# Patient Record
Sex: FEMALE | Race: WHITE | NOT HISPANIC OR LATINO
[De-identification: names, ages, dates, MRNs, and addresses within clinical notes are randomized per-mention and may not be internally consistent; named-entity substitution may affect disease eponyms.]

---

## 2020-01-10 ENCOUNTER — APPOINTMENT (OUTPATIENT)
Dept: UROLOGY | Facility: AMBULATORY SURGERY CENTER | Age: 85
End: 2020-01-10
Payer: MEDICARE

## 2020-01-10 PROBLEM — Z00.00 ENCOUNTER FOR PREVENTIVE HEALTH EXAMINATION: Status: ACTIVE | Noted: 2020-01-10

## 2020-01-10 PROCEDURE — 99204 OFFICE O/P NEW MOD 45 MIN: CPT

## 2020-01-10 NOTE — ASSESSMENT
[FreeTextEntry1] : Discussed medical expulsive therapy vs. ureteroscopy\par Discussed risks of sepsis, death, N/V, fever, UTI\par Discussed risks of intervention \par Will discuss tamsulosin with internist, to ensure he is OK with risks of hypotension/fall risk\par Patient stated internist zahra blood yesterday; will call to check Cr; if not drawn will ask internist to draw in office which is where she gets her usual bloodwork. \par Patient to strain all urine to capture stone if possible.

## 2020-01-10 NOTE — HISTORY OF PRESENT ILLNESS
[FreeTextEntry1] : CC: Ureteral stone, renal lesion \par \par HPI: Patient is an 84 year old woman with recent back pain.  Imaging obtained revealing nephrolithiasis including a ureteral stone.  The stone is 4-5 mm in size.  It is on the left side.  The stone is located in the distal left ureter, approximately 4-5 cm proximal to the left UVJ.  There is mild hydroureter.  There is a small 1cm right sided enhancing renal lesion, possible small renal cell carcinoma.  There are other small nonobstructing renal stones bilaterally.  No dysuria, burning, pain.   She has never had any known stones prior to this issue. \par \par Flank pain present, intermittent, sometimes up to 10/10 pain \par Nausea : denies\par Vomiting : denies \par Fever : denies \par Urinary symptoms : some sense of urgency but then little urine comes out; this is only for the lasat couple of days, possible tunnel syndrome  \par Duration: 2-3 weeks\par Location: left flank\par Severity: moderate to severe \par Stability: stable \par Quality: dull \par \par FAMHX: Adopted, no knowledge of family history \par SURGHX:  left pneumonectomy, craniotomy for aneurysm\par SOCIAL: 2 year history of smoking 40 years ago\par ROS: weakness, uses walker\par

## 2020-01-10 NOTE — PHYSICAL EXAM
[Normal Appearance] : normal appearance [General Appearance - In No Acute Distress] : no acute distress [Well Groomed] : well groomed [Heart Rate And Rhythm] : Heart rate and rhythm were normal [] : no respiratory distress [Respiration, Rhythm And Depth] : normal respiratory rhythm and effort [Abdomen Soft] : soft [Exaggerated Use Of Accessory Muscles For Inspiration] : no accessory muscle use [Abdomen Tenderness] : non-tender [Abdomen Hernia] : no hernia was discovered [Urinary Bladder Findings] : the bladder was normal on palpation [No Palpable Adenopathy] : no palpable adenopathy [FreeTextEntry1] : nondistended

## 2020-01-22 ENCOUNTER — INPATIENT (INPATIENT)
Facility: HOSPITAL | Age: 85
LOS: 7 days | Discharge: NURSING, REHAB OR LONG-TERM CARE | DRG: 853 | End: 2020-01-30
Attending: UROLOGY | Admitting: UROLOGY
Payer: MEDICARE

## 2020-01-22 VITALS
HEIGHT: 64 IN | RESPIRATION RATE: 18 BRPM | HEART RATE: 110 BPM | WEIGHT: 108.91 LBS | TEMPERATURE: 98 F | OXYGEN SATURATION: 97 % | DIASTOLIC BLOOD PRESSURE: 81 MMHG | SYSTOLIC BLOOD PRESSURE: 125 MMHG

## 2020-01-22 DIAGNOSIS — Z90.2 ACQUIRED ABSENCE OF LUNG [PART OF]: Chronic | ICD-10-CM

## 2020-01-22 LAB
ALBUMIN SERPL ELPH-MCNC: 3.3 G/DL — SIGNIFICANT CHANGE UP (ref 3.3–5)
ALP SERPL-CCNC: 85 U/L — SIGNIFICANT CHANGE UP (ref 40–120)
ALT FLD-CCNC: 35 U/L — SIGNIFICANT CHANGE UP (ref 10–45)
ANION GAP SERPL CALC-SCNC: 22 MMOL/L — HIGH (ref 5–17)
ANISOCYTOSIS BLD QL: SLIGHT — SIGNIFICANT CHANGE UP
APPEARANCE UR: CLEAR — SIGNIFICANT CHANGE UP
APTT BLD: 28.3 SEC — SIGNIFICANT CHANGE UP (ref 27.5–36.3)
AST SERPL-CCNC: 42 U/L — HIGH (ref 10–40)
BACTERIA # UR AUTO: PRESENT /HPF
BASOPHILS # BLD AUTO: 0 K/UL — SIGNIFICANT CHANGE UP (ref 0–0.2)
BASOPHILS NFR BLD AUTO: 0 % — SIGNIFICANT CHANGE UP (ref 0–2)
BILIRUB SERPL-MCNC: 0.6 MG/DL — SIGNIFICANT CHANGE UP (ref 0.2–1.2)
BILIRUB UR-MCNC: NEGATIVE — SIGNIFICANT CHANGE UP
BUN SERPL-MCNC: 38 MG/DL — HIGH (ref 7–23)
BURR CELLS BLD QL SMEAR: SIGNIFICANT CHANGE UP
CALCIUM SERPL-MCNC: 10.2 MG/DL — SIGNIFICANT CHANGE UP (ref 8.4–10.5)
CHLORIDE SERPL-SCNC: 96 MMOL/L — SIGNIFICANT CHANGE UP (ref 96–108)
CK MB CFR SERPL CALC: <1 NG/ML — SIGNIFICANT CHANGE UP (ref 0–6.7)
CK SERPL-CCNC: 27 U/L — SIGNIFICANT CHANGE UP (ref 25–170)
CO2 SERPL-SCNC: 19 MMOL/L — LOW (ref 22–31)
COLOR SPEC: YELLOW — SIGNIFICANT CHANGE UP
COMMENT - URINE: SIGNIFICANT CHANGE UP
CREAT SERPL-MCNC: 1.03 MG/DL — SIGNIFICANT CHANGE UP (ref 0.5–1.3)
DIFF PNL FLD: ABNORMAL
EOSINOPHIL # BLD AUTO: 0 K/UL — SIGNIFICANT CHANGE UP (ref 0–0.5)
EOSINOPHIL NFR BLD AUTO: 0 % — SIGNIFICANT CHANGE UP (ref 0–6)
EPI CELLS # UR: SIGNIFICANT CHANGE UP /HPF (ref 0–5)
GIANT PLATELETS BLD QL SMEAR: PRESENT — SIGNIFICANT CHANGE UP
GLUCOSE BLDC GLUCOMTR-MCNC: 230 MG/DL — HIGH (ref 70–99)
GLUCOSE BLDC GLUCOMTR-MCNC: 248 MG/DL — HIGH (ref 70–99)
GLUCOSE BLDC GLUCOMTR-MCNC: 319 MG/DL — HIGH (ref 70–99)
GLUCOSE SERPL-MCNC: 394 MG/DL — HIGH (ref 70–99)
GLUCOSE UR QL: >=1000
GRAN CASTS # UR COMP ASSIST: ABNORMAL /LPF
HCT VFR BLD CALC: 34.7 % — SIGNIFICANT CHANGE UP (ref 34.5–45)
HGB BLD-MCNC: 11.2 G/DL — LOW (ref 11.5–15.5)
HYALINE CASTS # UR AUTO: SIGNIFICANT CHANGE UP /LPF (ref 0–2)
INR BLD: 1.2 — HIGH (ref 0.88–1.16)
KETONES UR-MCNC: ABNORMAL MG/DL
LACTATE SERPL-SCNC: 3.6 MMOL/L — HIGH (ref 0.5–2)
LEUKOCYTE ESTERASE UR-ACNC: NEGATIVE — SIGNIFICANT CHANGE UP
LYMPHOCYTES # BLD AUTO: 0.26 K/UL — LOW (ref 1–3.3)
LYMPHOCYTES # BLD AUTO: 2.6 % — LOW (ref 13–44)
MANUAL SMEAR VERIFICATION: SIGNIFICANT CHANGE UP
MCHC RBC-ENTMCNC: 27.4 PG — SIGNIFICANT CHANGE UP (ref 27–34)
MCHC RBC-ENTMCNC: 32.3 GM/DL — SIGNIFICANT CHANGE UP (ref 32–36)
MCV RBC AUTO: 84.8 FL — SIGNIFICANT CHANGE UP (ref 80–100)
MICROCYTES BLD QL: SLIGHT — SIGNIFICANT CHANGE UP
MONOCYTES # BLD AUTO: 0.26 K/UL — SIGNIFICANT CHANGE UP (ref 0–0.9)
MONOCYTES NFR BLD AUTO: 2.6 % — SIGNIFICANT CHANGE UP (ref 2–14)
NEUTROPHILS # BLD AUTO: 9.66 K/UL — HIGH (ref 1.8–7.4)
NEUTROPHILS NFR BLD AUTO: 90.4 % — HIGH (ref 43–77)
NEUTS BAND # BLD: 4.4 % — SIGNIFICANT CHANGE UP (ref 0–8)
NITRITE UR-MCNC: NEGATIVE — SIGNIFICANT CHANGE UP
OVALOCYTES BLD QL SMEAR: SLIGHT — SIGNIFICANT CHANGE UP
PH UR: 6 — SIGNIFICANT CHANGE UP (ref 5–8)
PLAT MORPH BLD: NORMAL — SIGNIFICANT CHANGE UP
PLATELET # BLD AUTO: 102 K/UL — LOW (ref 150–400)
POIKILOCYTOSIS BLD QL AUTO: SIGNIFICANT CHANGE UP
POTASSIUM SERPL-MCNC: 3.4 MMOL/L — LOW (ref 3.5–5.3)
POTASSIUM SERPL-SCNC: 3.4 MMOL/L — LOW (ref 3.5–5.3)
PROT SERPL-MCNC: 6.3 G/DL — SIGNIFICANT CHANGE UP (ref 6–8.3)
PROT UR-MCNC: 30 MG/DL
PROTHROM AB SERPL-ACNC: 13.8 SEC — HIGH (ref 10–12.9)
RBC # BLD: 4.09 M/UL — SIGNIFICANT CHANGE UP (ref 3.8–5.2)
RBC # FLD: 14.5 % — SIGNIFICANT CHANGE UP (ref 10.3–14.5)
RBC BLD AUTO: SIGNIFICANT CHANGE UP
RBC CASTS # UR COMP ASSIST: ABNORMAL /HPF
RH IG SCN BLD-IMP: POSITIVE — SIGNIFICANT CHANGE UP
SCHISTOCYTES BLD QL AUTO: SLIGHT — SIGNIFICANT CHANGE UP
SODIUM SERPL-SCNC: 137 MMOL/L — SIGNIFICANT CHANGE UP (ref 135–145)
SP GR SPEC: 1.02 — SIGNIFICANT CHANGE UP (ref 1–1.03)
TROPONIN T SERPL-MCNC: <0.01 NG/ML — SIGNIFICANT CHANGE UP (ref 0–0.01)
UROBILINOGEN FLD QL: 0.2 E.U./DL — SIGNIFICANT CHANGE UP
WBC # BLD: 10.19 K/UL — SIGNIFICANT CHANGE UP (ref 3.8–10.5)
WBC # FLD AUTO: 10.19 K/UL — SIGNIFICANT CHANGE UP (ref 3.8–10.5)
WBC UR QL: ABNORMAL /HPF

## 2020-01-22 PROCEDURE — 99291 CRITICAL CARE FIRST HOUR: CPT

## 2020-01-22 PROCEDURE — 52332 CYSTOSCOPY AND TREATMENT: CPT | Mod: LT

## 2020-01-22 PROCEDURE — 74176 CT ABD & PELVIS W/O CONTRAST: CPT | Mod: 26

## 2020-01-22 PROCEDURE — 74420 UROGRAPHY RTRGR +-KUB: CPT | Mod: 26

## 2020-01-22 PROCEDURE — 70450 CT HEAD/BRAIN W/O DYE: CPT | Mod: 26

## 2020-01-22 PROCEDURE — 71045 X-RAY EXAM CHEST 1 VIEW: CPT | Mod: 26

## 2020-01-22 PROCEDURE — 93010 ELECTROCARDIOGRAM REPORT: CPT

## 2020-01-22 RX ORDER — OXYBUTYNIN CHLORIDE 5 MG
5 TABLET ORAL EVERY 8 HOURS
Refills: 0 | Status: DISCONTINUED | OUTPATIENT
Start: 2020-01-22 | End: 2020-01-25

## 2020-01-22 RX ORDER — CEFTRIAXONE 500 MG/1
1000 INJECTION, POWDER, FOR SOLUTION INTRAMUSCULAR; INTRAVENOUS ONCE
Refills: 0 | Status: COMPLETED | OUTPATIENT
Start: 2020-01-22 | End: 2020-01-22

## 2020-01-22 RX ORDER — GLUCAGON INJECTION, SOLUTION 0.5 MG/.1ML
1 INJECTION, SOLUTION SUBCUTANEOUS ONCE
Refills: 0 | Status: DISCONTINUED | OUTPATIENT
Start: 2020-01-22 | End: 2020-01-30

## 2020-01-22 RX ORDER — ACETAMINOPHEN 500 MG
975 TABLET ORAL ONCE
Refills: 0 | Status: COMPLETED | OUTPATIENT
Start: 2020-01-22 | End: 2020-01-22

## 2020-01-22 RX ORDER — SODIUM CHLORIDE 9 MG/ML
1000 INJECTION INTRAMUSCULAR; INTRAVENOUS; SUBCUTANEOUS ONCE
Refills: 0 | Status: COMPLETED | OUTPATIENT
Start: 2020-01-22 | End: 2020-01-22

## 2020-01-22 RX ORDER — INSULIN LISPRO 100/ML
VIAL (ML) SUBCUTANEOUS
Refills: 0 | Status: DISCONTINUED | OUTPATIENT
Start: 2020-01-22 | End: 2020-01-30

## 2020-01-22 RX ORDER — OXYCODONE HYDROCHLORIDE 5 MG/1
10 TABLET ORAL EVERY 6 HOURS
Refills: 0 | Status: DISCONTINUED | OUTPATIENT
Start: 2020-01-22 | End: 2020-01-29

## 2020-01-22 RX ORDER — OXYCODONE AND ACETAMINOPHEN 5; 325 MG/1; MG/1
1 TABLET ORAL EVERY 6 HOURS
Refills: 0 | Status: DISCONTINUED | OUTPATIENT
Start: 2020-01-22 | End: 2020-01-29

## 2020-01-22 RX ORDER — DEXTROSE 50 % IN WATER 50 %
15 SYRINGE (ML) INTRAVENOUS ONCE
Refills: 0 | Status: DISCONTINUED | OUTPATIENT
Start: 2020-01-22 | End: 2020-01-30

## 2020-01-22 RX ORDER — DEXTROSE 50 % IN WATER 50 %
25 SYRINGE (ML) INTRAVENOUS ONCE
Refills: 0 | Status: DISCONTINUED | OUTPATIENT
Start: 2020-01-22 | End: 2020-01-30

## 2020-01-22 RX ORDER — DEXTROSE 50 % IN WATER 50 %
12.5 SYRINGE (ML) INTRAVENOUS ONCE
Refills: 0 | Status: DISCONTINUED | OUTPATIENT
Start: 2020-01-22 | End: 2020-01-30

## 2020-01-22 RX ORDER — SODIUM CHLORIDE 9 MG/ML
1000 INJECTION INTRAMUSCULAR; INTRAVENOUS; SUBCUTANEOUS
Refills: 0 | Status: DISCONTINUED | OUTPATIENT
Start: 2020-01-22 | End: 2020-01-24

## 2020-01-22 RX ORDER — HYDROMORPHONE HYDROCHLORIDE 2 MG/ML
0.5 INJECTION INTRAMUSCULAR; INTRAVENOUS; SUBCUTANEOUS ONCE
Refills: 0 | Status: DISCONTINUED | OUTPATIENT
Start: 2020-01-22 | End: 2020-01-22

## 2020-01-22 RX ORDER — ACETAMINOPHEN 500 MG
650 TABLET ORAL EVERY 6 HOURS
Refills: 0 | Status: DISCONTINUED | OUTPATIENT
Start: 2020-01-22 | End: 2020-01-30

## 2020-01-22 RX ORDER — ONDANSETRON 8 MG/1
4 TABLET, FILM COATED ORAL EVERY 6 HOURS
Refills: 0 | Status: DISCONTINUED | OUTPATIENT
Start: 2020-01-22 | End: 2020-01-30

## 2020-01-22 RX ORDER — SODIUM CHLORIDE 9 MG/ML
500 INJECTION INTRAMUSCULAR; INTRAVENOUS; SUBCUTANEOUS ONCE
Refills: 0 | Status: COMPLETED | OUTPATIENT
Start: 2020-01-22 | End: 2020-01-22

## 2020-01-22 RX ORDER — SODIUM CHLORIDE 9 MG/ML
1000 INJECTION, SOLUTION INTRAVENOUS
Refills: 0 | Status: DISCONTINUED | OUTPATIENT
Start: 2020-01-22 | End: 2020-01-25

## 2020-01-22 RX ORDER — INSULIN HUMAN 100 [IU]/ML
6 INJECTION, SOLUTION SUBCUTANEOUS ONCE
Refills: 0 | Status: COMPLETED | OUTPATIENT
Start: 2020-01-22 | End: 2020-01-22

## 2020-01-22 RX ORDER — CEFTRIAXONE 500 MG/1
1000 INJECTION, POWDER, FOR SOLUTION INTRAMUSCULAR; INTRAVENOUS EVERY 24 HOURS
Refills: 0 | Status: DISCONTINUED | OUTPATIENT
Start: 2020-01-22 | End: 2020-01-23

## 2020-01-22 RX ADMIN — CEFTRIAXONE 100 MILLIGRAM(S): 500 INJECTION, POWDER, FOR SOLUTION INTRAMUSCULAR; INTRAVENOUS at 16:47

## 2020-01-22 RX ADMIN — SODIUM CHLORIDE 500 MILLILITER(S): 9 INJECTION INTRAMUSCULAR; INTRAVENOUS; SUBCUTANEOUS at 17:49

## 2020-01-22 RX ADMIN — Medication 975 MILLIGRAM(S): at 16:21

## 2020-01-22 RX ADMIN — SODIUM CHLORIDE 500 MILLILITER(S): 9 INJECTION INTRAMUSCULAR; INTRAVENOUS; SUBCUTANEOUS at 16:15

## 2020-01-22 RX ADMIN — INSULIN HUMAN 6 UNIT(S): 100 INJECTION, SOLUTION SUBCUTANEOUS at 17:48

## 2020-01-22 RX ADMIN — CEFTRIAXONE 1000 MILLIGRAM(S): 500 INJECTION, POWDER, FOR SOLUTION INTRAMUSCULAR; INTRAVENOUS at 17:49

## 2020-01-22 RX ADMIN — SODIUM CHLORIDE 1000 MILLILITER(S): 9 INJECTION INTRAMUSCULAR; INTRAVENOUS; SUBCUTANEOUS at 17:49

## 2020-01-22 RX ADMIN — Medication 975 MILLIGRAM(S): at 17:05

## 2020-01-22 RX ADMIN — SODIUM CHLORIDE 1000 MILLILITER(S): 9 INJECTION INTRAMUSCULAR; INTRAVENOUS; SUBCUTANEOUS at 16:15

## 2020-01-22 RX ADMIN — Medication 4: at 22:19

## 2020-01-22 NOTE — ED PROVIDER NOTE - CRITICAL CARE INDICATION, MLM
patient was critically ill... Patient was critically ill with a high probability of imminent or life threatening deterioration.  sepsis from suspected uti with mental status changes

## 2020-01-22 NOTE — BRIEF OPERATIVE NOTE - NSICDXBRIEFPOSTOP_GEN_ALL_CORE_FT
POST-OP DIAGNOSIS:  Left ureteral calculus 22-Jan-2020 19:39:47  Pa Calix  Sepsis 22-Jan-2020 19:39:20  Pa Calix

## 2020-01-22 NOTE — PACU DISCHARGE NOTE - COMMENTS
Discharged from PACU after meeting criteria. Report given to 9W JAY Rob. Discharged from PACU after meeting criteria. Report given to 9W RN Liane. VSS. Patient denies pain. RRWNL on 3lNC. CM showing junctional rhythm. Right 18g HL with NS at 75cc/h. Juarez 16fr in situ to bedside drainage. Patient tolerating sips of water. Patient transported on bed monitored with RN and PCA. Belongings taken home with daughter. Patient informed. Afebrile.

## 2020-01-22 NOTE — ED PROVIDER NOTE - OBJECTIVE STATEMENT
brought in by family/caregiver from pmd office with mental status changes, decreased po intake, intermittent chills/rigors for past few days.  Was diagnosed with renal stone about 2 weeks ago.  Denies fever, n/v/d, urinary symptoms.  Says she feels very weak/tired, having difficulty walking due to symptoms.  Family says she seems confused recently.  Went to pmd today and sent to ED for further eval.

## 2020-01-22 NOTE — ED PROVIDER NOTE - DIAGNOSTIC INTERPRETATION
CXR: whiteout of left lung, normal bones, heart border not visible due to whiteout.  read by Dr. Reynaga

## 2020-01-22 NOTE — PATIENT PROFILE ADULT - ARRIVAL FROM
Approved services done for vanco totaling $3.40.  Picked up patient's vanco from [facility]. Vanco given to charge nurse Laurita with instructions to give it to the patient at the time of discharge.  Bedside nurse Tammie notified.   Home

## 2020-01-22 NOTE — ED PROVIDER NOTE - CLINICAL SUMMARY MEDICAL DECISION MAKING FREE TEXT BOX
afebrile from triage but suspected infection clinically.  rectal temp done and elevated to 102.  + sirs criteria on arrival and concern for sepsis.  labs/ blood and urine cultures obtained and started 30/kg fluid bolus along with empiric ceftriaxone for suspected uti/obstructing stone as source of infection.  ua to r/o uti, cxr to r/o pneumonia.  urology consulted. afebrile from triage but suspected infection clinically.  rectal temp done and elevated to 102.  + sirs criteria on arrival and concern for sepsis.  labs/ blood and urine cultures obtained and started 30/kg fluid bolus along with empiric ceftriaxone for suspected uti/obstructing stone as source of infection.  ua to r/o uti, cxr to r/o pneumonia.  cxr shows whiteout of left lung.  ct shows obstructing stone.  urology consulted.  admit to OR afebrile from triage but suspected infection clinically.  rectal temp done and elevated to 102.  + sirs criteria on arrival and concern for sepsis.  labs/ blood and urine cultures obtained and started 30/kg fluid bolus along with empiric ceftriaxone for suspected uti/obstructing stone as source of infection.  ua to r/o uti, cxr to r/o pneumonia.  cxr shows whiteout of left lung.  discussed with daughter who states had left lung removed due to cancer.  ct shows obstructing stone.  urology consulted.  admit to OR

## 2020-01-22 NOTE — PROGRESS NOTE ADULT - ASSESSMENT
Hemodynamically stable with sepsis syndrome and impacted ureteral stone now s/p successful stent and drainage  -abx per urology until cultures return  -hold antihypertensive therapy   =AODM previsouly on metformin will monitor on sliding scale insulin  =DVT prophylaxis  =patient has 24 hour care at home and wants to return home asap will monitor her progress  [phone]

## 2020-01-22 NOTE — ED PROVIDER NOTE - CARE PLAN
Principal Discharge DX:	Sepsis Principal Discharge DX:	Sepsis  Secondary Diagnosis:	Ureteral stone  Secondary Diagnosis:	Hyperglycemia

## 2020-01-22 NOTE — ED ADULT NURSE NOTE - OBJECTIVE STATEMENT
pt A&Ox2 to pes[facility] and place brought by family members for eval of decreased po intake generalized weaknss and foul smelling urine reportedly recently had kidney stone they deny any problems with urination noted stage 2 pressure ulcer to sacrum pt wears pamp abd soft nondistended ntoed to have rectal temperature 12 lead ekg done 18Gplaced to RAC labs drawn and sent pt in nad

## 2020-01-22 NOTE — ED ADULT TRIAGE NOTE - CHIEF COMPLAINT QUOTE
Patient brought in for generalized weakness , poor appetite and disorientation since monday . Was sent from PMD clinic for evaluation .

## 2020-01-22 NOTE — ED CLERICAL - NS ED CLERK NOTE PRE-ARRIVAL INFORMATION; ADDITIONAL PRE-ARRIVAL INFORMATION
Patient mental health status has change, rule out cyslitis. known impact kidney stone, last seen by Dr. Arnoldo Villalobos

## 2020-01-22 NOTE — BRIEF OPERATIVE NOTE - NSICDXBRIEFPREOP_GEN_ALL_CORE_FT
PRE-OP DIAGNOSIS:  Left ureteral calculus 22-Jan-2020 19:39:10  Pa Calix  Sepsis 22-Jan-2020 19:39:02  Pa Calix

## 2020-01-22 NOTE — PROGRESS NOTE ADULT - SUBJECTIVE AND OBJECTIVE BOX
HPI: 85 yo woman with a hx of lung cancer s/p resecction, AODM, spine surgery, DJD and recently diagnosed with renal stone that was expected to pass but then presented to my office today with confusion lethargy loss of balance and her family thought she had a stroke.  In the setting of her recent history she was referred to the ER where CT confirmed impacted stone and she was sent urgently for ureteral stent revealling purulent fluid.    PAST MEDICAL & SURGICAL HISTORY:  High cholesterol  Diabetes  HTN (hypertension)  S/P removal of lung: left    MEDICATIONS  (STANDING):  cefTRIAXone   IVPB 1000 milliGRAM(s) IV Intermittent every 24 hours  dextrose 5%. 1000 milliLiter(s) (50 mL/Hr) IV Continuous <Continuous>  dextrose 50% Injectable 12.5 Gram(s) IV Push once  dextrose 50% Injectable 25 Gram(s) IV Push once  dextrose 50% Injectable 25 Gram(s) IV Push once  insulin lispro (HumaLOG) corrective regimen sliding scale   SubCutaneous Before meals and at bedtime  sodium chloride 0.9%. 1000 milliLiter(s) (75 mL/Hr) IV Continuous <Continuous>    MEDICATIONS  (PRN):  acetaminophen   Tablet .. 650 milliGRAM(s) Oral every 6 hours PRN Temp greater or equal to 38C (100.4F), Mild Pain (1 - 3)  dextrose 40% Gel 15 Gram(s) Oral once PRN Blood Glucose LESS THAN 70 milliGRAM(s)/deciliter  glucagon  Injectable 1 milliGRAM(s) IntraMuscular once PRN Glucose LESS THAN 70 milligrams/deciliter  HYDROmorphone  Injectable 0.5 milliGRAM(s) IV Push once PRN Severe Pain (7 - 10)  ondansetron Injectable 4 milliGRAM(s) IV Push every 6 hours PRN Nausea and/or Vomiting  oxybutynin 5 milliGRAM(s) Oral every 8 hours PRN Bladder spasms  oxycodone    5 mG/acetaminophen 325 mG 1 Tablet(s) Oral every 6 hours PRN Moderate Pain (4 - 6)  oxyCODONE    IR 10 milliGRAM(s) Oral every 6 hours PRN Severe Pain (7 - 10)    Vital Signs Last 24 Hrs  T(C): 36.8 (22 Jan 2020 19:50), Max: 39.2 (22 Jan 2020 15:57)  T(F): 98.2 (22 Jan 2020 19:50), Max: 102.6 (22 Jan 2020 15:57)  HR: 81 (22 Jan 2020 21:05) (74 - 124)  BP: 127/62 (22 Jan 2020 21:05) (125/81 - 144/89)  BP(mean): 89 (22 Jan 2020 21:05) (83 - 127)  RR: 11 (22 Jan 2020 21:05) (0 - 32)  SpO2: 100% (22 Jan 2020 21:05) (96% - 100%)    NAD  Noo JVD  Chest clear decreased breasth sounds right  CV RRR s1s2  Abd soft  Ext no edema                          11.2   10.19 )-----------( 102      ( 22 Jan 2020 15:58 )             34.7   01-22    137  |  96  |  38<H>  ----------------------------<  394<H>  3.4<L>   |  19<L>  |  1.03    Ca    10.2      22 Jan 2020 15:58    TPro  6.3  /  Alb  3.3  /  TBili  0.6  /  DBili  x   /  AST  42<H>  /  ALT  35  /  AlkPhos  85  01-22

## 2020-01-23 DIAGNOSIS — N20.1 CALCULUS OF URETER: ICD-10-CM

## 2020-01-23 LAB
ANION GAP SERPL CALC-SCNC: 11 MMOL/L — SIGNIFICANT CHANGE UP (ref 5–17)
BUN SERPL-MCNC: 28 MG/DL — HIGH (ref 7–23)
CALCIUM SERPL-MCNC: 9.2 MG/DL — SIGNIFICANT CHANGE UP (ref 8.4–10.5)
CHLORIDE SERPL-SCNC: 105 MMOL/L — SIGNIFICANT CHANGE UP (ref 96–108)
CO2 SERPL-SCNC: 26 MMOL/L — SIGNIFICANT CHANGE UP (ref 22–31)
CREAT SERPL-MCNC: 0.9 MG/DL — SIGNIFICANT CHANGE UP (ref 0.5–1.3)
ENTEROCOC DNA BLD POS QL NAA+NON-PROBE: SIGNIFICANT CHANGE UP
GLUCOSE BLDC GLUCOMTR-MCNC: 162 MG/DL — HIGH (ref 70–99)
GLUCOSE BLDC GLUCOMTR-MCNC: 186 MG/DL — HIGH (ref 70–99)
GLUCOSE BLDC GLUCOMTR-MCNC: 250 MG/DL — HIGH (ref 70–99)
GLUCOSE BLDC GLUCOMTR-MCNC: 297 MG/DL — HIGH (ref 70–99)
GLUCOSE SERPL-MCNC: 171 MG/DL — HIGH (ref 70–99)
GRAM STN FLD: SIGNIFICANT CHANGE UP
HBA1C BLD-MCNC: 7.2 % — HIGH (ref 4–5.6)
HCT VFR BLD CALC: 29.7 % — LOW (ref 34.5–45)
HGB BLD-MCNC: 9.7 G/DL — LOW (ref 11.5–15.5)
MAGNESIUM SERPL-MCNC: 1.3 MG/DL — LOW (ref 1.6–2.6)
MCHC RBC-ENTMCNC: 27.4 PG — SIGNIFICANT CHANGE UP (ref 27–34)
MCHC RBC-ENTMCNC: 32.7 GM/DL — SIGNIFICANT CHANGE UP (ref 32–36)
MCV RBC AUTO: 83.9 FL — SIGNIFICANT CHANGE UP (ref 80–100)
METHOD TYPE: SIGNIFICANT CHANGE UP
NRBC # BLD: 0 /100 WBCS — SIGNIFICANT CHANGE UP (ref 0–0)
PHOSPHATE SERPL-MCNC: 2.5 MG/DL — SIGNIFICANT CHANGE UP (ref 2.5–4.5)
PLATELET # BLD AUTO: 93 K/UL — LOW (ref 150–400)
POTASSIUM SERPL-MCNC: 3.4 MMOL/L — LOW (ref 3.5–5.3)
POTASSIUM SERPL-SCNC: 3.4 MMOL/L — LOW (ref 3.5–5.3)
RBC # BLD: 3.54 M/UL — LOW (ref 3.8–5.2)
RBC # FLD: 14.3 % — SIGNIFICANT CHANGE UP (ref 10.3–14.5)
SODIUM SERPL-SCNC: 142 MMOL/L — SIGNIFICANT CHANGE UP (ref 135–145)
WBC # BLD: 7.51 K/UL — SIGNIFICANT CHANGE UP (ref 3.8–10.5)
WBC # FLD AUTO: 7.51 K/UL — SIGNIFICANT CHANGE UP (ref 3.8–10.5)

## 2020-01-23 PROCEDURE — 99222 1ST HOSP IP/OBS MODERATE 55: CPT

## 2020-01-23 RX ORDER — PIPERACILLIN AND TAZOBACTAM 4; .5 G/20ML; G/20ML
3.38 INJECTION, POWDER, LYOPHILIZED, FOR SOLUTION INTRAVENOUS ONCE
Refills: 0 | Status: COMPLETED | OUTPATIENT
Start: 2020-01-23 | End: 2020-01-23

## 2020-01-23 RX ORDER — ACETAMINOPHEN 500 MG
750 TABLET ORAL ONCE
Refills: 0 | Status: COMPLETED | OUTPATIENT
Start: 2020-01-23 | End: 2020-01-23

## 2020-01-23 RX ORDER — VANCOMYCIN HCL 1 G
750 VIAL (EA) INTRAVENOUS ONCE
Refills: 0 | Status: COMPLETED | OUTPATIENT
Start: 2020-01-23 | End: 2020-01-23

## 2020-01-23 RX ORDER — PIPERACILLIN AND TAZOBACTAM 4; .5 G/20ML; G/20ML
2.25 INJECTION, POWDER, LYOPHILIZED, FOR SOLUTION INTRAVENOUS EVERY 6 HOURS
Refills: 0 | Status: DISCONTINUED | OUTPATIENT
Start: 2020-01-23 | End: 2020-01-24

## 2020-01-23 RX ORDER — MAGNESIUM SULFATE 500 MG/ML
2 VIAL (ML) INJECTION ONCE
Refills: 0 | Status: COMPLETED | OUTPATIENT
Start: 2020-01-23 | End: 2020-01-23

## 2020-01-23 RX ORDER — PIPERACILLIN AND TAZOBACTAM 4; .5 G/20ML; G/20ML
3.38 INJECTION, POWDER, LYOPHILIZED, FOR SOLUTION INTRAVENOUS EVERY 6 HOURS
Refills: 0 | Status: DISCONTINUED | OUTPATIENT
Start: 2020-01-23 | End: 2020-01-23

## 2020-01-23 RX ADMIN — Medication 750 MILLIGRAM(S): at 19:38

## 2020-01-23 RX ADMIN — Medication 50 GRAM(S): at 13:28

## 2020-01-23 RX ADMIN — Medication 4: at 22:32

## 2020-01-23 RX ADMIN — PIPERACILLIN AND TAZOBACTAM 200 GRAM(S): 4; .5 INJECTION, POWDER, LYOPHILIZED, FOR SOLUTION INTRAVENOUS at 18:16

## 2020-01-23 RX ADMIN — Medication 250 MILLIGRAM(S): at 19:38

## 2020-01-23 RX ADMIN — Medication 2: at 12:22

## 2020-01-23 RX ADMIN — Medication 2: at 07:24

## 2020-01-23 RX ADMIN — PIPERACILLIN AND TAZOBACTAM 200 GRAM(S): 4; .5 INJECTION, POWDER, LYOPHILIZED, FOR SOLUTION INTRAVENOUS at 23:56

## 2020-01-23 RX ADMIN — Medication 300 MILLIGRAM(S): at 18:00

## 2020-01-23 RX ADMIN — Medication 6: at 17:58

## 2020-01-23 RX ADMIN — PIPERACILLIN AND TAZOBACTAM 200 GRAM(S): 4; .5 INJECTION, POWDER, LYOPHILIZED, FOR SOLUTION INTRAVENOUS at 12:22

## 2020-01-23 NOTE — DIETITIAN INITIAL EVALUATION ADULT. - ENERGY NEEDS
Height: 5 feet 4 inches, Weight (Current): 108 pounds,  pounds +/-10%, %IBW 90%, BMI 18.7  current body wt used for energy calculations as pt falls within % IBW; adjusted for suspected malnutrition in older adult - recommend mid range calorie needs, upper end protein needs

## 2020-01-23 NOTE — DIETITIAN INITIAL EVALUATION ADULT. - ADD RECOMMEND
1. Monitor PO intake/appetite, GI distress, diet tolerance, labs, weights.  2. MVI daily.  3. RD to remain available for additional nutrition interventions as needed.

## 2020-01-23 NOTE — DIETITIAN INITIAL EVALUATION ADULT. - PERTINENT MEDS FT
82yo F admitted 1/22 – no H&P noted. Pt with hx of HTN HLD, B frontal craniotomies (aneurysm?), nephrolithiasis/pyelonephritis with bacteremia, lung cancer s/p resection, AODM, spine surgery, DJD, renal stone. Pt with sepsis, Left ureteral calculus s/p Placement of ureteral stent 1/22. No pain per flow sheets. No edema/pressure ulcers.       Zosyn, NaCl 0.9%, Humalog correction, Mg Sulfate, Zofran, Tylenol, Dilaudid, Oxy,  Percocet Zosyn, NaCl 0.9%, Humalog correction, Mg Sulfate, Zofran, Tylenol, Dilaudid, Oxy,  Percocet

## 2020-01-23 NOTE — DIETITIAN INITIAL EVALUATION ADULT. - PERTINENT LABORATORY DATA
low HH, last 3 POCT 162 230 248, Glucose 171, A1c 7.2%, low potassium, Phos WDL, Mg Low BUN 28, Cr WDL

## 2020-01-23 NOTE — DIETITIAN INITIAL EVALUATION ADULT. - OTHER INFO
84yo F admitted 1/22 – no H&P noted. Pt with hx of HTN HLD, B frontal craniotomies (aneurysm?), nephrolithiasis/pyelonephritis with bacteremia, lung cancer s/p resection, AODM, spine surgery, DJD, renal stone. Pt with sepsis, Left ureteral calculus s/p Placement of ureteral stent 1/22. No pain per flow sheets. No edema/pressure ulcers.   Please see below for nutritions recommendations. 84yo F admitted 1/22 – no H&P noted. Pt with hx of HTN HLD, B frontal craniotomies (aneurysm?), nephrolithiasis/pyelonephritis with bacteremia, lung cancer s/p resection, AODM, spine surgery, DJD, renal stone. Pt with sepsis, Left ureteral calculus s/p Placement of ureteral stent 1/22. No pain per flow sheets. No edema/pressure ulcers.   Spoke with pt, aide and daughter at bedside. Pt had weighed 148 pounds x15 months ago, reports more recent wt of 109 pounds, EMR wt of 108 pounds 1/22 suggests wt loss of 40 pounds / 27 % Body wt loss. Per physical assessment: Muscle Wasting- Temporal [ Moderate ]  Clavicle/Pectoral [ Severe ]  Shoulder/Deltoid [ Mild ]  Scapula [ Moderate ]. Pt unsure reason for wt loss however does report decreased PO intake x1 month. Regular diet in which she eats small portions of, Just bought ensure x1-2 days PTA. No issues chewing/swallowing, NKFA. Pt with order for Consistent Carbohydrate diet - lunch seen at bedside which was untouched, pt reports she was told not to eat however is not hungry regardless.  Education: Discussed oral nutrition supplements, Discussed calorie dense foods.   Please see below for nutritions recommendations. Recs made with team. Pending order placed.

## 2020-01-23 NOTE — CONSULT NOTE ADULT - ASSESSMENT
85 yo F with HTN, DM, HLD, h/o lung CA s/p L pneumonectomy, remote cerebral aneurysm with ureteral stone/pyelonephritis with sepsis (fever, tachycardia, leukocytosis elevated lactate).  Blood isolate is Enterococcus sp.  by [facility].  Would expect urine to be source in view of findings c/c pyelonephritis/blocked kidney on CT.  Though she had been treated with ceftriaxone, she is clinically improved since stent placed.  Until Enterococcal species is known, would include coverage for ampicillin-resistant isolates.  Gene for vancomycin resistance was not detected by PCR.  Until urine culture results are known, agree with including broader gram negative coverage.  Would dose antibiotics for CrCl ~37 for now.  Thrombocytopenia likely secondary to sepsis.  Suggest:  - F/U ID and susceptibility of bloodstream isolate  - Repeat surveillance blood cultures.  - F/U urine cultures  - Vancomycin 750 mg IV X 1 dose.  Please obtain level tomorrow morning  - Continue pip-tazo - change dose to 2.25 g IV q6h  Recommendations discussed with primary team.  Will follow with you – ID Team 2.

## 2020-01-23 NOTE — DIETITIAN INITIAL EVALUATION ADULT. - DIET TYPE
Consistent Carbohydrate + evening Snack to promote PO; glucerna x3 per day as oral nutrition supplements - 220kcal/10gm prot per 1 can

## 2020-01-23 NOTE — DIETITIAN INITIAL EVALUATION ADULT. - MALNUTRITION
Suspect Severe in setting of chronic illness: 40 pounds / 27 % Body wt loss x15 months / decreased PO intake x1 month / Per physical assessment: Muscle Wasting- Temporal [ Moderate ]  Clavicle/Pectoral [ Severe ]  Shoulder/Deltoid [ Mild ]  Scapula [ Moderate ] Suspect Severe in setting of chronic illness:

## 2020-01-23 NOTE — PROGRESS NOTE ADULT - SUBJECTIVE AND OBJECTIVE BOX
HPI:  full hx per yesterdays note  overnight afebrile normal WBC and good urine output  hemodynamically stable and marked improvememtn in mental status and level of alertness    PAST MEDICAL & SURGICAL HISTORY:  High cholesterol  Diabetes  HTN (hypertension)  S/P removal of lung: left    MEDICATIONS  (STANDING):  cefTRIAXone   IVPB 1000 milliGRAM(s) IV Intermittent every 24 hours  dextrose 5%. 1000 milliLiter(s) (50 mL/Hr) IV Continuous <Continuous>  dextrose 50% Injectable 12.5 Gram(s) IV Push once  dextrose 50% Injectable 25 Gram(s) IV Push once  dextrose 50% Injectable 25 Gram(s) IV Push once  insulin lispro (HumaLOG) corrective regimen sliding scale   SubCutaneous Before meals and at bedtime  sodium chloride 0.9%. 1000 milliLiter(s) (75 mL/Hr) IV Continuous <Continuous>    MEDICATIONS  (PRN):  acetaminophen   Tablet .. 650 milliGRAM(s) Oral every 6 hours PRN Temp greater or equal to 38C (100.4F), Mild Pain (1 - 3)  dextrose 40% Gel 15 Gram(s) Oral once PRN Blood Glucose LESS THAN 70 milliGRAM(s)/deciliter  glucagon  Injectable 1 milliGRAM(s) IntraMuscular once PRN Glucose LESS THAN 70 milligrams/deciliter  HYDROmorphone  Injectable 0.5 milliGRAM(s) IV Push once PRN Severe Pain (7 - 10)  ondansetron Injectable 4 milliGRAM(s) IV Push every 6 hours PRN Nausea and/or Vomiting  oxybutynin 5 milliGRAM(s) Oral every 8 hours PRN Bladder spasms  oxycodone    5 mG/acetaminophen 325 mG 1 Tablet(s) Oral every 6 hours PRN Moderate Pain (4 - 6)  oxyCODONE    IR 10 milliGRAM(s) Oral every 6 hours PRN Severe Pain (7 - 10)    Vital Signs Last 24 Hrs  T(C): 36.4 (23 Jan 2020 05:29), Max: 39.2 (22 Jan 2020 15:57)  T(F): 97.6 (23 Jan 2020 05:29), Max: 102.6 (22 Jan 2020 15:57)  HR: 78 (23 Jan 2020 05:29) (74 - 124)  BP: 144/66 (23 Jan 2020 05:29) (125/81 - 144/89)  BP(mean): 91 (22 Jan 2020 22:00) (83 - 127)  RR: 16 (23 Jan 2020 05:29) (0 - 32)  SpO2: 100% (23 Jan 2020 05:29) (96% - 100%)    Alert and oriented x 3   Denies pain  No JVD  Chest clear right decreased breath sounds left (s/p resecction)  CV RRR s1s2 no murmur  Abd soft  Ext no edema                          9.7    7.51  )-----------( 93       ( 23 Jan 2020 06:33 )             29.7   01-23    142  |  105  |  28<H>  ----------------------------<  171<H>  3.4<L>   |  26  |  0.90    Ca    9.2      23 Jan 2020 05:51  Phos  2.5     01-23  Mg     1.3     01-23    TPro  6.3  /  Alb  3.3  /  TBili  0.6  /  DBili  x   /  AST  42<H>  /  ALT  35  /  AlkPhos  85  01-22      Culture - Blood (collected 22 Jan 2020 17:50)  Source: .Blood Blood-Peripheral  Preliminary Report (23 Jan 2020 06:00):    No growth at 12 hours    Culture - Blood (collected 22 Jan 2020 17:50)  Source: .Blood Blood-Peripheral  Preliminary Report (23 Jan 2020 06:00):    No growth at 12 hours

## 2020-01-23 NOTE — CHART NOTE - NSCHARTNOTEFT_GEN_A_CORE
Upon Nutritional Assessment by the Registered Dietitian your patient was determined to meet criteria / has evidence of the following diagnosis/diagnoses:          [ ]  Mild Protein Calorie Malnutrition        [ ]  Moderate Protein Calorie Malnutrition        [ x ] Severe Protein Calorie Malnutrition        [ ] Unspecified Protein Calorie Malnutrition        [ ] Underweight / BMI <19        [ ] Morbid Obesity / BMI > 40      Findings as based on:  •  Comprehensive nutrition assessment and consultation : 40 pounds / 27 % Body wt loss x15 months / decreased PO intake x1 month / Per physical assessment: Muscle Wasting- Temporal [ Moderate ]  Clavicle/Pectoral [ Severe ]  Shoulder/Deltoid [ Mild ]  Scapula [ Moderate ]         The following diet has been recommended: Consistent Carbohydrate with evening snack, glucerna x3 per day as oral nutrition supplements - 220kcal/10gm prot per 1 can       PROVIDER Section:     By signing this assessment you are acknowledging and agree with the diagnosis/diagnoses assigned by the Registered Dietitian    Comments:

## 2020-01-23 NOTE — CONSULT NOTE ADULT - SUBJECTIVE AND OBJECTIVE BOX
HPI:  83 yo F with HTN, DM, HLD, h/o L pneumonectomy, B frontal craniotomies (aneurysm?), nephrolithiasis/pyelonephritis with bacteremia.  She went to PMD on  with confusion, decreased po intake and intermittent chills/rigors X ~3 days.  She had been diagnosed with nephrolithiasis ~2 weeks PTA.  Was referred to ED where she had T 98.5, , WBC 10.19 with 90% PMNs, plts 102, lactate 3.6.  CT A/P showed enlarged L kidney with perinephric stranding and moderate L hydroureteronephrosis secondary to a 4 mm stone just proximal to L ureterovesical junction.  She was started on ceftriaxone.  She underwent L ureteral stent placement last night.  Blood cultures are now growing GPC pr/ch.  Ceftriaxone was d/fernandez and she was started on pip-tazo.  ID consult requested.      PAST MEDICAL & SURGICAL HISTORY:  High cholesterol  Diabetes  HTN (hypertension)  S/P removal of lung: left          MEDICATIONS  (STANDING):  dextrose 5%. 1000 milliLiter(s) (50 mL/Hr) IV Continuous <Continuous>  dextrose 50% Injectable 12.5 Gram(s) IV Push once  dextrose 50% Injectable 25 Gram(s) IV Push once  dextrose 50% Injectable 25 Gram(s) IV Push once  insulin lispro (HumaLOG) corrective regimen sliding scale   SubCutaneous Before meals and at bedtime  magnesium sulfate  IVPB 2 Gram(s) IV Intermittent once  piperacillin/tazobactam IVPB. 3.375 Gram(s) IV Intermittent once  piperacillin/tazobactam IVPB.. 3.375 Gram(s) IV Intermittent every 6 hours  sodium chloride 0.9%. 1000 milliLiter(s) (75 mL/Hr) IV Continuous <Continuous>    MEDICATIONS  (PRN):  acetaminophen   Tablet .. 650 milliGRAM(s) Oral every 6 hours PRN Temp greater or equal to 38C (100.4F), Mild Pain (1 - 3)  dextrose 40% Gel 15 Gram(s) Oral once PRN Blood Glucose LESS THAN 70 milliGRAM(s)/deciliter  glucagon  Injectable 1 milliGRAM(s) IntraMuscular once PRN Glucose LESS THAN 70 milligrams/deciliter  HYDROmorphone  Injectable 0.5 milliGRAM(s) IV Push once PRN Severe Pain (7 - 10)  ondansetron Injectable 4 milliGRAM(s) IV Push every 6 hours PRN Nausea and/or Vomiting  oxybutynin 5 milliGRAM(s) Oral every 8 hours PRN Bladder spasms  oxycodone    5 mG/acetaminophen 325 mG 1 Tablet(s) Oral every 6 hours PRN Moderate Pain (4 - 6)  oxyCODONE    IR 10 milliGRAM(s) Oral every 6 hours PRN Severe Pain (7 - 10)      Allergies    No Known Allergies    Intolerances        SOCIAL HISTORY:    FAMILY HISTORY:  No pertinent family history in first degree relatives.    Vital Signs Last 24 Hrs  T(C): 37.2 (2020 08:24), Max: 39.2 (2020 15:57)  T(F): 98.9 (2020 08:24), Max: 102.6 (2020 15:57)  HR: 79 (2020 08:24) (74 - 124)  BP: 140/63 (2020 08:24) (125/81 - 144/89)  BP(mean): 91 (2020 22:00) (83 - 127)  RR: 17 (2020 08:24) (0 - 32)  SpO2: 100% (2020 08:24) (96% - 100%)    PE:  WDWN in no distress  HEENT:  NC, PERRL, sclerae anicteric, conjunctivae clear, EOMI.  Sinuses nontender, no nasal exudate.  No buccal or pharyngeal lesions, erythema or exudate  Neck:  Supple, no adenopathy  Lungs:  Clear to auscultation  Cor:  RRR, S1, S2, no murmur appreciated  Abd:  Symmetric, normoactive BS.  Soft, nontender, no masses, guarding or rebound.  Liver and spleen not enlarged  Extrem:  No cyanosis or edema  Skin:  No rashes.    LABS:                        9.7    7.51  )-----------( 93       ( 2020 06:33 )             29.7         142  |  105  |  28<H>  ----------------------------<  171<H>  3.4<L>   |  26  |  0.90    Ca    9.2      2020 05:51  Phos  2.5       Mg     1.3         TPro  6.3  /  Alb  3.3  /  TBili  0.6  /  DBili  x   /  AST  42<H>  /  ALT  35  /  AlkPhos  85      Urinalysis Basic - ( 2020 16:55 )    Color: Yellow / Appearance: Clear / S.020 / pH: x  Gluc: x / Ketone: Trace mg/dL  / Bili: Negative / Urobili: 0.2 E.U./dL   Blood: x / Protein: 30 mg/dL / Nitrite: NEGATIVE   Leuk Esterase: NEGATIVE / RBC: 5-10 /HPF / WBC 5-10 /HPF   Sq Epi: x / Non Sq Epi: 0-5 /HPF / Bacteria: Present /HPF    MICROBIOLOGY:  Blood cultures:  1/22 X 2 – GPC pr ch (3/4 bottles)  Urine culture : in lab  Surgical urine culture specimen received      RADIOLOGY & ADDITIONAL STUDIES:  < from: CT Head No Cont (20 @ 17:19) >  Findings: There are no prior studies available for comparison.    The patient is status post bilateral frontal craniotomies with a few surgical clips seen in the inferior anterior frontal midline. There are anterior frontal balwinder holes present. There is an aneurysm clip seen at the anterior suprasellar cistern. There are marked bilateral inferior frontal encephalomalacic changes and gliosis. There is ex vacuo dilation of the bilateral frontal horns. There are patchy areas of low density within the periventricular white matter consistent with mild microvascular disease. There are tiny bilateral caudate chronic lacunar infarcts. There is a left external capsule tiny chronic lacunar infarct. There is enlargement the sulci, cisterns and ventricles consistent with mild cerebral and cerebellar volume loss. There is no evidence of hydrocephalus. There is no evidence of mass-effect or midline shift.There is no evidence of an intra or extra-axial fluid collection.    There is mild bilateral ethmoid mucosal thickening. There is a tiny left sphenoid air-fluid level. The remaining paranasal sinuses and bilateral mastoid air cells are clear. There is osteopenia of the skull base.    Impression: No acute intracranial injury.     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 Bilateral inferior frontal encephalomalacic changes and gliosis. Mild microvascular disease. Tiny bilateral caudate chronic lacunar infarcts. Left external capsule tiny chronic lacunar infarct.     < end of copied text >    < from: CT Abdomen and Pelvis No Cont (20 @ 17:19) >  FINDINGS:    Evaluation of the abdominopelvic viscera is limited due to noncontrast technique.    Lower chest: There are multiple right-sided lung nodules including two 5-mm right lower lobe pulmonary nodules, 2 mm and 4 mm right middle lobe pulmonary nodules.  Severe mitral anus calcifications. Moderate coronary artery calcifications. Heart is deviated to the left. Left basal pleural thickening with thoracotomy and rib deformity. Small loculated left basal pleural effusion. Partially imaged nodular thickening of the adjacent left posterolateral pleura.    Liver: Limited assessment due to lack of intravenous contrast. Smooth contour. No definite focal lesion seen.     Biliary system: Gallbladder is normal in size. No calcified gallstones. No biliary ductal dilatation.    Pancreas: Pancreatic head calcifications which could be vascular. Thinning of the pancreatic parenchyma at the body and tail..    Spleen: Unremarkable.    Adrenal glands: 2.8 cmright adrenal nodule with Hounsfield unit of -2 HU compatible with a benign adenoma.     Kidneys: Enlarged left kidney and left renal perinephric stranding with moderate left hydroureteronephrosis secondary to a 4 mm stone (774 HU) just proximal to the left ureterovesical junction. 2.0 cm intermediate density left renal lesion could represent hyperdense renal cyst however a solid renal neoplasm cannot be excluded. Punctate nonobstructing right lower pole calcification which could be vascular or may represent a tiny stone.    Bowel/Peritoneum: Normal bowel caliber without evidence of obstruction. No appreciable wall thickening, although evaluation is suboptimal without oral contrast. No extraluminal gas or ascites.     Pelvic organs: Focus of air in the urinary bladder..     Lymph nodes: No lymphadenopathy.    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    Bones/Soft tissues: Degenerative changes of the spine and hips. Osteopenia. Small right fat-containing inguinal hernia.    IMPRESSION:   1.  Enlargedleft kidney and left renal perinephric stranding with moderate left hydroureteronephrosis secondary to a 4 mm stone just proximal to the left ureterovesical junction.  2.  Focus of air in the urinary bladder compatible with infection in the absence of recent instrumentation, correlate clinically.  3.  Partially imaged left lung base in this patient with a history of left pneumonectomy. Loculated small left basal pleural effusion with nodular pleural thickening of the left lateral costophrenic angle. Follow-up CT of the chest is recommended. Comparison with prior studies would be helpful.    < end of copied text > HPI:  85 yo F with HTN, DM, HLD, h/o lung CA s/p L pneumonectomy (), cerebral aneurysm - B frontal craniotomies (), ureteral stone/pyelonephritis with bacteremia.  Per her daughter, she had been having L hip pain X ~1 month.  Nephrolithiasis/ureteral stone was seen on imaging, for which she was seen by Dr. Villalobos on 1/10 - plan was for tamsulosin.  She went to PMD on  with confusion, decreased po intake and intermittent chills/rigors since .  Was referred to ED where she had T(r) 102.6, , WBC 10.19 with 90% PMNs, plts 102, lactate 3.6.  CT A/P showed enlarged L kidney with perinephric stranding and moderate L hydroureteronephrosis secondary to a 4 mm stone just proximal to L ureterovesical junction.  She was started on ceftriaxone.  She underwent L ureteral stent placement last night.  Blood cultures are now growing GPC pr/ch.  Ceftriaxone was d/fernandez and she was started on pip-tazo.  ID consult requested.  Per her daughter, her mental status is markedly improved today.  She thinks that she recently received antibiotics, unknown which or why.  Per Ms. Gold, she has not had prior UTIs.  She currently has no flank/back/hip pain.      PAST MEDICAL & SURGICAL HISTORY:  High cholesterol  Diabetes  HTN (hypertension)  S/P removal of lung: left          MEDICATIONS  (STANDING):  dextrose 5%. 1000 milliLiter(s) (50 mL/Hr) IV Continuous <Continuous>  dextrose 50% Injectable 12.5 Gram(s) IV Push once  dextrose 50% Injectable 25 Gram(s) IV Push once  dextrose 50% Injectable 25 Gram(s) IV Push once  insulin lispro (HumaLOG) corrective regimen sliding scale   SubCutaneous Before meals and at bedtime  magnesium sulfate  IVPB 2 Gram(s) IV Intermittent once  piperacillin/tazobactam IVPB. 3.375 Gram(s) IV Intermittent once  piperacillin/tazobactam IVPB.. 3.375 Gram(s) IV Intermittent every 6 hours  sodium chloride 0.9%. 1000 milliLiter(s) (75 mL/Hr) IV Continuous <Continuous>    MEDICATIONS  (PRN):  acetaminophen   Tablet .. 650 milliGRAM(s) Oral every 6 hours PRN Temp greater or equal to 38C (100.4F), Mild Pain (1 - 3)  dextrose 40% Gel 15 Gram(s) Oral once PRN Blood Glucose LESS THAN 70 milliGRAM(s)/deciliter  glucagon  Injectable 1 milliGRAM(s) IntraMuscular once PRN Glucose LESS THAN 70 milligrams/deciliter  HYDROmorphone  Injectable 0.5 milliGRAM(s) IV Push once PRN Severe Pain (7 - 10)  ondansetron Injectable 4 milliGRAM(s) IV Push every 6 hours PRN Nausea and/or Vomiting  oxybutynin 5 milliGRAM(s) Oral every 8 hours PRN Bladder spasms  oxycodone    5 mG/acetaminophen 325 mG 1 Tablet(s) Oral every 6 hours PRN Moderate Pain (4 - 6)  oxyCODONE    IR 10 milliGRAM(s) Oral every 6 hours PRN Severe Pain (7 - 10)      Allergies    No Known Allergies    Intolerances        SOCIAL HISTORY:  , originally from [place].  Moved to [place] (near daughter) in 3/2019.  Has HHA, now including night time.  No pets, recent travel.    FAMILY HISTORY:  No pertinent family history in first degree relatives.    ROS:  No HA, photophobia, neck stiffness, rhinorrhea, sore throat, cough, CP, SOB, N, V, diarrhea, abd pain, dysuria, hematuria, frequency, muscle/joint symptoms, bruising/bleeding.      Vital Signs Last 24 Hrs  T(C): 37.2 (2020 08:24), Max: 39.2 (2020 15:57)  T(F): 98.9 (2020 08:24), Max: 102.6 (2020 15:57)  HR: 79 (2020 08:24) (74 - 124)  BP: 140/63 (2020 08:24) (125/81 - 144/89)  BP(mean): 91 (2020 22:00) (83 - 127)  RR: 17 (2020 08:24) (0 - 32)  SpO2: 100% (2020 08:24) (96% - 100%)    PE:  Alert, frail, in no distress, OX3  HEENT:  NC, PERRL, sclerae anicteric, conjunctivae clear.  Nasal cannula in place.  Sinuses nontender, no nasal exudate.  No buccal or pharyngeal lesions, erythema or exudate  Neck:  Supple, no adenopathy  Lungs:  Clear to auscultation  Cor:  RRR, S1, S2, no murmur appreciated  Abd:  Symmetric, normoactive BS.  Soft, nontender, no masses, guarding or rebound.  Liver and spleen not enlarged.  No CVAT  :  Juarez catheter in place draining somewhat dark urine  Extrem:  No cyanosis or edema  Skin:  No rashes.    LABS:                        9.7    7.51  )-----------( 93       ( 2020 06:33 )             29.7         142  |  105  |  28<H>  ----------------------------<  171<H>  3.4<L>   |  26  |  0.90    Ca    9.2      2020 05:51  Phos  2.5       Mg     1.3         TPro  6.3  /  Alb  3.3  /  TBili  0.6  /  DBili  x   /  AST  42<H>  /  ALT  35  /  AlkPhos  85      Urinalysis Basic - ( 2020 16:55 )    Color: Yellow / Appearance: Clear / S.020 / pH: x  Gluc: x / Ketone: Trace mg/dL  / Bili: Negative / Urobili: 0.2 E.U./dL   Blood: x / Protein: 30 mg/dL / Nitrite: NEGATIVE   Leuk Esterase: NEGATIVE / RBC: 5-10 /HPF / WBC 5-10 /HPF   Sq Epi: x / Non Sq Epi: 0-5 /HPF / Bacteria: Present /HPF    MICROBIOLOGY:  Blood cultures:  1/22 X 2 – GPC pr ch (3/4 bottles)  Urine culture : in lab  Surgical urine culture specimen received      RADIOLOGY & ADDITIONAL STUDIES:  < from: CT Head No Cont (20 @ 17:19) >  Findings: There are no prior studies available for comparison.    The patient is status post bilateral frontal craniotomies with a few surgical clips seen in the inferior anterior frontal midline. There are anterior frontal balwinder holes present. There is an aneurysm clip seen at the anterior suprasellar cistern. There are marked bilateral inferior frontal encephalomalacic changes and gliosis. There is ex vacuo dilation of the bilateral frontal horns. There are patchy areas of low density within the periventricular white matter consistent with mild microvascular disease. There are tiny bilateral caudate chronic lacunar infarcts. There is a left external capsule tiny chronic lacunar infarct. There is enlargement the sulci, cisterns and ventricles consistent with mild cerebral and cerebellar volume loss. There is no evidence of hydrocephalus. There is no evidence of mass-effect or midline shift.There is no evidence of an intra or extra-axial fluid collection.    There is mild bilateral ethmoid mucosal thickening. There is a tiny left sphenoid air-fluid level. The remaining paranasal sinuses and bilateral mastoid air cells are clear. There is osteopenia of the skull base.    Impression: No acute intracranial injury.     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 Bilateral inferior frontal encephalomalacic changes and gliosis. Mild microvascular disease. Tiny bilateral caudate chronic lacunar infarcts. Left external capsule tiny chronic lacunar infarct.     < end of copied text >    < from: CT Abdomen and Pelvis No Cont (20 @ 17:19) >  FINDINGS:    Evaluation of the abdominopelvic viscera is limited due to noncontrast technique.    Lower chest: There are multiple right-sided lung nodules including two 5-mm right lower lobe pulmonary nodules, 2 mm and 4 mm right middle lobe pulmonary nodules.  Severe mitral anus calcifications. Moderate coronary artery calcifications. Heart is deviated to the left. Left basal pleural thickening with thoracotomy and rib deformity. Small loculated left basal pleural effusion. Partially imaged nodular thickening of the adjacent left posterolateral pleura.    Liver: Limited assessment due to lack of intravenous contrast. Smooth contour. No definite focal lesion seen.     Biliary system: Gallbladder is normal in size. No calcified gallstones. No biliary ductal dilatation.    Pancreas: Pancreatic head calcifications which could be vascular. Thinning of the pancreatic parenchyma at the body and tail..    Spleen: Unremarkable.    Adrenal glands: 2.8 cmright adrenal nodule with Hounsfield unit of -2 HU compatible with a benign adenoma.     Kidneys: Enlarged left kidney and left renal perinephric stranding with moderate left hydroureteronephrosis secondary to a 4 mm stone (774 HU) just proximal to the left ureterovesical junction. 2.0 cm intermediate density left renal lesion could represent hyperdense renal cyst however a solid renal neoplasm cannot be excluded. Punctate nonobstructing right lower pole calcification which could be vascular or may represent a tiny stone.    Bowel/Peritoneum: Normal bowel caliber without evidence of obstruction. No appreciable wall thickening, although evaluation is suboptimal without oral contrast. No extraluminal gas or ascites.     Pelvic organs: Focus of air in the urinary bladder..     Lymph nodes: No lymphadenopathy.    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    Bones/Soft tissues: Degenerative changes of the spine and hips. Osteopenia. Small right fat-containing inguinal hernia.    IMPRESSION:   1.  Enlargedleft kidney and left renal perinephric stranding with moderate left hydroureteronephrosis secondary to a 4 mm stone just proximal to the left ureterovesical junction.  2.  Focus of air in the urinary bladder compatible with infection in the absence of recent instrumentation, correlate clinically.  3.  Partially imaged left lung base in this patient with a history of left pneumonectomy. Loculated small left basal pleural effusion with nodular pleural thickening of the left lateral costophrenic angle. Follow-up CT of the chest is recommended. Comparison with prior studies would be helpful.    < end of copied text > HPI:  85 yo F with HTN, DM, HLD, h/o lung CA s/p L pneumonectomy (), cerebral aneurysm - B frontal craniotomies (), ureteral stone/pyelonephritis with bacteremia.  Per her daughter, she had been having L hip pain X ~1 month.  Nephrolithiasis/ureteral stone was seen on imaging, for which she was seen by Dr. Villalobos on 1/10 - plan was for tamsulosin.  She went to PMD on  with confusion, decreased po intake and intermittent chills/rigors since .  Was referred to ED where she had T(r) 102.6, , WBC 10.19 with 90% PMNs, plts 102, lactate 3.6.  CT A/P showed enlarged L kidney with perinephric stranding and moderate L hydroureteronephrosis secondary to a 4 mm stone just proximal to L ureterovesical junction.  She was started on ceftriaxone.  She underwent L ureteral stent placement last night.  Blood cultures are now growing GPC pr/ch.  Ceftriaxone was d/fernandez and she was started on pip-tazo.  ID consult requested.  Per her daughter, her mental status is markedly improved today.  She thinks that she recently received antibiotics, unknown which or why.  Per Ms. Gold, she has not had prior UTIs.  She currently has no flank/back/hip pain.      PAST MEDICAL & SURGICAL HISTORY:  High cholesterol  Diabetes  HTN (hypertension)  S/P removal of lung: left          MEDICATIONS  (STANDING):  dextrose 5%. 1000 milliLiter(s) (50 mL/Hr) IV Continuous <Continuous>  dextrose 50% Injectable 12.5 Gram(s) IV Push once  dextrose 50% Injectable 25 Gram(s) IV Push once  dextrose 50% Injectable 25 Gram(s) IV Push once  insulin lispro (HumaLOG) corrective regimen sliding scale   SubCutaneous Before meals and at bedtime  magnesium sulfate  IVPB 2 Gram(s) IV Intermittent once  piperacillin/tazobactam IVPB. 3.375 Gram(s) IV Intermittent once  piperacillin/tazobactam IVPB.. 3.375 Gram(s) IV Intermittent every 6 hours  sodium chloride 0.9%. 1000 milliLiter(s) (75 mL/Hr) IV Continuous <Continuous>    MEDICATIONS  (PRN):  acetaminophen   Tablet .. 650 milliGRAM(s) Oral every 6 hours PRN Temp greater or equal to 38C (100.4F), Mild Pain (1 - 3)  dextrose 40% Gel 15 Gram(s) Oral once PRN Blood Glucose LESS THAN 70 milliGRAM(s)/deciliter  glucagon  Injectable 1 milliGRAM(s) IntraMuscular once PRN Glucose LESS THAN 70 milligrams/deciliter  HYDROmorphone  Injectable 0.5 milliGRAM(s) IV Push once PRN Severe Pain (7 - 10)  ondansetron Injectable 4 milliGRAM(s) IV Push every 6 hours PRN Nausea and/or Vomiting  oxybutynin 5 milliGRAM(s) Oral every 8 hours PRN Bladder spasms  oxycodone    5 mG/acetaminophen 325 mG 1 Tablet(s) Oral every 6 hours PRN Moderate Pain (4 - 6)  oxyCODONE    IR 10 milliGRAM(s) Oral every 6 hours PRN Severe Pain (7 - 10)      Allergies    No Known Allergies    Intolerances        SOCIAL HISTORY:  , originally from [place].  Moved to [place] (near daughter) in 3/2019.  Has HHA, now including night time.  No pets, recent travel.    FAMILY HISTORY:  No pertinent family history in first degree relatives.    ROS:  No HA, photophobia, neck stiffness, rhinorrhea, sore throat, cough, CP, SOB, N, V, diarrhea, abd pain, dysuria, hematuria, frequency, muscle/joint symptoms, bruising/bleeding.  Per her daughter, she has 45 lb weight loss in past year.      Vital Signs Last 24 Hrs  T(C): 37.2 (2020 08:24), Max: 39.2 (2020 15:57)  T(F): 98.9 (2020 08:24), Max: 102.6 (2020 15:57)  HR: 79 (2020 08:24) (74 - 124)  BP: 140/63 (2020 08:24) (125/81 - 144/89)  BP(mean): 91 (2020 22:00) (83 - 127)  RR: 17 (2020 08:24) (0 - 32)  SpO2: 100% (2020 08:24) (96% - 100%)    PE:  Alert, frail, in no distress, OX3  HEENT:  NC, PERRL, sclerae anicteric, conjunctivae clear.  Nasal cannula in place.  Sinuses nontender, no nasal exudate.  No buccal or pharyngeal lesions, erythema or exudate  Neck:  Supple, no adenopathy  Lungs:  Clear to auscultation  Cor:  RRR, S1, S2, no murmur appreciated  Abd:  Symmetric, normoactive BS.  Soft, nontender, no masses, guarding or rebound.  Liver and spleen not enlarged.  No CVAT  :  Juarez catheter in place draining somewhat dark urine  Extrem:  No cyanosis or edema  Skin:  No rashes.    LABS:                        9.7    7.51  )-----------( 93       ( 2020 06:33 )             29.7         142  |  105  |  28<H>  ----------------------------<  171<H>  3.4<L>   |  26  |  0.90    Ca    9.2      2020 05:51  Phos  2.5       Mg     1.3         TPro  6.3  /  Alb  3.3  /  TBili  0.6  /  DBili  x   /  AST  42<H>  /  ALT  35  /  AlkPhos  85      Urinalysis Basic - ( 2020 16:55 )    Color: Yellow / Appearance: Clear / S.020 / pH: x  Gluc: x / Ketone: Trace mg/dL  / Bili: Negative / Urobili: 0.2 E.U./dL   Blood: x / Protein: 30 mg/dL / Nitrite: NEGATIVE   Leuk Esterase: NEGATIVE / RBC: 5-10 /HPF / WBC 5-10 /HPF   Sq Epi: x / Non Sq Epi: 0-5 /HPF / Bacteria: Present /HPF    MICROBIOLOGY:  Blood cultures:  1/22 X 2 – GPC pr ch (3/4 bottles)  Urine culture : in lab  Surgical urine culture specimen received      RADIOLOGY & ADDITIONAL STUDIES:  < from: CT Head No Cont (20 @ 17:19) >  Findings: There are no prior studies available for comparison.    The patient is status post bilateral frontal craniotomies with a few surgical clips seen in the inferior anterior frontal midline. There are anterior frontal balwinder holes present. There is an aneurysm clip seen at the anterior suprasellar cistern. There are marked bilateral inferior frontal encephalomalacic changes and gliosis. There is ex vacuo dilation of the bilateral frontal horns. There are patchy areas of low density within the periventricular white matter consistent with mild microvascular disease. There are tiny bilateral caudate chronic lacunar infarcts. There is a left external capsule tiny chronic lacunar infarct. There is enlargement the sulci, cisterns and ventricles consistent with mild cerebral and cerebellar volume loss. There is no evidence of hydrocephalus. There is no evidence of mass-effect or midline shift.There is no evidence of an intra or extra-axial fluid collection.    There is mild bilateral ethmoid mucosal thickening. There is a tiny left sphenoid air-fluid level. The remaining paranasal sinuses and bilateral mastoid air cells are clear. There is osteopenia of the skull base.    Impression: No acute intracranial injury.     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 Bilateral inferior frontal encephalomalacic changes and gliosis. Mild microvascular disease. Tiny bilateral caudate chronic lacunar infarcts. Left external capsule tiny chronic lacunar infarct.     < end of copied text >    < from: CT Abdomen and Pelvis No Cont (20 @ 17:19) >  FINDINGS:    Evaluation of the abdominopelvic viscera is limited due to noncontrast technique.    Lower chest: There are multiple right-sided lung nodules including two 5-mm right lower lobe pulmonary nodules, 2 mm and 4 mm right middle lobe pulmonary nodules.  Severe mitral anus calcifications. Moderate coronary artery calcifications. Heart is deviated to the left. Left basal pleural thickening with thoracotomy and rib deformity. Small loculated left basal pleural effusion. Partially imaged nodular thickening of the adjacent left posterolateral pleura.    Liver: Limited assessment due to lack of intravenous contrast. Smooth contour. No definite focal lesion seen.     Biliary system: Gallbladder is normal in size. No calcified gallstones. No biliary ductal dilatation.    Pancreas: Pancreatic head calcifications which could be vascular. Thinning of the pancreatic parenchyma at the body and tail..    Spleen: Unremarkable.    Adrenal glands: 2.8 cmright adrenal nodule with Hounsfield unit of -2 HU compatible with a benign adenoma.     Kidneys: Enlarged left kidney and left renal perinephric stranding with moderate left hydroureteronephrosis secondary to a 4 mm stone (774 HU) just proximal to the left ureterovesical junction. 2.0 cm intermediate density left renal lesion could represent hyperdense renal cyst however a solid renal neoplasm cannot be excluded. Punctate nonobstructing right lower pole calcification which could be vascular or may represent a tiny stone.    Bowel/Peritoneum: Normal bowel caliber without evidence of obstruction. No appreciable wall thickening, although evaluation is suboptimal without oral contrast. No extraluminal gas or ascites.     Pelvic organs: Focus of air in the urinary bladder..     Lymph nodes: No lymphadenopathy.    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    Bones/Soft tissues: Degenerative changes of the spine and hips. Osteopenia. Small right fat-containing inguinal hernia.    IMPRESSION:   1.  Enlargedleft kidney and left renal perinephric stranding with moderate left hydroureteronephrosis secondary to a 4 mm stone just proximal to the left ureterovesical junction.  2.  Focus of air in the urinary bladder compatible with infection in the absence of recent instrumentation, correlate clinically.  3.  Partially imaged left lung base in this patient with a history of left pneumonectomy. Loculated small left basal pleural effusion with nodular pleural thickening of the left lateral costophrenic angle. Follow-up CT of the chest is recommended. Comparison with prior studies would be helpful.    < end of copied text >

## 2020-01-23 NOTE — PROGRESS NOTE ADULT - ASSESSMENT
86 yo woman with AODM, hx of lung cancer s/p resection spine surgery admitted with septic stone now s/p stent  overnight afebrile normal WBC and good urine output  hemodynamically stable and marked improvement in mental status and level of alertness  follow up cultures  plan to switch to po abx per urology  DVT prophylaxis  Incentive spirometry  blood pressure controlled  pain controlled  OOB - PT  Dispo pending clinical course

## 2020-01-23 NOTE — PROGRESS NOTE ADULT - SUBJECTIVE AND OBJECTIVE BOX
INTERVAL HPI/OVERNIGHT EVENTS:  No acute events overnight. Patient is s/p cysto, stent for left sided septic stone. Patient reports to no episodes of acute pain s/p emergent surgical procedure. She denies chest pain, sob, abdominal or suprapubic pain. She is afebrile, hemodynamically stable.     VITALS:    T(F): 97.6 (20 @ 05:29), Max: 102.6 (20 @ 15:57)  HR: 78 (20 @ 05:29) (74 - 124)  BP: 144/66 (20 @ 05:29) (125/81 - 144/89)  RR: 16 (20 @ 05:29) (0 - 32)  SpO2: 100% (20 @ 05:29) (96% - 100%)  Wt(kg): --    I&O's Detail    2020 07:01  -  2020 05:49  --------------------------------------------------------  IN:    Oral Fluid: 60 mL    sodium chloride 0.9%.: 525 mL  Total IN: 585 mL    OUT:    Indwelling Catheter - Urethral: 1320 mL  Total OUT: 1320 mL    Total NET: -735 mL          MEDICATIONS:    ANTIBIOTICS:  cefTRIAXone   IVPB 1000 milliGRAM(s) IV Intermittent every 24 hours      PAIN CONTROL:  acetaminophen   Tablet .. 650 milliGRAM(s) Oral every 6 hours PRN  HYDROmorphone  Injectable 0.5 milliGRAM(s) IV Push once PRN  ondansetron Injectable 4 milliGRAM(s) IV Push every 6 hours PRN  oxycodone    5 mG/acetaminophen 325 mG 1 Tablet(s) Oral every 6 hours PRN  oxyCODONE    IR 10 milliGRAM(s) Oral every 6 hours PRN       MEDS:  oxybutynin 5 milliGRAM(s) Oral every 8 hours PRN      HEME/ONC        PHYSICAL EXAM:  General: No acute distress.  Alert and Oriented  Abdominal Exam: soft, nontender, non distended   Exam: no suprapubic discomfort, Juarez catheter in, draining clear pink      LABS:                        11.2   10.19 )-----------( 102      ( 2020 15:58 )             34.7         137  |  96  |  38<H>  ----------------------------<  394<H>  3.4<L>   |  19<L>  |  1.03    Ca    10.2      2020 15:58    TPro  6.3  /  Alb  3.3  /  TBili  0.6  /  DBili  x   /  AST  42<H>  /  ALT  35  /  AlkPhos  85      PT/INR - ( 2020 15:58 )   PT: 13.8 sec;   INR: 1.20          PTT - ( 2020 15:58 )  PTT:28.3 sec  Urinalysis Basic - ( 2020 16:55 )    Color: Yellow / Appearance: Clear / S.020 / pH: x  Gluc: x / Ketone: Trace mg/dL  / Bili: Negative / Urobili: 0.2 E.U./dL   Blood: x / Protein: 30 mg/dL / Nitrite: NEGATIVE   Leuk Esterase: NEGATIVE / RBC: 5-10 /HPF / WBC 5-10 /HPF   Sq Epi: x / Non Sq Epi: 0-5 /HPF / Bacteria: Present /HPF        RADIOLOGY & ADDITIONAL TESTS:

## 2020-01-24 LAB
-  AMPICILLIN: SIGNIFICANT CHANGE UP
-  CIPROFLOXACIN: SIGNIFICANT CHANGE UP
-  LEVOFLOXACIN: SIGNIFICANT CHANGE UP
-  TETRACYCLINE: SIGNIFICANT CHANGE UP
-  VANCOMYCIN: SIGNIFICANT CHANGE UP
ANION GAP SERPL CALC-SCNC: 10 MMOL/L — SIGNIFICANT CHANGE UP (ref 5–17)
BASOPHILS # BLD AUTO: 0.01 K/UL — SIGNIFICANT CHANGE UP (ref 0–0.2)
BASOPHILS NFR BLD AUTO: 0.2 % — SIGNIFICANT CHANGE UP (ref 0–2)
BUN SERPL-MCNC: 22 MG/DL — SIGNIFICANT CHANGE UP (ref 7–23)
CALCIUM SERPL-MCNC: 8.9 MG/DL — SIGNIFICANT CHANGE UP (ref 8.4–10.5)
CHLORIDE SERPL-SCNC: 105 MMOL/L — SIGNIFICANT CHANGE UP (ref 96–108)
CO2 SERPL-SCNC: 24 MMOL/L — SIGNIFICANT CHANGE UP (ref 22–31)
CREAT SERPL-MCNC: 1 MG/DL — SIGNIFICANT CHANGE UP (ref 0.5–1.3)
CULTURE RESULTS: SIGNIFICANT CHANGE UP
EOSINOPHIL # BLD AUTO: 0.01 K/UL — SIGNIFICANT CHANGE UP (ref 0–0.5)
EOSINOPHIL NFR BLD AUTO: 0.2 % — SIGNIFICANT CHANGE UP (ref 0–6)
GLUCOSE BLDC GLUCOMTR-MCNC: 132 MG/DL — HIGH (ref 70–99)
GLUCOSE BLDC GLUCOMTR-MCNC: 133 MG/DL — HIGH (ref 70–99)
GLUCOSE BLDC GLUCOMTR-MCNC: 153 MG/DL — HIGH (ref 70–99)
GLUCOSE BLDC GLUCOMTR-MCNC: 160 MG/DL — HIGH (ref 70–99)
GLUCOSE SERPL-MCNC: 152 MG/DL — HIGH (ref 70–99)
HCT VFR BLD CALC: 29.9 % — LOW (ref 34.5–45)
HGB BLD-MCNC: 9.5 G/DL — LOW (ref 11.5–15.5)
IMM GRANULOCYTES NFR BLD AUTO: 0.5 % — SIGNIFICANT CHANGE UP (ref 0–1.5)
LYMPHOCYTES # BLD AUTO: 0.56 K/UL — LOW (ref 1–3.3)
LYMPHOCYTES # BLD AUTO: 8.9 % — LOW (ref 13–44)
MAGNESIUM SERPL-MCNC: 1.8 MG/DL — SIGNIFICANT CHANGE UP (ref 1.6–2.6)
MCHC RBC-ENTMCNC: 27.1 PG — SIGNIFICANT CHANGE UP (ref 27–34)
MCHC RBC-ENTMCNC: 31.8 GM/DL — LOW (ref 32–36)
MCV RBC AUTO: 85.4 FL — SIGNIFICANT CHANGE UP (ref 80–100)
METHOD TYPE: SIGNIFICANT CHANGE UP
MONOCYTES # BLD AUTO: 0.45 K/UL — SIGNIFICANT CHANGE UP (ref 0–0.9)
MONOCYTES NFR BLD AUTO: 7.2 % — SIGNIFICANT CHANGE UP (ref 2–14)
NEUTROPHILS # BLD AUTO: 5.2 K/UL — SIGNIFICANT CHANGE UP (ref 1.8–7.4)
NEUTROPHILS NFR BLD AUTO: 83 % — HIGH (ref 43–77)
NRBC # BLD: 0 /100 WBCS — SIGNIFICANT CHANGE UP (ref 0–0)
PHOSPHATE SERPL-MCNC: 1.7 MG/DL — LOW (ref 2.5–4.5)
PLATELET # BLD AUTO: 86 K/UL — LOW (ref 150–400)
POTASSIUM SERPL-MCNC: 3.1 MMOL/L — LOW (ref 3.5–5.3)
POTASSIUM SERPL-SCNC: 3.1 MMOL/L — LOW (ref 3.5–5.3)
RBC # BLD: 3.5 M/UL — LOW (ref 3.8–5.2)
RBC # FLD: 14.4 % — SIGNIFICANT CHANGE UP (ref 10.3–14.5)
SODIUM SERPL-SCNC: 139 MMOL/L — SIGNIFICANT CHANGE UP (ref 135–145)
SPECIMEN SOURCE: SIGNIFICANT CHANGE UP
VANCOMYCIN FLD-MCNC: 8.7 UG/ML — SIGNIFICANT CHANGE UP
WBC # BLD: 6.26 K/UL — SIGNIFICANT CHANGE UP (ref 3.8–10.5)
WBC # FLD AUTO: 6.26 K/UL — SIGNIFICANT CHANGE UP (ref 3.8–10.5)

## 2020-01-24 PROCEDURE — 93306 TTE W/DOPPLER COMPLETE: CPT | Mod: 26

## 2020-01-24 PROCEDURE — 99233 SBSQ HOSP IP/OBS HIGH 50: CPT

## 2020-01-24 RX ORDER — POTASSIUM CHLORIDE 20 MEQ
10 PACKET (EA) ORAL
Refills: 0 | Status: COMPLETED | OUTPATIENT
Start: 2020-01-24 | End: 2020-01-24

## 2020-01-24 RX ORDER — MAGNESIUM OXIDE 400 MG ORAL TABLET 241.3 MG
800 TABLET ORAL ONCE
Refills: 0 | Status: DISCONTINUED | OUTPATIENT
Start: 2020-01-24 | End: 2020-01-24

## 2020-01-24 RX ORDER — FLUCONAZOLE 150 MG/1
200 TABLET ORAL DAILY
Refills: 0 | Status: DISCONTINUED | OUTPATIENT
Start: 2020-01-24 | End: 2020-01-30

## 2020-01-24 RX ORDER — SODIUM,POTASSIUM PHOSPHATES 278-250MG
1 POWDER IN PACKET (EA) ORAL ONCE
Refills: 0 | Status: COMPLETED | OUTPATIENT
Start: 2020-01-24 | End: 2020-01-24

## 2020-01-24 RX ORDER — POTASSIUM CHLORIDE 20 MEQ
40 PACKET (EA) ORAL ONCE
Refills: 0 | Status: COMPLETED | OUTPATIENT
Start: 2020-01-24 | End: 2020-01-24

## 2020-01-24 RX ORDER — AMPICILLIN TRIHYDRATE 250 MG
2 CAPSULE ORAL EVERY 6 HOURS
Refills: 0 | Status: DISCONTINUED | OUTPATIENT
Start: 2020-01-24 | End: 2020-01-30

## 2020-01-24 RX ORDER — MAGNESIUM OXIDE 400 MG ORAL TABLET 241.3 MG
400 TABLET ORAL ONCE
Refills: 0 | Status: COMPLETED | OUTPATIENT
Start: 2020-01-24 | End: 2020-01-24

## 2020-01-24 RX ADMIN — Medication 100 MILLIEQUIVALENT(S): at 09:03

## 2020-01-24 RX ADMIN — Medication 1 TABLET(S): at 17:35

## 2020-01-24 RX ADMIN — Medication 1 TABLET(S): at 21:11

## 2020-01-24 RX ADMIN — Medication 216 GRAM(S): at 17:35

## 2020-01-24 RX ADMIN — Medication 40 MILLIEQUIVALENT(S): at 21:12

## 2020-01-24 RX ADMIN — FLUCONAZOLE 200 MILLIGRAM(S): 150 TABLET ORAL at 16:32

## 2020-01-24 RX ADMIN — MAGNESIUM OXIDE 400 MG ORAL TABLET 400 MILLIGRAM(S): 241.3 TABLET ORAL at 21:11

## 2020-01-24 RX ADMIN — Medication 100 MILLIEQUIVALENT(S): at 12:55

## 2020-01-24 RX ADMIN — Medication 216 GRAM(S): at 23:46

## 2020-01-24 RX ADMIN — Medication 2: at 07:22

## 2020-01-24 RX ADMIN — PIPERACILLIN AND TAZOBACTAM 200 GRAM(S): 4; .5 INJECTION, POWDER, LYOPHILIZED, FOR SOLUTION INTRAVENOUS at 06:07

## 2020-01-24 RX ADMIN — Medication 100 MILLIEQUIVALENT(S): at 14:29

## 2020-01-24 RX ADMIN — PIPERACILLIN AND TAZOBACTAM 200 GRAM(S): 4; .5 INJECTION, POWDER, LYOPHILIZED, FOR SOLUTION INTRAVENOUS at 12:22

## 2020-01-24 NOTE — SWALLOW BEDSIDE ASSESSMENT ADULT - NS SPL SWALLOW CLINIC TRIAL FT
PO trials limited d/t pt's refusal. Oral and pharyngeal phases appeared to be WFL. No clinical overt s/s observed across trials. Swallow appeared brisk and timely. Pt denied odynophagia, globus, or discomfort during trials.

## 2020-01-24 NOTE — SWALLOW BEDSIDE ASSESSMENT ADULT - COMMENTS
Family at bedside who assisted in providing pertinent case hx information. Pt is somewhat of a poor historian. Per her explanation, liquid "doesn't seem to go down." She reported compensating for this b taking small sips. Since 3/18 to now, pt has had a significant weight loss of ~40 lbs. However, poor appetite could also account for this weight loss.

## 2020-01-24 NOTE — SWALLOW BEDSIDE ASSESSMENT ADULT - SWALLOW EVAL: DIAGNOSIS
Oropharyngeal swallow was clinically judged to be WFL. During the evaluation, pt denied odynophagia, globus sensation, or discomfort. Based on unremarkable assessment, cont. recs for a regular diet with thin liquids. However, if subjective complaints continue, pt would benefit from a MBSS as an OUTPATIENT to further assess swallow anatomy and physiology.

## 2020-01-24 NOTE — SWALLOW BEDSIDE ASSESSMENT ADULT - SLP PERTINENT HISTORY OF CURRENT PROBLEM
history of lung cancer s/p L pneumonectomy, diabetes, htn, hyperlipidemia, spine surgery and recently admitted for impacted ureteral stone with bacteremia.

## 2020-01-24 NOTE — PROGRESS NOTE ADULT - ASSESSMENT
Suggest:  - Continue to f/u results of blood cultures/susceptibilitites from 1/22 and 1/23  - D/c pip-tazo  - Start ampicillin 2 g IV q6h  - Start fluconazole 200 mg po q24h 85 yo F with HTN, DM, HLD, h/o lung CA s/p L pneumonectomy, remote cerebral aneurysm with ureteral stone/pyelo, Enterococcus faecalis in blood and urine from OR, yeast in urine as well.  Fever yesterday was not surprising but she has significant thrombocytopenia that is continuing to trend slightly down - for now would treat for yeast as well.  Urine E. faecalis isolate is susceptible to ampicillin, as are 100% of E faecalis isolates at [facility].  No gram negative in urine.  TTE did not show vegetation but she has severe mitral annular calcification and fibrocalcific aortic valve.  Though she has a urinary source, E. faecalis has high propensity for causing endocarditis and duration of bacteremia prior to admission is unknown.  If present, would change both antibiotic regimen and duration.  Dosing anti-infectives for CrCl 33.  Suggest:  - Continue to f/u results of blood cultures/susceptibilities from 1/22 and 1/23  - I have requested ID and susceptibility testing of urine yeast isolate  - Please send ESR and CRP  - D/c pip-tazo  - Start ampicillin 2 g IV q6h  - Start fluconazole 200 mg po q24h  - Will discuss possible ANNETTE with Dr. Johnson  Recommendations discussed with primary team.  Will follow with you – ID Team 2.  Dr. Fareed Ugarte will cover from [facility] through 1/26, I will return 1/27.

## 2020-01-24 NOTE — PROGRESS NOTE ADULT - ASSESSMENT
83 yo woman with history of lung cancer, diabetes, htn, hyperlipidemia, spine surgery and recently admitted for impacted ureteral stone with bacteremia.  =cultures grew out enterococcus sensitivity pending  -today she complains of dysuria  -she had fever yesterday evening  -vitals stable overnight    Currently on vanc and zosyn with enterococcus in the blood - sensitivity pending  awaiting speech and swallow  DVT prophylaxis  Please order an echocardiogram  Blood pressure controlled  PT evaluation

## 2020-01-24 NOTE — PROGRESS NOTE ADULT - SUBJECTIVE AND OBJECTIVE BOX
AM Note    Pt complaining of trouble swallowing. States it started 2 weeks ago and getting worse.      Vital Signs Last 24 Hrs  T(C): 37.6 (01-24-20 @ 05:45), Max: 39.4 (01-23-20 @ 16:50)  T(F): 99.6 (01-24-20 @ 05:45), Max: 102.9 (01-23-20 @ 16:50)  HR: 88 (01-24-20 @ 05:45) (74 - 113)  BP: 146/63 (01-24-20 @ 05:45) (118/57 - 177/82)  BP(mean): --  RR: 16 (01-24-20 @ 05:45) (15 - 18)  SpO2: 97% (01-24-20 @ 05:45) (97% - 100%)     23 Jan 2020 05:51    142    |  105    |  28     ----------------------------<  171    3.4     |  26     |  0.90     Ca    9.2        23 Jan 2020 05:51  Phos  2.5       23 Jan 2020 05:51  Mg     1.3       23 Jan 2020 05:51    TPro  6.3    /  Alb  3.3    /  TBili  0.6    /  DBili  x      /  AST  42     /  ALT  35     /  AlkPhos  85     22 Jan 2020 15:58                          9.7    7.51  )-----------( 93       ( 23 Jan 2020 06:33 )             29.7         I&O's Summary    22 Jan 2020 07:01  -  23 Jan 2020 07:00  --------------------------------------------------------  IN: 885 mL / OUT: 1320 mL / NET: -435 mL    23 Jan 2020 07:01  -  24 Jan 2020 06:27  --------------------------------------------------------  IN: 1075 mL / OUT: 900 mL / NET: 175 mL          PHYSICAL EXAM:    GEN: awake and alert  ABD: nontender, nondistended  : no suprapubic/CVAT. cuevas in place

## 2020-01-24 NOTE — CHART NOTE - NSCHARTNOTEFT_GEN_A_CORE
Admitting Diagnosis:   Patient is a 84y old  Female who presents with a chief complaint of septic stone (23 Jan 2020 07:46)      PAST MEDICAL & SURGICAL HISTORY:  High cholesterol  Diabetes  HTN (hypertension)  S/P removal of lung: left      Current Nutrition Order: Consistent Carbohydrate   PO Intake: Good (%) [   ]  Fair (50-75%) [   ] Poor (<25%) [   ]  GI Issues: WDL per flow sheets  Pain: none per flow sheets  Skin: no edema/pressure ulcers noted at this time, scab to R knee s/p fall prior to hospitalization as per pt    Labs:   01-24    139  |  105  |  22  ----------------------------<  152<H>  3.1<L>   |  24  |  1.00    Ca    8.9      24 Jan 2020 06:43  Phos  1.7     01-24  Mg     1.8     01-24    TPro  6.3  /  Alb  3.3  /  TBili  0.6  /  DBili  x   /  AST  42<H>  /  ALT  35  /  AlkPhos  85  01-22    CAPILLARY BLOOD GLUCOSE      POCT Blood Glucose.: 160 mg/dL (24 Jan 2020 06:56)  POCT Blood Glucose.: 250 mg/dL (23 Jan 2020 22:07)  POCT Blood Glucose.: 297 mg/dL (23 Jan 2020 17:34)  POCT Blood Glucose.: 186 mg/dL (23 Jan 2020 12:08)      Medications:  MEDICATIONS  (STANDING):  dextrose 5%. 1000 milliLiter(s) (50 mL/Hr) IV Continuous <Continuous>  dextrose 50% Injectable 12.5 Gram(s) IV Push once  dextrose 50% Injectable 25 Gram(s) IV Push once  dextrose 50% Injectable 25 Gram(s) IV Push once  insulin lispro (HumaLOG) corrective regimen sliding scale   SubCutaneous Before meals and at bedtime  magnesium oxide 400 milliGRAM(s) Oral once  Nephro-florence 1 Tablet(s) Oral daily  piperacillin/tazobactam IVPB.. 2.25 Gram(s) IV Intermittent every 6 hours  potassium acid phosphate/sodium acid phosphate tablet (K-PHOS No. 2) 1 Tablet(s) Oral once  potassium chloride   Powder 40 milliEquivalent(s) Oral once  potassium chloride  10 mEq/100 mL IVPB 10 milliEquivalent(s) IV Intermittent every 1 hour    MEDICATIONS  (PRN):  acetaminophen   Tablet .. 650 milliGRAM(s) Oral every 6 hours PRN Temp greater or equal to 38C (100.4F), Mild Pain (1 - 3)  dextrose 40% Gel 15 Gram(s) Oral once PRN Blood Glucose LESS THAN 70 milliGRAM(s)/deciliter  glucagon  Injectable 1 milliGRAM(s) IntraMuscular once PRN Glucose LESS THAN 70 milligrams/deciliter  HYDROmorphone  Injectable 0.5 milliGRAM(s) IV Push once PRN Severe Pain (7 - 10)  ondansetron Injectable 4 milliGRAM(s) IV Push every 6 hours PRN Nausea and/or Vomiting  oxybutynin 5 milliGRAM(s) Oral every 8 hours PRN Bladder spasms  oxycodone    5 mG/acetaminophen 325 mG 1 Tablet(s) Oral every 6 hours PRN Moderate Pain (4 - 6)  oxyCODONE    IR 10 milliGRAM(s) Oral every 6 hours PRN Severe Pain (7 - 10)      Height: 5 feet 4 inches  Weight (Current): 108 pounds – Most recent EMR wt    pounds +/-10%, %IBW 90%, BMI 18.7  Pt had weighed 148 pounds x15 months ago, reports more recent wt of 109 pounds, EMR wt of 108 pounds 1/22 suggests wt loss of 40 pounds / 27 % Body wt loss.    Weight Change:   Per physical assessment: Muscle Wasting- Temporal [ Moderate ]  Clavicle/Pectoral [ Severe ]  Shoulder/Deltoid [ Mild ]  Scapula [ Moderate ].     Estimated energy needs:   current body wt used for energy calculations as pt falls within % IBW; adjusted for suspected malnutrition in older adult - recommend mid range calorie needs, upper end protein needs        Subjective:  84yo F admitted 1/22 – no H&P noted. Pt with hx of HTN HLD, B frontal craniotomies (aneurysm?), nephrolithiasis/pyelonephritis with bacteremia, lung cancer s/p resection, AODM, spine surgery, DJD, renal stone. Pt with sepsis, Left ureteral calculus s/p Placement of ureteral stent 1/22.   Pt seen 1/23 for RD IA, reported Regular diet in which she eats small portions of, Just bought ensure x1-2 days PTA, NKFA. Had denies issues chewing/swallowing, however since RD IA, Consult 1/23 for “Patient's family reports patient having trouble swallowing and significantly decreased appetite over the past few weeks” and is also with Pending bedside swallow evaluation 1/24; per progress notes issues swallowing which began x2 weeks ago. Pt remains with order for PO diet at this time, consCHO no snack.   Please see below for nutritions recommendations.     Previous Nutrition Diagnosis: Malnutrition Suspect Severe in setting of chronic illness RT presumed intake<EER AEB wt loss, poor PO intake, wasting.  Active [  X ]  Resolved [   ]  Goal: Pt will meet at least 75% of nutrient needs via feasible route to show no further s/s of malnutrition     Recommendations:    Education:     Risk Level: High [   ] Moderate [   ] Low [   ] Admitting Diagnosis:   Patient is a 84y old  Female who presents with a chief complaint of septic stone (23 Jan 2020 07:46)      PAST MEDICAL & SURGICAL HISTORY:  High cholesterol  Diabetes  HTN (hypertension)  S/P removal of lung: left      Current Nutrition Order: Consistent Carbohydrate   PO Intake: Good (%) [   ]  Fair (50-75%) [   ] Poor (<25%) [   ]- Pt unable to determine  GI Issues: WDL per flow sheets, pt confirms   Pain: none per flow sheets  Skin: no edema/pressure ulcers noted at this time, scab to R knee s/p fall prior to hospitalization as per pt    Labs:   01-24    139  |  105  |  22  ----------------------------<  152<H>  3.1<L>   |  24  |  1.00    Ca    8.9      24 Jan 2020 06:43  Phos  1.7     01-24  Mg     1.8     01-24    TPro  6.3  /  Alb  3.3  /  TBili  0.6  /  DBili  x   /  AST  42<H>  /  ALT  35  /  AlkPhos  85  01-22    CAPILLARY BLOOD GLUCOSE      POCT Blood Glucose.: 160 mg/dL (24 Jan 2020 06:56)  POCT Blood Glucose.: 250 mg/dL (23 Jan 2020 22:07)  POCT Blood Glucose.: 297 mg/dL (23 Jan 2020 17:34)  POCT Blood Glucose.: 186 mg/dL (23 Jan 2020 12:08)      Medications:  MEDICATIONS  (STANDING):  dextrose 5%. 1000 milliLiter(s) (50 mL/Hr) IV Continuous <Continuous>  dextrose 50% Injectable 12.5 Gram(s) IV Push once  dextrose 50% Injectable 25 Gram(s) IV Push once  dextrose 50% Injectable 25 Gram(s) IV Push once  insulin lispro (HumaLOG) corrective regimen sliding scale   SubCutaneous Before meals and at bedtime  magnesium oxide 400 milliGRAM(s) Oral once  Nephro-florence 1 Tablet(s) Oral daily  piperacillin/tazobactam IVPB.. 2.25 Gram(s) IV Intermittent every 6 hours  potassium acid phosphate/sodium acid phosphate tablet (K-PHOS No. 2) 1 Tablet(s) Oral once  potassium chloride   Powder 40 milliEquivalent(s) Oral once  potassium chloride  10 mEq/100 mL IVPB 10 milliEquivalent(s) IV Intermittent every 1 hour    MEDICATIONS  (PRN):  acetaminophen   Tablet .. 650 milliGRAM(s) Oral every 6 hours PRN Temp greater or equal to 38C (100.4F), Mild Pain (1 - 3)  dextrose 40% Gel 15 Gram(s) Oral once PRN Blood Glucose LESS THAN 70 milliGRAM(s)/deciliter  glucagon  Injectable 1 milliGRAM(s) IntraMuscular once PRN Glucose LESS THAN 70 milligrams/deciliter  HYDROmorphone  Injectable 0.5 milliGRAM(s) IV Push once PRN Severe Pain (7 - 10)  ondansetron Injectable 4 milliGRAM(s) IV Push every 6 hours PRN Nausea and/or Vomiting  oxybutynin 5 milliGRAM(s) Oral every 8 hours PRN Bladder spasms  oxycodone    5 mG/acetaminophen 325 mG 1 Tablet(s) Oral every 6 hours PRN Moderate Pain (4 - 6)  oxyCODONE    IR 10 milliGRAM(s) Oral every 6 hours PRN Severe Pain (7 - 10)      Height: 5 feet 4 inches  Weight (Current): 108 pounds – Most recent EMR wt    pounds +/-10%, %IBW 90%, BMI 18.7  Pt had weighed 148 pounds x15 months ago, reports more recent wt of 109 pounds, EMR wt of 108 pounds 1/22 suggests wt loss of 40 pounds / 27 % Body wt loss.    Weight Change:   Per physical assessment: Muscle Wasting- Temporal [ Moderate ]  Clavicle/Pectoral [ Severe ]  Shoulder/Deltoid [ Mild ]  Scapula [ Moderate ].     Estimated energy needs:   current body wt used for energy calculations as pt falls within % IBW; adjusted for suspected malnutrition in older adult  ~1500kcal/day 30kcal/kg   ~60-70gm prot/day 1.2-1.4gm/kg  ~1500-1700ml/day 30-35ml/kg    Subjective:  84yo F admitted 1/22 – no H&P noted. Pt with hx of HTN HLD, B frontal craniotomies (aneurysm?), nephrolithiasis/pyelonephritis with bacteremia, lung cancer s/p resection, AODM, spine surgery, DJD, renal stone. Pt with sepsis, Left ureteral calculus s/p Placement of ureteral stent 1/22.   Pt seen 1/23 for RD IA, reported Regular diet in which she eats small portions of, Just bought ensure x1-2 days PTA, NKFA. Had denied issues chewing/swallowing, however since RD IA, Consult 1/23 for “Patient's family reports patient having trouble swallowing and significantly decreased appetite over the past few weeks” and is also with Pending bedside swallow evaluation 1/24; per progress notes issues swallowing which began x2 weeks ago. Pt  unclear as to issues swallowing when asked again today. Pt remains with order for PO diet at this time, consCHO no snack. pt reports eating dinner last however unsure how much / what she consumed. Pt seen with breakfast at bedside, untouched - per RN breakfast held d/t Pending SLP.   Please see below for nutritions recommendations. Recs made with team.     Previous Nutrition Diagnosis: Malnutrition Suspect Severe in setting of chronic illness RT presumed intake<EER AEB wt loss, poor PO intake, wasting.  Active [  X ]  Resolved [   ]  Goal: Pt will meet at least 75% of nutrient needs via feasible route to show no further s/s of malnutrition     Recommendations:  1. Recommend NPO prior to swallow evaluation.  2. With passing results, recommend Consistent Carbohydrate with even snack diet; Glucerna x3 per day as oral nutrition supplements - 220kcal/10 gm prot per 1 can; with PO consistency per SLP Recs.   3. However should pt fail and TF indicated + consistent with GOC/wishes, consider use of Glucerna 1.2 x24hrs goal rate 50ml/hr to provide ~1200ml, 1440kcal, 72gm prot, 966ml water. Start feeds at low rate, 10cc and increase slowly by 10cc q8hrs to reach goal - Monitor for tolerance, GI distress. Recommend to replete lytes prior to starting feeds and Monitor Closely; low BMI / low lytes, Risk for refeeding syndrome.   4. Monitor Labs, wts, Skin, GI distress, GOC.  5. Recommend MVI  >  Nephro-Florence.   6. RD to remain available for additional nutrition interventions as needed.     Education: Attempted to explain purpose of SLP Eval.     Risk Level: High [X] Moderate [   ] Low [   ] Admitting Diagnosis:   Patient is a 84y old  Female who presents with a chief complaint of septic stone (23 Jan 2020 07:46)      PAST MEDICAL & SURGICAL HISTORY:  High cholesterol  Diabetes  HTN (hypertension)  S/P removal of lung: left      Current Nutrition Order: Consistent Carbohydrate   PO Intake: Good (%) [   ]  Fair (50-75%) [   ] Poor (<25%) [   ]- Pt unable to determine  GI Issues: WDL per flow sheets, pt confirms   Pain: none per flow sheets  Skin: no edema/pressure ulcers noted at this time, scab to R knee s/p fall prior to hospitalization as per pt    Labs:   01-24    139  |  105  |  22  ----------------------------<  152<H>  3.1<L>   |  24  |  1.00    Ca    8.9      24 Jan 2020 06:43  Phos  1.7     01-24  Mg     1.8     01-24    TPro  6.3  /  Alb  3.3  /  TBili  0.6  /  DBili  x   /  AST  42<H>  /  ALT  35  /  AlkPhos  85  01-22    CAPILLARY BLOOD GLUCOSE      POCT Blood Glucose.: 160 mg/dL (24 Jan 2020 06:56)  POCT Blood Glucose.: 250 mg/dL (23 Jan 2020 22:07)  POCT Blood Glucose.: 297 mg/dL (23 Jan 2020 17:34)  POCT Blood Glucose.: 186 mg/dL (23 Jan 2020 12:08)      Medications:  MEDICATIONS  (STANDING):  dextrose 5%. 1000 milliLiter(s) (50 mL/Hr) IV Continuous <Continuous>  dextrose 50% Injectable 12.5 Gram(s) IV Push once  dextrose 50% Injectable 25 Gram(s) IV Push once  dextrose 50% Injectable 25 Gram(s) IV Push once  insulin lispro (HumaLOG) corrective regimen sliding scale   SubCutaneous Before meals and at bedtime  magnesium oxide 400 milliGRAM(s) Oral once  Nephro-florence 1 Tablet(s) Oral daily  piperacillin/tazobactam IVPB.. 2.25 Gram(s) IV Intermittent every 6 hours  potassium acid phosphate/sodium acid phosphate tablet (K-PHOS No. 2) 1 Tablet(s) Oral once  potassium chloride   Powder 40 milliEquivalent(s) Oral once  potassium chloride  10 mEq/100 mL IVPB 10 milliEquivalent(s) IV Intermittent every 1 hour    MEDICATIONS  (PRN):  acetaminophen   Tablet .. 650 milliGRAM(s) Oral every 6 hours PRN Temp greater or equal to 38C (100.4F), Mild Pain (1 - 3)  dextrose 40% Gel 15 Gram(s) Oral once PRN Blood Glucose LESS THAN 70 milliGRAM(s)/deciliter  glucagon  Injectable 1 milliGRAM(s) IntraMuscular once PRN Glucose LESS THAN 70 milligrams/deciliter  HYDROmorphone  Injectable 0.5 milliGRAM(s) IV Push once PRN Severe Pain (7 - 10)  ondansetron Injectable 4 milliGRAM(s) IV Push every 6 hours PRN Nausea and/or Vomiting  oxybutynin 5 milliGRAM(s) Oral every 8 hours PRN Bladder spasms  oxycodone    5 mG/acetaminophen 325 mG 1 Tablet(s) Oral every 6 hours PRN Moderate Pain (4 - 6)  oxyCODONE    IR 10 milliGRAM(s) Oral every 6 hours PRN Severe Pain (7 - 10)      Height: 5 feet 4 inches  Weight (Current): 108 pounds – Most recent EMR wt    pounds +/-10%, %IBW 90%, BMI 18.7  Pt had weighed 148 pounds x15 months ago, reports more recent wt of 109 pounds, EMR wt of 108 pounds 1/22 suggests wt loss of 40 pounds / 27 % Body wt loss.    Weight Change:   Per physical assessment: Muscle Wasting- Temporal [ Moderate ]  Clavicle/Pectoral [ Severe ]  Shoulder/Deltoid [ Mild ]  Scapula [ Moderate ].     Estimated energy needs:   current body wt used for energy calculations as pt falls within % IBW; adjusted for suspected malnutrition in older adult  ~1500kcal/day 30kcal/kg   ~60-70gm prot/day 1.2-1.4gm/kg  ~1500-1700ml/day 30-35ml/kg    Subjective:  82yo F admitted 1/22 – no H&P noted. Pt with hx of HTN HLD, B frontal craniotomies (aneurysm?), nephrolithiasis/pyelonephritis with bacteremia, lung cancer s/p resection, AODM, spine surgery, DJD, renal stone. Pt with sepsis, Left ureteral calculus s/p Placement of ureteral stent 1/22.   Pt seen 1/23 for RD IA, reported Regular diet in which she eats small portions of, Just bought ensure x1-2 days PTA, NKFA. Had denied issues chewing/swallowing, however since RD IA, Consult 1/23 for “Patient's family reports patient having trouble swallowing and significantly decreased appetite over the past few weeks” and is also with Pending bedside swallow evaluation 1/24; per progress notes issues swallowing which began x2 weeks ago. Pt  unclear as to issues swallowing when asked again today. Pt remains with order for PO diet at this time, consCHO no snack. pt reports eating dinner last however unsure how much / what she consumed. Pt seen with breakfast at bedside, untouched - per RN breakfast held d/t Pending SLP.   Please see below for nutritions recommendations. Paged team for Recs x2.    Previous Nutrition Diagnosis: Malnutrition Suspect Severe in setting of chronic illness RT presumed intake<EER AEB wt loss, poor PO intake, wasting.  Active [  X ]  Resolved [   ]  Goal: Pt will meet at least 75% of nutrient needs via feasible route to show no further s/s of malnutrition     Recommendations:  1. Recommend NPO prior to swallow evaluation.  2. With passing results, recommend Consistent Carbohydrate with even snack diet; Glucerna x3 per day as oral nutrition supplements - 220kcal/10 gm prot per 1 can; with PO consistency per SLP Recs.   3. However should pt fail and TF indicated + consistent with GOC/wishes, consider use of Glucerna 1.2 x24hrs goal rate 50ml/hr to provide ~1200ml, 1440kcal, 72gm prot, 966ml water. Start feeds at low rate, 10cc and increase slowly by 10cc q8hrs to reach goal - Monitor for tolerance, GI distress. Recommend to replete lytes prior to starting feeds and Monitor Closely; low BMI / low lytes, Risk for refeeding syndrome.   4. Monitor Labs, wts, Skin, GI distress, GOC.  5. Recommend MVI  >  Nephro-Florence.   6. RD to remain available for additional nutrition interventions as needed.     Education: Attempted to explain purpose of SLP Eval.     Risk Level: High [X] Moderate [   ] Low [   ]

## 2020-01-24 NOTE — PROGRESS NOTE ADULT - SUBJECTIVE AND OBJECTIVE BOX
HPI: 83 yo woman with history of lung cancer, diabetes, htn, hyperlipidemia, spine surgery and recently admitted for impacted ureteral stone with bacteremia.  =cultures grew out enterococcus sensitivity pending  -today she complians of dysuria  -she had fever yesterday evening  -vitals stable overnight    PAST MEDICAL & SURGICAL HISTORY:  High cholesterol  Diabetes  HTN (hypertension)  S/P removal of lung: left    MEDICATIONS  (STANDING):  dextrose 5%. 1000 milliLiter(s) (50 mL/Hr) IV Continuous <Continuous>  dextrose 50% Injectable 12.5 Gram(s) IV Push once  dextrose 50% Injectable 25 Gram(s) IV Push once  dextrose 50% Injectable 25 Gram(s) IV Push once  insulin lispro (HumaLOG) corrective regimen sliding scale   SubCutaneous Before meals and at bedtime  magnesium oxide 400 milliGRAM(s) Oral once  Nephro-florence 1 Tablet(s) Oral daily  piperacillin/tazobactam IVPB.. 2.25 Gram(s) IV Intermittent every 6 hours  potassium acid phosphate/sodium acid phosphate tablet (K-PHOS No. 2) 1 Tablet(s) Oral once  potassium chloride   Powder 40 milliEquivalent(s) Oral once  potassium chloride  10 mEq/100 mL IVPB 10 milliEquivalent(s) IV Intermittent every 1 hour    MEDICATIONS  (PRN):  acetaminophen   Tablet .. 650 milliGRAM(s) Oral every 6 hours PRN Temp greater or equal to 38C (100.4F), Mild Pain (1 - 3)  dextrose 40% Gel 15 Gram(s) Oral once PRN Blood Glucose LESS THAN 70 milliGRAM(s)/deciliter  glucagon  Injectable 1 milliGRAM(s) IntraMuscular once PRN Glucose LESS THAN 70 milligrams/deciliter  HYDROmorphone  Injectable 0.5 milliGRAM(s) IV Push once PRN Severe Pain (7 - 10)  ondansetron Injectable 4 milliGRAM(s) IV Push every 6 hours PRN Nausea and/or Vomiting  oxybutynin 5 milliGRAM(s) Oral every 8 hours PRN Bladder spasms  oxycodone    5 mG/acetaminophen 325 mG 1 Tablet(s) Oral every 6 hours PRN Moderate Pain (4 - 6)  oxyCODONE    IR 10 milliGRAM(s) Oral every 6 hours PRN Severe Pain (7 - 10)    Vital Signs Last 24 Hrs  T(C): 37.6 (24 Jan 2020 05:45), Max: 39.4 (23 Jan 2020 16:50)  T(F): 99.6 (24 Jan 2020 05:45), Max: 102.9 (23 Jan 2020 16:50)  HR: 88 (24 Jan 2020 05:45) (74 - 113)  BP: 146/63 (24 Jan 2020 05:45) (118/57 - 177/82)  BP(mean): --  RR: 16 (24 Jan 2020 05:45) (15 - 18)  SpO2: 97% (24 Jan 2020 05:45) (97% - 100%)    A/O x3 in NAD  No JVD  Chest clear on the right - dec BS on left  CV RRR s1s2 no murmur  Abd soft  Ext no edema                          9.5    6.26  )-----------( 86       ( 24 Jan 2020 06:43 )             29.9   01-24    139  |  105  |  22  ----------------------------<  152<H>  3.1<L>   |  24  |  1.00    Ca    8.9      24 Jan 2020 06:43  Phos  1.7     01-24  Mg     1.8     01-24    TPro  6.3  /  Alb  3.3  /  TBili  0.6  /  DBili  x   /  AST  42<H>  /  ALT  35  /  AlkPhos  85  01-22      Culture - Blood (collected 23 Jan 2020 15:36)  Source: .Blood Blood-Peripheral  Preliminary Report (24 Jan 2020 04:00):    No growth at 12 hours    Culture - Blood (collected 23 Jan 2020 12:01)  Source: .Blood Blood-Peripheral  Preliminary Report (24 Jan 2020 01:01):    No growth at 12 hours    Culture - Urine (collected 22 Jan 2020 21:07)  Source: Suprapubic bladder urine  Preliminary Report (23 Jan 2020 15:39):    50,000 CFU/ml Enterococcus faecalis    Susceptibility to follow.    Culture - Urine (collected 22 Jan 2020 18:12)  Source: .Urine Clean Catch (Midstream)  Preliminary Report (23 Jan 2020 15:37):    60,000 CFU/ml Enterococcus species    Susceptibility to follow.    Culture - Blood (collected 22 Jan 2020 17:50)  Source: .Blood Blood-Peripheral  Gram Stain (23 Jan 2020 09:39):    Anaerobic Bottle: Gram Positive Cocci in Pairs and Chains    Result called to and read back by_ HERMELINDA Guevara RN   01/23/2020 09:36:50  Preliminary Report (23 Jan 2020 09:21):    Culture in progress    Culture - Blood (collected 22 Jan 2020 17:50)  Source: .Blood Blood-Peripheral  Gram Stain (23 Jan 2020 09:38):    Aerobic and Anaerobic Bottle: Gram Positive Cocci in Pairs and Chains    Result called to and read back byLindsey Guevara RN   01/23/2020 09:36:50    ***Blood Panel PCR results on this specimen are available    approximately 3 hours after the Gram stain result.***    Gram stain, PCR, and/or culture results may not always    correspond due to difference in methodologies.    ************************************************************    This PCR assay was performed using [employer].    The following targets are tested for: Enterococcus,    vancomycin resistant enterococci, Listeria monocytogenes,    coagulase negative staphylococci, S. aureus,    methicillin resistant S. aureus, Streptococcus agalactiae    (Group B), S. pneumoniae, S. pyogenes (Group A),    Acinetobacter baumannii, Enterobacter cloacae, E. coli,    Klebsiella oxytoca, K. pneumoniae, Proteus sp.,    Serratia marcescens, Haemophilus influenzae,    Neisseria meningitidis, Pseudomonas aeruginosa, Candida    albicans, C. glabrata, C krusei, C parapsilosis,    C. tropicalis and the KPC resistance gene.  Preliminary Report (23 Jan 2020 09:21):    Culture in progress  Organism: Blood Culture PCR (23 Jan 2020 11:32)  Organism: Blood Culture PCR (23 Jan 2020 11:32)

## 2020-01-24 NOTE — PROGRESS NOTE ADULT - SUBJECTIVE AND OBJECTIVE BOX
INTERVAL HPI/OVERNIGHT EVENTS:  Tm 102.9 yesterday    CONSTITUTIONAL:  No fever, chills, night sweats  EYES:  No photophobia or visual changes  CARDIOVASCULAR:  No chest pain  RESPIRATORY:  No cough, wheezing, or SOB   GASTROINTESTINAL:  No nausea, vomiting, diarrhea, constipation, or abdominal pain  GENITOURINARY:  No frequency, urgency, dysuria or hematuria  NEUROLOGIC:  No headache, lightheadedness      ANTIBIOTICS/RELEVANT:  Pip-tazo 2.25 g IV q6h (-present)  Vancomycin 750 mg IV X 1 -     Ceftriaxone -        Vital Signs Last 24 Hrs  T(C): 37.6 (2020 05:45), Max: 39.4 (2020 16:50)  T(F): 99.6 (2020 05:45), Max: 102.9 (2020 16:50)  HR: 88 (2020 05:45) (74 - 113)  BP: 146/63 (2020 05:45) (118/57 - 177/82)  BP(mean): --  RR: 16 (2020 05:45) (15 - 18)  SpO2: 97% (2020 05:45) (97% - 98%)    PHYSICAL EXAM:  Constitutional:  Well-developed, well nourished  Eyes:  Sclerae anicterica, conjunctivae clear, PERRL  Ear/Nose/Throat:  No nasal exudate or sinus tenderness;  No buccal mucosal lesions, no pharyngeal erythema or exudate	  Neck:  Supple, no adenopathy  Respiratory:  Clear bilaterally  Cardiovascular:  RRR, S1S2, no murmur appreciated  Gastrointestinal:  Symmetric, normoactive BS, soft, NT, no masses, guarding or rebound.  No HSM  Extremities:  No edema      LABS:                        9.5    6.26  )-----------( 86       ( 2020 06:43 )             29.9         01-24    139  |  105  |  22  ----------------------------<  152<H>  3.1<L>   |  24  |  1.00    Ca    8.9      2020 06:43  Phos  1.7     -24  Mg     1.8     -24    TPro  6.3  /  Alb  3.3  /  TBili  0.6  /  DBili  x   /  AST  42<H>  /  ALT  35  /  AlkPhos  85        Urinalysis Basic - ( 2020 16:55 )    Color: Yellow / Appearance: Clear / S.020 / pH: x  Gluc: x / Ketone: Trace mg/dL  / Bili: Negative / Urobili: 0.2 E.U./dL   Blood: x / Protein: 30 mg/dL / Nitrite: NEGATIVE   Leuk Esterase: NEGATIVE / RBC: 5-10 /HPF / WBC 5-10 /HPF   Sq Epi: x / Non Sq Epi: 0-5 /HPF / Bacteria: Present /HPF    Vancomcyin 8.7 ( at 06:4- dosed at 19:38)    MICROBIOLOGY:  Blood cultures:  1/22 X 2 – Enterococcus faecalis (3/4 bottles);  1/23 X 2 - NGTD  Urine culture : contaminated  Surgical urine culture ("suprapubic bladder urine")   50K E. faecalis, 20K yeast      RADIOLOGY & ADDITIONAL STUDIES:    12 lead ECG :  QTc 0.42 INTERVAL HPI/OVERNIGHT EVENTS:  Tm 102.9 yesterday    CONSTITUTIONAL:  Not feeling fever today, no chills, night sweats  EYES:  No photophobia or visual changes  CARDIOVASCULAR:  No chest pain  RESPIRATORY:  No cough, wheezing, or SOB   GASTROINTESTINAL:  No nausea, vomiting, diarrhea, constipation, or abdominal pain  GENITOURINARY:  Feels burning on urination (Juarez in place)  NEUROLOGIC:  No headache, lightheadedness      ANTIBIOTICS/RELEVANT:  Pip-tazo 2.25 g IV q6h (-present)  Vancomycin 750 mg IV X 1 -     Ceftriaxone -        Vital Signs Last 24 Hrs  T(C): 37.6 (2020 05:45), Max: 39.4 (2020 16:50)  T(F): 99.6 (2020 05:45), Max: 102.9 (2020 16:50)  HR: 88 (2020 05:45) (74 - 113)  BP: 146/63 (2020 05:45) (118/57 - 177/82)  BP(mean): --  RR: 16 (2020 05:45) (15 - 18)  SpO2: 97% (2020 05:45) (97% - 98%)    PHYSICAL EXAM:  PE:  Alert, frail, in no distress, OX3  HEENT:  NC, PERRL, sclerae anicteric, conjunctivae clear.  Nasal cannula in place.  Sinuses nontender, no nasal exudate.  No buccal or pharyngeal lesions, erythema or exudate  Neck:  Supple, no adenopathy  Lungs:  Decreased BS L chest  Cor:  RRR, S1, S2, no murmur appreciated  Abd:  Symmetric, normoactive BS.  Soft, nontender, no masses, guarding or rebound.  Liver and spleen not enlarged.  No CVAT  :  Juarez catheter in place draining somewhat dark urine  Extrem:  No cyanosis or edema  Skin:  No rashes.        LABS:                        9.5    6.26  )-----------( 86       ( 2020 06:43 )             29.9         01-24    139  |  105  |  22  ----------------------------<  152<H>  3.1<L>   |  24  |  1.00    Ca    8.9      2020 06:43  Phos  1.7       Mg     1.8         TPro  6.3  /  Alb  3.3  /  TBili  0.6  /  DBili  x   /  AST  42<H>  /  ALT  35  /  AlkPhos  85        Urinalysis Basic - ( 2020 16:55 )    Color: Yellow / Appearance: Clear / S.020 / pH: x  Gluc: x / Ketone: Trace mg/dL  / Bili: Negative / Urobili: 0.2 E.U./dL   Blood: x / Protein: 30 mg/dL / Nitrite: NEGATIVE   Leuk Esterase: NEGATIVE / RBC: 5-10 /HPF / WBC 5-10 /HPF   Sq Epi: x / Non Sq Epi: 0-5 /HPF / Bacteria: Present /HPF    Vancomcyin 8.7 ( at 06:4- dosed at 19:38)    MICROBIOLOGY:  Blood cultures:  1/22 X 2 – Enterococcus faecalis (3/4 bottles) S amp;  1/23 X 2 - NGTD  Urine culture : contaminated  Surgical urine culture ("suprapubic bladder urine")   50K E. faecalis S amp, vanc (2), 20K yeast      RADIOLOGY & ADDITIONAL STUDIES:    12 lead ECG :  QTc 0.42    < from: Echocardiogram (20 @ 10:45) >   1. Small left ventricular cavity size.   2. Hyperdynamic left ventricular systolic function.   3. Mild symmetric left ventricular hypertrophy.   4. Normal right ventricular size and systolic function.   5. Aortic sclerosis without significant stenosis.   6. No evidence of pulmonary hypertension.   7. Small pericardial effusion without echocardiographic evidence of cardiac tamponade physiology.    --------------------------------------------------------------------------------  FINDINGS:     Left Ventricle:  The left ventricle cavity size is small. There is mild concentric left ventricular hypertrophy. There are no regional wall motion abnormalities seen. Left ventricular systolic function is hyperdynamic with a calculated ejection fraction of >75%. Analysis of left ventricular diastolic function and filling pressure is made challenging by the presence of significant mitral annular calcification.     Right Ventricle:  The right ventricle isnormal in size. Right ventricular systolic function is normal. The tricuspid annular plane systolic excursion (TAPSE) is 20.00 mm (normal >=17 mm). RV tissue Doppler S' is 15.60 cm/s (normal >10 cm/s).     Left Atrium:  The left atrium is normal in size. Left atrial volume index (ROSANA) is 17.10 ml/m².     Right Atrium:  The right atrium is normal in size.     Aortic Valve:  Fibrocalcific tricuspid aortic valve without significant stenosis. There is no evidence of aortic regurgitation.     Mitral Valve:  There is severe mitral annular calcification. There is trace mitral regurgitation.     Tricuspid Valve:  Structurally normal tricuspid valve with normal leaflet excursion. There is trace tricuspid regurgitation. Pulmonary artery systolic pressure (estimated using the tricuspid regurgitant gradient and an estimate of right atrial pressure) is 31.50 mmHg.     Inferior Vena Cava:  The inferior vena cava is normal in size (<2.1cm) with normal inspiratory collapse (>50%) consistent with normal right atrial pressure (  3, range 0-5mmHg).     Pulmonic Valve:  Structurally normal pulmonic valve with normal leaflet excursion. There is trace pulmonic regurgitation.     Aorta:  The aortic root is normal in size. The aortic arch is normal without evidence of aortic coarctation.     Pericardium:  There is a small pericardial effusion without echocardiographic evidence of cardiac tamponade.       < end of copied text >

## 2020-01-25 LAB
-  AMPICILLIN: SIGNIFICANT CHANGE UP
-  AMPICILLIN: SIGNIFICANT CHANGE UP
-  GENTAMICIN SYNERGY: SIGNIFICANT CHANGE UP
-  GENTAMICIN SYNERGY: SIGNIFICANT CHANGE UP
-  STREPTOMYCIN SYNERGY: SIGNIFICANT CHANGE UP
-  STREPTOMYCIN SYNERGY: SIGNIFICANT CHANGE UP
-  VANCOMYCIN: SIGNIFICANT CHANGE UP
-  VANCOMYCIN: SIGNIFICANT CHANGE UP
ANION GAP SERPL CALC-SCNC: 9 MMOL/L — SIGNIFICANT CHANGE UP (ref 5–17)
BUN SERPL-MCNC: 19 MG/DL — SIGNIFICANT CHANGE UP (ref 7–23)
CALCIUM SERPL-MCNC: 8.9 MG/DL — SIGNIFICANT CHANGE UP (ref 8.4–10.5)
CHLORIDE SERPL-SCNC: 107 MMOL/L — SIGNIFICANT CHANGE UP (ref 96–108)
CO2 SERPL-SCNC: 25 MMOL/L — SIGNIFICANT CHANGE UP (ref 22–31)
CREAT SERPL-MCNC: 0.98 MG/DL — SIGNIFICANT CHANGE UP (ref 0.5–1.3)
CRP SERPL-MCNC: 16.04 MG/DL — HIGH (ref 0–0.4)
CULTURE RESULTS: SIGNIFICANT CHANGE UP
ERYTHROCYTE [SEDIMENTATION RATE] IN BLOOD: 66 MM/HR — HIGH
GLUCOSE BLDC GLUCOMTR-MCNC: 151 MG/DL — HIGH (ref 70–99)
GLUCOSE BLDC GLUCOMTR-MCNC: 167 MG/DL — HIGH (ref 70–99)
GLUCOSE BLDC GLUCOMTR-MCNC: 172 MG/DL — HIGH (ref 70–99)
GLUCOSE SERPL-MCNC: 147 MG/DL — HIGH (ref 70–99)
HCT VFR BLD CALC: 29.4 % — LOW (ref 34.5–45)
HGB BLD-MCNC: 9.2 G/DL — LOW (ref 11.5–15.5)
MAGNESIUM SERPL-MCNC: 1.6 MG/DL — SIGNIFICANT CHANGE UP (ref 1.6–2.6)
MCHC RBC-ENTMCNC: 26.3 PG — LOW (ref 27–34)
MCHC RBC-ENTMCNC: 31.3 GM/DL — LOW (ref 32–36)
MCV RBC AUTO: 84 FL — SIGNIFICANT CHANGE UP (ref 80–100)
METHOD TYPE: SIGNIFICANT CHANGE UP
METHOD TYPE: SIGNIFICANT CHANGE UP
NRBC # BLD: 0 /100 WBCS — SIGNIFICANT CHANGE UP (ref 0–0)
ORGANISM # SPEC MICROSCOPIC CNT: SIGNIFICANT CHANGE UP
PHOSPHATE SERPL-MCNC: 2.2 MG/DL — LOW (ref 2.5–4.5)
PLATELET # BLD AUTO: 88 K/UL — LOW (ref 150–400)
POTASSIUM SERPL-MCNC: 3.3 MMOL/L — LOW (ref 3.5–5.3)
POTASSIUM SERPL-SCNC: 3.3 MMOL/L — LOW (ref 3.5–5.3)
RBC # BLD: 3.5 M/UL — LOW (ref 3.8–5.2)
RBC # FLD: 14.4 % — SIGNIFICANT CHANGE UP (ref 10.3–14.5)
SODIUM SERPL-SCNC: 141 MMOL/L — SIGNIFICANT CHANGE UP (ref 135–145)
SPECIMEN SOURCE: SIGNIFICANT CHANGE UP
WBC # BLD: 6.55 K/UL — SIGNIFICANT CHANGE UP (ref 3.8–10.5)
WBC # FLD AUTO: 6.55 K/UL — SIGNIFICANT CHANGE UP (ref 3.8–10.5)

## 2020-01-25 PROCEDURE — 99232 SBSQ HOSP IP/OBS MODERATE 35: CPT

## 2020-01-25 RX ORDER — SODIUM,POTASSIUM PHOSPHATES 278-250MG
1 POWDER IN PACKET (EA) ORAL ONCE
Refills: 0 | Status: COMPLETED | OUTPATIENT
Start: 2020-01-25 | End: 2020-01-25

## 2020-01-25 RX ORDER — POTASSIUM CHLORIDE 20 MEQ
40 PACKET (EA) ORAL EVERY 12 HOURS
Refills: 0 | Status: COMPLETED | OUTPATIENT
Start: 2020-01-25 | End: 2020-01-25

## 2020-01-25 RX ORDER — SODIUM CHLORIDE 9 MG/ML
3 INJECTION INTRAMUSCULAR; INTRAVENOUS; SUBCUTANEOUS EVERY 8 HOURS
Refills: 0 | Status: DISCONTINUED | OUTPATIENT
Start: 2020-01-25 | End: 2020-01-30

## 2020-01-25 RX ORDER — MAGNESIUM OXIDE 400 MG ORAL TABLET 241.3 MG
400 TABLET ORAL ONCE
Refills: 0 | Status: COMPLETED | OUTPATIENT
Start: 2020-01-25 | End: 2020-01-26

## 2020-01-25 RX ADMIN — SODIUM CHLORIDE 3 MILLILITER(S): 9 INJECTION INTRAMUSCULAR; INTRAVENOUS; SUBCUTANEOUS at 13:05

## 2020-01-25 RX ADMIN — Medication 216 GRAM(S): at 06:28

## 2020-01-25 RX ADMIN — Medication 2: at 07:43

## 2020-01-25 RX ADMIN — Medication 2: at 22:23

## 2020-01-25 RX ADMIN — Medication 216 GRAM(S): at 12:47

## 2020-01-25 RX ADMIN — FLUCONAZOLE 200 MILLIGRAM(S): 150 TABLET ORAL at 12:47

## 2020-01-25 RX ADMIN — Medication 216 GRAM(S): at 23:16

## 2020-01-25 RX ADMIN — Medication 1 PACKET(S): at 09:32

## 2020-01-25 RX ADMIN — Medication 216 GRAM(S): at 18:15

## 2020-01-25 RX ADMIN — Medication 40 MILLIEQUIVALENT(S): at 18:16

## 2020-01-25 RX ADMIN — Medication 2: at 18:23

## 2020-01-25 RX ADMIN — Medication 1 TABLET(S): at 12:47

## 2020-01-25 RX ADMIN — SODIUM CHLORIDE 3 MILLILITER(S): 9 INJECTION INTRAMUSCULAR; INTRAVENOUS; SUBCUTANEOUS at 21:25

## 2020-01-25 RX ADMIN — Medication 40 MILLIEQUIVALENT(S): at 09:42

## 2020-01-25 NOTE — PHYSICAL THERAPY INITIAL EVALUATION ADULT - DIAGNOSIS, PT EVAL
Practice Pattern 4G: Impaired Joint Mobility, Muscle Performance, and Range of Motion Associated with Fracture

## 2020-01-25 NOTE — PROGRESS NOTE ADULT - SUBJECTIVE AND OBJECTIVE BOX
No acute events  Patietn remains afebrile  Enterococcus gerw in the blood cutlures  ID adjusted abx    PAST MEDICAL & SURGICAL HISTORY:  High cholesterol  Diabetes  HTN (hypertension)  S/P removal of lung: left    MEDICATIONS  (STANDING):  ampicillin  IVPB 2 Gram(s) IV Intermittent every 6 hours  dextrose 50% Injectable 12.5 Gram(s) IV Push once  dextrose 50% Injectable 25 Gram(s) IV Push once  dextrose 50% Injectable 25 Gram(s) IV Push once  fluconAZOLE   Tablet 200 milliGRAM(s) Oral daily  insulin lispro (HumaLOG) corrective regimen sliding scale   SubCutaneous Before meals and at bedtime  magnesium oxide 400 milliGRAM(s) Oral once  magnesium oxide 400 milliGRAM(s) Oral once  Nephro-florence 1 Tablet(s) Oral daily  potassium acid phosphate/sodium acid phosphate tablet (K-PHOS No. 2) 2 Tablet(s) Oral once  sodium chloride 0.9% lock flush 3 milliLiter(s) IV Push every 8 hours    MEDICATIONS  (PRN):  acetaminophen   Tablet .. 650 milliGRAM(s) Oral every 6 hours PRN Temp greater or equal to 38C (100.4F), Mild Pain (1 - 3)  dextrose 40% Gel 15 Gram(s) Oral once PRN Blood Glucose LESS THAN 70 milliGRAM(s)/deciliter  glucagon  Injectable 1 milliGRAM(s) IntraMuscular once PRN Glucose LESS THAN 70 milligrams/deciliter  HYDROmorphone  Injectable 0.5 milliGRAM(s) IV Push once PRN Severe Pain (7 - 10)  ondansetron Injectable 4 milliGRAM(s) IV Push every 6 hours PRN Nausea and/or Vomiting  oxycodone    5 mG/acetaminophen 325 mG 1 Tablet(s) Oral every 6 hours PRN Moderate Pain (4 - 6)  oxyCODONE    IR 10 milliGRAM(s) Oral every 6 hours PRN Severe Pain (7 - 10)    Vital Signs Last 24 Hrs  T(C): 36.3 (26 Jan 2020 08:20), Max: 36.9 (25 Jan 2020 16:00)  T(F): 97.4 (26 Jan 2020 08:20), Max: 98.5 (25 Jan 2020 16:00)  HR: 85 (26 Jan 2020 08:20) (83 - 88)  BP: 139/74 (26 Jan 2020 08:20) (135/80 - 143/82)  BP(mean): --  RR: 17 (26 Jan 2020 08:20) (16 - 17)  SpO2: 100% (26 Jan 2020 08:20) (95% - 100%)    NAD  No JVD  Chest clear right and decreased bs left  CV RRR s1s2 no murmur  Abd soft  Ext no edema                          9.4    7.21  )-----------( 102      ( 26 Jan 2020 07:17 )             29.4     01-26    144  |  107  |  19  ----------------------------<  123<H>  4.8   |  28  |  0.90    Ca    9.8      26 Jan 2020 07:17  Phos  2.2     01-26  Mg     1.6     01-26        Culture - Blood (collected 23 Jan 2020 15:36)  Source: .Blood Blood-Peripheral  Preliminary Report (25 Jan 2020 16:01):    No growth at 2 days.    Culture - Blood (collected 23 Jan 2020 12:01)  Source: .Blood Blood-Peripheral  Preliminary Report (25 Jan 2020 13:01):    No growth at 2 days.

## 2020-01-25 NOTE — PHYSICAL THERAPY INITIAL EVALUATION ADULT - CRITERIA FOR SKILLED THERAPEUTIC INTERVENTIONS
anticipated discharge recommendation/impairments found/rehab potential/therapy frequency/risk reduction/prevention/functional limitations in following categories

## 2020-01-25 NOTE — PHYSICAL THERAPY INITIAL EVALUATION ADULT - PLANNED THERAPY INTERVENTIONS, PT EVAL
bed mobility training/gait training/postural re-education/strengthening/balance training/transfer training

## 2020-01-25 NOTE — PROGRESS NOTE ADULT - SUBJECTIVE AND OBJECTIVE BOX
AM Note    No acute events overnight.      Vital Signs Last 24 Hrs  T(C): 37.2 (01-24-20 @ 20:46), Max: 37.2 (01-24-20 @ 20:46)  T(F): 99 (01-24-20 @ 20:46), Max: 99 (01-24-20 @ 20:46)  HR: 87 (01-24-20 @ 20:46) (85 - 87)  BP: 143/85 (01-24-20 @ 20:46) (139/84 - 143/85)  BP(mean): --  RR: 17 (01-24-20 @ 20:46) (16 - 17)  SpO2: 97% (01-24-20 @ 20:46) (97% - 98%)     24 Jan 2020 06:43    139    |  105    |  22     ----------------------------<  152    3.1     |  24     |  1.00     Ca    8.9        24 Jan 2020 06:43  Phos  1.7       24 Jan 2020 06:43  Mg     1.8       24 Jan 2020 06:43                            9.5    6.26  )-----------( 86       ( 24 Jan 2020 06:43 )             29.9         I&O's Summary    23 Jan 2020 07:01  -  24 Jan 2020 07:00  --------------------------------------------------------  IN: 1075 mL / OUT: 900 mL / NET: 175 mL    24 Jan 2020 07:01  -  25 Jan 2020 06:28  --------------------------------------------------------  IN: 540 mL / OUT: 1700 mL / NET: -1160 mL          PHYSICAL EXAM:    GEN: NAD  ABD: soft, flat  : faith in place

## 2020-01-25 NOTE — PHYSICAL THERAPY INITIAL EVALUATION ADULT - ADDITIONAL COMMENTS
Patient lives in an elevator apartment with no steps to enter. Prior to admission, patient was independent for bed mobility, transfers and ambulation with rolling walker. Has home health assistance 24/7

## 2020-01-25 NOTE — PROGRESS NOTE ADULT - ASSESSMENT
85 yo F with HTN, DM, HLD, h/o lung CA s/p L pneumonectomy, remote cerebral aneurysm with ureteral stone/pyelo, Enterococcus faecalis in blood and urine from OR, yeast in urine as well. Urine E. faecalis isolate is susceptible to ampicillin, as are 100% of E faecalis isolates at [facility].  No gram negative in urine.  TTE did not show vegetation but she has severe mitral annular calcification and fibrocalcific aortic valve.  Though she has a urinary source, E. faecalis has high propensity for causing endocarditis and duration of bacteremia prior to admission is unknown.  If present, would change both antibiotic regimen and duration.   Suggest:  - surveillance bcx 1/23 ngtd  - f/u ID and susceptibility testing of urine yeast isolate  - continue ampicillin 2 g IV q6h  - continue fluconazole 200 mg po q24h  - Dr. Crawford discuss possible ANNETTE with Dr. Johnson Monday    Dr. Crawford/ID Team resumes care Monday 1/27

## 2020-01-25 NOTE — PHYSICAL THERAPY INITIAL EVALUATION ADULT - PATIENT/FAMILY/SIGNIFICANT OTHER GOALS STATEMENT, PT EVAL
Patient is willing and motivated to participate in physical therapy and would like to get stronger however is afraid

## 2020-01-25 NOTE — PHYSICAL THERAPY INITIAL EVALUATION ADULT - LEVEL OF INDEPENDENCE: CHAIR TO BED, REHAB EVAL
not observed, patient left sitting in bedside chair with all lines intact, +call bell, instructed to press call bell and await assistance should patient desire to get up or need anything, in no apparent distress

## 2020-01-25 NOTE — PROGRESS NOTE ADULT - ASSESSMENT
Hemodynamicaly stable with resolving sepsis post stenting of impacted left ureteral stone  d/w family and believe she would be a good candidate for ADELA  will d/w patient  DVT prophyilaxis  Normal renal function  mild anemia - check iron levels  Echocardiogram CONCLUSIONS:   1. Small left ventricular cavity size.   2. Hyperdynamic left ventricular systolic function.   3. Mild symmetric left ventricular hypertrophy.   4. Normal right ventricular size and systolic function.   5. Aortic sclerosis without significant stenosis.   6. No evidence of pulmonary hypertension.   7. Small pericardial effusion without echocardiographic evidence of cardiac tamponade physiology.

## 2020-01-25 NOTE — PHYSICAL THERAPY INITIAL EVALUATION ADULT - GENERAL OBSERVATIONS, REHAB EVAL
Received semi-Ann's position in bed with +hep lock, +home health attendant, on room air, +SCDs, in no apparent distress. Patient appears to be resting comfortably in bed

## 2020-01-25 NOTE — PROGRESS NOTE ADULT - SUBJECTIVE AND OBJECTIVE BOX
INTERVAL HPI/OVERNIGHT EVENTS: Feels better. Eager to leave hospital.    CONSTITUTIONAL:  Negative fever or chills, feels well, good appetite  EYES:  Negative  blurry vision or double vision  CARDIOVASCULAR:  Negative for chest pain or palpitations  RESPIRATORY:  Negative for cough, wheezing, or SOB   GASTROINTESTINAL:  Negative for nausea, vomiting, diarrhea, constipation, or abdominal pain  GENITOURINARY:  Negative frequency, urgency or dysuria  NEUROLOGIC:  No headache, confusion, dizziness, lightheadedness      ANTIBIOTICS/RELEVANT:    MEDICATIONS  (STANDING):  ampicillin  IVPB 2 Gram(s) IV Intermittent every 6 hours  dextrose 50% Injectable 12.5 Gram(s) IV Push once  dextrose 50% Injectable 25 Gram(s) IV Push once  dextrose 50% Injectable 25 Gram(s) IV Push once  fluconAZOLE   Tablet 200 milliGRAM(s) Oral daily  insulin lispro (HumaLOG) corrective regimen sliding scale   SubCutaneous Before meals and at bedtime  magnesium oxide 400 milliGRAM(s) Oral once  Nephro-florence 1 Tablet(s) Oral daily  potassium chloride   Powder 40 milliEquivalent(s) Oral every 12 hours  sodium chloride 0.9% lock flush 3 milliLiter(s) IV Push every 8 hours    MEDICATIONS  (PRN):  acetaminophen   Tablet .. 650 milliGRAM(s) Oral every 6 hours PRN Temp greater or equal to 38C (100.4F), Mild Pain (1 - 3)  dextrose 40% Gel 15 Gram(s) Oral once PRN Blood Glucose LESS THAN 70 milliGRAM(s)/deciliter  glucagon  Injectable 1 milliGRAM(s) IntraMuscular once PRN Glucose LESS THAN 70 milligrams/deciliter  HYDROmorphone  Injectable 0.5 milliGRAM(s) IV Push once PRN Severe Pain (7 - 10)  ondansetron Injectable 4 milliGRAM(s) IV Push every 6 hours PRN Nausea and/or Vomiting  oxycodone    5 mG/acetaminophen 325 mG 1 Tablet(s) Oral every 6 hours PRN Moderate Pain (4 - 6)  oxyCODONE    IR 10 milliGRAM(s) Oral every 6 hours PRN Severe Pain (7 - 10)        Vital Signs Last 24 Hrs  T(C): 36.9 (25 Jan 2020 16:00), Max: 37.2 (24 Jan 2020 20:46)  T(F): 98.5 (25 Jan 2020 16:00), Max: 99 (24 Jan 2020 20:46)  HR: 83 (25 Jan 2020 16:00) (83 - 91)  BP: 140/83 (25 Jan 2020 16:00) (137/80 - 143/85)  BP(mean): --  RR: 17 (25 Jan 2020 16:00) (17 - 17)  SpO2: 99% (25 Jan 2020 16:00) (97% - 99%)    PHYSICAL EXAM:  Constitutional:Well-developed, well nourished  Eyes:ZHANNA, EOMI  Ear/Nose/Throat: no oral lesion, no sinus tenderness on percussion	  Neck:no JVD, no lymphadenopathy, supple  Respiratory: CTA shane  Cardiovascular: S1S2 RRR, no murmurs  Gastrointestinal:soft, (+) BS, no HSM; no L CVAT  Extremities:no e/e/c  Vascular: DP Pulse:	right normal; left normal      LABS:                        9.2    6.55  )-----------( 88       ( 25 Jan 2020 06:23 )             29.4     01-25    141  |  107  |  19  ----------------------------<  147<H>  3.3<L>   |  25  |  0.98    Ca    8.9      25 Jan 2020 06:23  Phos  2.2     01-25  Mg     1.6     01-25            MICROBIOLOGY: Culture - Blood (01.23.20 @ 15:36)    Specimen Source: .Blood Blood-Peripheral    Culture Results:   No growth at 2 days.        RADIOLOGY & ADDITIONAL STUDIES: reviewed

## 2020-01-26 LAB
ANION GAP SERPL CALC-SCNC: 9 MMOL/L — SIGNIFICANT CHANGE UP (ref 5–17)
BUN SERPL-MCNC: 19 MG/DL — SIGNIFICANT CHANGE UP (ref 7–23)
CALCIUM SERPL-MCNC: 9.8 MG/DL — SIGNIFICANT CHANGE UP (ref 8.4–10.5)
CHLORIDE SERPL-SCNC: 107 MMOL/L — SIGNIFICANT CHANGE UP (ref 96–108)
CO2 SERPL-SCNC: 28 MMOL/L — SIGNIFICANT CHANGE UP (ref 22–31)
CREAT SERPL-MCNC: 0.9 MG/DL — SIGNIFICANT CHANGE UP (ref 0.5–1.3)
GLUCOSE BLDC GLUCOMTR-MCNC: 114 MG/DL — HIGH (ref 70–99)
GLUCOSE BLDC GLUCOMTR-MCNC: 146 MG/DL — HIGH (ref 70–99)
GLUCOSE BLDC GLUCOMTR-MCNC: 170 MG/DL — HIGH (ref 70–99)
GLUCOSE BLDC GLUCOMTR-MCNC: 217 MG/DL — HIGH (ref 70–99)
GLUCOSE SERPL-MCNC: 123 MG/DL — HIGH (ref 70–99)
HCT VFR BLD CALC: 29.4 % — LOW (ref 34.5–45)
HGB BLD-MCNC: 9.4 G/DL — LOW (ref 11.5–15.5)
MAGNESIUM SERPL-MCNC: 1.6 MG/DL — SIGNIFICANT CHANGE UP (ref 1.6–2.6)
MCHC RBC-ENTMCNC: 27.2 PG — SIGNIFICANT CHANGE UP (ref 27–34)
MCHC RBC-ENTMCNC: 32 GM/DL — SIGNIFICANT CHANGE UP (ref 32–36)
MCV RBC AUTO: 85 FL — SIGNIFICANT CHANGE UP (ref 80–100)
METHOD TYPE: SIGNIFICANT CHANGE UP
NRBC # BLD: 0 /100 WBCS — SIGNIFICANT CHANGE UP (ref 0–0)
ORGANISM # SPEC MICROSCOPIC CNT: SIGNIFICANT CHANGE UP
ORGANISM # SPEC MICROSCOPIC CNT: SIGNIFICANT CHANGE UP
PHOSPHATE SERPL-MCNC: 2.2 MG/DL — LOW (ref 2.5–4.5)
PLATELET # BLD AUTO: 102 K/UL — LOW (ref 150–400)
POTASSIUM SERPL-MCNC: 4.8 MMOL/L — SIGNIFICANT CHANGE UP (ref 3.5–5.3)
POTASSIUM SERPL-SCNC: 4.8 MMOL/L — SIGNIFICANT CHANGE UP (ref 3.5–5.3)
RBC # BLD: 3.46 M/UL — LOW (ref 3.8–5.2)
RBC # FLD: 14.5 % — SIGNIFICANT CHANGE UP (ref 10.3–14.5)
SODIUM SERPL-SCNC: 144 MMOL/L — SIGNIFICANT CHANGE UP (ref 135–145)
WBC # BLD: 7.21 K/UL — SIGNIFICANT CHANGE UP (ref 3.8–10.5)
WBC # FLD AUTO: 7.21 K/UL — SIGNIFICANT CHANGE UP (ref 3.8–10.5)

## 2020-01-26 RX ORDER — MAGNESIUM OXIDE 400 MG ORAL TABLET 241.3 MG
400 TABLET ORAL ONCE
Refills: 0 | Status: COMPLETED | OUTPATIENT
Start: 2020-01-26 | End: 2020-01-26

## 2020-01-26 RX ORDER — SODIUM,POTASSIUM PHOSPHATES 278-250MG
2 POWDER IN PACKET (EA) ORAL ONCE
Refills: 0 | Status: COMPLETED | OUTPATIENT
Start: 2020-01-26 | End: 2020-01-26

## 2020-01-26 RX ORDER — HEPARIN SODIUM 5000 [USP'U]/ML
5000 INJECTION INTRAVENOUS; SUBCUTANEOUS EVERY 8 HOURS
Refills: 0 | Status: DISCONTINUED | OUTPATIENT
Start: 2020-01-26 | End: 2020-01-30

## 2020-01-26 RX ADMIN — Medication 216 GRAM(S): at 12:15

## 2020-01-26 RX ADMIN — HEPARIN SODIUM 5000 UNIT(S): 5000 INJECTION INTRAVENOUS; SUBCUTANEOUS at 13:46

## 2020-01-26 RX ADMIN — SODIUM CHLORIDE 3 MILLILITER(S): 9 INJECTION INTRAMUSCULAR; INTRAVENOUS; SUBCUTANEOUS at 05:18

## 2020-01-26 RX ADMIN — SODIUM CHLORIDE 3 MILLILITER(S): 9 INJECTION INTRAMUSCULAR; INTRAVENOUS; SUBCUTANEOUS at 12:39

## 2020-01-26 RX ADMIN — HEPARIN SODIUM 5000 UNIT(S): 5000 INJECTION INTRAVENOUS; SUBCUTANEOUS at 21:33

## 2020-01-26 RX ADMIN — Medication 650 MILLIGRAM(S): at 09:38

## 2020-01-26 RX ADMIN — Medication 650 MILLIGRAM(S): at 16:15

## 2020-01-26 RX ADMIN — Medication 216 GRAM(S): at 23:07

## 2020-01-26 RX ADMIN — Medication 1 TABLET(S): at 12:15

## 2020-01-26 RX ADMIN — Medication 650 MILLIGRAM(S): at 17:15

## 2020-01-26 RX ADMIN — Medication 4: at 22:00

## 2020-01-26 RX ADMIN — Medication 216 GRAM(S): at 18:36

## 2020-01-26 RX ADMIN — SODIUM CHLORIDE 3 MILLILITER(S): 9 INJECTION INTRAMUSCULAR; INTRAVENOUS; SUBCUTANEOUS at 21:15

## 2020-01-26 RX ADMIN — FLUCONAZOLE 200 MILLIGRAM(S): 150 TABLET ORAL at 12:16

## 2020-01-26 RX ADMIN — Medication 650 MILLIGRAM(S): at 10:38

## 2020-01-26 RX ADMIN — MAGNESIUM OXIDE 400 MG ORAL TABLET 400 MILLIGRAM(S): 241.3 TABLET ORAL at 18:38

## 2020-01-26 RX ADMIN — MAGNESIUM OXIDE 400 MG ORAL TABLET 400 MILLIGRAM(S): 241.3 TABLET ORAL at 13:46

## 2020-01-26 RX ADMIN — Medication 2: at 12:22

## 2020-01-26 RX ADMIN — Medication 2 TABLET(S): at 13:46

## 2020-01-26 RX ADMIN — Medication 216 GRAM(S): at 05:31

## 2020-01-26 NOTE — PROGRESS NOTE ADULT - SUBJECTIVE AND OBJECTIVE BOX
83 yo woman admitted with impacted left ureteral stone and near sepsis with fever and postive blood cultures for enterococus:  -transthoraciic echo = unremarkable  -she has responded to approrpiate abx and remains afebrile  =she has agreed to ADELA  -normal renal function  =urinating in diaper with normal bladder scan (<42ml)    PAST MEDICAL & SURGICAL HISTORY:  High cholesterol  Diabetes  HTN (hypertension)  S/P removal of lung: left    MEDICATIONS  (STANDING):  ampicillin  IVPB 2 Gram(s) IV Intermittent every 6 hours  dextrose 50% Injectable 12.5 Gram(s) IV Push once  dextrose 50% Injectable 25 Gram(s) IV Push once  dextrose 50% Injectable 25 Gram(s) IV Push once  fluconAZOLE   Tablet 200 milliGRAM(s) Oral daily  insulin lispro (HumaLOG) corrective regimen sliding scale   SubCutaneous Before meals and at bedtime  magnesium oxide 400 milliGRAM(s) Oral once  magnesium oxide 400 milliGRAM(s) Oral once  Nephro-florence 1 Tablet(s) Oral daily  potassium acid phosphate/sodium acid phosphate tablet (K-PHOS No. 2) 2 Tablet(s) Oral once  sodium chloride 0.9% lock flush 3 milliLiter(s) IV Push every 8 hours    MEDICATIONS  (PRN):  acetaminophen   Tablet .. 650 milliGRAM(s) Oral every 6 hours PRN Temp greater or equal to 38C (100.4F), Mild Pain (1 - 3)  dextrose 40% Gel 15 Gram(s) Oral once PRN Blood Glucose LESS THAN 70 milliGRAM(s)/deciliter  glucagon  Injectable 1 milliGRAM(s) IntraMuscular once PRN Glucose LESS THAN 70 milligrams/deciliter  HYDROmorphone  Injectable 0.5 milliGRAM(s) IV Push once PRN Severe Pain (7 - 10)  ondansetron Injectable 4 milliGRAM(s) IV Push every 6 hours PRN Nausea and/or Vomiting  oxycodone    5 mG/acetaminophen 325 mG 1 Tablet(s) Oral every 6 hours PRN Moderate Pain (4 - 6)  oxyCODONE    IR 10 milliGRAM(s) Oral every 6 hours PRN Severe Pain (7 - 10)    Vital Signs Last 24 Hrs  T(C): 36.3 (26 Jan 2020 08:20), Max: 36.9 (25 Jan 2020 16:00)  T(F): 97.4 (26 Jan 2020 08:20), Max: 98.5 (25 Jan 2020 16:00)  HR: 85 (26 Jan 2020 08:20) (83 - 88)  BP: 139/74 (26 Jan 2020 08:20) (135/80 - 143/82)  BP(mean): --  RR: 17 (26 Jan 2020 08:20) (16 - 17)  SpO2: 100% (26 Jan 2020 08:20) (95% - 100%)    NAD A/O x3  No JVD  Chest clear right and decreased bs left  CV RRR s1s2 no murmur  Abd soft  Ext no edema  No peripheral stigmata of endocarditis                          9.4    7.21  )-----------( 102      ( 26 Jan 2020 07:17 )             29.4   01-26    144  |  107  |  19  ----------------------------<  123<H>  4.8   |  28  |  0.90    Ca    9.8      26 Jan 2020 07:17  Phos  2.2     01-26  Mg     1.6     01-26      Culture - Blood (collected 23 Jan 2020 15:36)  Source: .Blood Blood-Peripheral  Preliminary Report (25 Jan 2020 16:01):    No growth at 2 days.    Culture - Blood (collected 23 Jan 2020 12:01)  Source: .Blood Blood-Peripheral  Preliminary Report (25 Jan 2020 13:01):    No growth at 2 days.

## 2020-01-26 NOTE — PROGRESS NOTE ADULT - ASSESSMENT
Hemodynamically stable with resolving urosepsis on abx s/p stented left ureteral stone  -currently on po abx awaiting ID to change to po   -no indication for ANNETTE as she is afebrile on appropariate abx, has no murmur, had normal transthoracic echocardiogram, and has no peripheral stigmata of endocarditis  -blood pressure controlled  -urinating well post removal of cuevas  -I discussed with the patient and the family that she is a graat candidate for ADELA and they agree and she would like to go to [facility]  -DVT prophylaxis

## 2020-01-26 NOTE — PROGRESS NOTE ADULT - SUBJECTIVE AND OBJECTIVE BOX
INTERVAL HPI/OVERNIGHT EVENTS:  No acute events overnight.    VITALS:    T(F): 98.4 (01-26-20 @ 05:28), Max: 98.5 (01-25-20 @ 16:00)  HR: 88 (01-26-20 @ 05:28) (83 - 91)  BP: 143/82 (01-26-20 @ 05:28) (135/80 - 143/82)  RR: 16 (01-26-20 @ 05:28) (16 - 17)  SpO2: 97% (01-26-20 @ 05:28) (95% - 99%)  Wt(kg): --    I&O's Detail    24 Jan 2020 07:01  -  25 Jan 2020 07:00  --------------------------------------------------------  IN:    Oral Fluid: 540 mL  Total IN: 540 mL    OUT:    Indwelling Catheter - Urethral: 2050 mL  Total OUT: 2050 mL    Total NET: -1510 mL      25 Jan 2020 07:01  -  26 Jan 2020 06:15  --------------------------------------------------------  IN:    Oral Fluid: 120 mL  Total IN: 120 mL    OUT:    Voided: 600 mL  Total OUT: 600 mL    Total NET: -480 mL          MEDICATIONS:    ANTIBIOTICS:  ampicillin  IVPB 2 Gram(s) IV Intermittent every 6 hours  fluconAZOLE   Tablet 200 milliGRAM(s) Oral daily      PAIN CONTROL:  acetaminophen   Tablet .. 650 milliGRAM(s) Oral every 6 hours PRN  HYDROmorphone  Injectable 0.5 milliGRAM(s) IV Push once PRN  ondansetron Injectable 4 milliGRAM(s) IV Push every 6 hours PRN  oxycodone    5 mG/acetaminophen 325 mG 1 Tablet(s) Oral every 6 hours PRN  oxyCODONE    IR 10 milliGRAM(s) Oral every 6 hours PRN       MEDS:      HEME/ONC        PHYSICAL EXAM:  General: No acute distress.  Alert and Oriented  Abdominal Exam: soft, nontender   Exam: WNL, incontinence at baseline      LABS:                        9.2    6.55  )-----------( 88       ( 25 Jan 2020 06:23 )             29.4     01-25    141  |  107  |  19  ----------------------------<  147<H>  3.3<L>   |  25  |  0.98    Ca    8.9      25 Jan 2020 06:23  Phos  2.2     01-25  Mg     1.6     01-25

## 2020-01-27 LAB
ANION GAP SERPL CALC-SCNC: 10 MMOL/L — SIGNIFICANT CHANGE UP (ref 5–17)
BUN SERPL-MCNC: 18 MG/DL — SIGNIFICANT CHANGE UP (ref 7–23)
CALCIUM SERPL-MCNC: 9.5 MG/DL — SIGNIFICANT CHANGE UP (ref 8.4–10.5)
CHLORIDE SERPL-SCNC: 106 MMOL/L — SIGNIFICANT CHANGE UP (ref 96–108)
CO2 SERPL-SCNC: 27 MMOL/L — SIGNIFICANT CHANGE UP (ref 22–31)
CREAT SERPL-MCNC: 0.78 MG/DL — SIGNIFICANT CHANGE UP (ref 0.5–1.3)
GLUCOSE BLDC GLUCOMTR-MCNC: 112 MG/DL — HIGH (ref 70–99)
GLUCOSE BLDC GLUCOMTR-MCNC: 117 MG/DL — HIGH (ref 70–99)
GLUCOSE BLDC GLUCOMTR-MCNC: 137 MG/DL — HIGH (ref 70–99)
GLUCOSE BLDC GLUCOMTR-MCNC: 166 MG/DL — HIGH (ref 70–99)
GLUCOSE SERPL-MCNC: 122 MG/DL — HIGH (ref 70–99)
HCT VFR BLD CALC: 29.3 % — LOW (ref 34.5–45)
HGB BLD-MCNC: 9 G/DL — LOW (ref 11.5–15.5)
MAGNESIUM SERPL-MCNC: 1.7 MG/DL — SIGNIFICANT CHANGE UP (ref 1.6–2.6)
MCHC RBC-ENTMCNC: 26.2 PG — LOW (ref 27–34)
MCHC RBC-ENTMCNC: 30.7 GM/DL — LOW (ref 32–36)
MCV RBC AUTO: 85.4 FL — SIGNIFICANT CHANGE UP (ref 80–100)
NRBC # BLD: 0 /100 WBCS — SIGNIFICANT CHANGE UP (ref 0–0)
PHOSPHATE SERPL-MCNC: 2.5 MG/DL — SIGNIFICANT CHANGE UP (ref 2.5–4.5)
PLATELET # BLD AUTO: 120 K/UL — LOW (ref 150–400)
POTASSIUM SERPL-MCNC: 3.7 MMOL/L — SIGNIFICANT CHANGE UP (ref 3.5–5.3)
POTASSIUM SERPL-SCNC: 3.7 MMOL/L — SIGNIFICANT CHANGE UP (ref 3.5–5.3)
RBC # BLD: 3.43 M/UL — LOW (ref 3.8–5.2)
RBC # FLD: 14.4 % — SIGNIFICANT CHANGE UP (ref 10.3–14.5)
SODIUM SERPL-SCNC: 143 MMOL/L — SIGNIFICANT CHANGE UP (ref 135–145)
WBC # BLD: 6.8 K/UL — SIGNIFICANT CHANGE UP (ref 3.8–10.5)
WBC # FLD AUTO: 6.8 K/UL — SIGNIFICANT CHANGE UP (ref 3.8–10.5)

## 2020-01-27 PROCEDURE — 99233 SBSQ HOSP IP/OBS HIGH 50: CPT

## 2020-01-27 RX ORDER — MAGNESIUM SULFATE 500 MG/ML
1 VIAL (ML) INJECTION ONCE
Refills: 0 | Status: COMPLETED | OUTPATIENT
Start: 2020-01-27 | End: 2020-01-27

## 2020-01-27 RX ORDER — SODIUM CHLORIDE 9 MG/ML
1000 INJECTION INTRAMUSCULAR; INTRAVENOUS; SUBCUTANEOUS
Refills: 0 | Status: DISCONTINUED | OUTPATIENT
Start: 2020-01-27 | End: 2020-01-28

## 2020-01-27 RX ADMIN — Medication 216 GRAM(S): at 12:00

## 2020-01-27 RX ADMIN — SODIUM CHLORIDE 75 MILLILITER(S): 9 INJECTION INTRAMUSCULAR; INTRAVENOUS; SUBCUTANEOUS at 09:11

## 2020-01-27 RX ADMIN — HEPARIN SODIUM 5000 UNIT(S): 5000 INJECTION INTRAVENOUS; SUBCUTANEOUS at 05:00

## 2020-01-27 RX ADMIN — SODIUM CHLORIDE 3 MILLILITER(S): 9 INJECTION INTRAMUSCULAR; INTRAVENOUS; SUBCUTANEOUS at 22:22

## 2020-01-27 RX ADMIN — Medication 2: at 22:34

## 2020-01-27 RX ADMIN — SODIUM CHLORIDE 3 MILLILITER(S): 9 INJECTION INTRAMUSCULAR; INTRAVENOUS; SUBCUTANEOUS at 14:00

## 2020-01-27 RX ADMIN — Medication 216 GRAM(S): at 05:00

## 2020-01-27 RX ADMIN — FLUCONAZOLE 200 MILLIGRAM(S): 150 TABLET ORAL at 17:01

## 2020-01-27 RX ADMIN — HEPARIN SODIUM 5000 UNIT(S): 5000 INJECTION INTRAVENOUS; SUBCUTANEOUS at 22:34

## 2020-01-27 RX ADMIN — HEPARIN SODIUM 5000 UNIT(S): 5000 INJECTION INTRAVENOUS; SUBCUTANEOUS at 14:28

## 2020-01-27 RX ADMIN — Medication 100 GRAM(S): at 09:06

## 2020-01-27 RX ADMIN — Medication 216 GRAM(S): at 23:45

## 2020-01-27 RX ADMIN — SODIUM CHLORIDE 3 MILLILITER(S): 9 INJECTION INTRAMUSCULAR; INTRAVENOUS; SUBCUTANEOUS at 05:01

## 2020-01-27 RX ADMIN — Medication 216 GRAM(S): at 18:00

## 2020-01-27 NOTE — PROGRESS NOTE ADULT - ASSESSMENT
83 yo F with HTN, DM, HLD, h/o lung CA s/p L pneumonectomy, remote cerebral aneurysm with ureteral stone/pyelo, Enterococcus faecalis in blood and urine from OR, yeast in urine as well.  Though she has defervesced and cultures from 1/23 show NGTD, a significant proportion of patients with bloodstream infections due to E. faecalis develop endocarditis.  Unclear how long she was bacteremic as she had had significant decline for ~1 month per her children.  Though MV and AV function are normal, the MV has severe annular calcification and fibrocalcific changes are also present in AV.  Her ESR and CRP are markedly elevated.  Agree that she more likely does not have endocarditis in absence of murmur but in view of risk factors and marked change in management that would occur if she did, would exclude.  If endocarditis is not present, would complete course with IV antibiotics as she had bloodstream infection;  E. faecalis is tenacious - would not change to po.  Her renal function has improved - CrCl ~41.  Her thrombocytopenia also has improved.  Suggest:  - ANNETTE  - Continue ampicillin 2 g IV q6h  - Continue fluconazole 200 mg po q24h  Recommendations discussed with primary team, Dr. Johnson and at length with pt, son and daughter.  Will continue to follow with you - team 2.

## 2020-01-27 NOTE — PROGRESS NOTE ADULT - SUBJECTIVE AND OBJECTIVE BOX
INTERVAL HPI/OVERNIGHT EVENTS:  Remains afebrile.  Had had dysuria (sensation while Juarez was in place).  Juarez was d/fernandez 2 d ago - dysuria is improving.  No urinary retention.  Has had rectal pain since last night - decreased after having bowel movements.      CONSTITUTIONAL:  No fever, chills, night sweats  EYES:  No photophobia or visual changes  CARDIOVASCULAR:  No chest pain  RESPIRATORY:  No cough, wheezing, or SOB   GASTROINTESTINAL:  No nausea, vomiting, otherwise as above.  GENITOURINARY:  No frequency, urgency, dysuria or hematuria  NEUROLOGIC:  No headache, lightheadedness      ANTIBIOTICS/RELEVANT:  Ampicillin 2 g IV q6h (1/24-present)  Fluconazole 200 mg po q24h (1/24-present)    Ceftriaxone 1/22-1/23  Pip-tazo 1/23-1/24  Vancomycin 1/23    Vital Signs Last 24 Hrs  T(C): 36.9 (27 Jan 2020 09:10), Max: 36.9 (27 Jan 2020 09:10)  T(F): 98.5 (27 Jan 2020 09:10), Max: 98.5 (27 Jan 2020 09:10)  HR: 88 (27 Jan 2020 09:10) (84 - 89)  BP: 143/78 (27 Jan 2020 09:10) (130/82 - 143/78)  BP(mean): --  RR: 15 (27 Jan 2020 09:10) (15 - 17)  SpO2: 97% (27 Jan 2020 09:10) (97% - 100%)    PHYSICAL EXAM:  Constitutional:  Well-developed, well nourished  Eyes:  Sclerae anicteric, conjunctivae clear, PERRL  Ear/Nose/Throat:  No nasal exudate or sinus tenderness;  No buccal mucosal lesions, no pharyngeal erythema or exudate	  Neck:  Supple, no adenopathy  Respiratory:  Decrease BS L chest  Cardiovascular:  RRR, S1S2, no murmur appreciated  Gastrointestinal:  Symmetric, normoactive BS, soft, mild BLQ tenderness to palpation, no masses, guarding or rebound.  No HSM  Extremities:  No edema      LABS:                        9.0    6.80  )-----------( 120      ( 27 Jan 2020 07:59 )             29.3         01-27    143  |  106  |  18  ----------------------------<  122<H>  3.7   |  27  |  0.78    Ca    9.5      27 Jan 2020 07:59  Phos  2.5     01-27  Mg     1.7     01-27    Sedimentation Rate, Erythrocyte (01.25.20 @ 06:23)    Sedimentation Rate, Erythrocyte: 66 mm/Hr  C-Reactive Protein, Serum: 16.04 mg/dL (01.25.20 @ 06:23)            MICROBIOLOGY:    Blood cultures:  1/22 X 2 – Enterococcus faecalis (3/4 bottles) S amp;  1/23 X 2 - NGTD  Urine culture 1/22: contaminated  Surgical urine culture ("suprapubic bladder urine") 1/22  50K E. faecalis S amp, vanc (2), 20K yeast      RADIOLOGY & ADDITIONAL STUDIES:

## 2020-01-27 NOTE — PROGRESS NOTE ADULT - SUBJECTIVE AND OBJECTIVE BOX
INTERVAL HPI/OVERNIGHT EVENTS:  No acute events overnight.    VITALS:    T(F): 98.1 (01-27-20 @ 05:00), Max: 98.1 (01-27-20 @ 05:00)  HR: 88 (01-27-20 @ 05:00) (84 - 89)  BP: 141/77 (01-27-20 @ 05:00) (130/82 - 141/77)  RR: 16 (01-27-20 @ 05:00) (16 - 17)  SpO2: 99% (01-27-20 @ 05:00) (98% - 100%)  Wt(kg): --    I&O's Detail    25 Jan 2020 07:01  -  26 Jan 2020 07:00  --------------------------------------------------------  IN:    Oral Fluid: 120 mL  Total IN: 120 mL    OUT:    Voided: 600 mL  Total OUT: 600 mL    Total NET: -480 mL      26 Jan 2020 07:01  -  27 Jan 2020 06:27  --------------------------------------------------------  IN:    Oral Fluid: 120 mL  Total IN: 120 mL    OUT:    Voided: 700 mL  Total OUT: 700 mL    Total NET: -580 mL          MEDICATIONS:    ANTIBIOTICS:  ampicillin  IVPB 2 Gram(s) IV Intermittent every 6 hours  fluconAZOLE   Tablet 200 milliGRAM(s) Oral daily      PAIN CONTROL:  acetaminophen   Tablet .. 650 milliGRAM(s) Oral every 6 hours PRN  HYDROmorphone  Injectable 0.5 milliGRAM(s) IV Push once PRN  ondansetron Injectable 4 milliGRAM(s) IV Push every 6 hours PRN  oxycodone    5 mG/acetaminophen 325 mG 1 Tablet(s) Oral every 6 hours PRN  oxyCODONE    IR 10 milliGRAM(s) Oral every 6 hours PRN       MEDS:      HEME/ONC  heparin  Injectable 5000 Unit(s) SubCutaneous every 8 hours        PHYSICAL EXAM:  General: No acute distress.  Alert and Oriented  Abdominal Exam: soft, nontender   Exam: WNL, incontinence at baseline      LABS:                        9.4    7.21  )-----------( 102      ( 26 Jan 2020 07:17 )             29.4     01-26    144  |  107  |  19  ----------------------------<  123<H>  4.8   |  28  |  0.90    Ca    9.8      26 Jan 2020 07:17  Phos  2.2     01-26  Mg     1.6     01-26

## 2020-01-28 LAB
ANION GAP SERPL CALC-SCNC: 9 MMOL/L — SIGNIFICANT CHANGE UP (ref 5–17)
BUN SERPL-MCNC: 14 MG/DL — SIGNIFICANT CHANGE UP (ref 7–23)
CALCIUM SERPL-MCNC: 8.8 MG/DL — SIGNIFICANT CHANGE UP (ref 8.4–10.5)
CHLORIDE SERPL-SCNC: 104 MMOL/L — SIGNIFICANT CHANGE UP (ref 96–108)
CO2 SERPL-SCNC: 29 MMOL/L — SIGNIFICANT CHANGE UP (ref 22–31)
CREAT SERPL-MCNC: 0.86 MG/DL — SIGNIFICANT CHANGE UP (ref 0.5–1.3)
CULTURE RESULTS: SIGNIFICANT CHANGE UP
CULTURE RESULTS: SIGNIFICANT CHANGE UP
GLUCOSE BLDC GLUCOMTR-MCNC: 104 MG/DL — HIGH (ref 70–99)
GLUCOSE BLDC GLUCOMTR-MCNC: 121 MG/DL — HIGH (ref 70–99)
GLUCOSE BLDC GLUCOMTR-MCNC: 128 MG/DL — HIGH (ref 70–99)
GLUCOSE BLDC GLUCOMTR-MCNC: 97 MG/DL — SIGNIFICANT CHANGE UP (ref 70–99)
GLUCOSE SERPL-MCNC: 108 MG/DL — HIGH (ref 70–99)
HCT VFR BLD CALC: 28 % — LOW (ref 34.5–45)
HGB BLD-MCNC: 8.7 G/DL — LOW (ref 11.5–15.5)
MAGNESIUM SERPL-MCNC: 1.7 MG/DL — SIGNIFICANT CHANGE UP (ref 1.6–2.6)
MCHC RBC-ENTMCNC: 26.7 PG — LOW (ref 27–34)
MCHC RBC-ENTMCNC: 31.1 GM/DL — LOW (ref 32–36)
MCV RBC AUTO: 85.9 FL — SIGNIFICANT CHANGE UP (ref 80–100)
NRBC # BLD: 0 /100 WBCS — SIGNIFICANT CHANGE UP (ref 0–0)
PHOSPHATE SERPL-MCNC: 2.6 MG/DL — SIGNIFICANT CHANGE UP (ref 2.5–4.5)
PLATELET # BLD AUTO: 121 K/UL — LOW (ref 150–400)
POTASSIUM SERPL-MCNC: 3.4 MMOL/L — LOW (ref 3.5–5.3)
POTASSIUM SERPL-SCNC: 3.4 MMOL/L — LOW (ref 3.5–5.3)
RBC # BLD: 3.26 M/UL — LOW (ref 3.8–5.2)
RBC # FLD: 14.3 % — SIGNIFICANT CHANGE UP (ref 10.3–14.5)
SODIUM SERPL-SCNC: 142 MMOL/L — SIGNIFICANT CHANGE UP (ref 135–145)
SPECIMEN SOURCE: SIGNIFICANT CHANGE UP
SPECIMEN SOURCE: SIGNIFICANT CHANGE UP
WBC # BLD: 3.95 K/UL — SIGNIFICANT CHANGE UP (ref 3.8–10.5)
WBC # FLD AUTO: 3.95 K/UL — SIGNIFICANT CHANGE UP (ref 3.8–10.5)

## 2020-01-28 PROCEDURE — 71046 X-RAY EXAM CHEST 2 VIEWS: CPT | Mod: 26

## 2020-01-28 PROCEDURE — 99233 SBSQ HOSP IP/OBS HIGH 50: CPT

## 2020-01-28 RX ORDER — MAGNESIUM OXIDE 400 MG ORAL TABLET 241.3 MG
800 TABLET ORAL ONCE
Refills: 0 | Status: COMPLETED | OUTPATIENT
Start: 2020-01-28 | End: 2020-01-28

## 2020-01-28 RX ORDER — POTASSIUM CHLORIDE 20 MEQ
40 PACKET (EA) ORAL ONCE
Refills: 0 | Status: COMPLETED | OUTPATIENT
Start: 2020-01-28 | End: 2020-01-28

## 2020-01-28 RX ORDER — MAGNESIUM SULFATE 500 MG/ML
2 VIAL (ML) INJECTION ONCE
Refills: 0 | Status: DISCONTINUED | OUTPATIENT
Start: 2020-01-28 | End: 2020-01-28

## 2020-01-28 RX ORDER — POTASSIUM CHLORIDE 20 MEQ
10 PACKET (EA) ORAL
Refills: 0 | Status: DISCONTINUED | OUTPATIENT
Start: 2020-01-28 | End: 2020-01-28

## 2020-01-28 RX ADMIN — FLUCONAZOLE 200 MILLIGRAM(S): 150 TABLET ORAL at 13:19

## 2020-01-28 RX ADMIN — SODIUM CHLORIDE 3 MILLILITER(S): 9 INJECTION INTRAMUSCULAR; INTRAVENOUS; SUBCUTANEOUS at 21:06

## 2020-01-28 RX ADMIN — SODIUM CHLORIDE 3 MILLILITER(S): 9 INJECTION INTRAMUSCULAR; INTRAVENOUS; SUBCUTANEOUS at 06:47

## 2020-01-28 RX ADMIN — Medication 216 GRAM(S): at 23:27

## 2020-01-28 RX ADMIN — MAGNESIUM OXIDE 400 MG ORAL TABLET 800 MILLIGRAM(S): 241.3 TABLET ORAL at 13:19

## 2020-01-28 RX ADMIN — HEPARIN SODIUM 5000 UNIT(S): 5000 INJECTION INTRAVENOUS; SUBCUTANEOUS at 13:20

## 2020-01-28 RX ADMIN — Medication 40 MILLIEQUIVALENT(S): at 13:19

## 2020-01-28 RX ADMIN — Medication 216 GRAM(S): at 17:52

## 2020-01-28 RX ADMIN — Medication 216 GRAM(S): at 13:20

## 2020-01-28 RX ADMIN — SODIUM CHLORIDE 3 MILLILITER(S): 9 INJECTION INTRAMUSCULAR; INTRAVENOUS; SUBCUTANEOUS at 13:10

## 2020-01-28 RX ADMIN — Medication 1 TABLET(S): at 13:19

## 2020-01-28 RX ADMIN — Medication 216 GRAM(S): at 06:49

## 2020-01-28 RX ADMIN — Medication 100 MILLIEQUIVALENT(S): at 08:59

## 2020-01-28 RX ADMIN — HEPARIN SODIUM 5000 UNIT(S): 5000 INJECTION INTRAVENOUS; SUBCUTANEOUS at 21:08

## 2020-01-28 RX ADMIN — HEPARIN SODIUM 5000 UNIT(S): 5000 INJECTION INTRAVENOUS; SUBCUTANEOUS at 06:49

## 2020-01-28 NOTE — PROGRESS NOTE ADULT - SUBJECTIVE AND OBJECTIVE BOX
85 yo woman admitted with left ureteral stone and sepsis with enteroccocus (+) blood culture now resolved s/p stent and abx  -awaiting ANNETTE to rule out secondary endocarditis she developed a cough today that warranted a CXR was was clear on the right (she is s/p removal of left lung).  She is afebile and has a mild chronic cough but does not feel sick or SOB  -she will need 2 weeks of IV abx if ANNETTE is negqative but 6 weeks if it is positive and ID is recommending a PICC line    PAST MEDICAL & SURGICAL HISTORY:  High cholesterol  Diabetes  HTN (hypertension)  S/P removal of lung: left    MEDICATIONS  (STANDING):  ampicillin  IVPB 2 Gram(s) IV Intermittent every 6 hours  dextrose 50% Injectable 12.5 Gram(s) IV Push once  dextrose 50% Injectable 25 Gram(s) IV Push once  dextrose 50% Injectable 25 Gram(s) IV Push once  fluconAZOLE   Tablet 200 milliGRAM(s) Oral daily  heparin  Injectable 5000 Unit(s) SubCutaneous every 8 hours  insulin lispro (HumaLOG) corrective regimen sliding scale   SubCutaneous Before meals and at bedtime  Nephro-florence 1 Tablet(s) Oral daily  sodium chloride 0.9% lock flush 3 milliLiter(s) IV Push every 8 hours    MEDICATIONS  (PRN):  acetaminophen   Tablet .. 650 milliGRAM(s) Oral every 6 hours PRN Temp greater or equal to 38C (100.4F), Mild Pain (1 - 3)  dextrose 40% Gel 15 Gram(s) Oral once PRN Blood Glucose LESS THAN 70 milliGRAM(s)/deciliter  glucagon  Injectable 1 milliGRAM(s) IntraMuscular once PRN Glucose LESS THAN 70 milligrams/deciliter  HYDROmorphone  Injectable 0.5 milliGRAM(s) IV Push once PRN Severe Pain (7 - 10)  ondansetron Injectable 4 milliGRAM(s) IV Push every 6 hours PRN Nausea and/or Vomiting  oxycodone    5 mG/acetaminophen 325 mG 1 Tablet(s) Oral every 6 hours PRN Moderate Pain (4 - 6)  oxyCODONE    IR 10 milliGRAM(s) Oral every 6 hours PRN Severe Pain (7 - 10)    Vital Signs Last 24 Hrs  T(C): 36.6 (28 Jan 2020 16:15), Max: 36.8 (27 Jan 2020 20:20)  T(F): 97.8 (28 Jan 2020 16:15), Max: 98.3 (27 Jan 2020 20:20)  HR: 96 (28 Jan 2020 16:15) (95 - 97)  BP: 151/81 (28 Jan 2020 16:15) (150/72 - 153/76)  BP(mean): --  RR: 16 (28 Jan 2020 16:15) (14 - 16)  SpO2: 100% (28 Jan 2020 16:15) (96% - 100%)    NAD A/O x3  No JVD  Chest right rhonchi   CV [id number] no murmur  Abd soft  Ext no edema    CXR: History: Cough sepsis rule out pneumonia  Impression: Unchanged compared to prior exam 1/22/2020 with opacification left hemithorax and postoperative changes/surgical clips left hemithorax again noted. Right lung clear. No right pleural effusion. No acute bone abnormality. No pneumothorax.                          8.7    3.95  )-----------( 121      ( 28 Jan 2020 07:41 )             28.0   01-28    142  |  104  |  14  ----------------------------<  108<H>  3.4<L>   |  29  |  0.86    Ca    8.8      28 Jan 2020 07:41  Phos  2.6     01-28  Mg     1.7     01-28

## 2020-01-28 NOTE — PROGRESS NOTE ADULT - SUBJECTIVE AND OBJECTIVE BOX
INTERVAL HPI/OVERNIGHT EVENTS:  ANNETTE canceled this morning by Cardiology b/o cough.  Per pt and her son, she began coughing yesterday around noon.  Cough is nonproductive.  No CP, SOB.  She reports having had previous episodes of same cough for many years - she thinks these resolve spontaneously but does not know how long they last.  They have been associated with wheezing.    CONSTITUTIONAL:  No fever, chills, night sweats  EYES:  No photophobia or visual changes  CARDIOVASCULAR:  No chest pain  RESPIRATORY:  As above   GASTROINTESTINAL:  No nausea, vomiting, diarrhea, constipation, or abdominal pain  GENITOURINARY:  No frequency, urgency, dysuria or hematuria  NEUROLOGIC:  No headache, lightheadedness      ANTIBIOTICS/RELEVANT:  Ampicillin 2 g IV q6h (1/24-present)  Fluconazole 200 mg po q24h (1/24-present)    Ceftriaxone 1/22-1/23  Pip-tazo 1/23-1/24  Vancomycin 1/23      Vital Signs Last 24 Hrs  T(C): 36.7 (28 Jan 2020 08:38), Max: 36.8 (27 Jan 2020 20:20)  T(F): 98 (28 Jan 2020 08:38), Max: 98.3 (27 Jan 2020 20:20)  HR: 97 (28 Jan 2020 08:38) (95 - 97)  BP: 153/68 (28 Jan 2020 08:38) (150/72 - 153/76)  BP(mean): --  RR: 14 (28 Jan 2020 08:38) (14 - 16)  SpO2: 97% (28 Jan 2020 08:38) (96% - 97%)    PHYSICAL EXAM:  Constitutional:  Well-developed, well nourished  Eyes:  Sclerae anicteric, conjunctivae clear, PERRL  Ear/Nose/Throat:  No nasal exudate or sinus tenderness;  No buccal mucosal lesions, no pharyngeal erythema or exudate	  Neck:  Supple, no adenopathy  Respiratory:  Decrease BS L chest;  mild diffuse inspiratory and expiratory rhonchi  Cardiovascular:  RRR, S1S2, no murmur appreciated  Gastrointestinal:  Symmetric, normoactive BS, soft, nontender, no masses, guarding or rebound.  No HSM.  No CVAT.  :  Primafit in place  Extremities:  No edema      LABS:                        8.7    3.95  )-----------( 121      ( 28 Jan 2020 07:41 )             28.0         01-28    142  |  104  |  14  ----------------------------<  108<H>  3.4<L>   |  29  |  0.86    Ca    8.8      28 Jan 2020 07:41  Phos  2.6     01-28  Mg     1.7     01-28            MICROBIOLOGY:  Blood cultures:  1/22 X 2 – Enterococcus faecalis (3/4 bottles) S amp;  1/23 X 2 - NG  Urine culture 1/22: contaminated  Surgical urine culture ("suprapubic bladder urine") 1/22  50K E. faecalis S amp, vanc (2), 20K yeast      RADIOLOGY & ADDITIONAL STUDIES:  < from: Xray Chest 2 Views PA/Lat (01.28.20 @ 12:04) >  History: Cough sepsis rule out pneumonia    Impression:    Unchanged compared to prior exam 1/22/2020 with opacification left hemithorax and postoperative changes/surgical clips left hemithorax again noted. Right lung clear. No right pleural effusion. No acute bone abnormality. No pneumothorax.    < end of copied text >

## 2020-01-28 NOTE — PROGRESS NOTE ADULT - ASSESSMENT
83 yo woman admitted with left ureteral stone and sepsis with enteroccocus (+) blood culture now resolved s/p stent and abx  -awaiting ANNETTE to rule out secondary endocarditis she developed a cough today that warranted a CXR was was clear on the right (she is s/p removal of left lung).  She is afebile and has a mild chronic cough but does not feel sick or SOB  -she will need 2 weeks of IV abx if ANNETTE is negqative but 6 weeks if it is positive and ID is recommending a PICC line  -DVT prophylaxis  -Blood pressure controlled  -anemia  -diet controlled prediabetes  -UTI - on abx per ID with left ureteral stent in place

## 2020-01-28 NOTE — PROGRESS NOTE ADULT - ASSESSMENT
85 yo F with HTN, DM, HLD, h/o lung CA s/p L pneumonectomy, remote cerebral aneurysm with ureteral stone/pyelo, Enterococcus faecalis in blood and urine from OR, yeast in urine as well.  Agree with postponement of ANNETTE in view of cough/rhonchi.  Based upon H&P, suspect most likely bronchospasm and she has substantial smoking history.  No fever, leukocytosis.  CXR does not show pneumonia.  Will discuss prior cardiopulmonary disease with Dr. Johnson.  Suggest:  - Continue ampicillin 2 g IV q6h  - Continue fluconazole 200 mg po q24h  - ANNETTE after resolution of pulmonary symptoms  Recommendations discussed with primary team and at length with pt and her son.  Will continue to follow with you - team 2.

## 2020-01-28 NOTE — PROGRESS NOTE ADULT - SUBJECTIVE AND OBJECTIVE BOX
AM Note    No acute events overnight.      Vital Signs Last 24 Hrs  T(C): 36.8 (01-27-20 @ 20:20), Max: 37.2 (01-27-20 @ 15:18)  T(F): 98.3 (01-27-20 @ 20:20), Max: 98.9 (01-27-20 @ 15:18)  HR: 96 (01-27-20 @ 20:20) (88 - 96)  BP: 150/72 (01-27-20 @ 20:20) (143/78 - 150/72)  BP(mean): --  RR: 16 (01-27-20 @ 20:20) (15 - 16)  SpO2: 96% (01-27-20 @ 20:20) (96% - 99%)     27 Jan 2020 07:59    143    |  106    |  18     ----------------------------<  122    3.7     |  27     |  0.78     Ca    9.5        27 Jan 2020 07:59  Phos  2.5       27 Jan 2020 07:59  Mg     1.7       27 Jan 2020 07:59                            9.0    6.80  )-----------( 120      ( 27 Jan 2020 07:59 )             29.3         I&O's Summary    26 Jan 2020 07:01  -  27 Jan 2020 07:00  --------------------------------------------------------  IN: 120 mL / OUT: 700 mL / NET: -580 mL    27 Jan 2020 07:01  -  28 Jan 2020 05:57  --------------------------------------------------------  IN: 1425 mL / OUT: 0 mL / NET: 1425 mL          PHYSICAL EXAM:    GEN: awake and alert  ABD: nontender, nondistended  : no suprapubic/CVAT

## 2020-01-29 LAB
ANION GAP SERPL CALC-SCNC: 11 MMOL/L — SIGNIFICANT CHANGE UP (ref 5–17)
BUN SERPL-MCNC: 14 MG/DL — SIGNIFICANT CHANGE UP (ref 7–23)
CALCIUM SERPL-MCNC: 9.4 MG/DL — SIGNIFICANT CHANGE UP (ref 8.4–10.5)
CHLORIDE SERPL-SCNC: 103 MMOL/L — SIGNIFICANT CHANGE UP (ref 96–108)
CO2 SERPL-SCNC: 26 MMOL/L — SIGNIFICANT CHANGE UP (ref 22–31)
CREAT SERPL-MCNC: 0.71 MG/DL — SIGNIFICANT CHANGE UP (ref 0.5–1.3)
CULTURE RESULTS: SIGNIFICANT CHANGE UP
GLUCOSE BLDC GLUCOMTR-MCNC: 100 MG/DL — HIGH (ref 70–99)
GLUCOSE BLDC GLUCOMTR-MCNC: 146 MG/DL — HIGH (ref 70–99)
GLUCOSE BLDC GLUCOMTR-MCNC: 83 MG/DL — SIGNIFICANT CHANGE UP (ref 70–99)
GLUCOSE BLDC GLUCOMTR-MCNC: 92 MG/DL — SIGNIFICANT CHANGE UP (ref 70–99)
GLUCOSE BLDC GLUCOMTR-MCNC: 93 MG/DL — SIGNIFICANT CHANGE UP (ref 70–99)
GLUCOSE SERPL-MCNC: 100 MG/DL — HIGH (ref 70–99)
HCT VFR BLD CALC: 28.4 % — LOW (ref 34.5–45)
HGB BLD-MCNC: 8.7 G/DL — LOW (ref 11.5–15.5)
MAGNESIUM SERPL-MCNC: 1.8 MG/DL — SIGNIFICANT CHANGE UP (ref 1.6–2.6)
MCHC RBC-ENTMCNC: 26.5 PG — LOW (ref 27–34)
MCHC RBC-ENTMCNC: 30.6 GM/DL — LOW (ref 32–36)
MCV RBC AUTO: 86.6 FL — SIGNIFICANT CHANGE UP (ref 80–100)
NRBC # BLD: 0 /100 WBCS — SIGNIFICANT CHANGE UP (ref 0–0)
ORGANISM # SPEC MICROSCOPIC CNT: SIGNIFICANT CHANGE UP
ORGANISM # SPEC MICROSCOPIC CNT: SIGNIFICANT CHANGE UP
PHOSPHATE SERPL-MCNC: 2.5 MG/DL — SIGNIFICANT CHANGE UP (ref 2.5–4.5)
PLATELET # BLD AUTO: 132 K/UL — LOW (ref 150–400)
POTASSIUM SERPL-MCNC: 4 MMOL/L — SIGNIFICANT CHANGE UP (ref 3.5–5.3)
POTASSIUM SERPL-SCNC: 4 MMOL/L — SIGNIFICANT CHANGE UP (ref 3.5–5.3)
RBC # BLD: 3.28 M/UL — LOW (ref 3.8–5.2)
RBC # FLD: 14.5 % — SIGNIFICANT CHANGE UP (ref 10.3–14.5)
SODIUM SERPL-SCNC: 140 MMOL/L — SIGNIFICANT CHANGE UP (ref 135–145)
SPECIMEN SOURCE: SIGNIFICANT CHANGE UP
WBC # BLD: 3.49 K/UL — LOW (ref 3.8–10.5)
WBC # FLD AUTO: 3.49 K/UL — LOW (ref 3.8–10.5)

## 2020-01-29 PROCEDURE — 99232 SBSQ HOSP IP/OBS MODERATE 35: CPT

## 2020-01-29 PROCEDURE — 76376 3D RENDER W/INTRP POSTPROCES: CPT | Mod: 26

## 2020-01-29 PROCEDURE — 93312 ECHO TRANSESOPHAGEAL: CPT | Mod: 26

## 2020-01-29 RX ORDER — OXYCODONE HYDROCHLORIDE 5 MG/1
10 TABLET ORAL EVERY 6 HOURS
Refills: 0 | Status: DISCONTINUED | OUTPATIENT
Start: 2020-01-29 | End: 2020-01-30

## 2020-01-29 RX ORDER — BENZOCAINE AND MENTHOL 5; 1 G/100ML; G/100ML
1 LIQUID ORAL ONCE
Refills: 0 | Status: COMPLETED | OUTPATIENT
Start: 2020-01-29 | End: 2020-01-29

## 2020-01-29 RX ORDER — OXYCODONE AND ACETAMINOPHEN 5; 325 MG/1; MG/1
1 TABLET ORAL EVERY 6 HOURS
Refills: 0 | Status: DISCONTINUED | OUTPATIENT
Start: 2020-01-29 | End: 2020-01-30

## 2020-01-29 RX ADMIN — SODIUM CHLORIDE 3 MILLILITER(S): 9 INJECTION INTRAMUSCULAR; INTRAVENOUS; SUBCUTANEOUS at 05:38

## 2020-01-29 RX ADMIN — Medication 1 TABLET(S): at 12:51

## 2020-01-29 RX ADMIN — SODIUM CHLORIDE 3 MILLILITER(S): 9 INJECTION INTRAMUSCULAR; INTRAVENOUS; SUBCUTANEOUS at 21:52

## 2020-01-29 RX ADMIN — FLUCONAZOLE 200 MILLIGRAM(S): 150 TABLET ORAL at 12:51

## 2020-01-29 RX ADMIN — BENZOCAINE AND MENTHOL 1 LOZENGE: 5; 1 LIQUID ORAL at 15:49

## 2020-01-29 RX ADMIN — BENZOCAINE AND MENTHOL 1 LOZENGE: 5; 1 LIQUID ORAL at 18:29

## 2020-01-29 RX ADMIN — HEPARIN SODIUM 5000 UNIT(S): 5000 INJECTION INTRAVENOUS; SUBCUTANEOUS at 05:40

## 2020-01-29 RX ADMIN — Medication 216 GRAM(S): at 12:47

## 2020-01-29 RX ADMIN — Medication 216 GRAM(S): at 23:07

## 2020-01-29 RX ADMIN — HEPARIN SODIUM 5000 UNIT(S): 5000 INJECTION INTRAVENOUS; SUBCUTANEOUS at 22:15

## 2020-01-29 RX ADMIN — Medication 216 GRAM(S): at 05:40

## 2020-01-29 RX ADMIN — HEPARIN SODIUM 5000 UNIT(S): 5000 INJECTION INTRAVENOUS; SUBCUTANEOUS at 14:42

## 2020-01-29 RX ADMIN — Medication 216 GRAM(S): at 18:29

## 2020-01-29 RX ADMIN — SODIUM CHLORIDE 3 MILLILITER(S): 9 INJECTION INTRAMUSCULAR; INTRAVENOUS; SUBCUTANEOUS at 13:01

## 2020-01-29 NOTE — PROGRESS NOTE ADULT - ASSESSMENT
Suggest:  - Ampicillin 2 g IV q6h to complete 14 d from 1st negative blood culture (1/23-2/5) 83 yo F with HTN, DM, HLD, h/o lung CA s/p L pneumonectomy, remote cerebral aneurysm with ureteral stone/pyelo, Enterococcus faecalis in blood and urine from OR, yeast in urine as well.  ANNETTE without evidence for endocarditis.  In view of bacteremia, would complete course with IV therapy, would continue to treat for yeast as complicated UTI.  Suggest:  - I am attempting to obtain information regarding ID and susceptibility of yeast from [facility]  - Ampicillin 2 g IV q6h to complete 14 d from 1st negative blood culture (1/23-2/5)  - Fluconazole 200 mg po qd through 2/5  - Please have [facility] do CBC and CMP on 2/3 and fax to my office ([phone])  - I will arrange for f/u with me for after she is off antibiotics.  Recommendations discussed with primary team.  Please recall if further ID input is desired - team 2.

## 2020-01-29 NOTE — PROGRESS NOTE ADULT - SUBJECTIVE AND OBJECTIVE BOX
INTERVAL HPI/OVERNIGHT EVENTS:    CONSTITUTIONAL:  No fever, chills, night sweats  EYES:  No photophobia or visual changes  CARDIOVASCULAR:  No chest pain  RESPIRATORY:  No cough, wheezing, or SOB   GASTROINTESTINAL:  No nausea, vomiting, diarrhea, constipation, or abdominal pain  GENITOURINARY:  No frequency, urgency, dysuria or hematuria  NEUROLOGIC:  No headache, lightheadedness      ANTIBIOTICS/RELEVANT:          Vital Signs Last 24 Hrs  T(C): 36.8 (29 Jan 2020 08:18), Max: 37.1 (28 Jan 2020 20:26)  T(F): 98.2 (29 Jan 2020 08:18), Max: 98.8 (28 Jan 2020 20:26)  HR: 80 (29 Jan 2020 08:18) (80 - 96)  BP: 132/79 (29 Jan 2020 08:18) (132/79 - 151/81)  BP(mean): --  RR: 15 (29 Jan 2020 08:18) (15 - 17)  SpO2: 100% (29 Jan 2020 08:18) (100% - 100%)    PHYSICAL EXAM:  Constitutional:  Well-developed, well nourished  Eyes:  Sclerae anicterica, conjunctivae clear, PERRL  Ear/Nose/Throat:  No nasal exudate or sinus tenderness;  No buccal mucosal lesions, no pharyngeal erythema or exudate	  Neck:  Supple, no adenopathy  Respiratory:  Clear bilaterally  Cardiovascular:  RRR, S1S2, no murmur appreciated  Gastrointestinal:  Symmetric, normoactive BS, soft, NT, no masses, guarding or rebound.  No HSM  Extremities:  No edema      LABS:                        8.7    3.49  )-----------( 132      ( 29 Jan 2020 07:17 )             28.4         01-29    140  |  103  |  14  ----------------------------<  100<H>  4.0   |  26  |  0.71    Ca    9.4      29 Jan 2020 07:18  Phos  2.5     01-29  Mg     1.8     01-29            MICROBIOLOGY:        RADIOLOGY & ADDITIONAL STUDIES:  < from: ANNETTE w/Doppler (01.29.20 @ 10:58) >  CONCLUSIONS:     1. Normal left and right ventricular size and function.   2. Dilated left atrium.   3. No LA/RA/EZEKIEL/RAA thrombus seen.   4. The mitral valve is mildly thickened and calcified. There is severe mitral annular calcification. There is mild mitral regurgitation.   5. Fibrocalcific tricuspid aortic valve without significant stenosis. No hemodynamically significant aortic stenosis. The aortic valve area (estimated via planimetry) is 2.00 cm². There is trace aortic.   6. Trivial likely physiological pericardial effusion.   7. No echocardiographic evidence of vegetations.    < end of copied text > INTERVAL HPI/OVERNIGHT EVENTS:  Feels weak following ANNETTE.      CONSTITUTIONAL:  No fever, chills, night sweats  EYES:  No photophobia or visual changes  CARDIOVASCULAR:  No chest pain  RESPIRATORY:  Cough is unchanged, no wheezing, or SOB   GASTROINTESTINAL:  No nausea, vomiting, diarrhea, constipation, or abdominal pain  GENITOURINARY:  No frequency, urgency, dysuria or hematuria  NEUROLOGIC:  No headache, lightheadedness      ANTIBIOTICS/RELEVANT:    Ampicillin 2 g IV q6h (1/24-present)  Fluconazole 200 mg po q24h (1/24-present)    Ceftriaxone 1/22-1/23  Pip-tazo 1/23-1/24  Vancomycin 1/23        Vital Signs Last 24 Hrs  T(C): 36.8 (29 Jan 2020 08:18), Max: 37.1 (28 Jan 2020 20:26)  T(F): 98.2 (29 Jan 2020 08:18), Max: 98.8 (28 Jan 2020 20:26)  HR: 80 (29 Jan 2020 08:18) (80 - 96)  BP: 132/79 (29 Jan 2020 08:18) (132/79 - 151/81)  BP(mean): --  RR: 15 (29 Jan 2020 08:18) (15 - 17)  SpO2: 100% (29 Jan 2020 08:18) (100% - 100%)    PHYSICAL EXAM:  Constitutional:  Well-developed, well nourished  Eyes:  Sclerae anicteric, conjunctivae clear, PERRL  Ear/Nose/Throat:  No nasal exudate or sinus tenderness;  No buccal mucosal lesions, no pharyngeal erythema or exudate	  Neck:  Supple, no adenopathy  Respiratory:  Decreased BS L post chest, mild low pitched exp rhonchi  Cardiovascular:  RRR, S1S2, no murmur appreciated  Gastrointestinal:  Symmetric, normoactive BS, soft, NT, no masses, guarding or rebound.  No HSM  Extremities:  No edema      LABS:                        8.7    3.49  )-----------( 132      ( 29 Jan 2020 07:17 )             28.4         01-29    140  |  103  |  14  ----------------------------<  100<H>  4.0   |  26  |  0.71    Ca    9.4      29 Jan 2020 07:18  Phos  2.5     01-29  Mg     1.8     01-29            MICROBIOLOGY:        RADIOLOGY & ADDITIONAL STUDIES:  < from: ANNETTE w/Doppler (01.29.20 @ 10:58) >  CONCLUSIONS:     1. Normal left and right ventricular size and function.   2. Dilated left atrium.   3. No LA/RA/EZEKIEL/RAA thrombus seen.   4. The mitral valve is mildly thickened and calcified. There is severe mitral annular calcification. There is mild mitral regurgitation.   5. Fibrocalcific tricuspid aortic valve without significant stenosis. No hemodynamically significant aortic stenosis. The aortic valve area (estimated via planimetry) is 2.00 cm². There is trace aortic.   6. Trivial likely physiological pericardial effusion.   7. No echocardiographic evidence of vegetations.    < end of copied text >

## 2020-01-29 NOTE — PROGRESS NOTE ADULT - ASSESSMENT
Admitted with urospesis and impacted left ureteral stone no w s/p stent on abx with enterococcus in the blood  ANNETTE today showed no veg  Will need PICC for abx  Anxious to proceed to [facility]

## 2020-01-29 NOTE — PROGRESS NOTE ADULT - SUBJECTIVE AND OBJECTIVE BOX
Admitted with urospesis and impacted left ureteral stone no w s/p stent on abx with enterococcus in the blood  ANNETTE today showed no veg  Will need PICC for abx  Anxious to proceed to [facility]      PAST MEDICAL & SURGICAL HISTORY:  High cholesterol  Diabetes  HTN (hypertension)  S/P removal of lung: left    MEDICATIONS  (STANDING):  ampicillin  IVPB 2 Gram(s) IV Intermittent every 6 hours  dextrose 50% Injectable 12.5 Gram(s) IV Push once  dextrose 50% Injectable 25 Gram(s) IV Push once  dextrose 50% Injectable 25 Gram(s) IV Push once  fluconAZOLE   Tablet 200 milliGRAM(s) Oral daily  heparin  Injectable 5000 Unit(s) SubCutaneous every 8 hours  insulin lispro (HumaLOG) corrective regimen sliding scale   SubCutaneous Before meals and at bedtime  Nephro-florence 1 Tablet(s) Oral daily  sodium chloride 0.9% lock flush 3 milliLiter(s) IV Push every 8 hours    MEDICATIONS  (PRN):  acetaminophen   Tablet .. 650 milliGRAM(s) Oral every 6 hours PRN Temp greater or equal to 38C (100.4F), Mild Pain (1 - 3)  dextrose 40% Gel 15 Gram(s) Oral once PRN Blood Glucose LESS THAN 70 milliGRAM(s)/deciliter  glucagon  Injectable 1 milliGRAM(s) IntraMuscular once PRN Glucose LESS THAN 70 milligrams/deciliter  ondansetron Injectable 4 milliGRAM(s) IV Push every 6 hours PRN Nausea and/or Vomiting  oxycodone    5 mG/acetaminophen 325 mG 1 Tablet(s) Oral every 6 hours PRN Moderate Pain (4 - 6)  oxyCODONE    IR 10 milliGRAM(s) Oral every 6 hours PRN Severe Pain (7 - 10)    Vital Signs Last 24 Hrs  T(C): 37.2 (30 Jan 2020 06:32), Max: 37.2 (29 Jan 2020 20:39)  T(F): 98.9 (30 Jan 2020 06:32), Max: 98.9 (29 Jan 2020 20:39)  HR: 86 (30 Jan 2020 06:32) (80 - 98)  BP: 142/83 (30 Jan 2020 06:32) (132/79 - 164/88)  BP(mean): --  RR: 16 (30 Jan 2020 06:32) (15 - 16)  SpO2: 100% (30 Jan 2020 06:32) (100% - 100%)    NAD  No JVD  Chest absent left bs clear right  CV RRR s1s2 no murmur  Abd soft  Ext no edema                          8.7    3.49  )-----------( 132      ( 29 Jan 2020 07:17 )             28.4   01-29    140  |  103  |  14  ----------------------------<  100<H>  4.0   |  26  |  0.71    Ca    9.4      29 Jan 2020 07:18  Phos  2.5     01-29  Mg     1.8     01-29

## 2020-01-29 NOTE — PROGRESS NOTE ADULT - SUBJECTIVE AND OBJECTIVE BOX
INTERVAL HPI/OVERNIGHT EVENTS:  No acute events overnight.    VITALS:    T(F): 98.8 (01-28-20 @ 20:26), Max: 98.8 (01-28-20 @ 20:26)  HR: 90 (01-28-20 @ 20:26) (90 - 97)  BP: 143/83 (01-28-20 @ 20:26) (143/83 - 153/68)  RR: 17 (01-28-20 @ 20:26) (14 - 17)  SpO2: 100% (01-28-20 @ 20:26) (97% - 100%)  Wt(kg): --    I&O's Detail    27 Jan 2020 07:01  -  28 Jan 2020 07:00  --------------------------------------------------------  IN:    Oral Fluid: 600 mL    sodium chloride 0.9%: 825 mL  Total IN: 1425 mL    OUT:  Total OUT: 0 mL    Total NET: 1425 mL          MEDICATIONS:    ANTIBIOTICS:  ampicillin  IVPB 2 Gram(s) IV Intermittent every 6 hours  fluconAZOLE   Tablet 200 milliGRAM(s) Oral daily      PAIN CONTROL:  acetaminophen   Tablet .. 650 milliGRAM(s) Oral every 6 hours PRN  HYDROmorphone  Injectable 0.5 milliGRAM(s) IV Push once PRN  ondansetron Injectable 4 milliGRAM(s) IV Push every 6 hours PRN  oxycodone    5 mG/acetaminophen 325 mG 1 Tablet(s) Oral every 6 hours PRN  oxyCODONE    IR 10 milliGRAM(s) Oral every 6 hours PRN       MEDS:      HEME/ONC  heparin  Injectable 5000 Unit(s) SubCutaneous every 8 hours        PHYSICAL EXAM:  General: No acute distress.  Alert and Oriented  Abdominal Exam: soft, NT, ND   Exam: no SP discomfort      LABS:                        8.7    3.95  )-----------( 121      ( 28 Jan 2020 07:41 )             28.0     01-28    142  |  104  |  14  ----------------------------<  108<H>  3.4<L>   |  29  |  0.86    Ca    8.8      28 Jan 2020 07:41  Phos  2.6     01-28  Mg     1.7     01-28            RADIOLOGY & ADDITIONAL TESTS:

## 2020-01-29 NOTE — CHART NOTE - NSCHARTNOTEFT_GEN_A_CORE
Admitting Diagnosis:   Patient is a 84y old  Female who presents with a chief complaint of Urosepsis (28 Jan 2020 17:46)      PAST MEDICAL & SURGICAL HISTORY:  High cholesterol  Diabetes  HTN (hypertension)  S/P removal of lung: left      Current Nutrition Order:   Diet, NPO after Midnight:      NPO Start Date: 28-Jan-2020,   NPO Start Time: 23:59  Except Medications (01-28-20 @ 21:38)      PO Intake: Good (%) [   ]  Fair (50-75%) [ x  ] Poor (<25%) [   ]    GI Issues: none    Pain :none reported    Skin Integrity :intact    Labs:   01-29    140  |  103  |  14  ----------------------------<  100<H>  4.0   |  26  |  0.71    Ca    9.4      29 Jan 2020 07:18  Phos  2.5     01-29  Mg     1.8     01-29      CAPILLARY BLOOD GLUCOSE      POCT Blood Glucose.: 83 mg/dL (29 Jan 2020 14:26)  POCT Blood Glucose.: 92 mg/dL (29 Jan 2020 10:18)  POCT Blood Glucose.: 93 mg/dL (29 Jan 2020 06:46)  POCT Blood Glucose.: 121 mg/dL (28 Jan 2020 21:50)  POCT Blood Glucose.: 128 mg/dL (28 Jan 2020 16:57)      Medications:  MEDICATIONS  (STANDING):  ampicillin  IVPB 2 Gram(s) IV Intermittent every 6 hours  dextrose 50% Injectable 12.5 Gram(s) IV Push once  dextrose 50% Injectable 25 Gram(s) IV Push once  dextrose 50% Injectable 25 Gram(s) IV Push once  fluconAZOLE   Tablet 200 milliGRAM(s) Oral daily  heparin  Injectable 5000 Unit(s) SubCutaneous every 8 hours  insulin lispro (HumaLOG) corrective regimen sliding scale   SubCutaneous Before meals and at bedtime  Nephro-florence 1 Tablet(s) Oral daily  sodium chloride 0.9% lock flush 3 milliLiter(s) IV Push every 8 hours    MEDICATIONS  (PRN):  acetaminophen   Tablet .. 650 milliGRAM(s) Oral every 6 hours PRN Temp greater or equal to 38C (100.4F), Mild Pain (1 - 3)  dextrose 40% Gel 15 Gram(s) Oral once PRN Blood Glucose LESS THAN 70 milliGRAM(s)/deciliter  glucagon  Injectable 1 milliGRAM(s) IntraMuscular once PRN Glucose LESS THAN 70 milligrams/deciliter  HYDROmorphone  Injectable 0.5 milliGRAM(s) IV Push once PRN Severe Pain (7 - 10)  ondansetron Injectable 4 milliGRAM(s) IV Push every 6 hours PRN Nausea and/or Vomiting  oxycodone    5 mG/acetaminophen 325 mG 1 Tablet(s) Oral every 6 hours PRN Moderate Pain (4 - 6)  oxyCODONE    IR 10 milliGRAM(s) Oral every 6 hours PRN Severe Pain (7 - 10)      Weight:49kg  Daily     Daily no updated weights    Weight Change: noted reported weight loss over 15months 148-108lbs    Estimated energy needs: Based on on ABW due to between % of IBW.ABW:49kg [id number]:1125-1500kcal and 1.2-1.4gmprotein:60-70gmprotein and 30-35cc fluids:1500-1700cc fluids    Subjective: 85y/o female with high cholesterol Type 2 DM, HTN admitted with urosepsis. S.P cystoscopy stent 1/23.SLP consult noted for regular consistency. Found to have malnutrition on NFPE.Patient went for ANNETTE and PICC today .Eating variable amounts .Pending D/C to [facility]    Previous Nutrition Diagnosis: Suspected severe protein/calorie malnutrition in setting of chronic illness r/t presumed inadequate <eer AEB: weight loss and poor po and NFPE findings  Active [ x ]  Resolved [   ]    If resolved, new PES:     Goal:No further S/S of malnutrition.     Recommendations:1.Updated weights 2.Add Glucerna shakes to regimen(3x) providing :660kcal and 30gmprotein    Education: not appropriate at this time    Risk Level: High [ x  ] Moderate [   ] Low [   ]

## 2020-01-30 ENCOUNTER — TRANSCRIPTION ENCOUNTER (OUTPATIENT)
Age: 85
End: 2020-01-30

## 2020-01-30 VITALS
TEMPERATURE: 98 F | SYSTOLIC BLOOD PRESSURE: 128 MMHG | RESPIRATION RATE: 16 BRPM | HEART RATE: 88 BPM | OXYGEN SATURATION: 92 % | DIASTOLIC BLOOD PRESSURE: 82 MMHG

## 2020-01-30 LAB
ANION GAP SERPL CALC-SCNC: 14 MMOL/L — SIGNIFICANT CHANGE UP (ref 5–17)
BUN SERPL-MCNC: 15 MG/DL — SIGNIFICANT CHANGE UP (ref 7–23)
CALCIUM SERPL-MCNC: 9 MG/DL — SIGNIFICANT CHANGE UP (ref 8.4–10.5)
CHLORIDE SERPL-SCNC: 103 MMOL/L — SIGNIFICANT CHANGE UP (ref 96–108)
CO2 SERPL-SCNC: 26 MMOL/L — SIGNIFICANT CHANGE UP (ref 22–31)
CREAT SERPL-MCNC: 0.75 MG/DL — SIGNIFICANT CHANGE UP (ref 0.5–1.3)
GLUCOSE BLDC GLUCOMTR-MCNC: 149 MG/DL — HIGH (ref 70–99)
GLUCOSE BLDC GLUCOMTR-MCNC: 98 MG/DL — SIGNIFICANT CHANGE UP (ref 70–99)
GLUCOSE SERPL-MCNC: 95 MG/DL — SIGNIFICANT CHANGE UP (ref 70–99)
HCT VFR BLD CALC: 28.8 % — LOW (ref 34.5–45)
HGB BLD-MCNC: 8.9 G/DL — LOW (ref 11.5–15.5)
MAGNESIUM SERPL-MCNC: 1.9 MG/DL — SIGNIFICANT CHANGE UP (ref 1.6–2.6)
MCHC RBC-ENTMCNC: 26.6 PG — LOW (ref 27–34)
MCHC RBC-ENTMCNC: 30.9 GM/DL — LOW (ref 32–36)
MCV RBC AUTO: 86.2 FL — SIGNIFICANT CHANGE UP (ref 80–100)
NRBC # BLD: 0 /100 WBCS — SIGNIFICANT CHANGE UP (ref 0–0)
PHOSPHATE SERPL-MCNC: 2.8 MG/DL — SIGNIFICANT CHANGE UP (ref 2.5–4.5)
PLATELET # BLD AUTO: 145 K/UL — LOW (ref 150–400)
POTASSIUM SERPL-MCNC: 3.5 MMOL/L — SIGNIFICANT CHANGE UP (ref 3.5–5.3)
POTASSIUM SERPL-SCNC: 3.5 MMOL/L — SIGNIFICANT CHANGE UP (ref 3.5–5.3)
RBC # BLD: 3.34 M/UL — LOW (ref 3.8–5.2)
RBC # FLD: 14.4 % — SIGNIFICANT CHANGE UP (ref 10.3–14.5)
SODIUM SERPL-SCNC: 143 MMOL/L — SIGNIFICANT CHANGE UP (ref 135–145)
WBC # BLD: 4.82 K/UL — SIGNIFICANT CHANGE UP (ref 3.8–10.5)
WBC # FLD AUTO: 4.82 K/UL — SIGNIFICANT CHANGE UP (ref 3.8–10.5)

## 2020-01-30 PROCEDURE — C1758: CPT

## 2020-01-30 PROCEDURE — 93005 ELECTROCARDIOGRAM TRACING: CPT

## 2020-01-30 PROCEDURE — 83036 HEMOGLOBIN GLYCOSYLATED A1C: CPT

## 2020-01-30 PROCEDURE — 36569 INSJ PICC 5 YR+ W/O IMAGING: CPT

## 2020-01-30 PROCEDURE — C2617: CPT

## 2020-01-30 PROCEDURE — 81001 URINALYSIS AUTO W/SCOPE: CPT

## 2020-01-30 PROCEDURE — 85652 RBC SED RATE AUTOMATED: CPT

## 2020-01-30 PROCEDURE — 86901 BLOOD TYPING SEROLOGIC RH(D): CPT

## 2020-01-30 PROCEDURE — 99285 EMERGENCY DEPT VISIT HI MDM: CPT | Mod: 25

## 2020-01-30 PROCEDURE — 82553 CREATINE MB FRACTION: CPT

## 2020-01-30 PROCEDURE — 85730 THROMBOPLASTIN TIME PARTIAL: CPT

## 2020-01-30 PROCEDURE — 80053 COMPREHEN METABOLIC PANEL: CPT

## 2020-01-30 PROCEDURE — 83735 ASSAY OF MAGNESIUM: CPT

## 2020-01-30 PROCEDURE — 96361 HYDRATE IV INFUSION ADD-ON: CPT

## 2020-01-30 PROCEDURE — 36415 COLL VENOUS BLD VENIPUNCTURE: CPT

## 2020-01-30 PROCEDURE — 82962 GLUCOSE BLOOD TEST: CPT

## 2020-01-30 PROCEDURE — 71045 X-RAY EXAM CHEST 1 VIEW: CPT | Mod: 26

## 2020-01-30 PROCEDURE — 74176 CT ABD & PELVIS W/O CONTRAST: CPT

## 2020-01-30 PROCEDURE — 82550 ASSAY OF CK (CPK): CPT

## 2020-01-30 PROCEDURE — 71046 X-RAY EXAM CHEST 2 VIEWS: CPT

## 2020-01-30 PROCEDURE — 84100 ASSAY OF PHOSPHORUS: CPT

## 2020-01-30 PROCEDURE — 96365 THER/PROPH/DIAG IV INF INIT: CPT

## 2020-01-30 PROCEDURE — 76000 FLUOROSCOPY <1 HR PHYS/QHP: CPT

## 2020-01-30 PROCEDURE — 97162 PT EVAL MOD COMPLEX 30 MIN: CPT

## 2020-01-30 PROCEDURE — 87086 URINE CULTURE/COLONY COUNT: CPT

## 2020-01-30 PROCEDURE — 87186 SC STD MICRODIL/AGAR DIL: CPT

## 2020-01-30 PROCEDURE — 84484 ASSAY OF TROPONIN QUANT: CPT

## 2020-01-30 PROCEDURE — 97116 GAIT TRAINING THERAPY: CPT

## 2020-01-30 PROCEDURE — 83605 ASSAY OF LACTIC ACID: CPT

## 2020-01-30 PROCEDURE — 85610 PROTHROMBIN TIME: CPT

## 2020-01-30 PROCEDURE — 87040 BLOOD CULTURE FOR BACTERIA: CPT

## 2020-01-30 PROCEDURE — 87150 DNA/RNA AMPLIFIED PROBE: CPT

## 2020-01-30 PROCEDURE — 87106 FUNGI IDENTIFICATION YEAST: CPT

## 2020-01-30 PROCEDURE — 93312 ECHO TRANSESOPHAGEAL: CPT

## 2020-01-30 PROCEDURE — 80202 ASSAY OF VANCOMYCIN: CPT

## 2020-01-30 PROCEDURE — 85027 COMPLETE CBC AUTOMATED: CPT

## 2020-01-30 PROCEDURE — 85025 COMPLETE CBC W/AUTO DIFF WBC: CPT

## 2020-01-30 PROCEDURE — 70450 CT HEAD/BRAIN W/O DYE: CPT

## 2020-01-30 PROCEDURE — 80048 BASIC METABOLIC PNL TOTAL CA: CPT

## 2020-01-30 PROCEDURE — 86140 C-REACTIVE PROTEIN: CPT

## 2020-01-30 PROCEDURE — 93306 TTE W/DOPPLER COMPLETE: CPT

## 2020-01-30 PROCEDURE — 36573 INSJ PICC RS&I 5 YR+: CPT

## 2020-01-30 PROCEDURE — 74176 CT ABD & PELVIS W/O CONTRAST: CPT | Mod: 26

## 2020-01-30 PROCEDURE — 76937 US GUIDE VASCULAR ACCESS: CPT | Mod: 26,59

## 2020-01-30 PROCEDURE — 71045 X-RAY EXAM CHEST 1 VIEW: CPT

## 2020-01-30 PROCEDURE — 92610 EVALUATE SWALLOWING FUNCTION: CPT

## 2020-01-30 RX ORDER — SODIUM CHLORIDE 9 MG/ML
10 INJECTION INTRAMUSCULAR; INTRAVENOUS; SUBCUTANEOUS
Refills: 0 | Status: DISCONTINUED | OUTPATIENT
Start: 2020-01-30 | End: 2020-01-30

## 2020-01-30 RX ORDER — CHLORHEXIDINE GLUCONATE 213 G/1000ML
1 SOLUTION TOPICAL
Refills: 0 | Status: DISCONTINUED | OUTPATIENT
Start: 2020-01-30 | End: 2020-01-30

## 2020-01-30 RX ORDER — MAGNESIUM OXIDE 400 MG ORAL TABLET 241.3 MG
800 TABLET ORAL ONCE
Refills: 0 | Status: COMPLETED | OUTPATIENT
Start: 2020-01-30 | End: 2020-01-30

## 2020-01-30 RX ADMIN — FLUCONAZOLE 200 MILLIGRAM(S): 150 TABLET ORAL at 11:57

## 2020-01-30 RX ADMIN — Medication 216 GRAM(S): at 11:57

## 2020-01-30 RX ADMIN — MAGNESIUM OXIDE 400 MG ORAL TABLET 800 MILLIGRAM(S): 241.3 TABLET ORAL at 12:09

## 2020-01-30 RX ADMIN — SODIUM CHLORIDE 3 MILLILITER(S): 9 INJECTION INTRAMUSCULAR; INTRAVENOUS; SUBCUTANEOUS at 05:40

## 2020-01-30 RX ADMIN — SODIUM CHLORIDE 3 MILLILITER(S): 9 INJECTION INTRAMUSCULAR; INTRAVENOUS; SUBCUTANEOUS at 14:35

## 2020-01-30 RX ADMIN — HEPARIN SODIUM 5000 UNIT(S): 5000 INJECTION INTRAVENOUS; SUBCUTANEOUS at 05:39

## 2020-01-30 RX ADMIN — Medication 1 TABLET(S): at 11:57

## 2020-01-30 RX ADMIN — Medication 216 GRAM(S): at 05:39

## 2020-01-30 RX ADMIN — HEPARIN SODIUM 5000 UNIT(S): 5000 INJECTION INTRAVENOUS; SUBCUTANEOUS at 14:39

## 2020-01-30 NOTE — DISCHARGE NOTE PROVIDER - NSDCCPCAREPLAN_GEN_ALL_CORE_FT
PRINCIPAL DISCHARGE DIAGNOSIS  Diagnosis: Sepsis  Assessment and Plan of Treatment:       SECONDARY DISCHARGE DIAGNOSES  Diagnosis: Hyperglycemia  Assessment and Plan of Treatment:     Diagnosis: Ureteral stone  Assessment and Plan of Treatment:

## 2020-01-30 NOTE — PROGRESS NOTE ADULT - ASSESSMENT
85 yo female s/p cystoscopy, stent 1/23/20 with urine positive cultures for ESBL proteus mirabilis and enterococcus faecalis .

## 2020-01-30 NOTE — DISCHARGE NOTE PROVIDER - HOSPITAL COURSE
84 year old female with PMH HTN, HLD, DM admitted for mental status changes secondary to kidney stone s/p cysto, stent for left sided septic stone on 1/22/20. Patients surgical course was unremarkable. Urine cultures positive for enterococcus faecalis and yeast with initial Bcx positive for enterococcus faecalis. Patient was started on a antibiotic and antifungal regimen. Patient was extensively evaluated by ID and cardio for secondary complications. She was cleared by both teams. Patient is current asymptomatic, afebrile, hemodynamically stable. Patient will be discharged to [facility] with PICC line for continued ampicillin medication regimen. She will also continue Fluconazole 200mg PO qd and follow up with Dr. Crawford from infectious disease. patient is optimized for discharge. 84 year old female with PMH HTN, HLD, DM admitted for mental status changes secondary to kidney stone s/p cysto, stent for left sided septic stone on 1/22/20. Patients surgical course was unremarkable. Urine cultures positive for enterococcus faecalis and yeast with initial Bcx positive for enterococcus faecalis. Patient was started on a antibiotic and antifungal regimen. Patient was extensively evaluated by ID and cardio for secondary complications. She was cleared by both teams. Patient is current asymptomatic, afebrile, hemodynamically stable. Patient will be discharged to [facility] with PICC line for continued ampicillin medication regimen. She will also continue Fluconazole 200mg PO qd and follow up with Dr. Crawford from infectious disease. patient is optimized for discharge.  Patient with severe malnutrition.

## 2020-01-30 NOTE — CONSULT NOTE ADULT - SUBJECTIVE AND OBJECTIVE BOX
Vascular Access Service Consult Note    84yFemaleHEAL ISSUES - PROBLEM Dx:  Ureteral stone: Ureteral stone             Diagnosis: bacteremia, uTI    Indications for Vascular Access (Check all that apply)  [  X]  Antibiotic Therapy       Antibiotic Prescribed:  ampicillin                                                                           Expected Duration of Therapy:   6 more days            [  ]  IV Hydration  [  ]  Total Parenteral Nutrition  [  ]  Chemotherapy  [  ]  Difficult Venous Access  [  ]  CVP monitoring  [  ]  Medications with high potential for tissue necrosis on extravasation  [  ]  Other    Screening (Check all that apply)  Previous Radiation to chest  [  ] Yes      [X  ]  No  Breast Cancer                          [  ] Left     [  ]  Right    [ X ]  No  Lymph Node Dissection         [  ] Left     [  ]  Right    [  X]  No  Pacemaker or ICD                   [  ] Left     [  ]  Right    [ X ]  No  Upper Extremity DVT             [  ] Left     [  ]  Right    [ X ]  No  Chronic Kidney Disease         [  ]  Yes     [ X ]  No  Hemodialysis                           [  ]  Yes     [ X ]  No  AV Fistula/ Graft                     [  ]  Left    [  ]  Right    [ X ]  No  Temp>101F in past 24 H       [  ]  Yes     [X  ]  No  H/O PICC/Midline                   [  ]  Yes     [  X]  No    Lab data:                        8.9    4.82  )-----------( 145      ( 30 Jan 2020 07:52 )             28.8     01-30    143  |  103  |  15  ----------------------------<  95  3.5   |  26  |  0.75    Ca    9.0      30 Jan 2020 07:52  Phos  2.8     01-30  Mg     1.9     01-30                I have reviewed the chart, interviewed and examined the patient and determined that this patient:  [ X ] Is a candidate for a PICC line  [  ] Is a candidate for a Midline  [  ] Is not a candidate for vascular access device (reason)    Lumens:    [  X] Single  [  ] Double

## 2020-01-30 NOTE — PROGRESS NOTE ADULT - REASON FOR ADMISSION
Septic stone
sepsis with impacted renal stone
Sepsis
Urosepsis
sepsis
septic stone
urosepsis

## 2020-01-30 NOTE — PROGRESS NOTE ADULT - ASSESSMENT
Hemodynamically stable with urosepsis and enterococcus in the blood  ANNETTE neg for endocarditis  plan for PICC and abx per ID  DVT proph  Plan for [facility]

## 2020-01-30 NOTE — PROCEDURAL SAFETY CHECKLIST WITH OR WITHOUT SEDATION - NSPREEQUIPSUP_GEN_ALL_CORE
FEMALE STEVIE;      GA 32 weeks;     Age: 24;   PMA: 35    Current Status: 32 GA, AGA, maternal preeclampsia, prenatal Lt multicystic kidney, hydronephrosis, breech presentation    Weight: 2285 (+10)  Intake(ml/kg/day): 154  Urine output:    (ml/kg/hr or frequency):  x 8                         Stools (frequency): x 1  Other:     *******************************************************  FEN: Feed FEHM 24cal +Enfacare 22cal ad duane taking 40-47  ml every 3 h  PO ad duane since 1/5/19  intake - 100% with Dr Reyna. mother is practicing feeding    ADWG 28 kg/day (1/8) Daniela 39% HC: 31.5 (01-07), 31 (12-31), 30 (12-24)  Respiratory: Comfortable in RA.  CV: No current issues. Continue cardiorespiratory monitoring.  Heme: A+/NANCY neg;  Hyperbilirubinemia due to prematurity. S/p PhotoRx. Monitor bilirubin levels. 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 Hankins, peds Urology no need for Amox prophylaxis, request renal U/S in 3 months with follow-up in 3 months,   Renal US - L dysplastic kidney; mild R hydronephrosis.  VCUG done 1/3 and was normal, Urology rt hydronephrosis resolved, on repeat  RUS1/4    Neuro: Normal exam for GA. HC: 30.75 (90%)   NRE 7/15 no EI  f/u 6 months, HUS (1/7) - no IVH  Thermal:  Weaned  to crib 12/29.   Social: mother updated at the bedside 1/9. Plan to d/c  once demonstrates consistent feeding ability at sufficient volume, Earliest possible discharge 1/10-1/11  Meds: PVS, Fe  Labs/Imaging/Studies:  none FEMALE STEVIE;      GA 32 weeks;     Age: 24;   PMA: 35    Current Status: 32 GA, AGA, maternal preeclampsia, prenatal Lt multicystic kidney, hydronephrosis, breech presentation    Weight: 2295 (+10)  Intake(ml/kg/day): 142  Urine output:    (ml/kg/hr or frequency):  x 8                         Stools (frequency): x 2  Other:     *******************************************************  FEN: Feed FEHM 24cal +Enfacare 22cal ad duane taking 30-45  ml every 3 h  PO ad duane since 1/5/19  intake - 100% with slow flow and regular nipple mother is practicing feeding. PT/OT to work with baby today .  ADWG 28 kg/day (1/8) Daniela 39% HC: 31.5 (01-07), 31 (12-31), 30 (12-24)  Respiratory: Comfortable in RA.  CV: No current issues. Continue cardiorespiratory monitoring.  Heme: A+/NANCY neg;  Hyperbilirubinemia due to prematurity. S/p PhotoRx. Monitor bilirubin levels. 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 Hankins, peds Urology no need for Amox prophylaxis, request renal U/S in 3 months with follow-up in 3 months,   Renal US - L dysplastic kidney; mild R hydronephrosis.  VCUG done 1/3 and was normal, Urology rt hydronephrosis resolved, on repeat  RUS1/4    Neuro: Normal exam for GA. HC: 30.75 (90%)   NRE 7/15 no EI  f/u 6 months, HUS (1/7) - no IVH  Thermal:  Weaned  to crib 12/29.   Social: mother updated at the bedside 1/9. Plan to d/c  once demonstrates consistent feeding ability at sufficient volume, Earliest possible discharge 1/10-1/11  Meds: PVS, Fe  Labs/Imaging/Studies:  none n/a

## 2020-01-30 NOTE — DISCHARGE NOTE PROVIDER - NSDCMRMEDTOKEN_GEN_ALL_CORE_FT
ampicillin 2 g injection every 6 hours until 2/5/20. : 2 gram(s) intravenous every 6 hours   CBC and CMP on 2/3 and fax to Dr Crawford office ([phone]): 1 application intravenous once a week   Fluconazole 200mg PO qd until 2/5/20: 200 milligram(s) orally once a day   Heparin 10 units/ml 3ml post infusion: 3 milliliter(s) intravenous every 6 hours   Normal saline 0.9% 10ml pre/post infusion until 2/5/20 : ampicillin 2 g injection every 6 hours until 2/5/20. : 2 gram(s) intravenous every 6 hours   CBC and CMP on 2/3 and fax to Dr Crawford office ([phone]): 1 application intravenous once a week   Fluconazole 200mg PO qd until 2/5/20: 200 milligram(s) orally once a day   Heparin 10 units/ml 3ml post infusion: 3 milliliter(s) intravenous every 6 hours   Normal saline 0.9% 10ml pre/post infusion until 2/5/20 : ampicillin 2 g injection every 6 hours until 2/5/20. : 2 gram(s) intravenous every 6 hours   CBC and CMP on 2/3 and fax to Dr Crawford office ([phone]): 1 application intravenous once a week   Fluconazole 200mg PO qd until 2/5/20: 200 milligram(s) orally once a day   Heparin 10 units/ml 3ml post infusion: 3 milliliter(s) intravenous every 6 hours   Normal saline 0.9% 10ml pre/post infusion until 2/5/20 :

## 2020-01-30 NOTE — PROGRESS NOTE ADULT - SUBJECTIVE AND OBJECTIVE BOX
INTERVAL HPI/OVERNIGHT EVENTS:  No acute events overnight.    VITALS:    T(F): 98.9 (01-29-20 @ 20:39), Max: 98.9 (01-29-20 @ 20:39)  HR: 98 (01-29-20 @ 20:39) (80 - 98)  BP: 144/80 (01-29-20 @ 20:39) (132/79 - 164/88)  RR: 16 (01-29-20 @ 20:39) (15 - 16)  SpO2: 100% (01-29-20 @ 20:39) (100% - 100%)  Wt(kg): --    I&O's Detail      MEDICATIONS:    ANTIBIOTICS:  ampicillin  IVPB 2 Gram(s) IV Intermittent every 6 hours  fluconAZOLE   Tablet 200 milliGRAM(s) Oral daily      PAIN CONTROL:  acetaminophen   Tablet .. 650 milliGRAM(s) Oral every 6 hours PRN  ondansetron Injectable 4 milliGRAM(s) IV Push every 6 hours PRN  oxycodone    5 mG/acetaminophen 325 mG 1 Tablet(s) Oral every 6 hours PRN  oxyCODONE    IR 10 milliGRAM(s) Oral every 6 hours PRN       MEDS:      HEME/ONC  heparin  Injectable 5000 Unit(s) SubCutaneous every 8 hours        PHYSICAL EXAM:  General: No acute distress.  Alert and Oriented  Abdominal Exam: soft, NT, ND   Exam: no SP discomfort        LABS:                        8.7    3.49  )-----------( 132      ( 29 Jan 2020 07:17 )             28.4     01-29    140  |  103  |  14  ----------------------------<  100<H>  4.0   |  26  |  0.71    Ca    9.4      29 Jan 2020 07:18  Phos  2.5     01-29  Mg     1.8     01-29

## 2020-01-30 NOTE — DISCHARGE NOTE PROVIDER - CARE PROVIDERS DIRECT ADDRESSES
[email or web address]

## 2020-01-30 NOTE — DISCHARGE NOTE PROVIDER - CARE PROVIDER_API CALL
Arnoldo Villalobos)  Urology  [address] [address]  Phone: [phone]  Fax: [phone]  Follow Up Time:     Celestina Crawford)  Infectious Disease; Internal Medicine  [address] [place] [address]  Phone: [phone]  Fax: [phone]  Follow Up Time:

## 2020-01-30 NOTE — PROGRESS NOTE ADULT - NSHPATTENDINGPLANDISCUSS_GEN_ALL_CORE
primary team
pateint family and team
family pateint and team
patient famil and team
patient family and team

## 2020-01-30 NOTE — DISCHARGE NOTE NURSING/CASE MANAGEMENT/SOCIAL WORK - PATIENT PORTAL LINK FT
You can access the FollowMyHealth Patient Portal offered by [facility] by registering at the following website: [email or web address]. By joining [facility]’s FollowMyHealth portal, you will also be able to view your health information using other applications (apps) compatible with our system.

## 2020-01-30 NOTE — DISCHARGE NOTE PROVIDER - NSDCFUADDINST_GEN_ALL_CORE_FT
You are being discharged to [facility] sub acute rehab  -you will be continuing Ampicillin antibiotics 2g every 6 hours until 2/5/20  -You will be continuing Fluconazole 200mg every day until 2/5/20  -Dr. Crawford will follow up after completion of antibiotics You are being discharged to [facility]- sub acute rehab  -you will be continuing Ampicillin antibiotics 2g every 6 hours until 2/5/20  -You will be continuing Fluconazole 200mg every day until 2/5/20  -Dr. Crawford will follow up after completion of antibiotics    General Discharge Instructions:  Please resume all regular home medications unless specifically advised not to take a particular medication. Also, please take any new medications as prescribed.  Please get plenty of rest, continue to ambulate several times per day, and drink adequate amounts of fluids.

## 2020-01-30 NOTE — PROGRESS NOTE ADULT - SUBJECTIVE AND OBJECTIVE BOX
HPI: urosepsis with impactged renal stone    PAST MEDICAL & SURGICAL HISTORY:  High cholesterol  Diabetes  HTN (hypertension)  S/P removal of lung: left    MEDICATIONS  (STANDING):  ampicillin  IVPB 2 Gram(s) IV Intermittent every 6 hours  dextrose 50% Injectable 12.5 Gram(s) IV Push once  dextrose 50% Injectable 25 Gram(s) IV Push once  dextrose 50% Injectable 25 Gram(s) IV Push once  fluconAZOLE   Tablet 200 milliGRAM(s) Oral daily  heparin  Injectable 5000 Unit(s) SubCutaneous every 8 hours  insulin lispro (HumaLOG) corrective regimen sliding scale   SubCutaneous Before meals and at bedtime  Nephro-florence 1 Tablet(s) Oral daily  sodium chloride 0.9% lock flush 3 milliLiter(s) IV Push every 8 hours    MEDICATIONS  (PRN):  acetaminophen   Tablet .. 650 milliGRAM(s) Oral every 6 hours PRN Temp greater or equal to 38C (100.4F), Mild Pain (1 - 3)  dextrose 40% Gel 15 Gram(s) Oral once PRN Blood Glucose LESS THAN 70 milliGRAM(s)/deciliter  glucagon  Injectable 1 milliGRAM(s) IntraMuscular once PRN Glucose LESS THAN 70 milligrams/deciliter  ondansetron Injectable 4 milliGRAM(s) IV Push every 6 hours PRN Nausea and/or Vomiting  oxycodone    5 mG/acetaminophen 325 mG 1 Tablet(s) Oral every 6 hours PRN Moderate Pain (4 - 6)  oxyCODONE    IR 10 milliGRAM(s) Oral every 6 hours PRN Severe Pain (7 - 10)    Vital Signs Last 24 Hrs  T(C): 37.2 (30 Jan 2020 06:32), Max: 37.2 (29 Jan 2020 20:39)  T(F): 98.9 (30 Jan 2020 06:32), Max: 98.9 (29 Jan 2020 20:39)  HR: 86 (30 Jan 2020 06:32) (80 - 98)  BP: 142/83 (30 Jan 2020 06:32) (132/79 - 164/88)  BP(mean): --  RR: 16 (30 Jan 2020 06:32) (15 - 16)  SpO2: 100% (30 Jan 2020 06:32) (100% - 100%)    NAD  No JVD  Chest right clear no bs left  CV rrr s1s2 no murmur  Abd sfot  Ext no edema                          8.7    3.49  )-----------( 132      ( 29 Jan 2020 07:17 )             28.4   01-29    140  |  103  |  14  ----------------------------<  100<H>  4.0   |  26  |  0.71    Ca    9.4      29 Jan 2020 07:18  Phos  2.5     01-29  Mg     1.8     01-29        ANNETTE CONCLUSIONS:     1. Normal left and right ventricular size and function.   2. Dilated left atrium.   3. No LA/RA/EZEKIEL/RAA thrombus seen.   4. The mitral valve is mildly thickened and calcified. There is severe mitral annular calcification. There is mild mitral regurgitation.   5. Fibrocalcific tricuspid aortic valve without significant stenosis. No hemodynamically significant aortic stenosis. The aortic valve area (estimated via planimetry) is 2.00 cm². There is trace aortic.   6. Trivial likely physiological pericardial effusion.   7. No echocardiographic evidence of vegetations.

## 2020-02-04 DIAGNOSIS — E78.5 HYPERLIPIDEMIA, UNSPECIFIED: ICD-10-CM

## 2020-02-04 DIAGNOSIS — Z79.899 OTHER LONG TERM (CURRENT) DRUG THERAPY: ICD-10-CM

## 2020-02-04 DIAGNOSIS — M19.90 UNSPECIFIED OSTEOARTHRITIS, UNSPECIFIED SITE: ICD-10-CM

## 2020-02-04 DIAGNOSIS — N13.6 PYONEPHROSIS: ICD-10-CM

## 2020-02-04 DIAGNOSIS — Z98.890 OTHER SPECIFIED POSTPROCEDURAL STATES: ICD-10-CM

## 2020-02-04 DIAGNOSIS — A41.9 SEPSIS, UNSPECIFIED ORGANISM: ICD-10-CM

## 2020-02-04 DIAGNOSIS — D64.9 ANEMIA, UNSPECIFIED: ICD-10-CM

## 2020-02-04 DIAGNOSIS — D69.6 THROMBOCYTOPENIA, UNSPECIFIED: ICD-10-CM

## 2020-02-04 DIAGNOSIS — Z85.118 PERSONAL HISTORY OF OTHER MALIGNANT NEOPLASM OF BRONCHUS AND LUNG: ICD-10-CM

## 2020-02-04 DIAGNOSIS — E43 UNSPECIFIED SEVERE PROTEIN-CALORIE MALNUTRITION: ICD-10-CM

## 2020-02-04 DIAGNOSIS — N20.1 CALCULUS OF URETER: ICD-10-CM

## 2020-02-04 DIAGNOSIS — I10 ESSENTIAL (PRIMARY) HYPERTENSION: ICD-10-CM

## 2020-02-04 DIAGNOSIS — E11.65 TYPE 2 DIABETES MELLITUS WITH HYPERGLYCEMIA: ICD-10-CM

## 2020-02-04 PROBLEM — E78.00 PURE HYPERCHOLESTEROLEMIA, UNSPECIFIED: Chronic | Status: ACTIVE | Noted: 2020-01-22

## 2020-02-04 PROBLEM — E11.9 TYPE 2 DIABETES MELLITUS WITHOUT COMPLICATIONS: Chronic | Status: ACTIVE | Noted: 2020-01-22

## 2020-02-06 LAB
CULTURE RESULTS: SIGNIFICANT CHANGE UP
ORGANISM # SPEC MICROSCOPIC CNT: SIGNIFICANT CHANGE UP
ORGANISM # SPEC MICROSCOPIC CNT: SIGNIFICANT CHANGE UP
SPECIMEN SOURCE: SIGNIFICANT CHANGE UP

## 2020-02-14 ENCOUNTER — OUTPATIENT (OUTPATIENT)
Dept: OUTPATIENT SERVICES | Facility: HOSPITAL | Age: 85
LOS: 1 days | End: 2020-02-14
Payer: MEDICARE

## 2020-02-14 ENCOUNTER — APPOINTMENT (OUTPATIENT)
Dept: CT IMAGING | Facility: HOSPITAL | Age: 85
End: 2020-02-14

## 2020-02-14 ENCOUNTER — LABORATORY RESULT (OUTPATIENT)
Age: 85
End: 2020-02-14

## 2020-02-14 DIAGNOSIS — Z90.2 ACQUIRED ABSENCE OF LUNG [PART OF]: Chronic | ICD-10-CM

## 2020-02-14 PROCEDURE — 71250 CT THORAX DX C-: CPT

## 2020-02-14 PROCEDURE — 71250 CT THORAX DX C-: CPT | Mod: 26

## 2020-02-21 ENCOUNTER — APPOINTMENT (OUTPATIENT)
Dept: INFECTIOUS DISEASE | Facility: CLINIC | Age: 85
End: 2020-02-21
Payer: MEDICARE

## 2020-02-21 VITALS
HEART RATE: 98 BPM | HEIGHT: 64 IN | TEMPERATURE: 98 F | DIASTOLIC BLOOD PRESSURE: 80 MMHG | RESPIRATION RATE: 16 BRPM | OXYGEN SATURATION: 98 % | SYSTOLIC BLOOD PRESSURE: 134 MMHG | WEIGHT: 102.07 LBS

## 2020-02-21 VITALS
HEART RATE: 111 BPM | DIASTOLIC BLOOD PRESSURE: 79 MMHG | HEIGHT: 64 IN | TEMPERATURE: 97.2 F | SYSTOLIC BLOOD PRESSURE: 128 MMHG | WEIGHT: 102.5 LBS | OXYGEN SATURATION: 98 % | BODY MASS INDEX: 17.5 KG/M2 | RESPIRATION RATE: 16 BRPM

## 2020-02-21 DIAGNOSIS — Z82.49 FAMILY HISTORY OF ISCHEMIC HEART DISEASE AND OTHER DISEASES OF THE CIRCULATORY SYSTEM: ICD-10-CM

## 2020-02-21 DIAGNOSIS — R78.81 BACTEREMIA: ICD-10-CM

## 2020-02-21 DIAGNOSIS — Z78.9 OTHER SPECIFIED HEALTH STATUS: ICD-10-CM

## 2020-02-21 DIAGNOSIS — B95.2 BACTEREMIA: ICD-10-CM

## 2020-02-21 PROCEDURE — 99214 OFFICE O/P EST MOD 30 MIN: CPT

## 2020-02-21 RX ORDER — LOSARTAN POTASSIUM 100 MG/1
TABLET, FILM COATED ORAL
Refills: 0 | Status: ACTIVE | COMMUNITY

## 2020-02-21 RX ORDER — SERTRALINE HYDROCHLORIDE 25 MG/1
TABLET, FILM COATED ORAL
Refills: 0 | Status: ACTIVE | COMMUNITY

## 2020-02-21 RX ORDER — CHROMIUM 200 MCG
TABLET ORAL
Refills: 0 | Status: ACTIVE | COMMUNITY

## 2020-02-21 RX ORDER — METFORMIN HYDROCHLORIDE 625 MG/1
TABLET ORAL
Refills: 0 | Status: ACTIVE | COMMUNITY

## 2020-02-21 RX ORDER — MULTIVIT-MIN/FA/LYCOPEN/LUTEIN .4-300-25
TABLET ORAL
Refills: 0 | Status: ACTIVE | COMMUNITY

## 2020-02-21 RX ORDER — ASPIRIN 325 MG/1
TABLET, FILM COATED ORAL
Refills: 0 | Status: ACTIVE | COMMUNITY

## 2020-02-21 NOTE — ASSESSMENT
[FreeTextEntry1] : 84 year old female with HTN, DM, HLD, h/o lung CA s/p L pneumonectomy, remote cerebral aneurysm with ureteral stone/pyelo s/p L ureteral stend placement on 1/22.  Enterococcus faecalis in blood and urine from OR, yeast in urine as well. She completed treatment for bacteremia and UTI on 2/5.  Ureteroscopy/lithotripsy scheduled for 2/24, she is currently on ciprofloxacin for urine culture growing Pseudomonas aeruginosa and Klebsiella pneumoniae.\par Plan:\par - Continue ciprofloxacin 500 mg PO q 12 h x3 days - confirmed with [facility] that medication was started this morning\par - F/u with Dr. Villalobos as planned\par - Encouraged daily bathing, frequent diaper changes, scheduled toileting to decrease risk for infection\par F/u PRN, contact the office with any questions, concerns.

## 2020-02-21 NOTE — HISTORY OF PRESENT ILLNESS
[FreeTextEntry1] : 84 year old female with HTN, DM, HLD, h/o lung CA s/p L pneumonectomy (2001), cerebral aneurysm - B frontal craniotomies (1967), ureteral stone/pyelonephritis with bacteremia.  Nephrolithiasis/ureteral stone was seen on imaging, for which she was seen by Dr. Villalobos on 1/10 - plan was for tamsulosin.  She went to PMD on 1/22 with confusion, decreased po intake and intermittent chills/rigors since 1/20.  Was referred to ED where she had T(r) 102.6, , WBC 10.19 with 90% PMNs, lactate 3.6.  CT A/P showed enlarged L kidney with perinephric stranding and moderate L hydroureteronephrosis secondary to a 4 mm stone just proximal to L ureterovesical junction.  She was started on ceftriaxone.  She underwent L ureteral stent placement on 1/22.  Blood cultures grew enterococcus.  Ceftriaxone was d/fernandez and she was started on vanc/pip-tazo.  TTE negative.  Isolate identified as E. faecalis.  Vanc/pip-tazo discontinued and she was started on ampicillin.  Urine grew Candida albicans – fluconazole started. ANNETTE negative.  She was discharged to complete treatment of bacteremia: ampicillin 2 g IV q 6 h x 14 days from 1st negative blood culture (1/23-2/5) and complicated UTI d/t yeast: fluconazole 200 mg PO daily (1/24-2/5).  Plan is for ureteroscopy/lithotripsy on 2/24 with Dr. Villalobos.  On 2/21, she was started on ciprofloxacin pre-op for urine culture that grew Klebsiella pneumoniae and Pseudomonas aeruginosa.  Patient reports feeling depressed (recently started Zoloft) and difficulty walking but offers no other symptoms.  She is accompanied by daughter who reports intermittent blood in urine.  Patient is currently at rehab, she is incontinent at night and changed nightly.

## 2020-02-21 NOTE — PHYSICAL EXAM
[General Appearance - Alert] : alert [General Appearance - In No Acute Distress] : in no acute distress [Sclera] : the sclera and conjunctiva were normal [Outer Ear] : the ears and nose were normal in appearance [Examination Of The Oral Cavity] : the lips and gums were normal [Oropharynx] : the oropharynx was normal with no thrush [Heart Rate And Rhythm] : heart rate was normal and rhythm regular [Heart Sounds] : normal S1 and S2 [Murmurs] : no murmurs [Edema] : there was no peripheral edema [Bowel Sounds] : normal bowel sounds [Abdomen Soft] : soft [Abdomen Tenderness] : non-tender [Costovertebral Angle Tenderness] : no CVA tenderness [Skin Color & Pigmentation] : normal skin color and pigmentation [] : no rash [Musculoskeletal - Swelling] : no joint swelling [FreeTextEntry1] : Decreased BS L post chest

## 2020-02-21 NOTE — REVIEW OF SYSTEMS
[Nasal Discharge] : nasal discharge [As Noted in HPI] : as noted in HPI [Depression] : depression [Chills] : no chills [Fever] : no fever [Eyesight Problems] : no eyesight problems [Eye Pain] : no eye pain [Sore Throat] : no sore throat [Chest Pain] : no chest pain [Palpitations] : no palpitations [Shortness Of Breath] : no shortness of breath [Cough] : no cough [Abdominal Pain] : no abdominal pain [Vomiting] : no vomiting [Joint Swelling] : no joint swelling [Joint Stiffness] : no joint stiffness [Confused] : no confusion [Skin Lesions] : no skin lesions [Easy Bruising] : no tendency for easy bruising [Swollen Glands] : no swollen glands [Easy Bleeding] : no tendency for easy bleeding [FreeTextEntry9] : generalized weakness

## 2020-02-24 ENCOUNTER — APPOINTMENT (OUTPATIENT)
Dept: UROLOGY | Facility: HOSPITAL | Age: 85
End: 2020-02-24

## 2020-02-24 ENCOUNTER — RESULT REVIEW (OUTPATIENT)
Age: 85
End: 2020-02-24

## 2020-02-24 ENCOUNTER — OUTPATIENT (OUTPATIENT)
Dept: OUTPATIENT SERVICES | Facility: HOSPITAL | Age: 85
LOS: 1 days | Discharge: HOME | End: 2020-02-24
Payer: MEDICARE

## 2020-02-24 VITALS
RESPIRATION RATE: 25 BRPM | TEMPERATURE: 97 F | HEART RATE: 87 BPM | SYSTOLIC BLOOD PRESSURE: 138 MMHG | DIASTOLIC BLOOD PRESSURE: 64 MMHG | OXYGEN SATURATION: 98 %

## 2020-02-24 DIAGNOSIS — Z90.2 ACQUIRED ABSENCE OF LUNG [PART OF]: Chronic | ICD-10-CM

## 2020-02-24 LAB
GLUCOSE BLDC GLUCOMTR-MCNC: 133 MG/DL — HIGH (ref 70–99)
GLUCOSE BLDC GLUCOMTR-MCNC: 137 MG/DL — HIGH (ref 70–99)

## 2020-02-24 PROCEDURE — 88300 SURGICAL PATH GROSS: CPT | Mod: 26

## 2020-02-24 PROCEDURE — 88300 SURGICAL PATH GROSS: CPT

## 2020-02-24 PROCEDURE — 52310 CYSTOSCOPY AND TREATMENT: CPT | Mod: LT

## 2020-02-24 PROCEDURE — 74420 UROGRAPHY RTRGR +-KUB: CPT | Mod: 26

## 2020-02-24 PROCEDURE — 52332 CYSTOSCOPY AND TREATMENT: CPT | Mod: LT

## 2020-02-24 PROCEDURE — 52352 CYSTOURETERO W/STONE REMOVE: CPT | Mod: LT

## 2020-02-24 PROCEDURE — C1758: CPT

## 2020-02-24 PROCEDURE — C1769: CPT

## 2020-02-24 PROCEDURE — 82365 CALCULUS SPECTROSCOPY: CPT

## 2020-02-24 PROCEDURE — 82962 GLUCOSE BLOOD TEST: CPT

## 2020-02-24 PROCEDURE — 76000 FLUOROSCOPY <1 HR PHYS/QHP: CPT

## 2020-02-24 PROCEDURE — C2617: CPT

## 2020-02-24 PROCEDURE — 87086 URINE CULTURE/COLONY COUNT: CPT

## 2020-02-24 RX ORDER — ACETAMINOPHEN 500 MG
1000 TABLET ORAL ONCE
Refills: 0 | Status: DISCONTINUED | OUTPATIENT
Start: 2020-02-24 | End: 2020-02-24

## 2020-02-24 RX ORDER — SODIUM CHLORIDE 9 MG/ML
1000 INJECTION, SOLUTION INTRAVENOUS
Refills: 0 | Status: DISCONTINUED | OUTPATIENT
Start: 2020-02-24 | End: 2020-02-24

## 2020-02-24 RX ORDER — ONDANSETRON 8 MG/1
4 TABLET, FILM COATED ORAL EVERY 6 HOURS
Refills: 0 | Status: DISCONTINUED | OUTPATIENT
Start: 2020-02-24 | End: 2020-02-24

## 2020-02-24 RX ORDER — CIPROFLOXACIN LACTATE 400MG/40ML
1 VIAL (ML) INTRAVENOUS
Qty: 10 | Refills: 0
Start: 2020-02-24 | End: 2020-02-28

## 2020-02-24 RX ORDER — METOCLOPRAMIDE HCL 10 MG
10 TABLET ORAL ONCE
Refills: 0 | Status: DISCONTINUED | OUTPATIENT
Start: 2020-02-24 | End: 2020-02-24

## 2020-02-24 NOTE — PACU DISCHARGE NOTE - COMMENTS
pt is awake alert going home with daughter via car assissted pt with her clothes placed her in wheel chair diaper on pt IV dc .pt had some juice and crackers
to rehab with daughter as escort.  paperworks sent with pt

## 2020-02-24 NOTE — BRIEF OPERATIVE NOTE - NSICDXBRIEFPROCEDURE_GEN_ALL_CORE_FT
PROCEDURES:  Cystoscopy with ureteroscopy, removal of calculus, and insertion of ureteral stent 24-Feb-2020 10:07:26 Stent removal Tomer Arboleda

## 2020-02-26 LAB
CULTURE RESULTS: NO GROWTH — SIGNIFICANT CHANGE UP
SPECIMEN SOURCE: SIGNIFICANT CHANGE UP

## 2020-02-27 ENCOUNTER — APPOINTMENT (OUTPATIENT)
Dept: UROLOGY | Facility: CLINIC | Age: 85
End: 2020-02-27
Payer: MEDICARE

## 2020-02-27 VITALS — HEART RATE: 90 BPM | SYSTOLIC BLOOD PRESSURE: 140 MMHG | DIASTOLIC BLOOD PRESSURE: 80 MMHG | TEMPERATURE: 97.8 F

## 2020-02-27 DIAGNOSIS — N20.1 CALCULUS OF URETER: ICD-10-CM

## 2020-02-27 PROCEDURE — 99213 OFFICE O/P EST LOW 20 MIN: CPT

## 2020-02-27 NOTE — PHYSICAL EXAM
[General Appearance - Well Developed] : well developed [General Appearance - Well Nourished] : well nourished [Normal Appearance] : normal appearance [General Appearance - In No Acute Distress] : no acute distress [Well Groomed] : well groomed [Abdomen Tenderness] : non-tender [Abdomen Soft] : soft [] : no respiratory distress [Costovertebral Angle Tenderness] : no ~M costovertebral angle tenderness [Oriented To Time, Place, And Person] : oriented to person, place, and time [FreeTextEntry1] : patient in wheelchair

## 2020-02-27 NOTE — ASSESSMENT
[FreeTextEntry1] : 84 year old female s/p left laser lithotripsy for impacted left ureteral stone\par -stent removed today on string without any complications\par -finish antibiotic course\par -Renal US in 6 months\par

## 2020-02-27 NOTE — HISTORY OF PRESENT ILLNESS
[FreeTextEntry1] : CC: Post-op follow up\par \par This is a 84 year old female who underwent a left ureteroscopy laser lithotripsy on 2/24/20. Feeling well since the procedure.  \par Denies any hematuria, nausea, vomiting, fever, or chills\par \par Has been taking antibiotic prescribed at discharge without any complications.

## 2020-02-28 LAB — SURGICAL PATHOLOGY STUDY: SIGNIFICANT CHANGE UP

## 2020-03-03 LAB — NIDUS STONE QN: SIGNIFICANT CHANGE UP

## 2020-06-26 ENCOUNTER — OUTPATIENT (OUTPATIENT)
Dept: OUTPATIENT SERVICES | Facility: HOSPITAL | Age: 85
LOS: 1 days | End: 2020-06-26
Payer: MEDICARE

## 2020-06-26 ENCOUNTER — RESULT REVIEW (OUTPATIENT)
Age: 85
End: 2020-06-26

## 2020-06-26 ENCOUNTER — APPOINTMENT (OUTPATIENT)
Dept: ULTRASOUND IMAGING | Facility: HOSPITAL | Age: 85
End: 2020-06-26
Payer: MEDICARE

## 2020-06-26 DIAGNOSIS — Z90.2 ACQUIRED ABSENCE OF LUNG [PART OF]: Chronic | ICD-10-CM

## 2020-06-26 PROCEDURE — 76770 US EXAM ABDO BACK WALL COMP: CPT

## 2020-06-26 PROCEDURE — 76770 US EXAM ABDO BACK WALL COMP: CPT | Mod: 26

## 2020-07-15 DIAGNOSIS — Z87.442 PERSONAL HISTORY OF URINARY CALCULI: ICD-10-CM

## 2020-08-13 NOTE — PHYSICAL THERAPY INITIAL EVALUATION ADULT - IMPAIRMENTS FOUND, PT EVAL
This office note has been dictated.  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   • Biopsy vulva/perineum,one lesn      Vulvar bx---epithelial hyperplasia with hyperkeratosis and mild, nonspecific chronic inflammation with fibrosis of the submucosa; there is no epithelial dysplasia or neoplasia:  Dr. Rudd   • Conization cervix,loop electrd      Uterus conization: focal residual, KRYSTINA III; ectocervical and endocervical margins negative for dysplasia. Marked inflammation, squamous metaplasia and reserve cell hyperplasia. Uterus endocervical curreting: negative for dysplasia and negative for HPV.   • Induced abortn by dil/evac       elective termination of pregnancy   • Laser surgery of cervix      Cervical CIS and vulvar JACOB   • Obstetrical care,vag deliv only  , 1997    x2       Family History   Problem Relation Age of Onset   • Hyperlipidemia Mother    • Hypertension Mother    • Seizure Disorder Mother    • Heart Father         MI   • Hypertension Father    • Patient is unaware of any medical problems Brother    • Patient is unaware of any medical problems Sister    • Cancer Maternal Grandmother    • Stroke Maternal Grandfather    • Patient is unaware of any medical problems Son    • Patient is unaware of any medical problems Brother    • Patient is unaware of any medical problems  Brother    • Patient is unaware of any medical problems Son      Review of patient's family status indicates:    Mother                         Alive                       Comment: high chol, htn, gluten free diet    Father                                             Comment: MI, HTN, hx bypass surg.,DM, Kidney                disease    Brother                        Alive                       Comment: x3  L&W    Sister                         Alive                       Comment: L&W    Maternal Grandmother                               Comment: cancer    Maternal Grandfather           Alive                       Comment: strokes    Paternal Grandmother                               Comment: Unknown    Paternal Grandfather                               Comment: unknown    Son                            Alive                       Comment: x2 L & W    Brother                        Alive                     Brother                        Alive                     Son                            Alive                       Social History     Socioeconomic History   • Marital status: /Civil Union     Spouse name: Trace   • Number of children: 2   • Years of education: 12   • Highest education level: Not on file   Occupational History   • Occupation:     Social Needs   • Financial resource strain: Not on file   • Food insecurity:     Worry: Not on file     Inability: Not on file   • Transportation needs:     Medical: Not on file     Non-medical: Not on file   Tobacco Use   • Smoking status: Former Smoker     Years: 10.00     Last attempt to quit: 2001     Years since quittin.6   • Smokeless tobacco: Never Used   • Tobacco comment:  no smokers in home   Substance and Sexual Activity   • Alcohol use: Yes     Comment: rare   • Drug use: No   • Sexual activity: Yes     Partners: Male     Birth control/protection: Surgical     Comment:  had vasectomy    Lifestyle   • Physical activity:     Days per week: Not on file     Minutes per session: Not on file   • Stress: Not on file   Relationships   • Social connections:     Talks on phone: Not on file     Gets together: Not on file     Attends Holiness service: Not on file     Active member of club or organization: Not on file     Attends meetings of clubs or organizations: Not on file     Relationship status: Not on file   • Intimate partner violence:     Fear of current or ex partner: Not on file     Emotionally abused: Not on file     Physically abused: Not on file     Forced sexual activity: Not on file   Other Topics Concern   • Not on file   Social History Narrative    Blood type---A  positive     Ros neg   gross motor/muscle strength/gait, locomotion, and balance/aerobic capacity/endurance

## 2020-12-08 ENCOUNTER — OUTPATIENT (OUTPATIENT)
Dept: OUTPATIENT SERVICES | Facility: HOSPITAL | Age: 85
LOS: 1 days | End: 2020-12-08
Payer: MEDICARE

## 2020-12-08 ENCOUNTER — APPOINTMENT (OUTPATIENT)
Dept: ULTRASOUND IMAGING | Facility: HOSPITAL | Age: 85
End: 2020-12-08
Payer: MEDICARE

## 2020-12-08 ENCOUNTER — RESULT REVIEW (OUTPATIENT)
Age: 85
End: 2020-12-08

## 2020-12-08 DIAGNOSIS — Z90.2 ACQUIRED ABSENCE OF LUNG [PART OF]: Chronic | ICD-10-CM

## 2020-12-08 PROCEDURE — 76770 US EXAM ABDO BACK WALL COMP: CPT | Mod: 26

## 2020-12-08 PROCEDURE — 76770 US EXAM ABDO BACK WALL COMP: CPT

## 2021-01-19 ENCOUNTER — TRANSCRIPTION ENCOUNTER (OUTPATIENT)
Age: 86
End: 2021-01-19

## 2021-02-05 ENCOUNTER — INPATIENT (INPATIENT)
Facility: HOSPITAL | Age: 86
LOS: 3 days | Discharge: HOME | DRG: 86 | End: 2021-02-09
Attending: NEUROLOGICAL SURGERY | Admitting: NEUROLOGICAL SURGERY
Payer: MEDICARE

## 2021-02-05 VITALS
WEIGHT: 123.02 LBS | HEIGHT: 64 IN | SYSTOLIC BLOOD PRESSURE: 127 MMHG | TEMPERATURE: 97 F | OXYGEN SATURATION: 95 % | HEART RATE: 107 BPM | DIASTOLIC BLOOD PRESSURE: 80 MMHG | RESPIRATION RATE: 18 BRPM

## 2021-02-05 DIAGNOSIS — Z90.49 ACQUIRED ABSENCE OF OTHER SPECIFIED PARTS OF DIGESTIVE TRACT: Chronic | ICD-10-CM

## 2021-02-05 DIAGNOSIS — I10 ESSENTIAL (PRIMARY) HYPERTENSION: ICD-10-CM

## 2021-02-05 DIAGNOSIS — E78.00 PURE HYPERCHOLESTEROLEMIA, UNSPECIFIED: ICD-10-CM

## 2021-02-05 DIAGNOSIS — E11.9 TYPE 2 DIABETES MELLITUS WITHOUT COMPLICATIONS: ICD-10-CM

## 2021-02-05 DIAGNOSIS — I62.9 NONTRAUMATIC INTRACRANIAL HEMORRHAGE, UNSPECIFIED: ICD-10-CM

## 2021-02-05 DIAGNOSIS — Z90.2 ACQUIRED ABSENCE OF LUNG [PART OF]: Chronic | ICD-10-CM

## 2021-02-05 DIAGNOSIS — Z98.890 OTHER SPECIFIED POSTPROCEDURAL STATES: Chronic | ICD-10-CM

## 2021-02-05 LAB
APPEARANCE UR: CLEAR — SIGNIFICANT CHANGE UP
BILIRUB UR-MCNC: NEGATIVE — SIGNIFICANT CHANGE UP
BLD GP AB SCN SERPL QL: POSITIVE — SIGNIFICANT CHANGE UP
COLOR SPEC: YELLOW — SIGNIFICANT CHANGE UP
DIFF PNL FLD: NEGATIVE — SIGNIFICANT CHANGE UP
GLUCOSE BLDC GLUCOMTR-MCNC: 154 MG/DL — HIGH (ref 70–99)
GLUCOSE UR QL: NEGATIVE — SIGNIFICANT CHANGE UP
KETONES UR-MCNC: ABNORMAL MG/DL
LEUKOCYTE ESTERASE UR-ACNC: NEGATIVE — SIGNIFICANT CHANGE UP
NITRITE UR-MCNC: NEGATIVE — SIGNIFICANT CHANGE UP
PH UR: 5.5 — SIGNIFICANT CHANGE UP (ref 5–8)
PROT UR-MCNC: NEGATIVE MG/DL — SIGNIFICANT CHANGE UP
RH IG SCN BLD-IMP: POSITIVE — SIGNIFICANT CHANGE UP
SP GR SPEC: >=1.03 — SIGNIFICANT CHANGE UP (ref 1–1.03)
UROBILINOGEN FLD QL: 0.2 E.U./DL — SIGNIFICANT CHANGE UP

## 2021-02-05 PROCEDURE — 70450 CT HEAD/BRAIN W/O DYE: CPT | Mod: 26,59,76

## 2021-02-05 PROCEDURE — 99223 1ST HOSP IP/OBS HIGH 75: CPT

## 2021-02-05 PROCEDURE — 70498 CT ANGIOGRAPHY NECK: CPT | Mod: 26

## 2021-02-05 PROCEDURE — 93010 ELECTROCARDIOGRAM REPORT: CPT

## 2021-02-05 PROCEDURE — 71045 X-RAY EXAM CHEST 1 VIEW: CPT | Mod: 26

## 2021-02-05 PROCEDURE — 70496 CT ANGIOGRAPHY HEAD: CPT | Mod: 26

## 2021-02-05 PROCEDURE — 72125 CT NECK SPINE W/O DYE: CPT | Mod: 26,MH

## 2021-02-05 PROCEDURE — 99285 EMERGENCY DEPT VISIT HI MDM: CPT

## 2021-02-05 RX ORDER — FOLIC ACID 0.8 MG
1 TABLET ORAL DAILY
Refills: 0 | Status: DISCONTINUED | OUTPATIENT
Start: 2021-02-05 | End: 2021-02-09

## 2021-02-05 RX ORDER — PANTOPRAZOLE SODIUM 20 MG/1
40 TABLET, DELAYED RELEASE ORAL
Refills: 0 | Status: DISCONTINUED | OUTPATIENT
Start: 2021-02-05 | End: 2021-02-09

## 2021-02-05 RX ORDER — METFORMIN HYDROCHLORIDE 850 MG/1
0 TABLET ORAL
Qty: 0 | Refills: 0 | DISCHARGE

## 2021-02-05 RX ORDER — SENNA PLUS 8.6 MG/1
2 TABLET ORAL AT BEDTIME
Refills: 0 | Status: DISCONTINUED | OUTPATIENT
Start: 2021-02-05 | End: 2021-02-09

## 2021-02-05 RX ORDER — CHOLECALCIFEROL (VITAMIN D3) 125 MCG
0 CAPSULE ORAL
Qty: 0 | Refills: 0 | DISCHARGE

## 2021-02-05 RX ORDER — ATORVASTATIN CALCIUM 80 MG/1
10 TABLET, FILM COATED ORAL AT BEDTIME
Refills: 0 | Status: DISCONTINUED | OUTPATIENT
Start: 2021-02-05 | End: 2021-02-06

## 2021-02-05 RX ORDER — ACETAMINOPHEN 500 MG
650 TABLET ORAL EVERY 6 HOURS
Refills: 0 | Status: DISCONTINUED | OUTPATIENT
Start: 2021-02-05 | End: 2021-02-07

## 2021-02-05 RX ORDER — PREGABALIN 225 MG/1
250 CAPSULE ORAL DAILY
Refills: 0 | Status: DISCONTINUED | OUTPATIENT
Start: 2021-02-05 | End: 2021-02-09

## 2021-02-05 RX ORDER — INSULIN LISPRO 100/ML
VIAL (ML) SUBCUTANEOUS
Refills: 0 | Status: DISCONTINUED | OUTPATIENT
Start: 2021-02-05 | End: 2021-02-09

## 2021-02-05 RX ORDER — ONDANSETRON 8 MG/1
4 TABLET, FILM COATED ORAL EVERY 6 HOURS
Refills: 0 | Status: DISCONTINUED | OUTPATIENT
Start: 2021-02-05 | End: 2021-02-09

## 2021-02-05 RX ORDER — LOSARTAN POTASSIUM 100 MG/1
0 TABLET, FILM COATED ORAL
Qty: 0 | Refills: 0 | DISCHARGE

## 2021-02-05 RX ORDER — DESMOPRESSIN ACETATE 0.1 MG/1
22 TABLET ORAL ONCE
Refills: 0 | Status: COMPLETED | OUTPATIENT
Start: 2021-02-05 | End: 2021-02-05

## 2021-02-05 RX ORDER — SODIUM CHLORIDE 9 MG/ML
1000 INJECTION INTRAMUSCULAR; INTRAVENOUS; SUBCUTANEOUS
Refills: 0 | Status: DISCONTINUED | OUTPATIENT
Start: 2021-02-05 | End: 2021-02-06

## 2021-02-05 RX ORDER — ACETAMINOPHEN 500 MG
650 TABLET ORAL ONCE
Refills: 0 | Status: COMPLETED | OUTPATIENT
Start: 2021-02-05 | End: 2021-02-05

## 2021-02-05 RX ORDER — DEXTROSE 50 % IN WATER 50 %
25 SYRINGE (ML) INTRAVENOUS ONCE
Refills: 0 | Status: DISCONTINUED | OUTPATIENT
Start: 2021-02-05 | End: 2021-02-09

## 2021-02-05 RX ORDER — LEVETIRACETAM 250 MG/1
500 TABLET, FILM COATED ORAL EVERY 12 HOURS
Refills: 0 | Status: DISCONTINUED | OUTPATIENT
Start: 2021-02-05 | End: 2021-02-07

## 2021-02-05 RX ORDER — GLUCAGON INJECTION, SOLUTION 0.5 MG/.1ML
1 INJECTION, SOLUTION SUBCUTANEOUS ONCE
Refills: 0 | Status: DISCONTINUED | OUTPATIENT
Start: 2021-02-05 | End: 2021-02-09

## 2021-02-05 RX ORDER — DESMOPRESSIN ACETATE 0.1 MG/1
22 TABLET ORAL ONCE
Refills: 0 | Status: DISCONTINUED | OUTPATIENT
Start: 2021-02-05 | End: 2021-02-05

## 2021-02-05 RX ORDER — SERTRALINE 25 MG/1
50 TABLET, FILM COATED ORAL DAILY
Refills: 0 | Status: DISCONTINUED | OUTPATIENT
Start: 2021-02-05 | End: 2021-02-09

## 2021-02-05 RX ORDER — CHOLECALCIFEROL (VITAMIN D3) 125 MCG
5000 CAPSULE ORAL DAILY
Refills: 0 | Status: DISCONTINUED | OUTPATIENT
Start: 2021-02-05 | End: 2021-02-09

## 2021-02-05 RX ORDER — SODIUM CHLORIDE 9 MG/ML
1000 INJECTION, SOLUTION INTRAVENOUS
Refills: 0 | Status: DISCONTINUED | OUTPATIENT
Start: 2021-02-05 | End: 2021-02-09

## 2021-02-05 RX ORDER — LOSARTAN POTASSIUM 100 MG/1
100 TABLET, FILM COATED ORAL DAILY
Refills: 0 | Status: DISCONTINUED | OUTPATIENT
Start: 2021-02-05 | End: 2021-02-09

## 2021-02-05 RX ORDER — DEXTROSE 50 % IN WATER 50 %
15 SYRINGE (ML) INTRAVENOUS ONCE
Refills: 0 | Status: DISCONTINUED | OUTPATIENT
Start: 2021-02-05 | End: 2021-02-09

## 2021-02-05 RX ORDER — DEXTROSE 50 % IN WATER 50 %
12.5 SYRINGE (ML) INTRAVENOUS ONCE
Refills: 0 | Status: DISCONTINUED | OUTPATIENT
Start: 2021-02-05 | End: 2021-02-09

## 2021-02-05 RX ADMIN — LEVETIRACETAM 400 MILLIGRAM(S): 250 TABLET, FILM COATED ORAL at 20:30

## 2021-02-05 RX ADMIN — SENNA PLUS 2 TABLET(S): 8.6 TABLET ORAL at 22:36

## 2021-02-05 RX ADMIN — ATORVASTATIN CALCIUM 10 MILLIGRAM(S): 80 TABLET, FILM COATED ORAL at 22:35

## 2021-02-05 RX ADMIN — DESMOPRESSIN ACETATE 222 MICROGRAM(S): 0.1 TABLET ORAL at 22:35

## 2021-02-05 RX ADMIN — Medication 650 MILLIGRAM(S): at 19:12

## 2021-02-05 RX ADMIN — Medication 1 TABLET(S): at 22:35

## 2021-02-05 NOTE — H&P ADULT - DOES THIS PATIENT HAVE A HISTORY OF OR HAS BEEN DX WITH HEART FAILURE?
"    Patient denies any complaints related to anticoagulation therapy at this time. Patient reports no change in medication, diet or health. Dosing instructions given to patient verbally over the phone. Advised to call the clinic with any questions or concerns. Patient verbalizes understanding. Clinic number provided. Anticoagulation Summary  As of 3/27/2018    INR goal:   2.5-3.5   TTR:   53.4 % (2.4 y)   Today's INR:   2.7   Maintenance plan:   5 mg (2.5 mg x 2) on M, W, F; 2.5 mg (2.5 mg x 1) all other days   Weekly total:   25 mg   Plan last modified:   Richard Mciknnon RN (3/20/2018)   Next INR check:   4/10/2018   Priority:   Follow-Up - 2 Weeks   Target end date: Indefinite    Indications    S/P AVR (aortic valve replacement) [Z95.2]  Long term (current) use of anticoagulants [Z79.01]  Long-term (current) use of anticoagulants [Z79.01]             Anticoagulation Episode Summary     INR check location:   [facility]    Preferred lab:       Send INR reminders to:   [facility] (OPEN ENROLLMENT) [facility] CARD/EP    Comments:   Next STAC due 10/11/18 ; Self Monitor; 2 mg, 2.5 mg tablets;  ""Pat\""; call work phone (Option 4)      Anticoagulation Care Providers     Provider Role Specialty Phone number    Desire Monzon MD Referring Cardiovascular Disease [phone]            "
unknown

## 2021-02-05 NOTE — H&P ADULT - NSHPSOCIALHISTORY_GEN_ALL_CORE
Patient quit smoking 45-50 years ago. Patient admits to 1 alcoholic beverage approximately every 5 months. Patient denies illicit drug use.

## 2021-02-05 NOTE — ED ADULT NURSE NOTE - NSIMPLEMENTINTERV_GEN_ALL_ED
Implemented All Fall Risk Interventions:  [place] to call system. Call bell, personal items and telephone within reach. Instruct patient to call for assistance. Room bathroom lighting operational. Non-slip footwear when patient is off stretcher. Physically safe environment: no spills, clutter or unnecessary equipment. Stretcher in lowest position, wheels locked, appropriate side rails in place. Provide visual cue, wrist band, yellow gown, etc. Monitor gait and stability. Monitor for mental status changes and reorient to person, place, and time. Review medications for side effects contributing to fall risk. Reinforce activity limits and safety measures with patient and family.

## 2021-02-05 NOTE — ED ADULT NURSE NOTE - OBJECTIVE STATEMENT
Pt BIBA for head injury following fall at home. Per pt and friend, pt lost balance and fell backwards. PT states she has been feeling off balance after starting abx for UTI. Pt denies LOC.  Pt is alert and oriented x3.

## 2021-02-05 NOTE — H&P ADULT - ASSESSMENT
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

## 2021-02-05 NOTE — ED PROVIDER NOTE - CARE PLAN
Principal Discharge DX:	Fall  Secondary Diagnosis:	Head injury   Principal Discharge DX:	Intracranial hemorrhage  Secondary Diagnosis:	Head injury  Secondary Diagnosis:	Fall

## 2021-02-05 NOTE — H&P ADULT - NSHPPHYSICALEXAM_GEN_ALL_CORE
General: patient seen laying supine in bed in NAD  Neuro: AAOx3, FC, OE spontaneously, speech clear and fluent, CNII-XI grossly intact, face symmetric, no pronator drift, finger to nose intact, strength 5/5 b/l UE and LE, decreased sensation throughout left side of body (baseline)  HEENT: PERRL, EOMI, VFs intact b/l, large L parietal scalp hematoma with laceration covered in dried blood  Neck: supple  Cardiac: RRR, S1S2  Pulmonary: chest rise symmetric  Abdomen: soft, nontender, nondistended  Ext: warm, perfusing well

## 2021-02-05 NOTE — H&P ADULT - HISTORY OF PRESENT ILLNESS
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 The fall was witnessed by patient's home aide who denies LOC. Patient does not remember falling today, but states that she fell 2 days ago and hit her head when getting up from the toilet with no LOC. Patient reports 4/10 in severity head pain and baseline full body left sided decreased sensation since her prior aneurysm repair. Patient's daughter reports a history of chronic bladder incontinence. Patient denies new extremity numbness or weakness, diplopia, blurry vision, dizziness, and headache or dizziness preceding her fall.   Patient is also currently being treated with Bactrim DS 1 tab BID x 10 days for klebsiella UTI with last dose scheduled for 2/7/21.

## 2021-02-05 NOTE — H&P ADULT - NSHPLABSRESULTS_GEN_ALL_CORE
.  LABS:                         9.7    6.95  )-----------( 163      ( 05 Feb 2021 17:24 )             31.8     02-05    139  |  100  |  35<H>  ----------------------------<  151<H>  5.4<H>   |  26  |  1.33<H>    Ca    10.8<H>      05 Feb 2021 17:24    TPro  6.5  /  Alb  3.8  /  TBili  0.3  /  DBili  x   /  AST  121<H>  /  ALT  334<H>  /  AlkPhos  407<H>  02-05    PT/INR - ( 05 Feb 2021 17:24 )   PT: 12.0 sec;   INR: 1.00          PTT - ( 05 Feb 2021 17:24 )  PTT:26.3 sec  Urinalysis Basic - ( 05 Feb 2021 18:37 )    Color: Yellow / Appearance: Clear / SG: >=1.030 / pH: x  Gluc: x / Ketone: Trace mg/dL  / Bili: Negative / Urobili: 0.2 E.U./dL   Blood: x / Protein: NEGATIVE mg/dL / Nitrite: NEGATIVE   Leuk Esterase: NEGATIVE / RBC: x / WBC x   Sq Epi: x / Non Sq Epi: x / Bacteria: x            RADIOLOGY, EKG & ADDITIONAL TESTS: Reviewed.

## 2021-02-05 NOTE — ED PROVIDER NOTE - CLINICAL SUMMARY MEDICAL DECISION MAKING FREE TEXT BOX
86 yo s/p fall with ICH--  SDH  /SAH-- on ASA plt 162 K  no platelets for now -   neuro intact awake/ alert   hematoma  scalp- left parietal- no bisi lac to suture- tet utd -

## 2021-02-05 NOTE — H&P ADULT - PROBLEM SELECTOR PLAN 1
- admit to neurosurgical ICU  - neuro/vital checks q1  - pain control  - patient unable to have MRIs due to prior aneurysm repair hardware  - hold ASA 81 mg  - CTA head/neck  - repeat CTH 4-6 hours after initial  - start keppra 500 BID for seizure ppx  - NPO until stability imaging completed

## 2021-02-05 NOTE — ED ADULT TRIAGE NOTE - CHIEF COMPLAINT QUOTE
Per aide "she fell backwards. She's on an antibiotic and ever since she's been on it she's been unsteady."

## 2021-02-05 NOTE — ED PROVIDER NOTE - OBJECTIVE STATEMENT
84 yo F with hx HTN -recent treatment for UTI  bactrim -s/p fall 1 hr ago was walking when it occurred- no antecedant headache or dizziness - has bleeding / ? lac to eft parietal region-- no other ext injuries - no N/V no fevers or chills recently  TET UTD

## 2021-02-05 NOTE — H&P ADULT - NSICDXPASTSURGICALHX_GEN_ALL_CORE_FT
PAST SURGICAL HISTORY:  H/O cerebral aneurysm repair 1968, "metal plates"    Personal history of spine surgery     S/P cholecystectomy     S/P removal of lung left, 2000 partial and 2004 complete

## 2021-02-06 LAB
A1C WITH ESTIMATED AVERAGE GLUCOSE RESULT: 6.2 % — HIGH (ref 4–5.6)
ALBUMIN SERPL ELPH-MCNC: 3.4 G/DL — SIGNIFICANT CHANGE UP (ref 3.3–5)
ALP SERPL-CCNC: 315 U/L — HIGH (ref 40–120)
ALT FLD-CCNC: 250 U/L — HIGH (ref 10–45)
ANION GAP SERPL CALC-SCNC: 11 MMOL/L — SIGNIFICANT CHANGE UP (ref 5–17)
AST SERPL-CCNC: 87 U/L — HIGH (ref 10–40)
BILIRUB DIRECT SERPL-MCNC: <0.2 MG/DL — SIGNIFICANT CHANGE UP (ref 0–0.2)
BILIRUB INDIRECT FLD-MCNC: SIGNIFICANT CHANGE UP (ref 0.2–1)
BILIRUB SERPL-MCNC: 0.3 MG/DL — SIGNIFICANT CHANGE UP (ref 0.2–1.2)
BUN SERPL-MCNC: 37 MG/DL — HIGH (ref 7–23)
CALCIUM SERPL-MCNC: 9.9 MG/DL — SIGNIFICANT CHANGE UP (ref 8.4–10.5)
CHLORIDE SERPL-SCNC: 98 MMOL/L — SIGNIFICANT CHANGE UP (ref 96–108)
CO2 SERPL-SCNC: 25 MMOL/L — SIGNIFICANT CHANGE UP (ref 22–31)
CREAT SERPL-MCNC: 1.18 MG/DL — SIGNIFICANT CHANGE UP (ref 0.5–1.3)
ESTIMATED AVERAGE GLUCOSE: 131 MG/DL — HIGH (ref 68–114)
GLUCOSE BLDC GLUCOMTR-MCNC: 102 MG/DL — HIGH (ref 70–99)
GLUCOSE BLDC GLUCOMTR-MCNC: 127 MG/DL — HIGH (ref 70–99)
GLUCOSE BLDC GLUCOMTR-MCNC: 161 MG/DL — HIGH (ref 70–99)
GLUCOSE BLDC GLUCOMTR-MCNC: 169 MG/DL — HIGH (ref 70–99)
GLUCOSE BLDC GLUCOMTR-MCNC: 196 MG/DL — HIGH (ref 70–99)
GLUCOSE SERPL-MCNC: 103 MG/DL — HIGH (ref 70–99)
HCT VFR BLD CALC: 25.8 % — LOW (ref 34.5–45)
HGB BLD-MCNC: 7.8 G/DL — LOW (ref 11.5–15.5)
MAGNESIUM SERPL-MCNC: 1.7 MG/DL — SIGNIFICANT CHANGE UP (ref 1.6–2.6)
MCHC RBC-ENTMCNC: 24.7 PG — LOW (ref 27–34)
MCHC RBC-ENTMCNC: 30.2 GM/DL — LOW (ref 32–36)
MCV RBC AUTO: 81.6 FL — SIGNIFICANT CHANGE UP (ref 80–100)
NRBC # BLD: 0 /100 WBCS — SIGNIFICANT CHANGE UP (ref 0–0)
PHOSPHATE SERPL-MCNC: 4 MG/DL — SIGNIFICANT CHANGE UP (ref 2.5–4.5)
PLATELET # BLD AUTO: 117 K/UL — LOW (ref 150–400)
POTASSIUM SERPL-MCNC: 4.6 MMOL/L — SIGNIFICANT CHANGE UP (ref 3.5–5.3)
POTASSIUM SERPL-SCNC: 4.6 MMOL/L — SIGNIFICANT CHANGE UP (ref 3.5–5.3)
PROT SERPL-MCNC: 6 G/DL — SIGNIFICANT CHANGE UP (ref 6–8.3)
RBC # BLD: 3.16 M/UL — LOW (ref 3.8–5.2)
RBC # FLD: 17.1 % — HIGH (ref 10.3–14.5)
SARS-COV-2 RNA SPEC QL NAA+PROBE: SIGNIFICANT CHANGE UP
SODIUM SERPL-SCNC: 134 MMOL/L — LOW (ref 135–145)
WBC # BLD: 5.52 K/UL — SIGNIFICANT CHANGE UP (ref 3.8–10.5)
WBC # FLD AUTO: 5.52 K/UL — SIGNIFICANT CHANGE UP (ref 3.8–10.5)

## 2021-02-06 PROCEDURE — 76770 US EXAM ABDO BACK WALL COMP: CPT | Mod: 26

## 2021-02-06 PROCEDURE — 99291 CRITICAL CARE FIRST HOUR: CPT | Mod: 24

## 2021-02-06 PROCEDURE — 93970 EXTREMITY STUDY: CPT | Mod: 26

## 2021-02-06 PROCEDURE — 99223 1ST HOSP IP/OBS HIGH 75: CPT

## 2021-02-06 PROCEDURE — 70450 CT HEAD/BRAIN W/O DYE: CPT | Mod: 26

## 2021-02-06 RX ORDER — HYDRALAZINE HCL 50 MG
10 TABLET ORAL
Refills: 0 | Status: DISCONTINUED | OUTPATIENT
Start: 2021-02-06 | End: 2021-02-09

## 2021-02-06 RX ADMIN — PREGABALIN 250 MICROGRAM(S): 225 CAPSULE ORAL at 16:37

## 2021-02-06 RX ADMIN — Medication 2: at 00:45

## 2021-02-06 RX ADMIN — PANTOPRAZOLE SODIUM 40 MILLIGRAM(S): 20 TABLET, DELAYED RELEASE ORAL at 05:24

## 2021-02-06 RX ADMIN — LEVETIRACETAM 400 MILLIGRAM(S): 250 TABLET, FILM COATED ORAL at 05:24

## 2021-02-06 RX ADMIN — SERTRALINE 50 MILLIGRAM(S): 25 TABLET, FILM COATED ORAL at 12:06

## 2021-02-06 RX ADMIN — Medication 1 TABLET(S): at 08:45

## 2021-02-06 RX ADMIN — LEVETIRACETAM 400 MILLIGRAM(S): 250 TABLET, FILM COATED ORAL at 19:22

## 2021-02-06 RX ADMIN — Medication 5000 UNIT(S): at 12:06

## 2021-02-06 RX ADMIN — Medication 2: at 19:35

## 2021-02-06 RX ADMIN — Medication 650 MILLIGRAM(S): at 00:15

## 2021-02-06 RX ADMIN — Medication 650 MILLIGRAM(S): at 22:13

## 2021-02-06 RX ADMIN — SENNA PLUS 2 TABLET(S): 8.6 TABLET ORAL at 21:18

## 2021-02-06 RX ADMIN — Medication 1 TABLET(S): at 19:22

## 2021-02-06 RX ADMIN — Medication 1 MILLIGRAM(S): at 12:05

## 2021-02-06 RX ADMIN — LOSARTAN POTASSIUM 100 MILLIGRAM(S): 100 TABLET, FILM COATED ORAL at 05:24

## 2021-02-06 RX ADMIN — Medication 650 MILLIGRAM(S): at 12:05

## 2021-02-06 NOTE — OCCUPATIONAL THERAPY INITIAL EVALUATION ADULT - FINE MOTOR COORDINATION, RIGHT HAND, MANIPULATE OBJECTS, OT EVAL
normal performance Rhofade Counseling: Rhofade is a topical medication which can decrease superficial blood flow where applied. Side effects are uncommon and include stinging, redness and allergic reactions.

## 2021-02-06 NOTE — OCCUPATIONAL THERAPY INITIAL EVALUATION ADULT - ADDITIONAL COMMENTS
Patient reports that she lives in apt w/ elevator. Pt has 24 hour home health aide. Pt ambulates in her apt w/ RW x1 person assist. Pt uses a w/c for community mobility. Reports that her aide assists with all ADLs and mobility.

## 2021-02-06 NOTE — CONSULT NOTE ADULT - ASSESSMENT
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       PLAN -     NEURO -   -neurochecks q1h  -traumatic L frontal SAH, SDH with IVH, baseline L LEBRON stenosis  -sBP < 150 mmHg parameter  -keppra 500 mg BID  -follow-up CT head today  -ASA held    CV -  -sBP < 150 mmHg goal  -losartan 100 mg daily  -telemetry NSR    PUL -  -stable  -Hx of left pneumonectomy    ENDO -  -euglycemia goal  -insulin sliding scale  -lipitor 10 mg daily held secondary to increased LFTs, will trend    GI/ -  -downtrending Cr 1.1 (post IV hydration)  -euvolemia goal    DIET -   -dysphagia screen, diabetic diet as tolerated    HEM/ONC -  -Hb 9.7 stable    VTE Prophylaxis -  -SCDs  -SQL as per NSx    ID -  -afebrile, no leukocytosis     Social -  -updated patient and daughter    *****    CORE MEASURES    ICH  1.  NIHSS = 1  2.  ICH Score = 1  3.  VTE Prophylaxis:  [] administered within 24 hours of admission OR [X] reason not done: acute bleed  4.  Dysphagia screening:  [] performed before any oral meds / liquids / food

## 2021-02-06 NOTE — PHYSICAL THERAPY INITIAL EVALUATION ADULT - ADDITIONAL COMMENTS
Patient reports that she lives with 24 hour home health aide. Reports living in an elevator apt building with no TI. States that she is ambulatory within her apt with 1 person assist and use of a rolling walker, uses a w/c for community mobility. Reports that her aide assists with all ADLs and mobility.

## 2021-02-06 NOTE — PHYSICAL THERAPY INITIAL EVALUATION ADULT - MODALITIES TREATMENT COMMENTS
Patient demo'd (R) sided lean when standing, somewhat correctable with VCs. Patien unsure if this is chronic or new. Reports that she "always" uses a rolling walker for ambulation. Plan assess ambulation with rolling walker next session.

## 2021-02-06 NOTE — OCCUPATIONAL THERAPY INITIAL EVALUATION ADULT - GENERAL OBSERVATIONS, REHAB EVAL
Pt's RN Chantale aware of intent to eval; cleared Pt. Pt received in chair- +telemetry, dressing to head, premafit, heplock/IVs. Pt with fair affect; agreeable to OT. Pt's daughter present.

## 2021-02-06 NOTE — PROGRESS NOTE ADULT - ASSESSMENT
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       PLAN -     NEURO -   -neurochecks q1h  -traumatic L frontal SAH, SDH with IVH, possible L LEBRON stenosis, no residual aneurysm noted as per CTA report  -sBP < 140 mmHg parameter  -keppra 500 mg BID  -repeat CT head in AM  -ASA held    CV -  -sBP < 140 mmHg goal  -EKG ___    PUL -  -stable  -Hx of left pneumonectomy    ENDO -  -euglycemia goal  -insulin sliding scale  -lipitor 10 mg daily    GI/ -  -IV NS hydration post contrast, Cr trend 1.33 -->__    DIET -   -NPO, assess in AM    HEM/ONC -  -Hb 9.7 stable    VTE Prophylaxis -  -SCDs    ID -  -afebrile, no leukocytosis     Social -  -will update patient    *****    CORE MEASURES    ICH  1.  NIHSS =  2.  ICH Score = 1  3.  VTE Prophylaxis:  [] administered within 24 hours of admission OR [X] reason not done: acute bleed  4.  Dysphagia screening:  [] performed before any oral meds / liquids / food    *****    Quality Measures    FAST HUG    *****    ATTENDING ATTESTATION:  I was physically present for the key portions of the evaluation and management (E/M) service provided.  I agree with the above history, physical and plan, which I have reviewed and edited where appropriate.    Patient at high risk for neurological deterioriation or death due to:  bleeding, seizures, infection, DVT.  Critical Care Time:  I have personally provided 45 minutes of critical care time excluding time spent on separate procedures.   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       PLAN -     NEURO -   -neurochecks q1h  -traumatic L frontal SAH, SDH with IVH, possible L LEBRON stenosis, no residual aneurysm noted as per CTA report  -sBP < 140 mmHg parameter  -keppra 500 mg BID  -repeat CT head in AM  -ASA held    CV -  -sBP < 140 mmHg goal  -EKG ___    PUL -  -stable  -Hx of left pneumonectomy    ENDO -  -euglycemia goal  -insulin sliding scale  -lipitor 10 mg daily    GI/ -  -IV NS hydration post contrast, Cr trend 1.33 -->__    DIET -   -NPO, assess in AM    HEM/ONC -  -Hb 9.7 stable    VTE Prophylaxis -  -SCDs    ID -  -afebrile, no leukocytosis     Social -  -will update patient    *****    CORE MEASURES    ICH  1.  NIHSS = 1  2.  ICH Score = 1  3.  VTE Prophylaxis:  [] administered within 24 hours of admission OR [X] reason not done: acute bleed  4.  Dysphagia screening:  [] performed before any oral meds / liquids / food    *****    Quality Measures    FAST HUG    *****    ATTENDING ATTESTATION:  I was physically present for the key portions of the evaluation and management (E/M) service provided.  I agree with the above history, physical and plan, which I have reviewed and edited where appropriate.    Patient at high risk for neurological deterioriation or death due to:  bleeding, seizures, infection, DVT.  Critical Care Time:  I have personally provided 45 minutes of critical care time excluding time spent on separate procedures.   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       PLAN -     NEURO -   -neurochecks q1h  -traumatic L frontal SAH, SDH with IVH, L LEBRON stenosis, no residual aneurysm noted as per CTA report  -sBP < 140 mmHg parameter  -keppra 500 mg BID  -repeat CT head in AM  -ASA held    CV -  -sBP < 140 mmHg goal  -EKG ___    PUL -  -stable  -Hx of left pneumonectomy    ENDO -  -euglycemia goal  -insulin sliding scale  -lipitor 10 mg daily    GI/ -  -IV NS hydration post contrast, Cr trend 1.33 -->__    DIET -   -NPO, assess in AM    HEM/ONC -  -Hb 9.7 stable    VTE Prophylaxis -  -SCDs    ID -  -afebrile, no leukocytosis     Social -  -will update patient    *****    CORE MEASURES    ICH  1.  NIHSS = 1  2.  ICH Score = 1  3.  VTE Prophylaxis:  [] administered within 24 hours of admission OR [X] reason not done: acute bleed  4.  Dysphagia screening:  [] performed before any oral meds / liquids / food    *****    Quality Measures    FAST HUG    *****    ATTENDING ATTESTATION:  I was physically present for the key portions of the evaluation and management (E/M) service provided.  I agree with the above history, physical and plan, which I have reviewed and edited where appropriate.    Patient at high risk for neurological deterioriation or death due to:  bleeding, seizures, infection, DVT.  Critical Care Time:  I have personally provided 45 minutes of critical care time excluding time spent on separate procedures.   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       PLAN -     NEURO -   -neurochecks q1h  -traumatic L frontal SAH, SDH with IVH, baseline L LEBRON stenosis  -sBP < 150 mmHg parameter  -keppra 500 mg BID  -follow-up CT head today  -ASA held    CV -  -sBP < 150 mmHg goal  -telemetry NSR    PUL -  -stable  -Hx of left pneumonectomy    ENDO -  -euglycemia goal  -insulin sliding scale  -lipitor 10 mg daily held secondary to increased LFTs, will trend    GI/ -  -downtrending Cr 1.1 (post IV hydration)  -euvolemia goal    DIET -   -dysphagia screen, diabetic diet as tolerated    HEM/ONC -  -Hb 9.7 stable    VTE Prophylaxis -  -SCDs  -SQL as per NSx    ID -  -afebrile, no leukocytosis     Social -  -updated patient and daughter    *****    CORE MEASURES    ICH  1.  NIHSS = 1  2.  ICH Score = 1  3.  VTE Prophylaxis:  [] administered within 24 hours of admission OR [X] reason not done: acute bleed  4.  Dysphagia screening:  [] performed before any oral meds / liquids / food    *****    Quality Measures    FAST HUG    *****    ATTENDING ATTESTATION:  I was physically present for the key portions of the evaluation and management (E/M) service provided.  I agree with the above history, physical and plan, which I have reviewed and edited where appropriate.    Patient at high risk for neurological deterioriation or death due to:  bleeding, seizures, infection, DVT.  Critical Care Time:  I have personally provided 45 minutes of critical care time excluding time spent on separate procedures.   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       PLAN -     NEURO -   -neurochecks q1h  -traumatic L frontal SAH, SDH with IVH, baseline L LEBRON stenosis  -sBP < 150 mmHg parameter  -keppra 500 mg BID  -follow-up CT head today  -ASA held    CV -  -sBP < 150 mmHg goal  -losartan 100 mg daily  -telemetry NSR    PUL -  -stable  -Hx of left pneumonectomy    ENDO -  -euglycemia goal  -insulin sliding scale  -lipitor 10 mg daily held secondary to increased LFTs, will trend    GI/ -  -downtrending Cr 1.1 (post IV hydration)  -euvolemia goal    DIET -   -dysphagia screen, diabetic diet as tolerated    HEM/ONC -  -Hb 9.7 stable    VTE Prophylaxis -  -SCDs  -SQL as per NSx    ID -  -afebrile, no leukocytosis     Social -  -updated patient and daughter    *****    CORE MEASURES    ICH  1.  NIHSS = 1  2.  ICH Score = 1  3.  VTE Prophylaxis:  [] administered within 24 hours of admission OR [X] reason not done: acute bleed  4.  Dysphagia screening:  [] performed before any oral meds / liquids / food    *****    Quality Measures    FAST HUG    *****    ATTENDING ATTESTATION:  I was physically present for the key portions of the evaluation and management (E/M) service provided.  I agree with the above history, physical and plan, which I have reviewed and edited where appropriate.    Patient at high risk for neurological deterioriation or death due to:  bleeding, seizures, infection, DVT.  Critical Care Time:  I have personally provided 45 minutes of critical care time excluding time spent on separate procedures.

## 2021-02-06 NOTE — CONSULT NOTE ADULT - SUBJECTIVE AND OBJECTIVE BOX
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 The fall was witnessed by patient's home aide who denies LOC. Patient does not remember falling today, but states that she fell 2 days ago and hit her head when getting up from the toilet with no LOC. Patient reports 4/10 in severity head pain and baseline full body right sided decreased sensation since her prior aneurysm repair. Patient's daughter reports a history of chronic bladder incontinence. Patient denies new extremity numbness or weakness, diplopia, blurry vision, dizziness, and headache or dizziness preceding her fall.   Patient is also currently being treated with Bactrim DS 1 tab BID x 10 days for klebsiella UTI with last dose scheduled for 2/7/21.     PAST MEDICAL & SURGICAL HISTORY:  High cholesterol    Diabetes    HTN (hypertension)    H/O cerebral aneurysm repair  1968, &quot;metal plates&quot;    Personal history of spine surgery    S/P cholecystectomy    S/P removal of lung  left, 2000 partial and 2004 complete        FAMILY HISTORY:  No pertinent family history in first degree relatives        SOCIAL HISTORY:  Denies smoking, drinking, or drug use    ROS:  As above    MEDICATIONS  (STANDING):  cholecalciferol 5000 Unit(s) Oral daily  cyanocobalamin 250 MICROGram(s) Oral daily  dextrose 40% Gel 15 Gram(s) Oral once  dextrose 5%. 1000 milliLiter(s) (50 mL/Hr) IV Continuous <Continuous>  dextrose 5%. 1000 milliLiter(s) (100 mL/Hr) IV Continuous <Continuous>  dextrose 50% Injectable 25 Gram(s) IV Push once  dextrose 50% Injectable 12.5 Gram(s) IV Push once  dextrose 50% Injectable 25 Gram(s) IV Push once  folic acid 1 milliGRAM(s) Oral daily  glucagon  Injectable 1 milliGRAM(s) IntraMuscular once  insulin lispro (ADMELOG) corrective regimen sliding scale   SubCutaneous Before meals and at bedtime  levETIRAcetam  IVPB 500 milliGRAM(s) IV Intermittent every 12 hours  losartan 100 milliGRAM(s) Oral daily  pantoprazole    Tablet 40 milliGRAM(s) Oral before breakfast  senna 2 Tablet(s) Oral at bedtime  sertraline 50 milliGRAM(s) Oral daily  trimethoprim  160 mG/sulfamethoxazole 800 mG 1 Tablet(s) Oral every 12 hours    MEDICATIONS  (PRN):  acetaminophen   Tablet .. 650 milliGRAM(s) Oral every 6 hours PRN Temp greater or equal to 38.5C (101.3F), Mild Pain (1 - 3)  hydrALAZINE Injectable 10 milliGRAM(s) IV Push every 3 hours PRN SBP>140  ondansetron Injectable 4 milliGRAM(s) IV Push every 6 hours PRN Nausea and/or Vomiting      Allergies    No Known Allergies    Intolerances        Vital Signs Last 24 Hrs  T(C): 36.5 (06 Feb 2021 14:38), Max: 36.8 (06 Feb 2021 00:00)  T(F): 97.7 (06 Feb 2021 14:38), Max: 98.3 (06 Feb 2021 00:00)  HR: 98 (06 Feb 2021 14:06) (86 - 129)  BP: 125/58 (06 Feb 2021 14:06) (106/68 - 135/61)  BP(mean): 84 (06 Feb 2021 14:06) (79 - 96)  RR: 18 (06 Feb 2021 11:00) (15 - 18)  SpO2: 96% (06 Feb 2021 14:06) (75% - 100%)    Physical exam:  General: No acute distress, awake and alert  Cardiovascular: Regular rate and rhythm, no murmurs, rubs, or gallops. No carotid bruits.   Pulmonary: Anterior breath sounds clear bilaterally, no crackles or wheezing. No use of accessory muscles  GI: Abdomen soft, non-distended, non-tender  Extremities: Radial and DP pulses +2, no edema    Neurologic:  -Mental status: Awake, alert, oriented to person, place, and time. Speech is fluent with intact naming, repetition, and comprehension, no dysarthria. Difficulty with recent and remote memory. Decreased attention and concentration Follows commands.   -Cranial nerves:   II: Visual fields are full to confrontation.  III, IV, VI: Extraocular movements are intact without nystagmus. Pupils equally round and reactive to light  V:  Decreased V1-V3 sensation 50% on left side   VII: Face is symmetric with normal eye closure and smile  Motor: Normal bulk and tone. No pronator drift. Strength bilateral upper extremity 5/5, bilateral lower extremities 5/5.  Sensation: Decreased V1-V3 sensation 50% on left side  No neglect or extinction on double simultaneous testing.  Coordination: No dysmetria on finger-to-nose   NIHSS: 1 ICH 1 Vega Mcnair 1        LABS:                        7.8    5.52  )-----------( 117      ( 06 Feb 2021 06:25 )             25.8     02-06    134<L>  |  98  |  37<H>  ----------------------------<  103<H>  4.6   |  25  |  1.18    Ca    9.9      06 Feb 2021 06:25  Phos  4.0     02-06  Mg     1.7     02-06    TPro  6.0  /  Alb  3.4  /  TBili  0.3  /  DBili  <0.2  /  AST  87<H>  /  ALT  250<H>  /  AlkPhos  315<H>  02-06    PT/INR - ( 05 Feb 2021 17:24 )   PT: 12.0 sec;   INR: 1.00          PTT - ( 05 Feb 2021 17:24 )  PTT:26.3 sec  Urinalysis Basic - ( 05 Feb 2021 18:37 )    Color: Yellow / Appearance: Clear / SG: >=1.030 / pH: x  Gluc: x / Ketone: Trace mg/dL  / Bili: Negative / Urobili: 0.2 E.U./dL   Blood: x / Protein: NEGATIVE mg/dL / Nitrite: NEGATIVE   Leuk Esterase: NEGATIVE / RBC: x / WBC x   Sq Epi: x / Non Sq Epi: x / Bacteria: x        RADIOLOGY & ADDITIONAL TESTS:  < from: CT Head No Cont (02.05.21 @ 22:23) >  INTERPRETATION:  I, Kevin West MD, have reviewed the images and the report and agree with the findings, with the following modification:    There is a new small volume of subarachnoid hemorrhage layering within a left frontal sulci (series 5 image 21 and series 3 image 12).    < end of copied text >  < from: CT Head No Cont (02.05.21 @ 22:23) >  IMPRESSION:  1.  No significant interval change in large mixed subarachnoid and/or subdural hemorrhage along the left paramedian frontal lobe. No new or increasing hemorrhage.  2.  Stable thin right subdural hematoma and trace intraventricular hemorrhageas above.    < end of copied text >    < from: CT Angio Head w/ IV Cont (02.05.21 @ 22:24) >  INTERPRETATION:  I, Kevin West MD, have reviewed the images and the report and agree with the findings, with the following modification:    The patient is status post bifrontal craniotomy for aneurysm clipping. A midline aneurysm clip is identified which may be secondary to anterior communicating artery aneurysm clipping. Metallic artifact fromthe aneurysm obscures fine detail. No residual aneurysm is identified. There are encephalomalacia changes within the frontal lobes bilaterally which may be secondary to previous surgery and/or ischemia. The subarachnoid and subdural hematomas are most likely the sequela of recent trauma. The right A1 segment is clearly not identified and it is unclear whether this may be secondary to underlying aplasia or occlusion. The proximal left A2 segment is clearly identified. The anterior cerebral arteries are otherwise markedly attenuated in caliber which correlates with the encephalomalacia changes within the frontal lobes. Comparison with any prior studies would be beneficial to ascertain stability/change.    There is a common origin to the innominate and left common carotid artery. The left carotid artery is normal in caliber. There is a normal bifurcation into the left internal and external carotid arteries. There is minimum calcification at the right carotid bifurcation.    The patient is status post left lobectomy with shift of the mediastinal structures.    < end of copied text >  
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   Underlying L LEBRON stenosis.    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 The fall was witnessed by patient's home aide who denies LOC. Patient does not remember falling today, but states that she fell 2 days ago and hit her head when getting up from the toilet with no LOC. Patient reports 4/10 in severity head pain and baseline full body left sided decreased sensation since her prior aneurysm repair. Patient's daughter reports a history of chronic bladder incontinence. Patient denies new extremity numbness or weakness, diplopia, blurry vision, dizziness, and headache or dizziness preceding her fall.   Patient is also currently being treated with Bactrim DS 1 tab BID x 10 days for klebsiella UTI with last dose scheduled for 2/7/21.       Past Medical History:  High cholesterol  Diabetes  HTN (hypertension)  H/O cerebral aneurysm repair 1968  Personal history of spine surgery  S/P cholecystectomy  S/P removal of lung  left, 2000 partial and 2004 complete    Allergies:  None known    Home Meds:   aspirin 81 mg oral tablet:  (05 Feb 2021 21:05)  Bactrim  mg-160 mg oral tablet: 1 tab(s) orally every 12 hours x 10 days, klebsiella UTI, last dose 2/7 (05 Feb 2021 21:05)  Centrum:  (05 Feb 2021 21:05)  Claritin:  (05 Feb 2021 21:05)  Colace 100 mg oral capsule: 1 cap(s) orally once a day (at bedtime) (05 Feb 2021 21:05)  folic acid 1 mg oral tablet: 1 tab(s) orally once a day (05 Feb 2021 21:05)  Lipitor 10 mg oral tablet: 1 tab(s) orally once a day (05 Feb 2021 21:05)  losartan 100 mg oral tablet: 1 tab(s) orally once a day (05 Feb 2021 21:05)  metFORMIN 500 mg oral tablet: 500  orally 2 times a day (05 Feb 2021 21:05)  omeprazole 20 mg oral delayed release capsule:  (05 Feb 2021 21:05)  sertraline 50 mg oral tablet: 1 tab(s) orally once a day (05 Feb 2021 21:05)  Vitamin B12 250 mcg oral tablet:  (05 Feb 2021 21:05)  Vitamin D3: 5000u  orally (05 Feb 2021 21:05)    Current Meds:  MEDICATIONS  (STANDING):  atorvastatin 10 milliGRAM(s) Oral at bedtime  cholecalciferol 5000 Unit(s) Oral daily  cyanocobalamin 250 MICROGram(s) Oral daily  folic acid 1 milliGRAM(s) Oral daily  insulin lispro (ADMELOG) corrective regimen sliding scale   SubCutaneous Before meals and at bedtime  levETIRAcetam  IVPB 500 milliGRAM(s) IV Intermittent every 12 hours  losartan 100 milliGRAM(s) Oral daily  pantoprazole    Tablet 40 milliGRAM(s) Oral before breakfast  senna 2 Tablet(s) Oral at bedtime  sertraline 50 milliGRAM(s) Oral daily  sodium chloride 0.9%. 1000 milliLiter(s) (80 mL/Hr) IV Continuous <Continuous>  trimethoprim  160 mG/sulfamethoxazole 800 mG 1 Tablet(s) Oral every 12 hours    MEDICATIONS  (PRN):  acetaminophen   Tablet .. 650 milliGRAM(s) Oral every 6 hours PRN Temp greater or equal to 38.5C (101.3F), Mild Pain (1 - 3)  hydrALAZINE Injectable 10 milliGRAM(s) IV Push every 3 hours PRN SBP>140  ondansetron Injectable 4 milliGRAM(s) IV Push every 6 hours PRN Nausea and/or Vomiting    PHYSICAL EXAMINATION    ICU Vital Signs Last 24 Hrs  T(C): 36.8 (06 Feb 2021 00:00), Max: 36.8 (06 Feb 2021 00:00)  T(F): 98.3 (06 Feb 2021 00:00), Max: 98.3 (06 Feb 2021 00:00)  HR: 103 (06 Feb 2021 02:00) (103 - 115)  BP: 123/60 (06 Feb 2021 02:00) (112/58 - 134/61)  BP(mean): 86 (06 Feb 2021 02:00) (80 - 86)  RR: 16 (06 Feb 2021 02:00) (15 - 18)  SpO2: 99% (06 Feb 2021 02:00) (95% - 99%)    NEUROLOGICAL EXAMINATION:  awake, alert, oriented, speech clear, BINTA, intact remote memory, decreased recent memory, frontal release, EOMI, VFF, face symmetric, no pronator drift, no [place], finger-to-nose intact, intact strength, left hemibody decreased sensation (baseline), no LE extremity drift  EENT:  supple, occipital hematoma  CARDIOVASCULAR:  S1S2 noS3S4 noM  PULMONARY:  clear to bases  ABDOMEN:  soft, nontender, intact bowel sounds  EXTREMITIES:  warm, PPP  SKIN:  no manifestations    I/O's    02-05-21 @ 07:01  -  02-06-21 @ 07:00  --------------------------------------------------------  IN: 660 mL / OUT: 100 mL / NET: 560 mL    02-06-21 @ 07:01  -  02-06-21 @ 15:46  --------------------------------------------------------  IN: 320 mL / OUT: 0 mL / NET: 320 mL    LABS:                7.9 (9.7) ___  5.52  )-----------( 117      ( 06 Feb 2021 06:25 )             25.8     02-06    134<L>  |  98  |  37<H>  ----------------------------<  103<H>  4.6   |  25  |  1.18 (<--1.33)    Ca    9.9      06 Feb 2021 06:25  Phos  4.0     02-06  Mg     1.7     02-06    TPro  6.0  /  Alb  3.4  /  TBili  0.3  /  DBili  <0.2  /  AST  87<H>  /  ALT  250<H>  /  AlkPhos  315<H>  02-06    PT/INR - ( 05 Feb 2021 17:24 )   PT: 12.0 sec;   INR: 1.00     PTT - ( 05 Feb 2021 17:24 )  PTT:26.3 sec    Urinalysis Basic - ( 05 Feb 2021 18:37 )    Color: Yellow / Appearance: Clear / SG: >=1.030 / pH: x  Gluc: x / Ketone: Trace mg/dL  / Bili: Negative / Urobili: 0.2 E.U./dL   Blood: x / Protein: NEGATIVE mg/dL / Nitrite: NEGATIVE   Leuk Esterase: NEGATIVE / RBC: x / WBC x   Sq Epi: x / Non Sq Epi: x / Bacteria: x    CAPILLARY BLOOD GLUCOSE    POCT Blood Glucose.: 102 mg/dL (06 Feb 2021 11:22)  POCT Blood Glucose.: 161 mg/dL (06 Feb 2021 01:04)  POCT Blood Glucose.: 154 mg/dL (05 Feb 2021 23:06)    Culture - Urine (collected 02-05-21 @ 20:00)  Source: .Urine Clean Catch (Midstream)  Preliminary Report (02-06-21 @ 10:41):    No growth to date    02.06.2021 - CT follow-up pending _____    02.05.2021 - CT/CTA -  -hemorrhage in the medial aspect of left cerebral hemisphere extending into the rostrum of the corpus callosum and mid and lower portion of left frontal lobe. Large amount of posterior left scalp swelling. Patient is status post bifrontal craniotomy with aneurysm clip noted likely within the left anterior cerebral artery location. Aneurysm clip causes metallic artifact and poor visualization of regional portions of the brain.  There is faint visualization of the A1 segment on the left side. The A1 segment is not visualized on the right side. Very faint opacification of markedly diminished caliber of anterior cerebral arteries with possible segmental areas of occlusion involving the anterior cerebral arteries possibly etiological for hemorrhagic infarction within the anterior cerebral artery distribution on left side. There is some bowing to the right side of the anterior cerebral arteries due to the area of hemorrhage involving the medial cerebral hemispheres and rostrum of corpus callosum extending into left frontal lobe.    02.05.2021 - CT -  New left subarachnoid bleed identified within the paramedian left frontal lobe. There is also a new 2 mm right subdural hematoma and a small left lateral ventricle intraventricular bleed. Left scalp hematoma is also noted.  No cervical fracture, cervical spondylosis.    IV FLUIDS: [] SL  DRIPS:  []  LINES:  []  DRAINS:  []  WOUNDS:  []    CODE STATUS:  [Full]  GOALS OF CARE:  [Aggressive]  DISPOSITION:  [ICU]    *****

## 2021-02-06 NOTE — CONSULT NOTE ADULT - ATTENDING COMMENTS
Patient's daughter notes a long standing history of gait imbalance exacerbated when patient gets her recurrent UTIs.  She has 24 hr HHA.  The patient c/o positional vertigo for the last many months which may have contributed to fall.      Currently A&Ox3, no headaches/ neck stiffness, PERRLA, c/o vertigo w lateral eye movement but no nystagmus, no diplopia.  No UE drift, tone wnl, LE good bilateral movement and tone, plantars downgoing.      Imp/Plan: S/p head injury with small R SDH, left frontal lobe SAH, small left lateral IVH.      Continue on Keppra for seizure prophylaxis, continue monitoring with neuro checks.  OPD Estring for UTI prophylaxis.   Consider [facility] vestibular rehab.

## 2021-02-06 NOTE — PROGRESS NOTE ADULT - SUBJECTIVE AND OBJECTIVE BOX
HPI:  86 y/o F with pmh HTN, DM, HLD, hx aneurysm repair 1968 with "metal plates", hx lung cancer s/p L pneumonectomy, on ASA 81mg (last dose 9:30am) presents to ED s/p fall backwards onto a wooden floor with head trauma at 4:10pm. The fall was witnessed by patient's home aide who denies LOC. Patient does not remember falling today, but states that she fell 2 days ago and hit her head when getting up from the toilet with no LOC. Patient reports 4/10 in severity head pain and baseline full body left sided decreased sensation since her prior aneurysm repair. Patient's daughter reports a history of chronic bladder incontinence. Patient denies new extremity numbness or weakness, diplopia, blurry vision, dizziness, and headache or dizziness preceding her fall.   Patient is also currently being treated with Bactrim DS 1 tab BID x 10 days for klebsiella UTI with last dose scheduled for 2/7/21.    (05 Feb 2021 20:36)    Hospital Course:  2/6: RADHA overnight. Repeat head CT performed, stable. Neuro exam remains stable. CTA head/neck shows "diminished caliber of anterior cerebral arteries with possible segmental areas of occlusion involving the anterior cerebral arteries possibly etiological for hemorrhagic infarction within the anterior cerebral artery distribution on left side."     Vital Signs Last 24 Hrs  T(C): 36.8 (06 Feb 2021 00:00), Max: 36.8 (06 Feb 2021 00:00)  T(F): 98.3 (06 Feb 2021 00:00), Max: 98.3 (06 Feb 2021 00:00)  HR: 91 (06 Feb 2021 04:00) (91 - 115)  BP: 116/57 (06 Feb 2021 04:00) (112/58 - 134/61)  BP(mean): 82 (06 Feb 2021 04:00) (80 - 86)  RR: 16 (06 Feb 2021 04:00) (15 - 18)  SpO2: 95% (06 Feb 2021 04:00) (95% - 99%)    I&O's Summary    05 Feb 2021 07:01  -  06 Feb 2021 05:06  --------------------------------------------------------  IN: 320 mL / OUT: 0 mL / NET: 320 mL        PHYSICAL EXAM:  General: patient seen laying supine in bed in NAD  Neuro: AAOx3, FC, OE spontaneously, speech clear and fluent, CNII-XI grossly intact, face symmetric, no pronator drift, finger to nose intact, strength 5/5 b/l UE and LE, decreased sensation throughout left side of body (baseline)  HEENT: PERRL, EOMI, VFs intact b/l, large L parietal scalp hematoma with laceration covered in guaze and headwrap  Neck: supple  Cardiac: RRR, +S1S2  Pulmonary: chest rise symmetric  Abdomen: soft, nontender, nondistended  Ext: warm, perfusing well    TUBES/LINES:  [] Juarez  [] Lumbar Drain  [] Wound Drains  [] Others      DIET:  [x] NPO  [] Mechanical  [] Tube feeds    LABS:                        9.7    6.95  )-----------( 163      ( 05 Feb 2021 17:24 )             31.8     02-05    139  |  100  |  35<H>  ----------------------------<  151<H>  5.4<H>   |  26  |  1.33<H>    Ca    10.8<H>      05 Feb 2021 17:24    TPro  6.5  /  Alb  3.8  /  TBili  0.3  /  DBili  x   /  AST  121<H>  /  ALT  334<H>  /  AlkPhos  407<H>  02-05    PT/INR - ( 05 Feb 2021 17:24 )   PT: 12.0 sec;   INR: 1.00          PTT - ( 05 Feb 2021 17:24 )  PTT:26.3 sec  Urinalysis Basic - ( 05 Feb 2021 18:37 )    Color: Yellow / Appearance: Clear / SG: >=1.030 / pH: x  Gluc: x / Ketone: Trace mg/dL  / Bili: Negative / Urobili: 0.2 E.U./dL   Blood: x / Protein: NEGATIVE mg/dL / Nitrite: NEGATIVE   Leuk Esterase: NEGATIVE / RBC: x / WBC x   Sq Epi: x / Non Sq Epi: x / Bacteria: x          CAPILLARY BLOOD GLUCOSE      POCT Blood Glucose.: 161 mg/dL (06 Feb 2021 01:04)  POCT Blood Glucose.: 154 mg/dL (05 Feb 2021 23:06)      Drug Levels: [] N/A    CSF Analysis: [] N/A      Allergies    No Known Allergies    Intolerances      MEDICATIONS:  Antibiotics:  trimethoprim  160 mG/sulfamethoxazole 800 mG 1 Tablet(s) Oral every 12 hours    Neuro:  acetaminophen   Tablet .. 650 milliGRAM(s) Oral every 6 hours PRN  levETIRAcetam  IVPB 500 milliGRAM(s) IV Intermittent every 12 hours  ondansetron Injectable 4 milliGRAM(s) IV Push every 6 hours PRN  sertraline 50 milliGRAM(s) Oral daily    Anticoagulation:    OTHER:  atorvastatin 10 milliGRAM(s) Oral at bedtime  dextrose 40% Gel 15 Gram(s) Oral once  dextrose 50% Injectable 25 Gram(s) IV Push once  dextrose 50% Injectable 12.5 Gram(s) IV Push once  dextrose 50% Injectable 25 Gram(s) IV Push once  glucagon  Injectable 1 milliGRAM(s) IntraMuscular once  hydrALAZINE Injectable 10 milliGRAM(s) IV Push every 3 hours PRN  insulin lispro (ADMELOG) corrective regimen sliding scale   SubCutaneous Before meals and at bedtime  losartan 100 milliGRAM(s) Oral daily  pantoprazole    Tablet 40 milliGRAM(s) Oral before breakfast  senna 2 Tablet(s) Oral at bedtime    IVF:  cholecalciferol 5000 Unit(s) Oral daily  cyanocobalamin 250 MICROGram(s) Oral daily  dextrose 5%. 1000 milliLiter(s) IV Continuous <Continuous>  dextrose 5%. 1000 milliLiter(s) IV Continuous <Continuous>  folic acid 1 milliGRAM(s) Oral daily  sodium chloride 0.9%. 1000 milliLiter(s) IV Continuous <Continuous>    CULTURES:    RADIOLOGY & ADDITIONAL TESTS:      ASSESSMENT:  86 y/o F with pmh HTN, DM, HLD, hx aneurysm repair 1968 with "metal plates", hx lung cancer s/p L pneumonectomy, on ASA 81mg (last dose 9:30am 2/5/21) presenting s/p fall with posterior head trauma found to have traumatic L frontal lobe SAH, 2mm R SDH, and left lateral IVH    INTRACRANIAL HEMORRHAGE    No pertinent family history in first degree relatives    Handoff    MEWS Score    High cholesterol    Diabetes    HTN (hypertension)    Intracranial hemorrhage    Fall    High cholesterol    HTN (hypertension)    Diabetes    Intracranial hemorrhage    H/O cerebral aneurysm repair    Personal history of spine surgery    S/P cholecystectomy    S/P removal of lung    No significant past surgical history    HEAD INJURY    90+    Fall    Head injury    [email or web address]        PLAN:  NEURO:  -neuro/vital checks  -pain control  -continue keppra    CARDIOVASCULAR:  -SBP goal <140  -f/u echocardiogram    PULMONARY:  -room air    RENAL:  -IVF while NPO    GI:  -bowel regimen    HEME:  -H/H stable    ID:  -no issues  -continue bactrim for klebsiella UTI (started outpatient)    ENDO:  -ISS    DVT PROPHYLAXIS:  [x] Venodynes                                [] Heparin/Lovenox  F/u LE dopplers    DISPOSITION: ICU status, full code  D/w Dr. D'Amico and Dr. Gross

## 2021-02-06 NOTE — PHYSICAL THERAPY INITIAL EVALUATION ADULT - MANUAL MUSCLE TESTING RESULTS, REHAB EVAL
(B) UE grossly 3/5 upon functional assessment. (B) LEs 5/5 for each of the following: knee ext, knee flex, ankle DF, ankle PF.

## 2021-02-06 NOTE — PHYSICAL THERAPY INITIAL EVALUATION ADULT - LIVES WITH, PROFILE
I reviewed UC notes and diagnosis for Shingles on he right side of her face near her eye.  They already put in referral for Ophthalmology.  Patient states that now her eyelid is swollen.  I called  and got her an appt. with Dr. Nelson at 1:30 today.  Patient was advised and is OK with this.   24 hours home health aide

## 2021-02-06 NOTE — PHYSICAL THERAPY INITIAL EVALUATION ADULT - REHAB POTENTIAL, PT EVAL
Physical Therapy Daily Treatment Note     Name: Guillermo Castillo  Clinic Number: [id number]    Therapy Diagnosis:   Encounter Diagnoses   Name Primary?    Right elbow pain     Muscle weakness of right arm     Right shoulder pain, unspecified chronicity     Decreased  strength of right hand      Physician: Ruby Ortega NP    Visit Date: 8/15/2019    Physician Orders: PT Eval and Treat   Medical Diagnosis from Referral: Right elbow pain, right forearm pain  Evaluation Date: 7/31/2019  Authorization Period Expiration: 12/31/2019  Plan of Care Expiration: 8/30/2019  Visit # / Visits authorized: 5/25      Time In: 2:00  Time Out: 3:05  Total Billable Time: 44 minutes    Precautions: Standard    Subjective     Pt reports:  He reports that he is still having increased pain, slightly better after last tx session.    He was compliant with home exercise program.  Response to previous treatment: Increased pain after last tx session.  Functional change: None    Pain:  3-4/10  Location: right elbow      Objective     Guillermo received therapeutic exercises to develop ROM for 19 minutes including:    Wrist flexion with elbow flexed and then elbow extended 3/10 - each  Wrist palm to body with elbow flexed and then elbow extended 3/10 - each  Foam roll stretch for pectorals 3' pain after  LT iso with 1/2 roll 2'  Scap retract iso with 1/2 roll 2'       Guillermo received the following manual therapy techniques of soft tissue mobilization/manual to the: R upper quadrant for 10 minutes, including: subscapular releases, UT/levator scap, brachialis and scapular tilting.  FDN with and without estim to ERL/ECRB, brachioradialis, biceps along with STM to area as well x 15 min     Guillermo received the following supervised modalities after being cleared for contradictions: IFC Electrical Stimulation:  Guillermo received IFC Electrical Stimulation for pain control applied to the R elbow. Pt received stimulation at 40 % scan at a frequency  of  for 15 minutes. Guillermo tolerated treatment well without any adverse effects.      Guillermo received cold pack for 15 minutes to R elbow.      Home Exercises Provided and Patient Education Provided     Education provided:   - con't with HEP, posture    Written Home Exercises Provided: Patient instructed to cont prior HEP.  Exercises were reviewed and Guillermo was able to demonstrate them prior to the end of the session.  Guillermo demonstrated good  understanding of the education provided.     See EMR under Patient Instructions for exercises provided prior visit.    Assessment     Pt with improved sx's after last visit but still flared up, increased mm tone noted ECRL/ECRB, brachialis and biceps today.    Guillermo is progressing well towards his goals.   Pt prognosis is Good.     Pt will continue to benefit from skilled outpatient physical therapy to address the deficits listed in the problem list box on initial evaluation, provide pt/family education and to maximize pt's level of independence in the home and community environment.     Pt's spiritual, cultural and educational needs considered and pt agreeable to plan of care and goals.     Anticipated barriers to physical therapy: history of elbow injury, pain    Goals:   Short Term Goals: In 3 weeks   1.Pt to be educated on HEP.  2.Patient to increase wrist ROM to WNL, without pain.  3.Patient to increase RUE strength by 1/2 grade.  4.Patient to have pain less than 5/10 at worst.  5.Patient to score 59/80 or higher on the UEFS.  6. Pt to be educated on body mechanics awareness.     Long Term Goals: In 6 weeks  1. Patient to score 68/80 or higher on the UEFS.  2. Patient to demo increase in UE strength to 5/5.  3. Patient to have decreased pain to 0/10 at all times.  5. Patient to perform daily activities including lifting loads and required work duties without limitation.  6. Pt to demo negative Impingement testing at R shoulder.      Plan     Monitor response to  todays tx and modify program PRN, if pt con't to have increased pain next week will refer to orthopedics for consult.    Aniya Martinez, SPT  Екатерина Donovan, PT    good, to achieve stated therapy goals

## 2021-02-06 NOTE — PHYSICAL THERAPY INITIAL EVALUATION ADULT - PERTINENT HX OF CURRENT PROBLEM, REHAB EVAL
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

## 2021-02-06 NOTE — OCCUPATIONAL THERAPY INITIAL EVALUATION ADULT - PLANNED THERAPY INTERVENTIONS, OT EVAL
ADL retraining/IADL retraining/balance training/bed mobility training/cognitive, visual perceptual/fine motor coordination training/motor coordination training/neuromuscular re-education/ROM/strengthening/transfer training

## 2021-02-06 NOTE — PROGRESS NOTE ADULT - SUBJECTIVE AND OBJECTIVE BOX
=================================================   NEUROCRITICAL CARE ATTENDING NOTE (SATURDAY, FEBRUARY 6, 2021)  =================================================    NAME:  [FERNANDO ASHBY]  MRN:  [[id number]]    86 y/o F with pmh HTN, DM, HLD, hx aneurysm repair 1968 with "metal plates", hx lung cancer s/p L pneumonectomy, on ASA 81mg (last dose 9:30am) presents to ED s/p fall backwards onto a wooden floor with head trauma at 4:10pm. The fall was witnessed by patient's home aide who denies LOC. Patient does not remember falling today, but states that she fell 2 days ago and hit her head when getting up from the toilet with no LOC. Patient reports 4/10 in severity head pain and baseline full body left sided decreased sensation since her prior aneurysm repair. Patient's daughter reports a history of chronic bladder incontinence. Patient denies new extremity numbness or weakness, diplopia, blurry vision, dizziness, and headache or dizziness preceding her fall.   Patient is also currently being treated with Bactrim DS 1 tab BID x 10 days for klebsiella UTI with last dose scheduled for 2/7/21.       Past Medical History:  High cholesterol  Diabetes  HTN (hypertension)  H/O cerebral aneurysm repair 1968  Personal history of spine surgery  S/P cholecystectomy  S/P removal of lung  left, 2000 partial and 2004 complete    Allergies:  None known    Home Meds:   aspirin 81 mg oral tablet:  (05 Feb 2021 21:05)  Bactrim  mg-160 mg oral tablet: 1 tab(s) orally every 12 hours x 10 days, klebsiella UTI, last dose 2/7 (05 Feb 2021 21:05)  Centrum:  (05 Feb 2021 21:05)  Claritin:  (05 Feb 2021 21:05)  Colace 100 mg oral capsule: 1 cap(s) orally once a day (at bedtime) (05 Feb 2021 21:05)  folic acid 1 mg oral tablet: 1 tab(s) orally once a day (05 Feb 2021 21:05)  Lipitor 10 mg oral tablet: 1 tab(s) orally once a day (05 Feb 2021 21:05)  losartan 100 mg oral tablet: 1 tab(s) orally once a day (05 Feb 2021 21:05)  metFORMIN 500 mg oral tablet: 500  orally 2 times a day (05 Feb 2021 21:05)  omeprazole 20 mg oral delayed release capsule:  (05 Feb 2021 21:05)  sertraline 50 mg oral tablet: 1 tab(s) orally once a day (05 Feb 2021 21:05)  Vitamin B12 250 mcg oral tablet:  (05 Feb 2021 21:05)  Vitamin D3: 5000u  orally (05 Feb 2021 21:05)    Current Meds:  MEDICATIONS  (STANDING):  atorvastatin 10 milliGRAM(s) Oral at bedtime  cholecalciferol 5000 Unit(s) Oral daily  cyanocobalamin 250 MICROGram(s) Oral daily  folic acid 1 milliGRAM(s) Oral daily  insulin lispro (ADMELOG) corrective regimen sliding scale   SubCutaneous Before meals and at bedtime  levETIRAcetam  IVPB 500 milliGRAM(s) IV Intermittent every 12 hours  losartan 100 milliGRAM(s) Oral daily  pantoprazole    Tablet 40 milliGRAM(s) Oral before breakfast  senna 2 Tablet(s) Oral at bedtime  sertraline 50 milliGRAM(s) Oral daily  sodium chloride 0.9%. 1000 milliLiter(s) (80 mL/Hr) IV Continuous <Continuous>  trimethoprim  160 mG/sulfamethoxazole 800 mG 1 Tablet(s) Oral every 12 hours    MEDICATIONS  (PRN):  acetaminophen   Tablet .. 650 milliGRAM(s) Oral every 6 hours PRN Temp greater or equal to 38.5C (101.3F), Mild Pain (1 - 3)  hydrALAZINE Injectable 10 milliGRAM(s) IV Push every 3 hours PRN SBP>140  ondansetron Injectable 4 milliGRAM(s) IV Push every 6 hours PRN Nausea and/or Vomiting    PHYSICAL EXAMINATION    ICU Vital Signs Last 24 Hrs  T(C): 36.8 (06 Feb 2021 00:00), Max: 36.8 (06 Feb 2021 00:00)  T(F): 98.3 (06 Feb 2021 00:00), Max: 98.3 (06 Feb 2021 00:00)  HR: 103 (06 Feb 2021 02:00) (103 - 115)  BP: 123/60 (06 Feb 2021 02:00) (112/58 - 134/61)  BP(mean): 86 (06 Feb 2021 02:00) (80 - 86)  RR: 16 (06 Feb 2021 02:00) (15 - 18)  SpO2: 99% (06 Feb 2021 02:00) (95% - 99%)    NEUROLOGICAL EXAMINATION:  []  GENERAL:  []  EENT:  []  CARDIOVASCULAR:  []  PULMONARY:  []  ABDOMEN:  []  EXTREMITIES:  []  SKIN:  []    Physical Exam: General: patient seen laying supine in bed in NAD  Neuro: AAOx3, FC, OE spontaneously, speech clear and fluent, CNII-XI grossly intact, face symmetric, no pronator drift, finger to nose intact, strength 5/5 b/l UE and LE, decreased sensation throughout left side of body (baseline)  HEENT: PERRL, EOMI, VFs intact b/l, large L parietal scalp hematoma with laceration covered in dried blood  Neck: supple  Cardiac: RRR, S1S2  Pulmonary: chest rise symmetric  Abdomen: soft, nontender, nondistended  Ext: warm, perfusing well    I/O's    02-05-21 @ 07:01  -  02-06-21 @ 03:24  --------------------------------------------------------  IN: 320 mL / OUT: 0 mL / NET: 320 mL    LABS:                        9.7    6.95  )-----------( 163      ( 05 Feb 2021 17:24 )             31.8     02-05    139  |  100  |  35<H>  ----------------------------<  151<H>  5.4<H>   |  26  |  1.33<H>    Ca    10.8<H>      05 Feb 2021 17:24    TPro  6.5  /  Alb  3.8  /  TBili  0.3  /  DBili  x   /  AST  121<H>  /  ALT  334<H>  /  AlkPhos  407<H>  02-05    PT/INR - ( 05 Feb 2021 17:24 )   PT: 12.0 sec;   INR: 1.00     PTT - ( 05 Feb 2021 17:24 )  PTT:26.3 sec    Urinalysis Basic - ( 05 Feb 2021 18:37 )    Color: Yellow / Appearance: Clear / SG: >=1.030 / pH: x  Gluc: x / Ketone: Trace mg/dL  / Bili: Negative / Urobili: 0.2 E.U./dL   Blood: x / Protein: NEGATIVE mg/dL / Nitrite: NEGATIVE   Leuk Esterase: NEGATIVE / RBC: x / WBC x   Sq Epi: x / Non Sq Epi: x / Bacteria: x    CAPILLARY BLOOD GLUCOSE    POCT Blood Glucose.: 161 mg/dL (06 Feb 2021 01:04)  POCT Blood Glucose.: 154 mg/dL (05 Feb 2021 23:06)    02.05.2021 - CT/CTA -  -hemorrhage in the medial aspect of left cerebral hemisphere extending into the rostrum of the corpus callosum and mid and lower portion of left frontal lobe. Large amount of posterior left scalp swelling. Patient is status post bifrontal craniotomy with aneurysmclip noted likely within the left anterior cerebral artery location. Aneurysm clip causes metallic artifact and poor visualization of regional portions of the brain.  There is faint visualization of the A1 segment on the left side. The A1 segmentis not visualized on the right side. Very faint opacification of markedly diminished caliber of anterior cerebral arteries with possible segmental areas of occlusion involving the anterior cerebral arteries possibly etiological for hemorrhagic infarction within the anterior cerebral artery distribution on left side. There is some bowing to the right side of the anterior cerebral arteries due to the area of hemorrhage involving the medial cerebral hemispheres and rostrum of corpus callosum extending into left frontal lobe.    02.05.2021 - CT -  New left subarachnoid bleed identified within the paramedian left frontal lobe. There is also a new 2 mm right subdural hematoma and a small left lateral ventricle intraventricular bleed. Left scalp hematoma is also noted.  No cervical fracture, cervical spondylosis.    IV FLUIDS:  []  DRIPS:  []  LINES:  []  DRAINS:  []  WOUNDS:  []    CODE STATUS:  [Full]  GOALS OF CARE:  [Aggressive]  DISPOSITION:  [ICU]    ***** =================================================   NEUROCRITICAL CARE ATTENDING NOTE (SATURDAY, FEBRUARY 6, 2021)  =================================================    NAME:  [FERNANDO ASHBY]  MRN:  [[id number]]    86 y/o F with pmh HTN, DM, HLD, hx aneurysm repair 1968 with "metal plates", hx lung cancer s/p L pneumonectomy, on ASA 81mg (last dose 9:30am 2/5/21) presenting s/p fall with posterior head trauma found to have traumatic L frontal lobe SAH, 2mm R SDH, and left lateral IVH.   Underlying L LEBRON stenosis.    HPI:  86 y/o F with pmh HTN, DM, HLD, hx aneurysm repair 1968 with "metal plates", hx lung cancer s/p L pneumonectomy, on ASA 81mg (last dose 9:30am) presents to ED s/p fall backwards onto a wooden floor with head trauma at 4:10pm. The fall was witnessed by patient's home aide who denies LOC. Patient does not remember falling today, but states that she fell 2 days ago and hit her head when getting up from the toilet with no LOC. Patient reports 4/10 in severity head pain and baseline full body left sided decreased sensation since her prior aneurysm repair. Patient's daughter reports a history of chronic bladder incontinence. Patient denies new extremity numbness or weakness, diplopia, blurry vision, dizziness, and headache or dizziness preceding her fall.   Patient is also currently being treated with Bactrim DS 1 tab BID x 10 days for klebsiella UTI with last dose scheduled for 2/7/21.       Past Medical History:  High cholesterol  Diabetes  HTN (hypertension)  H/O cerebral aneurysm repair 1968  Personal history of spine surgery  S/P cholecystectomy  S/P removal of lung  left, 2000 partial and 2004 complete    Allergies:  None known    Home Meds:   aspirin 81 mg oral tablet:  (05 Feb 2021 21:05)  Bactrim  mg-160 mg oral tablet: 1 tab(s) orally every 12 hours x 10 days, klebsiella UTI, last dose 2/7 (05 Feb 2021 21:05)  Centrum:  (05 Feb 2021 21:05)  Claritin:  (05 Feb 2021 21:05)  Colace 100 mg oral capsule: 1 cap(s) orally once a day (at bedtime) (05 Feb 2021 21:05)  folic acid 1 mg oral tablet: 1 tab(s) orally once a day (05 Feb 2021 21:05)  Lipitor 10 mg oral tablet: 1 tab(s) orally once a day (05 Feb 2021 21:05)  losartan 100 mg oral tablet: 1 tab(s) orally once a day (05 Feb 2021 21:05)  metFORMIN 500 mg oral tablet: 500  orally 2 times a day (05 Feb 2021 21:05)  omeprazole 20 mg oral delayed release capsule:  (05 Feb 2021 21:05)  sertraline 50 mg oral tablet: 1 tab(s) orally once a day (05 Feb 2021 21:05)  Vitamin B12 250 mcg oral tablet:  (05 Feb 2021 21:05)  Vitamin D3: 5000u  orally (05 Feb 2021 21:05)    Current Meds:  MEDICATIONS  (STANDING):  atorvastatin 10 milliGRAM(s) Oral at bedtime  cholecalciferol 5000 Unit(s) Oral daily  cyanocobalamin 250 MICROGram(s) Oral daily  folic acid 1 milliGRAM(s) Oral daily  insulin lispro (ADMELOG) corrective regimen sliding scale   SubCutaneous Before meals and at bedtime  levETIRAcetam  IVPB 500 milliGRAM(s) IV Intermittent every 12 hours  losartan 100 milliGRAM(s) Oral daily  pantoprazole    Tablet 40 milliGRAM(s) Oral before breakfast  senna 2 Tablet(s) Oral at bedtime  sertraline 50 milliGRAM(s) Oral daily  sodium chloride 0.9%. 1000 milliLiter(s) (80 mL/Hr) IV Continuous <Continuous>  trimethoprim  160 mG/sulfamethoxazole 800 mG 1 Tablet(s) Oral every 12 hours    MEDICATIONS  (PRN):  acetaminophen   Tablet .. 650 milliGRAM(s) Oral every 6 hours PRN Temp greater or equal to 38.5C (101.3F), Mild Pain (1 - 3)  hydrALAZINE Injectable 10 milliGRAM(s) IV Push every 3 hours PRN SBP>140  ondansetron Injectable 4 milliGRAM(s) IV Push every 6 hours PRN Nausea and/or Vomiting    PHYSICAL EXAMINATION    ICU Vital Signs Last 24 Hrs  T(C): 36.8 (06 Feb 2021 00:00), Max: 36.8 (06 Feb 2021 00:00)  T(F): 98.3 (06 Feb 2021 00:00), Max: 98.3 (06 Feb 2021 00:00)  HR: 103 (06 Feb 2021 02:00) (103 - 115)  BP: 123/60 (06 Feb 2021 02:00) (112/58 - 134/61)  BP(mean): 86 (06 Feb 2021 02:00) (80 - 86)  RR: 16 (06 Feb 2021 02:00) (15 - 18)  SpO2: 99% (06 Feb 2021 02:00) (95% - 99%)    NEUROLOGICAL EXAMINATION:  []  GENERAL:  []  EENT:  []  CARDIOVASCULAR:  []  PULMONARY:  []  ABDOMEN:  []  EXTREMITIES:  []  SKIN:  []    Physical Exam: General: patient seen laying supine in bed in NAD  Neuro: AAOx3, FC, OE spontaneously, speech clear and fluent, CNII-XI grossly intact, face symmetric, no pronator drift, finger to nose intact, strength 5/5 b/l UE and LE, decreased sensation throughout left side of body (baseline)  HEENT: PERRL, EOMI, VFs intact b/l, large L parietal scalp hematoma with laceration covered in dried blood  Neck: supple  Cardiac: RRR, S1S2  Pulmonary: chest rise symmetric  Abdomen: soft, nontender, nondistended  Ext: warm, perfusing well    I/O's    02-05-21 @ 07:01  -  02-06-21 @ 03:24  --------------------------------------------------------  IN: 320 mL / OUT: 0 mL / NET: 320 mL    LABS:                        9.7    6.95  )-----------( 163      ( 05 Feb 2021 17:24 )             31.8     02-05    139  |  100  |  35<H>  ----------------------------<  151<H>  5.4<H>   |  26  |  1.33<H>    Ca    10.8<H>      05 Feb 2021 17:24    TPro  6.5  /  Alb  3.8  /  TBili  0.3  /  DBili  x   /  AST  121<H>  /  ALT  334<H>  /  AlkPhos  407<H>  02-05    PT/INR - ( 05 Feb 2021 17:24 )   PT: 12.0 sec;   INR: 1.00     PTT - ( 05 Feb 2021 17:24 )  PTT:26.3 sec    Urinalysis Basic - ( 05 Feb 2021 18:37 )    Color: Yellow / Appearance: Clear / SG: >=1.030 / pH: x  Gluc: x / Ketone: Trace mg/dL  / Bili: Negative / Urobili: 0.2 E.U./dL   Blood: x / Protein: NEGATIVE mg/dL / Nitrite: NEGATIVE   Leuk Esterase: NEGATIVE / RBC: x / WBC x   Sq Epi: x / Non Sq Epi: x / Bacteria: x    CAPILLARY BLOOD GLUCOSE    POCT Blood Glucose.: 161 mg/dL (06 Feb 2021 01:04)  POCT Blood Glucose.: 154 mg/dL (05 Feb 2021 23:06)    02.05.2021 - CT/CTA -  -hemorrhage in the medial aspect of left cerebral hemisphere extending into the rostrum of the corpus callosum and mid and lower portion of left frontal lobe. Large amount of posterior left scalp swelling. Patient is status post bifrontal craniotomy with aneurysmclip noted likely within the left anterior cerebral artery location. Aneurysm clip causes metallic artifact and poor visualization of regional portions of the brain.  There is faint visualization of the A1 segment on the left side. The A1 segmentis not visualized on the right side. Very faint opacification of markedly diminished caliber of anterior cerebral arteries with possible segmental areas of occlusion involving the anterior cerebral arteries possibly etiological for hemorrhagic infarction within the anterior cerebral artery distribution on left side. There is some bowing to the right side of the anterior cerebral arteries due to the area of hemorrhage involving the medial cerebral hemispheres and rostrum of corpus callosum extending into left frontal lobe.    02.05.2021 - CT -  New left subarachnoid bleed identified within the paramedian left frontal lobe. There is also a new 2 mm right subdural hematoma and a small left lateral ventricle intraventricular bleed. Left scalp hematoma is also noted.  No cervical fracture, cervical spondylosis.    IV FLUIDS:  NS   DRIPS:  []  LINES:  []  DRAINS:  []  WOUNDS:  []    CODE STATUS:  [Full]  GOALS OF CARE:  [Aggressive]  DISPOSITION:  [ICU]    ***** =================================================   NEUROCRITICAL CARE ATTENDING NOTE (SATURDAY, FEBRUARY 6, 2021)  =================================================    NAME:  [FERNANDO ASHBY]  MRN:  [[id number]]    86 y/o F with pmh HTN, DM, HLD, hx aneurysm repair 1968 with "metal plates", hx lung cancer s/p L pneumonectomy, on ASA 81mg (last dose 9:30am 2/5/21) presenting s/p fall with posterior head trauma found to have traumatic L frontal lobe SAH, 2mm R SDH, and left lateral IVH.   Underlying L LEBRON stenosis.    HPI:  86 y/o F with pmh HTN, DM, HLD, hx aneurysm repair 1968 with "metal plates", hx lung cancer s/p L pneumonectomy, on ASA 81mg (last dose 9:30am) presents to ED s/p fall backwards onto a wooden floor with head trauma at 4:10pm. The fall was witnessed by patient's home aide who denies LOC. Patient does not remember falling today, but states that she fell 2 days ago and hit her head when getting up from the toilet with no LOC. Patient reports 4/10 in severity head pain and baseline full body left sided decreased sensation since her prior aneurysm repair. Patient's daughter reports a history of chronic bladder incontinence. Patient denies new extremity numbness or weakness, diplopia, blurry vision, dizziness, and headache or dizziness preceding her fall.   Patient is also currently being treated with Bactrim DS 1 tab BID x 10 days for klebsiella UTI with last dose scheduled for 2/7/21.       Past Medical History:  High cholesterol  Diabetes  HTN (hypertension)  H/O cerebral aneurysm repair 1968  Personal history of spine surgery  S/P cholecystectomy  S/P removal of lung  left, 2000 partial and 2004 complete    Allergies:  None known    Home Meds:   aspirin 81 mg oral tablet:  (05 Feb 2021 21:05)  Bactrim  mg-160 mg oral tablet: 1 tab(s) orally every 12 hours x 10 days, klebsiella UTI, last dose 2/7 (05 Feb 2021 21:05)  Centrum:  (05 Feb 2021 21:05)  Claritin:  (05 Feb 2021 21:05)  Colace 100 mg oral capsule: 1 cap(s) orally once a day (at bedtime) (05 Feb 2021 21:05)  folic acid 1 mg oral tablet: 1 tab(s) orally once a day (05 Feb 2021 21:05)  Lipitor 10 mg oral tablet: 1 tab(s) orally once a day (05 Feb 2021 21:05)  losartan 100 mg oral tablet: 1 tab(s) orally once a day (05 Feb 2021 21:05)  metFORMIN 500 mg oral tablet: 500  orally 2 times a day (05 Feb 2021 21:05)  omeprazole 20 mg oral delayed release capsule:  (05 Feb 2021 21:05)  sertraline 50 mg oral tablet: 1 tab(s) orally once a day (05 Feb 2021 21:05)  Vitamin B12 250 mcg oral tablet:  (05 Feb 2021 21:05)  Vitamin D3: 5000u  orally (05 Feb 2021 21:05)    Current Meds:  MEDICATIONS  (STANDING):  atorvastatin 10 milliGRAM(s) Oral at bedtime  cholecalciferol 5000 Unit(s) Oral daily  cyanocobalamin 250 MICROGram(s) Oral daily  folic acid 1 milliGRAM(s) Oral daily  insulin lispro (ADMELOG) corrective regimen sliding scale   SubCutaneous Before meals and at bedtime  levETIRAcetam  IVPB 500 milliGRAM(s) IV Intermittent every 12 hours  losartan 100 milliGRAM(s) Oral daily  pantoprazole    Tablet 40 milliGRAM(s) Oral before breakfast  senna 2 Tablet(s) Oral at bedtime  sertraline 50 milliGRAM(s) Oral daily  sodium chloride 0.9%. 1000 milliLiter(s) (80 mL/Hr) IV Continuous <Continuous>  trimethoprim  160 mG/sulfamethoxazole 800 mG 1 Tablet(s) Oral every 12 hours    MEDICATIONS  (PRN):  acetaminophen   Tablet .. 650 milliGRAM(s) Oral every 6 hours PRN Temp greater or equal to 38.5C (101.3F), Mild Pain (1 - 3)  hydrALAZINE Injectable 10 milliGRAM(s) IV Push every 3 hours PRN SBP>140  ondansetron Injectable 4 milliGRAM(s) IV Push every 6 hours PRN Nausea and/or Vomiting    PHYSICAL EXAMINATION    ICU Vital Signs Last 24 Hrs  T(C): 36.8 (06 Feb 2021 00:00), Max: 36.8 (06 Feb 2021 00:00)  T(F): 98.3 (06 Feb 2021 00:00), Max: 98.3 (06 Feb 2021 00:00)  HR: 103 (06 Feb 2021 02:00) (103 - 115)  BP: 123/60 (06 Feb 2021 02:00) (112/58 - 134/61)  BP(mean): 86 (06 Feb 2021 02:00) (80 - 86)  RR: 16 (06 Feb 2021 02:00) (15 - 18)  SpO2: 99% (06 Feb 2021 02:00) (95% - 99%)    NEUROLOGICAL EXAMINATION:  []  GENERAL:  []  EENT:  []  CARDIOVASCULAR:  []  PULMONARY:  []  ABDOMEN:  []  EXTREMITIES:  []  SKIN:  []    Physical Exam: General: patient seen laying supine in bed in NAD  Neuro: AAOx3, FC, OE spontaneously, speech clear and fluent, CNII-XI grossly intact, face symmetric, no pronator drift, finger to nose intact, strength 5/5 b/l UE and LE, decreased sensation throughout left side of body (baseline)  HEENT: PERRL, EOMI, VFs intact b/l, large L parietal scalp hematoma with laceration covered in dried blood  Neck: supple  Cardiac: RRR, S1S2  Pulmonary: chest rise symmetric  Abdomen: soft, nontender, nondistended  Ext: warm, perfusing well    I/O's    02-05-21 @ 07:01  -  02-06-21 @ 06:51  --------------------------------------------------------  IN: 320 mL / OUT: 0 mL / NET: 320 mL    LABS:               7.8    5.52  )-----------( 117      ( 06 Feb 2021 06:25 )             25.8     02-06    x   |  98  |  x   ----------------------------<  x   4.6   |  25  |  1.18    Ca    9.9      06 Feb 2021 06:25  Phos  4.0     02-06  Mg     1.7     02-06    TPro  6.5  /  Alb  3.8  /  TBili  0.3  /  DBili  x   /  AST  121<H>  /  ALT  334<H>  /  AlkPhos  407<H>  02-05    PT/INR - ( 05 Feb 2021 17:24 )   PT: 12.0 sec;   INR: 1.00     PTT - ( 05 Feb 2021 17:24 )  PTT:26.3 sec    Urinalysis Basic - ( 05 Feb 2021 18:37 )    Color: Yellow / Appearance: Clear / SG: >=1.030 / pH: x  Gluc: x / Ketone: Trace mg/dL  / Bili: Negative / Urobili: 0.2 E.U./dL   Blood: x / Protein: NEGATIVE mg/dL / Nitrite: NEGATIVE   Leuk Esterase: NEGATIVE / RBC: x / WBC x   Sq Epi: x / Non Sq Epi: x / Bacteria: x    CAPILLARY BLOOD GLUCOSE    POCT Blood Glucose.: 161 mg/dL (06 Feb 2021 01:04)  POCT Blood Glucose.: 154 mg/dL (05 Feb 2021 23:06)    02.05.2021 - CT/CTA -  -hemorrhage in the medial aspect of left cerebral hemisphere extending into the rostrum of the corpus callosum and mid and lower portion of left frontal lobe. Large amount of posterior left scalp swelling. Patient is status post bifrontal craniotomy with aneurysm clip noted likely within the left anterior cerebral artery location. Aneurysm clip causes metallic artifact and poor visualization of regional portions of the brain.  There is faint visualization of the A1 segment on the left side. The A1 segment is not visualized on the right side. Very faint opacification of markedly diminished caliber of anterior cerebral arteries with possible segmental areas of occlusion involving the anterior cerebral arteries possibly etiological for hemorrhagic infarction within the anterior cerebral artery distribution on left side. There is some bowing to the right side of the anterior cerebral arteries due to the area of hemorrhage involving the medial cerebral hemispheres and rostrum of corpus callosum extending into left frontal lobe.    02.05.2021 - CT -  New left subarachnoid bleed identified within the paramedian left frontal lobe. There is also a new 2 mm right subdural hematoma and a small left lateral ventricle intraventricular bleed. Left scalp hematoma is also noted.  No cervical fracture, cervical spondylosis.    IV FLUIDS:  NS   DRIPS:  []  LINES:  []  DRAINS:  []  WOUNDS:  []    CODE STATUS:  [Full]  GOALS OF CARE:  [Aggressive]  DISPOSITION:  [ICU]    ***** =================================================   NEUROCRITICAL CARE ATTENDING NOTE (SATURDAY, FEBRUARY 6, 2021)  =================================================    NAME:  [FERNANDO ASHBY]  MRN:  [[id number]]    86 y/o F with pmh HTN, DM, HLD, hx aneurysm repair 1968 with "metal plates", hx lung cancer s/p L pneumonectomy, on ASA 81mg (last dose 9:30am 2/5/21) presenting s/p fall with posterior head trauma found to have traumatic L frontal lobe SAH, 2mm R SDH, and left lateral IVH.   Underlying L LEBRON stenosis.    HPI:  86 y/o F with pmh HTN, DM, HLD, hx aneurysm repair 1968 with "metal plates", hx lung cancer s/p L pneumonectomy, on ASA 81mg (last dose 9:30am) presents to ED s/p fall backwards onto a wooden floor with head trauma at 4:10pm. The fall was witnessed by patient's home aide who denies LOC. Patient does not remember falling today, but states that she fell 2 days ago and hit her head when getting up from the toilet with no LOC. Patient reports 4/10 in severity head pain and baseline full body left sided decreased sensation since her prior aneurysm repair. Patient's daughter reports a history of chronic bladder incontinence. Patient denies new extremity numbness or weakness, diplopia, blurry vision, dizziness, and headache or dizziness preceding her fall.   Patient is also currently being treated with Bactrim DS 1 tab BID x 10 days for klebsiella UTI with last dose scheduled for 2/7/21.       Past Medical History:  High cholesterol  Diabetes  HTN (hypertension)  H/O cerebral aneurysm repair 1968  Personal history of spine surgery  S/P cholecystectomy  S/P removal of lung  left, 2000 partial and 2004 complete    Allergies:  None known    Home Meds:   aspirin 81 mg oral tablet:  (05 Feb 2021 21:05)  Bactrim  mg-160 mg oral tablet: 1 tab(s) orally every 12 hours x 10 days, klebsiella UTI, last dose 2/7 (05 Feb 2021 21:05)  Centrum:  (05 Feb 2021 21:05)  Claritin:  (05 Feb 2021 21:05)  Colace 100 mg oral capsule: 1 cap(s) orally once a day (at bedtime) (05 Feb 2021 21:05)  folic acid 1 mg oral tablet: 1 tab(s) orally once a day (05 Feb 2021 21:05)  Lipitor 10 mg oral tablet: 1 tab(s) orally once a day (05 Feb 2021 21:05)  losartan 100 mg oral tablet: 1 tab(s) orally once a day (05 Feb 2021 21:05)  metFORMIN 500 mg oral tablet: 500  orally 2 times a day (05 Feb 2021 21:05)  omeprazole 20 mg oral delayed release capsule:  (05 Feb 2021 21:05)  sertraline 50 mg oral tablet: 1 tab(s) orally once a day (05 Feb 2021 21:05)  Vitamin B12 250 mcg oral tablet:  (05 Feb 2021 21:05)  Vitamin D3: 5000u  orally (05 Feb 2021 21:05)    Current Meds:  MEDICATIONS  (STANDING):  atorvastatin 10 milliGRAM(s) Oral at bedtime  cholecalciferol 5000 Unit(s) Oral daily  cyanocobalamin 250 MICROGram(s) Oral daily  folic acid 1 milliGRAM(s) Oral daily  insulin lispro (ADMELOG) corrective regimen sliding scale   SubCutaneous Before meals and at bedtime  levETIRAcetam  IVPB 500 milliGRAM(s) IV Intermittent every 12 hours  losartan 100 milliGRAM(s) Oral daily  pantoprazole    Tablet 40 milliGRAM(s) Oral before breakfast  senna 2 Tablet(s) Oral at bedtime  sertraline 50 milliGRAM(s) Oral daily  sodium chloride 0.9%. 1000 milliLiter(s) (80 mL/Hr) IV Continuous <Continuous>  trimethoprim  160 mG/sulfamethoxazole 800 mG 1 Tablet(s) Oral every 12 hours    MEDICATIONS  (PRN):  acetaminophen   Tablet .. 650 milliGRAM(s) Oral every 6 hours PRN Temp greater or equal to 38.5C (101.3F), Mild Pain (1 - 3)  hydrALAZINE Injectable 10 milliGRAM(s) IV Push every 3 hours PRN SBP>140  ondansetron Injectable 4 milliGRAM(s) IV Push every 6 hours PRN Nausea and/or Vomiting    PHYSICAL EXAMINATION    ICU Vital Signs Last 24 Hrs  T(C): 36.8 (06 Feb 2021 00:00), Max: 36.8 (06 Feb 2021 00:00)  T(F): 98.3 (06 Feb 2021 00:00), Max: 98.3 (06 Feb 2021 00:00)  HR: 103 (06 Feb 2021 02:00) (103 - 115)  BP: 123/60 (06 Feb 2021 02:00) (112/58 - 134/61)  BP(mean): 86 (06 Feb 2021 02:00) (80 - 86)  RR: 16 (06 Feb 2021 02:00) (15 - 18)  SpO2: 99% (06 Feb 2021 02:00) (95% - 99%)    NEUROLOGICAL EXAMINATION:  awake, alert, oriented, speech clear, BINTA, intact remote memory, decreased recent memory, frontal release, EOMI, VFF, face symmetric, no pronator drift, no Margaret, finger-to-nose intact, intact strength, left hemibody decreased sensation (baseline), no LE extremity drift  EENT:  supple, occipital hematoma  CARDIOVASCULAR:  S1S2 noS3S4 noM  PULMONARY:  clear to bases  ABDOMEN:  soft, nontender, intact bowel sounds  EXTREMITIES:  warm, PPP  SKIN:  no manifestations    I/O's    02-05-21 @ 07:01  -  02-06-21 @ 07:00  --------------------------------------------------------  IN: 660 mL / OUT: 100 mL / NET: 560 mL    02-06-21 @ 07:01  -  02-06-21 @ 15:46  --------------------------------------------------------  IN: 320 mL / OUT: 0 mL / NET: 320 mL    LABS:                7.9 (9.7) ___  5.52  )-----------( 117      ( 06 Feb 2021 06:25 )             25.8     02-06    134<L>  |  98  |  37<H>  ----------------------------<  103<H>  4.6   |  25  |  1.18 (<--1.33)    Ca    9.9      06 Feb 2021 06:25  Phos  4.0     02-06  Mg     1.7     02-06    TPro  6.0  /  Alb  3.4  /  TBili  0.3  /  DBili  <0.2  /  AST  87<H>  /  ALT  250<H>  /  AlkPhos  315<H>  02-06    PT/INR - ( 05 Feb 2021 17:24 )   PT: 12.0 sec;   INR: 1.00     PTT - ( 05 Feb 2021 17:24 )  PTT:26.3 sec    Urinalysis Basic - ( 05 Feb 2021 18:37 )    Color: Yellow / Appearance: Clear / SG: >=1.030 / pH: x  Gluc: x / Ketone: Trace mg/dL  / Bili: Negative / Urobili: 0.2 E.U./dL   Blood: x / Protein: NEGATIVE mg/dL / Nitrite: NEGATIVE   Leuk Esterase: NEGATIVE / RBC: x / WBC x   Sq Epi: x / Non Sq Epi: x / Bacteria: x    CAPILLARY BLOOD GLUCOSE    POCT Blood Glucose.: 102 mg/dL (06 Feb 2021 11:22)  POCT Blood Glucose.: 161 mg/dL (06 Feb 2021 01:04)  POCT Blood Glucose.: 154 mg/dL (05 Feb 2021 23:06)    Culture - Urine (collected 02-05-21 @ 20:00)  Source: .Urine Clean Catch (Midstream)  Preliminary Report (02-06-21 @ 10:41):    No growth to date    02.06.2021 - CT follow-up pending _____    02.05.2021 - CT/CTA -  -hemorrhage in the medial aspect of left cerebral hemisphere extending into the rostrum of the corpus callosum and mid and lower portion of left frontal lobe. Large amount of posterior left scalp swelling. Patient is status post bifrontal craniotomy with aneurysm clip noted likely within the left anterior cerebral artery location. Aneurysm clip causes metallic artifact and poor visualization of regional portions of the brain.  There is faint visualization of the A1 segment on the left side. The A1 segment is not visualized on the right side. Very faint opacification of markedly diminished caliber of anterior cerebral arteries with possible segmental areas of occlusion involving the anterior cerebral arteries possibly etiological for hemorrhagic infarction within the anterior cerebral artery distribution on left side. There is some bowing to the right side of the anterior cerebral arteries due to the area of hemorrhage involving the medial cerebral hemispheres and rostrum of corpus callosum extending into left frontal lobe.    02.05.2021 - CT -  New left subarachnoid bleed identified within the paramedian left frontal lobe. There is also a new 2 mm right subdural hematoma and a small left lateral ventricle intraventricular bleed. Left scalp hematoma is also noted.  No cervical fracture, cervical spondylosis.    IV FLUIDS: [] SL  DRIPS:  []  LINES:  []  DRAINS:  []  WOUNDS:  []    CODE STATUS:  [Full]  GOALS OF CARE:  [Aggressive]  DISPOSITION:  [ICU]    *****

## 2021-02-06 NOTE — OCCUPATIONAL THERAPY INITIAL EVALUATION ADULT - MD ORDER
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 The fall was witnessed by patient's home aide who denies LOC.

## 2021-02-06 NOTE — PHYSICAL THERAPY INITIAL EVALUATION ADULT - GENERAL OBSERVATIONS, REHAB EVAL
Patient received supine in bed with + IV, tele, room air, primafit catheter, (B) SCDs. Patient in no apparent distress.

## 2021-02-06 NOTE — OCCUPATIONAL THERAPY INITIAL EVALUATION ADULT - PERTINENT HX OF CURRENT PROBLEM, REHAB EVAL
S/P Fall w/ head trauma, ICH. Traumatic L frontal lobe SAH, 2mm R SDH, and left lateral IVH. H/O aneurysm repair with residual L sided weakness.

## 2021-02-06 NOTE — CONSULT NOTE ADULT - ASSESSMENT
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 HCT showed mixed subarachnoid and/or subdural hemorrhage along the left paramedian frontal lobe and thin right subdural hematoma and trace intraventricular hemorrhage. NIHSS 1, ICH 1, Vega Mcnair 1. Patient ICH, subdural, and SAH s/p traumatic fall. Stroke neurology consulted.     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 HCT showed mixed subarachnoid and/or subdural hemorrhage along the left paramedian frontal lobe and thin right subdural hematoma and trace intraventricular hemorrhage. NIHSS 1, ICH 1, Vega Mcnair 1. Patient ICH, subdural, and SAH s/p traumatic fall. Stroke neurology consulted.     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 HCT showed mixed subarachnoid and/or subdural hemorrhage along the left paramedian frontal lobe and thin right subdural hematoma and trace intraventricular hemorrhage. NIHSS 1, ICH 1, Vega Mcnair 1. Patient ICH, subdural, and SAH s/p traumatic fall. Stroke neurology consulted.     - no antiplatelets or anticoagulation as per NSGY in setting of bleed  - agree with keppra 500mg BID  - no need for statins at this time  - consider echo with bubble   - general neuro checks q1hr  - unable to get MRI head given metal from old clipping, recommend repeat HCT for bleed stability  - recommend SCDs for DVT prophylaxis  - may need outpatient neurology follow up    Pt with traumatic fall, care per neurosurgery  No further inpatient stroke workup    Thank you for allowing us to participate in the care of this patient, please call Neurology with questions as needed.

## 2021-02-07 LAB
ALP SERPL-CCNC: 300 U/L — HIGH (ref 40–120)
ALT FLD-CCNC: 193 U/L — HIGH (ref 10–45)
ANION GAP SERPL CALC-SCNC: 8 MMOL/L — SIGNIFICANT CHANGE UP (ref 5–17)
APPEARANCE UR: CLEAR — SIGNIFICANT CHANGE UP
AST SERPL-CCNC: 66 U/L — HIGH (ref 10–40)
BILIRUB UR-MCNC: NEGATIVE — SIGNIFICANT CHANGE UP
BUN SERPL-MCNC: 35 MG/DL — HIGH (ref 7–23)
CALCIUM SERPL-MCNC: 9.9 MG/DL — SIGNIFICANT CHANGE UP (ref 8.4–10.5)
CHLORIDE SERPL-SCNC: 102 MMOL/L — SIGNIFICANT CHANGE UP (ref 96–108)
CO2 SERPL-SCNC: 27 MMOL/L — SIGNIFICANT CHANGE UP (ref 22–31)
COLOR SPEC: YELLOW — SIGNIFICANT CHANGE UP
CREAT SERPL-MCNC: 1.17 MG/DL — SIGNIFICANT CHANGE UP (ref 0.5–1.3)
CULTURE RESULTS: SIGNIFICANT CHANGE UP
DIFF PNL FLD: NEGATIVE — SIGNIFICANT CHANGE UP
FERRITIN SERPL-MCNC: 57 NG/ML — SIGNIFICANT CHANGE UP (ref 15–150)
GLUCOSE BLDC GLUCOMTR-MCNC: 111 MG/DL — HIGH (ref 70–99)
GLUCOSE BLDC GLUCOMTR-MCNC: 161 MG/DL — HIGH (ref 70–99)
GLUCOSE BLDC GLUCOMTR-MCNC: 206 MG/DL — HIGH (ref 70–99)
GLUCOSE BLDC GLUCOMTR-MCNC: 96 MG/DL — SIGNIFICANT CHANGE UP (ref 70–99)
GLUCOSE SERPL-MCNC: 91 MG/DL — SIGNIFICANT CHANGE UP (ref 70–99)
GLUCOSE UR QL: NEGATIVE — SIGNIFICANT CHANGE UP
HCT VFR BLD CALC: 23.7 % — LOW (ref 34.5–45)
HGB BLD-MCNC: 7.2 G/DL — LOW (ref 11.5–15.5)
IRON SATN MFR SERPL: 13 % — LOW (ref 14–50)
IRON SATN MFR SERPL: 38 UG/DL — SIGNIFICANT CHANGE UP (ref 30–160)
KETONES UR-MCNC: NEGATIVE — SIGNIFICANT CHANGE UP
LEUKOCYTE ESTERASE UR-ACNC: NEGATIVE — SIGNIFICANT CHANGE UP
MCHC RBC-ENTMCNC: 24.7 PG — LOW (ref 27–34)
MCHC RBC-ENTMCNC: 30.4 GM/DL — LOW (ref 32–36)
MCV RBC AUTO: 81.2 FL — SIGNIFICANT CHANGE UP (ref 80–100)
NITRITE UR-MCNC: NEGATIVE — SIGNIFICANT CHANGE UP
NRBC # BLD: 0 /100 WBCS — SIGNIFICANT CHANGE UP (ref 0–0)
PH UR: 7 — SIGNIFICANT CHANGE UP (ref 5–8)
PHOSPHATE SERPL-MCNC: 3 MG/DL — SIGNIFICANT CHANGE UP (ref 2.5–4.5)
PLATELET # BLD AUTO: 117 K/UL — LOW (ref 150–400)
POTASSIUM SERPL-MCNC: 4.6 MMOL/L — SIGNIFICANT CHANGE UP (ref 3.5–5.3)
POTASSIUM SERPL-SCNC: 4.6 MMOL/L — SIGNIFICANT CHANGE UP (ref 3.5–5.3)
PROT UR-MCNC: NEGATIVE MG/DL — SIGNIFICANT CHANGE UP
RBC # BLD: 2.92 M/UL — LOW (ref 3.8–5.2)
RBC # FLD: 16.8 % — HIGH (ref 10.3–14.5)
SODIUM SERPL-SCNC: 137 MMOL/L — SIGNIFICANT CHANGE UP (ref 135–145)
SP GR SPEC: 1.02 — SIGNIFICANT CHANGE UP (ref 1–1.03)
SPECIMEN SOURCE: SIGNIFICANT CHANGE UP
TIBC SERPL-MCNC: 288 UG/DL — SIGNIFICANT CHANGE UP (ref 220–430)
TRANSFERRIN SERPL-MCNC: 245 MG/DL — SIGNIFICANT CHANGE UP (ref 200–360)
UIBC SERPL-MCNC: 250 UG/DL — SIGNIFICANT CHANGE UP (ref 110–370)
UROBILINOGEN FLD QL: 0.2 E.U./DL — SIGNIFICANT CHANGE UP
WBC # BLD: 3.93 K/UL — SIGNIFICANT CHANGE UP (ref 3.8–10.5)
WBC # FLD AUTO: 3.93 K/UL — SIGNIFICANT CHANGE UP (ref 3.8–10.5)

## 2021-02-07 PROCEDURE — 99291 CRITICAL CARE FIRST HOUR: CPT | Mod: 24

## 2021-02-07 PROCEDURE — 76705 ECHO EXAM OF ABDOMEN: CPT | Mod: 26

## 2021-02-07 PROCEDURE — 99232 SBSQ HOSP IP/OBS MODERATE 35: CPT

## 2021-02-07 RX ORDER — LEVETIRACETAM 250 MG/1
500 TABLET, FILM COATED ORAL EVERY 12 HOURS
Refills: 0 | Status: DISCONTINUED | OUTPATIENT
Start: 2021-02-08 | End: 2021-02-09

## 2021-02-07 RX ADMIN — PANTOPRAZOLE SODIUM 40 MILLIGRAM(S): 20 TABLET, DELAYED RELEASE ORAL at 05:04

## 2021-02-07 RX ADMIN — Medication 4: at 12:11

## 2021-02-07 RX ADMIN — LOSARTAN POTASSIUM 100 MILLIGRAM(S): 100 TABLET, FILM COATED ORAL at 05:04

## 2021-02-07 RX ADMIN — LEVETIRACETAM 400 MILLIGRAM(S): 250 TABLET, FILM COATED ORAL at 18:16

## 2021-02-07 RX ADMIN — Medication 2: at 21:43

## 2021-02-07 RX ADMIN — Medication 1 TABLET(S): at 18:12

## 2021-02-07 RX ADMIN — Medication 5000 UNIT(S): at 11:29

## 2021-02-07 RX ADMIN — PREGABALIN 250 MICROGRAM(S): 225 CAPSULE ORAL at 11:29

## 2021-02-07 RX ADMIN — SENNA PLUS 2 TABLET(S): 8.6 TABLET ORAL at 21:43

## 2021-02-07 RX ADMIN — Medication 1 TABLET(S): at 05:04

## 2021-02-07 RX ADMIN — LEVETIRACETAM 400 MILLIGRAM(S): 250 TABLET, FILM COATED ORAL at 05:04

## 2021-02-07 RX ADMIN — SERTRALINE 50 MILLIGRAM(S): 25 TABLET, FILM COATED ORAL at 11:29

## 2021-02-07 RX ADMIN — Medication 1 TABLET(S): at 11:29

## 2021-02-07 RX ADMIN — Medication 1 MILLIGRAM(S): at 11:29

## 2021-02-07 NOTE — PROGRESS NOTE ADULT - SUBJECTIVE AND OBJECTIVE BOX
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 The fall was witnessed by patient's home aide who denies LOC. Patient does not remember falling today, but states that she fell 2 days ago and hit her head when getting up from the toilet with no LOC. Patient reports 4/10 in severity head pain and baseline full body left sided decreased sensation since her prior aneurysm repair. Patient's daughter reports a history of chronic bladder incontinence. Patient denies new extremity numbness or weakness, diplopia, blurry vision, dizziness, and headache or dizziness preceding her fall.   Patient is also currently being treated with Bactrim DS 1 tab BID x 10 days for klebsiella UTI with last dose scheduled for 21.    (2021 20:36)    S/Overnight events:  RADHA overnight, pt had brief atrial tachycardia noted on telemtery monitiring that self-resolved. CTH non-con completed for stability scan, which shows redistribution of SAH and resolution of R SDH noted on previous scan, no new or worsening hemorrhage noted.     Hospital Course:   : RADHA overnight. Repeat head CT performed, stable. Neuro exam remains stable. CTA head/neck shows "diminished caliber of anterior cerebral arteries with possible segmental areas of occlusion involving the anterior cerebral arteries possibly etiological for hemorrhagic infarction within the anterior cerebral artery distribution on left side."   : RADHA overnight, neuro exam stable. CTH completed and shows improvement in SDH and resdistribution of SAH, no new or worsening hemorrhage. Pend SDU and TTE w/ bubble study per stroke/neurology for syncope work-up.     Vital Signs Last 24 Hrs  T(C): 36.4 (2021 02:12), Max: 36.6 (2021 05:49)  T(F): 97.6 (2021 02:12), Max: 97.9 (2021 05:49)  HR: 75 (2021 03:00) (74 - 129)  BP: 123/58 (2021 03:00) (106/68 - 148/72)  BP(mean): 84 (2021 03:00) (78 - 99)  RR: 16 (2021 03:00) (15 - 20)  SpO2: 98% (2021 03:00) (94% - 100%)    I&O's Detail    2021 07:01  -  2021 07:00  --------------------------------------------------------  IN:    IV PiggyBack: 100 mL    sodium chloride 0.9%: 560 mL  Total IN: 660 mL    OUT:    Voided (mL): 100 mL  Total OUT: 100 mL    Total NET: 560 mL      2021 07:01  -  2021 04:07  --------------------------------------------------------  IN:    IV PiggyBack: 100 mL    Oral Fluid: 360 mL    sodium chloride 0.9%: 320 mL  Total IN: 780 mL    OUT:    Voided (mL): 600 mL  Total OUT: 600 mL    Total NET: 180 mL        I&O's Summary    2021 07:01  -  2021 07:00  --------------------------------------------------------  IN: 660 mL / OUT: 100 mL / NET: 560 mL    2021 07:01  -  2021 04:07  --------------------------------------------------------  IN: 780 mL / OUT: 600 mL / NET: 180 mL        PHYSICAL EXAM:  General: NAD, pt is comfortably sitting up in bed, A&O x3, on RA  HEENT: PERRL 3mm, EOMI b/l, face symmetric, tongue midline, +scalp hematoma w/ 1cm laceration to posterior parietal region, +headwrap in-place   Cardiovascular: RRR, normal S1 and S2   Respiratory: lungs CTAB, no wheezing, rhonchi, or crackles   GI: normoactive BS to auscultation, abd soft, NTND   Neuro: no aphasia, speech clear, no dysmetria, neg pronator drift  BRO x4 spontaneously, RUE/LUE strength 5/5, RLE/LLE strength 4/5 (baseline per patient, uses walker and wheelchair at home)    decreased sensation to light touch of LUE/LLE compared to RUE/RLE   Extremities: distal pulses 2+ x4     TUBES/LINES:  [] CVC  [] A-line  [] Lumbar Drain  [] Ventriculostomy  [] Other    DIET:  [] NPO  [X] Mechanical  [] Tube feeds    LABS:                        7.8    5.52  )-----------( 117      ( 2021 06:25 )             25.8     02-06    134<L>  |  98  |  37<H>  ----------------------------<  103<H>  4.6   |  25  |  1.18    Ca    9.9      2021 06:25  Phos  4.0     02-06  Mg     1.7     02-06    TPro  6.0  /  Alb  3.4  /  TBili  0.3  /  DBili  <0.2  /  AST  87<H>  /  ALT  250<H>  /  AlkPhos  315<H>  02-06    PT/INR - ( 2021 17:24 )   PT: 12.0 sec;   INR: 1.00          PTT - ( 2021 17:24 )  PTT:26.3 sec  Urinalysis Basic - ( 2021 23:47 )    Color: Yellow / Appearance: Clear / S.020 / pH: x  Gluc: x / Ketone: NEGATIVE  / Bili: Negative / Urobili: 0.2 E.U./dL   Blood: x / Protein: NEGATIVE mg/dL / Nitrite: NEGATIVE   Leuk Esterase: NEGATIVE / RBC: x / WBC x   Sq Epi: x / Non Sq Epi: x / Bacteria: x          CAPILLARY BLOOD GLUCOSE      POCT Blood Glucose.: 127 mg/dL (2021 21:17)  POCT Blood Glucose.: 196 mg/dL (2021 19:26)  POCT Blood Glucose.: 169 mg/dL (2021 16:53)  POCT Blood Glucose.: 102 mg/dL (2021 11:22)      Drug Levels: [] N/A    CSF Analysis: [] N/A      Allergies    No Known Allergies    Intolerances      MEDICATIONS:  Antibiotics:  trimethoprim  160 mG/sulfamethoxazole 800 mG 1 Tablet(s) Oral every 12 hours    Neuro:  acetaminophen   Tablet .. 650 milliGRAM(s) Oral every 6 hours PRN  levETIRAcetam  IVPB 500 milliGRAM(s) IV Intermittent every 12 hours  ondansetron Injectable 4 milliGRAM(s) IV Push every 6 hours PRN  sertraline 50 milliGRAM(s) Oral daily    Anticoagulation:    OTHER:  dextrose 40% Gel 15 Gram(s) Oral once  dextrose 50% Injectable 25 Gram(s) IV Push once  dextrose 50% Injectable 12.5 Gram(s) IV Push once  dextrose 50% Injectable 25 Gram(s) IV Push once  glucagon  Injectable 1 milliGRAM(s) IntraMuscular once  hydrALAZINE Injectable 10 milliGRAM(s) IV Push every 3 hours PRN  insulin lispro (ADMELOG) corrective regimen sliding scale   SubCutaneous Before meals and at bedtime  losartan 100 milliGRAM(s) Oral daily  pantoprazole    Tablet 40 milliGRAM(s) Oral before breakfast  senna 2 Tablet(s) Oral at bedtime    IVF:  cholecalciferol 5000 Unit(s) Oral daily  cyanocobalamin 250 MICROGram(s) Oral daily  dextrose 5%. 1000 milliLiter(s) IV Continuous <Continuous>  dextrose 5%. 1000 milliLiter(s) IV Continuous <Continuous>  folic acid 1 milliGRAM(s) Oral daily    CULTURES:  Culture Results:   No growth to date ( @ 20:00)    RADIOLOGY & ADDITIONAL TESTS:  < from: CT Head No Cont (21 @ 22:03) >  IMPRESSION: Redistribution of subarachnoid bleed along the falx cerebri on the left. Previous seen right subdural hematoma is not identified. Improvement of left scalp hematoma.    < end of copied text >        ASSESSMENT:  84 y/o F with pmh HTN, DM, HLD, hx aneurysm repair  with "metal plates", hx lung cancer s/p L pneumonectomy, on ASA 81mg (last dose 9:30am 21) presenting s/p fall with posterior head trauma found to have traumatic L frontal lobe SAH, 2mm R SDH, and left lateral IVH      INTRACRANIAL HEMORRHAGE    No pertinent family history in first degree relatives    Handoff    MEWS Score    High cholesterol    Diabetes    HTN (hypertension)    Intracranial hemorrhage    Fall    High cholesterol    HTN (hypertension)    Diabetes    Intracranial hemorrhage    H/O cerebral aneurysm repair    Personal history of spine surgery    S/P cholecystectomy    S/P removal of lung    No significant past surgical history    HEAD INJURY    90+    Fall    Head injury    [email or web address]        PLAN:  NEURO:  -neuro/vital checks  -pain control  -continue keppra  -24hr stability CTH stable, SAH redistribution   -appreciate stroke/neurology further recs     CARDIOVASCULAR:  -SBP goal <140  -f/u echocardiogram, pend     PULMONARY:  -room air    RENAL:  -IVF while NPO    GI:  -bowel regimen  -trend LFTs daily, elevated alk phos, AST/ALT, statin d/c'd at this time     HEME:  -H/H stable    ID:  -no issues  -continue bactrim for klebsiella UTI (started outpatient)    ENDO:  -ISS    DVT PROPHYLAXIS:  [x] Venodynes                                [] Heparin/Lovenox  F/u LE dopplers    DISPOSITION: ICU status, full code, dispo pend   Assessment and plan discussed w/ Dr. D'Amico and Dr. Gross

## 2021-02-07 NOTE — DIETITIAN INITIAL EVALUATION ADULT. - OTHER INFO
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 Underlying L LEBRON stenosis. 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 Underlying L LEBRON stenosis. CTH completed and shows improvement in SDH and resdistribution of SAH, no new or worsening hemorrhage.     On assessment, pt resting in bed without complaints. Currently on CST CHO diet with evening snack. Pt requires RN to prep meal and total assistance with meals. Tolerating PO well consuming >50% of meals. No n/v/d/c. No abd distention. Skin WDL, head wound noted, bimal score 17. No pain noted at time of assessment. Pt noting good PO intake PTA. UBW consistent with admission weight- pt denies any weight changes PTA. NKFA Pt with hx of DM but well educated and well controlled (A1C 6.2%). Formal edu deferred, RD instead noting importance of adequate PO intake with emphasis on lean protein to aid with BS control and wound healing- pt receptive. Please see nutr recs below. RD to follow.

## 2021-02-07 NOTE — PROGRESS NOTE ADULT - SUBJECTIVE AND OBJECTIVE BOX
================================================   NEUROCRITICAL CARE ATTENDING NOTE (SUNDAY, FEBRUARY 7, 2021)  ================================================    NAME:  [FERNANDO ASHBY]  MRN:  [[id number]]    86 y/o F with pmh HTN, DM, HLD, hx aneurysm repair 1968 with "metal plates", hx lung cancer s/p L pneumonectomy, on ASA 81mg (last dose 9:30am 2/5/21) presenting s/p fall with posterior head trauma found to have traumatic L frontal lobe SAH, 2mm R SDH, and left lateral IVH.   Underlying L LEBRON stenosis.    HPI:  86 y/o F with pmh HTN, DM, HLD, hx aneurysm repair 1968 with "metal plates", hx lung cancer s/p L pneumonectomy, on ASA 81mg (last dose 9:30am) presents to ED s/p fall backwards onto a wooden floor with head trauma at 4:10pm. The fall was witnessed by patient's home aide who denies LOC. Patient does not remember falling today, but states that she fell 2 days ago and hit her head when getting up from the toilet with no LOC. Patient reports 4/10 in severity head pain and baseline full body left sided decreased sensation since her prior aneurysm repair. Patient's daughter reports a history of chronic bladder incontinence. Patient denies new extremity numbness or weakness, diplopia, blurry vision, dizziness, and headache or dizziness preceding her fall.   Patient is also currently being treated with Bactrim DS 1 tab BID x 10 days for klebsiella UTI with last dose scheduled for 2/7/21.     Hospital Course:   2/6: RADHA overnight. Repeat head CT performed, stable. Neuro exam remains stable. CTA head/neck shows "diminished caliber of anterior cerebral arteries with possible segmental areas of occlusion involving the anterior cerebral arteries possibly etiological for hemorrhagic infarction within the anterior cerebral artery distribution on left side."   2/7: RADHA overnight, neuro exam stable. CTH completed and shows improvement in SDH and resdistribution of SAH, no new or worsening hemorrhage. Pend SDU and TTE w/ bubble study per stroke/neurology for syncope work-up.     Past Medical History:  High cholesterol  Diabetes  HTN (hypertension)  H/O cerebral aneurysm repair 1968  Personal history of spine surgery  S/P cholecystectomy  S/P removal of lung  left, 2000 partial and 2004 complete    Allergies:  None known    Home Meds:   aspirin 81 mg oral tablet:  (05 Feb 2021 21:05)  Bactrim  mg-160 mg oral tablet: 1 tab(s) orally every 12 hours x 10 days, klebsiella UTI, last dose 2/7 (05 Feb 2021 21:05)  Centrum:  (05 Feb 2021 21:05)  Claritin:  (05 Feb 2021 21:05)  Colace 100 mg oral capsule: 1 cap(s) orally once a day (at bedtime) (05 Feb 2021 21:05)  folic acid 1 mg oral tablet: 1 tab(s) orally once a day (05 Feb 2021 21:05)  Lipitor 10 mg oral tablet: 1 tab(s) orally once a day (05 Feb 2021 21:05)  losartan 100 mg oral tablet: 1 tab(s) orally once a day (05 Feb 2021 21:05)  metFORMIN 500 mg oral tablet: 500  orally 2 times a day (05 Feb 2021 21:05)  omeprazole 20 mg oral delayed release capsule:  (05 Feb 2021 21:05)  sertraline 50 mg oral tablet: 1 tab(s) orally once a day (05 Feb 2021 21:05)  Vitamin B12 250 mcg oral tablet:  (05 Feb 2021 21:05)  Vitamin D3: 5000u  orally (05 Feb 2021 21:05)    Current Meds:  MEDICATIONS  (STANDING):  atorvastatin 10 milliGRAM(s) Oral at bedtime  cholecalciferol 5000 Unit(s) Oral daily  cyanocobalamin 250 MICROGram(s) Oral daily  folic acid 1 milliGRAM(s) Oral daily  insulin lispro (ADMELOG) corrective regimen sliding scale   SubCutaneous Before meals and at bedtime  levETIRAcetam  IVPB 500 milliGRAM(s) IV Intermittent every 12 hours  losartan 100 milliGRAM(s) Oral daily  pantoprazole    Tablet 40 milliGRAM(s) Oral before breakfast  senna 2 Tablet(s) Oral at bedtime  sertraline 50 milliGRAM(s) Oral daily  sodium chloride 0.9%. 1000 milliLiter(s) (80 mL/Hr) IV Continuous <Continuous>  trimethoprim  160 mG/sulfamethoxazole 800 mG 1 Tablet(s) Oral every 12 hours    MEDICATIONS  (PRN):  acetaminophen   Tablet .. 650 milliGRAM(s) Oral every 6 hours PRN Temp greater or equal to 38.5C (101.3F), Mild Pain (1 - 3)  hydrALAZINE Injectable 10 milliGRAM(s) IV Push every 3 hours PRN SBP>140  ondansetron Injectable 4 milliGRAM(s) IV Push every 6 hours PRN Nausea and/or Vomiting    PHYSICAL EXAMINATION    ICU Vital Signs Last 24 Hrs      NEUROLOGICAL EXAMINATION:  awake, alert, oriented, speech clear, BINTA, intact remote memory, decreased recent memory, frontal release, EOMI, VFF, face symmetric, no pronator drift, no [place], finger-to-nose intact, intact strength, left hemibody decreased sensation (baseline), no LE extremity drift  EENT:  supple, occipital hematoma  CARDIOVASCULAR:  S1S2 noS3S4 noM  PULMONARY:  clear to bases  ABDOMEN:  soft, nontender, intact bowel sounds  EXTREMITIES:  warm, PPP  SKIN:  no manifestations          Culture - Urine (collected 02-05-21 @ 20:00)  Source: .Urine Clean Catch (Midstream)  Preliminary Report (02-06-21 @ 10:41):    No growth to date    02.06.2021 - CT follow-up stable    02.05.2021 - CT/CTA -  -hemorrhage in the medial aspect of left cerebral hemisphere extending into the rostrum of the corpus callosum and mid and lower portion of left frontal lobe. Large amount of posterior left scalp swelling. Patient is status post bifrontal craniotomy with aneurysm clip noted likely within the left anterior cerebral artery location. Aneurysm clip causes metallic artifact and poor visualization of regional portions of the brain.  There is faint visualization of the A1 segment on the left side. The A1 segment is not visualized on the right side. Very faint opacification of markedly diminished caliber of anterior cerebral arteries with possible segmental areas of occlusion involving the anterior cerebral arteries possibly etiological for hemorrhagic infarction within the anterior cerebral artery distribution on left side. There is some bowing to the right side of the anterior cerebral arteries due to the area of hemorrhage involving the medial cerebral hemispheres and rostrum of corpus callosum extending into left frontal lobe.    02.05.2021 - CT -  New left subarachnoid bleed identified within the paramedian left frontal lobe. There is also a new 2 mm right subdural hematoma and a small left lateral ventricle intraventricular bleed. Left scalp hematoma is also noted.  No cervical fracture, cervical spondylosis.    IV FLUIDS: [] SL  DRIPS:  []  LINES:  []  DRAINS:  []  WOUNDS:  []    CODE STATUS:  [Full]  GOALS OF CARE:  [Aggressive]  DISPOSITION:  [ICU]    ***** ================================================   NEUROCRITICAL CARE ATTENDING NOTE (2021)  ================================================    NAME:  [FERNANDO ASHBY]  MRN:  [[id number]]    84 y/o F with pmh HTN, DM, HLD, hx aneurysm repair  with "metal plates", hx lung cancer s/p L pneumonectomy, on ASA 81mg (last dose 9:30am 21) presenting s/p fall with posterior head trauma found to have traumatic L frontal lobe SAH, 2mm R SDH, and left lateral IVH.   Underlying L LEBRON stenosis.    HPI:  84 y/o F with pmh HTN, DM, HLD, hx aneurysm repair  with "metal plates", hx lung cancer s/p L pneumonectomy, on ASA 81mg (last dose 9:30am) presents to ED s/p fall backwards onto a wooden floor with head trauma at 4:10pm. The fall was witnessed by patient's home aide who denies LOC. Patient does not remember falling today, but states that she fell 2 days ago and hit her head when getting up from the toilet with no LOC. Patient reports 4/10 in severity head pain and baseline full body left sided decreased sensation since her prior aneurysm repair. Patient's daughter reports a history of chronic bladder incontinence. Patient denies new extremity numbness or weakness, diplopia, blurry vision, dizziness, and headache or dizziness preceding her fall.   Patient is also currently being treated with Bactrim DS 1 tab BID x 10 days for klebsiella UTI with last dose scheduled for 21.     Hospital Course:   : RADHA overnight. Repeat head CT performed, stable. Neuro exam remains stable. CTA head/neck shows "diminished caliber of anterior cerebral arteries with possible segmental areas of occlusion involving the anterior cerebral arteries possibly etiological for hemorrhagic infarction within the anterior cerebral artery distribution on left side."   : RADHA overnight, neuro exam stable. CTH completed and shows improvement in SDH and resdistribution of SAH, no new or worsening hemorrhage. Pend SDU and TTE w/ bubble study per stroke/neurology for syncope work-up.     Past Medical History:  High cholesterol  Diabetes  HTN (hypertension)  H/O cerebral aneurysm repair 1968  Personal history of spine surgery  S/P cholecystectomy  S/P removal of lung  left,  partial and  complete    Allergies:  None known    Home Meds:   aspirin 81 mg oral tablet:  (2021 21:05)  Bactrim  mg-160 mg oral tablet: 1 tab(s) orally every 12 hours x 10 days, klebsiella UTI, last dose  (2021 21:05)  Centrum:  (2021 21:05)  Claritin:  (2021 21:05)  Colace 100 mg oral capsule: 1 cap(s) orally once a day (at bedtime) (2021 21:05)  folic acid 1 mg oral tablet: 1 tab(s) orally once a day (2021 21:05)  Lipitor 10 mg oral tablet: 1 tab(s) orally once a day (2021 21:05)  losartan 100 mg oral tablet: 1 tab(s) orally once a day (2021 21:05)  metFORMIN 500 mg oral tablet: 500  orally 2 times a day (2021 21:05)  omeprazole 20 mg oral delayed release capsule:  (2021 21:05)  sertraline 50 mg oral tablet: 1 tab(s) orally once a day (2021 21:05)  Vitamin B12 250 mcg oral tablet:  (2021 21:05)  Vitamin D3: 5000u  orally (2021 21:05)    Current Meds:  MEDICATIONS  (STANDING):  atorvastatin 10 milliGRAM(s) Oral at bedtime  cholecalciferol 5000 Unit(s) Oral daily  cyanocobalamin 250 MICROGram(s) Oral daily  folic acid 1 milliGRAM(s) Oral daily  insulin lispro (ADMELOG) corrective regimen sliding scale   SubCutaneous Before meals and at bedtime  levETIRAcetam  IVPB 500 milliGRAM(s) IV Intermittent every 12 hours  losartan 100 milliGRAM(s) Oral daily  pantoprazole    Tablet 40 milliGRAM(s) Oral before breakfast  senna 2 Tablet(s) Oral at bedtime  sertraline 50 milliGRAM(s) Oral daily  sodium chloride 0.9%. 1000 milliLiter(s) (80 mL/Hr) IV Continuous <Continuous>  trimethoprim  160 mG/sulfamethoxazole 800 mG 1 Tablet(s) Oral every 12 hours    MEDICATIONS  (PRN):  acetaminophen   Tablet .. 650 milliGRAM(s) Oral every 6 hours PRN Temp greater or equal to 38.5C (101.3F), Mild Pain (1 - 3)  hydrALAZINE Injectable 10 milliGRAM(s) IV Push every 3 hours PRN SBP>140  ondansetron Injectable 4 milliGRAM(s) IV Push every 6 hours PRN Nausea and/or Vomiting    PHYSICAL EXAMINATION    ICU Vital Signs Last 24 Hrs  T(C): 36.4 (2021 06:52), Max: 36.5 (2021 14:38)  T(F): 97.6 (2021 06:52), Max: 97.7 (2021 14:38)  HR: 76 (2021 06:00) (74 - 129)  BP: 128/60 (2021 06:00) (104/54 - 148/72)  BP(mean): 86 (2021 06:00) (75 - 99)  RR: 16 (2021 06:00) (15 - 20)  SpO2: 98% (2021 06:00) (94% - 100%)    NEUROLOGICAL EXAMINATION:  awake, alert, oriented, speech clear, BINTA, intact remote memory, decreased recent memory, frontal release, EOMI, VFF, face symmetric, no pronator drift, no [place], finger-to-nose intact, intact strength, left hemibody decreased sensation (baseline), no LE extremity drift  EENT:  supple, occipital hematoma  CARDIOVASCULAR:  S1S2 noS3S4 noM  PULMONARY:  clear to bases  ABDOMEN:  soft, nontender, intact bowel sounds  EXTREMITIES:  warm, PPP  SKIN:  no manifestations    I/O's    21 @ 07:01  -  21 @ 07:00  --------------------------------------------------------  IN: 780 mL / OUT: 800 mL / NET: -20 mL    LABS:               (7.8)             7.2    3.93  )-----------( 117      ( 2021 05:28 )             23.7         137  |  102  |  35<H>  ----------------------------<  91  4.6   |  27  |  1.17    Ca    9.9      2021 05:28  Phos  3.0       Mg     1.7         TPro  x   /  Alb  x   /  TBili  x   /  DBili  x   /  AST  66<H>  /  ALT  193<H>  /  AlkPhos  300<H>      PT/INR - ( 2021 17:24 )   PT: 12.0 sec;   INR: 1.00     PTT - ( 2021 17:24 )  PTT:26.3 sec    Urinalysis Basic - ( 2021 23:47 )    Color: Yellow / Appearance: Clear / S.020 / pH: x  Gluc: x / Ketone: NEGATIVE  / Bili: Negative / Urobili: 0.2 E.U./dL   Blood: x / Protein: NEGATIVE mg/dL / Nitrite: NEGATIVE   Leuk Esterase: NEGATIVE / RBC: x / WBC x   Sq Epi: x / Non Sq Epi: x / Bacteria: x    CAPILLARY BLOOD GLUCOSE    POCT Blood Glucose.: 96 mg/dL (2021 06:46)  POCT Blood Glucose.: 127 mg/dL (2021 21:17)  POCT Blood Glucose.: 196 mg/dL (2021 19:26)  POCT Blood Glucose.: 169 mg/dL (2021 16:53)  POCT Blood Glucose.: 102 mg/dL (2021 11:22)    Culture - Urine (collected 21 @ 20:00)  Source: .Urine Clean Catch (Midstream)  Preliminary Report (21 @ 10:41):    No growth to date    2021 - U/S abdomen    2021 - TTE with bubble study    2021 - Dopplers    2021 - Redistribution of subarachnoid bleed along the falx cerebri on the left. Previous seen right subdural hematoma is not identified. Improvement of left scalp hematoma.    2021 - CT/CTA -  -hemorrhage in the medial aspect of left cerebral hemisphere extending into the rostrum of the corpus callosum and mid and lower portion of left frontal lobe. Large amount of posterior left scalp swelling. Patient is status post bifrontal craniotomy with aneurysm clip noted likely within the left anterior cerebral artery location. Aneurysm clip causes metallic artifact and poor visualization of regional portions of the brain.  There is faint visualization of the A1 segment on the left side. The A1 segment is not visualized on the right side. Very faint opacification of markedly diminished caliber of anterior cerebral arteries with possible segmental areas of occlusion involving the anterior cerebral arteries possibly etiological for hemorrhagic infarction within the anterior cerebral artery distribution on left side. There is some bowing to the right side of the anterior cerebral arteries due to the area of hemorrhage involving the medial cerebral hemispheres and rostrum of corpus callosum extending into left frontal lobe.    2021 - CT -  New left subarachnoid bleed identified within the paramedian left frontal lobe. There is also a new 2 mm right subdural hematoma and a small left lateral ventricle intraventricular bleed. Left scalp hematoma is also noted.  No cervical fracture, cervical spondylosis.    IV FLUIDS: [] SL  DRIPS:  []  LINES:  []  DRAINS:  []  WOUNDS:  []    CODE STATUS:  [Full]  GOALS OF CARE:  [Aggressive]  DISPOSITION:  [ICU]    ***** ================================================   NEUROCRITICAL CARE ATTENDING NOTE (2021)  ================================================    NAME:  [FERNANDO ASHBY]  MRN:  [[id number]]    86 y/o F with pmh HTN, DM, HLD, hx aneurysm repair  with "metal plates", hx lung cancer s/p L pneumonectomy, on ASA 81mg (last dose 9:30am 21) presenting s/p fall with posterior head trauma found to have traumatic L frontal lobe SAH, 2mm R SDH, and left lateral IVH.   Underlying L LEBRON stenosis.    HPI:  86 y/o F with pmh HTN, DM, HLD, hx aneurysm repair  with "metal plates", hx lung cancer s/p L pneumonectomy, on ASA 81mg (last dose 9:30am) presents to ED s/p fall backwards onto a wooden floor with head trauma at 4:10pm. The fall was witnessed by patient's home aide who denies LOC. Patient does not remember falling today, but states that she fell 2 days ago and hit her head when getting up from the toilet with no LOC. Patient reports 4/10 in severity head pain and baseline full body left sided decreased sensation since her prior aneurysm repair. Patient's daughter reports a history of chronic bladder incontinence. Patient denies new extremity numbness or weakness, diplopia, blurry vision, dizziness, and headache or dizziness preceding her fall.   Patient is also currently being treated with Bactrim DS 1 tab BID x 10 days for klebsiella UTI with last dose scheduled for 21.     Hospital Course:   : RADHA overnight. Repeat head CT performed, stable. Neuro exam remains stable. CTA head/neck shows "diminished caliber of anterior cerebral arteries with possible segmental areas of occlusion involving the anterior cerebral arteries possibly etiological for hemorrhagic infarction within the anterior cerebral artery distribution on left side."   : RADHA overnight, neuro exam stable. CTH completed and shows improvement in SDH and resdistribution of SAH, no new or worsening hemorrhage. Pend SDU and TTE w/ bubble study per stroke/neurology for syncope work-up.     Past Medical History:  High cholesterol  Diabetes  HTN (hypertension)  H/O cerebral aneurysm repair 1968  Personal history of spine surgery  S/P cholecystectomy  S/P removal of lung  left,  partial and  complete    Allergies:  None known    Home Meds:   aspirin 81 mg oral tablet:  (2021 21:05)  Bactrim  mg-160 mg oral tablet: 1 tab(s) orally every 12 hours x 10 days, klebsiella UTI, last dose  (2021 21:05)  Centrum:  (2021 21:05)  Claritin:  (2021 21:05)  Colace 100 mg oral capsule: 1 cap(s) orally once a day (at bedtime) (2021 21:05)  folic acid 1 mg oral tablet: 1 tab(s) orally once a day (2021 21:05)  Lipitor 10 mg oral tablet: 1 tab(s) orally once a day (2021 21:05)  losartan 100 mg oral tablet: 1 tab(s) orally once a day (2021 21:05)  metFORMIN 500 mg oral tablet: 500  orally 2 times a day (2021 21:05)  omeprazole 20 mg oral delayed release capsule:  (2021 21:05)  sertraline 50 mg oral tablet: 1 tab(s) orally once a day (2021 21:05)  Vitamin B12 250 mcg oral tablet:  (2021 21:05)  Vitamin D3: 5000u  orally (2021 21:05)    Current Meds:  MEDICATIONS  (STANDING):  atorvastatin 10 milliGRAM(s) Oral at bedtime  cholecalciferol 5000 Unit(s) Oral daily  cyanocobalamin 250 MICROGram(s) Oral daily  folic acid 1 milliGRAM(s) Oral daily  insulin lispro (ADMELOG) corrective regimen sliding scale   SubCutaneous Before meals and at bedtime  levETIRAcetam  IVPB 500 milliGRAM(s) IV Intermittent every 12 hours  losartan 100 milliGRAM(s) Oral daily  pantoprazole    Tablet 40 milliGRAM(s) Oral before breakfast  senna 2 Tablet(s) Oral at bedtime  sertraline 50 milliGRAM(s) Oral daily  sodium chloride 0.9%. 1000 milliLiter(s) (80 mL/Hr) IV Continuous <Continuous>  trimethoprim  160 mG/sulfamethoxazole 800 mG 1 Tablet(s) Oral every 12 hours    MEDICATIONS  (PRN):  acetaminophen   Tablet .. 650 milliGRAM(s) Oral every 6 hours PRN Temp greater or equal to 38.5C (101.3F), Mild Pain (1 - 3)  hydrALAZINE Injectable 10 milliGRAM(s) IV Push every 3 hours PRN SBP>140  ondansetron Injectable 4 milliGRAM(s) IV Push every 6 hours PRN Nausea and/or Vomiting    PHYSICAL EXAMINATION    ICU Vital Signs Last 24 Hrs  T(C): 36.4 (2021 06:52), Max: 36.5 (2021 14:38)  T(F): 97.6 (2021 06:52), Max: 97.7 (2021 14:38)  HR: 76 (2021 06:00) (74 - 129)  BP: 128/60 (2021 06:00) (104/54 - 148/72)  BP(mean): 86 (2021 06:00) (75 - 99)  RR: 16 (2021 06:00) (15 - 20)  SpO2: 98% (2021 06:00) (94% - 100%)    NEUROLOGICAL EXAMINATION:  awake, alert, oriented, speech clear, BINTA, intact remote memory, decreased recent memory, frontal release, EOMI, VFF, face symmetric, no pronator drift, no [place], finger-to-nose intact, intact strength, left hemibody decreased sensation (baseline), no LE extremity drift  EENT:  supple, occipital hematoma  CARDIOVASCULAR:  S1S2 noS3S4 noM  PULMONARY:  clear to bases  ABDOMEN:  soft, nontender, intact bowel sounds  EXTREMITIES:  warm, PPP  SKIN:  no manifestations    I/O's    21 @ 07:01  -  21 @ 07:00  --------------------------------------------------------  IN: 780 mL / OUT: 800 mL / NET: -20 mL    LABS:               (7.8)             7.2    3.93  )-----------( 117      ( 2021 05:28 )             23.7         137  |  102  |  35<H>  ----------------------------<  91  4.6   |  27  |  1.17    Ca    9.9      2021 05:28  Phos  3.0       Mg     1.7         TPro  x   /  Alb  x   /  TBili  x   /  DBili  x   /  AST  66<H>  /  ALT  193<H>  /  AlkPhos  300<H>   (improving)    PT/INR - ( 2021 17:24 )   PT: 12.0 sec;   INR: 1.00     PTT - ( 2021 17:24 )  PTT:26.3 sec    Urinalysis Basic - ( 2021 23:47 )    Color: Yellow / Appearance: Clear / S.020 / pH: x  Gluc: x / Ketone: NEGATIVE  / Bili: Negative / Urobili: 0.2 E.U./dL   Blood: x / Protein: NEGATIVE mg/dL / Nitrite: NEGATIVE   Leuk Esterase: NEGATIVE / RBC: x / WBC x   Sq Epi: x / Non Sq Epi: x / Bacteria: x    CAPILLARY BLOOD GLUCOSE    POCT Blood Glucose.: 96 mg/dL (2021 06:46)  POCT Blood Glucose.: 127 mg/dL (2021 21:17)  POCT Blood Glucose.: 196 mg/dL (2021 19:26)  POCT Blood Glucose.: 169 mg/dL (2021 16:53)  POCT Blood Glucose.: 102 mg/dL (2021 11:22)    Culture - Urine (collected 21 @ 20:00)  Source: .Urine Clean Catch (Midstream)  Preliminary Report (21 @ 10:41):    No growth to date    2021 - U/S abdomen    2021 - TTE with bubble study    2021 - Dopplers    2021 - Redistribution of subarachnoid bleed along the falx cerebri on the left. Previous seen right subdural hematoma is not identified. Improvement of left scalp hematoma.    2021 - CT/CTA -  -hemorrhage in the medial aspect of left cerebral hemisphere extending into the rostrum of the corpus callosum and mid and lower portion of left frontal lobe. Large amount of posterior left scalp swelling. Patient is status post bifrontal craniotomy with aneurysm clip noted likely within the left anterior cerebral artery location. Aneurysm clip causes metallic artifact and poor visualization of regional portions of the brain.  There is faint visualization of the A1 segment on the left side. The A1 segment is not visualized on the right side. Very faint opacification of markedly diminished caliber of anterior cerebral arteries with possible segmental areas of occlusion involving the anterior cerebral arteries possibly etiological for hemorrhagic infarction within the anterior cerebral artery distribution on left side. There is some bowing to the right side of the anterior cerebral arteries due to the area of hemorrhage involving the medial cerebral hemispheres and rostrum of corpus callosum extending into left frontal lobe.    2021 - CT -  New left subarachnoid bleed identified within the paramedian left frontal lobe. There is also a new 2 mm right subdural hematoma and a small left lateral ventricle intraventricular bleed. Left scalp hematoma is also noted.  No cervical fracture, cervical spondylosis.    IV FLUIDS: [] SL  DRIPS:  []  LINES:  []  DRAINS:  []  WOUNDS:  []    CODE STATUS:  [Full]  GOALS OF CARE:  [Aggressive]  DISPOSITION:  [ICU]    ***** ================================================   NEUROCRITICAL CARE ATTENDING NOTE (2021)  ================================================    NAME:  [FERNANDO ASHBY]  MRN:  [[id number]]    86 y/o F with pmh HTN, DM, HLD, hx aneurysm repair  with "metal plates", hx lung cancer s/p L pneumonectomy, on ASA 81mg (last dose 9:30am 21) presenting s/p fall with posterior head trauma found to have traumatic L frontal lobe SAH, 2mm R SDH, and left lateral IVH.   Underlying L LEBRON stenosis.    HPI:  86 y/o F with pmh HTN, DM, HLD, hx aneurysm repair  with "metal plates", hx lung cancer s/p L pneumonectomy, on ASA 81mg (last dose 9:30am) presents to ED s/p fall backwards onto a wooden floor with head trauma at 4:10pm. The fall was witnessed by patient's home aide who denies LOC. Patient does not remember falling today, but states that she fell 2 days ago and hit her head when getting up from the toilet with no LOC. Patient reports 4/10 in severity head pain and baseline full body left sided decreased sensation since her prior aneurysm repair. Patient's daughter reports a history of chronic bladder incontinence. Patient denies new extremity numbness or weakness, diplopia, blurry vision, dizziness, and headache or dizziness preceding her fall.   Patient is also currently being treated with Bactrim DS 1 tab BID x 10 days for klebsiella UTI with last dose scheduled for 21.     Hospital Course:   : RADHA overnight. Repeat head CT performed, stable. Neuro exam remains stable. CTA head/neck shows "diminished caliber of anterior cerebral arteries with possible segmental areas of occlusion involving the anterior cerebral arteries possibly etiological for hemorrhagic infarction within the anterior cerebral artery distribution on left side."   : RADHA overnight, neuro exam stable. CTH completed and shows improvement in SDH and resdistribution of SAH, no new or worsening hemorrhage. Pend SDU and TTE w/ bubble study per stroke/neurology for syncope work-up; patient gives Hx of persistent tinnitus with positional vertigo related to fall    Past Medical History:  High cholesterol  Diabetes  HTN (hypertension)  H/O cerebral aneurysm repair 1968  Personal history of spine surgery  S/P cholecystectomy  S/P removal of lung  left,  partial and  complete    Allergies:  None known    Home Meds:   aspirin 81 mg oral tablet:  (2021 21:05)  Bactrim  mg-160 mg oral tablet: 1 tab(s) orally every 12 hours x 10 days, klebsiella UTI, last dose  (2021 21:05)  Centrum:  (2021 21:05)  Claritin:  (2021 21:05)  Colace 100 mg oral capsule: 1 cap(s) orally once a day (at bedtime) (2021 21:05)  folic acid 1 mg oral tablet: 1 tab(s) orally once a day (2021 21:05)  Lipitor 10 mg oral tablet: 1 tab(s) orally once a day (2021 21:05)  losartan 100 mg oral tablet: 1 tab(s) orally once a day (2021 21:05)  metFORMIN 500 mg oral tablet: 500  orally 2 times a day (2021 21:05)  omeprazole 20 mg oral delayed release capsule:  (2021 21:05)  sertraline 50 mg oral tablet: 1 tab(s) orally once a day (2021 21:05)  Vitamin B12 250 mcg oral tablet:  (2021 21:05)  Vitamin D3: 5000u  orally (2021 21:05)    Current Meds:  MEDICATIONS  (STANDING):  atorvastatin 10 milliGRAM(s) Oral at bedtime  cholecalciferol 5000 Unit(s) Oral daily  cyanocobalamin 250 MICROGram(s) Oral daily  folic acid 1 milliGRAM(s) Oral daily  insulin lispro (ADMELOG) corrective regimen sliding scale   SubCutaneous Before meals and at bedtime  levETIRAcetam  IVPB 500 milliGRAM(s) IV Intermittent every 12 hours  losartan 100 milliGRAM(s) Oral daily  pantoprazole    Tablet 40 milliGRAM(s) Oral before breakfast  senna 2 Tablet(s) Oral at bedtime  sertraline 50 milliGRAM(s) Oral daily  sodium chloride 0.9%. 1000 milliLiter(s) (80 mL/Hr) IV Continuous <Continuous>  trimethoprim  160 mG/sulfamethoxazole 800 mG 1 Tablet(s) Oral every 12 hours    MEDICATIONS  (PRN):  acetaminophen   Tablet .. 650 milliGRAM(s) Oral every 6 hours PRN Temp greater or equal to 38.5C (101.3F), Mild Pain (1 - 3)  hydrALAZINE Injectable 10 milliGRAM(s) IV Push every 3 hours PRN SBP>140  ondansetron Injectable 4 milliGRAM(s) IV Push every 6 hours PRN Nausea and/or Vomiting    PHYSICAL EXAMINATION    ICU Vital Signs Last 24 Hrs  T(C): 36.4 (2021 06:52), Max: 36.5 (2021 14:38)  T(F): 97.6 (2021 06:52), Max: 97.7 (2021 14:38)  HR: 76 (2021 06:00) (74 - 129)  BP: 128/60 (2021 06:00) (104/54 - 148/72)  BP(mean): 86 (2021 06:00) (75 - 99)  RR: 16 (2021 06:00) (15 - 20)  SpO2: 98% (2021 06:00) (94% - 100%)    NEUROLOGICAL EXAMINATION:  awake, alert, oriented, speech clear, BINTA, intact remote memory, decreased recent memory, frontal release, EOMI, VFF, face symmetric, no pronator drift, no [place], finger-to-nose intact, intact strength, left hemibody decreased sensation (baseline), no LE extremity drift  EENT:  supple, occipital hematoma  CARDIOVASCULAR:  S1S2 noS3S4 noM  PULMONARY:  clear to bases  ABDOMEN:  soft, nontender, intact bowel sounds  EXTREMITIES:  warm, PPP  SKIN:  no manifestations    I/O's    21 @ 07:01  -  21 @ 07:00  --------------------------------------------------------  IN: 780 mL / OUT: 800 mL / NET: -20 mL    LABS:               (7.8)             7.2    3.93  )-----------( 117      ( 2021 05:28 )             23.7         137  |  102  |  35<H>  ----------------------------<  91  4.6   |  27  |  1.17    Ca    9.9      2021 05:28  Phos  3.0       Mg     1.7         TPro  x   /  Alb  x   /  TBili  x   /  DBili  x   /  AST  66<H>  /  ALT  193<H>  /  AlkPhos  300<H>   (improving)    PT/INR - ( 2021 17:24 )   PT: 12.0 sec;   INR: 1.00     PTT - ( 2021 17:24 )  PTT:26.3 sec    Urinalysis Basic - ( 2021 23:47 )    Color: Yellow / Appearance: Clear / S.020 / pH: x  Gluc: x / Ketone: NEGATIVE  / Bili: Negative / Urobili: 0.2 E.U./dL   Blood: x / Protein: NEGATIVE mg/dL / Nitrite: NEGATIVE   Leuk Esterase: NEGATIVE / RBC: x / WBC x   Sq Epi: x / Non Sq Epi: x / Bacteria: x    CAPILLARY BLOOD GLUCOSE    POCT Blood Glucose.: 96 mg/dL (2021 06:46)  POCT Blood Glucose.: 127 mg/dL (2021 21:17)  POCT Blood Glucose.: 196 mg/dL (2021 19:26)  POCT Blood Glucose.: 169 mg/dL (2021 16:53)  POCT Blood Glucose.: 102 mg/dL (2021 11:22)    Culture - Urine (collected 21 @ 20:00)  Source: .Urine Clean Catch (Midstream)  Preliminary Report (21 @ 10:41):    No growth to date    2021 - U/S abdomen    2021 - TTE with bubble study    2021 - Dopplers    2021 - Redistribution of subarachnoid bleed along the falx cerebri on the left. Previous seen right subdural hematoma is not identified. Improvement of left scalp hematoma.    2021 - CT/CTA -  -hemorrhage in the medial aspect of left cerebral hemisphere extending into the rostrum of the corpus callosum and mid and lower portion of left frontal lobe. Large amount of posterior left scalp swelling. Patient is status post bifrontal craniotomy with aneurysm clip noted likely within the left anterior cerebral artery location. Aneurysm clip causes metallic artifact and poor visualization of regional portions of the brain.  There is faint visualization of the A1 segment on the left side. The A1 segment is not visualized on the right side. Very faint opacification of markedly diminished caliber of anterior cerebral arteries with possible segmental areas of occlusion involving the anterior cerebral arteries possibly etiological for hemorrhagic infarction within the anterior cerebral artery distribution on left side. There is some bowing to the right side of the anterior cerebral arteries due to the area of hemorrhage involving the medial cerebral hemispheres and rostrum of corpus callosum extending into left frontal lobe.    2021 - CT -  New left subarachnoid bleed identified within the paramedian left frontal lobe. There is also a new 2 mm right subdural hematoma and a small left lateral ventricle intraventricular bleed. Left scalp hematoma is also noted.  No cervical fracture, cervical spondylosis.    IV FLUIDS: []  DRIPS:  []  LINES:  []  DRAINS:  []  WOUNDS:  []    CODE STATUS:  [Full]  GOALS OF CARE:  [Aggressive]  DISPOSITION:  [ICU --> stroke service]    ***** ================================================   NEUROCRITICAL CARE ATTENDING NOTE (2021)  ================================================    NAME:  [FERNANDO ASHBY]  MRN:  [[id number]]    84 y/o F with pmh HTN, DM, HLD, hx aneurysm repair  with "metal plates", hx lung cancer s/p L pneumonectomy, on ASA 81mg (last dose 9:30am 21) presenting s/p fall with posterior head trauma found to have traumatic L frontal lobe SAH, 2mm R SDH, and left lateral IVH.   Underlying L LEBRON stenosis.    HPI:  84 y/o F with pmh HTN, DM, HLD, hx aneurysm repair  with "metal plates", hx lung cancer s/p L pneumonectomy, on ASA 81mg (last dose 9:30am) presents to ED s/p fall backwards onto a wooden floor with head trauma at 4:10pm. The fall was witnessed by patient's home aide who denies LOC. Patient does not remember falling today, but states that she fell 2 days ago and hit her head when getting up from the toilet with no LOC. Patient reports 4/10 in severity head pain and baseline full body left sided decreased sensation since her prior aneurysm repair. Patient's daughter reports a history of chronic bladder incontinence. Patient denies new extremity numbness or weakness, diplopia, blurry vision, dizziness, and headache or dizziness preceding her fall.   Patient is also currently being treated with Bactrim DS 1 tab BID x 10 days for klebsiella UTI with last dose scheduled for 21. Healthcare aide describes patient as experiencing tremors and hallucinations after Bactrim DS prior to her fall.  Patient's daughter is also allergic to sulpha based antibiotics (rash).    Hospital Course:   : RADHA overnight. Repeat head CT performed, stable. Neuro exam remains stable. CTA head/neck shows "diminished caliber of anterior cerebral arteries with possible segmental areas of occlusion involving the anterior cerebral arteries possibly etiological for hemorrhagic infarction within the anterior cerebral artery distribution on left side."   : RADHA overnight, neuro exam stable. CTH completed and shows improvement in SDH and resdistribution of SAH, no new or worsening hemorrhage. Pend SDU and TTE w/ bubble study per stroke/neurology for syncope work-up; patient gives Hx of persistent tinnitus with positional vertigo related to fall    Past Medical History:  High cholesterol  Diabetes  HTN (hypertension)  H/O cerebral aneurysm repair 1968  Personal history of spine surgery  S/P cholecystectomy  S/P removal of lung  left,  partial and  complete    Allergies:  Bactrim (sulfa) - hallucinations, tremors    Home Meds:   aspirin 81 mg oral tablet:  (2021 21:05)  Bactrim  mg-160 mg oral tablet: 1 tab(s) orally every 12 hours x 10 days, klebsiella UTI, last dose  (2021 21:05)  Centrum:  (2021 21:05)  Claritin:  (2021 21:05)  Colace 100 mg oral capsule: 1 cap(s) orally once a day (at bedtime) (2021 21:05)  folic acid 1 mg oral tablet: 1 tab(s) orally once a day (2021 21:05)  Lipitor 10 mg oral tablet: 1 tab(s) orally once a day (2021 21:05)  losartan 100 mg oral tablet: 1 tab(s) orally once a day (2021 21:05)  metFORMIN 500 mg oral tablet: 500  orally 2 times a day (2021 21:05)  omeprazole 20 mg oral delayed release capsule:  (2021 21:05)  sertraline 50 mg oral tablet: 1 tab(s) orally once a day (2021 21:05)  Vitamin B12 250 mcg oral tablet:  (2021 21:05)  Vitamin D3: 5000u  orally (2021 21:05)    Current Meds:  MEDICATIONS  (STANDING):  atorvastatin 10 milliGRAM(s) Oral at bedtime  cholecalciferol 5000 Unit(s) Oral daily  cyanocobalamin 250 MICROGram(s) Oral daily  folic acid 1 milliGRAM(s) Oral daily  insulin lispro (ADMELOG) corrective regimen sliding scale   SubCutaneous Before meals and at bedtime  levETIRAcetam  IVPB 500 milliGRAM(s) IV Intermittent every 12 hours  losartan 100 milliGRAM(s) Oral daily  pantoprazole    Tablet 40 milliGRAM(s) Oral before breakfast  senna 2 Tablet(s) Oral at bedtime  sertraline 50 milliGRAM(s) Oral daily  sodium chloride 0.9%. 1000 milliLiter(s) (80 mL/Hr) IV Continuous <Continuous>  trimethoprim  160 mG/sulfamethoxazole 800 mG 1 Tablet(s) Oral every 12 hours    MEDICATIONS  (PRN):  acetaminophen   Tablet .. 650 milliGRAM(s) Oral every 6 hours PRN Temp greater or equal to 38.5C (101.3F), Mild Pain (1 - 3)  hydrALAZINE Injectable 10 milliGRAM(s) IV Push every 3 hours PRN SBP>140  ondansetron Injectable 4 milliGRAM(s) IV Push every 6 hours PRN Nausea and/or Vomiting    PHYSICAL EXAMINATION    ICU Vital Signs Last 24 Hrs  T(C): 36.4 (2021 06:52), Max: 36.5 (2021 14:38)  T(F): 97.6 (2021 06:52), Max: 97.7 (2021 14:38)  HR: 76 (2021 06:00) (74 - 129)  BP: 128/60 (2021 06:00) (104/54 - 148/72)  BP(mean): 86 (2021 06:00) (75 - 99)  RR: 16 (2021 06:00) (15 - 20)  SpO2: 98% (2021 06:00) (94% - 100%)    NEUROLOGICAL EXAMINATION:  awake, alert, oriented, speech clear, BINTA, intact remote memory, decreased recent memory, frontal release, EOMI, VFF, face symmetric, no pronator drift, no [place], finger-to-nose intact, intact strength, left hemibody decreased sensation (baseline), no LE extremity drift  EENT:  supple, occipital hematoma  CARDIOVASCULAR:  S1S2 noS3S4 noM  PULMONARY:  clear to bases  ABDOMEN:  soft, nontender, intact bowel sounds  EXTREMITIES:  warm, PPP  SKIN:  no manifestations    I/O's    21 @ 07:01  -  21 @ 07:00  --------------------------------------------------------  IN: 780 mL / OUT: 800 mL / NET: -20 mL    LABS:               (7.8)             7.2    3.93  )-----------( 117      ( 2021 05:28 )             23.7         137  |  102  |  35<H>  ----------------------------<  91  4.6   |  27  |  1.17    Ca    9.9      2021 05:28  Phos  3.0       Mg     1.7         TPro  x   /  Alb  x   /  TBili  x   /  DBili  x   /  AST  66<H>  /  ALT  193<H>  /  AlkPhos  300<H>   (improving)    PT/INR - ( 2021 17:24 )   PT: 12.0 sec;   INR: 1.00     PTT - ( 2021 17:24 )  PTT:26.3 sec    Urinalysis Basic - ( 2021 23:47 )    Color: Yellow / Appearance: Clear / S.020 / pH: x  Gluc: x / Ketone: NEGATIVE  / Bili: Negative / Urobili: 0.2 E.U./dL   Blood: x / Protein: NEGATIVE mg/dL / Nitrite: NEGATIVE   Leuk Esterase: NEGATIVE / RBC: x / WBC x   Sq Epi: x / Non Sq Epi: x / Bacteria: x    CAPILLARY BLOOD GLUCOSE    POCT Blood Glucose.: 96 mg/dL (2021 06:46)  POCT Blood Glucose.: 127 mg/dL (2021 21:17)  POCT Blood Glucose.: 196 mg/dL (2021 19:26)  POCT Blood Glucose.: 169 mg/dL (2021 16:53)  POCT Blood Glucose.: 102 mg/dL (2021 11:22)    Culture - Urine (collected 21 @ 20:00)  Source: .Urine Clean Catch (Midstream)  Preliminary Report (21 @ 10:41):    No growth to date    2021 - U/S abdomen    2021 - TTE with bubble study    2021 - Mildly atrophic right kidney. Normal size left kidney. No hydronephrosis. A small cyst in each kidney.    2021 - Dopplers    2021 - Redistribution of subarachnoid bleed along the falx cerebri on the left. Previous seen right subdural hematoma is not identified. Improvement of left scalp hematoma.    2021 - CT/CTA -  -hemorrhage in the medial aspect of left cerebral hemisphere extending into the rostrum of the corpus callosum and mid and lower portion of left frontal lobe. Large amount of posterior left scalp swelling. Patient is status post bifrontal craniotomy with aneurysm clip noted likely within the left anterior cerebral artery location. Aneurysm clip causes metallic artifact and poor visualization of regional portions of the brain.  There is faint visualization of the A1 segment on the left side. The A1 segment is not visualized on the right side. Very faint opacification of markedly diminished caliber of anterior cerebral arteries with possible segmental areas of occlusion involving the anterior cerebral arteries possibly etiological for hemorrhagic infarction within the anterior cerebral artery distribution on left side. There is some bowing to the right side of the anterior cerebral arteries due to the area of hemorrhage involving the medial cerebral hemispheres and rostrum of corpus callosum extending into left frontal lobe.    2021 - CT -  New left subarachnoid bleed identified within the paramedian left frontal lobe. There is also a new 2 mm right subdural hematoma and a small left lateral ventricle intraventricular bleed. Left scalp hematoma is also noted.  No cervical fracture, cervical spondylosis.    IV FLUIDS: []  DRIPS:  []  LINES:  []  DRAINS:  []  WOUNDS:  []    CODE STATUS:  [Full]  GOALS OF CARE:  [Aggressive]  DISPOSITION:  [ICU --> [facility]]    *****

## 2021-02-07 NOTE — PROGRESS NOTE ADULT - ASSESSMENT
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       PLAN -     NEURO -   -neurochecks q1h  -traumatic L frontal SAH, SDH with IVH, baseline L LEBRON stenosis  -sBP < 160 mmHg parameter  -keppra 500 mg BID  -ASA held    CV -  -sBP < 160 mmHg goal  -losartan 100 mg daily  -telemetry NSR  -pending TTE bubble study    PUL -  -stable  -Hx of left pneumonectomy    ENDO -  -euglycemia goal  -insulin sliding scale  -lipitor 10 mg daily held secondary to increased LFTs, will trend    GI/ -  -downtrending Cr 1.1 (post IV hydration)  -euvolemia goal    DIET -   -dysphagia screen, diabetic diet as tolerated    HEM/ONC -  -Hb 9.7 stable    VTE Prophylaxis -  -SCDs  -SQL as per NSx    ID -  -afebrile, no leukocytosis     Social -  -updated patient and daughter    *****    CORE MEASURES    ICH  1.  NIHSS = 1, H&H = 1  2.  ICH Score = 1  3.  VTE Prophylaxis:  [] administered within 24 hours of admission OR [X] reason not done: acute bleed  4.  Dysphagia screening:  [] performed before any oral meds / liquids / food    *****    Quality Measures    [facility]    *****    ATTENDING ATTESTATION:  I was physically present for the key portions of the evaluation and management (E/M) service provided.  I agree with the above history, physical and plan, which I have reviewed and edited where appropriate.    Patient at high risk for neurological deterioriation or death due to:  bleeding, seizures, infection, DVT.  Critical Care Time:  I have personally provided 45 minutes of critical care time excluding time spent on separate procedures.   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       PLAN -     NEURO -   -neurochecks q1h  -traumatic L frontal SAH, SDH with IVH, baseline L LEBRON stenosis  -sBP < 160 mmHg parameter  -keppra 500 mg BID  -ASA held  -neurology workup for tinnitus/positional vertigo related to fall    CV -  -sBP < 160 mmHg goal  -losartan 100 mg daily  -telemetry NSR  -pending TTE bubble study, as suggested by stroke neurology    PUL -  -stable  -Hx of left pneumonectomy    ENDO -  -euglycemia goal  -insulin sliding scale  -lipitor 10 mg daily held secondary to increased LFTs, will trend    GI/ -  -downtrending Cr 1.1 (post IV hydration)  -euvolemia goal    DIET -   -dysphagia screen, diabetic diet as tolerated    HEM/ONC -  -Hb 9.7 stable    VTE Prophylaxis -  -SCDs  -SQL as per NSx    ID -  -afebrile, no leukocytosis     Social -  -updated patient and daughter    *****    CORE MEASURES    ICH  1.  NIHSS = 1, H&H = 1  2.  ICH Score = 1  3.  VTE Prophylaxis:  [] administered within 24 hours of admission OR [X] reason not done: acute bleed  4.  Dysphagia screening:  [] performed before any oral meds / liquids / food    *****    Quality Measures    [place] [facility] [place]    ATTENDING ATTESTATION:  I was physically present for the key portions of the evaluation and management (E/M) service provided.  I agree with the above history, physical and plan, which I have reviewed and edited where appropriate.    Patient at high risk for neurological deterioriation or death due to:  bleeding, seizures, infection, DVT.  Critical Care Time:  I have personally provided 45 minutes of critical care time excluding time spent on separate procedures.   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       PLAN -     NEURO -   -neurochecks q1h  -traumatic L frontal SAH, SDH with IVH, baseline L LEBRON stenosis  -sBP < 160 mmHg parameter  -keppra 500 mg BID  -ASA held  -neurology workup for tinnitus/positional vertigo related to fall    CV -  -sBP < 160 mmHg goal  -losartan 100 mg daily  -telemetry NSR  -pending TTE bubble study, as suggested by stroke neurology    PUL -  -stable  -Hx of left pneumonectomy    ENDO -  -euglycemia goal  -insulin sliding scale  -lipitor 10 mg daily held secondary to increased LFTs, will trend  -Fe, B12, folate studies pending    GI/ -  -Na 135-140 goal  -downtrending Cr 1.1 (post IV hydration)  -euvolemia goal    DIET -   -dysphagia screen, diabetic diet as tolerated    HEM/ONC -  -Hb 9.7 stable    VTE Prophylaxis -  -SCDs  -SQL as per NSx    ID -  -afebrile, no leukocytosis     Social -  -updated patient and daughter    *****    CORE MEASURES    ICH  1.  NIHSS = 1, H&H = 1  2.  ICH Score = 1  3.  VTE Prophylaxis:  [] administered within 24 hours of admission OR [X] reason not done: acute bleed  4.  Dysphagia screening:  [] performed before any oral meds / liquids / food    *****    Quality Measures    [place] [facility] [place]    ATTENDING ATTESTATION:  I was physically present for the key portions of the evaluation and management (E/M) service provided.  I agree with the above history, physical and plan, which I have reviewed and edited where appropriate.    Patient at high risk for neurological deterioriation or death due to:  bleeding, seizures, infection, DVT.  Critical Care Time:  I have personally provided 45 minutes of critical care time excluding time spent on separate procedures.   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       PLAN -     NEURO -   -neurochecks q1h  -traumatic L frontal SAH, SDH with IVH, baseline L LEBRON stenosis  -sBP < 160 mmHg parameter  -keppra 500 mg BID  -ASA held  -neurology workup for tinnitus/positional vertigo related to fall, re:  meclizine prn    CV -  -sBP < 160 mmHg goal  -losartan 100 mg daily  -telemetry NSR  -pending TTE bubble study, as suggested by stroke neurology    PUL -  -stable  -Hx of left pneumonectomy    ENDO -  -euglycemia goal  -insulin sliding scale  -lipitor 10 mg daily held secondary to increased LFTs, will trend  -Fe, B12, folate studies pending    GI/ -  -Na 135-140 goal  -downtrending Cr 1.1 (post IV hydration)  -euvolemia goal    DIET -   -dysphagia screen, diabetic diet as tolerated    HEM/ONC -  -Hb 9.7 --> 7.2  -fecal occult blood pending, Fe studies pending    VTE Prophylaxis -  -SCDs  -SQL as per NSx    ID -  -afebrile, no leukocytosis     Social -  -updated patient and daughter    *****    CORE MEASURES    ICH  1.  NIHSS = 1, H&H = 1  2.  ICH Score = 1  3.  VTE Prophylaxis:  [] administered within 24 hours of admission OR [X] reason not done: acute bleed  4.  Dysphagia screening:  [] performed before any oral meds / liquids / food    *****    Quality Measures    [facility]    *****    ATTENDING ATTESTATION:  I was physically present for the key portions of the evaluation and management (E/M) service provided.  I agree with the above history, physical and plan, which I have reviewed and edited where appropriate.    Patient at high risk for neurological deterioriation or death due to:  bleeding, seizures, infection, DVT.  Critical Care Time:  I have personally provided 45 minutes of critical care time excluding time spent on separate procedures.   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       PLAN -     NEURO -   -neurochecks q1h  -traumatic L frontal SAH, SDH with IVH, baseline L LEBRON stenosis  -sBP < 160 mmHg parameter  -keppra 500 mg BID  -ASA held  -neurology workup for tinnitus/positional vertigo related to fall, re:  meclizine prn    CV -  -sBP < 160 mmHg goal  -losartan 100 mg daily  -telemetry NSR  -pending TTE bubble study, as suggested by stroke neurology    PUL -  -stable  -Hx of left pneumonectomy    ENDO -  -euglycemia goal  -insulin sliding scale  -lipitor 10 mg daily held secondary to increased LFTs, will trend  -Fe, B12, folate studies pending    GI/ -  -Na 135-140 goal  -downtrending Cr 1.1 (post IV hydration)  -euvolemia goal    DIET -   -dysphagia screen, diabetic diet as tolerated    HEM/ONC -  -Hb 9.7 --> 7.2  -fecal occult blood pending, Fe studies pending    VTE Prophylaxis -  -SCDs  -hold SQL given Hb  -assess daily    ID -  -afebrile, no leukocytosis     Social -  -updated patient and daughter    *****    CORE MEASURES    ICH  1.  NIHSS = 1, H&H = 1  2.  ICH Score = 1  3.  VTE Prophylaxis:  [] administered within 24 hours of admission OR [X] reason not done: acute bleed  4.  Dysphagia screening:  [] performed before any oral meds / liquids / food    *****    Quality Measures    [place] [facility] [place]    ATTENDING ATTESTATION:  I was physically present for the key portions of the evaluation and management (E/M) service provided.  I agree with the above history, physical and plan, which I have reviewed and edited where appropriate.    Patient at high risk for neurological deterioriation or death due to:  bleeding, seizures, infection, DVT.  Critical Care Time:  I have personally provided 45 minutes of critical care time excluding time spent on separate procedures.   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       PLAN -     NEURO -   -neurochecks q1h  -traumatic L frontal SAH, SDH with IVH, baseline L LEBRON stenosis  -sBP < 160 mmHg parameter  -keppra 500 mg BID  -ASA held  -neurology workup for tinnitus/positional vertigo related to fall, re:  meclizine prn; vs. neurocardiac syncope  -pre-fall reaction to Bactrim (tremors and halluciations)    CV -  -sBP < 160 mmHg goal  -losartan 100 mg daily  -telemetry NSR  -pending TTE bubble study, as suggested by stroke neurology    PUL -  -stable  -Hx of left pneumonectomy    ENDO -  -euglycemia goal  -insulin sliding scale  -lipitor 10 mg daily held secondary to increased LFTs, will trend  -Fe, B12, folate studies pending    GI/ -  -Na 135-140 goal  -downtrending Cr 1.1 (post IV hydration)  -euvolemia goal    DIET -   -dysphagia screen, diabetic diet as tolerated    HEM/ONC -  -Hb 9.7 --> 7.2  -fecal occult blood pending, Fe studies pending    VTE Prophylaxis -  -SCDs  -hold SQL given Hb  -assess daily    ID -  -afebrile, no leukocytosis     Social -  -updated patient and daughter    *****    CORE MEASURES    ICH  1.  NIHSS = 1, H&H = 1  2.  ICH Score = 1  3.  VTE Prophylaxis:  [] administered within 24 hours of admission OR [X] reason not done: acute bleed  4.  Dysphagia screening:  [] performed before any oral meds / liquids / food    *****    Quality Measures    FAST [facility]    *****    ATTENDING ATTESTATION:  I was physically present for the key portions of the evaluation and management (E/M) service provided.  I agree with the above history, physical and plan, which I have reviewed and edited where appropriate.    Patient at high risk for neurological deterioriation or death due to:  bleeding, seizures, infection, DVT.  Critical Care Time:  I have personally provided 45 minutes of critical care time excluding time spent on separate procedures.

## 2021-02-07 NOTE — DIETITIAN INITIAL EVALUATION ADULT. - ADD RECOMMEND
1. Pain and bowel regime per team 2. Cont to monitor %PO intake 3. Cont to monitor lytes and replet prn 4. Tight BS control 5. RD diet edu prn

## 2021-02-07 NOTE — DIETITIAN INITIAL EVALUATION ADULT. - OTHER CALCULATIONS
IBW used for calculations as pt >120% of IBW (102%). Needs adjusted for advanced age. IBW used for calculations as pt >120% of IBW (102%). Needs adjusted for advanced age and wound healing.

## 2021-02-08 LAB
ANION GAP SERPL CALC-SCNC: 10 MMOL/L — SIGNIFICANT CHANGE UP (ref 5–17)
ANION GAP SERPL CALC-SCNC: 9 MMOL/L — SIGNIFICANT CHANGE UP (ref 5–17)
BUN SERPL-MCNC: 22 MG/DL — SIGNIFICANT CHANGE UP (ref 7–23)
BUN SERPL-MCNC: 30 MG/DL — HIGH (ref 7–23)
CALCIUM SERPL-MCNC: 10.5 MG/DL — SIGNIFICANT CHANGE UP (ref 8.4–10.5)
CALCIUM SERPL-MCNC: 8 MG/DL — LOW (ref 8.4–10.5)
CHLORIDE SERPL-SCNC: 110 MMOL/L — HIGH (ref 96–108)
CHLORIDE SERPL-SCNC: 99 MMOL/L — SIGNIFICANT CHANGE UP (ref 96–108)
CO2 SERPL-SCNC: 18 MMOL/L — LOW (ref 22–31)
CO2 SERPL-SCNC: 25 MMOL/L — SIGNIFICANT CHANGE UP (ref 22–31)
CREAT SERPL-MCNC: 0.85 MG/DL — SIGNIFICANT CHANGE UP (ref 0.5–1.3)
CREAT SERPL-MCNC: 1.09 MG/DL — SIGNIFICANT CHANGE UP (ref 0.5–1.3)
GLUCOSE BLDC GLUCOMTR-MCNC: 106 MG/DL — HIGH (ref 70–99)
GLUCOSE BLDC GLUCOMTR-MCNC: 141 MG/DL — HIGH (ref 70–99)
GLUCOSE BLDC GLUCOMTR-MCNC: 158 MG/DL — HIGH (ref 70–99)
GLUCOSE BLDC GLUCOMTR-MCNC: 209 MG/DL — HIGH (ref 70–99)
GLUCOSE SERPL-MCNC: 102 MG/DL — HIGH (ref 70–99)
GLUCOSE SERPL-MCNC: 164 MG/DL — HIGH (ref 70–99)
HCT VFR BLD CALC: 28.5 % — LOW (ref 34.5–45)
HGB BLD-MCNC: 8.7 G/DL — LOW (ref 11.5–15.5)
MAGNESIUM SERPL-MCNC: 1.9 MG/DL — SIGNIFICANT CHANGE UP (ref 1.6–2.6)
MCHC RBC-ENTMCNC: 24.8 PG — LOW (ref 27–34)
MCHC RBC-ENTMCNC: 30.5 GM/DL — LOW (ref 32–36)
MCV RBC AUTO: 81.2 FL — SIGNIFICANT CHANGE UP (ref 80–100)
NRBC # BLD: 0 /100 WBCS — SIGNIFICANT CHANGE UP (ref 0–0)
PHOSPHATE SERPL-MCNC: 3 MG/DL — SIGNIFICANT CHANGE UP (ref 2.5–4.5)
PLATELET # BLD AUTO: 149 K/UL — LOW (ref 150–400)
POTASSIUM SERPL-MCNC: 4.1 MMOL/L — SIGNIFICANT CHANGE UP (ref 3.5–5.3)
POTASSIUM SERPL-MCNC: 4.6 MMOL/L — SIGNIFICANT CHANGE UP (ref 3.5–5.3)
POTASSIUM SERPL-SCNC: 4.1 MMOL/L — SIGNIFICANT CHANGE UP (ref 3.5–5.3)
POTASSIUM SERPL-SCNC: 4.6 MMOL/L — SIGNIFICANT CHANGE UP (ref 3.5–5.3)
RBC # BLD: 3.51 M/UL — LOW (ref 3.8–5.2)
RBC # FLD: 16.6 % — HIGH (ref 10.3–14.5)
SODIUM SERPL-SCNC: 133 MMOL/L — LOW (ref 135–145)
SODIUM SERPL-SCNC: 138 MMOL/L — SIGNIFICANT CHANGE UP (ref 135–145)
WBC # BLD: 5.05 K/UL — SIGNIFICANT CHANGE UP (ref 3.8–10.5)
WBC # FLD AUTO: 5.05 K/UL — SIGNIFICANT CHANGE UP (ref 3.8–10.5)

## 2021-02-08 PROCEDURE — 99233 SBSQ HOSP IP/OBS HIGH 50: CPT | Mod: 25

## 2021-02-08 PROCEDURE — 99232 SBSQ HOSP IP/OBS MODERATE 35: CPT

## 2021-02-08 PROCEDURE — 93306 TTE W/DOPPLER COMPLETE: CPT | Mod: 26

## 2021-02-08 PROCEDURE — 99233 SBSQ HOSP IP/OBS HIGH 50: CPT

## 2021-02-08 RX ORDER — MAGNESIUM OXIDE 400 MG ORAL TABLET 241.3 MG
400 TABLET ORAL ONCE
Refills: 0 | Status: COMPLETED | OUTPATIENT
Start: 2021-02-08 | End: 2021-02-08

## 2021-02-08 RX ORDER — POLYETHYLENE GLYCOL 3350 17 G/17G
17 POWDER, FOR SOLUTION ORAL AT BEDTIME
Refills: 0 | Status: DISCONTINUED | OUTPATIENT
Start: 2021-02-08 | End: 2021-02-09

## 2021-02-08 RX ORDER — ENOXAPARIN SODIUM 100 MG/ML
40 INJECTION SUBCUTANEOUS EVERY 24 HOURS
Refills: 0 | Status: DISCONTINUED | OUTPATIENT
Start: 2021-02-08 | End: 2021-02-09

## 2021-02-08 RX ORDER — SODIUM CHLORIDE 9 MG/ML
1000 INJECTION INTRAMUSCULAR; INTRAVENOUS; SUBCUTANEOUS ONCE
Refills: 0 | Status: COMPLETED | OUTPATIENT
Start: 2021-02-08 | End: 2021-02-08

## 2021-02-08 RX ORDER — ACETAMINOPHEN 500 MG
650 TABLET ORAL EVERY 6 HOURS
Refills: 0 | Status: DISCONTINUED | OUTPATIENT
Start: 2021-02-08 | End: 2021-02-09

## 2021-02-08 RX ADMIN — Medication 2: at 22:29

## 2021-02-08 RX ADMIN — LEVETIRACETAM 500 MILLIGRAM(S): 250 TABLET, FILM COATED ORAL at 17:14

## 2021-02-08 RX ADMIN — Medication 1 TABLET(S): at 12:26

## 2021-02-08 RX ADMIN — LOSARTAN POTASSIUM 100 MILLIGRAM(S): 100 TABLET, FILM COATED ORAL at 06:11

## 2021-02-08 RX ADMIN — Medication 1 MILLIGRAM(S): at 12:26

## 2021-02-08 RX ADMIN — ENOXAPARIN SODIUM 40 MILLIGRAM(S): 100 INJECTION SUBCUTANEOUS at 22:11

## 2021-02-08 RX ADMIN — Medication 650 MILLIGRAM(S): at 08:23

## 2021-02-08 RX ADMIN — Medication 4: at 17:14

## 2021-02-08 RX ADMIN — MAGNESIUM OXIDE 400 MG ORAL TABLET 400 MILLIGRAM(S): 241.3 TABLET ORAL at 07:20

## 2021-02-08 RX ADMIN — PREGABALIN 250 MICROGRAM(S): 225 CAPSULE ORAL at 12:26

## 2021-02-08 RX ADMIN — POLYETHYLENE GLYCOL 3350 17 GRAM(S): 17 POWDER, FOR SOLUTION ORAL at 22:11

## 2021-02-08 RX ADMIN — SERTRALINE 50 MILLIGRAM(S): 25 TABLET, FILM COATED ORAL at 12:26

## 2021-02-08 RX ADMIN — Medication 10 MILLIGRAM(S): at 07:19

## 2021-02-08 RX ADMIN — PANTOPRAZOLE SODIUM 40 MILLIGRAM(S): 20 TABLET, DELAYED RELEASE ORAL at 06:11

## 2021-02-08 RX ADMIN — Medication 650 MILLIGRAM(S): at 19:37

## 2021-02-08 RX ADMIN — SENNA PLUS 2 TABLET(S): 8.6 TABLET ORAL at 22:12

## 2021-02-08 RX ADMIN — Medication 5000 UNIT(S): at 12:27

## 2021-02-08 RX ADMIN — LEVETIRACETAM 500 MILLIGRAM(S): 250 TABLET, FILM COATED ORAL at 06:11

## 2021-02-08 RX ADMIN — SODIUM CHLORIDE 200 MILLILITER(S): 9 INJECTION INTRAMUSCULAR; INTRAVENOUS; SUBCUTANEOUS at 12:26

## 2021-02-08 NOTE — PROGRESS NOTE ADULT - SUBJECTIVE AND OBJECTIVE BOX
OVERNIGHT EVENTS: RADHA    SUBJECTIVE / INTERVAL HPI: Patient seen and examined at bedside. Patient feels tired and reports occasional dizziness upon moving. Denies HAs. Rest of ROS neg.     VITAL SIGNS:  Vital Signs Last 24 Hrs  T(C): 36.4 (2021 09:30), Max: 36.7 (2021 15:22)  T(F): 97.6 (2021 09:30), Max: 98.1 (2021 21:46)  HR: 100 (2021 10:00) (77 - 116)  BP: 101/55 (2021 10:00) (101/55 - 159/65)  BP(mean): 75 (2021 10:00) (72 - 120)  RR: 18 (2021 10:00) (16 - 18)  SpO2: 98% (2021 10:00) (95% - 100%)    PHYSICAL EXAM:  Physical exam:  General: No acute distress, awake and alert  Cardiovascular: Regular rate and rhythm, no murmurs, rubs, or gallops  Pulmonary: Anterior breath sounds clear bilaterally, no crackles or wheezing. No use of accessory muscles  Extremities: Radial and DP pulses +2, no edema    Neurologic:  -Mental status: Awake, alert, oriented to person, place, and time. Speech is fluent with intact naming, repetition, and comprehension, no dysarthria. Recent and remote memory intact. Follows commands. Attention/concentration intact. Fund of knowledge appropriate.  -Cranial nerves:   II: Visual fields are full to confrontation.  III, IV, VI: Extraocular movements are intact without nystagmus. Pupils equally round and reactive to light  V: Decreased left facial sensation in V1-V3 distribution   VII: Face is symmetric with normal eye closure and smile  VIII: Hard of hearing   IX, X: Uvula is midline and soft palate rises symmetrically  XI: Head turning and shoulder shrug are intact.  XII: Tongue protrudes midline  Motor: Normal bulk and tone. No pronator drift. Strength bilateral upper extremity 4/5, bilateral lower extremities 3/5.  Sensation: Decreased sensation in LUE and LLE   Coordination: No dysmetria on finger-to-nose   Reflexes: Downgoing toes bilaterally. 2+ patellar/biceps DTRs        MEDICATIONS:  MEDICATIONS  (STANDING):  cholecalciferol 5000 Unit(s) Oral daily  cyanocobalamin 250 MICROGram(s) Oral daily  dextrose 40% Gel 15 Gram(s) Oral once  dextrose 5%. 1000 milliLiter(s) (50 mL/Hr) IV Continuous <Continuous>  dextrose 5%. 1000 milliLiter(s) (100 mL/Hr) IV Continuous <Continuous>  dextrose 50% Injectable 25 Gram(s) IV Push once  dextrose 50% Injectable 12.5 Gram(s) IV Push once  dextrose 50% Injectable 25 Gram(s) IV Push once  folic acid 1 milliGRAM(s) Oral daily  glucagon  Injectable 1 milliGRAM(s) IntraMuscular once  insulin lispro (ADMELOG) corrective regimen sliding scale   SubCutaneous Before meals and at bedtime  levETIRAcetam 500 milliGRAM(s) Oral every 12 hours  losartan 100 milliGRAM(s) Oral daily  multivitamin 1 Tablet(s) Oral daily  pantoprazole    Tablet 40 milliGRAM(s) Oral before breakfast  polyethylene glycol 3350 17 Gram(s) Oral at bedtime  senna 2 Tablet(s) Oral at bedtime  sertraline 50 milliGRAM(s) Oral daily    MEDICATIONS  (PRN):  acetaminophen   Tablet .. 650 milliGRAM(s) Oral every 6 hours PRN Temp greater or equal to 38C (100.4F), Mild Pain (1 - 3)  hydrALAZINE Injectable 10 milliGRAM(s) IV Push every 3 hours PRN SBP>140  ondansetron Injectable 4 milliGRAM(s) IV Push every 6 hours PRN Nausea and/or Vomiting      ALLERGIES:  Allergies    No Known Allergies    Intolerances        LABS:                        8.7    5.05  )-----------( 149      ( 2021 06:06 )             28.5     02-08    133<L>  |  99  |  30<H>  ----------------------------<  102<H>  4.6   |  25  |  1.09    Ca    10.5      2021 06:06  Phos  3.0     02-08  Mg     1.9     02-08    TPro  x   /  Alb  x   /  TBili  x   /  DBili  x   /  AST  66<H>  /  ALT  193<H>  /  AlkPhos  300<H>  02-07      Urinalysis Basic - ( 2021 23:47 )    Color: Yellow / Appearance: Clear / S.020 / pH: x  Gluc: x / Ketone: NEGATIVE  / Bili: Negative / Urobili: 0.2 E.U./dL   Blood: x / Protein: NEGATIVE mg/dL / Nitrite: NEGATIVE   Leuk Esterase: NEGATIVE / RBC: x / WBC x   Sq Epi: x / Non Sq Epi: x / Bacteria: x      CAPILLARY BLOOD GLUCOSE      POCT Blood Glucose.: 106 mg/dL (2021 07:05)      RADIOLOGY & ADDITIONAL TESTS: Reviewed.

## 2021-02-08 NOTE — PROGRESS NOTE ADULT - SUBJECTIVE AND OBJECTIVE BOX
================================================   NEUROCRITICAL CARE ATTENDING NOTE  ================================================    NAME:  [FERNANDO ASHBY]  MRN:  [[id number]]      HPI:  86 y/o F with pmh HTN, DM, HLD, hx aneurysm repair  with "metal plates", hx lung cancer s/p L pneumonectomy, on ASA 81mg (last dose 9:30am) presents to ED s/p fall backwards onto a wooden floor with head trauma at 4:10pm. The fall was witnessed by patient's home aide who denies LOC. Patient does not remember falling today, but states that she fell 2 days ago and hit her head when getting up from the toilet with no LOC. Patient reports 4/10 in severity head pain and baseline full body left sided decreased sensation since her prior aneurysm repair. Patient's daughter reports a history of chronic bladder incontinence. Patient denies new extremity numbness or weakness, diplopia, blurry vision, dizziness, and headache or dizziness preceding her fall.   Patient is also currently being treated with Bactrim DS 1 tab BID x 10 days for klebsiella UTI with last dose scheduled for 21. Healthcare aide describes patient as experiencing tremors and hallucinations after Bactrim DS prior to her fall.  Patient's daughter is also allergic to sulpha based antibiotics (rash).    Past Medical History:  High cholesterol  Diabetes  HTN (hypertension)  H/O cerebral aneurysm repair   Personal history of spine surgery  S/P cholecystectomy  S/P removal of lung  left,  partial and  complete    Hospital Course:   : RADHA overnight. Repeat head CT performed, stable. Neuro exam remains stable. CTA head/neck shows "diminished caliber of anterior cerebral arteries with possible segmental areas of occlusion involving the anterior cerebral arteries possibly etiological for hemorrhagic infarction within the anterior cerebral artery distribution on left side."   : RADHA overnight, neuro exam stable. CTH completed and shows improvement in SDH and resdistribution of SAH, no new or worsening hemorrhage. Pend SDU and TTE w/ bubble study per stroke/neurology for syncope work-up; patient gives Hx of persistent tinnitus with positional vertigo related to fall    24h events: breif atrial tachycardia, self resolved.  Had stabiliy scan, strable.        NEUROLOGICAL EXAMINATION:  awake, alert, oriented, speech clear, BINTA  Face symmetric, no pronator drift, intact strength, left hemibody decreased sensation (baseline),  CARDIOVASCULAR: RRR  PULMONARY:  clear to bases  ABDOMEN:  soft, nontender, intact bowel sounds  EXTREMITIES:  warm, PPP  SKIN:  no manifestations    Allergies: No Known Allergies      EXAM:  VITALS:  T(C): 36.4 (21 @ 09:30), Max: 36.7 (21 @ 15:22)  HR: 96 (21 @ 11:00) (77 - 116)  BP: 112/54 (21 @ 11:00) (101/55 - 159/65)  RR: 18 (21 @ 11:00) (16 - 18)  SpO2: 98% (21 @ 11:00) (95% - 100%)    MEDICATIONS:  acetaminophen   Tablet .. 650 milliGRAM(s) Oral every 6 hours PRN  cholecalciferol 5000 Unit(s) Oral daily  cyanocobalamin 250 MICROGram(s) Oral daily  dextrose 40% Gel 15 Gram(s) Oral once  dextrose 5%. 1000 milliLiter(s) IV Continuous <Continuous>  dextrose 5%. 1000 milliLiter(s) IV Continuous <Continuous>  dextrose 50% Injectable 25 Gram(s) IV Push once  dextrose 50% Injectable 12.5 Gram(s) IV Push once  dextrose 50% Injectable 25 Gram(s) IV Push once  folic acid 1 milliGRAM(s) Oral daily  glucagon  Injectable 1 milliGRAM(s) IntraMuscular once  hydrALAZINE Injectable 10 milliGRAM(s) IV Push every 3 hours PRN  insulin lispro (ADMELOG) corrective regimen sliding scale   SubCutaneous Before meals and at bedtime  levETIRAcetam 500 milliGRAM(s) Oral every 12 hours  losartan 100 milliGRAM(s) Oral daily  multivitamin 1 Tablet(s) Oral daily  ondansetron Injectable 4 milliGRAM(s) IV Push every 6 hours PRN  pantoprazole    Tablet 40 milliGRAM(s) Oral before breakfast  polyethylene glycol 3350 17 Gram(s) Oral at bedtime  senna 2 Tablet(s) Oral at bedtime  sertraline 50 milliGRAM(s) Oral daily    I/O's    21 @ 07:01  -  21 @ 07:00  --------------------------------------------------------  IN: 480 mL / OUT: 700 mL / NET: -220 mL        Ventilator data:      LABS:                          8.7    5.05  )-----------( 149      ( 2021 06:06 )             28.5         133<L>  |  99  |  30<H>  ----------------------------<  102<H>  4.6   |  25  |  1.09    Ca    10.5      2021 06:06  Phos  3.0       Mg     1.9         TPro  x   /  Alb  x   /  TBili  x   /  DBili  x   /  AST  66<H>  /  ALT  193<H>  /  AlkPhos  300<H>        Urinalysis Basic - ( 2021 23:47 )    Color: Yellow / Appearance: Clear / S.020 / pH: x  Gluc: x / Ketone: NEGATIVE  / Bili: Negative / Urobili: 0.2 E.U./dL   Blood: x / Protein: NEGATIVE mg/dL / Nitrite: NEGATIVE   Leuk Esterase: NEGATIVE / RBC: x / WBC x   Sq Epi: x / Non Sq Epi: x / Bacteria: x          CAPILLARY BLOOD GLUCOSE      POCT Blood Glucose.: 106 mg/dL (2021 07:05)  POCT Blood Glucose.: 161 mg/dL (2021 21:41)  POCT Blood Glucose.: 111 mg/dL (2021 16:56)          2021 - TTE with bubble study    2021 - Mildly atrophic right kidney. Normal size left kidney. No hydronephrosis. A small cyst in each kidney.    2021 - Dopplers    2021 - Redistribution of subarachnoid bleed along the falx cerebri on the left. Previous seen right subdural hematoma is not identified. Improvement of left scalp hematoma.    2021 - CT/CTA -  -hemorrhage in the medial aspect of left cerebral hemisphere extending into the rostrum of the corpus callosum and mid and lower portion of left frontal lobe. Large amount of posterior left scalp swelling. Patient is status post bifrontal craniotomy with aneurysm clip noted likely within the left anterior cerebral artery location. Aneurysm clip causes metallic artifact and poor visualization of regional portions of the brain.  There is faint visualization of the A1 segment on the left side. The A1 segment is not visualized on the right side. Very faint opacification of markedly diminished caliber of anterior cerebral arteries with possible segmental areas of occlusion involving the anterior cerebral arteries possibly etiological for hemorrhagic infarction within the anterior cerebral artery distribution on left side. There is some bowing to the right side of the anterior cerebral arteries due to the area of hemorrhage involving the medial cerebral hemispheres and rostrum of corpus callosum extending into left frontal lobe.    2021 - CT -  New left subarachnoid bleed identified within the paramedian left frontal lobe. There is also a new 2 mm right subdural hematoma and a small left lateral ventricle intraventricular bleed. Left scalp hematoma is also noted.  No cervical fracture, cervical spondylosis.    IV FLUIDS: []  DRIPS:  []  LINES:  []  DRAINS:  []  WOUNDS:  []

## 2021-02-08 NOTE — PROGRESS NOTE ADULT - ASSESSMENT
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  Neurology consult for gait imbalance.     #Gait imbalance   Likely chronic. Pt uses walker at home, has HHAs. May be 2/2 positional vertigo vs orthostatic hypotension vs deconditioned. Does admit to occasional dizziness with movement which may have contributed to fall. Before falling in the bathroom she does not remember if she felt dizzy, lightheaded or had palpitations.   -Recommend PT consult. Also consider vestibular rehab outpatient   -Rest of management per neurosurgery     Discussed with attending Dr. Farah. Neurology to sign off.    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  Neurology consulted for gait imbalance.     #Gait imbalance   Likely chronic. Pt uses walker at home, has HHAs. May be 2/2 positional vertigo vs orthostatic hypotension vs deconditioned. Does admit to occasional dizziness with movement which may have contributed to fall. Before falling in the bathroom she does not remember if she felt dizzy, lightheaded or had palpitations.   -Recommend PT consult. Also consider vestibular rehab outpatient   -Check orthostatics   -Rest of management per neurosurgery     Discussed with attending Dr. Farah. Neurology to sign off.    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  Neurology consulted for gait imbalance.     #Gait imbalance   Likely chronic. Pt uses walker at home, has HHAs. May be 2/2 positional vertigo vs orthostatic hypotension vs deconditioned. Does admit to occasional dizziness with movement which may have contributed to fall. Before falling in the bathroom she does not remember if she felt dizzy, lightheaded or had palpitations.   -Recommend PT consult  -Check orthostatics   -Rest of management per neurosurgery     Discussed with attending Dr. Farah. Neurology to sign off.

## 2021-02-08 NOTE — PROGRESS NOTE ADULT - ATTENDING COMMENTS
Fall with head trauma and intracranial hemorrhage (parenchymal, subdural, intraventricular)   Likely mechanical fall or less likely syncope   Neurologic exam is currently unremarkable   She is completely oriented and appropriate  No headache  Some residual dizziness, nonspecific, not clearly vertigo and no nystagmus on exam  Recommend PT consult, check orthostatics to r/o orthostatic hypotension   Neurology will sign off, call back with further questions

## 2021-02-08 NOTE — PROGRESS NOTE ADULT - ASSESSMENT
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     NEURO -   -neurochecks q4h, neurologicallyu sdtable at baseline  -traumatic L frontal SAH, SDH with IVH, baseline L LEBRON stenosis  -sBP < 160 mmHg parameter  -keppra 500 mg BID --> stop after 1 week  -ASA held --> resume after 2-4 week  -neurology workup for tinnitus/positional vertigo related to fall, re:  meclizine prn; vs. neurocardiac syncope --> no evidence of LOC, possible vertigo.     CV -  -sBP < 160 mmHg goal  -losartan 100 mg daily  -telemetry NSR --> continue on SDU  -pending TTE bubble study, as suggested by stroke neurology    PUL -  -stable  -Hx of left pneumonectomy    ENDO -  -euglycemia goal  -insulin sliding scale  -lipitor 10 mg daily held secondary to increased LFTs, will trend  -Fe, B12, folate studies pending    GI/ -  -Na 135-145  -downtrending Cr 1.1   -euvolemia goal  -NS bolus x1  hyperechoic liver, nonspecific  atrophic right kidney    DIET -   -dysphagia screen, diabetic diet as tolerated    HEM/ONC -  -Hb 9.7 --> 7.2  -fecal occult blood pending, Fe studies pending    VTE Prophylaxis -  -SCDs  -stability scan OK, start SQL tonight if remains clinically stable    ID -  -afebrile, no leukocytosis     Dispo: to SDU today    Discharge Checklist:    Completed: 02-08 @ 11:52    [X] hemodynamically stable – VS WNL and stable x 24hours, UO adequate  [n/a ] if  previously on HDA - off pressors x 24h with stable neuro exam    [X] no new symptoms x 24h (i.e. new fever, new-onset nausea/vomiting)  [X] stable labs: (i.e. WBC not rising, sodium not dropping)  [n/a] if high aspiration risk (modified MASA score):                  [ ] should have definitive plans for trach/PEG (alternative option is to discharge from ICU to facilty)                  [ ] stepdown to bed close to nurse’s station  [n/a] low suctioning requirements (i.e. q4h or less)  [X] sign-off from primary RN*  [X] drains do not require ICU level of care  [X] if patient previously agitated or with behavioral issues – controlled   [X] pain controlled

## 2021-02-09 ENCOUNTER — TRANSCRIPTION ENCOUNTER (OUTPATIENT)
Age: 86
End: 2021-02-09

## 2021-02-09 VITALS — TEMPERATURE: 99 F

## 2021-02-09 LAB
ALBUMIN SERPL ELPH-MCNC: 3.3 G/DL — SIGNIFICANT CHANGE UP (ref 3.3–5)
ALP SERPL-CCNC: 308 U/L — HIGH (ref 40–120)
ALT FLD-CCNC: 149 U/L — HIGH (ref 10–45)
ANION GAP SERPL CALC-SCNC: 7 MMOL/L — SIGNIFICANT CHANGE UP (ref 5–17)
AST SERPL-CCNC: 68 U/L — HIGH (ref 10–40)
BILIRUB SERPL-MCNC: 0.3 MG/DL — SIGNIFICANT CHANGE UP (ref 0.2–1.2)
BUN SERPL-MCNC: 24 MG/DL — HIGH (ref 7–23)
CALCIUM SERPL-MCNC: 9.7 MG/DL — SIGNIFICANT CHANGE UP (ref 8.4–10.5)
CHLORIDE SERPL-SCNC: 102 MMOL/L — SIGNIFICANT CHANGE UP (ref 96–108)
CO2 SERPL-SCNC: 25 MMOL/L — SIGNIFICANT CHANGE UP (ref 22–31)
CREAT SERPL-MCNC: 1.09 MG/DL — SIGNIFICANT CHANGE UP (ref 0.5–1.3)
GLUCOSE BLDC GLUCOMTR-MCNC: 127 MG/DL — HIGH (ref 70–99)
GLUCOSE BLDC GLUCOMTR-MCNC: 229 MG/DL — HIGH (ref 70–99)
GLUCOSE SERPL-MCNC: 108 MG/DL — HIGH (ref 70–99)
HCT VFR BLD CALC: 24.5 % — LOW (ref 34.5–45)
HGB BLD-MCNC: 7.5 G/DL — LOW (ref 11.5–15.5)
MAGNESIUM SERPL-MCNC: 1.7 MG/DL — SIGNIFICANT CHANGE UP (ref 1.6–2.6)
MCHC RBC-ENTMCNC: 25 PG — LOW (ref 27–34)
MCHC RBC-ENTMCNC: 30.6 GM/DL — LOW (ref 32–36)
MCV RBC AUTO: 81.7 FL — SIGNIFICANT CHANGE UP (ref 80–100)
NRBC # BLD: 0 /100 WBCS — SIGNIFICANT CHANGE UP (ref 0–0)
PHOSPHATE SERPL-MCNC: 3.4 MG/DL — SIGNIFICANT CHANGE UP (ref 2.5–4.5)
PLATELET # BLD AUTO: 150 K/UL — SIGNIFICANT CHANGE UP (ref 150–400)
POTASSIUM SERPL-MCNC: 4.5 MMOL/L — SIGNIFICANT CHANGE UP (ref 3.5–5.3)
POTASSIUM SERPL-SCNC: 4.5 MMOL/L — SIGNIFICANT CHANGE UP (ref 3.5–5.3)
PROT SERPL-MCNC: 5.7 G/DL — LOW (ref 6–8.3)
RBC # BLD: 3 M/UL — LOW (ref 3.8–5.2)
RBC # FLD: 16.8 % — HIGH (ref 10.3–14.5)
SODIUM SERPL-SCNC: 134 MMOL/L — LOW (ref 135–145)
WBC # BLD: 4.2 K/UL — SIGNIFICANT CHANGE UP (ref 3.8–10.5)
WBC # FLD AUTO: 4.2 K/UL — SIGNIFICANT CHANGE UP (ref 3.8–10.5)

## 2021-02-09 PROCEDURE — 76770 US EXAM ABDO BACK WALL COMP: CPT

## 2021-02-09 PROCEDURE — 83735 ASSAY OF MAGNESIUM: CPT

## 2021-02-09 PROCEDURE — U0003: CPT

## 2021-02-09 PROCEDURE — 87086 URINE CULTURE/COLONY COUNT: CPT

## 2021-02-09 PROCEDURE — 84075 ASSAY ALKALINE PHOSPHATASE: CPT

## 2021-02-09 PROCEDURE — 99285 EMERGENCY DEPT VISIT HI MDM: CPT

## 2021-02-09 PROCEDURE — 86901 BLOOD TYPING SEROLOGIC RH(D): CPT

## 2021-02-09 PROCEDURE — 36415 COLL VENOUS BLD VENIPUNCTURE: CPT

## 2021-02-09 PROCEDURE — 70496 CT ANGIOGRAPHY HEAD: CPT

## 2021-02-09 PROCEDURE — 86905 BLOOD TYPING RBC ANTIGENS: CPT

## 2021-02-09 PROCEDURE — 84450 TRANSFERASE (AST) (SGOT): CPT

## 2021-02-09 PROCEDURE — 86900 BLOOD TYPING SEROLOGIC ABO: CPT

## 2021-02-09 PROCEDURE — 86870 RBC ANTIBODY IDENTIFICATION: CPT

## 2021-02-09 PROCEDURE — 84466 ASSAY OF TRANSFERRIN: CPT

## 2021-02-09 PROCEDURE — 70450 CT HEAD/BRAIN W/O DYE: CPT

## 2021-02-09 PROCEDURE — 93970 EXTREMITY STUDY: CPT

## 2021-02-09 PROCEDURE — 82728 ASSAY OF FERRITIN: CPT

## 2021-02-09 PROCEDURE — 97161 PT EVAL LOW COMPLEX 20 MIN: CPT

## 2021-02-09 PROCEDURE — 82962 GLUCOSE BLOOD TEST: CPT

## 2021-02-09 PROCEDURE — 80048 BASIC METABOLIC PNL TOTAL CA: CPT

## 2021-02-09 PROCEDURE — 80053 COMPREHEN METABOLIC PANEL: CPT

## 2021-02-09 PROCEDURE — 97162 PT EVAL MOD COMPLEX 30 MIN: CPT

## 2021-02-09 PROCEDURE — 83036 HEMOGLOBIN GLYCOSYLATED A1C: CPT

## 2021-02-09 PROCEDURE — 85025 COMPLETE CBC W/AUTO DIFF WBC: CPT

## 2021-02-09 PROCEDURE — 85027 COMPLETE CBC AUTOMATED: CPT

## 2021-02-09 PROCEDURE — 76705 ECHO EXAM OF ABDOMEN: CPT

## 2021-02-09 PROCEDURE — 85730 THROMBOPLASTIN TIME PARTIAL: CPT

## 2021-02-09 PROCEDURE — 71045 X-RAY EXAM CHEST 1 VIEW: CPT

## 2021-02-09 PROCEDURE — 97530 THERAPEUTIC ACTIVITIES: CPT

## 2021-02-09 PROCEDURE — 86850 RBC ANTIBODY SCREEN: CPT

## 2021-02-09 PROCEDURE — 83550 IRON BINDING TEST: CPT

## 2021-02-09 PROCEDURE — 99233 SBSQ HOSP IP/OBS HIGH 50: CPT

## 2021-02-09 PROCEDURE — 86880 COOMBS TEST DIRECT: CPT

## 2021-02-09 PROCEDURE — 84460 ALANINE AMINO (ALT) (SGPT): CPT

## 2021-02-09 PROCEDURE — 93306 TTE W/DOPPLER COMPLETE: CPT

## 2021-02-09 PROCEDURE — 93005 ELECTROCARDIOGRAM TRACING: CPT

## 2021-02-09 PROCEDURE — 81003 URINALYSIS AUTO W/O SCOPE: CPT

## 2021-02-09 PROCEDURE — 72125 CT NECK SPINE W/O DYE: CPT

## 2021-02-09 PROCEDURE — 85610 PROTHROMBIN TIME: CPT

## 2021-02-09 PROCEDURE — 70498 CT ANGIOGRAPHY NECK: CPT

## 2021-02-09 PROCEDURE — 80076 HEPATIC FUNCTION PANEL: CPT

## 2021-02-09 PROCEDURE — U0005: CPT

## 2021-02-09 PROCEDURE — 83540 ASSAY OF IRON: CPT

## 2021-02-09 PROCEDURE — 84100 ASSAY OF PHOSPHORUS: CPT

## 2021-02-09 RX ORDER — ACETAMINOPHEN 500 MG
2 TABLET ORAL
Qty: 0 | Refills: 0 | DISCHARGE
Start: 2021-02-09

## 2021-02-09 RX ORDER — MAGNESIUM SULFATE 500 MG/ML
2 VIAL (ML) INJECTION ONCE
Refills: 0 | Status: COMPLETED | OUTPATIENT
Start: 2021-02-09 | End: 2021-02-09

## 2021-02-09 RX ORDER — AZTREONAM 2 G
1 VIAL (EA) INJECTION
Qty: 0 | Refills: 0 | DISCHARGE

## 2021-02-09 RX ORDER — ASPIRIN/CALCIUM CARB/MAGNESIUM 324 MG
0 TABLET ORAL
Qty: 0 | Refills: 0 | DISCHARGE

## 2021-02-09 RX ORDER — LEVETIRACETAM 250 MG/1
1 TABLET, FILM COATED ORAL
Qty: 8 | Refills: 0
Start: 2021-02-09 | End: 2021-02-12

## 2021-02-09 RX ORDER — ATORVASTATIN CALCIUM 80 MG/1
1 TABLET, FILM COATED ORAL
Qty: 0 | Refills: 0 | DISCHARGE

## 2021-02-09 RX ADMIN — Medication 50 GRAM(S): at 11:55

## 2021-02-09 RX ADMIN — Medication 5000 UNIT(S): at 11:54

## 2021-02-09 RX ADMIN — PANTOPRAZOLE SODIUM 40 MILLIGRAM(S): 20 TABLET, DELAYED RELEASE ORAL at 06:13

## 2021-02-09 RX ADMIN — Medication 1 MILLIGRAM(S): at 11:54

## 2021-02-09 RX ADMIN — SERTRALINE 50 MILLIGRAM(S): 25 TABLET, FILM COATED ORAL at 11:54

## 2021-02-09 RX ADMIN — PREGABALIN 250 MICROGRAM(S): 225 CAPSULE ORAL at 11:54

## 2021-02-09 RX ADMIN — LOSARTAN POTASSIUM 100 MILLIGRAM(S): 100 TABLET, FILM COATED ORAL at 05:24

## 2021-02-09 RX ADMIN — Medication 1 TABLET(S): at 11:54

## 2021-02-09 RX ADMIN — LEVETIRACETAM 500 MILLIGRAM(S): 250 TABLET, FILM COATED ORAL at 05:24

## 2021-02-09 RX ADMIN — Medication 4: at 11:55

## 2021-02-09 NOTE — DISCHARGE NOTE PROVIDER - NSDCFUADDINST_GEN_ALL_CORE_FT
General:  - You may shower regularly with shampoo/conditioner.  - Walking is recommended, ambulate as tolerated  - Keep hydrated, drink plenty of water   - Take pain medications as needed     Medications:  - Continue to take Keppra 500mg twice a day x 7 days total (stop date 2/12).  - Hold your home Aspirin dose. Do not resume for 2-4 weeks (follow up with Dr. D'Amico's office prior to resuming).  - Hold your home Lipitor dose until follow up with your PCP (elevated LFTs in hospital).      Call the office or come to ED for any of the following:  -sudden change in mental status or wakefulness, increased confusion, severe headache, or any other acute neurological change (changes in vision, changes in speech, nausea/vomiting, localized weakness or numbness/tingling of the extremities.

## 2021-02-09 NOTE — DISCHARGE NOTE NURSING/CASE MANAGEMENT/SOCIAL WORK - PATIENT PORTAL LINK FT
You can access the FollowMyHealth Patient Portal offered by [facility] by registering at the following website: [email or web address]. By joining [facility]’s FollowMyHealth portal, you will also be able to view your health information using other applications (apps) compatible with our system.

## 2021-02-09 NOTE — DISCHARGE NOTE PROVIDER - NSDCMRMEDTOKEN_GEN_ALL_CORE_FT
aspirin 81 mg oral tablet:   Bactrim  mg-160 mg oral tablet: 1 tab(s) orally every 12 hours x 10 days, klebsiella UTI, last dose 2/7  Centrum:   Claritin:   Colace 100 mg oral capsule: 1 cap(s) orally once a day (at bedtime)  folic acid 1 mg oral tablet: 1 tab(s) orally once a day  Lipitor 10 mg oral tablet: 1 tab(s) orally once a day  losartan 100 mg oral tablet: 1 tab(s) orally once a day  metFORMIN 500 mg oral tablet: 500  orally 2 times a day  omeprazole 20 mg oral delayed release capsule:   sertraline 50 mg oral tablet: 1 tab(s) orally once a day  Vitamin B12 250 mcg oral tablet:   Vitamin D3: 5000u  orally   acetaminophen 325 mg oral tablet: 2 tab(s) orally every 6 hours, As needed, Temp greater or equal to 38C (100.4F), Mild Pain (1 - 3)  Centrum:   Claritin:   Colace 100 mg oral capsule: 1 cap(s) orally once a day (at bedtime)  folic acid 1 mg oral tablet: 1 tab(s) orally once a day  levETIRAcetam 500 mg oral tablet: 1 tab(s) orally every 12 hours  losartan 100 mg oral tablet: 1 tab(s) orally once a day  metFORMIN 500 mg oral tablet: 500  orally 2 times a day  omeprazole 20 mg oral delayed release capsule:   sertraline 50 mg oral tablet: 1 tab(s) orally once a day  Vitamin B12 250 mcg oral tablet:   Vitamin D3: 5000u  orally   none

## 2021-02-09 NOTE — PROGRESS NOTE ADULT - THIS PATIENT HAS THE FOLLOWING CONDITION(S)/DIAGNOSES ON THIS ADMISSION:
None
Brain Compression / Herniation
None
Brain Compression / Herniation
Brain Compression / Herniation

## 2021-02-09 NOTE — PROGRESS NOTE ADULT - SUBJECTIVE AND OBJECTIVE BOX
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 The fall was witnessed by patient's home aide who denies LOC. Patient does not remember falling today, but states that she fell 2 days ago and hit her head when getting up from the toilet with no LOC. Patient reports 4/10 in severity head pain and baseline full body left sided decreased sensation since her prior aneurysm repair. Patient's daughter reports a history of chronic bladder incontinence. Patient denies new extremity numbness or weakness, diplopia, blurry vision, dizziness, and headache or dizziness preceding her fall.   Patient is also currently being treated with Bactrim DS 1 tab BID x 10 days for klebsiella UTI with last dose scheduled for 2/7/21.    (05 Feb 2021 20:36)    S/Overnight events:  RADHA overnight, pt had brief atrial tachycardia noted on telemtery monitiring that self-resolved. CTH non-con completed for stability scan, which shows redistribution of SAH and resolution of R SDH noted on previous scan, no new or worsening hemorrhage noted.       Hospital Course:   2/6: RADHA overnight. Repeat head CT performed, stable. Neuro exam remains stable. CTA head/neck shows "diminished caliber of anterior cerebral arteries with possible segmental areas of occlusion involving the anterior cerebral arteries possibly etiological for hemorrhagic infarction within the anterior cerebral artery distribution on left side."   2/7: RADHA overnight, neuro exam stable. CTH completed and shows improvement in SDH and resdistribution of SAH, no new or worsening hemorrhage. Pend SDU and TTE w/ bubble study per stroke/neurology for syncope work-up.   2/8: No events overnight. Neurological exam remains stable. Stability scans neg for increasing hemorrhage. Pending Echo w/ bubble per stroke recommendations, follow/up.  2/9: RADHA overnight, neuro stable      Vital Signs Last 24 Hrs  T(C): 36.8 (08 Feb 2021 22:10), Max: 36.9 (08 Feb 2021 13:00)  T(F): 98.2 (08 Feb 2021 22:10), Max: 98.5 (08 Feb 2021 13:00)  HR: 94 (09 Feb 2021 00:24) (77 - 116)  BP: 141/58 (09 Feb 2021 00:24) (101/48 - 159/65)  BP(mean): 83 (09 Feb 2021 00:24) (69 - 103)  RR: 18 (09 Feb 2021 00:24) (16 - 18)  SpO2: 98% (09 Feb 2021 00:24) (95% - 100%)    I&O's Detail    07 Feb 2021 07:01  -  08 Feb 2021 07:00  --------------------------------------------------------  IN:    Oral Fluid: 480 mL  Total IN: 480 mL    OUT:    Voided (mL): 700 mL  Total OUT: 700 mL    Total NET: -220 mL      08 Feb 2021 07:01  -  09 Feb 2021 01:56  --------------------------------------------------------  IN:    Sodium Chloride 0.9% Bolus: 1000 mL  Total IN: 1000 mL    OUT:    Voided (mL): 300 mL  Total OUT: 300 mL    Total NET: 700 mL        I&O's Summary    07 Feb 2021 07:01  -  08 Feb 2021 07:00  --------------------------------------------------------  IN: 480 mL / OUT: 700 mL / NET: -220 mL    08 Feb 2021 07:01  -  09 Feb 2021 01:56  --------------------------------------------------------  IN: 1000 mL / OUT: 300 mL / NET: 700 mL        PHYSICAL EXAM:  Gen: laying in hospital bed, NAD  Neuro: AA+Ox3, OE spont, FC  CN II-XII: PERRL, EOMI  Motor: MAEx4, b/l LE 4/5, b/l UE 5/5    TUBES/LINES:  [] CVC  [] A-line  [] Lumbar Drain  [] Ventriculostomy  [] Other    DIET:  [] NPO  [x] Mechanical  [] Tube feeds    LABS:                        8.7    5.05  )-----------( 149      ( 08 Feb 2021 06:06 )             28.5     02-08    138  |  110<H>  |  22  ----------------------------<  164<H>  4.1   |  18<L>  |  0.85    Ca    8.0<L>      08 Feb 2021 16:03  Phos  3.0     02-08  Mg     1.9     02-08    TPro  x   /  Alb  x   /  TBili  x   /  DBili  x   /  AST  66<H>  /  ALT  193<H>  /  AlkPhos  300<H>  02-07            CAPILLARY BLOOD GLUCOSE      POCT Blood Glucose.: 158 mg/dL (08 Feb 2021 22:18)  POCT Blood Glucose.: 209 mg/dL (08 Feb 2021 16:57)  POCT Blood Glucose.: 141 mg/dL (08 Feb 2021 12:09)  POCT Blood Glucose.: 106 mg/dL (08 Feb 2021 07:05)      Drug Levels: [] N/A    CSF Analysis: [] N/A      Allergies    No Known Allergies    Intolerances      MEDICATIONS:  Antibiotics:    Neuro:  acetaminophen   Tablet .. 650 milliGRAM(s) Oral every 6 hours PRN  levETIRAcetam 500 milliGRAM(s) Oral every 12 hours  ondansetron Injectable 4 milliGRAM(s) IV Push every 6 hours PRN  sertraline 50 milliGRAM(s) Oral daily    Anticoagulation:  enoxaparin Injectable 40 milliGRAM(s) SubCutaneous every 24 hours    OTHER:  dextrose 40% Gel 15 Gram(s) Oral once  dextrose 50% Injectable 25 Gram(s) IV Push once  dextrose 50% Injectable 12.5 Gram(s) IV Push once  dextrose 50% Injectable 25 Gram(s) IV Push once  glucagon  Injectable 1 milliGRAM(s) IntraMuscular once  hydrALAZINE Injectable 10 milliGRAM(s) IV Push every 3 hours PRN  insulin lispro (ADMELOG) corrective regimen sliding scale   SubCutaneous Before meals and at bedtime  losartan 100 milliGRAM(s) Oral daily  pantoprazole    Tablet 40 milliGRAM(s) Oral before breakfast  polyethylene glycol 3350 17 Gram(s) Oral at bedtime  senna 2 Tablet(s) Oral at bedtime    IVF:  cholecalciferol 5000 Unit(s) Oral daily  cyanocobalamin 250 MICROGram(s) Oral daily  dextrose 5%. 1000 milliLiter(s) IV Continuous <Continuous>  dextrose 5%. 1000 milliLiter(s) IV Continuous <Continuous>  folic acid 1 milliGRAM(s) Oral daily  multivitamin 1 Tablet(s) Oral daily    CULTURES:  Culture Results:   Less than 10,000 cols/cc; Insignificant amount of growth. (02-05 @ 20:00)    RADIOLOGY & ADDITIONAL TESTS:      ASSESSMENT:  84 y/o F with pmh HTN, DM, HLD, hx aneurysm repair 1968 with "metal plates", hx lung cancer s/p L pneumonectomy, on ASA 81mg (last dose 9:30am 2/5/21) presenting s/p fall with posterior head trauma found to have traumatic L frontal lobe SAH, 2mm R SDH, and left lateral IVH      INTRACRANIAL HEMORRHAGE    No pertinent family history in first degree relatives    Handoff    MEWS Score    High cholesterol    Diabetes    HTN (hypertension)    Intracranial hemorrhage    Fall    High cholesterol    HTN (hypertension)    Diabetes    Intracranial hemorrhage    H/O cerebral aneurysm repair    Personal history of spine surgery    S/P cholecystectomy    S/P removal of lung    No significant past surgical history    HEAD INJURY    90+    Fall    Head injury    [email or web address]        PLAN:  NEURO:  - neuro checks  - vitals checks  - pain control  - cont keppra  - neurology following, recs appreciated    CARDIOVASCULAR:  - -140  - cont losartan    PULMONARY:  - on room air satting well    RENAL:  - no issues    GI:  - CCB diet  - bowel regimen  - GI ppx: protonix    HEME:  - H/H stable    ID:  - afebrile  - Bactrim completed for UTI    ENDO:  - ISS    DVT PROPHYLAXIS: [x] Venodynes  [x] Heparin/Lovenox    DISPOSITION: stepdown, full code, dispo pending    d/w Dr. D'amico      Assessment:  Present when checked    []  GCS  E   V  M     Heart Failure: []Acute, [] acute on chronic , []chronic  Heart Failure:  [] Diastolic (HFpEF), [] Systolic (HFrEF), []Combined (HFpEF and HFrEF), [] RHF, [] Pulm HTN, [] Other    [] JUANITA, [] ATN, [] AIN, [] other  [] CKD1, [] CKD2, [] CKD 3, [] CKD 4, [] CKD 5, []ESRD    Encephalopathy: [] Metabolic, [] Hepatic, [] toxic, [] Neurological, [] Other    Abnormal Nurtitional Status: [] malnurtition (see nutrition note), [ ]underweight: BMI < 19, [] morbid obesity: BMI >40, [] Cachexia    [] Sepsis  [] hypovolemic shock,[] cardiogenic shock, [] hemorrhagic shock, [] neuogenic shock  [] Acute Respiratory Failure  []Cerebral edema, [] Brain compression/ herniation,   [] Functional quadriplegia  [] Acute blood loss anemia

## 2021-02-09 NOTE — DISCHARGE NOTE PROVIDER - NSDCFUADDAPPT_GEN_ALL_CORE_FT
Follow up:  - You have a scheduled appointment for your second COVID vaccination on 2/11 at 5:45pm  - You have a follow up appointment with Dr. D'Amico (Neurosurgery) on 2/17 at 1:00pm. Please call the office to confirm appointment: [phone]    Please call to schedule an appointment with your PCP upon discharge (Dr. Johnson)

## 2021-02-09 NOTE — DISCHARGE NOTE PROVIDER - CARE PROVIDER_API CALL
DAmico, Randy S (MD)  Neurosurgery  [address] [place] [address]  Phone: [phone]  Fax: [phone]  Follow Up Time:

## 2021-02-09 NOTE — PROGRESS NOTE ADULT - REASON FOR ADMISSION
head trauma, ICH

## 2021-02-09 NOTE — DISCHARGE NOTE PROVIDER - HOSPITAL COURSE
HPI:  86 y/o F with pmh HTN, DM, HLD, hx aneurysm repair 1968 with "metal plates", hx lung cancer s/p L pneumonectomy, on ASA 81mg (last dose 9:30am) presents to ED s/p fall backwards onto a wooden floor with head trauma at 4:10pm. The fall was witnessed by patient's home aide who denies LOC. Patient does not remember falling today, but states that she fell 2 days ago and hit her head when getting up from the toilet with no LOC. Patient reports 4/10 in severity head pain and baseline full body left sided decreased sensation since her prior aneurysm repair. Patient's daughter reports a history of chronic bladder incontinence. Patient denies new extremity numbness or weakness, diplopia, blurry vision, dizziness, and headache or dizziness preceding her fall.   Patient is also currently being treated with Bactrim DS 1 tab BID x 10 days for klebsiella UTI with last dose scheduled for 2/7/21.    (05 Feb 2021 20:36)    S/Overnight events:  RADHA overnight, pt had brief atrial tachycardia noted on telemtery monitiring that self-resolved. CTH non-con completed for stability scan, which shows redistribution of SAH and resolution of R SDH noted on previous scan, no new or worsening hemorrhage noted.       Hospital Course:   2/6: RADHA overnight. Repeat head CT performed, stable. Neuro exam remains stable. CTA head/neck shows "diminished caliber of anterior cerebral arteries with possible segmental areas of occlusion involving the anterior cerebral arteries possibly etiological for hemorrhagic infarction within the anterior cerebral artery distribution on left side."   2/7: RADHA overnight, neuro exam stable. CTH completed and shows improvement in SDH and resdistribution of SAH, no new or worsening hemorrhage. Pend SDU and TTE w/ bubble study per stroke/neurology for syncope work-up.   2/8: No events overnight. Neurological exam remains stable. Stability scans neg for increasing hemorrhage. Pending Echo w/ bubble per stroke recommendations, follow/up.  2/9: RADHA overnight, neuro stable      Patient worked with PT/OT and is recommended for home with home PT and 24/7 home care services.  Patient medically optimized for discharge today.

## 2021-02-09 NOTE — DISCHARGE NOTE NURSING/CASE MANAGEMENT/SOCIAL WORK - NSDCFUADDAPPT_GEN_ALL_CORE_FT
Follow up:  - You have a scheduled appointment for your second COVID vaccination on 2/11 at 5:45pm  - You have a follow up appointment with Dr. D'Amico (Neurosurgery) on 2/17 at 1:00pm. Please call the office to confirm appointment: [phone]    Please call to schedule an appointment with your PCP upon discharge (Dr. Johnson)

## 2021-02-17 ENCOUNTER — APPOINTMENT (OUTPATIENT)
Dept: NEUROSURGERY | Facility: CLINIC | Age: 86
End: 2021-02-17
Payer: MEDICARE

## 2021-02-17 VITALS
RESPIRATION RATE: 18 BRPM | SYSTOLIC BLOOD PRESSURE: 114 MMHG | HEIGHT: 64 IN | WEIGHT: 119 LBS | OXYGEN SATURATION: 99 % | TEMPERATURE: 97.2 F | DIASTOLIC BLOOD PRESSURE: 71 MMHG | BODY MASS INDEX: 20.32 KG/M2 | HEART RATE: 120 BPM

## 2021-02-17 DIAGNOSIS — R26.9 UNSPECIFIED ABNORMALITIES OF GAIT AND MOBILITY: ICD-10-CM

## 2021-02-17 PROCEDURE — 99204 OFFICE O/P NEW MOD 45 MIN: CPT

## 2021-02-17 RX ORDER — OMEPRAZOLE 20 MG/1
20 CAPSULE, DELAYED RELEASE ORAL
Refills: 0 | Status: ACTIVE | COMMUNITY

## 2021-02-17 RX ORDER — ATORVASTATIN CALCIUM 80 MG/1
TABLET, FILM COATED ORAL
Refills: 0 | Status: ACTIVE | COMMUNITY

## 2021-02-17 RX ORDER — CIPROFLOXACIN HYDROCHLORIDE 500 MG/1
500 TABLET, FILM COATED ORAL
Qty: 6 | Refills: 0 | Status: DISCONTINUED | OUTPATIENT
Start: 2020-02-19 | End: 2021-02-17

## 2021-02-23 DIAGNOSIS — N39.0 URINARY TRACT INFECTION, SITE NOT SPECIFIED: ICD-10-CM
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
## 2021-02-23 DIAGNOSIS — N26.1 ATROPHY OF KIDNEY (TERMINAL): ICD-10-CM

## 2021-02-23 DIAGNOSIS — S00.03XA CONTUSION OF SCALP, INITIAL ENCOUNTER: ICD-10-CM

## 2021-02-23 DIAGNOSIS — S06.5X0A TRAUMATIC SUBDURAL HEMORRHAGE WITHOUT LOSS OF CONSCIOUSNESS, INITIAL ENCOUNTER: ICD-10-CM
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
## 2021-02-23 DIAGNOSIS — I47.1 SUPRAVENTRICULAR TACHYCARDIA: ICD-10-CM

## 2021-02-23 DIAGNOSIS — I10 ESSENTIAL (PRIMARY) HYPERTENSION: ICD-10-CM

## 2021-02-23 DIAGNOSIS — S06.6X0A TRAUMATIC SUBARACHNOID HEMORRHAGE WITHOUT LOSS OF CONSCIOUSNESS, INITIAL ENCOUNTER: ICD-10-CM

## 2021-02-23 DIAGNOSIS — E78.5 HYPERLIPIDEMIA, UNSPECIFIED: ICD-10-CM

## 2021-02-23 DIAGNOSIS — E11.9 TYPE 2 DIABETES MELLITUS WITHOUT COMPLICATIONS: ICD-10-CM

## 2021-03-08 ENCOUNTER — APPOINTMENT (OUTPATIENT)
Dept: CT IMAGING | Facility: HOSPITAL | Age: 86
End: 2021-03-08
Payer: MEDICARE

## 2021-03-08 ENCOUNTER — OUTPATIENT (OUTPATIENT)
Dept: OUTPATIENT SERVICES | Facility: HOSPITAL | Age: 86
LOS: 1 days | End: 2021-03-08
Payer: MEDICARE

## 2021-03-08 ENCOUNTER — RESULT REVIEW (OUTPATIENT)
Age: 86
End: 2021-03-08

## 2021-03-08 DIAGNOSIS — Z90.2 ACQUIRED ABSENCE OF LUNG [PART OF]: Chronic | ICD-10-CM

## 2021-03-08 DIAGNOSIS — Z90.49 ACQUIRED ABSENCE OF OTHER SPECIFIED PARTS OF DIGESTIVE TRACT: Chronic | ICD-10-CM

## 2021-03-08 DIAGNOSIS — Z98.890 OTHER SPECIFIED POSTPROCEDURAL STATES: Chronic | ICD-10-CM

## 2021-03-08 PROCEDURE — 70450 CT HEAD/BRAIN W/O DYE: CPT | Mod: 26,ME

## 2021-03-08 PROCEDURE — 70450 CT HEAD/BRAIN W/O DYE: CPT

## 2021-03-08 PROCEDURE — G1004: CPT

## 2021-03-10 ENCOUNTER — APPOINTMENT (OUTPATIENT)
Dept: NEUROSURGERY | Facility: CLINIC | Age: 86
End: 2021-03-10
Payer: MEDICARE

## 2021-03-10 VITALS
OXYGEN SATURATION: 98 % | HEART RATE: 108 BPM | BODY MASS INDEX: 20.32 KG/M2 | DIASTOLIC BLOOD PRESSURE: 76 MMHG | WEIGHT: 119 LBS | TEMPERATURE: 96.5 F | SYSTOLIC BLOOD PRESSURE: 120 MMHG | RESPIRATION RATE: 18 BRPM | HEIGHT: 64 IN

## 2021-03-10 DIAGNOSIS — R42 DIZZINESS AND GIDDINESS: ICD-10-CM

## 2021-03-10 DIAGNOSIS — S06.5X9A TRAUMATIC SUBDURAL HEMORRHAGE WITH LOSS OF CONSCIOUSNESS OF UNSPECIFIED DURATION, INITIAL ENCOUNTER: ICD-10-CM

## 2021-03-10 DIAGNOSIS — H93.19 TINNITUS, UNSPECIFIED EAR: ICD-10-CM

## 2021-03-10 PROCEDURE — 99212 OFFICE O/P EST SF 10 MIN: CPT

## 2021-03-10 NOTE — DATA REVIEWED
[de-identified] : [facility]\par    [facility] Department of Radiology\par   Radiology Report\par \par \par Patient Name: ISMA KING   Report Date: 08-Mar-2021 13:44.00 \par Patient ID: [id number] ([id number]), [id number] ([id number])  Accession No.: [id number] \par Patient Birth Date: 1935  Report Status: F \par Referring Physician: [id number] DAMICO RANDY   Reason For Study: F/U SDH/SAH  \par \par \par \par \par \par EXAM: CT BRAIN\par \par PROCEDURE DATE: 03/08/2021\par \par \par \par INTERPRETATION: Clinical history/reason for exam: Follow-up subdural hematoma and subarachnoid\par \par Technique: Multiple contiguous axial CT images of the head were obtained from the base of the skull to the vertex without the administration of intravenous contrast. Coronal and sagittal reformatted images were obtained.\par \par Comparison:CT head 2/6/2021\par \par Findings:\par \par Redemonstrated status post bifrontal craniotomy with parasellar aneurysm clip again demonstrated. There is encephalomalacia within the bilateral frontal lobes expected dilatation. The ventricles are stable in size and configuration from prior examination.\par \par There is been interval resolution of the left parafalcine subdural hematoma and subarachnoid hemorrhage within the left cerebral sulci. There is no new intracranial hemorrhage or extra-axial fluid collection. There is no mass effect or midline shift..\par \par There is no interval demarcated territorial infarction..\par \par There is no acute calvarial fracture. Interval decrease subcutaneous swelling in the left parietal region..\par \par There is polypoid mucosal thickening of the left maxillary sinus with partial opacification of the bilateral ethmoid and left sphenoid sinus. The mastoid air cells are well-aerated..\par \par Impression:\par \par Since prior CT head 2/6/2021, interval resolution of left parafalcine subdural hematoma and subarachnoid hemorrhage. No new intracranial hemorrhage or extra-axial fluid collection..\par \par \par \par \par Thank you for the opportunity to participate in the care of this patient.\par \par \par MYNOR TORRES MD; Attending Radiologist\par This document has been electronically signed. Mar 8 2021 1:44PM \par

## 2021-03-10 NOTE — ASSESSMENT
[FreeTextEntry1] : 85F with pmhx of SAH treated with aneurysm clipping in 1960's who presented with recent admission for traumatic SAH. Unable to obtain MRI. CT stable prior to discharge. Completed 7 days of keppra. Continues to be off ASA. Has been recovering well post-fall. Repeat CT without evidence of hemorrhage. Plan:\par \par No future imaging necessary\par RTC for results\par clear for ASA use \par PT referral\par continue excellent care with Dr. Johnson

## 2021-03-10 NOTE — REASON FOR VISIT
[FreeTextEntry1] : 3/10/21 - Doing well. Repeat CT with improved subarachnoid hemorrhage/subdural hematoma. Persistent tinnitus and vertigo like symptoms with arising from bed. Overall improved mental status. no seizures. No weakness/numbness.

## 2021-03-10 NOTE — HISTORY OF PRESENT ILLNESS
[FreeTextEntry1] : 84 y/o F with pmh HTN, DM, HLD, lung CA s/p left pneumonectomy, SAH s/p aneurysm clipping in 1968 who \par  presented to ED on 2/5/21 for traumatic SAH s/p fall backwards onto a wooden floor with head trauma. Pt was on ASA. CT stable prior to discharge. She was discharged to home with in-home PT.\par \par On last visit 2/17/21, no neuro deficits.  Patient completed 7 days of Keppra but was advised to restart Keppra x 7 days. She was advised to start ASA post-bleed day 7. She was referred to PT.\par \par CTH from 2/6/21 Impression: Redistribution of subarachnoid bleed along the falx cerebri on the left. Previous seen right subdural hematoma is not identified. Improvement of left scalp hematoma. \par \par Pt presents with a follow up CTH.\par \par \par \par \par \par \par \par \par

## 2021-05-21 ENCOUNTER — OUTPATIENT (OUTPATIENT)
Dept: OUTPATIENT SERVICES | Facility: HOSPITAL | Age: 86
LOS: 1 days | End: 2021-05-21
Payer: MEDICARE

## 2021-05-21 ENCOUNTER — APPOINTMENT (OUTPATIENT)
Dept: CT IMAGING | Facility: HOSPITAL | Age: 86
End: 2021-05-21
Payer: MEDICARE

## 2021-05-21 ENCOUNTER — RESULT REVIEW (OUTPATIENT)
Age: 86
End: 2021-05-21

## 2021-05-21 DIAGNOSIS — Z98.890 OTHER SPECIFIED POSTPROCEDURAL STATES: Chronic | ICD-10-CM

## 2021-05-21 DIAGNOSIS — Z90.49 ACQUIRED ABSENCE OF OTHER SPECIFIED PARTS OF DIGESTIVE TRACT: Chronic | ICD-10-CM

## 2021-05-21 DIAGNOSIS — Z90.2 ACQUIRED ABSENCE OF LUNG [PART OF]: Chronic | ICD-10-CM

## 2021-05-21 PROCEDURE — 70450 CT HEAD/BRAIN W/O DYE: CPT | Mod: 26,MH

## 2021-05-21 PROCEDURE — 70450 CT HEAD/BRAIN W/O DYE: CPT

## 2021-06-01 PROBLEM — Z87.440 HISTORY OF URINARY TRACT INFECTION: Status: RESOLVED | Noted: 2020-02-19 | Resolved: 2020-12-23

## 2021-06-01 PROBLEM — Z86.79 HISTORY OF HYPERTENSION: Status: RESOLVED | Noted: 2021-02-17 | Resolved: 2021-06-01

## 2021-06-01 PROBLEM — Z86.79 HISTORY OF INTRACRANIAL ANEURYSM: Status: RESOLVED | Noted: 2020-02-21 | Resolved: 2021-06-01

## 2021-06-01 PROBLEM — S06.6X9A SUBARACHNOID HEMORRHAGE, TRAUMATIC: Status: ACTIVE | Noted: 2021-02-17

## 2021-06-01 PROBLEM — Z85.118 HISTORY OF MALIGNANT NEOPLASM OF LUNG: Status: RESOLVED | Noted: 2020-02-21 | Resolved: 2021-06-01

## 2021-06-01 PROBLEM — Z86.39 HISTORY OF DIABETES MELLITUS: Status: RESOLVED | Noted: 2021-02-17 | Resolved: 2021-06-01

## 2021-06-02 ENCOUNTER — APPOINTMENT (OUTPATIENT)
Dept: NEUROSURGERY | Facility: CLINIC | Age: 86
End: 2021-06-02
Payer: MEDICARE

## 2021-06-02 VITALS
RESPIRATION RATE: 18 BRPM | DIASTOLIC BLOOD PRESSURE: 58 MMHG | WEIGHT: 119 LBS | HEART RATE: 98 BPM | SYSTOLIC BLOOD PRESSURE: 106 MMHG | BODY MASS INDEX: 20.32 KG/M2 | OXYGEN SATURATION: 98 % | HEIGHT: 64 IN | TEMPERATURE: 97.3 F

## 2021-06-02 DIAGNOSIS — Z87.440 PERSONAL HISTORY OF URINARY (TRACT) INFECTIONS: ICD-10-CM

## 2021-06-02 DIAGNOSIS — R29.898 OTHER SYMPTOMS AND SIGNS INVOLVING THE MUSCULOSKELETAL SYSTEM: ICD-10-CM

## 2021-06-02 DIAGNOSIS — R41.0 DISORIENTATION, UNSPECIFIED: ICD-10-CM

## 2021-06-02 DIAGNOSIS — Z86.79 PERSONAL HISTORY OF OTHER DISEASES OF THE CIRCULATORY SYSTEM: ICD-10-CM

## 2021-06-02 DIAGNOSIS — S06.6X9A TRAUMATIC SUBARACHNOID HEMORRHAGE WITH LOSS OF CONSCIOUSNESS OF UNSPECIFIED DURATION, INITIAL ENCOUNTER: ICD-10-CM

## 2021-06-02 DIAGNOSIS — Z85.118 PERSONAL HISTORY OF OTHER MALIGNANT NEOPLASM OF BRONCHUS AND LUNG: ICD-10-CM

## 2021-06-02 DIAGNOSIS — Z86.39 PERSONAL HISTORY OF OTHER ENDOCRINE, NUTRITIONAL AND METABOLIC DISEASE: ICD-10-CM

## 2021-06-02 PROCEDURE — 99212 OFFICE O/P EST SF 10 MIN: CPT

## 2021-06-02 NOTE — DATA REVIEWED
[de-identified] : EXAM: CT BRAIN\par \par PROCEDURE DATE: 05/21/2021\par \par \par \par INTERPRETATION: Axial images were obtained from the skull base to the cranial vertex without intravenous contrast. 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 There is unchanged encephalomalacia in the bilateral frontal lobes with associated enlargement of the frontal horns. There is mild diffuse cerebral volume loss with patchy low attenuation in the cerebral white matter most compatible with changes of chronic ischemic microangiopathy in a patient of this age. There is no intracranial hemorrhage, midline shift, mass effect or extracerebral collection. Mucosal thickening is noted in the left sphenoid sinus, with a focus of polypoid soft tissue in the left sphenoethmoidal recess. The mastoid air cells are predominantly ventilated bilaterally.\par \par IMPRESSION:\par \par No change since prior study of 3/8/2021. No intracranial hemorrhage.\par \par \par \par \par Thank you for the opportunity to participate in the care of this patient.\par \par \par JIMMY CASTRO MD; Attending Radiologist\par This document has been electronically signed. May 22 2021 3:35PM

## 2021-06-02 NOTE — HISTORY OF PRESENT ILLNESS
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 The fall was \par witnessed by patient's home aide who denies LOC. Patient does not remember \par falling today, but states that she fell 2 days ago and hit her head when \par getting up from the toilet with no LOC. Patient reports 4/10 in severity head \par pain and baseline full body left sided decreased sensation since her prior \par aneurysm repair. Patient's daughter reports a history of chronic bladder incontinence. \par Patient denies new extremity numbness or weakness, diplopia, \par blurry vision, dizziness, and headache or dizziness preceding her fall. \par Patient is also currently being treated with Bactrim DS 1 tab BID x 10 days for \par klebsiella UTI with last dose scheduled for 2/7/21. \par  \par FOLLOW UP VISIT (3/10/2021)\par Patient returns for continuation of care after  being recently discharged with images noted for  redistribution of SAH and resolution of R SDH noted on \par previous scan, no new or worsening hemorrhage noted. \par Discharged home with in-home physical therapy. she comes in a wheelchair today\par Family attests to NO new neurological deficits. \par

## 2021-06-02 NOTE — REASON FOR VISIT
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

## 2021-06-02 NOTE — ASSESSMENT
[FreeTextEntry1] : 85F with pmhx of SAH treated with aneurysm clipping in 1960's who presented with recent admission for traumatic SAH. CT stable x 2. Has been recovering well post-fall. Continues to suffer intermittent cognitive issues and persistent urinary incontinence. CT not consistent with NPH, but with persistent bifrontal damage since aneurysm clipping. Plan:\par \par OT referral\par NeuroPsych referral for possible neuropsych testing/cognitive evaluation\par Neurology referral\par ENT referral for possible benign positional vertigo following fall\par Urology referral for frequency and recurrent UTIs\par \par continue excellent care with Dr. Johnson\par \par \par A total of 15 minutes was spent reviewing the labs, imaging and physical examination of the patient. We discussed the diagnosis, and the plan. The patient's questions were answered. The patient demonstrated an excellent understanding of the plan.

## 2021-06-14 ENCOUNTER — NON-APPOINTMENT (OUTPATIENT)
Age: 86
End: 2021-06-14

## 2021-06-17 ENCOUNTER — APPOINTMENT (OUTPATIENT)
Dept: UROLOGY | Facility: CLINIC | Age: 86
End: 2021-06-17
Payer: MEDICARE

## 2021-06-17 VITALS — DIASTOLIC BLOOD PRESSURE: 70 MMHG | HEART RATE: 80 BPM | TEMPERATURE: 97.2 F | SYSTOLIC BLOOD PRESSURE: 121 MMHG

## 2021-06-17 DIAGNOSIS — R32 UNSPECIFIED URINARY INCONTINENCE: ICD-10-CM

## 2021-06-17 PROCEDURE — 51798 US URINE CAPACITY MEASURE: CPT

## 2021-06-17 PROCEDURE — 99213 OFFICE O/P EST LOW 20 MIN: CPT

## 2021-06-17 NOTE — ASSESSMENT
[FreeTextEntry1] : Incontinence\par UTI\par \par Plan for Estrace cream for UTI prevention\par UDS for incontinence

## 2021-06-17 NOTE — HISTORY OF PRESENT ILLNESS
[Urinary Incontinence] : urinary incontinence [FreeTextEntry1] : CC: 86 year old female with Hx of stones and UTIs\par \par Hx of incontinence. Ureteroscopy in 2020. Patient had a fall in February 2021 and had a SAH leading to some neurologic issues. Having UTIs since admission to hospital. Family wondering why recurrent UTIs. Verbally told by doctor's office that no need for antibiotics after urine sample sent on May 26th. Possibly has had 2-3 UTIs over the past 6 months. Seems the family does random urine cultures as patient has some cognitive impairment and difficulty expressing symptoms.\par \par Patient is continuously incontinent since February, had previous urge incontinence. 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 \par \par Labs - 04/21\par Cr 0.93\par GFR 56\par \par Cultures - \par May/21 - e.coli resistant to amox/clav and ampicillin \par Feb/21 - klebsiella - put on cipro for 10 days\par

## 2021-08-23 ENCOUNTER — APPOINTMENT (OUTPATIENT)
Dept: UROLOGY | Facility: CLINIC | Age: 86
End: 2021-08-23
Payer: MEDICARE

## 2021-08-23 VITALS — SYSTOLIC BLOOD PRESSURE: 112 MMHG | DIASTOLIC BLOOD PRESSURE: 69 MMHG | TEMPERATURE: 97.4 F | HEART RATE: 85 BPM

## 2021-08-23 PROCEDURE — 99212 OFFICE O/P EST SF 10 MIN: CPT

## 2021-08-23 NOTE — HISTORY OF PRESENT ILLNESS
[FreeTextEntry1] : CC: 86 year old female with Hx of stones and UTIs\par \par She presents today with her HHA for a catheterized urine sample. Family is concerned that she may have a UTI.  They report increase in memory problems and worsening gait.\par No fever, chills, nausea/vomiting.\par \par She has a history of incontinence.\par Patient had a fall in February 2021 and had a SAH leading to some neurologic issues. Having UTIs since her admission to hospital.\par \par Cultures - \par May/21 - e.coli resistant to amox/clav and ampicillin \par Feb/21 - klebsiella - put on cipro for 10 days\par \par Patient is continuously incontinent since February wears multiple pads throughout the day.  Had previous urge incontinence.   Last visit in June concern for  neurogenic incontinence associated with recent cerebral hemorrhage. She has not followed up with Dr. Diaz for UDS.\par

## 2021-08-23 NOTE — ASSESSMENT
[FreeTextEntry1] : 86 year old female with history of recurrent UTI\par -catheterized urine sample obtained today\par -bladder emptied, drain 100 cc from bladder\par -continue with Estrace cream\par -follow up with Dr. Diaz

## 2021-08-23 NOTE — PHYSICAL EXAM
[General Appearance - Well Developed] : well developed [General Appearance - Well Nourished] : well nourished [Normal Appearance] : normal appearance [Well Groomed] : well groomed [General Appearance - In No Acute Distress] : no acute distress [Abdomen Soft] : soft [Abdomen Tenderness] : non-tender [Costovertebral Angle Tenderness] : no ~M costovertebral angle tenderness [] : no respiratory distress [Exaggerated Use Of Accessory Muscles For Inspiration] : no accessory muscle use [Respiration, Rhythm And Depth] : normal respiratory rhythm and effort [Oriented To Time, Place, And Person] : oriented to person, place, and time [Affect] : the affect was normal [Mood] : the mood was normal [Not Anxious] : not anxious [Normal Station and Gait] : the gait and station were normal for the patient's age [FreeTextEntry1] : bilateral labial hypertrophy

## 2021-08-25 LAB — BACTERIA UR CULT: NORMAL

## 2021-12-07 ENCOUNTER — NON-APPOINTMENT (OUTPATIENT)
Age: 86
End: 2021-12-07

## 2021-12-14 ENCOUNTER — APPOINTMENT (OUTPATIENT)
Dept: UROLOGY | Facility: CLINIC | Age: 86
End: 2021-12-14

## 2021-12-22 ENCOUNTER — APPOINTMENT (OUTPATIENT)
Dept: UROLOGY | Facility: CLINIC | Age: 86
End: 2021-12-22
Payer: MEDICARE

## 2021-12-22 VITALS — TEMPERATURE: 97.1 F | DIASTOLIC BLOOD PRESSURE: 77 MMHG | HEART RATE: 106 BPM | SYSTOLIC BLOOD PRESSURE: 123 MMHG

## 2021-12-22 DIAGNOSIS — R31.0 GROSS HEMATURIA: ICD-10-CM

## 2021-12-22 PROCEDURE — 99213 OFFICE O/P EST LOW 20 MIN: CPT

## 2021-12-22 RX ORDER — SULFAMETHOXAZOLE AND TRIMETHOPRIM 800; 160 MG/1; MG/1
800-160 TABLET ORAL
Qty: 14 | Refills: 0 | Status: ACTIVE | COMMUNITY
Start: 2021-12-22 | End: 1900-01-01

## 2021-12-22 NOTE — HISTORY OF PRESENT ILLNESS
[FreeTextEntry1] : \par This is a 87 year old female who presents today for hematuria.  She has a history of recurrent UTIs and septic left ureteral stone requiring stenting back in January 2020, stent removed after lithotripsy in February 2020.  \par \par Her HHA called today to report hematuria that was first noticed yesterday.  Patient is incontinent of urine, we had discussed previously following up with Dr. Diaz but they did not want to pursue any further investigation.  HHA stated blood noted on pad patient wears.\par HHA denies any abnormal behavior from patient, no fatigue, no change in mental status, no fever, no pain.\par \par Last UCx on 8/23/21-no growth\par \par Has been using Estrace cream for recurrent UTIs

## 2021-12-22 NOTE — ASSESSMENT
[FreeTextEntry1] : 86 year old female with new onset hematuria\par -unable to void, bladder scan shows volume of 55 cc\par -catheterized urine sample obtained today, urine noted to be rust color no clots\par -Bactrim tab given in office post intermittent catheterization\par -UCx and cytology\par -CTU ordered\par -?cysto pending CT and culture results\par -reviewed s/s to monitor for with HHA and when to proceed to ED for further evaluation\par -empirically treat till culture results available\par 
well-developed/pain right neck on movement/distress due to pain/well-groomed

## 2021-12-23 ENCOUNTER — APPOINTMENT (OUTPATIENT)
Dept: CT IMAGING | Facility: HOSPITAL | Age: 86
End: 2021-12-23

## 2021-12-23 ENCOUNTER — OUTPATIENT (OUTPATIENT)
Dept: OUTPATIENT SERVICES | Facility: HOSPITAL | Age: 86
LOS: 1 days | End: 2021-12-23
Payer: MEDICARE

## 2021-12-23 ENCOUNTER — RESULT REVIEW (OUTPATIENT)
Age: 86
End: 2021-12-23

## 2021-12-23 DIAGNOSIS — Z90.49 ACQUIRED ABSENCE OF OTHER SPECIFIED PARTS OF DIGESTIVE TRACT: Chronic | ICD-10-CM

## 2021-12-23 DIAGNOSIS — Z98.890 OTHER SPECIFIED POSTPROCEDURAL STATES: Chronic | ICD-10-CM

## 2021-12-23 DIAGNOSIS — Z90.2 ACQUIRED ABSENCE OF LUNG [PART OF]: Chronic | ICD-10-CM

## 2021-12-23 PROCEDURE — 74178 CT ABD&PLV WO CNTR FLWD CNTR: CPT | Mod: 26,MH

## 2021-12-23 PROCEDURE — 74178 CT ABD&PLV WO CNTR FLWD CNTR: CPT

## 2021-12-27 LAB — BACTERIA UR CULT: ABNORMAL

## 2021-12-28 LAB — URINE CYTOLOGY: NORMAL

## 2022-01-04 ENCOUNTER — APPOINTMENT (OUTPATIENT)
Dept: UROLOGY | Facility: CLINIC | Age: 87
End: 2022-01-04
Payer: MEDICARE

## 2022-01-04 VITALS
HEART RATE: 88 BPM | DIASTOLIC BLOOD PRESSURE: 71 MMHG | SYSTOLIC BLOOD PRESSURE: 107 MMHG | TEMPERATURE: 98.7 F | OXYGEN SATURATION: 99 %

## 2022-01-04 PROCEDURE — 52000 CYSTOURETHROSCOPY: CPT

## 2022-01-04 RX ORDER — SULFAMETHOXAZOLE AND TRIMETHOPRIM 800; 160 MG/1; MG/1
800-160 TABLET ORAL TWICE DAILY
Qty: 10 | Refills: 0 | Status: ACTIVE | COMMUNITY
Start: 2022-01-04 | End: 1900-01-01

## 2022-01-04 NOTE — HISTORY OF PRESENT ILLNESS
[FreeTextEntry1] : CC: gross hematuria\par \par Patient with continued gross hematuria despite treatment of UTI.   Limited smoking history in distant past (2ys). \par \par Cytology negative.  CT with incidental findings for which recommend surveillance at this time. \par \par Cystoscopy today with grossly hematuric urine.  Evacuated out with 60 cc syringe.  Avoided overdistention. No tumors, clots, stones, lesions. \par \par Will give her an additional week of antbiotics both for prophylaxis given cystoscopy/instrumenting elderly woman, and also in case  the hematuria is due to persistent bacteriuria.  Instructed to contact us immediately if fever, MS changes, LUTS, or systemic complaints.

## 2022-01-07 LAB — BACTERIA UR CULT: ABNORMAL

## 2022-05-25 ENCOUNTER — APPOINTMENT (OUTPATIENT)
Dept: UROLOGY | Facility: CLINIC | Age: 87
End: 2022-05-25
Payer: MEDICARE

## 2022-05-25 VITALS — DIASTOLIC BLOOD PRESSURE: 84 MMHG | HEART RATE: 62 BPM | TEMPERATURE: 97.4 F | SYSTOLIC BLOOD PRESSURE: 143 MMHG

## 2022-05-25 PROCEDURE — 99213 OFFICE O/P EST LOW 20 MIN: CPT

## 2022-05-25 RX ORDER — SULFAMETHOXAZOLE AND TRIMETHOPRIM 800; 160 MG/1; MG/1
800-160 TABLET ORAL TWICE DAILY
Qty: 10 | Refills: 0 | Status: ACTIVE | COMMUNITY
Start: 2022-05-25 | End: 1900-01-01

## 2022-05-25 NOTE — PHYSICAL EXAM
[Normal Appearance] : normal appearance [Well Groomed] : well groomed [General Appearance - In No Acute Distress] : no acute distress [Urethral Meatus] : normal urethra [Urinary Bladder Findings] : the bladder was normal on palpation [] : no respiratory distress [Exaggerated Use Of Accessory Muscles For Inspiration] : no accessory muscle use [Affect] : the affect was normal

## 2022-05-25 NOTE — HISTORY OF PRESENT ILLNESS
[FreeTextEntry1] : Dear Dr. Rizwana Downey ([facility])\par Dear Dr. Wilian Johnson\par \par CC: Unsteady gait, foul smelling urine\par \par Samreen Gold is an 87 year old  female with a PMHx of HTN, DM, Lung Ca, SDH, and chronic UTI's presents accompanied by her aide with complaints of unsteady gait and foul smelling urine. Her last UTI (E.Coli) was in 12/2021. She was treated with Bactrim at that time, and started on Estrace cream. She has been taking Wellbutrin, but her psychiatrist discontinued it yesterday, thinking that it may have been causing the unsteady gait. She is incontinent. She presents today for a catheterized urine sample. \par \par She denies fevers, chills, and shortness of breath. She denies dysuria and hematuria. \par  Detail Level: Detailed Quality 402: Tobacco Use And Help With Quitting Among Adolescents: Patient screened for tobacco and never smoked Quality 110: Preventive Care And Screening: Influenza Immunization: Influenza immunization was not ordered or administered, reason not given

## 2022-05-25 NOTE — ASSESSMENT
[FreeTextEntry1] : 87 year old female with hx of chronic UTIs presents today with complaints of recent unsteady gait and foul smelling urine. Her last UTI was 12/2021(E.coli), treated with Bactrim, and she was started on Estrace cream at that time. She is afebrile and in no acute distress. She presents today for a catheterized urine sample. \par \par 1. UCx- catheterized specimen obtained\par 2. Bactrim x 5 days, UCx results pending\par 3. Chronic UTI-  Increase hydration, continue Estrace cream. \par

## 2022-05-31 LAB — BACTERIA UR CULT: ABNORMAL

## 2022-06-03 ENCOUNTER — NON-APPOINTMENT (OUTPATIENT)
Age: 87
End: 2022-06-03

## 2022-07-21 NOTE — PROGRESS NOTE ADULT - PROBLEM SELECTOR PLAN 1
-consistent carb diet  Pain control  Monitor Urine Output  DVT ppx  Cont Abx  OOB/IS  follow up labs
-consistent carb diet  Pain control  Monitor Urine Output  DVT ppx  Cont Abx  OOB/IS  follow up labs  -f/u with ID for further recs
ANNETTE today 1/28  Cont Amp + Fluconazole for yeast and enterococcus UTI as per ID  Pain control  Monitor Urine Output  DVT ppx  OOB/IS  follow up labs
Diet: advance as tolerated  tele monitoring  Pain control  Monitor Urine Output  DVT ppx  Cont Abx  OOB/IS  follow up labs  monitor vitals
Diet: consistent carb diet  Pain control  Monitor Urine Output  DVT ppx  Cont Abx  OOB/IS  follow up labs
Diet: consistent carb diet  Pain control  Monitor Urine Output  DVT ppx  Cont Abx  OOB/IS  follow up labs
with enterococcus faecalis UTI and Yeast urine  -stable  -OOB  -SCD's, IS  -f/u labs  -f/u ANNETTE   -continue antibiotics  -for PICC line  -dispo to [facility]
with enterococcus faecalis UTI and Yeast urine  -stable  -OOB  -SCD's, IS  -f/u labs  -for ANNETTE today, pending results determining length of antibiotics  -continue antibiotics  -for PICC line  -dispo to [facility]
DISPLAY PLAN FREE TEXT

## 2022-08-01 ENCOUNTER — APPOINTMENT (OUTPATIENT)
Dept: UROLOGY | Facility: CLINIC | Age: 87
End: 2022-08-01

## 2022-08-01 VITALS — DIASTOLIC BLOOD PRESSURE: 76 MMHG | HEART RATE: 87 BPM | SYSTOLIC BLOOD PRESSURE: 132 MMHG | TEMPERATURE: 97.2 F

## 2022-08-01 PROCEDURE — 99213 OFFICE O/P EST LOW 20 MIN: CPT

## 2022-08-01 NOTE — PHYSICAL EXAM
[General Appearance - Well Developed] : well developed [General Appearance - Well Nourished] : well nourished [Normal Appearance] : normal appearance [Well Groomed] : well groomed [General Appearance - In No Acute Distress] : no acute distress [Abdomen Soft] : soft [Abdomen Tenderness] : non-tender [Costovertebral Angle Tenderness] : no ~M costovertebral angle tenderness [] : no respiratory distress [Respiration, Rhythm And Depth] : normal respiratory rhythm and effort [Exaggerated Use Of Accessory Muscles For Inspiration] : no accessory muscle use [Oriented To Time, Place, And Person] : oriented to person, place, and time [Affect] : the affect was normal [Mood] : the mood was normal [Not Anxious] : not anxious [No Focal Deficits] : no focal deficits [FreeTextEntry1] : wheelchair

## 2022-08-01 NOTE — ASSESSMENT
[FreeTextEntry1] : Diagnosis: Recurrent UTI \par \par Plan:\par PVR was 4 cc\par Continue with Estrace cream, reviewed proper usage of cream\par Catheterized UCx taken today\par Reviewed s/s to monitor for ie: worsening confusion, fever, chills, hematuria \par Follow up with nephrology for parkinsons\par \par Can discuss results over the phone\par \par Follow up in 6 months

## 2022-08-01 NOTE — HISTORY OF PRESENT ILLNESS
[FreeTextEntry1] : \par CC: Possible UTI\par \par Samreen presents today for concern of possible UTI.\par Last UCx on 5/25/22 was positive for E.Coli.  We treated her with Bactrim and she was doing well up till this weekend.\par Her HHA over the weekend believes the patient was "more confused" then her baseline.\par \par They also note worsening Parkinsons, feel like everything is "slowing down"  Some difficulty eating and swallowing food.  They have switched her diet in the interim.\par \par She is baseline incontinent\par Wears pads daily\par Using Estrace 3 x week\par \par HHA has not noticed any fever, chills, or gross hematuria.\par \par

## 2022-08-02 LAB
APPEARANCE: ABNORMAL
BACTERIA: NEGATIVE
BILIRUBIN URINE: NEGATIVE
BLOOD URINE: NEGATIVE
CALCIUM OXALATE CRYSTALS: ABNORMAL
COLOR: YELLOW
GLUCOSE QUALITATIVE U: NEGATIVE
HYALINE CASTS: 2 /LPF
KETONES URINE: NORMAL
LEUKOCYTE ESTERASE URINE: ABNORMAL
MICROSCOPIC-UA: NORMAL
NITRITE URINE: NEGATIVE
PH URINE: 6
PROTEIN URINE: ABNORMAL
RED BLOOD CELLS URINE: 2 /HPF
SPECIFIC GRAVITY URINE: 1.03
SQUAMOUS EPITHELIAL CELLS: 10 /HPF
UROBILINOGEN URINE: NORMAL
WHITE BLOOD CELLS URINE: 61 /HPF

## 2022-08-04 LAB — BACTERIA UR CULT: ABNORMAL

## 2022-08-04 RX ORDER — NITROFURANTOIN MACROCRYSTALS 100 MG/1
100 CAPSULE ORAL
Qty: 10 | Refills: 0 | Status: ACTIVE | COMMUNITY
Start: 2022-08-04 | End: 1900-01-01

## 2023-05-16 ENCOUNTER — APPOINTMENT (OUTPATIENT)
Dept: UROLOGY | Facility: CLINIC | Age: 88
End: 2023-05-16
Payer: MEDICARE

## 2023-05-16 ENCOUNTER — NON-APPOINTMENT (OUTPATIENT)
Age: 88
End: 2023-05-16

## 2023-05-16 VITALS
SYSTOLIC BLOOD PRESSURE: 138 MMHG | HEIGHT: 65 IN | DIASTOLIC BLOOD PRESSURE: 81 MMHG | OXYGEN SATURATION: 97 % | BODY MASS INDEX: 20.33 KG/M2 | WEIGHT: 122 LBS | HEART RATE: 92 BPM | TEMPERATURE: 97.7 F

## 2023-05-16 DIAGNOSIS — N39.0 URINARY TRACT INFECTION, SITE NOT SPECIFIED: ICD-10-CM

## 2023-05-16 PROCEDURE — 99213 OFFICE O/P EST LOW 20 MIN: CPT

## 2023-05-16 RX ORDER — SULFAMETHOXAZOLE AND TRIMETHOPRIM 800; 160 MG/1; MG/1
800-160 TABLET ORAL TWICE DAILY
Qty: 14 | Refills: 0 | Status: ACTIVE | COMMUNITY
Start: 2023-05-16 | End: 1900-01-01

## 2023-05-16 RX ORDER — ESTRADIOL 0.1 MG/G
0.1 CREAM VAGINAL
Qty: 1 | Refills: 2 | Status: ACTIVE | COMMUNITY
Start: 2021-06-17 | End: 1900-01-01

## 2023-05-16 NOTE — HISTORY OF PRESENT ILLNESS
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 Aide states Samreen was doing her usual routine. Aide attempted to assist her to the bathroom and noticed her walking is off. Also states that Samreen complained of pain while urinating which usually are signs of UTI. Aide is unsure if has been using estrogen cream.\par \par Aide states did not have a fall. Aide says let her down gently. Aide mentioned that Samreen has an appointment with MD Wilian Johnson tomorrow 5/17/2023. \par \par Aide states Samreen took a nap and seems to have gotten better. Denies fever, chills and hematuria \par \par Hx of UCx\par \par 8/2022- E. coli\par 5/2022- E. coli\par 1/2022- E. coli\par \par \par

## 2023-05-16 NOTE — ASSESSMENT
[FreeTextEntry1] : 87 years old female with hx of Alzeimer's, parkinson, incontinence and chronic UTI\par \par Plan:\par \par 1. UA/ UCx- Straight cath today 5/16/2023 in office \par 2. Prescribed and instructed to start Abx today. Will call with UCx results and will adjust Abx if necessary\par 3. Explained importance of using estrogen cream\par 4. Continue with PCP f/u tomorrow \par 5. Advised to f/u w/ MD Diaz \par \par RTC if symptoms persist

## 2023-05-16 NOTE — PHYSICAL EXAM
[General Appearance - Well Developed] : well developed [General Appearance - Well Nourished] : well nourished [Normal Appearance] : normal appearance [Well Groomed] : well groomed [General Appearance - In No Acute Distress] : no acute distress [Abdomen Soft] : soft [Abdomen Tenderness] : non-tender [Edema] : no peripheral edema [] : no respiratory distress [Respiration, Rhythm And Depth] : normal respiratory rhythm and effort [Exaggerated Use Of Accessory Muscles For Inspiration] : no accessory muscle use [FreeTextEntry1] : WC

## 2023-05-22 VITALS
SYSTOLIC BLOOD PRESSURE: 81 MMHG | DIASTOLIC BLOOD PRESSURE: 50 MMHG | RESPIRATION RATE: 18 BRPM | HEART RATE: 106 BPM | HEIGHT: 64 IN | TEMPERATURE: 98 F | OXYGEN SATURATION: 95 % | WEIGHT: 125 LBS

## 2023-05-22 LAB
ALBUMIN SERPL ELPH-MCNC: 3.5 G/DL — SIGNIFICANT CHANGE UP (ref 3.3–5)
ALP SERPL-CCNC: 159 U/L — HIGH (ref 40–120)
ALT FLD-CCNC: 12 U/L — SIGNIFICANT CHANGE UP (ref 10–45)
ANION GAP SERPL CALC-SCNC: 11 MMOL/L — SIGNIFICANT CHANGE UP (ref 5–17)
APPEARANCE UR: CLEAR — SIGNIFICANT CHANGE UP
APPEARANCE: ABNORMAL
AST SERPL-CCNC: 24 U/L — SIGNIFICANT CHANGE UP (ref 10–40)
BACTERIA # UR AUTO: SIGNIFICANT CHANGE UP /HPF
BACTERIA UR CULT: ABNORMAL
BACTERIA: ABNORMAL /HPF
BASE EXCESS BLDV CALC-SCNC: 0.1 MMOL/L — SIGNIFICANT CHANGE UP (ref -2–3)
BASOPHILS # BLD AUTO: 0.04 K/UL — SIGNIFICANT CHANGE UP (ref 0–0.2)
BASOPHILS NFR BLD AUTO: 0.3 % — SIGNIFICANT CHANGE UP (ref 0–2)
BILIRUB SERPL-MCNC: 0.4 MG/DL — SIGNIFICANT CHANGE UP (ref 0.2–1.2)
BILIRUB UR-MCNC: NEGATIVE — SIGNIFICANT CHANGE UP
BILIRUBIN URINE: NEGATIVE
BLOOD URINE: NEGATIVE
BUN SERPL-MCNC: 52 MG/DL — HIGH (ref 7–23)
CA-I SERPL-SCNC: 1.41 MMOL/L — HIGH (ref 1.15–1.33)
CALCIUM OXALATE CRYSTALS: PRESENT
CALCIUM SERPL-MCNC: 10.6 MG/DL — HIGH (ref 8.4–10.5)
CAST: NORMAL /LPF
CHLORIDE SERPL-SCNC: 94 MMOL/L — LOW (ref 96–108)
CO2 BLDV-SCNC: 27.4 MMOL/L — HIGH (ref 22–26)
CO2 SERPL-SCNC: 24 MMOL/L — SIGNIFICANT CHANGE UP (ref 22–31)
COLOR SPEC: YELLOW — SIGNIFICANT CHANGE UP
COLOR: YELLOW
CREAT SERPL-MCNC: 1.02 MG/DL — SIGNIFICANT CHANGE UP (ref 0.5–1.3)
DIFF PNL FLD: NEGATIVE — SIGNIFICANT CHANGE UP
EGFR: 54 ML/MIN/1.73M2 — LOW
EOSINOPHIL # BLD AUTO: 0.07 K/UL — SIGNIFICANT CHANGE UP (ref 0–0.5)
EOSINOPHIL NFR BLD AUTO: 0.5 % — SIGNIFICANT CHANGE UP (ref 0–6)
EPI CELLS # UR: SIGNIFICANT CHANGE UP /HPF (ref 0–5)
EPITHELIAL CELLS: 1 /HPF
GAS PNL BLDV: 127 MMOL/L — LOW (ref 136–145)
GAS PNL BLDV: SIGNIFICANT CHANGE UP
GLUCOSE QUALITATIVE U: 100 MG/DL
GLUCOSE SERPL-MCNC: 119 MG/DL — HIGH (ref 70–99)
GLUCOSE UR QL: NEGATIVE — SIGNIFICANT CHANGE UP
HCO3 BLDV-SCNC: 26 MMOL/L — SIGNIFICANT CHANGE UP (ref 22–29)
HCT VFR BLD CALC: 33.2 % — LOW (ref 34.5–45)
HGB BLD-MCNC: 10.8 G/DL — LOW (ref 11.5–15.5)
IMM GRANULOCYTES NFR BLD AUTO: 1.8 % — HIGH (ref 0–0.9)
KETONES UR-MCNC: NEGATIVE — SIGNIFICANT CHANGE UP
KETONES URINE: ABNORMAL MG/DL
LACTATE SERPL-SCNC: 2.3 MMOL/L — HIGH (ref 0.5–2)
LACTATE SERPL-SCNC: 2.7 MMOL/L — HIGH (ref 0.5–2)
LEUKOCYTE ESTERASE UR-ACNC: ABNORMAL
LEUKOCYTE ESTERASE URINE: ABNORMAL
LYMPHOCYTES # BLD AUTO: 0.84 K/UL — LOW (ref 1–3.3)
LYMPHOCYTES # BLD AUTO: 5.5 % — LOW (ref 13–44)
MCHC RBC-ENTMCNC: 25.1 PG — LOW (ref 27–34)
MCHC RBC-ENTMCNC: 32.5 GM/DL — SIGNIFICANT CHANGE UP (ref 32–36)
MCV RBC AUTO: 77 FL — LOW (ref 80–100)
MICROSCOPIC-UA: NORMAL
MONOCYTES # BLD AUTO: 0.68 K/UL — SIGNIFICANT CHANGE UP (ref 0–0.9)
MONOCYTES NFR BLD AUTO: 4.5 % — SIGNIFICANT CHANGE UP (ref 2–14)
NEUTROPHILS # BLD AUTO: 13.33 K/UL — HIGH (ref 1.8–7.4)
NEUTROPHILS NFR BLD AUTO: 87.4 % — HIGH (ref 43–77)
NITRITE UR-MCNC: NEGATIVE — SIGNIFICANT CHANGE UP
NITRITE URINE: POSITIVE
NRBC # BLD: 0 /100 WBCS — SIGNIFICANT CHANGE UP (ref 0–0)
PCO2 BLDV: 46 MMHG — HIGH (ref 39–42)
PH BLDV: 7.36 — SIGNIFICANT CHANGE UP (ref 7.32–7.43)
PH UR: 5.5 — SIGNIFICANT CHANGE UP (ref 5–8)
PH URINE: 5.5
PLATELET # BLD AUTO: 242 K/UL — SIGNIFICANT CHANGE UP (ref 150–400)
PO2 BLDV: <33 MMHG — SIGNIFICANT CHANGE UP (ref 25–45)
POTASSIUM BLDV-SCNC: 4.4 MMOL/L — SIGNIFICANT CHANGE UP (ref 3.5–5.1)
POTASSIUM SERPL-MCNC: 4.6 MMOL/L — SIGNIFICANT CHANGE UP (ref 3.5–5.3)
POTASSIUM SERPL-SCNC: 4.6 MMOL/L — SIGNIFICANT CHANGE UP (ref 3.5–5.3)
PROT SERPL-MCNC: 6.2 G/DL — SIGNIFICANT CHANGE UP (ref 6–8.3)
PROT UR-MCNC: ABNORMAL MG/DL
PROTEIN URINE: 30 MG/DL
RAPID RVP RESULT: SIGNIFICANT CHANGE UP
RBC # BLD: 4.31 M/UL — SIGNIFICANT CHANGE UP (ref 3.8–5.2)
RBC # FLD: 16.6 % — HIGH (ref 10.3–14.5)
RBC CASTS # UR COMP ASSIST: < 5 /HPF — SIGNIFICANT CHANGE UP
RED BLOOD CELLS URINE: NORMAL /HPF
SAO2 % BLDV: 28.5 % — LOW (ref 67–88)
SARS-COV-2 RNA SPEC QL NAA+PROBE: SIGNIFICANT CHANGE UP
SODIUM SERPL-SCNC: 129 MMOL/L — LOW (ref 135–145)
SP GR SPEC: >=1.03 — SIGNIFICANT CHANGE UP (ref 1–1.03)
SPECIFIC GRAVITY URINE: 1.02
TRI-PHOS CRY UR QL COMP ASSIST: ABNORMAL /HPF
URATE CRY FLD QL MICRO: ABNORMAL /HPF
UROBILINOGEN FLD QL: 0.2 E.U./DL — SIGNIFICANT CHANGE UP
UROBILINOGEN URINE: 0.2 MG/DL
WBC # BLD: 15.23 K/UL — HIGH (ref 3.8–10.5)
WBC # FLD AUTO: 15.23 K/UL — HIGH (ref 3.8–10.5)
WBC UR QL: < 5 /HPF — SIGNIFICANT CHANGE UP
WHITE BLOOD CELLS URINE: 210 /HPF

## 2023-05-22 PROCEDURE — 70450 CT HEAD/BRAIN W/O DYE: CPT | Mod: 26,59,MA

## 2023-05-22 PROCEDURE — 70496 CT ANGIOGRAPHY HEAD: CPT | Mod: 26,MA

## 2023-05-22 PROCEDURE — 71045 X-RAY EXAM CHEST 1 VIEW: CPT | Mod: 26

## 2023-05-22 PROCEDURE — 70498 CT ANGIOGRAPHY NECK: CPT | Mod: 26,MA

## 2023-05-22 PROCEDURE — 72132 CT LUMBAR SPINE W/DYE: CPT | Mod: 26,MA

## 2023-05-22 PROCEDURE — 71250 CT THORAX DX C-: CPT | Mod: 26,MA

## 2023-05-22 PROCEDURE — 99285 EMERGENCY DEPT VISIT HI MDM: CPT

## 2023-05-22 RX ORDER — PIPERACILLIN AND TAZOBACTAM 4; .5 G/20ML; G/20ML
3.38 INJECTION, POWDER, LYOPHILIZED, FOR SOLUTION INTRAVENOUS ONCE
Refills: 0 | Status: COMPLETED | OUTPATIENT
Start: 2023-05-22 | End: 2023-05-22

## 2023-05-22 RX ORDER — ACETAMINOPHEN 500 MG
1000 TABLET ORAL ONCE
Refills: 0 | Status: COMPLETED | OUTPATIENT
Start: 2023-05-22 | End: 2023-05-22

## 2023-05-22 RX ORDER — VANCOMYCIN HCL 1 G
1000 VIAL (EA) INTRAVENOUS ONCE
Refills: 0 | Status: COMPLETED | OUTPATIENT
Start: 2023-05-22 | End: 2023-05-22

## 2023-05-22 RX ORDER — SODIUM CHLORIDE 9 MG/ML
1750 INJECTION INTRAMUSCULAR; INTRAVENOUS; SUBCUTANEOUS ONCE
Refills: 0 | Status: COMPLETED | OUTPATIENT
Start: 2023-05-22 | End: 2023-05-22

## 2023-05-22 RX ADMIN — Medication 400 MILLIGRAM(S): at 20:54

## 2023-05-22 RX ADMIN — PIPERACILLIN AND TAZOBACTAM 200 GRAM(S): 4; .5 INJECTION, POWDER, LYOPHILIZED, FOR SOLUTION INTRAVENOUS at 18:24

## 2023-05-22 RX ADMIN — SODIUM CHLORIDE 1750 MILLILITER(S): 9 INJECTION INTRAMUSCULAR; INTRAVENOUS; SUBCUTANEOUS at 18:24

## 2023-05-22 RX ADMIN — Medication 250 MILLIGRAM(S): at 19:13

## 2023-05-22 NOTE — ED PROVIDER NOTE - PHYSICAL EXAMINATION
Constitutional: Elderly, frail, awake, alert, oriented to person, place  ENMT: Airway patent. Dry MM  Eyes: Clear bilaterally, PERRL, EOMI  Cardiac: Tachycardic rate, regular rhythm.  Heart sounds S1, S2.  No murmurs, rubs or gallops.  Respiratory: Breaths sounds equal and clear b/l. No increased WOB, tachypnea, hypoxia, or accessory mm use. Pt speaks in full sentences.   Gastrointestinal: Abd soft, NT, ND, NABS. No guarding, rebound, or rigidity. No pulsatile abdominal masses. No organomegaly appreciated.   Musculoskeletal: Range of motion is not limited  Neuro: Alert & Oriented x 2. CN II-XII intact. Mild drooping R lip, normal as per daughter. +Dysarthria. Tremulous. No pronator drift. LE weakness, symmetric w/ drift in b/l LE ext, ? R neglect  Skin: Skin normal color for race, warm, dry and intact. No evidence of rash.  Psych: UTO. Constitutional: Elderly, frail, awake, alert, oriented to person, place  ENMT: Airway patent. Dry MM  Eyes: Clear bilaterally, PERRL, EOMI  Cardiac: Tachycardic rate, regular rhythm.  Heart sounds S1, S2.  No murmurs, rubs or gallops.  Respiratory: Breaths sounds equal and clear b/l. No increased WOB, tachypnea, hypoxia, or accessory mm use. Pt speaks in full sentences.   Gastrointestinal: Abd soft, NT, ND, NABS. No guarding, rebound, or rigidity. No pulsatile abdominal masses. No organomegaly appreciated.   Musculoskeletal: Range of motion is not limited  Neuro: Alert & Oriented x 2. CN II-XII intact. Mild drooping R lip, normal as per daughter. +Dysarthria. Tremulous. No pronator drift. LE weakness, symmetric w/ drift in b/l LE ext  Skin: Skin normal color for race, warm, dry and intact. No evidence of rash.  Psych: UTO.

## 2023-05-22 NOTE — ED ADULT NURSE REASSESSMENT NOTE - NS ED NURSE REASSESS COMMENT FT1
patient had multiple bms during shift. Failed dysphagia, remains on 2L NC. Aide at bedside. Patient having increased tremors, missed two doses of Parkinsons medication. MD aware. Report endorsed to JAY Hyman

## 2023-05-22 NOTE — ED ADULT NURSE NOTE - NIH STROKE SCALE: 7. LIMB ATAXIA, QM
normal... Well appearing, awake, alert, oriented to person, place, time/situation and in no apparent distress. (0) Absent

## 2023-05-22 NOTE — ED PROVIDER NOTE - PROGRESS NOTE DETAILS
[facility] CTA Head IMPRESSION:  No stenosis or occlusion of intracranial arteries  [facility] CTA Neck IMPRESSION:  No stenosis or occlusion of neck arteries D/w Stroke / neuro - sx likely unrelated to stroke, and more likely related to PD-exacerbation in the setting of sepsis. If sx not improved s/p abx completed, stroke/ neuro can be reconsulted Left TM for Dr Johnson on his cell. ED consulted placed in computer to be contacted via office by Rep ramya - " CTAP IMPRESSION:  1.   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  2.   Enlarged left external iliac lymph node. Further evaluation with CT  abdomen and pelvis is recommended.  3.   Rectal thickening, to be further evaluated on CT abdomen and pelvis."    imaging as above.   defer CTAP given pt s/p 2 contrast dye loads w JUANITA.   pt admitted to medicine for ams, juanita ramya / ramiro  received on sign out. pt here w AMS, febrile and tachy - sepsis criteria. s/p abx. one episode self resolved hypotension. lactate down-trending.   imaging thus far w/o acute findings.   at time of sign out pending CT LSpine bc of possible infectious process seen on CT chest.   ultimately to be admitted.

## 2023-05-22 NOTE — ED ADULT NURSE NOTE - CCCP TRG CHIEF CMPLNT
PREOPERATIVE CONSULT      CHIEF COMPLAINT: Preoperative exam.      HISTORY OF PRESENT ILLNESS: William Santa is a 77year old male patient who presents for preoperative exam today, requested by Dr. Jacob Bravo. Date of Surgery: 02/27/2019  Site: Right eye  Procedure: Cataract removal     History of previous surgeries? Yes had left cataract surgery previously  History of anesthesia problems? No  Family history of anesthesia problems? No  Previous history of blood clots/pulmonary embolism? No  History of kidney problems? Yes CKD   History of heart attack? No  Any chest pain or shortness of breath when climbing two flights of stairs at a normal speed? No  Use of aspirin, anti-inflammatories, or other blood thinners? Yes aspirin, Plavix  History of sleep apnea? No  Recent illness?  No    Past Medical History:   Diagnosis Date   â¢ Abnormal stress test 02/19/2018   â¢ Acute bronchitis 10/11/2014   â¢ Anemia    â¢ Arthritis     back, neck   â¢ Benign essential HTN    â¢ Bronchitis 06/17/2015   â¢ Cellulitis of toe of right foot 10/07/2015   â¢ Cervicalgia 03/21/2016   â¢ Cervicodorsal spondylosis with myelopathy    â¢ Chest pain 05/24/2015   â¢ Chest pain 01/12/2018   â¢ Chronic kidney disease     CKD Stage 4   â¢ Chronic neck pain 03/09/2014   â¢ Chronic pain     back, neck   â¢ Chronic systolic heart failure (CMS/[facility]) 06/20/2018   â¢ Claudication (CMS/[facility]) 06/06/2016   â¢ Coronary artery disease    â¢ Diabetes mellitus (CMS/[facility]) 03/01/2013    type 2, with opthalmic manifestations   â¢ Diabetic nephropathy (CMS/[facility])    â¢ Dialysis patient (CMS/[facility]) 11/2018    [phone]   â¢ Edentulous    â¢ ESRD (end stage renal disease) on HD 06/21/2018   â¢ Essential (primary) hypertension    â¢ Fatigue     with ADL's   â¢ Fracture     foot   â¢ Hyperlipidemia 07/17/2014   â¢ Hypertensive urgency 03/18/2015   â¢ Kidney stone 09/13/2015   â¢ Left flank pain 06/14/2014   â¢ Lower extremity edema    â¢ Lumbar stenosis with neurogenic claudication    â¢ LV dysfunction 06/20/2018   â¢ PVD (peripheral vascular disease) with claudication (CMS/[facility]) 01/29/2016   â¢ Shingles approx. 2015   â¢ Sleeping difficulty     due to neck pain   â¢ Spondylosis with myelopathy, thoracic region 05/20/2016   â¢ Transient cerebral ischemia 02/26/2016   â¢ Use of cane as ambulatory aid     occasional use   â¢ Vitamin D deficiency    â¢ Vitreous hemorrhage, left eye (CMS/[facility]) 5-8-17   â¢ Wears reading eyeglasses        Patient Active Problem List    Diagnosis Date Noted   â¢ Other iron deficiency anemias 11/08/2018     Priority: Low   â¢ CKD (chronic kidney disease) stage 4, GFR 15-29 ml/min (CMS/[facility]) 03/21/2018     Priority: Low   â¢ Abnormal stress test 02/19/2018     Priority: Low     11/12/15 NM Stress  1. Normal regadenoson stress SPECT myocardial perfusion study without evidence of ischemia or infarction. 2. Mild global left ventricular hypokinesis is noted. Left ventricle is normal  in size. 3. Global left ventricular systolic function is mildly decreased, left ventricular ejection fraction is calculated at 42%. 1. Negative regadenoson stress EKG for ischemia. 2. No chest pain. 3. Nuclear images will be reviewed and reported separately. 1/12/18 NM Stress  1. Abnormal myocardial perfusion scans during Adenosine/low-level exercise and at rest showing small area of mild basal anterior and anteroseptal wall ischemia. 2.  Severely decreased left ventricular ejection fraction post-Adenosine left ventricular systolic function. 3. Cardiac Risk Assessment: High due to low ejection fraction. Additionally, there is an evidence of visual transient ischemic dilatation. Resting supine 12 lead ECG: Normal sinus rhythm with voltage criteria for left ventricular hypertrophy and ST depression, T wave inversion in leads I, II, III, aVF, V4-V6.   Baseline stress 12 lead ECG: Unchanged from the resting ECG  Stress ECG: There is mild exaggeration of the ST abnormalities with adenosine infusion. Â These changes are equivocal for ischemia in the presence of voltage criteria for left ventricular hypertrophy. There was no development of ectopy or sustained arrhythmias. â¢ LV dysfunction 02/19/2018     Priority: Low     NM stress 2015-  Global left ventricular systolic function is mildly decreased, left  ventricular ejection fraction is calculated at 42%. Echo 2016 Left ventricular ejection fraction, 55 %. NM Stress 1/12/18- Severely decreased left ventricular ejection fraction post-Adenosine left ventricular systolic function. LVEF 35%      NM Cardiac Blood Pool Imaging 7/5/2018  1. Normal left ventricular size, regional wall motion and ejection fraction  2. Normal diastolic LV function  3. Normal right ventricular size and systolic function  4. Compared to a previous myocardial perfusion scan report from January 2018, left ventricular ejection fraction has now normalized. â¢ Cervicodorsal spondylosis with myelopathy 05/20/2016     Priority: Low   â¢ Spondylosis with myelopathy, thoracic region 05/20/2016     Priority: Low   â¢ Lumbar stenosis with neurogenic claudication 05/20/2016     Priority: Low   â¢ Type 2 diabetes mellitus with ophthalmic manifestations (CMS/[facility]) 09/15/2015     Priority: Low   â¢ Benign essential HTN 04/06/2015     Priority: Low   â¢ CKD (chronic kidney disease) stage 3, GFR 30-59 ml/min (CMS/[facility]) 12/01/2014     Priority: Low   â¢ Chronic pain 07/10/2014     Priority: Low   â¢ Hypertensive urgency 07/10/2014     Priority: Low   â¢ Lower extremity edema 07/10/2014     Priority: Low   â¢ Hyperlipidemia 04/06/2014     Priority: Low     2/23/17 Lipid Panel: Chol. 150, LDL 62, HDL 58, . â¢ Back pain 04/06/2014     Priority: Low   â¢ DM (diabetes mellitus), type 2 04/06/2014     Priority: Low     Has been using lantus nightly. Has not ever had an A1C checked. Does not like needles.  Has been only on Lipitor 10mg in the past.          Past Surgical History:   Procedure Laterality Date   â¢ Av fistula placement w/ ptfe Left 07/27/2018    Left upper arm av graft   â¢ Cdl cath poss ptca poss stent  12/12/2018   â¢ Colonoscopy diagnostic  1/8/15    Affi 5yr recall, 2 polyps benign and tubular adenoma   â¢ Hand surgery Right    â¢ Knee surgery Left 2000   â¢ Lower extremity angiograms/possible pta/possible stent  6/6/2016   â¢ Posterior cervical laminectomy  2006    with fusion [facility] Shoulder surgery  2000   â¢ Toe amputation Right 2016    2nd toe    â¢ Vitrectomy Left 05/08/2017    Left 23 g pars plana vitrectomy, endolaer, avastin inj., air; Dr. Thiago Lebron       Current Outpatient Medications   Medication Sig   â¢ cilostazol (PLETAL) 100 MG tablet TAKE 1 TABLET BY MOUTH TWICE DAILY   â¢ metoPROLOL succinate (TOPROL-XL) 50 MG 24 hr tablet TAKE 1 AND ONE-HALF TABLET BY MOUTH DAILY   â¢ Carboxymethylcellulose Sodium (EYE DROPS OP)    â¢ Multiple Vitamins-Minerals (MULTIVITAMIN ADULT PO)    â¢ hydrALAZINE (APRESOLINE) 100 MG tablet Take 1 tablet by mouth 3 times daily. â¢ NIFEdipine CC (ADALAT CC) 90 MG 24 hr tablet Take 1 tablet by mouth daily. â¢ sodium bicarbonate 650 MG tablet Take 1 tablet by mouth 2 times daily. â¢ atorvastatin (LIPITOR) 40 MG tablet TAKE 1 TABLET BY MOUTH DAILY   â¢ LANTUS SOLOSTAR 100 UNIT/ML pen-injector ADMINISTER 28 UNITS UNDER THE SKIN DAILY (Patient taking differently: ADMINISTER 20 UNITS UNDER THE SKIN DAILY)   â¢ losartan (COZAAR) 100 MG tablet TAKE 1 TABLET BY MOUTH DAILY   â¢ Acetaminophen (TYLENOL PO) Take by mouth as needed. â¢ clopidogrel (PLAVIX) 75 MG tablet TAKE 1 TABLET BY MOUTH DAILY   â¢ Blood Glucose Monitoring Suppl (ACCU-CHEK TSERING SMARTVIEW) w/Device Kit 1 kit by Other route as directed. Use to test blood sugars twice daily   â¢ ACCU-CHEK SMARTVIEW test strip 1 each 3 times daily. Test blood sugar 2 times daily as directed. Diagnosis: E11.9.  Meter: Accu-chek Tsering smartview   â¢ cyclobenzaprine (FLEXERIL) 10 MG tablet Take 1 "tablet by mouth 3 times daily as needed for Muscle spasms. â¢ aspirin 81 MG tablet Take 1 tablet by mouth daily. â¢ B-D U/F PEN NEEDLE 5/16"" 31G X 8 MM Misc USE AS DIRECTED TWICE DAILY   â¢ Lancets Misc. (ACCU-CHEK FASTCLIX LANCET) Kit 1 each by Other route 2 times daily. Current Facility-Administered Medications   Medication   â¢ sodium chloride 0.9% infusion     Facility-Administered Medications Ordered in Other Visits   Medication   â¢ Lidocaine HCl 2 % PF injection Solution       ALLERGIES:  No Known Allergies      FAMILY HISTORY:     Patient denies any family history of clotting or bleeding disorders or trouble with anesthesia.      Family History   Problem Relation Age of Onset   â¢ Heart disease Mother    â¢ Diabetes Father    â¢ Kidney disease Brother    â¢ Heart disease Brother         tachycardia   â¢ Migraine Daughter          SOCIAL HISTORY:    Social History     Socioeconomic History   â¢ Marital status: Single     Spouse name: Not on file   â¢ Number of children: Not on file   â¢ Years of education: Not on file   â¢ Highest education level: Not on file   Social Needs   â¢ Financial resource strain: Not on file   â¢ Food insecurity - worry: Not on file   â¢ Food insecurity - inability: Not on file   â¢ Transportation needs - medical: Not on file   â¢ Transportation needs - non-medical: Not on file   Occupational History   â¢ Not on file   Tobacco Use   â¢ Smoking status: Former Smoker     Packs/day: 0.10     Years: 8.00     Pack years: 0.80     Types: Cigarettes     Start date: 7/18/1968   â¢ Smokeless tobacco: Former User     Quit date: 1/25/2016   â¢ Tobacco comment: 1-2 cig per day stopped in mid 20's and started up again around 2013 per daughters report   Substance and Sexual Activity   â¢ Alcohol use: No     Alcohol/week: 0.0 oz     Frequency: Never   â¢ Drug use: No   â¢ Sexual activity: Yes     Partners: Female   Other Topics Concern   â¢ Not on file   Social History Narrative   â¢ Not on file         REVIEW OF " SYSTEMS:     General: Denies significant weight changes. Denies fevers, chills, or night sweats. Skin: Denies significant rashes or new lesions. HEENT: Denies recent visual changes, dizziness, headaches, sore throat, hoarseness or difficulty swallowing. Denies otalgia or hearing loss, tinnitus, vertigo, epistaxis, sinus congestion, dental problem or loose teeth. Cardiovascular: Denies chest pain, shortness of breath, palpitations, edema, or leg pains with walking. Able to climb flight of stairs without difficulty. Respiratory: Denies cough, asthma, wheezing or dyspnea on exertion. Gastrointestinal: Denies abdominal pain, nausea, vomiting, diarrhea, or blood in stool. Denies heartburn or reflux. Genitourinary: Denies dysuria, urinary frequency, urgency or hematuria. Musculoskeletal: Denies myalgias, joint pains or swelling. Endocrine: Denies known thyroid disorder or diabetes. Denies heat or cold intolerance. Denies polyuria, polyphagia and polydipsia. Neurologic: Denies extremity paresthesias or focal weakness, denies history of seizures. Psychiatric: Denies anxiety and depression. PHYSICAL EXAM:     Visit Vitals  /60   Pulse 78   Wt 78 kg   SpO2 97%   BMI 23.32 kg/mÂ²       General Appearance: Pleasant, cooperative patient. Well-developed, well-nourished male in no acute distress. Skin: Warm, dry, no rash. Head: Normocephalic, atraumatic, no lesions. Ears: External ears normal. Canals clear. Tympanic membranes clear with all landmarks noted. Eyes: Pupils equal, round, and reactive to light and accommodation. Extraocular movements are intact. Conjunctivae and lids normal, sclera anicteric. Red reflex visible bilaterally. Nose: Patent and without erythema, edema or discharge   Mouth and Throat: Lips, mucosa, and tongue normal. Moist mucous membranes. Teeth and gums normal. Oropharynx clear. Posterior pharynx without erythema, edema or exudate. T    Neck: Supple, no lymphadenopathy, no thyromegaly. No jugular venous distention. Carotids 2+ and equal, no bruits. Chest: Symmetrical and no chest deformities noted. Lungs: Clear to auscultation bilaterally. Back: Straight, without deformity or pain. Heart: Regular rate and rhythm with normal S1, S2; no S3, S4, gallops or murmurs. Abdomen: Positive bowel sounds, no bruits. Abdomen soft, nontender, no masses or hepatosplenomegaly palpated. .    Extremities: No cyanosis or edema bilaterally. Distal pulses 2+ and symmetric bilaterally (radial, pedal). Fistula in LUE. Neurologic: Alert and oriented x3. Pleasant affect, appropriate in conversation. Range of motion and motor strength to upper and lower extremities grossly intact. Gait steady. No tremors. ASSESSMENT:     1. Pre-op examination          PLAN:       1. Advised patient to not take Lantus the day of surgery. Labs: None. A copy of this preoperative medical evaluation will be forwarded (chart cc'd) to Dr. Cheo Hdz for use with surgery. So long as preoperative testing results are appropriate, I see no contraindication to planned procedure. On 2/19/2019, I, Adama Galvez scribed the services personally performed by Delaney Maya MD    2/19/2019    The patient was seen and examined personally. The plan of care was reviewed with the student in detail and agreed upon. The patient was given an opportunity to ask questions. I agree with the note as written. hypotension, unable to ambulate

## 2023-05-22 NOTE — ED PROVIDER NOTE - CLINICAL SUMMARY MEDICAL DECISION MAKING FREE TEXT BOX
Pt p/w worsening functional status in the past week, unclear speech w/ unclear onset (woke up with sx 6:30 am, different caretaker evening before unclear if noted prior to sleep), w/ recent UTI on abx sensitive based on C&S, hypotension at home, normotensive on arrival w/ low grade rectal temp 100.3. Ddx includes but not limited to sepsis, infection precipitated worsening PD, stroke syndrome, other toxic / metabolic / electrolyte derangements, ICH, other pathology

## 2023-05-22 NOTE — ED ADULT TRIAGE NOTE - CHIEF COMPLAINT QUOTE
as per aid, pt unable to ambulate today, unable to open mouth today, and hypotensive today. pt has hx parkinsons.

## 2023-05-22 NOTE — ED ADULT NURSE NOTE - NIH STROKE SCALE: 6A. MOTOR LEG, LEFT, QM
per pt family, pt not able to move lower extremities against gravity at baseline/(3) No effort against gravity; leg falls to bed immediately

## 2023-05-22 NOTE — ED ADULT NURSE NOTE - OBJECTIVE STATEMENT
patient brought into ED, via daughter and home health aid. Home health aid reports patient is more lethargic and poor appetite today. AOX2, knows name and location. History of parkinsons and UTI, treated with antibiotics x7 days. Bilateral LLE weakness, wheelchair baseline.

## 2023-05-22 NOTE — ED PROVIDER NOTE - OBJECTIVE STATEMENT
PT w/ PMHx HTN on Toprol 25 mg QAM / Losartan 100 mg QAM, Sinemet 25 / 100 mg 2.5 tabs TID (last dose this morning around 9am), DM on Metformin 500 mg BID, GERD on Omeprazole 20 mg QD, depression on Sertraline 100 mg, HLD on Lipitor 5 mg QHS, hx traumatic SDH / SAH, PSHx repair of cerebral aneurysm 1960s p/w hypotension at home. Pt has 24 hour home care. At baseline pt is AO&2 , sometimes ambulates w/ walker + 1 person assist. 1 week ago pt noted to more weak, fell from standing onto her buttock. She has had frequent UTI's, was taken to her urologist Dr Villalobos, had UA/UCx + and started on Bactrim DS BID x 7 days, last dose tomorrow. UCx from sample taken 5/17 shows > 100K E Coli pansensitive, except to ampicillin / unasyn; sensitive to Bactrim. Pt has been too weak to get OOB in the past week, but has been taken her meds. In the past 1-2 days it has been harder for her to swallow, although she was still able to swallow her meds this morning, but it takes awhile. She was also noted upon awakening this morning to have garbled speech, and is sometimes blowing out her cheeks and moving her mouth. Her BP was noted to be low at home today, prompting the ED visit.     Other meds: ASA 81 mg QD, Senokot BID, Iron 65 mg, Folate 1 mg, MVI, Vit 3D, B12 complex

## 2023-05-22 NOTE — ED ADULT NURSE NOTE - NSFALLHARMRISKINTERV_ED_ALL_ED
Assistance OOB with selected safe patient handling equipment if applicable/Communicate risk of Fall with Harm to all staff, patient, and family/Monitor gait and stability/Provide patient with walking aids/Provide visual cue: red socks, yellow wristband, yellow gown, etc/Reinforce activity limits and safety measures with patient and family/Bed in lowest position, wheels locked, appropriate side rails in place/Call bell, personal items and telephone in reach/Instruct patient to call for assistance before getting out of bed/chair/stretcher/Non-slip footwear applied when patient is off stretcher/[place] to call system/Physically safe environment - no spills, clutter or unnecessary equipment/Purposeful Proactive Rounding/Room/bathroom lighting operational, light cord in reach

## 2023-05-22 NOTE — ED ADULT NURSE NOTE - NIH STROKE SCALE: 6B. MOTOR LEG, RIGHT, QM
lower extremities against gravity at baseline/(3) No effort against gravity; leg falls to bed immediately

## 2023-05-23 ENCOUNTER — INPATIENT (INPATIENT)
Facility: HOSPITAL | Age: 88
LOS: 2 days | Discharge: NURSING, REHAB OR LONG-TERM CARE | DRG: 872 | End: 2023-05-26
Attending: INTERNAL MEDICINE | Admitting: INTERNAL MEDICINE
Payer: MEDICARE

## 2023-05-23 DIAGNOSIS — Z51.5 ENCOUNTER FOR PALLIATIVE CARE: ICD-10-CM

## 2023-05-23 DIAGNOSIS — G30.9 ALZHEIMER'S DISEASE, UNSPECIFIED: ICD-10-CM

## 2023-05-23 DIAGNOSIS — K59.00 CONSTIPATION, UNSPECIFIED: ICD-10-CM

## 2023-05-23 DIAGNOSIS — Z98.890 OTHER SPECIFIED POSTPROCEDURAL STATES: Chronic | ICD-10-CM

## 2023-05-23 DIAGNOSIS — R13.10 DYSPHAGIA, UNSPECIFIED: ICD-10-CM

## 2023-05-23 DIAGNOSIS — Z90.2 ACQUIRED ABSENCE OF LUNG [PART OF]: Chronic | ICD-10-CM

## 2023-05-23 DIAGNOSIS — G20 PARKINSON'S DISEASE: ICD-10-CM

## 2023-05-23 DIAGNOSIS — E11.9 TYPE 2 DIABETES MELLITUS WITHOUT COMPLICATIONS: ICD-10-CM

## 2023-05-23 DIAGNOSIS — R53.1 WEAKNESS: ICD-10-CM

## 2023-05-23 DIAGNOSIS — Z29.9 ENCOUNTER FOR PROPHYLACTIC MEASURES, UNSPECIFIED: ICD-10-CM

## 2023-05-23 DIAGNOSIS — A41.9 SEPSIS, UNSPECIFIED ORGANISM: ICD-10-CM

## 2023-05-23 DIAGNOSIS — F03.90 UNSPECIFIED DEMENTIA WITHOUT BEHAVIORAL DISTURBANCE: ICD-10-CM

## 2023-05-23 DIAGNOSIS — Z71.89 OTHER SPECIFIED COUNSELING: ICD-10-CM

## 2023-05-23 DIAGNOSIS — Z90.49 ACQUIRED ABSENCE OF OTHER SPECIFIED PARTS OF DIGESTIVE TRACT: Chronic | ICD-10-CM

## 2023-05-23 DIAGNOSIS — I10 ESSENTIAL (PRIMARY) HYPERTENSION: ICD-10-CM

## 2023-05-23 LAB
ALBUMIN SERPL ELPH-MCNC: 3.1 G/DL — LOW (ref 3.3–5)
ALP SERPL-CCNC: 113 U/L — SIGNIFICANT CHANGE UP (ref 40–120)
ALT FLD-CCNC: 13 U/L — SIGNIFICANT CHANGE UP (ref 10–45)
ANION GAP SERPL CALC-SCNC: 8 MMOL/L — SIGNIFICANT CHANGE UP (ref 5–17)
APTT BLD: 25.2 SEC — LOW (ref 27.5–35.5)
AST SERPL-CCNC: 16 U/L — SIGNIFICANT CHANGE UP (ref 10–40)
BASOPHILS # BLD AUTO: 0.02 K/UL — SIGNIFICANT CHANGE UP (ref 0–0.2)
BASOPHILS NFR BLD AUTO: 0.2 % — SIGNIFICANT CHANGE UP (ref 0–2)
BILIRUB SERPL-MCNC: 0.3 MG/DL — SIGNIFICANT CHANGE UP (ref 0.2–1.2)
BUN SERPL-MCNC: 38 MG/DL — HIGH (ref 7–23)
CALCIUM SERPL-MCNC: 9.1 MG/DL — SIGNIFICANT CHANGE UP (ref 8.4–10.5)
CHLORIDE SERPL-SCNC: 98 MMOL/L — SIGNIFICANT CHANGE UP (ref 96–108)
CO2 SERPL-SCNC: 24 MMOL/L — SIGNIFICANT CHANGE UP (ref 22–31)
CREAT SERPL-MCNC: 1 MG/DL — SIGNIFICANT CHANGE UP (ref 0.5–1.3)
EGFR: 55 ML/MIN/1.73M2 — LOW
EOSINOPHIL # BLD AUTO: 0.09 K/UL — SIGNIFICANT CHANGE UP (ref 0–0.5)
EOSINOPHIL NFR BLD AUTO: 0.9 % — SIGNIFICANT CHANGE UP (ref 0–6)
GLUCOSE BLDC GLUCOMTR-MCNC: 126 MG/DL — HIGH (ref 70–99)
GLUCOSE BLDC GLUCOMTR-MCNC: 128 MG/DL — HIGH (ref 70–99)
GLUCOSE BLDC GLUCOMTR-MCNC: 130 MG/DL — HIGH (ref 70–99)
GLUCOSE BLDC GLUCOMTR-MCNC: 131 MG/DL — HIGH (ref 70–99)
GLUCOSE SERPL-MCNC: 118 MG/DL — HIGH (ref 70–99)
HCT VFR BLD CALC: 30.5 % — LOW (ref 34.5–45)
HGB BLD-MCNC: 9.9 G/DL — LOW (ref 11.5–15.5)
IMM GRANULOCYTES NFR BLD AUTO: 1.5 % — HIGH (ref 0–0.9)
INR BLD: 1.06 — SIGNIFICANT CHANGE UP (ref 0.88–1.16)
LACTATE SERPL-SCNC: 1.9 MMOL/L — SIGNIFICANT CHANGE UP (ref 0.5–2)
LYMPHOCYTES # BLD AUTO: 0.84 K/UL — LOW (ref 1–3.3)
LYMPHOCYTES # BLD AUTO: 8 % — LOW (ref 13–44)
MAGNESIUM SERPL-MCNC: 1.6 MG/DL — SIGNIFICANT CHANGE UP (ref 1.6–2.6)
MCHC RBC-ENTMCNC: 25.4 PG — LOW (ref 27–34)
MCHC RBC-ENTMCNC: 32.5 GM/DL — SIGNIFICANT CHANGE UP (ref 32–36)
MCV RBC AUTO: 78.2 FL — LOW (ref 80–100)
MONOCYTES # BLD AUTO: 0.45 K/UL — SIGNIFICANT CHANGE UP (ref 0–0.9)
MONOCYTES NFR BLD AUTO: 4.3 % — SIGNIFICANT CHANGE UP (ref 2–14)
NEUTROPHILS # BLD AUTO: 8.98 K/UL — HIGH (ref 1.8–7.4)
NEUTROPHILS NFR BLD AUTO: 85.1 % — HIGH (ref 43–77)
NRBC # BLD: 0 /100 WBCS — SIGNIFICANT CHANGE UP (ref 0–0)
PHOSPHATE SERPL-MCNC: 2.9 MG/DL — SIGNIFICANT CHANGE UP (ref 2.5–4.5)
PLATELET # BLD AUTO: 188 K/UL — SIGNIFICANT CHANGE UP (ref 150–400)
POTASSIUM SERPL-MCNC: 3.9 MMOL/L — SIGNIFICANT CHANGE UP (ref 3.5–5.3)
POTASSIUM SERPL-SCNC: 3.9 MMOL/L — SIGNIFICANT CHANGE UP (ref 3.5–5.3)
PROT SERPL-MCNC: 5.1 G/DL — LOW (ref 6–8.3)
PROTHROM AB SERPL-ACNC: 12.6 SEC — SIGNIFICANT CHANGE UP (ref 10.5–13.4)
RBC # BLD: 3.9 M/UL — SIGNIFICANT CHANGE UP (ref 3.8–5.2)
RBC # FLD: 16.7 % — HIGH (ref 10.3–14.5)
SODIUM SERPL-SCNC: 130 MMOL/L — LOW (ref 135–145)
TSH SERPL-MCNC: 1.6 UIU/ML — SIGNIFICANT CHANGE UP (ref 0.27–4.2)
WBC # BLD: 10.54 K/UL — HIGH (ref 3.8–10.5)
WBC # FLD AUTO: 10.54 K/UL — HIGH (ref 3.8–10.5)

## 2023-05-23 PROCEDURE — 99223 1ST HOSP IP/OBS HIGH 75: CPT

## 2023-05-23 PROCEDURE — 99497 ADVNCD CARE PLAN 30 MIN: CPT | Mod: 25

## 2023-05-23 PROCEDURE — 74176 CT ABD & PELVIS W/O CONTRAST: CPT | Mod: 26

## 2023-05-23 RX ORDER — GLUCAGON INJECTION, SOLUTION 0.5 MG/.1ML
1 INJECTION, SOLUTION SUBCUTANEOUS ONCE
Refills: 0 | Status: DISCONTINUED | OUTPATIENT
Start: 2023-05-23 | End: 2023-05-26

## 2023-05-23 RX ORDER — PANTOPRAZOLE SODIUM 20 MG/1
40 TABLET, DELAYED RELEASE ORAL
Refills: 0 | Status: DISCONTINUED | OUTPATIENT
Start: 2023-05-23 | End: 2023-05-26

## 2023-05-23 RX ORDER — DEXTROSE 50 % IN WATER 50 %
25 SYRINGE (ML) INTRAVENOUS ONCE
Refills: 0 | Status: DISCONTINUED | OUTPATIENT
Start: 2023-05-23 | End: 2023-05-26

## 2023-05-23 RX ORDER — ASPIRIN/CALCIUM CARB/MAGNESIUM 324 MG
81 TABLET ORAL DAILY
Refills: 0 | Status: DISCONTINUED | OUTPATIENT
Start: 2023-05-23 | End: 2023-05-26

## 2023-05-23 RX ORDER — SODIUM CHLORIDE 9 MG/ML
1000 INJECTION, SOLUTION INTRAVENOUS
Refills: 0 | Status: DISCONTINUED | OUTPATIENT
Start: 2023-05-23 | End: 2023-05-26

## 2023-05-23 RX ORDER — METOPROLOL TARTRATE 50 MG
25 TABLET ORAL DAILY
Refills: 0 | Status: DISCONTINUED | OUTPATIENT
Start: 2023-05-23 | End: 2023-05-26

## 2023-05-23 RX ORDER — DEXTROSE 50 % IN WATER 50 %
15 SYRINGE (ML) INTRAVENOUS ONCE
Refills: 0 | Status: DISCONTINUED | OUTPATIENT
Start: 2023-05-23 | End: 2023-05-26

## 2023-05-23 RX ORDER — INSULIN LISPRO 100/ML
VIAL (ML) SUBCUTANEOUS
Refills: 0 | Status: DISCONTINUED | OUTPATIENT
Start: 2023-05-23 | End: 2023-05-26

## 2023-05-23 RX ORDER — DEXTROSE 50 % IN WATER 50 %
12.5 SYRINGE (ML) INTRAVENOUS ONCE
Refills: 0 | Status: DISCONTINUED | OUTPATIENT
Start: 2023-05-23 | End: 2023-05-26

## 2023-05-23 RX ORDER — SERTRALINE 25 MG/1
100 TABLET, FILM COATED ORAL DAILY
Refills: 0 | Status: DISCONTINUED | OUTPATIENT
Start: 2023-05-23 | End: 2023-05-26

## 2023-05-23 RX ORDER — LANOLIN ALCOHOL/MO/W.PET/CERES
3 CREAM (GRAM) TOPICAL AT BEDTIME
Refills: 0 | Status: DISCONTINUED | OUTPATIENT
Start: 2023-05-23 | End: 2023-05-26

## 2023-05-23 RX ORDER — CEFTRIAXONE 500 MG/1
1000 INJECTION, POWDER, FOR SOLUTION INTRAMUSCULAR; INTRAVENOUS EVERY 24 HOURS
Refills: 0 | Status: DISCONTINUED | OUTPATIENT
Start: 2023-05-23 | End: 2023-05-25

## 2023-05-23 RX ORDER — INSULIN LISPRO 100/ML
VIAL (ML) SUBCUTANEOUS AT BEDTIME
Refills: 0 | Status: DISCONTINUED | OUTPATIENT
Start: 2023-05-23 | End: 2023-05-26

## 2023-05-23 RX ORDER — ONDANSETRON 8 MG/1
4 TABLET, FILM COATED ORAL EVERY 8 HOURS
Refills: 0 | Status: DISCONTINUED | OUTPATIENT
Start: 2023-05-23 | End: 2023-05-26

## 2023-05-23 RX ORDER — ENOXAPARIN SODIUM 100 MG/ML
40 INJECTION SUBCUTANEOUS EVERY 24 HOURS
Refills: 0 | Status: DISCONTINUED | OUTPATIENT
Start: 2023-05-23 | End: 2023-05-24

## 2023-05-23 RX ORDER — SENNA PLUS 8.6 MG/1
1 TABLET ORAL AT BEDTIME
Refills: 0 | Status: DISCONTINUED | OUTPATIENT
Start: 2023-05-23 | End: 2023-05-26

## 2023-05-23 RX ORDER — ACETAMINOPHEN 500 MG
650 TABLET ORAL EVERY 6 HOURS
Refills: 0 | Status: DISCONTINUED | OUTPATIENT
Start: 2023-05-23 | End: 2023-05-26

## 2023-05-23 RX ORDER — CARBIDOPA AND LEVODOPA 25; 100 MG/1; MG/1
1 TABLET ORAL EVERY 8 HOURS
Refills: 0 | Status: DISCONTINUED | OUTPATIENT
Start: 2023-05-23 | End: 2023-05-26

## 2023-05-23 RX ORDER — MAGNESIUM SULFATE 500 MG/ML
2 VIAL (ML) INJECTION ONCE
Refills: 0 | Status: COMPLETED | OUTPATIENT
Start: 2023-05-23 | End: 2023-05-23

## 2023-05-23 RX ORDER — ATORVASTATIN CALCIUM 80 MG/1
5 TABLET, FILM COATED ORAL AT BEDTIME
Refills: 0 | Status: DISCONTINUED | OUTPATIENT
Start: 2023-05-23 | End: 2023-05-26

## 2023-05-23 RX ADMIN — SENNA PLUS 1 TABLET(S): 8.6 TABLET ORAL at 22:30

## 2023-05-23 RX ADMIN — ENOXAPARIN SODIUM 40 MILLIGRAM(S): 100 INJECTION SUBCUTANEOUS at 09:09

## 2023-05-23 RX ADMIN — CEFTRIAXONE 100 MILLIGRAM(S): 500 INJECTION, POWDER, FOR SOLUTION INTRAMUSCULAR; INTRAVENOUS at 06:13

## 2023-05-23 RX ADMIN — Medication 25 MILLIGRAM(S): at 18:23

## 2023-05-23 RX ADMIN — Medication 81 MILLIGRAM(S): at 22:30

## 2023-05-23 RX ADMIN — SERTRALINE 100 MILLIGRAM(S): 25 TABLET, FILM COATED ORAL at 18:23

## 2023-05-23 RX ADMIN — Medication 25 GRAM(S): at 09:09

## 2023-05-23 RX ADMIN — ATORVASTATIN CALCIUM 5 MILLIGRAM(S): 80 TABLET, FILM COATED ORAL at 22:30

## 2023-05-23 RX ADMIN — CARBIDOPA AND LEVODOPA 1 TABLET(S): 25; 100 TABLET ORAL at 18:23

## 2023-05-23 NOTE — H&P ADULT - HISTORY OF PRESENT ILLNESS
INCOMPLETE    HPI     PT w/ PMHx HTN on Toprol 25 mg QAM / Losartan 100 mg QAM, Sinemet 25 / 100 mg 2.5 tabs TID (last dose this morning around 9am), DM on Metformin 500 mg BID, GERD on Omeprazole 20 mg QD, depression on Sertraline 100 mg, HLD on Lipitor 5 mg QHS, hx traumatic SDH / SAH, PSHx repair of cerebral aneurysm 1960s p/w hypotension at home. Pt has 24 hour home care. At baseline pt is AO&2 , sometimes ambulates w/ walker + 1 person assist. 1 week ago pt noted to more weak, fell from standing onto her buttock. She has had frequent UTI's, was taken to her urologist Dr Villalobos, had UA/UCx + and started on Bactrim DS BID x 7 days, last dose tomorrow. UCx from sample taken 5/17 shows > 100K E Coli pansensitive, except to ampicillin / unasyn; sensitive to Bactrim. Pt has been too weak to get OOB in the past week, but has been taken her meds. In the past 1-2 days it has been harder for her to swallow, although she was still able to swallow her meds this morning, but it takes awhile. She was also noted upon awakening this morning to have garbled speech, and is sometimes blowing out her cheeks and moving her mouth. Her BP was noted to be low at home today, prompting the ED visit.     Past medical Hx:   Past surgical Hx:   Social Hx: alcohol, tobacco, or illicit drugs    ALL:   Pharmacy:     Pt seen in AM at bedside, resting comfortably in bed, and does not appear to be in any acute distress. When asked, pt denies any recent or active fever, chills, nausea, vomiting, headache, acute sob, chest pain, abdominal pain, genitourinary sx, extremity pain or swelling.    ED Course   (Triage) vitals: BP 81/50  T 97.7 95% on RA  (Pertinent) Labs: WBC 15.23 Hb 10.8 lactate 2.7 > 1.9 UA negative RVP negative  EKG: sinus tachycardia with PAC  Imaging:   - CXR shows small right sided pleural effusion.  - CT angio neck no stenosis or occlusion of intracranial arteries.  - CTH no acute intracranial hemorrhage, transcortical infarct or calvarial fracture. 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  - CT chest shows 1. Surgically absent left lung. 2. Small bilateral pleural effusions. 3. Air within the L1 vertebral body, likely a benign pneumatosis. Further evaluation with contrast-enhanced lumbar spine CT is recommended. Please correlate with any signs and symptoms of infection.  - CT lumbar spine 1. 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 2. Enlarged left external iliac lymph node. Further evaluation with CT abdomen and pelvis is recommended. 3. Rectal thickening, to be further evaluated on CT abdomen and pelvis.    Medication: Vancomycin 1 g IV, Zosyn 3.375 g IV, Ofirmev 1 g IV, NS 1750 cc IV    Metabolic Encephalopathy    Sepsis    UTI    Chronic Problems    Prophylactic Measure  E: as needed  N:   DVT:   C:   D: Regional   INCOMPLETE    HPI     PT w/ PMHx HTN on Toprol 25 mg QAM / Losartan 100 mg QAM, Sinemet 25 / 100 mg 2.5 tabs TID (last dose this morning around 9am), DM on Metformin 500 mg BID, GERD on Omeprazole 20 mg QD, depression on Sertraline 100 mg, HLD on Lipitor 5 mg QHS, hx traumatic SDH / SAH, PSHx repair of cerebral aneurysm 1960s p/w hypotension at home. Pt has 24 hour home care. At baseline pt is AO&2 , sometimes ambulates w/ walker + 1 person assist. 1 week ago pt noted to more weak, fell from standing onto her buttock. She has had frequent UTI's, was taken to her urologist Dr Villalobos, had UA/UCx + and started on Bactrim DS BID x 7 days, last dose tomorrow. UCx from sample taken 5/17 shows > 100K E Coli pansensitive, except to ampicillin / unasyn; sensitive to Bactrim. Pt has been too weak to get OOB in the past week, but has been taken her meds. In the past 1-2 days it has been harder for her to swallow, although she was still able to swallow her meds this morning, but it takes awhile. She was also noted upon awakening this morning to have garbled speech, and is sometimes blowing out her cheeks and moving her mouth. Her BP was noted to be low at home today, prompting the ED visit.       Medication:       Physicians:      Patient seen at the bedside, resting comfortably in bed, and does not appear to be in any acute distress. When asked, patient denies any recent or active fever, chills, nausea, vomiting, headache, acute sob, chest pain, abdominal pain, extremity pain or swelling.    ED Course   (Triage) vitals: BP 81/50  T 97.7 95% on RA  (Pertinent) Labs: WBC 15.23 Hb 10.8 lactate 2.7 > 1.9 UA negative RVP negative  EKG: sinus tachycardia with PAC  Imaging:   - CXR shows small right sided pleural effusion.  - CT angio neck no stenosis or occlusion of intracranial arteries.  - CTH no acute intracranial hemorrhage, transcortical infarct or calvarial fracture. 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  - CT chest shows 1. Surgically absent left lung. 2. Small bilateral pleural effusions. 3. Air within the L1 vertebral body, likely a benign pneumatosis. Further evaluation with contrast-enhanced lumbar spine CT is recommended. Please correlate with any signs and symptoms of infection.  - CT lumbar spine 1. 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 2. Enlarged left external iliac lymph node. Further evaluation with CT abdomen and pelvis is recommended. 3. Rectal thickening, to be further evaluated on CT abdomen and pelvis.  Medication: Vancomycin 1 g IV, Zosyn 3.375 g IV, Ofirmev 1 g IV, NS 1750 cc IV       INCOMPLETE    HPI     PT w/ PMHx HTN on Toprol 25 mg QAM / Losartan 100 mg QAM, Sinemet 25 / 100 mg 2.5 tabs TID (last dose this morning around 9am), DM on Metformin 500 mg BID, GERD on Omeprazole 20 mg QD, depression on Sertraline 100 mg, HLD on Lipitor 5 mg QHS, hx traumatic SDH / SAH, PSHx repair of cerebral aneurysm 1960s p/w hypotension at home. Pt has 24 hour home care. At baseline pt is AO&2 , sometimes ambulates w/ walker + 1 person assist. 1 week ago pt noted to more weak, fell from standing onto her buttock. She has had frequent UTI's, was taken to her urologist Dr Villalobos, had UA/UCx + and started on Bactrim DS BID x 7 days, last dose tomorrow. UCx from sample taken 5/17 shows > 100K E Coli pansensitive, except to ampicillin / unasyn; sensitive to Bactrim. Pt has been too weak to get OOB in the past week, but has been taken her meds. In the past 1-2 days it has been harder for her to swallow, although she was still able to swallow her meds this morning, but it takes awhile. She was also noted upon awakening this morning to have garbled speech, and is sometimes blowing out her cheeks and moving her mouth. Her BP was noted to be low at home today, prompting the ED visit.       Medication:       Physicians:      Patient seen at the bedside, resting comfortably in bed, and does not appear to be in any acute distress. When asked, patient denies any recent or active fever, chills, nausea, vomiting, headache, acute sob, chest pain, abdominal pain, extremity pain or swelling.    ED Course   (Triage) vitals: BP 81/50  T 97.7 95% on RA  (Pertinent) Labs: WBC 15.23 Hb 10.8 lactate 2.7 > 1.9 Na 129 UA negative RVP negative  EKG: sinus tachycardia with PAC  Imaging:   - CXR shows small right sided pleural effusion.  - CT angio neck no stenosis or occlusion of intracranial arteries.  - CTH no acute intracranial hemorrhage, transcortical infarct or calvarial fracture. 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  - CT chest shows 1. Surgically absent left lung. 2. Small bilateral pleural effusions. 3. Air within the L1 vertebral body, likely a benign pneumatosis. Further evaluation with contrast-enhanced lumbar spine CT is recommended. Please correlate with any signs and symptoms of infection.  - CT lumbar spine 1. 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 2. Enlarged left external iliac lymph node. Further evaluation with CT abdomen and pelvis is recommended. 3. Rectal thickening, to be further evaluated on CT abdomen and pelvis.  Medication: Vancomycin 1 g IV, Zosyn 3.375 g IV, Ofirmev 1 g IV, NS 1750 cc IV           Medication:   AM:  Toprol 25 mg PO QD   Omeprazole 20 mg PO QD  Losartan 100 mg PO QD  Sertraline 100 mg PO QD    PM:  ASA 81 mg PO QD  Lipitor 5 mg PO QD    Senna 1 tablet PO BID   Sinemet 25/100 mg TID   Metformin 500 mg PO BID    Physicians:  Neurologist: Dr. Nuha Ni  Urologist: Dr. Villalobos  Internists: Dr. Downey and Elizabeth Garcia    Patient seen at the bedside, resting comfortably in bed, and does not appear to be in any acute distress. When asked, patient denies any recent or active fever, chills, nausea, vomiting, headache, acute sob, chest pain, abdominal pain, extremity pain or swelling.    ED Course   (Triage) vitals: BP 81/50  T 97.7 95% on RA  (Pertinent) Labs: WBC 15.23 Hb 10.8 lactate 2.7 > 1.9 Na 129 UA negative RVP negative  EKG: sinus tachycardia with PAC  Imaging:   - CXR shows small right sided pleural effusion.  - CT angio neck no stenosis or occlusion of intracranial arteries.  - CTH no acute intracranial hemorrhage, transcortical infarct or calvarial fracture. 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  - CT chest shows 1. Surgically absent left lung. 2. Small bilateral pleural effusions. 3. Air within the L1 vertebral body, likely a benign pneumatosis. Further evaluation with contrast-enhanced lumbar spine CT is recommended. Please correlate with any signs and symptoms of infection.  - CT lumbar spine 1. 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 2. Enlarged left external iliac lymph node. Further evaluation with CT abdomen and pelvis is recommended. 3. Rectal thickening, to be further evaluated on CT abdomen and pelvis.  Medication: Vancomycin 1 g IV, Zosyn 3.375 g IV, Ofirmev 1 g IV, NS 1750 cc IV       ???      Physicians:  Neurologist: Dr. Nuha Ni  Urologist: Dr. iVllalobos  Internists: Dr. Downey and Elizabeth Garcia    Patient seen at the bedside, resting comfortably in bed, and does not appear to be in any acute distress. When asked, patient denies any recent or active fever, chills, nausea, vomiting, headache, acute sob, chest pain, abdominal pain, extremity pain or swelling.    ED Course   (Triage) vitals: BP 81/50  T 97.7 95% on RA  (Pertinent) Labs: WBC 15.23 Hb 10.8 lactate 2.7 > 1.9 Na 129 UA negative RVP negative  EKG: sinus tachycardia with PAC  Imaging:   - CXR shows small right sided pleural effusion.  - CT angio neck no stenosis or occlusion of intracranial arteries.  - CTH no acute intracranial hemorrhage, transcortical infarct or calvarial fracture. 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  - CT chest shows 1. Surgically absent left lung. 2. Small bilateral pleural effusions. 3. Air within the L1 vertebral body, likely a benign pneumatosis. Further evaluation with contrast-enhanced lumbar spine CT is recommended. Please correlate with any signs and symptoms of infection.  - CT lumbar spine 1. 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 2. Enlarged left external iliac lymph node. Further evaluation with CT abdomen and pelvis is recommended. 3. Rectal thickening, to be further evaluated on CT abdomen and pelvis.  Medication: Vancomycin 1 g IV, Zosyn 3.375 g IV, Ofirmev 1 g IV, NS 1750 cc IV       Patient is an 88 yo F with a past medical Hx of Alzheimer's dementia, Parkinson's Disease, SDH/SAH s/p repair of cerebral aneurysm 1960s, Lung Ca s/p lobectomy of L lung, Diabetes, HTN, who presents with progressive weakness and dysphagia.    UTI: Patient was having dysuria last week and was brought to her Urologist's office. She was straight catheterized and found to have a UTI. Urine Cx came back with pansensitive E Coli. Patient is s/p 7 day course of Bactrim since Tuesday of last week.    Weakness: JOSÉ MIGUEL says that patient has been getting progressively weaker the past two to three days. Patient is normally able to ambulate with a walker with assistance. This weekend, the patient had to be transferred from her wheelchair to her bed.    Dysphagia: JOSÉ MIGUEL says that the patient's dysphagia has been getting increasingly worse the past two to three days. Now she is unable to take PO. Dysphagia has always been present in the setting of PD. It used to take the patient up to an hour to finish a meal. Patient failed dyphagia screen at the bedside.     Physicians:  Neurologist: Dr. Nuha Ni  Urologist: Dr. Villalobos  Internists: Dr. Downey and Elizabeth Garcia    Patient seen at the bedside, resting comfortably in bed, and does not appear to be in any acute distress. When asked, patient denies any recent or active fever, chills, nausea, vomiting, headache, acute sob, chest pain, abdominal pain, extremity pain or swelling.    ED Course   (Triage) vitals: BP 81/50  T 97.7 95% on RA  (Pertinent) Labs: WBC 15.23 Hb 10.8 lactate 2.7 > 1.9 Na 129 UA negative RVP negative  EKG: sinus tachycardia with PAC  Imaging:   - CXR shows small right sided pleural effusion.  - CT angio neck no stenosis or occlusion of intracranial arteries.  - CTH no acute intracranial hemorrhage, transcortical infarct or calvarial fracture. 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  - CT chest shows 1. Surgically absent left lung. 2. Small bilateral pleural effusions. 3. Air within the L1 vertebral body, likely a benign pneumatosis. Further evaluation with contrast-enhanced lumbar spine CT is recommended. Please correlate with any signs and symptoms of infection.  - CT lumbar spine 1. 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 2. Enlarged left external iliac lymph node. Further evaluation with CT abdomen and pelvis is recommended. 3. Rectal thickening, to be further evaluated on CT abdomen and pelvis.  Medication: Vancomycin 1 g IV, Zosyn 3.375 g IV, Ofirmev 1 g IV, NS 1750 cc IV Patient is an 88 yo F with a past medical Hx of Alzheimer's dementia, Parkinson's Disease, SDH/SAH s/p repair of cerebral aneurysm 1960s, Lung Ca s/p lobectomy of L lung, Diabetes, HTN, who presents with progressive weakness and dysphagia.    UTI: Patient was having dysuria last week and was brought to her Urologist's office. She was straight catheterized and found to have a UTI. Urine Cx came back with pansensitive E Coli. Patient is s/p 7 day course of Bactrim since Tuesday of last week.    Weakness: JOSÉ MIGUEL says that patient has been getting progressively weaker the past two to three days. Patient is normally able to ambulate with a walker with assistance. This weekend, the patient had to be transferred from her wheelchair to her bed.    Dysphagia: JOSÉ MIGUEL says that the patient's dysphagia has been getting increasingly worse the past two to three days. Now she is unable to take PO. Dysphagia has always been present in the setting of PD. It used to take the patient up to an hour to finish a meal. Patient failed dyphagia screen at the bedside.     Physicians:  Neurologist: Dr. Nuha Ni  Urologist: Dr. Villalobos  Internists: Dr. Downey and Elizabeth Garcia    Patient seen at the bedside, resting comfortably in bed, and does not appear to be in any acute distress. When asked, patient denies any recent or active fever, chills, nausea, vomiting, headache, acute sob, chest pain, abdominal pain, extremity pain or swelling.    ED Course   (Triage) vitals: BP 81/50 > 135/76  T 97.7 95% on RA  (Pertinent) Labs: WBC 15.23 Hb 10.8 lactate 2.7 > 1.9 Na 129 UA negative RVP negative  EKG: sinus tachycardia with PAC  Imaging:   - CXR shows small right sided pleural effusion.  - CT angio neck no stenosis or occlusion of intracranial arteries.  - CTH no acute intracranial hemorrhage, transcortical infarct or calvarial fracture. 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  - CT chest shows 1. Surgically absent left lung. 2. Small bilateral pleural effusions. 3. Air within the L1 vertebral body, likely a benign pneumatosis. Further evaluation with contrast-enhanced lumbar spine CT is recommended. Please correlate with any signs and symptoms of infection.  - CT lumbar spine 1. 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 2. Enlarged left external iliac lymph node. Further evaluation with CT abdomen and pelvis is recommended. 3. Rectal thickening, to be further evaluated on CT abdomen and pelvis.  Medication: Vancomycin 1 g IV, Zosyn 3.375 g IV, Ofirmev 1 g IV, NS 1750 cc IV

## 2023-05-23 NOTE — H&P ADULT - PROBLEM SELECTOR PLAN 8
E: as needed  N: NPO, pending speech pathology evaluation (consult placed)   DVT: Lovenox 40 mg SC QD  C: Full Code, pending goals of care discussion with daughter Ana (palliative care consult placed)  D: Regional

## 2023-05-23 NOTE — H&P ADULT - PROBLEM SELECTOR PLAN 3
Dysphagia: JOSÉ MIGUEL says that the patient's dysphagia has been getting increasingly worse the past two to three days. Now she is unable to take PO. Dysphagia has always been present in the setting of PD. It used to take the patient up to an hour to finish a meal. Patient failed dysphagia screen at the bedside.   - keep NPO pending speech pathology evaluation (order placed)   - goals of care with family, palliative consult placed

## 2023-05-23 NOTE — CONSULT NOTE ADULT - PROBLEM SELECTOR RECOMMENDATION 5
.  Patient is Full Code  -daughter and son are designated HCPs    In addition to the E/M visit, an advance care planning meeting was performed. Start time: 6:00PM; End time: 6:20PM; Total time: min. A face to face meeting to discuss advance care planning was held today regarding: FERNANDO ASHBY. Primary decision maker: Patient is unable to participate in decision making; Alternate/surrogate: Children acting collectively. Discussed advance directives including, but not limited to, healthcare proxy and code status. Decision regarding code status: FULL CODE; Documentation completed today: [facility] note

## 2023-05-23 NOTE — H&P ADULT - PROBLEM SELECTOR PLAN 4
E: as needed  N: NPO, pending speech pathology evaluation (consult placed)   DVT: Lovenox 40 mg SC QD  C: Full Code, pending goals of care discussion with daughter Aan (palliative care consult placed)  D: Regional home medication: Sinemet 25/100 mg PO TID.  - consider placing NG tube if in line with goals of care

## 2023-05-23 NOTE — SWALLOW BEDSIDE ASSESSMENT ADULT - ORAL PHASE
Prolonged bolus holding with thin liquids with suspected piece meal deglutition and delayed a/p transport. Verbal and visual cues required to manipulate bolus. Functional mastication and functional oral clearance with purees and solids.

## 2023-05-23 NOTE — H&P ADULT - PROBLEM SELECTOR PLAN 6
Home medication: Losartan 100 mg PO QD Home medication: metformin 500 mg PO BID  - sliding scale E: as needed  N: NPO, pending speech pathology evaluation (consult placed)   DVT: Lovenox 40 mg SC QD  C: Full Code, pending goals of care discussion with daughter Ana (palliative care consult placed)  D: Regional

## 2023-05-23 NOTE — SWALLOW BEDSIDE ASSESSMENT ADULT - SWALLOW EVAL: RECOMMENDED FEEDING/EATING TECHNIQUES
allow for swallow between intakes/check mouth frequently for oral residue/pocketing/maintain upright posture during/after eating for 30 mins/oral hygiene/position upright (90 degrees) STRAWS OK AND PREFERRED/allow for swallow between intakes/check mouth frequently for oral residue/pocketing/crush medication (when feasible)/maintain upright posture during/after eating for 30 mins/oral hygiene/position upright (90 degrees)

## 2023-05-23 NOTE — SWALLOW BEDSIDE ASSESSMENT ADULT - SLP GENERAL OBSERVATIONS
Pt was seen fully awake and alert, HOB fully elevated, on 3L of O2 via NC. Pt oriented to self and place (aware she was at a hospital, though did not know name). Caregiver reports she normal is aware of the year. She inconsistently followed simple directives and verbally communicative when prompted. Pt was seen fully awake and alert, HOB fully elevated, on 3L of O2 via NC. Pt oriented to self and place (aware she was at a hospital, though did not know name). Caregiver reports Pt is normally aware of the year. She inconsistently followed simple directives and verbally communicative when prompted.

## 2023-05-23 NOTE — CONSULT NOTE ADULT - PROBLEM SELECTOR RECOMMENDATION 4
.  FAST 7, still ambulatory at baseline per family  -if patient remains ambulatory, then dementia would not be a hospice qualifying diagnosis at this time  -PT Eval to assess and maintain functionality

## 2023-05-23 NOTE — PATIENT PROFILE ADULT - FALL HARM RISK - HARM RISK INTERVENTIONS

## 2023-05-23 NOTE — H&P ADULT - PROBLEM SELECTOR PLAN 7
Home medication: metformin 500 mg PO BID  - sliding scale E: as needed  N: NPO, pending speech pathology evaluation (consult placed)   DVT: Lovenox 40 mg SC QD  C: Full Code, pending goals of care discussion with daughter Ana (palliative care consult placed)  D: Regional

## 2023-05-23 NOTE — CONSULT NOTE ADULT - PROBLEM SELECTOR RECOMMENDATION 2
.  Chronic issue, likely exacerbated by infection  -recommended for a diet by S&S  -family would not be inclined to pursue feeding tube if offered

## 2023-05-23 NOTE — SWALLOW BEDSIDE ASSESSMENT ADULT - SLP PERTINENT HISTORY OF CURRENT PROBLEM
PMHx of Alzheimer's dementia, Parkinson's Disease, SDH/SAH s/p repair of cerebral aneurysm 1960s, Lung Ca s/p lobectomy of L lung, Diabetes, HTN, who presents to [facility] today, 5/22 and admitted 5/23/23 with progressive weakness and worsening dysphagia over the past few days. Pt was found to have leukocytosis, elevated lactate, and admitted for severe sepsis. CTH 5/22/23: No acute intracranial hemorrhage, transcortical infarct or calvarial fracture. CT chest 5/22/23: Surgically absent left lung. Small bilateral pleural effusions. Air within the L1 vertebral body, likely a benign pneumatosis.

## 2023-05-23 NOTE — H&P ADULT - PROBLEM/PLAN-4
Kenton Mercedes (Self) [phone] (H)     Pt requesting a refill on acyclovir and hctz to [facility]. Pt also says that when he saw corey sarmiento he was told he would send [facility] rx for crestor 40 mg DISPLAY PLAN FREE TEXT

## 2023-05-23 NOTE — SWALLOW BEDSIDE ASSESSMENT ADULT - PHARYNGEAL PHASE
Suspect delayed swallow and reduced hyolaryngeal complex noted via palpation. Multiple swallows noted with thin liquids, suspect piece meal deglutition versus pharyngeal inefficiency. No bisi clinical indicators of penetration/aspiration noted.

## 2023-05-23 NOTE — SWALLOW BEDSIDE ASSESSMENT ADULT - ASR SWALLOW RECOMMEND DIAG
MBSS will be warranted if concern for changes to pulmonary status, active infection related to aspiration

## 2023-05-23 NOTE — H&P ADULT - NSHPPHYSICALEXAM_GEN_ALL_CORE
.  VITAL SIGNS:  T(C): 36.4 (05-23-23 @ 04:27), Max: 37.9 (05-22-23 @ 17:10)  T(F): 97.5 (05-23-23 @ 04:27), Max: 100.3 (05-22-23 @ 17:10)  HR: 107 (05-23-23 @ 04:27) (102 - 112)  BP: 142/81 (05-23-23 @ 04:27) (81/50 - 143/73)  BP(mean): --  RR: 20 (05-23-23 @ 04:27) (16 - 20)  SpO2: 98% (05-23-23 @ 04:27) (95% - 99%)  Wt(kg): --    PHYSICAL EXAM:    Constitutional: NAD, elderly frail appearing F  Eyes: PERRL, EOMI, anicteric sclera  ENT: MMM  Respiratory: No breath sounds on the L  Cardiac: tachycardia  Gastrointestinal: soft, NT/ND; no rebound or guarding; +BSx4  Back: spine midline, no bony tenderness or step-offs; no CVAT B/L  Extremities: WWP, no clubbing or cyanosis; no peripheral edema. Capillary refill <2 sec  Vascular: 2+ radial, femoral, DP/PT pulses B/L  Neurologic: AAOx1-2 (person, sometimes place), weakness in b/l upper and lower extremities 3/5, cogwheel rigidity; CNII-XII grossly intact; no focal deficits

## 2023-05-23 NOTE — CONSULT NOTE ADULT - PROBLEM SELECTOR RECOMMENDATION 6
.  Complex medical decision making / symptom management in the setting of advanced illness.    Will continue to follow for ongoing GOC discussion / titration of palliative regimen. Emotional support provided, questions answered.  Active Psychosocial Referrals:  [x]Social Work/Case management [x]PT/OT []Chaplaincy []Hospice  []Patient/Family Support []Holistic RN [x]Massage Therapy []Music Therapy []Ethics  Coping: [] well [] with difficulty [] poor coping [x] unable to assess  Support system: [x] strong [] adequate [] inadequate    For new or uncontrolled symptoms, please call Palliative Care at [phone]). The service is available 24/7 (including nights & weekends) to provide symptom management recommendations over the phone as appropriate

## 2023-05-23 NOTE — SWALLOW BEDSIDE ASSESSMENT ADULT - SWALLOW EVAL: DIAGNOSIS
Prolonged oral phase marked by bolus holding noted with thin liquids with suspected piece meal deglutition and delayed a/p transport. Pt required verbal and visual cues to transport the bolus for swallowing. No bisi clinical indicators of penetration/aspiration noted. Multiple swallows noted with thin liquids, suspect piece meal deglutition versus pharyngeal inefficiency. Presentation seemingly at baseline (caregiver endorsed improved presentation in today's assessment compared to yesterday) and consistent with dementia and PD dx. This service to follow up to assess tolerance of recommended diet.

## 2023-05-23 NOTE — CONSULT NOTE ADULT - PROBLEM SELECTOR RECOMMENDATION 3
.  -c/w [place], can consider disintegrating tablets if patient is having difficulty tolerating standard pill

## 2023-05-23 NOTE — H&P ADULT - ASSESSMENT
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

## 2023-05-23 NOTE — H&P ADULT - PROBLEM SELECTOR PLAN 1
Patient meets SIRS 2/4 with lactate 2.7. Patient with known UTI diagnosed on Tuesday of last week. Pansensitive E. Coli. Patient s/p 7 day course of Bactrim. Patient s/p Vancomycin 1g IV, Zosyn 3.375 g IV, and 1750 cc NS in the ED. CT chest shows small bilateral pleural effusions. CT lumbar spine shows enlarged left external iliac lymph node. Further evaluation with CT abdomen and pelvis is recommended. 3. Rectal thickening, to be further evaluated on CT abdomen and pelvis.   - f/u B Cx, U Cx   - c/w Ceftriaxone 1g QD  - consider obtaining CT AP Patient meets SIRS 2/4 with lactate 2.7. Patient with known UTI diagnosed on Tuesday of last week. Pansensitive E. Coli. Patient s/p 7 day course of Bactrim. Patient s/p Vancomycin 1g IV, Zosyn 3.375 g IV, and 1750 cc NS in the ED. CT chest shows small bilateral pleural effusions. CT lumbar spine shows enlarged left external iliac lymph node. Further evaluation with CT abdomen and pelvis is recommended. 3. Rectal thickening, to be further evaluated on CT abdomen and pelvis.   - f/u B Cx, U Cx   - c/w Ceftriaxone 1g QD  - consider obtaining CT AP if clinical status continues to worsen

## 2023-05-23 NOTE — H&P ADULT - PROBLEM SELECTOR PLAN 2
JOSÉ MIGUEL says that patient has been getting progressively weaker the past two to three days. Patient is normally able to ambulate with a walker with assistance. This weekend, the patient had to be transferred from her wheelchair to her bed. Possible 2/2 sepsis VS progression of PD.  - see management of severe sepsis above

## 2023-05-23 NOTE — PROGRESS NOTE ADULT - SUBJECTIVE AND OBJECTIVE BOX
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    UTI: Patient was having dysuria last week and was brought to her Urologist's office. She was straight catheterized and found to have a UTI. Urine Cx came back with pansensitive E Coli. Patient is s/p 7 day course of Bactrim since Tuesday of last week.    Weakness: JOSÉ MIGUEL says that patient has been getting progressively weaker the past two to three days. Patient is normally able to ambulate with a walker with assistance. This weekend, the patient had to be transferred from her wheelchair to her bed.    Dysphagia: JOSÉ MIGUEL says that the patient's dysphagia has been getting increasingly worse the past two to three days. Now she is unable to take PO. Dysphagia has always been present in the setting of PD. It used to take the patient up to an hour to finish a meal. Patient failed dyphagia screen at the bedside.     Physicians:  Neurologist: Dr. Nuha Ni  Urologist: Dr. Villalobos  Internists: Dr. Downey and Elizabeth Garcia    Patient seen at the bedside, resting comfortably in bed, and does not appear to be in any acute distress. When asked, patient denies any recent or active fever, chills, nausea, vomiting, headache, acute sob, chest pain, abdominal pain, extremity pain or swelling.    ED Course   (Triage) vitals: BP 81/50 > 135/76  T 97.7 95% on RA  (Pertinent) Labs: WBC 15.23 Hb 10.8 lactate 2.7 > 1.9 Na 129 UA negative RVP negative  EKG: sinus tachycardia with PAC  Imaging:   - CXR shows small right sided pleural effusion.  - CT angio neck no stenosis or occlusion of intracranial arteries.  - CTH no acute intracranial hemorrhage, transcortical infarct or calvarial fracture. 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  - CT chest shows 1. Surgically absent left lung. 2. Small bilateral pleural effusions. 3. Air within the L1 vertebral body, likely a benign pneumatosis. Further evaluation with contrast-enhanced lumbar spine CT is recommended. Please correlate with any signs and symptoms of infection.  - CT lumbar spine 1. 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 2. Enlarged left external iliac lymph node. Further evaluation with CT abdomen and pelvis is recommended. 3. Rectal thickening, to be further evaluated on CT abdomen and pelvis.  Medication: Vancomycin 1 g IV, Zosyn 3.375 g IV, Ofirmev 1 g IV, NS 1750 cc IV (23 May 2023 04:03)      Overnight she remains hemodynamically stable and interactive at baseline.    PAST MEDICAL & SURGICAL HISTORY:  HTN (hypertension)  Diabetes  High cholesterol  S/P removal of lung  left, 2000 partial and 2004 complete  S/P cholecystectomy  Personal history of spine surgery  H/O cerebral aneurysm repair  1968, "metal plates"    MEDICATIONS  (STANDING):  cefTRIAXone   IVPB 1000 milliGRAM(s) IV Intermittent every 24 hours  dextrose 5%. 1000 milliLiter(s) (50 mL/Hr) IV Continuous <Continuous>  dextrose 5%. 1000 milliLiter(s) (100 mL/Hr) IV Continuous <Continuous>  dextrose 50% Injectable 25 Gram(s) IV Push once  dextrose 50% Injectable 12.5 Gram(s) IV Push once  dextrose 50% Injectable 25 Gram(s) IV Push once  enoxaparin Injectable 40 milliGRAM(s) SubCutaneous every 24 hours  glucagon  Injectable 1 milliGRAM(s) IntraMuscular once  insulin lispro (ADMELOG) corrective regimen sliding scale   SubCutaneous three times a day before meals  insulin lispro (ADMELOG) corrective regimen sliding scale   SubCutaneous at bedtime    MEDICATIONS  (PRN):  acetaminophen     Tablet .. 650 milliGRAM(s) Oral every 6 hours PRN Temp greater or equal to 38C (100.4F), Mild Pain (1 - 3)  aluminum hydroxide/magnesium hydroxide/simethicone Suspension 30 milliLiter(s) Oral every 4 hours PRN Dyspepsia  dextrose Oral Gel 15 Gram(s) Oral once PRN Blood Glucose LESS THAN 70 milliGRAM(s)/deciliter  melatonin 3 milliGRAM(s) Oral at bedtime PRN Insomnia  ondansetron Injectable 4 milliGRAM(s) IV Push every 8 hours PRN Nausea and/or Vomiting    Vital Signs Last 24 Hrs  T(C): 36.4 (23 May 2023 06:50), Max: 37.9 (22 May 2023 17:10)  T(F): 97.5 (23 May 2023 06:50), Max: 100.3 (22 May 2023 17:10)  HR: 109 (23 May 2023 06:50) (102 - 112)  BP: 117/66 (23 May 2023 06:50) (81/50 - 143/73)  BP(mean): --  RR: 20 (23 May 2023 06:50) (16 - 20)  SpO2: 98% (23 May 2023 06:50) (95% - 99%)    Parameters below as of 23 May 2023 06:50  Patient On (Oxygen Delivery Method): nasal cannula  O2 Flow (L/min): 2    Asleep but easily arousable and responsive  chest decreased breasth sounds  CV sinus tach no murmur  Abd soft nontender nondistended  Ext no edema                          10.8   15.23 )-----------( 242      ( 22 May 2023 17:35 )             33.2   05-22    129<L>  |  94<L>  |  52<H>  ----------------------------<  119<H>  4.6   |  24  |  1.02    Ca    10.6<H>      22 May 2023 17:35    TPro  6.2  /  Alb  3.5  /  TBili  0.4  /  DBili  x   /  AST  24  /  ALT  12  /  AlkPhos  159<H>  05-22    Urinalysis Basic - ( 22 May 2023 17:48 )    Color: Yellow / Appearance: Clear / SG: >=1.030 / pH: x  Gluc: x / Ketone: NEGATIVE  / Bili: Negative / Urobili: 0.2 E.U./dL   Blood: x / Protein: Trace mg/dL / Nitrite: NEGATIVE   Leuk Esterase: Trace / RBC: < 5 /HPF / WBC < 5 /HPF   Sq Epi: x / Non Sq Epi: x / Bacteria: None /HPF    IMPRESSION:  1.   Air within the L1 vertebral body and L1-L2 disc space, likely   representing  a pneumatocele (degenerative, benign.  2.   Enlarged left external iliac lymph node. Further evaluation with CT  abdomen and pelvis is recommended.  3.   Rectal thickening, to be further evaluated on CT abdomen and pelvis.

## 2023-05-23 NOTE — PATIENT PROFILE ADULT - PATIENT'S PREFERRED PRONOUN
COLONOSCOPY  Progress Note    Jory Crowley  2/1/2022    Pre-op Diagnosis:   Family history of colonic polyps [Z83.71]       Post-Op Diagnosis Codes:     * Family history of colonic polyps [Z83.71]     * Colon polyp [K63.5]    Procedure/CPT® Codes:        Procedure(s):  COLONOSCOPY    Surgeon(s):  Justin Hester MD    Anesthesia: Monitored Anesthesia Care    Staff:   Endo Technician: Melonie Simpson  Endo Nurse: Valorie Hartman RN         Estimated Blood Loss: none    Urine Voided: * No values recorded between 2/1/2022 12:32 PM and 2/1/2022  1:21 PM *    Specimens:                Specimens     ID Source Type Tests Collected By Collected At Frozen?    A Large Intestine, Transverse Colon Tissue · TISSUE PATHOLOGY EXAM   Justin Hester MD 2/1/22 1251 No    Description: transverse polyp    Comment: Cold snare    This specimen was not marked as sent.    B Large Intestine, Right / Ascending Colon Tissue · TISSUE PATHOLOGY EXAM   Justin Hester MD 2/1/22 1256 No    Description: Ascending colon polyp x5    Comment: Cold snare trap 2    This specimen was not marked as sent.    C Large Intestine, Left / Descending Colon Tissue · TISSUE PATHOLOGY EXAM   Justin Hester MD 2/1/22 1309 No    Description: descending polyp x3    This specimen was not marked as sent.    D Large Intestine, Sigmoid Colon Tissue · TISSUE PATHOLOGY EXAM   Justin Hester MD 2/1/22 1310 No    Description: sigmoid polyp x4    This specimen was not marked as sent.    E Large Intestine, Rectum Tissue · TISSUE PATHOLOGY EXAM   Justin Hester MD 2/1/22 1316 No    Description: rectal polyp x5    This specimen was not marked as sent.                Drains: * No LDAs found *    Findings: Colon to TI good PRep  [id number]    Complications: None          Justin Hester MD     Date: 2/1/2022  Time: 13:20 EST       Her/She

## 2023-05-23 NOTE — CONSULT NOTE ADULT - CONVERSATION DETAILS
Reviewed patient's hospital course and interval developments. Discussed advanced directives including code status, HCP completion, and hospice eligibility. Daughter and son are designated HCP, they have not discussed code status with the patient but are confident that they would make the appropriate decisions if the patient is decompensating. Daughter states her goal is to return the patient home ASAP if she is eating and moving, she wants to maintain the patient's baseline and current quality of life but is "not expecting miracles." She is not inclined to pursue invasive work-up of colon findings while inpatient. Daughter recognizes the importance of protecting the patient from overly invasive interventions.

## 2023-05-23 NOTE — SWALLOW BEDSIDE ASSESSMENT ADULT - COMMENTS
Per caregiver Krupa, Pt consumed soft bite sized/shredded solids and thin liquids. She recalls Pt was on "thickener" in the past, though not for the last 1.5 years. Denied any concern re: choking or recent PNA hx. She reports meals may take up to 1.5 hours. Per caregiver, Pt tends to hold liquids in her mouth for prolonged periods and requires frequent verbal and visual cues. She has received speech therapy in the past for swallowing dysfunction. She also endorsed medications are crushed. Pt reportedly feeds self with assistance. Caregiver reported a decline in function after a fall last Tuesday. Per caregiver Krupa, Pt consumes a soft bite sized/shredded solids and thin liquid diet at home. She recalls Pt was on "thickener" in the past, though not for the last 1.5 years. Denied any concern re: choking or recent PNA hx. She reports meals may take up to 1.5 hours. Per caregiver, Pt tends to hold liquids in her mouth for prolonged periods and requires frequent verbal and visual cues. She has received speech therapy in the past for swallowing dysfunction. She also endorsed medications are crushed. Pt reportedly feeds self with assistance. Caregiver reported a decline in function after a fall last Tuesday.

## 2023-05-23 NOTE — CONSULT NOTE ADULT - PROBLEM SELECTOR RECOMMENDATION 9
.  Prone to constipation as per family  -recommend escalating bowel regimen if no BMs  -consider Dulcolax 10mg PO or Lactulose 10g in place of Miralax to minimize aspiration risk (Miralax is thin and a lot of volume to drink at once)

## 2023-05-24 DIAGNOSIS — M54.9 DORSALGIA, UNSPECIFIED: ICD-10-CM

## 2023-05-24 LAB
A1C WITH ESTIMATED AVERAGE GLUCOSE RESULT: 6.9 % — HIGH (ref 4–5.6)
ACANTHOCYTES BLD QL SMEAR: SLIGHT — SIGNIFICANT CHANGE UP
ALBUMIN SERPL ELPH-MCNC: 3.3 G/DL — SIGNIFICANT CHANGE UP (ref 3.3–5)
ALP SERPL-CCNC: 115 U/L — SIGNIFICANT CHANGE UP (ref 40–120)
ALT FLD-CCNC: 6 U/L — LOW (ref 10–45)
ANION GAP SERPL CALC-SCNC: 9 MMOL/L — SIGNIFICANT CHANGE UP (ref 5–17)
ANISOCYTOSIS BLD QL: SIGNIFICANT CHANGE UP
AST SERPL-CCNC: 24 U/L — SIGNIFICANT CHANGE UP (ref 10–40)
BASOPHILS # BLD AUTO: 0 K/UL — SIGNIFICANT CHANGE UP (ref 0–0.2)
BASOPHILS NFR BLD AUTO: 0 % — SIGNIFICANT CHANGE UP (ref 0–2)
BILIRUB SERPL-MCNC: 0.4 MG/DL — SIGNIFICANT CHANGE UP (ref 0.2–1.2)
BUN SERPL-MCNC: 26 MG/DL — HIGH (ref 7–23)
BURR CELLS BLD QL SMEAR: PRESENT — SIGNIFICANT CHANGE UP
CALCIUM SERPL-MCNC: 9.9 MG/DL — SIGNIFICANT CHANGE UP (ref 8.4–10.5)
CHLORIDE SERPL-SCNC: 103 MMOL/L — SIGNIFICANT CHANGE UP (ref 96–108)
CO2 SERPL-SCNC: 22 MMOL/L — SIGNIFICANT CHANGE UP (ref 22–31)
CREAT SERPL-MCNC: 0.88 MG/DL — SIGNIFICANT CHANGE UP (ref 0.5–1.3)
CRP SERPL-MCNC: 57.7 MG/L — HIGH (ref 0–4)
DACRYOCYTES BLD QL SMEAR: SLIGHT — SIGNIFICANT CHANGE UP
EGFR: 64 ML/MIN/1.73M2 — SIGNIFICANT CHANGE UP
EOSINOPHIL # BLD AUTO: 0.23 K/UL — SIGNIFICANT CHANGE UP (ref 0–0.5)
EOSINOPHIL NFR BLD AUTO: 2.6 % — SIGNIFICANT CHANGE UP (ref 0–6)
ERYTHROCYTE [SEDIMENTATION RATE] IN BLOOD: 28 MM/HR — HIGH
ESTIMATED AVERAGE GLUCOSE: 151 MG/DL — HIGH (ref 68–114)
GIANT PLATELETS BLD QL SMEAR: PRESENT — SIGNIFICANT CHANGE UP
GLUCOSE BLDC GLUCOMTR-MCNC: 150 MG/DL — HIGH (ref 70–99)
GLUCOSE BLDC GLUCOMTR-MCNC: 159 MG/DL — HIGH (ref 70–99)
GLUCOSE BLDC GLUCOMTR-MCNC: 188 MG/DL — HIGH (ref 70–99)
GLUCOSE BLDC GLUCOMTR-MCNC: 253 MG/DL — HIGH (ref 70–99)
GLUCOSE SERPL-MCNC: 140 MG/DL — HIGH (ref 70–99)
HCT VFR BLD CALC: 34.9 % — SIGNIFICANT CHANGE UP (ref 34.5–45)
HGB BLD-MCNC: 10.9 G/DL — LOW (ref 11.5–15.5)
HYPOCHROMIA BLD QL: SLIGHT — SIGNIFICANT CHANGE UP
LYMPHOCYTES # BLD AUTO: 0.3 K/UL — LOW (ref 1–3.3)
LYMPHOCYTES # BLD AUTO: 3.5 % — LOW (ref 13–44)
MACROCYTES BLD QL: SLIGHT — SIGNIFICANT CHANGE UP
MAGNESIUM SERPL-MCNC: 2 MG/DL — SIGNIFICANT CHANGE UP (ref 1.6–2.6)
MANUAL SMEAR VERIFICATION: SIGNIFICANT CHANGE UP
MCHC RBC-ENTMCNC: 25.1 PG — LOW (ref 27–34)
MCHC RBC-ENTMCNC: 31.2 GM/DL — LOW (ref 32–36)
MCV RBC AUTO: 80.2 FL — SIGNIFICANT CHANGE UP (ref 80–100)
METAMYELOCYTES # FLD: 0.9 % — HIGH (ref 0–0)
MICROCYTES BLD QL: SLIGHT — SIGNIFICANT CHANGE UP
MONOCYTES # BLD AUTO: 0.15 K/UL — SIGNIFICANT CHANGE UP (ref 0–0.9)
MONOCYTES NFR BLD AUTO: 1.7 % — LOW (ref 2–14)
NEUTROPHILS # BLD AUTO: 7.91 K/UL — HIGH (ref 1.8–7.4)
NEUTROPHILS NFR BLD AUTO: 91.3 % — HIGH (ref 43–77)
OVALOCYTES BLD QL SMEAR: SLIGHT — SIGNIFICANT CHANGE UP
PHOSPHATE SERPL-MCNC: 2.3 MG/DL — LOW (ref 2.5–4.5)
PLAT MORPH BLD: ABNORMAL
PLATELET # BLD AUTO: 199 K/UL — SIGNIFICANT CHANGE UP (ref 150–400)
POIKILOCYTOSIS BLD QL AUTO: SIGNIFICANT CHANGE UP
POLYCHROMASIA BLD QL SMEAR: SLIGHT — SIGNIFICANT CHANGE UP
POTASSIUM SERPL-MCNC: 4.2 MMOL/L — SIGNIFICANT CHANGE UP (ref 3.5–5.3)
POTASSIUM SERPL-SCNC: 4.2 MMOL/L — SIGNIFICANT CHANGE UP (ref 3.5–5.3)
PROT SERPL-MCNC: 5.8 G/DL — LOW (ref 6–8.3)
RBC # BLD: 4.35 M/UL — SIGNIFICANT CHANGE UP (ref 3.8–5.2)
RBC # FLD: 16.6 % — HIGH (ref 10.3–14.5)
RBC BLD AUTO: ABNORMAL
SCHISTOCYTES BLD QL AUTO: SIGNIFICANT CHANGE UP
SODIUM SERPL-SCNC: 134 MMOL/L — LOW (ref 135–145)
SPHEROCYTES BLD QL SMEAR: SLIGHT — SIGNIFICANT CHANGE UP
WBC # BLD: 8.66 K/UL — SIGNIFICANT CHANGE UP (ref 3.8–10.5)
WBC # FLD AUTO: 8.66 K/UL — SIGNIFICANT CHANGE UP (ref 3.8–10.5)

## 2023-05-24 PROCEDURE — 99233 SBSQ HOSP IP/OBS HIGH 50: CPT

## 2023-05-24 RX ORDER — HEPARIN SODIUM 5000 [USP'U]/ML
5000 INJECTION INTRAVENOUS; SUBCUTANEOUS EVERY 8 HOURS
Refills: 0 | Status: DISCONTINUED | OUTPATIENT
Start: 2023-05-24 | End: 2023-05-26

## 2023-05-24 RX ORDER — LIDOCAINE 4 G/100G
1 CREAM TOPICAL DAILY
Refills: 0 | Status: DISCONTINUED | OUTPATIENT
Start: 2023-05-24 | End: 2023-05-26

## 2023-05-24 RX ORDER — POLYETHYLENE GLYCOL 3350 17 G/17G
17 POWDER, FOR SOLUTION ORAL DAILY
Refills: 0 | Status: DISCONTINUED | OUTPATIENT
Start: 2023-05-24 | End: 2023-05-24

## 2023-05-24 RX ADMIN — CARBIDOPA AND LEVODOPA 1 TABLET(S): 25; 100 TABLET ORAL at 09:10

## 2023-05-24 RX ADMIN — Medication 25 MILLIGRAM(S): at 06:18

## 2023-05-24 RX ADMIN — LIDOCAINE 1 PATCH: 4 CREAM TOPICAL at 12:48

## 2023-05-24 RX ADMIN — Medication 1: at 18:16

## 2023-05-24 RX ADMIN — CEFTRIAXONE 100 MILLIGRAM(S): 500 INJECTION, POWDER, FOR SOLUTION INTRAMUSCULAR; INTRAVENOUS at 06:18

## 2023-05-24 RX ADMIN — Medication 81 MILLIGRAM(S): at 09:09

## 2023-05-24 RX ADMIN — PANTOPRAZOLE SODIUM 40 MILLIGRAM(S): 20 TABLET, DELAYED RELEASE ORAL at 06:19

## 2023-05-24 RX ADMIN — ATORVASTATIN CALCIUM 5 MILLIGRAM(S): 80 TABLET, FILM COATED ORAL at 22:37

## 2023-05-24 RX ADMIN — Medication 1: at 22:36

## 2023-05-24 RX ADMIN — SERTRALINE 100 MILLIGRAM(S): 25 TABLET, FILM COATED ORAL at 09:09

## 2023-05-24 RX ADMIN — HEPARIN SODIUM 5000 UNIT(S): 5000 INJECTION INTRAVENOUS; SUBCUTANEOUS at 15:03

## 2023-05-24 RX ADMIN — CARBIDOPA AND LEVODOPA 1 TABLET(S): 25; 100 TABLET ORAL at 18:15

## 2023-05-24 RX ADMIN — HEPARIN SODIUM 5000 UNIT(S): 5000 INJECTION INTRAVENOUS; SUBCUTANEOUS at 22:37

## 2023-05-24 RX ADMIN — POLYETHYLENE GLYCOL 3350 17 GRAM(S): 17 POWDER, FOR SOLUTION ORAL at 09:09

## 2023-05-24 RX ADMIN — Medication 1: at 12:48

## 2023-05-24 RX ADMIN — CARBIDOPA AND LEVODOPA 1 TABLET(S): 25; 100 TABLET ORAL at 01:16

## 2023-05-24 RX ADMIN — LIDOCAINE 1 PATCH: 4 CREAM TOPICAL at 18:03

## 2023-05-24 RX ADMIN — SENNA PLUS 1 TABLET(S): 8.6 TABLET ORAL at 22:36

## 2023-05-24 RX ADMIN — Medication 10 MILLIGRAM(S): at 15:02

## 2023-05-24 NOTE — PROGRESS NOTE ADULT - TIME BILLING
Emotional Support/Supportive Care and Clarification of Potential Disease Trajectory related to dementia  Assessment of Symptom [place] and Palliative regimen  Exploration of GOC including advanced directives such as HCP designation and code status    Time inclusive of chart review, medication ordering, discussion with primary team, clinical documentation, and communication with family/caregiver

## 2023-05-24 NOTE — CONSULT NOTE ADULT - CONSULT REASON
uti
PM&R evaluation
Pain/Symptom Management and Complex Medical Decision Making/GOC in the setting of Advanced Illness

## 2023-05-24 NOTE — PHYSICAL THERAPY INITIAL EVALUATION ADULT - PERTINENT HX OF CURRENT PROBLEM, REHAB EVAL
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

## 2023-05-24 NOTE — PROGRESS NOTE ADULT - SUBJECTIVE AND OBJECTIVE BOX
** INCOMPLETE **    OVERNIGHT EVENTS:     SUBJECTIVE:    VITAL SIGNS:  Vital Signs Last 24 Hrs  T(C): 36.7 (24 May 2023 05:33), Max: 36.9 (23 May 2023 21:05)  T(F): 98.1 (24 May 2023 05:33), Max: 98.5 (23 May 2023 21:05)  HR: 97 (24 May 2023 05:33) (65 - 106)  BP: 132/82 (24 May 2023 05:33) (111/67 - 136/72)  BP(mean): --  RR: 18 (24 May 2023 05:33) (18 - 18)  SpO2: 96% (24 May 2023 05:33) (96% - 100%)    Parameters below as of 24 May 2023 05:33  Patient On (Oxygen Delivery Method): room air        PHYSICAL EXAM:  General: NAD; speaking in full sentences  HEENT: NC/AT; PERRL; EOMI; MMM  Neck: supple; no JVD  Cardiac: RRR; +S1/S2  Pulm: CTA B/L; no W/R/R  GI: soft, NT/ND, +BS  Extremities: WWP; no edema, clubbing or cyanosis  Vasc: 2+ radial, DP pulses B/L  Neuro: AAOx3; no focal deficits    MEDICATIONS:  MEDICATIONS  (STANDING):  aspirin  chewable 81 milliGRAM(s) Oral daily  atorvastatin 5 milliGRAM(s) Oral at bedtime  carbidopa/levodopa  25/100 1 Tablet(s) Oral every 8 hours  cefTRIAXone   IVPB 1000 milliGRAM(s) IV Intermittent every 24 hours  dextrose 5%. 1000 milliLiter(s) (50 mL/Hr) IV Continuous <Continuous>  dextrose 5%. 1000 milliLiter(s) (100 mL/Hr) IV Continuous <Continuous>  dextrose 50% Injectable 25 Gram(s) IV Push once  dextrose 50% Injectable 12.5 Gram(s) IV Push once  dextrose 50% Injectable 25 Gram(s) IV Push once  enoxaparin Injectable 40 milliGRAM(s) SubCutaneous every 24 hours  glucagon  Injectable 1 milliGRAM(s) IntraMuscular once  insulin lispro (ADMELOG) corrective regimen sliding scale   SubCutaneous three times a day before meals  insulin lispro (ADMELOG) corrective regimen sliding scale   SubCutaneous at bedtime  metoprolol succinate ER 25 milliGRAM(s) Oral daily  pantoprazole    Tablet 40 milliGRAM(s) Oral before breakfast  senna 1 Tablet(s) Oral at bedtime  sertraline 100 milliGRAM(s) Oral daily    MEDICATIONS  (PRN):  acetaminophen     Tablet .. 650 milliGRAM(s) Oral every 6 hours PRN Temp greater or equal to 38C (100.4F), Mild Pain (1 - 3)  aluminum hydroxide/magnesium hydroxide/simethicone Suspension 30 milliLiter(s) Oral every 4 hours PRN Dyspepsia  dextrose Oral Gel 15 Gram(s) Oral once PRN Blood Glucose LESS THAN 70 milliGRAM(s)/deciliter  melatonin 3 milliGRAM(s) Oral at bedtime PRN Insomnia  ondansetron Injectable 4 milliGRAM(s) IV Push every 8 hours PRN Nausea and/or Vomiting      ALLERGIES:  Allergies    No Known Allergies    Intolerances        LABS:                        9.9    10.54 )-----------( 188      ( 23 May 2023 05:30 )             30.5     05-23    130<L>  |  98  |  38<H>  ----------------------------<  118<H>  3.9   |  24  |  1.00    Ca    9.1      23 May 2023 05:30  Phos  2.9     05-23  Mg     1.6     05-23    TPro  5.1<L>  /  Alb  3.1<L>  /  TBili  0.3  /  DBili  x   /  AST  16  /  ALT  13  /  AlkPhos  113  05-23    PT/INR - ( 23 May 2023 05:30 )   PT: 12.6 sec;   INR: 1.06          PTT - ( 23 May 2023 05:30 )  PTT:25.2 sec    RADIOLOGY & ADDITIONAL TESTS: Reviewed. OVERNIGHT EVENTS: NAEO    SUBJECTIVE: Pt appears markedly improved and more alert today at bedside. Tolerating minced and moist diet. Not complaining of any pain. ROS otherwise negative     VITAL SIGNS:  Vital Signs Last 24 Hrs  T(C): 36.7 (24 May 2023 05:33), Max: 36.9 (23 May 2023 21:05)  T(F): 98.1 (24 May 2023 05:33), Max: 98.5 (23 May 2023 21:05)  HR: 97 (24 May 2023 05:33) (65 - 106)  BP: 132/82 (24 May 2023 05:33) (111/67 - 136/72)  BP(mean): --  RR: 18 (24 May 2023 05:33) (18 - 18)  SpO2: 96% (24 May 2023 05:33) (96% - 100%)    Parameters below as of 24 May 2023 05:33  Patient On (Oxygen Delivery Method): room air      PHYSICAL EXAM:  Constitutional: NAD, elderly frail appearing   Eyes: PERRL, EOMI, anicteric sclera  ENT: MMM  Respiratory: No breath sounds on the L. side   Cardiac: tachycardia  Gastrointestinal: soft, NT/ND; no rebound or guarding; +BSx4  Back: spine midline, no bony tenderness or step-offs; no CVAT B/L  Extremities: WWP, no clubbing or cyanosis; no peripheral edema. Capillary refill <2 sec  Vascular: 2+ radial, femoral, DP/PT pulses B/L  Neurologic: AAOx1-2 (person, sometimes place), weakness in b/l upper and lower extremities 3/5, cogwheel rigidity; CNII-XII grossly intact; no focal deficits    MEDICATIONS:  MEDICATIONS  (STANDING):  aspirin  chewable 81 milliGRAM(s) Oral daily  atorvastatin 5 milliGRAM(s) Oral at bedtime  carbidopa/levodopa  25/100 1 Tablet(s) Oral every 8 hours  cefTRIAXone   IVPB 1000 milliGRAM(s) IV Intermittent every 24 hours  dextrose 5%. 1000 milliLiter(s) (50 mL/Hr) IV Continuous <Continuous>  dextrose 5%. 1000 milliLiter(s) (100 mL/Hr) IV Continuous <Continuous>  dextrose 50% Injectable 25 Gram(s) IV Push once  dextrose 50% Injectable 12.5 Gram(s) IV Push once  dextrose 50% Injectable 25 Gram(s) IV Push once  enoxaparin Injectable 40 milliGRAM(s) SubCutaneous every 24 hours  glucagon  Injectable 1 milliGRAM(s) IntraMuscular once  insulin lispro (ADMELOG) corrective regimen sliding scale   SubCutaneous three times a day before meals  insulin lispro (ADMELOG) corrective regimen sliding scale   SubCutaneous at bedtime  metoprolol succinate ER 25 milliGRAM(s) Oral daily  pantoprazole    Tablet 40 milliGRAM(s) Oral before breakfast  senna 1 Tablet(s) Oral at bedtime  sertraline 100 milliGRAM(s) Oral daily    MEDICATIONS  (PRN):  acetaminophen     Tablet .. 650 milliGRAM(s) Oral every 6 hours PRN Temp greater or equal to 38C (100.4F), Mild Pain (1 - 3)  aluminum hydroxide/magnesium hydroxide/simethicone Suspension 30 milliLiter(s) Oral every 4 hours PRN Dyspepsia  dextrose Oral Gel 15 Gram(s) Oral once PRN Blood Glucose LESS THAN 70 milliGRAM(s)/deciliter  melatonin 3 milliGRAM(s) Oral at bedtime PRN Insomnia  ondansetron Injectable 4 milliGRAM(s) IV Push every 8 hours PRN Nausea and/or Vomiting      ALLERGIES:  Allergies    No Known Allergies    Intolerances        LABS:                        9.9    10.54 )-----------( 188      ( 23 May 2023 05:30 )             30.5     05-23    130<L>  |  98  |  38<H>  ----------------------------<  118<H>  3.9   |  24  |  1.00    Ca    9.1      23 May 2023 05:30  Phos  2.9     05-23  Mg     1.6     05-23    TPro  5.1<L>  /  Alb  3.1<L>  /  TBili  0.3  /  DBili  x   /  AST  16  /  ALT  13  /  AlkPhos  113  05-23    PT/INR - ( 23 May 2023 05:30 )   PT: 12.6 sec;   INR: 1.06          PTT - ( 23 May 2023 05:30 )  PTT:25.2 sec    RADIOLOGY & ADDITIONAL TESTS: Reviewed.

## 2023-05-24 NOTE — CONSULT NOTE ADULT - ASSESSMENT
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   ·  Plan: Patient meets SIRS 2/4 with lactate 2.7. Patient with known UTI diagnosed on Tuesday of last week. Pansensitive E. Coli. Patient s/p 7 day course of Bactrim. Patient s/p Vancomycin 1g IV, Zosyn 3.375 g IV, and 1750 cc NS in the ED. CT chest shows small bilateral pleural effusions. CT lumbar spine shows enlarged left external iliac lymph node. Further evaluation with CT abdomen and pelvis is recommended. 3. Rectal thickening, to be further evaluated on CT abdomen and pelvis.   - f/u B Cx, U Cx   - c/w Ceftriaxone 1g QD  - consider obtaining CT AP if clinical status continues to worsen.    Problem/Plan - 2:  ·  Problem: Weakness.   ·  Plan: JOSÉ MIGUEL says that patient has been getting progressively weaker the past two to three days. Patient is normally able to ambulate with a walker with assistance. This weekend, the patient had to be transferred from her wheelchair to her bed. Possible 2/2 sepsis VS progression of PD.  - see management of severe sepsis above.    Problem/Plan - 3:  ·  Problem: Dysphagia.   ·  Plan: Dysphagia: JOSÉ MIGUEL says that the patient's dysphagia has been getting increasingly worse the past two to three days. Now she is unable to take PO. Dysphagia has always been present in the setting of PD. It used to take the patient up to an hour to finish a meal. Patient failed dysphagia screen at the bedside.   - keep NPO pending speech pathology evaluation (order placed)   - goals of care with family, palliative consult placed.    Problem/Plan - 4:  ·  Problem: Parkinsons disease.   ·  Plan: home medication: Sinemet 25/100 mg PO TID.  - consider placing NG tube if in line with goals of care.    Problem/Plan - 5:  ·  Problem: Diabetes.   ·  Plan: Home medication: metformin 500 mg PO BID.  - sliding scale.    Problem/Plan - 6:  ·  Problem: Prophylactic measure.   ·  Plan: E: as needed  N: NPO, pending speech pathology evaluation (consult placed)   DVT: Lovenox 40 mg SC QD  C: Full Code, pending goals of care discussion with daughter Ana (palliative care consult placed)  D: Regional.    
86yo F with PMH of Dementia, Parkinson's, h/o Lung CA, HTN, T2DM, and h/o SDH/SAH p/w weakness and found to meet sepsis criteria. Palliative consulted for complex medical decision making in the setting of advanced illness.    ·	recommend escalating bowel regimen if necessary, consider Dulcolax 10mg PO or Lactulose 10g in place of Miralax to minimize aspiration risk (Miralax is thin and a lot of volume to drink at once)  ·	daughter and son are designated HCP, they have not discussed code status with the patient but are confident that they would make the appropriate decisions if the patient is decompensating  -if patient is suddenly decompensating, call daughter and she will be prepared to make a decision  ·	daughter states her goal is to return the patient home ASAP if she is eating and moving, she wants to maintain the patient's baseline and current quality of life but is "not expecting miracles"  ·	family suggested obtaining prior imaging from [facility] to compare lung and colon findings, daughter is not inclined to pursue invasive work-up of colon findings while inpatient

## 2023-05-24 NOTE — PROGRESS NOTE ADULT - SUBJECTIVE AND OBJECTIVE BOX
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  -UTI: Patient was having dysuria last week and was brought to her Urologist's office. She was straight catheterized and found to have a UTI. Urine Cx came back with pansensitive E Coli. Patient is s/p 7 day course of Bactrim since Tuesday of last week.  -Weakness: HHA says that patient has been getting progressively weaker the past two to three days. Patient is normally able to ambulate with a walker with assistance. This weekend, the patient had to be transferred from her wheelchair to her bed.  -Patient seen at the bedside, resting comfortably in bed, and does not appear to be in any acute distress. When asked, patient denies any recent or active fever, chills, nausea, vomiting, headache, acute sob, chest pain, abdominal pain, extremity pain or swelling.  -her WBC has continued to improve since admission on IV ABX  -Follow up CT scan shows Imaging:   IMPRESSION: Proctocolitis. Masslike thickening of the rectum, possibly inflammatory,   however correlate with direct inspection to exclude neoplasm.  No evidence of obstructive uropathy. Other incidental comments as above.    PAST MEDICAL & SURGICAL HISTORY:  HTN (hypertension)  Diabetes  High cholesterol  S/P removal of lung  left, 2000 partial and 2004 complete  S/P cholecystectomy  Personal history of spine surgery  H/O cerebral aneurysm repair  1968, "metal plates"    MEDICATIONS  (STANDING):  aspirin  chewable 81 milliGRAM(s) Oral daily  atorvastatin 5 milliGRAM(s) Oral at bedtime  carbidopa/levodopa  25/100 1 Tablet(s) Oral every 8 hours  cefTRIAXone   IVPB 1000 milliGRAM(s) IV Intermittent every 24 hours  dextrose 5%. 1000 milliLiter(s) (50 mL/Hr) IV Continuous <Continuous>  dextrose 5%. 1000 milliLiter(s) (100 mL/Hr) IV Continuous <Continuous>  dextrose 50% Injectable 25 Gram(s) IV Push once  dextrose 50% Injectable 12.5 Gram(s) IV Push once  dextrose 50% Injectable 25 Gram(s) IV Push once  enoxaparin Injectable 40 milliGRAM(s) SubCutaneous every 24 hours  glucagon  Injectable 1 milliGRAM(s) IntraMuscular once  insulin lispro (ADMELOG) corrective regimen sliding scale   SubCutaneous three times a day before meals  insulin lispro (ADMELOG) corrective regimen sliding scale   SubCutaneous at bedtime  metoprolol succinate ER 25 milliGRAM(s) Oral daily  pantoprazole    Tablet 40 milliGRAM(s) Oral before breakfast  senna 1 Tablet(s) Oral at bedtime  sertraline 100 milliGRAM(s) Oral daily    MEDICATIONS  (PRN):  acetaminophen     Tablet .. 650 milliGRAM(s) Oral every 6 hours PRN Temp greater or equal to 38C (100.4F), Mild Pain (1 - 3)  aluminum hydroxide/magnesium hydroxide/simethicone Suspension 30 milliLiter(s) Oral every 4 hours PRN Dyspepsia  dextrose Oral Gel 15 Gram(s) Oral once PRN Blood Glucose LESS THAN 70 milliGRAM(s)/deciliter  melatonin 3 milliGRAM(s) Oral at bedtime PRN Insomnia  ondansetron Injectable 4 milliGRAM(s) IV Push every 8 hours PRN Nausea and/or Vomiting      Vital Signs Last 24 Hrs  T(C): 36.7 (24 May 2023 05:33), Max: 36.9 (23 May 2023 21:05)  T(F): 98.1 (24 May 2023 05:33), Max: 98.5 (23 May 2023 21:05)  HR: 97 (24 May 2023 05:33) (65 - 106)  BP: 132/82 (24 May 2023 05:33) (111/67 - 136/72)  BP(mean): --  RR: 18 (24 May 2023 05:33) (18 - 18)  SpO2: 96% (24 May 2023 05:33) (96% - 100%)    Parameters below as of 24 May 2023 05:33  Patient On (Oxygen Delivery Method): room air    NAD  No JVD  Chest decreased breath sounds  CV Sinus tach no murmur  Abd soft  Ext no edema                          10.9   8.66  )-----------( 199      ( 24 May 2023 07:11 )             34.9   05-23    130<L>  |  98  |  38<H>  ----------------------------<  118<H>  3.9   |  24  |  1.00    Ca    9.1      23 May 2023 05:30  Phos  2.9     05-23  Mg     1.6     05-23    TPro  5.1<L>  /  Alb  3.1<L>  /  TBili  0.3  /  DBili  x   /  AST  16  /  ALT  13  /  AlkPhos  113  05-23    -Follow up CT scan shows Imaging:   IMPRESSION: Proctocolitis. Masslike thickening of the rectum, possibly inflammatory,   however correlate with direct inspection to exclude neoplasm.  No evidence of obstructive uropathy. Other incidental comments as above.

## 2023-05-24 NOTE — PHYSICAL THERAPY INITIAL EVALUATION ADULT - GENERAL OBSERVATIONS, REHAB EVAL
PT IE completed. Pt received semi-supine, w/ primafit, + IV, HHA bedside, RA, 0/10 pain, JAY Hall notified, pt agreeable to PT IE. Pt admitted for general weakness and dysphagia. PT exams shows dec strength and impaired posture for bed mobility and transfers. Pt left semi-supine, w/ Bed alarm on, call bell, heplock, and HHA bedside. JAY Hall notified.

## 2023-05-24 NOTE — CONSULT NOTE ADULT - SUBJECTIVE AND OBJECTIVE BOX
Consult note:      HPI:  88 yo F with a past medical Hx of Alzheimer's dementia, Parkinson's Disease, SDH/SAH s/p repair of cerebral aneurysm 1960s, Lung Ca s/p lobectomy of L lung, Diabetes, HTN, presenting to ER with decreased appetite, elevated blood pressure, and " just not acting like herself" per caretaker at bedside (Krupa). Patient fell last tuesday, a UA/Ucx was done at that time which showed ecoli UTI and patient was started on bactrim. Per caretaker patient has been getting progressively weaker the past two to three days. Patient is normally able to ambulate with a walker with assistance. This weekend, the patient had to be transferred from her wheelchair to her bed. Patient also with decreased appetite, and less conversive then usual.  Patient not complaining of pain anywhere. No nausea or vomiting, no fever or chills, no hematuria or changes in color or quantity of urine output.        Physicians:  Neurologist: Dr. Nuha Ni  Urologist: Dr. Villalobos  Internists: Dr. Downey and Elizabeth Garcia    ED Course   (Triage) vitals: BP 81/50 > 135/76  T 97.7 95% on RA  (Pertinent) Labs: WBC 15.23 Hb 10.8 lactate 2.7 > 1.9 Na 129 UA negative RVP negative  EKG: sinus tachycardia with PAC  Imaging:   - CXR shows small right sided pleural effusion.  - CT angio neck no stenosis or occlusion of intracranial arteries.  - CTH no acute intracranial hemorrhage, transcortical infarct or calvarial fracture. 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  - CT chest shows 1. Surgically absent left lung. 2. Small bilateral pleural effusions. 3. Air within the L1 vertebral body, likely a benign pneumatosis. Further evaluation with contrast-enhanced lumbar spine CT is recommended. Please correlate with any signs and symptoms of infection.  - CT lumbar spine 1. 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 2. Enlarged left external iliac lymph node. Further evaluation with CT abdomen and pelvis is recommended. 3. Rectal thickening, to be further evaluated on CT abdomen and pelvis.  Medication: Vancomycin 1 g IV, Zosyn 3.375 g IV, Ofirmev 1 g IV, NS 1750 cc IV (23 May 2023 04:03)      Vital Signs Last 24 Hrs  T(C): 36.8 (23 May 2023 11:12), Max: 37.9 (22 May 2023 17:10)  T(F): 98.2 (23 May 2023 11:12), Max: 100.3 (22 May 2023 17:10)  HR: 105 (23 May 2023 11:12) (102 - 112)  BP: 111/67 (23 May 2023 11:12) (81/50 - 143/73)  BP(mean): --  RR: 18 (23 May 2023 11:12) (16 - 20)  SpO2: 99% (23 May 2023 11:12) (95% - 99%)    Parameters below as of 23 May 2023 11:12  Patient On (Oxygen Delivery Method): room air  O2 Flow (L/min): 2    I&O's Summary      PE:  Gen:  Abd:  :  SAM:    LABS:                        9.9    10.54 )-----------( 188      ( 23 May 2023 05:30 )             30.5     05-23    130<L>  |  98  |  38<H>  ----------------------------<  118<H>  3.9   |  24  |  1.00    Ca    9.1      23 May 2023 05:30  Phos  2.9     05-23  Mg     1.6     05-23    TPro  5.1<L>  /  Alb  3.1<L>  /  TBili  0.3  /  DBili  x   /  AST  16  /  ALT  13  /  AlkPhos  113  05-23    PT/INR - ( 23 May 2023 05:30 )   PT: 12.6 sec;   INR: 1.06          PTT - ( 23 May 2023 05:30 )  PTT:25.2 sec  Cultures  Culture Results:   Culture in progress (05-22 @ 17:48)  Culture Results:   No growth at 12 hours (05-22 @ 17:15)  Culture Results:   No growth at 12 hours (05-22 @ 17:10)      A/P  88 yo f with uti and recent decline in mental status  1) called to rule out kidney stone in patient with known history of stones, rec non con ct A and P  2) f/u ucx  3) cont abx  4) bladder scan after next void to ensure patient emptying bladder
  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    UTI: Patient was having dysuria last week and was brought to her Urologist's office. She was straight catheterized and found to have a UTI. Urine Cx came back with pansensitive E Coli. Patient is s/p 7 day course of Bactrim since Tuesday of last week.    Weakness: HHLAVELLE says that patient has been getting progressively weaker the past two to three days. Patient is normally able to ambulate with a walker with assistance. This weekend, the patient had to be transferred from her wheelchair to her bed.    Dysphagia: JOSÉ MIGUEL says that the patient's dysphagia has been getting increasingly worse the past two to three days. Now she is unable to take PO. Dysphagia has always been present in the setting of PD. It used to take the patient up to an hour to finish a meal. Patient failed dyphagia screen at the bedside.     Physicians:  Neurologist: Dr. Nuha Ni  Urologist: Dr. Villalobos  Internists: Dr. Downey and Elizabeth Garcia    Patient seen at the bedside, resting comfortably in bed, and does not appear to be in any acute distress. When asked, patient denies any recent or active fever, chills, nausea, vomiting, headache, acute sob, chest pain, abdominal pain, extremity pain or swelling.    ED Course   (Triage) vitals: BP 81/50 > 135/76  T 97.7 95% on RA  (Pertinent) Labs: WBC 15.23 Hb 10.8 lactate 2.7 > 1.9 Na 129 UA negative RVP negative  EKG: sinus tachycardia with PAC  Imaging:   - CXR shows small right sided pleural effusion.  - CT angio neck no stenosis or occlusion of intracranial arteries.  - CTH no acute intracranial hemorrhage, transcortical infarct or calvarial fracture. 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  - CT chest shows 1. Surgically absent left lung. 2. Small bilateral pleural effusions. 3. Air within the L1 vertebral body, likely a benign pneumatosis. Further evaluation with contrast-enhanced lumbar spine CT is recommended. Please correlate with any signs and symptoms of infection.  - CT lumbar spine 1. 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 2. Enlarged left external iliac lymph node. Further evaluation with CT abdomen and pelvis is recommended. 3. Rectal thickening, to be further evaluated on CT abdomen and pelvis.  Medication: Vancomycin 1 g IV, Zosyn 3.375 g IV, Ofirmev 1 g IV, NS 1750 cc IV (23 May 2023 04:03)    PAST MEDICAL & SURGICAL HISTORY:  HTN (hypertension)      Diabetes      High cholesterol      S/P removal of lung  left, 2000 partial and 2004 complete      S/P cholecystectomy      Personal history of spine surgery      H/O cerebral aneurysm repair  1968, "metal plates"        MEDICATIONS  (STANDING):  aspirin  chewable 81 milliGRAM(s) Oral daily  atorvastatin 5 milliGRAM(s) Oral at bedtime  carbidopa/levodopa  25/100 1 Tablet(s) Oral every 8 hours  cefTRIAXone   IVPB 1000 milliGRAM(s) IV Intermittent every 24 hours  dextrose 5%. 1000 milliLiter(s) (50 mL/Hr) IV Continuous <Continuous>  dextrose 5%. 1000 milliLiter(s) (100 mL/Hr) IV Continuous <Continuous>  dextrose 50% Injectable 25 Gram(s) IV Push once  dextrose 50% Injectable 12.5 Gram(s) IV Push once  dextrose 50% Injectable 25 Gram(s) IV Push once  enoxaparin Injectable 40 milliGRAM(s) SubCutaneous every 24 hours  glucagon  Injectable 1 milliGRAM(s) IntraMuscular once  insulin lispro (ADMELOG) corrective regimen sliding scale   SubCutaneous three times a day before meals  insulin lispro (ADMELOG) corrective regimen sliding scale   SubCutaneous at bedtime  metoprolol succinate ER 25 milliGRAM(s) Oral daily  pantoprazole    Tablet 40 milliGRAM(s) Oral before breakfast  senna 1 Tablet(s) Oral at bedtime  sertraline 100 milliGRAM(s) Oral daily    MEDICATIONS  (PRN):  acetaminophen     Tablet .. 650 milliGRAM(s) Oral every 6 hours PRN Temp greater or equal to 38C (100.4F), Mild Pain (1 - 3)  aluminum hydroxide/magnesium hydroxide/simethicone Suspension 30 milliLiter(s) Oral every 4 hours PRN Dyspepsia  dextrose Oral Gel 15 Gram(s) Oral once PRN Blood Glucose LESS THAN 70 milliGRAM(s)/deciliter  melatonin 3 milliGRAM(s) Oral at bedtime PRN Insomnia  ondansetron Injectable 4 milliGRAM(s) IV Push every 8 hours PRN Nausea and/or Vomiting          FAMILY HISTORY:  No pertinent family history in first degree relatives        CBC Full  -  ( 24 May 2023 07:11 )  WBC Count : 8.66 K/uL  RBC Count : 4.35 M/uL  Hemoglobin : 10.9 g/dL  Hematocrit : 34.9 %  Platelet Count - Automated : 199 K/uL  Mean Cell Volume : 80.2 fl  Mean Cell Hemoglobin : 25.1 pg  Mean Cell Hemoglobin Concentration : 31.2 gm/dL  Auto Neutrophil # : x  Auto Lymphocyte # : x  Auto Monocyte # : x  Auto Eosinophil # : x  Auto Basophil # : x  Auto Neutrophil % : x  Auto Lymphocyte % : x  Auto Monocyte % : x  Auto Eosinophil % : x  Auto Basophil % : x      05-23    130<L>  |  98  |  38<H>  ----------------------------<  118<H>  3.9   |  24  |  1.00    Ca    9.1      23 May 2023 05:30  Phos  2.9     05-23  Mg     1.6     05-23    TPro  5.1<L>  /  Alb  3.1<L>  /  TBili  0.3  /  DBili  x   /  AST  16  /  ALT  13  /  AlkPhos  113  05-23      Urinalysis Basic - ( 22 May 2023 17:48 )    Color: Yellow / Appearance: Clear / SG: >=1.030 / pH: x  Gluc: x / Ketone: NEGATIVE  / Bili: Negative / Urobili: 0.2 E.U./dL   Blood: x / Protein: Trace mg/dL / Nitrite: NEGATIVE   Leuk Esterase: Trace / RBC: < 5 /HPF / WBC < 5 /HPF   Sq Epi: x / Non Sq Epi: x / Bacteria: None /HPF        Radiology :     < from: Xray Chest 1 View-PORTABLE IMMEDIATE (Xray Chest 1 View-PORTABLE IMMEDIATE .) (05.22.23 @ 18:03) >  ACC: [id number] EXAM:  XR CHEST PORTABLE IMMED 1V   ORDERED BY: BEAU BROWN     PROCEDURE DATE:  05/22/2023          INTERPRETATION:  TECHNIQUE: Single portable view of the chest.    COMPARISON:  2/5/2021    CLINICAL HISTORY: fever, SOB    FINDINGS:    Single frontal view of the chest demonstrates small right-sided pleural   effusion. Left-sided pneumonectomy. The cardiomediastinal silhouette is   normal. No acute osseous abnormalities. Overlying EKG leads and wires are   noted. Status post left-sided thoracotomy defect. Please refer to the   subsequent chest CT for further details.    IMPRESSION: Small right-sided pleural effusion.      < from: CT Head No Cont (05.22.23 @ 20:41) >  ACC: [id number] EXAM:  CT BRAIN   ORDERED BY: BEAU BROWN     PROCEDURE DATE:  05/22/2023          INTERPRETATION:  INDICATIONS: Confusion, difficulty speaking, Parkinson's   disease.    TECHNIQUE: Serial axial images were obtained from the skull base to the   vertex without the use of intravenous contrast. Sagittal and coronal   reformatted images were created from the axial data set. Images were   reviewed in the bone, brain and subdural windows.    COMPARISON: CT head 5/21/2021.    FINDINGS:  INTRA-AXIAL: No intracranial mass effect, acute hemorrhage, midline shift   or acute transcortical infarct is seen. Subcortical and   periventricular-white matter hypoattenuation, nonspecific but favored to   reflect sequela of chronic microvascular ischemia. Redemonstrated and   stable encephalomalacia of the bilateral frontal lobes.  EXTRA-AXIAL: No extra-axial fluid collection is present.  VENTRICLES AND SULCI:  Age-appropriate generalized parenchymal volume loss. Ex vacuo dilatation   of the frontal horns of the bilateral lateral ventricles. Polypoid   mucosal thickening of the left maxillary sinus, may represent retention   cyst versus mucosal polyp. Mucosal thickening left sphenoid sinus, and   polypoid mucosal thickening of the left sphenoethmoidal recess.  VISUALIZED SINUSES: No air-fluid levels are identified.  VISUALIZED MASTOIDS: Clear.  CALVARIUM: Status post bifrontal craniotomy with left suprasellar   aneurysm clip.  MISCELLANEOUS: None.    IMPRESSION: No acute intracranial hemorrhage, transcortical infarct or   calvarial fracture.  Since 5/21/2021, no significant change in bifrontal encephalomalacia   status post bifrontal craniotomy and left suprasellar aneurysm clip.      < from: CT Chest No Cont (05.22.23 @ 20:43) >  ACC: [id number] EXAM:  CT CHEST   ORDERED BY: BEAU BROWN     PROCEDURE DATE:  05/22/2023          INTERPRETATION:  ATTENDING OVER READ:    AGREE WITH BELOW REPORT WITH THE FOLLOWING ADDITIONS:    1. Multiple solid and groundglass nodules within the residual right lung   the largest measuring 6 mm unchanged within the right lower lobe (4:157).   Some of these nodules demonstrate a waxing and waning configuration with   a tree-in-bud-like pattern to suggest infectious and/or inflammatory   etiologies possibly small airways inflammation. There may be some mild   air trapping.  2. New 5 mm nodule within the superior segment to the left lower lobe   (4:119). Short interval surveillance in 3 months is suggested if there is   a known history of extrathoracic malignancy to exclude metastatic   disease. Stable 6 mm right lower lobe nodule (4:157).  3. 9 mm groundglass opacity within the apical segment of the right upper   lobe more conspicuous in attenuation when compared to prior imaging. This   can also be reassessed in 3 months.  4. Gas contained within the L1 vertebral body compatible with compression   fracture as well as new mild anterior wedging of T1 when compared to   2/14/2020. Correlation with a CT of the neck and lumbar spine.      [facility] RADIOLOGIST PRELIMINARY REPORT      Initial report created on 5/22/2023 10:49:27 PM EDT  PROCEDURE INFORMATION:  Exam: CT Chest Without Contrast; Diagnostic  Exam date and time: 5/22/2023 8:30 PM  Age: 87 years old  Clinical indication:Whiteou L lung    TECHNIQUE:  Imaging protocol: Diagnostic computed tomography of the chest without   contrast.  3D rendering (Not supervised by radiologist): MIP and/or 3D reconstructed  images were created by the technologist.    COMPARISON:  CT CHEST 2/14/2020 12:10 PM    FINDINGS:  Lungs: The left lung is surgically absent, as previously. Trace left   pleural  effusion. Right lower lobe nodules measure up to 6 mm, unchanged. Smaller   right  apical nodules are again noted.  Pleural spaces: There is a small right-sided pleural effusion.  Heart:  Mildly enlarged.  Coronary artery atherosclerotic calcifications   are  noted.  Lymph nodes: Unremarkable. No enlarged lymph nodes.  Vasculature: Splenic varices.    Bones/joints: Air is seen within the L1 vertebral body.  Soft tissues: Unremarkable.    Other findings: Examination is limited due to respiratory motion.  Right  adrenal adenoma, as previously.    IMPRESSION:  1.   Surgically absent left lung.  2.   Small bilateral pleural effusions.  3.   Air within the L1 vertebral body, likely a benign pneumatosis.   Further  evaluation with contrast-enhanced lumbar spine CT is recommended. Please  correlate with any signs and symptoms of infection.         REVIEW OF SYSTEMS:  per hpi         Vital Signs Last 24 Hrs  T(C): 36.7 (24 May 2023 05:33), Max: 36.9 (23 May 2023 21:05)  T(F): 98.1 (24 May 2023 05:33), Max: 98.5 (23 May 2023 21:05)  HR: 97 (24 May 2023 05:33) (65 - 106)  BP: 132/82 (24 May 2023 05:33) (111/67 - 136/72)  BP(mean): --  RR: 18 (24 May 2023 05:33) (18 - 18)  SpO2: 96% (24 May 2023 05:33) (96% - 100%)    Parameters below as of 24 May 2023 05:33  Patient On (Oxygen Delivery Method): room air            Physical Exam :   frail 87 y o woman jaylin comfortably in semi Ann's position , somnolent ,  NAD     Head : normocephalic , atraumatic    Eyes : PERRLA , EOMI , no nystagmus , sclera anicteric    ENT / FACE : neg nasal discharge , uvula midline , no oropharyngeal erythema / exudate    Neck : supple , negative JVD , negative carotid bruits , no thyromegaly    Chest : CTA  R lung fields     Cardiovascular : regular rate and rhythm , neg murmurs / rubs / gallops    Abdomen : soft , non distended , non tender to palpation in all 4 quadrants ,  normal bowel sounds     Extremities : WWP , neg cyanosis /clubbing / edema      Neurologic Exam :    somnolent and oriented to person , follows commands     Motor Exam:          Right UE:               no focal weakness ,  > 3+/5 throughout     Left UE:                 no focal weakness ,  > 3+/5 throughout        Right LE:    no focal weakness ,  > 3+/5 throughout        Left LE:    no focal weakness ,  > 3+/5 throughout         Sensation :         intact to light touch x 4 extremities       DTR :     biceps/brachioradialis : equal                      patella/ankle : equal        Gait :  not tested          PM&R Impression :         - deconditioned    - no focal weakness          Recommendations / Plan :              1) Physical / Occupational therapy focusing on therapeutic exercises , equipment evaluation , bed mobility/transfer out of bed evaluation , progressive ambulation with assistive devices prn .    2) Current disposition plan recommendation  :    pending functional progress         
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 UTI: Patient was having dysuria last week and was brought to her Urologist's office. She was straight catheterized and found to have a UTI. Urine Cx came back with pansensitive E Coli. Patient is s/p 7 day course of Bactrim since Tuesday of last week. Weakness: HHA says that patient has been getting progressively weaker the past two to three days. Patient is normally able to ambulate with a walker with assistance. This weekend, the patient had to be transferred from her wheelchair to her bed. Dysphagia: HHLAVELLE says that the patient's dysphagia has been getting increasingly worse the past two to three days. Now she is unable to take PO. Dysphagia has always been present in the setting of PD. It used to take the patient up to an hour to finish a meal. Patient failed dyphagia screen at the bedside.     Patient seen and examined at bedside. Appears overtly comfortable. Denies any significant complaint. Comprehensive symptom assessment and GOC exploration as noted below. Further collateral obtained from primary team, chart, and family.    PERTINENT PM/SXH:   HTN (hypertension)  Diabetes  High cholesterol  S/P removal of lung  S/P cholecystectomy  Personal history of spine surgery  H/O cerebral aneurysm repair    FAMILY HISTORY:  No pertinent family history of aspiration in first degree relatives  Unable to obtain due to encephalopathy    ITEMS NOT CHECKED ARE NOT PRESENT  SOCIAL HISTORY:   Significant other/partner:  []  Children:  [x]  Substance hx:  []   Tobacco hx:  []   Alcohol hx: []   Home Opioid hx:  [] I-Stop Reference No:  - no active Rx's  Living Situation: [x]Home  []Long term care  []Rehab []Other  Judaism/Spiritual practice: ; Role of organized Lutheran [] important [] some [x] unable to assess  Coping: [] well [] with difficulty [] poor coping [x] unable to assess  Support system: [x] strong [] adequate [] inadequate    ADVANCE DIRECTIVES:    []MOLST  []Living Will  DECISION MAKER(s):  [x] Health Care Proxy(s)  [] Surrogate(s)  [] Guardian           Name(s)/Phone Number(s): Ana [phone]    BASELINE (I)ADLs (prior to admission):  [place]: []Total  [] Moderate [x]Dependent    ALLERGIES:  No Known Allergies    MEDICATIONS  (STANDING):  aspirin  chewable 81 milliGRAM(s) Oral daily  atorvastatin 5 milliGRAM(s) Oral at bedtime  carbidopa/levodopa  25/100 1 Tablet(s) Oral every 8 hours  cefTRIAXone   IVPB 1000 milliGRAM(s) IV Intermittent every 24 hours  dextrose 5%. 1000 milliLiter(s) (50 mL/Hr) IV Continuous <Continuous>  dextrose 5%. 1000 milliLiter(s) (100 mL/Hr) IV Continuous <Continuous>  dextrose 50% Injectable 25 Gram(s) IV Push once  dextrose 50% Injectable 12.5 Gram(s) IV Push once  dextrose 50% Injectable 25 Gram(s) IV Push once  enoxaparin Injectable 40 milliGRAM(s) SubCutaneous every 24 hours  glucagon  Injectable 1 milliGRAM(s) IntraMuscular once  insulin lispro (ADMELOG) corrective regimen sliding scale   SubCutaneous three times a day before meals  insulin lispro (ADMELOG) corrective regimen sliding scale   SubCutaneous at bedtime  metoprolol succinate ER 25 milliGRAM(s) Oral daily  pantoprazole    Tablet 40 milliGRAM(s) Oral before breakfast  senna 1 Tablet(s) Oral at bedtime  sertraline 100 milliGRAM(s) Oral daily    MEDICATIONS  (PRN):  acetaminophen     Tablet .. 650 milliGRAM(s) Oral every 6 hours PRN Temp greater or equal to 38C (100.4F), Mild Pain (1 - 3)  aluminum hydroxide/magnesium hydroxide/simethicone Suspension 30 milliLiter(s) Oral every 4 hours PRN Dyspepsia  dextrose Oral Gel 15 Gram(s) Oral once PRN Blood Glucose LESS THAN 70 milliGRAM(s)/deciliter  melatonin 3 milliGRAM(s) Oral at bedtime PRN Insomnia  ondansetron Injectable 4 milliGRAM(s) IV Push every 8 hours PRN Nausea and/or Vomiting    Analgesic Use (Scheduled and PRNs) for past 24 hours:  acetaminophen   IVPB ..   400 mL/Hr IV Intermittent (05-22-23 @ 20:54)  carbidopa/levodopa  25/100   1 Tablet(s) Oral (05-23-23 @ 18:23)  sertraline   100 milliGRAM(s) Oral (05-23-23 @ 18:23)    PRESENT SYMPTOMS: [x]Unable to obtain due to poor mentation/encephalopathy  Source if other than patient:  [x]Family   []Team     Pain: [] yes [x] no  QOL impact -   Location -                    Aggravating Factors -  Quality -  Radiation -  Timing -  Severity (0-10 scale) -   Minimal Acceptable Level (0-10 scale) -    PAIN AD Score: 0  (Nonverbal Pain Assessment)    Dyspnea:                           []Mild  []Moderate []Severe  Anxiety:                             []Mild []Moderate []Severe  Fatigue:                             []Mild []Moderate []Severe  Nausea:                             []Mild []Moderate []Severe  Loss of Appetite:              []Mild []Moderate []Severe  Constipation:                    []Mild []Moderate []Severe    Other Symptoms:  [x]All other review of systems negative     Palliative Performance Status Version 2:  50%  (Functional Assessment Tool)    GENERAL:  [x] NAD []Alert [x]Lethargic  []Cachexia  []Unarousable  [x]Verbal  []Non-Verbal  BEHAVIORAL:   []Anxiety  [x]Delirium []Agitation []Cooperative []Oriented x  HEENT:  [x]Normal  [x] Moist Mucous Membranes []Dry mouth   []ET Tube/Trach  []Oral lesions  PULMONARY:   []Clear []Tachypnea  []Audible excessive secretions  [x]Normal Work of Breathing []Labored Breathing  [x]Rhonchi []Crackles []Wheezing  CARDIOVASCULAR:    [x]Regular Rate [x]Regular Rhythm []Irregular []Tachy  []Timi  GASTROINTESTINAL:  [x]Soft  []Distended   [x]+BS  [x]Non tender []Tender  []PEG []OGT/ NGT  Last BM:  GENITOURINARY:  []Normal [x] Incontinent   []Oliguria/Anuria   []Juarez  MUSCULOSKELETAL:   [x]Normal Extremities  [x]Weakness  []Bed/Wheelchair bound []Edema  NEUROLOGIC:   []No focal deficits  [x]Cognitive impairment  [x]Dysphagia []Dysarthria []Paresis [x]Encephalopathic  SKIN:   [x]Normal   []Pressure ulcer(s)  []Rash    Vital Signs Last 24 Hrs  T(C): 36.4 (23 May 2023 13:35), Max: 36.8 (23 May 2023 11:12)  T(F): 97.6 (23 May 2023 13:35), Max: 98.2 (23 May 2023 11:12)  HR: 104 (23 May 2023 13:35) (104 - 112)  BP: 125/63 (23 May 2023 13:35) (111/67 - 142/81)  BP(mean): --  RR: 18 (23 May 2023 13:35) (18 - 20)  SpO2: 100% (23 May 2023 13:35) (98% - 100%)    Parameters below as of 23 May 2023 13:35  Patient On (Oxygen Delivery Method): nasal cannula  O2 Flow (L/min): 2     LABS:                        9.9    10.54 )-----------( 188      ( 23 May 2023 05:30 )             30.5   05-23    130<L>  |  98  |  38<H>  ----------------------------<  118<H>  3.9   |  24  |  1.00    Ca    9.1      23 May 2023 05:30  Phos  2.9     05-23  Mg     1.6     05-23  TPro  5.1<L>  /  Alb  3.1<L>  /  TBili  0.3  /  DBili  x   /  AST  16  /  ALT  13  /  AlkPhos  113  05-23    RADIOLOGY & ADDITIONAL STUDIES:  < from: CT Abdomen and Pelvis No Cont (05.23.23 @ 14:52) >  LOWER CHEST: Severe elevation left hemidiaphragm. Small left pleural   effusion with underlying airspace opacities, possibly chronic aspiration.  LIVER: Within normal limits.  BILE DUCTS: Normal caliber.  GALLBLADDER: Within normal limits.  SPLEEN: Within normal limits.  PANCREAS: Within normal limits.  ADRENALS: 2.7 cm right adrenal adenoma.  KIDNEYS/URETERS: Small bilateral solid renal lesions stable from 2021.   Excreted contrast in the collecting system limiting evaluation for   calculi, however no evidence of obstructive uropathy.  BLADDER: Within normal limits.  REPRODUCTIVE ORGANS: Hysterectomy.  BOWEL: No bowel obstruction. Appendix is not visualized. No evidence of   inflammation in the pericecal region. Markedly thickened rectum.   Thickened rectosigmoid colon with pericolic fat stranding suggesting   colitis. Extensive presacral edema.  PERITONEUM: No ascites.  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   Extensive vascular calcifications.  RETROPERITONEUM/LYMPH NODES: No lymphadenopathy.  ABDOMINAL WALL: Within normal limits.  BONES: Age-indeterminate, possibly subacute compression fracture L2   vertebral body. Chronic L1 vertebral compression fracture.  IMPRESSION:  Proctocolitis. Masslike thickening of the rectum, possibly inflammatory,   however correlate with direct inspection to exclude neoplasm.    REFERRALS:  [x]Social Work  []Case management [x]PT/OT []Chaplaincy  []Hospice  []Patient/Family Support [x]Massage Therapy []Music Therapy []Holistic RN    DISCUSSION OF CASE: Son, Daughter - to provide updates and emotional support; Medicine - to discuss plan of care

## 2023-05-24 NOTE — PHYSICAL THERAPY INITIAL EVALUATION ADULT - ADDITIONAL COMMENTS
Pt lives in elevator apt w/ no TI. HHA 24/7 and daughter lives 2 blocks away. Pt has shower chair, grab bars, and RW. Pt needs help w/ ADL from [facility] such as dressing and bathing. [facility] reports pt going to outpatient PT previously.

## 2023-05-24 NOTE — PHYSICAL THERAPY INITIAL EVALUATION ADULT - TRANSFER SAFETY CONCERNS NOTED: SIT/STAND, REHAB EVAL
3 stands w/ ModA x2. First stand pt unable to keep feet underneath to bring hips forward to stand straight. Cued pt for next stand to keep feet underneath her and bring hips forward but still unable to perform. 3rd transfer was an attempt to transfer from bed to chair however unable to perform due to pts dec in strength and impaired postural control./losing balance/decreased weight-shifting ability

## 2023-05-24 NOTE — PROGRESS NOTE ADULT - SUBJECTIVE AND OBJECTIVE BOX
[facility]  Last Bobo, Palliative Care Attending  Contact Info: Call [phone] (HEAL Line) or message on [employer] Teams (Last Bobo)    SUBJECTIVE AND OBJECTIVE:  INTERVAL HPI/OVERNIGHT EVENTS: Interval events noted. More alert, participating in interview with prompting. Denies active symptoms but HHA endorsed a episode of back pain when the patient stood with PT. Was too shaky on her feet so PT did not want the patient in the chair yet. Advised HHA to continue with bed exercises to maintain mobility. See patient's PRN use for the past 24hrs noted below. Comprehensive symptom assessment and GOC exploration as noted below. Extensive time spent discussing plan of care with family.    ALLERGIES:  No Known Allergies    MEDICATIONS  (STANDING):  aspirin  chewable 81 milliGRAM(s) Oral daily  atorvastatin 5 milliGRAM(s) Oral at bedtime  carbidopa/levodopa  25/100 1 Tablet(s) Oral every 8 hours  cefTRIAXone   IVPB 1000 milliGRAM(s) IV Intermittent every 24 hours  dextrose 5%. 1000 milliLiter(s) (50 mL/Hr) IV Continuous <Continuous>  dextrose 5%. 1000 milliLiter(s) (100 mL/Hr) IV Continuous <Continuous>  dextrose 50% Injectable 25 Gram(s) IV Push once  dextrose 50% Injectable 12.5 Gram(s) IV Push once  dextrose 50% Injectable 25 Gram(s) IV Push once  glucagon  Injectable 1 milliGRAM(s) IntraMuscular once  heparin   Injectable 5000 Unit(s) SubCutaneous every 8 hours  insulin lispro (ADMELOG) corrective regimen sliding scale   SubCutaneous three times a day before meals  insulin lispro (ADMELOG) corrective regimen sliding scale   SubCutaneous at bedtime  lidocaine   4% Patch 1 Patch Transdermal daily  metoprolol succinate ER 25 milliGRAM(s) Oral daily  pantoprazole    Tablet 40 milliGRAM(s) Oral before breakfast  polyethylene glycol 3350 17 Gram(s) Oral daily  senna 1 Tablet(s) Oral at bedtime  sertraline 100 milliGRAM(s) Oral daily    MEDICATIONS  (PRN):  acetaminophen     Tablet .. 650 milliGRAM(s) Oral every 6 hours PRN Temp greater or equal to 38C (100.4F), Mild Pain (1 - 3)  aluminum hydroxide/magnesium hydroxide/simethicone Suspension 30 milliLiter(s) Oral every 4 hours PRN Dyspepsia  dextrose Oral Gel 15 Gram(s) Oral once PRN Blood Glucose LESS THAN 70 milliGRAM(s)/deciliter  melatonin 3 milliGRAM(s) Oral at bedtime PRN Insomnia  ondansetron Injectable 4 milliGRAM(s) IV Push every 8 hours PRN Nausea and/or Vomiting      Analgesic Use (Scheduled and PRNs) for past 24 hours:  carbidopa/levodopa  25/100   1 Tablet(s) Oral (05-24-23 @ 09:10)   1 Tablet(s) Oral (05-24-23 @ 01:16)   1 Tablet(s) Oral (05-23-23 @ 18:23)  sertraline   100 milliGRAM(s) Oral (05-24-23 @ 09:09)   100 milliGRAM(s) Oral (05-23-23 @ 18:23)    ITEMS UNCHECKED ARE NOT PRESENT  PRESENT SYMPTOMS/REVIEW OF SYSTEMS: []Unable to obtain due to poor mentation   Source if other than patient:  []Family   []Team     Pain: [] yes [x] no  QOL impact -   Location -                    Aggravating Factors -  Quality -  Radiation -  Timing -  Severity (0-10 scale) -   Minimal Acceptable Level (0-10 scale) -    PAIN AD Score: 0  (Nonverbal Pain Assessment)    Dyspnea:                           []Mild  []Moderate []Severe  Anxiety:                             []Mild []Moderate []Severe  Fatigue:                             []Mild []Moderate []Severe  Nausea:                             []Mild []Moderate []Severe  Loss of Appetite:              []Mild []Moderate []Severe  Constipation:                    []Mild []Moderate []Severe    Other Symptoms:  [x]All other review of systems negative     Palliative Performance Status Version 2:  50%  (Functional Assessment Tool)    GENERAL:  [x] NAD [x]Alert []Lethargic  []Cachexia  []Unarousable  [x]Verbal  []Non-Verbal  BEHAVIORAL:   []Anxiety  []Delirium []Agitation [x]Cooperative []Oriented x  HEENT:  [x]Normal  [x] Moist Mucous Membranes []Dry mouth   []ET Tube/Trach  []Oral lesions  PULMONARY:   [x]Clear []Tachypnea  []Audible excessive secretions  [x]Normal Work of Breathing []Labored Breathing  []Rhonchi []Crackles []Wheezing  CARDIOVASCULAR:    [x]Regular Rate [x]Regular Rhythm []Irregular []Tachy  []Timi  GASTROINTESTINAL:  [x]Soft  []Distended   [x]+BS  [x]Non tender []Tender  []PEG []OGT/ NGT  Last BM:  GENITOURINARY:  []Normal [x] Incontinent   []Oliguria/Anuria   []Juarez  MUSCULOSKELETAL:   [x]Normal Extremities  [x]Weakness  []Bed/Wheelchair bound []Edema  NEUROLOGIC:   []No focal deficits  [x]Cognitive impairment  [x]Dysphagia []Dysarthria []Paresis [x]Encephalopathic  SKIN:   [x]Normal   []Pressure ulcer(s)  []Rash    Vital Signs Last 24 Hrs  T(C): 36.7 (24 May 2023 10:11), Max: 36.9 (23 May 2023 21:05)  T(F): 98 (24 May 2023 10:11), Max: 98.5 (23 May 2023 21:05)  HR: 98 (24 May 2023 10:11) (65 - 106)  BP: 127/55 (24 May 2023 10:11) (113/59 - 136/72)  BP(mean): --  RR: 18 (24 May 2023 10:11) (18 - 18)  SpO2: 97% (24 May 2023 10:11) (96% - 100%)    Parameters below as of 24 May 2023 10:11  Patient On (Oxygen Delivery Method): room air    LABS:                         10.9   8.66  )-----------( 199      ( 24 May 2023 07:11 )             34.9   05-24    134<L>  |  103  |  26<H>  ----------------------------<  140<H>  4.2   |  22  |  0.88    Ca    9.9      24 May 2023 07:11  Phos  2.3     05-24  Mg     2.0     05-24  TPro  5.8<L>  /  Alb  3.3  /  TBili  0.4  /  DBili  x   /  AST  24  /  ALT  6<L>  /  AlkPhos  115  05-24    RADIOLOGY & ADDITIONAL STUDIES: None new    DISCUSSION OF CASE: Family - to provide updates and emotional support

## 2023-05-25 ENCOUNTER — TRANSCRIPTION ENCOUNTER (OUTPATIENT)
Age: 88
End: 2023-05-25

## 2023-05-25 LAB
ANION GAP SERPL CALC-SCNC: 9 MMOL/L — SIGNIFICANT CHANGE UP (ref 5–17)
BASOPHILS # BLD AUTO: 0.04 K/UL — SIGNIFICANT CHANGE UP (ref 0–0.2)
BASOPHILS NFR BLD AUTO: 0.6 % — SIGNIFICANT CHANGE UP (ref 0–2)
BUN SERPL-MCNC: 23 MG/DL — SIGNIFICANT CHANGE UP (ref 7–23)
CALCIUM SERPL-MCNC: 10.1 MG/DL — SIGNIFICANT CHANGE UP (ref 8.4–10.5)
CHLORIDE SERPL-SCNC: 103 MMOL/L — SIGNIFICANT CHANGE UP (ref 96–108)
CO2 SERPL-SCNC: 21 MMOL/L — LOW (ref 22–31)
CREAT SERPL-MCNC: 0.74 MG/DL — SIGNIFICANT CHANGE UP (ref 0.5–1.3)
CRP SERPL-MCNC: 20.8 MG/L — HIGH (ref 0–4)
CULTURE RESULTS: SIGNIFICANT CHANGE UP
EGFR: 78 ML/MIN/1.73M2 — SIGNIFICANT CHANGE UP
EOSINOPHIL # BLD AUTO: 0.21 K/UL — SIGNIFICANT CHANGE UP (ref 0–0.5)
EOSINOPHIL NFR BLD AUTO: 3 % — SIGNIFICANT CHANGE UP (ref 0–6)
ERYTHROCYTE [SEDIMENTATION RATE] IN BLOOD: 21 MM/HR — HIGH
GLUCOSE BLDC GLUCOMTR-MCNC: 190 MG/DL — HIGH (ref 70–99)
GLUCOSE BLDC GLUCOMTR-MCNC: 206 MG/DL — HIGH (ref 70–99)
GLUCOSE BLDC GLUCOMTR-MCNC: 230 MG/DL — HIGH (ref 70–99)
GLUCOSE BLDC GLUCOMTR-MCNC: 234 MG/DL — HIGH (ref 70–99)
GLUCOSE SERPL-MCNC: 158 MG/DL — HIGH (ref 70–99)
HCT VFR BLD CALC: 33.1 % — LOW (ref 34.5–45)
HGB BLD-MCNC: 10.6 G/DL — LOW (ref 11.5–15.5)
IMM GRANULOCYTES NFR BLD AUTO: 4.8 % — HIGH (ref 0–0.9)
LYMPHOCYTES # BLD AUTO: 0.88 K/UL — LOW (ref 1–3.3)
LYMPHOCYTES # BLD AUTO: 12.8 % — LOW (ref 13–44)
MAGNESIUM SERPL-MCNC: 1.5 MG/DL — LOW (ref 1.6–2.6)
MCHC RBC-ENTMCNC: 25.2 PG — LOW (ref 27–34)
MCHC RBC-ENTMCNC: 32 GM/DL — SIGNIFICANT CHANGE UP (ref 32–36)
MCV RBC AUTO: 78.6 FL — LOW (ref 80–100)
MONOCYTES # BLD AUTO: 0.37 K/UL — SIGNIFICANT CHANGE UP (ref 0–0.9)
MONOCYTES NFR BLD AUTO: 5.4 % — SIGNIFICANT CHANGE UP (ref 2–14)
NEUTROPHILS # BLD AUTO: 5.07 K/UL — SIGNIFICANT CHANGE UP (ref 1.8–7.4)
NEUTROPHILS NFR BLD AUTO: 73.4 % — SIGNIFICANT CHANGE UP (ref 43–77)
NRBC # BLD: 0 /100 WBCS — SIGNIFICANT CHANGE UP (ref 0–0)
PHOSPHATE SERPL-MCNC: 2.4 MG/DL — LOW (ref 2.5–4.5)
PLATELET # BLD AUTO: 202 K/UL — SIGNIFICANT CHANGE UP (ref 150–400)
POTASSIUM SERPL-MCNC: SIGNIFICANT CHANGE UP (ref 3.5–5.3)
POTASSIUM SERPL-SCNC: SIGNIFICANT CHANGE UP (ref 3.5–5.3)
RBC # BLD: 4.21 M/UL — SIGNIFICANT CHANGE UP (ref 3.8–5.2)
RBC # FLD: 16.4 % — HIGH (ref 10.3–14.5)
SODIUM SERPL-SCNC: 133 MMOL/L — LOW (ref 135–145)
SPECIMEN SOURCE: SIGNIFICANT CHANGE UP
WBC # BLD: 6.9 K/UL — SIGNIFICANT CHANGE UP (ref 3.8–10.5)
WBC # FLD AUTO: 6.9 K/UL — SIGNIFICANT CHANGE UP (ref 3.8–10.5)

## 2023-05-25 RX ORDER — MAGNESIUM SULFATE 500 MG/ML
2 VIAL (ML) INJECTION ONCE
Refills: 0 | Status: COMPLETED | OUTPATIENT
Start: 2023-05-25 | End: 2023-05-25

## 2023-05-25 RX ORDER — ATORVASTATIN CALCIUM 80 MG/1
5 TABLET, FILM COATED ORAL
Qty: 0 | Refills: 0 | DISCHARGE
Start: 2023-05-25

## 2023-05-25 RX ORDER — METOPROLOL TARTRATE 50 MG
1 TABLET ORAL
Refills: 0 | DISCHARGE

## 2023-05-25 RX ADMIN — Medication 25 MILLIGRAM(S): at 06:41

## 2023-05-25 RX ADMIN — Medication 25 GRAM(S): at 11:37

## 2023-05-25 RX ADMIN — LIDOCAINE 1 PATCH: 4 CREAM TOPICAL at 00:50

## 2023-05-25 RX ADMIN — SERTRALINE 100 MILLIGRAM(S): 25 TABLET, FILM COATED ORAL at 11:37

## 2023-05-25 RX ADMIN — LIDOCAINE 1 PATCH: 4 CREAM TOPICAL at 18:46

## 2023-05-25 RX ADMIN — ATORVASTATIN CALCIUM 5 MILLIGRAM(S): 80 TABLET, FILM COATED ORAL at 22:24

## 2023-05-25 RX ADMIN — Medication 81 MILLIGRAM(S): at 11:37

## 2023-05-25 RX ADMIN — PANTOPRAZOLE SODIUM 40 MILLIGRAM(S): 20 TABLET, DELAYED RELEASE ORAL at 06:41

## 2023-05-25 RX ADMIN — LIDOCAINE 1 PATCH: 4 CREAM TOPICAL at 11:37

## 2023-05-25 RX ADMIN — CARBIDOPA AND LEVODOPA 1 TABLET(S): 25; 100 TABLET ORAL at 11:37

## 2023-05-25 RX ADMIN — HEPARIN SODIUM 5000 UNIT(S): 5000 INJECTION INTRAVENOUS; SUBCUTANEOUS at 06:42

## 2023-05-25 RX ADMIN — CARBIDOPA AND LEVODOPA 1 TABLET(S): 25; 100 TABLET ORAL at 01:01

## 2023-05-25 RX ADMIN — Medication 2: at 18:28

## 2023-05-25 RX ADMIN — HEPARIN SODIUM 5000 UNIT(S): 5000 INJECTION INTRAVENOUS; SUBCUTANEOUS at 13:16

## 2023-05-25 RX ADMIN — CARBIDOPA AND LEVODOPA 1 TABLET(S): 25; 100 TABLET ORAL at 18:28

## 2023-05-25 RX ADMIN — CEFTRIAXONE 100 MILLIGRAM(S): 500 INJECTION, POWDER, FOR SOLUTION INTRAMUSCULAR; INTRAVENOUS at 06:42

## 2023-05-25 RX ADMIN — HEPARIN SODIUM 5000 UNIT(S): 5000 INJECTION INTRAVENOUS; SUBCUTANEOUS at 22:24

## 2023-05-25 RX ADMIN — Medication 1: at 09:13

## 2023-05-25 RX ADMIN — SENNA PLUS 1 TABLET(S): 8.6 TABLET ORAL at 22:24

## 2023-05-25 RX ADMIN — Medication 2: at 13:15

## 2023-05-25 NOTE — DISCHARGE NOTE PROVIDER - HOSPITAL COURSE
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 RE: dysphagia, improved on its own and was tolerating minced and moist diet. (evaluated by speech and swallow). Patient initially started on CTX for suspected UTI. CT AP revealed proctocolitis, recommended to ruled out neoplasm, however not consistent with goals of care per charlee care proxy and will not be further explored. Patient now markedly improved, more alert, responding to     Problem List/Main Diagnoses:     New medications/therapies:   New lines/hardware:  Labs to be followed outpatient:   Exam to be followed outpatient:     Discharge plan: discharge to home vs [facility]???    Physical exam on discharge:   Constitutional: NAD, elderly frail appearing   Eyes: PERRL, EOMI, anicteric sclera  ENT: MMM  Respiratory: No breath sounds on the L. side   Cardiac: tachycardia  Gastrointestinal: soft, NT/ND; no rebound or guarding; +BSx4  Back: spine midline, no bony tenderness or step-offs; no CVAT B/L  Extremities: WWP, no clubbing or cyanosis; no peripheral edema. 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 RE: dysphagia, improved on its own and was tolerating minced and moist diet. (evaluated by speech and swallow). Patient initially started on CTX for suspected UTI. CT AP revealed proctocolitis, recommended to ruled out neoplasm, however not consistent with goals of care per charlee care proxy and will not be further explored. Patient now markedly improved, more alert, responding to     Problem List/Main Diagnoses:     #Proctocolitis   CT AP revealed colitis, responded to ceftriaxone   -Discharge on 10 day total course of abx (7 days of Augmentin 5/26-6/1)     #R/o kidney stones   -CT AP unremarkable for nephrolithiasis	  -Urology consulted over admission   -No signs or symptoms during admission  -Outpatient follow-up with Dr. Villalobos       New medications/therapies: 875 mg TID for 7 days   New lines/hardware: None   Labs to be followed outpatient: BMP, CBC    Exam to be followed outpatient: Urology, PCP outpatient     Discharge plan: discharge to home vs [facility]???    Physical exam on discharge:   Constitutional: NAD, elderly frail appearing   Eyes: PERRL, EOMI, anicteric sclera  ENT: MMM  Respiratory: No breath sounds on the L. side   Cardiac: tachycardia  Gastrointestinal: soft, NT/ND; no rebound or guarding; +BSx4  Back: spine midline, no bony tenderness or step-offs; no CVAT B/L  Extremities: WWP, no clubbing or cyanosis; no peripheral edema. 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 RE: dysphagia, improved on its own and was tolerating minced and moist diet. (evaluated by speech and swallow). Patient initially started on CTX for suspected UTI. CT AP revealed proctocolitis, recommended to ruled out neoplasm, however not consistent with goals of care per charlee care proxy and will not be further explored. Patient now markedly improved, more alert, responding to antibiotics and ready for discharge home on 14 total day course of antibiotics (being discharged on augmentin).     Problem List/Main Diagnoses:     #Proctocolitis   CT AP revealed colitis, responded to ceftriaxone   -Discharge on 14 day total course of abx (10 days of Augmentin 5/26-6/5)     #R/o kidney stones   -CT AP unremarkable for nephrolithiasis	  -Urology consulted over admission   -No signs or symptoms during admission  -Outpatient follow-up with Dr. Villalobos       New medications/therapies: Augmentin 875 mg TID for 10 day total course (5/26-6/5)   New lines/hardware: None   Labs to be followed outpatient: BMP, CBC    Exam to be followed outpatient: Urology, PCP outpatient     Discharge plan: discharge to [facility]    Physical exam on discharge:   Constitutional: NAD, elderly frail appearing   Eyes: PERRL, EOMI, anicteric sclera  ENT: MMM  Respiratory: No breath sounds on the L. side   Cardiac: tachycardia  Gastrointestinal: soft, NT/ND; no rebound or guarding; +BSx4  Back: spine midline, no bony tenderness or step-offs; no CVAT B/L  Extremities: WWP, no clubbing or cyanosis; no peripheral edema. 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 RE: dysphagia, improved on its own and was tolerating minced and moist diet. (evaluated by speech and swallow). Patient initially started on CTX for suspected UTI. CT AP revealed proctocolitis, recommended to ruled out neoplasm, however not consistent with goals of care per charlee care proxy and will not be further explored. Patient now markedly improved, more alert, responding to antibiotics and ready for discharge home on 14 total day course of antibiotics (being discharged on augmentin 10 day course)    Problem List/Main Diagnoses:     #Proctocolitis   CT AP revealed colitis, responded to ceftriaxone. improved leukocytosis and clinical status  -Discharge on 14 day total course of abx (10 days of Augmentin 5/26-6/5)     #Dysphagia   Improved with treatment of colitis, cleared by speech and swallow for minced and moist diet   -Continue minced and moist diet on dc     #R/o kidney stones   -CT AP unremarkable for nephrolithiasis	  -Urology consulted over admission   -No signs or symptoms during admission  -Outpatient follow-up with Dr. Villalobos     New medications/therapies: Augmentin 875 mg TID for 10 day total course (5/26-6/5)   New lines/hardware: None   Labs to be followed outpatient: BMP, CBC    Exam to be followed outpatient: Urology, PCP outpatient     Discharge plan: discharge to [facility]    Physical exam on discharge:   Constitutional: NAD, elderly frail appearing   Eyes: PERRL, EOMI, anicteric sclera  ENT: MMM  Respiratory: No breath sounds on the L. side   Cardiac: tachycardia  Gastrointestinal: soft, NT/ND; no rebound or guarding; +BSx4  Back: spine midline, no bony tenderness or step-offs; no CVAT B/L  Extremities: WWP, no clubbing or cyanosis; no peripheral edema. Capillary refill <2 sec  Vascular: 2+ radial, femoral, DP/PT pulses B/L  Neurologic: AAOx1-2 (person, sometimes place), weakness in b/l upper and lower extremities 3/5, cogwheel rigidity; CNII-XII grossly intact; no focal deficits

## 2023-05-25 NOTE — DISCHARGE NOTE PROVIDER - NSDCFUSCHEDAPPT_GEN_ALL_CORE_FT
Arnoldo Villalobos  [facility] Physician [facility]  UROLOGY [address]  Scheduled Appointment: 05/30/2023

## 2023-05-25 NOTE — DISCHARGE NOTE PROVIDER - NSDCFUADDAPPT_GEN_ALL_CORE_FT
(1) Please note that the office of your Primary Care Provider, Dr. Wilian Johnson will call you from phone number [phone] to schedule an appointment.    Appointment was facilitated by Ms. ADELINA Wilson, Referral Coordinator.

## 2023-05-25 NOTE — DISCHARGE NOTE PROVIDER - CARE PROVIDER_API CALL
Wilian Johnson)  Cardiovascular Medicine  [address] [place] [address]  Phone: [phone]  Fax: [phone]  Established Patient  Follow Up Time: 2 weeks   Wilian Johnson)  Cardiovascular Medicine  [address] [place] [address]  Phone: [phone]  Fax: [phone]  Established Patient  Follow Up Time: 2 weeks    Arnoldo Villalobos)  Urology  [address]  [place], [place] [id number]  Phone: [phone]  Fax: [phone]  Follow Up Time: 1 month   Wilian Johnson)  Cardiovascular Medicine  [address] [place] [address]  Phone: [phone]  Fax: [phone]  Established Patient  Follow Up Time: 2 weeks    Arnoldo Villalobos)  Urology  [address]  [place], [place] [id number]  Phone: [phone]  Fax: [phone]  Scheduled Appointment: 05/30/2023 03:30 PM

## 2023-05-25 NOTE — DISCHARGE NOTE PROVIDER - CARE PROVIDERS DIRECT ADDRESSES
[email or web address]

## 2023-05-25 NOTE — DISCHARGE NOTE PROVIDER - PROVIDER TOKENS
PROVIDER:[TOKEN:[[id number]],FOLLOWUP:[2 weeks],ESTABLISHEDPATIENT:[T]] PROVIDER:[TOKEN:[[id number]],FOLLOWUP:[2 weeks],ESTABLISHEDPATIENT:[T]],PROVIDER:[TOKEN:[[id number]],FOLLOWUP:[1 month]] PROVIDER:[TOKEN:[[id number]],FOLLOWUP:[2 weeks],ESTABLISHEDPATIENT:[T]],PROVIDER:[TOKEN:[[id number]],SCHEDULEDAPPT:[05/30/2023],SCHEDULEDAPPTTIME:[03:30 PM]]

## 2023-05-25 NOTE — DISCHARGE NOTE PROVIDER - NSDCCPCAREPLAN_GEN_ALL_CORE_FT
PRINCIPAL DISCHARGE DIAGNOSIS  Diagnosis: Colitis  Assessment and Plan of Treatment: Colitis is swelling (inflammation) of the large intestine (colon). Your goals of care are not in line with getting further work up of colonoscopies or work-up for cancer  --  You were given antibiotics with significant improvement   --  -Please complete a 10 day course of augmentin 875 mg two times a day until 6/4/23.  -Please follow with Dr. Vanegas office within 2 weeks to ensure your symptoms are continuing to improve      SECONDARY DISCHARGE DIAGNOSES  Diagnosis: Dysphagia  Assessment and Plan of Treatment: Please follow a minced and moist diet. Your trouble swallowing improved while in the hospital.    Diagnosis: Parkinsons disease  Assessment and Plan of Treatment:

## 2023-05-25 NOTE — DISCHARGE NOTE PROVIDER - NSDCMRMEDTOKEN_GEN_ALL_CORE_FT
acetaminophen 325 mg oral tablet: 2 tab(s) orally every 6 hours, As needed, Temp greater or equal to 38C (100.4F), Mild Pain (1 - 3)  Centrum:   Claritin:   Colace 100 mg oral capsule: 1 cap(s) orally once a day (at bedtime)  folic acid 1 mg oral tablet: 1 tab(s) orally once a day  levETIRAcetam 500 mg oral tablet: 1 tab(s) orally every 12 hours  losartan 100 mg oral tablet: 1 tab(s) orally once a day  metFORMIN 500 mg oral tablet: 500  orally 2 times a day  omeprazole 20 mg oral delayed release capsule:   sertraline 50 mg oral tablet: 1 tab(s) orally once a day  Vitamin B12 250 mcg oral tablet:   Vitamin D3: 5000u  orally   acetaminophen 325 mg oral tablet: 2 tab(s) orally every 6 hours, As needed, Temp greater or equal to 38C (100.4F), Mild Pain (1 - 3)  aspirin 81 mg oral tablet, chewable: 1 chewed once a day  atorvastatin: 5 milligram(s) orally once a day (at bedtime)  Centrum:   Claritin:   Colace 100 mg oral capsule: 1 cap(s) orally once a day (at bedtime)  folic acid 1 mg oral tablet: 1 tab(s) orally once a day  levETIRAcetam 500 mg oral tablet: 1 tab(s) orally every 12 hours  losartan 100 mg oral tablet: 1 tab(s) orally once a day  metFORMIN 500 mg oral tablet: 500  orally 2 times a day  omeprazole 20 mg oral delayed release capsule:   sertraline 50 mg oral tablet: 1 tab(s) orally once a day  Sinemet 25 mg-100 mg oral tablet: 1 orally every 8 hours  Vitamin B12 250 mcg oral tablet:   Vitamin D3: 5000u  orally   acetaminophen 325 mg oral tablet: 2 tab(s) orally every 6 hours, As needed, Temp greater or equal to 38C (100.4F), Mild Pain (1 - 3)  amoxicillin-clavulanate 875 mg-125 mg oral tablet: 1 tab(s) orally every 12 hours  aspirin 81 mg oral tablet, chewable: 1 chewed once a day  atorvastatin: 5 milligram(s) orally once a day (at bedtime)  Centrum:   Claritin:   Colace 100 mg oral capsule: 1 cap(s) orally once a day (at bedtime)  folic acid 1 mg oral tablet: 1 tab(s) orally once a day  levETIRAcetam 500 mg oral tablet: 1 tab(s) orally every 12 hours  losartan 100 mg oral tablet: 1 tab(s) orally once a day  metFORMIN 500 mg oral tablet: 500  orally 2 times a day  omeprazole 20 mg oral delayed release capsule:   sertraline 50 mg oral tablet: 1 tab(s) orally once a day  Sinemet 25 mg-100 mg oral tablet: 1 orally every 8 hours  Vitamin B12 250 mcg oral tablet:   Vitamin D3: 5000u  orally   acetaminophen 325 mg oral tablet: 2 tab(s) orally every 6 hours, As needed, Temp greater or equal to 38C (100.4F), Mild Pain (1 - 3)  aluminum hydroxide-magnesium hydroxide 200 mg-200 mg/5 mL oral suspension: 30 milliliter(s) orally every 4 hours as needed for Dyspepsia  amoxicillin-clavulanate 875 mg-125 mg oral tablet: 1 tab(s) orally every 12 hours PLEASE CONTINUE 10 DAY TOTAL DAY COURSE FROM 5/26-6/5/23  aspirin 81 mg oral tablet, chewable: 1 chewed once a day  atorvastatin: 5 milligram(s) orally once a day (at bedtime)  Centrum: 1 tab(s) orally once a day  Claritin: 5 milligram(s) orally once a day  Colace 100 mg oral capsule: 1 cap(s) orally once a day (at bedtime)  folic acid 1 mg oral tablet: 1 tab(s) orally once a day  levETIRAcetam 500 mg oral tablet: 1 tab(s) orally every 12 hours  losartan 100 mg oral tablet: 1 tab(s) orally once a day  metFORMIN 500 mg oral tablet: 500  orally 2 times a day  omeprazole 20 mg oral delayed release capsule:   sertraline 50 mg oral tablet: 1 tab(s) orally once a day  Sinemet 25 mg-100 mg oral tablet: 1 orally every 8 hours  Vitamin B12 250 mcg oral tablet:   Vitamin D3: 5000u  orally

## 2023-05-25 NOTE — PROGRESS NOTE ADULT - SUBJECTIVE AND OBJECTIVE BOX
No events overnight  Presntation is now most consistent with proctitis that responded to abx  She will complete a 10 course of abx  Lekocytosis has resolved  Patient will follow up with me to monitor blood work prior to completing course of abx  No further imaging or work up for additional finings per family meeting with pallitaive care

## 2023-05-26 ENCOUNTER — TRANSCRIPTION ENCOUNTER (OUTPATIENT)
Age: 88
End: 2023-05-26

## 2023-05-26 VITALS
SYSTOLIC BLOOD PRESSURE: 129 MMHG | TEMPERATURE: 98 F | HEART RATE: 103 BPM | OXYGEN SATURATION: 94 % | RESPIRATION RATE: 18 BRPM | DIASTOLIC BLOOD PRESSURE: 85 MMHG

## 2023-05-26 DIAGNOSIS — G20 PARKINSON'S DISEASE: ICD-10-CM

## 2023-05-26 LAB
GLUCOSE BLDC GLUCOMTR-MCNC: 124 MG/DL — HIGH (ref 70–99)
GLUCOSE BLDC GLUCOMTR-MCNC: 193 MG/DL — HIGH (ref 70–99)
GLUCOSE BLDC GLUCOMTR-MCNC: 243 MG/DL — HIGH (ref 70–99)

## 2023-05-26 PROCEDURE — 87040 BLOOD CULTURE FOR BACTERIA: CPT

## 2023-05-26 PROCEDURE — 82330 ASSAY OF CALCIUM: CPT

## 2023-05-26 PROCEDURE — 96374 THER/PROPH/DIAG INJ IV PUSH: CPT

## 2023-05-26 PROCEDURE — 74176 CT ABD & PELVIS W/O CONTRAST: CPT

## 2023-05-26 PROCEDURE — 70496 CT ANGIOGRAPHY HEAD: CPT | Mod: MA

## 2023-05-26 PROCEDURE — 71045 X-RAY EXAM CHEST 1 VIEW: CPT

## 2023-05-26 PROCEDURE — 71250 CT THORAX DX C-: CPT | Mod: MA

## 2023-05-26 PROCEDURE — 87086 URINE CULTURE/COLONY COUNT: CPT

## 2023-05-26 PROCEDURE — 70498 CT ANGIOGRAPHY NECK: CPT | Mod: MA

## 2023-05-26 PROCEDURE — 84295 ASSAY OF SERUM SODIUM: CPT

## 2023-05-26 PROCEDURE — 84100 ASSAY OF PHOSPHORUS: CPT

## 2023-05-26 PROCEDURE — 97530 THERAPEUTIC ACTIVITIES: CPT

## 2023-05-26 PROCEDURE — 81001 URINALYSIS AUTO W/SCOPE: CPT

## 2023-05-26 PROCEDURE — 97161 PT EVAL LOW COMPLEX 20 MIN: CPT

## 2023-05-26 PROCEDURE — 92526 ORAL FUNCTION THERAPY: CPT

## 2023-05-26 PROCEDURE — 80053 COMPREHEN METABOLIC PANEL: CPT

## 2023-05-26 PROCEDURE — 72132 CT LUMBAR SPINE W/DYE: CPT | Mod: MA

## 2023-05-26 PROCEDURE — 97110 THERAPEUTIC EXERCISES: CPT

## 2023-05-26 PROCEDURE — 83036 HEMOGLOBIN GLYCOSYLATED A1C: CPT

## 2023-05-26 PROCEDURE — 85730 THROMBOPLASTIN TIME PARTIAL: CPT

## 2023-05-26 PROCEDURE — 70450 CT HEAD/BRAIN W/O DYE: CPT | Mod: MA

## 2023-05-26 PROCEDURE — 80048 BASIC METABOLIC PNL TOTAL CA: CPT

## 2023-05-26 PROCEDURE — 82962 GLUCOSE BLOOD TEST: CPT

## 2023-05-26 PROCEDURE — 99285 EMERGENCY DEPT VISIT HI MDM: CPT

## 2023-05-26 PROCEDURE — 82803 BLOOD GASES ANY COMBINATION: CPT

## 2023-05-26 PROCEDURE — 83735 ASSAY OF MAGNESIUM: CPT

## 2023-05-26 PROCEDURE — 83605 ASSAY OF LACTIC ACID: CPT

## 2023-05-26 PROCEDURE — 85025 COMPLETE CBC W/AUTO DIFF WBC: CPT

## 2023-05-26 PROCEDURE — 84132 ASSAY OF SERUM POTASSIUM: CPT

## 2023-05-26 PROCEDURE — 85652 RBC SED RATE AUTOMATED: CPT

## 2023-05-26 PROCEDURE — 36415 COLL VENOUS BLD VENIPUNCTURE: CPT

## 2023-05-26 PROCEDURE — 0225U NFCT DS DNA&RNA 21 SARSCOV2: CPT

## 2023-05-26 PROCEDURE — 92610 EVALUATE SWALLOWING FUNCTION: CPT

## 2023-05-26 PROCEDURE — 84443 ASSAY THYROID STIM HORMONE: CPT

## 2023-05-26 PROCEDURE — 85610 PROTHROMBIN TIME: CPT

## 2023-05-26 PROCEDURE — 86140 C-REACTIVE PROTEIN: CPT

## 2023-05-26 PROCEDURE — 96375 TX/PRO/DX INJ NEW DRUG ADDON: CPT

## 2023-05-26 RX ADMIN — HEPARIN SODIUM 5000 UNIT(S): 5000 INJECTION INTRAVENOUS; SUBCUTANEOUS at 13:11

## 2023-05-26 RX ADMIN — SERTRALINE 100 MILLIGRAM(S): 25 TABLET, FILM COATED ORAL at 11:31

## 2023-05-26 RX ADMIN — Medication 1: at 09:37

## 2023-05-26 RX ADMIN — CARBIDOPA AND LEVODOPA 1 TABLET(S): 25; 100 TABLET ORAL at 01:50

## 2023-05-26 RX ADMIN — LIDOCAINE 1 PATCH: 4 CREAM TOPICAL at 11:30

## 2023-05-26 RX ADMIN — HEPARIN SODIUM 5000 UNIT(S): 5000 INJECTION INTRAVENOUS; SUBCUTANEOUS at 06:11

## 2023-05-26 RX ADMIN — PANTOPRAZOLE SODIUM 40 MILLIGRAM(S): 20 TABLET, DELAYED RELEASE ORAL at 06:11

## 2023-05-26 RX ADMIN — Medication 2: at 13:10

## 2023-05-26 RX ADMIN — Medication 81 MILLIGRAM(S): at 11:31

## 2023-05-26 RX ADMIN — CARBIDOPA AND LEVODOPA 1 TABLET(S): 25; 100 TABLET ORAL at 11:31

## 2023-05-26 RX ADMIN — CARBIDOPA AND LEVODOPA 1 TABLET(S): 25; 100 TABLET ORAL at 17:35

## 2023-05-26 RX ADMIN — Medication 1 TABLET(S): at 11:30

## 2023-05-26 RX ADMIN — Medication 25 MILLIGRAM(S): at 06:11

## 2023-05-26 NOTE — PROGRESS NOTE ADULT - SUBJECTIVE AND OBJECTIVE BOX
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    UTI: Patient was having dysuria last week and was brought to her Urologist's office. She was straight catheterized and found to have a UTI. Urine Cx came back with pansensitive E Coli. Patient is s/p 7 day course of Bactrim since Tuesday of last week.    Weakness: HHA says that patient has been getting progressively weaker the past two to three days. Patient is normally able to ambulate with a walker with assistance. This weekend, the patient had to be transferred from her wheelchair to her bed.    Dysphagia: JOSÉ MIGUEL says that the patient's dysphagia has been getting increasingly worse the past two to three days. Now she is unable to take PO. Dysphagia has always been present in the setting of PD. It used to take the patient up to an hour to finish a meal. Patient failed dyphagia screen at the bedside.     Physicians:  Neurologist: Dr. Nuha Ni  Urologist: Dr. Villalobos  Internists: Dr. Downey and Elizabeth Garcia    Patient seen at the bedside, resting comfortably in bed, and does not appear to be in any acute distress. When asked, patient denies any recent or active fever, chills, nausea, vomiting, headache, acute sob, chest pain, abdominal pain, extremity pain or swelling.    ED Course   (Triage) vitals: BP 81/50 > 135/76  T 97.7 95% on RA  (Pertinent) Labs: WBC 15.23 Hb 10.8 lactate 2.7 > 1.9 Na 129 UA negative RVP negative  EKG: sinus tachycardia with PAC  Imaging:   - CXR shows small right sided pleural effusion.  - CT angio neck no stenosis or occlusion of intracranial arteries.  - CTH no acute intracranial hemorrhage, transcortical infarct or calvarial fracture. 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  - CT chest shows 1. Surgically absent left lung. 2. Small bilateral pleural effusions. 3. Air within the L1 vertebral body, likely a benign pneumatosis. Further evaluation with contrast-enhanced lumbar spine CT is recommended. Please correlate with any signs and symptoms of infection.  - CT lumbar spine 1. 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 2. Enlarged left external iliac lymph node. Further evaluation with CT abdomen and pelvis is recommended. 3. Rectal thickening, to be further evaluated on CT abdomen and pelvis.  Medication: Vancomycin 1 g IV, Zosyn 3.375 g IV, Ofirmev 1 g IV, NS 1750 cc IV (23 May 2023 04:03)                            10.6   6.90  )-----------( 202      ( 25 May 2023 07:22 )             33.1       05-25    133<L>  |  103  |  23  ----------------------------<  158<H>  See Note   |  21<L>  |  0.74    Ca    10.1      25 May 2023 07:22  Phos  2.4     05-25  Mg     1.5     05-25      Vital Signs Last 24 Hrs  T(C): 36.4 (26 May 2023 05:49), Max: 36.8 (25 May 2023 21:00)  T(F): 97.6 (26 May 2023 05:49), Max: 98.2 (25 May 2023 21:00)  HR: 97 (26 May 2023 05:49) (97 - 103)  BP: 147/80 (26 May 2023 05:49) (117/70 - 147/80)  BP(mean): --  RR: 17 (26 May 2023 05:49) (17 - 18)  SpO2: 97% (26 May 2023 05:49) (96% - 98%)    Parameters below as of 26 May 2023 05:49  Patient On (Oxygen Delivery Method): room air        MEDICATIONS  (STANDING):  amoxicillin  875 milliGRAM(s)/clavulanate 1 Tablet(s) Oral every 12 hours  aspirin  chewable 81 milliGRAM(s) Oral daily  atorvastatin 5 milliGRAM(s) Oral at bedtime  carbidopa/levodopa  25/100 1 Tablet(s) Oral every 8 hours  dextrose 5%. 1000 milliLiter(s) (50 mL/Hr) IV Continuous <Continuous>  dextrose 5%. 1000 milliLiter(s) (100 mL/Hr) IV Continuous <Continuous>  dextrose 50% Injectable 25 Gram(s) IV Push once  dextrose 50% Injectable 12.5 Gram(s) IV Push once  dextrose 50% Injectable 25 Gram(s) IV Push once  glucagon  Injectable 1 milliGRAM(s) IntraMuscular once  heparin   Injectable 5000 Unit(s) SubCutaneous every 8 hours  insulin lispro (ADMELOG) corrective regimen sliding scale   SubCutaneous three times a day before meals  insulin lispro (ADMELOG) corrective regimen sliding scale   SubCutaneous at bedtime  lidocaine   4% Patch 1 Patch Transdermal daily  metoprolol succinate ER 25 milliGRAM(s) Oral daily  pantoprazole    Tablet 40 milliGRAM(s) Oral before breakfast  senna 1 Tablet(s) Oral at bedtime  sertraline 100 milliGRAM(s) Oral daily    MEDICATIONS  (PRN):  acetaminophen     Tablet .. 650 milliGRAM(s) Oral every 6 hours PRN Temp greater or equal to 38C (100.4F), Mild Pain (1 - 3)  aluminum hydroxide/magnesium hydroxide/simethicone Suspension 30 milliLiter(s) Oral every 4 hours PRN Dyspepsia  bisacodyl 10 milliGRAM(s) Oral every 12 hours PRN Constipation  dextrose Oral Gel 15 Gram(s) Oral once PRN Blood Glucose LESS THAN 70 milliGRAM(s)/deciliter  melatonin 3 milliGRAM(s) Oral at bedtime PRN Insomnia  ondansetron Injectable 4 milliGRAM(s) IV Push every 8 hours PRN Nausea and/or Vomiting        Initial Functional Status Assessment :       Previous Level of Function:     · Additional Comments	Pt lives in elevator apt w/ no TI. HHA 24/7 and daughter lives 2 blocks away. Pt has shower chair, grab bars, and RW. Pt needs help w/ ADL from [facility] such as dressing and bathing. A reports pt going to outpatient PT previously.    Cognitive Status Examination:   · Orientation	not fully assessed 2/2 to pt PMH of dementia and HHA present; time  · Level of Consciousness	alert  · Follows Commands and Answers Questions	100% of the time  · Personal Safety and Judgment	intact    Range of Motion Exam:   · Active Range of Motion Examination	bilateral upper extremity Active ROM was WFL (within functional limits); bilateral  lower extremity Active ROM was WFL (within functional limits)    Manual Muscle Testing:   · Manual Muscle Testing Results	L knee flx 4/5, L hip flx 4/5, L plantar flx/dorsiflx and knee ext all 5/5; RLE 5/5    Bed Mobility: Rolling/Turning:     · Level of [place]	minimum assist (75% patients effort)  · Physical Assist/Nonphysical Assist	verbal cues; 1 person assist; nonverbal cues (demo/gestures); cued pt to grab bed rail    Bed Mobility: Scooting/Bridging:     · Level of [place]	moderate assist (50% patients effort)  · Physical Assist/Nonphysical Assist	2 person assist; Scooted to EOB    Bed Mobility: Sit to Supine:     · Level of [place]	maximum assist (25% patients effort)  · Physical Assist/Nonphysical Assist	2 person assist; verbal cues; supported at trunk and legs    Bed Mobility: Supine to Sit:     · Level of [place]	moderate assist (50% patients effort)  · Physical Assist/Nonphysical Assist	1 person assist; verbal cues; supported at trunk    Bed Mobility Analysis:     · Bed Mobility Limitations	decreased ability to use arms for pushing/pulling; impaired ability to control trunk for mobility  · Impairments Contributing to Impaired Bed Mobility	impaired balance; impaired postural control; decreased strength    Transfer: Sit to Stand:     · Level of [place]	moderate assist (50% patients effort)  · Physical Assist/Nonphysical Assist	2 person assist; verbal cues  · Assistive Device	rolling walker    Transfer: Stand to Sit:     · Level of [place]	moderate assist (50% patients effort)  · Physical Assist/Nonphysical Assist	2 person assist; verbal cues  · Assistive Device	rolling walker    Sit/Stand Transfer Safety Analysis:     · Transfer Safety Concerns Noted	losing balance; decreased weight-shifting ability; 3 stands w/ ModA x2. First stand pt unable to keep feet underneath to bring hips forward to stand straight. Cued pt for next stand to keep feet underneath her and bring hips forward but still unable to perform. 3rd transfer was an attempt to transfer from bed to chair however unable to perform due to pts dec in strength and impaired postural control.  · Impairments Contributing to Impaired Transfers	impaired balance; impaired motor control; impaired postural control; decreased strength    Balance Skills Assessment:     · Sitting Balance: Static	fair balance  pt balance improved throughout sitting time.  · Sitting Balance: Dynamic	poor balance  · Sit-to-Stand Balance	poor balance  · Standing Balance: Static	poor balance  · Systems Impairment Contributing to Balance Disturbance	musculoskeletal; neuromuscular  · Identified Impairments Contributing to Balance Disturbance	impaired postural control; decreased strength; impaired motor control    Clinical Impressions:   · Criteria for Skilled Therapeutic Interventions	impairments found; functional limitations in following categories; risk reduction/prevention  · Impairments Found (describe specific impairments)	arousal, attention, and cognition; gait, locomotion, and balance; muscle strength; neuromotor development and sensory integration; posture  · Functional Limitations in Following Categories (describe specific limitations)	self-care; home management; community/leisure  · Risk Reduction/Prevention (Describe Specific Areas of risk reduction/prevention)	risk factors  · Risk Areas	fall; cognitive impairment            PM&R Impression : as above    Current Disposition Plan Recommendations :    subacute rehab placement

## 2023-05-26 NOTE — PROGRESS NOTE ADULT - PROBLEM SELECTOR PLAN 4
home medication: Sinemet 25/100 mg PO TID.  -Continue home meds   -Dysphagia improved - tolerating minced and moist
home medication: Sinemet 25/100 mg PO TID.  -Continue home meds   -Dysphagia improved - tolerating minced and moist
.  -c/w [place], can consider disintegrating tablets if patient is having difficulty tolerating standard pill

## 2023-05-26 NOTE — PROGRESS NOTE ADULT - PROBLEM SELECTOR PLAN 1
Patient meets SIRS 2/4 with lactate 2.7. Patient with known UTI diagnosed on Tuesday of last week. Pansensitive E. Coli. Patient s/p 7 day course of Bactrim. Patient s/p Vancomycin 1g IV, Zosyn 3.375 g IV, and 1750 cc NS in the ED. CT chest shows small bilateral pleural effusions. CT lumbar spine shows enlarged left external iliac lymph node. Further evaluation with CT abdomen and pelvis is recommended. 3. Rectal thickening, to be further evaluated on CT abdomen and pelvis  -CT AP revealed proctocolitis, likely the etiology of sepsis, now resolving; ucx, bcx -  S/P CTX 3 days with improvement     -Augmentin 875 mg BID from 5/26-6/4 for 10 days (total 14 day course of abx)  -Discharge to [facility]
Patient meets SIRS 2/4 with lactate 2.7. Patient with known UTI diagnosed on Tuesday of last week. Pansensitive E. Coli. Patient s/p 7 day course of Bactrim. Patient s/p Vancomycin 1g IV, Zosyn 3.375 g IV, and 1750 cc NS in the ED. CT chest shows small bilateral pleural effusions. CT lumbar spine shows enlarged left external iliac lymph node. Further evaluation with CT abdomen and pelvis is recommended. 3. Rectal thickening, to be further evaluated on CT abdomen and pelvis  -CT AP revealed proctocolitis, likely the etiology of sepsis, now resolving     - f/u B Cx, U Cx   - c/w Ceftriaxone 1g QD  -Discharge on augmentin for total 10 day course
.  -ordered Lidocaine Patch daily  -can pre-medicate activity with Tylenol PRNs

## 2023-05-26 NOTE — PROGRESS NOTE ADULT - ASSESSMENT
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   ·  Plan: Patient meets SIRS 2/4 with lactate 2.7. Patient with known UTI diagnosed on Tuesday of last week. Pansensitive E. Coli. Patient s/p 7 day course of Bactrim. Patient s/p Vancomycin 1g IV, Zosyn 3.375 g IV, and 1750 cc NS in the ED. CT chest shows small bilateral pleural effusions. CT lumbar spine shows enlarged left external iliac lymph node. Further evaluation with CT abdomen and pelvis is recommended. 3. Rectal thickening, to be further evaluated on CT abdomen and pelvis  -CT AP revealed proctocolitis, likely the etiology of sepsis, now resolving     - f/u B Cx, U Cx   - c/w Ceftriaxone 1g QD  -Discharge on augmentin for total 10 day course.    Problem/Plan - 2:  ·  Problem: Weakness.   ·  Plan: JOSÉ MIGUEL says that patient has been getting progressively weaker the past two to three days. Patient is normally able to ambulate with a walker with assistance. This weekend, the patient had to be transferred from her wheelchair to her bed. Possible 2/2 sepsis VS progression of PD    - see management of severe sepsis above.    Problem/Plan - 3:  ·  Problem: Dysphagia.   ·  Plan: Dysphagia: JOSÉ MIGUEL says that the patient's dysphagia has been getting increasingly worse the past two to three days. Now she is unable to take PO. Dysphagia has always been present in the setting of PD. It used to take the patient up to an hour to finish a meal. Patient failed dysphagia screen at the bedside.     -Tolerating minced and moist, improved.    Problem/Plan - 4:  ·  Problem: Parkinsons disease.   ·  Plan: home medication: Sinemet 25/100 mg PO TID.  -Continue home meds   -Dysphagia improved - tolerating minced and moist.    Problem/Plan - 5:  ·  Problem: Diabetes.   ·  Plan: Home medication: metformin 500 mg PO BID.  - sliding scale.    Problem/Plan - 6:  ·  Problem: Prophylactic measure.   ·  Plan: E: as needed  N: minced and moist   DVT: Lovenox 40 mg SC QD  C: Full Code  D: Regional.    
The findings of the follow up CT scan are now suggestive of possible proctitis as the etiology of her infection:  -bowel regimen  -WBC responding to therapy  -in consultation with family they do not want additional work up at this time and would prefer comparison with [facility] which I will try to obtain today  -blood pressure controlled  -PT assessment  -DVT prophylaxis  -medications reviewed   -case d/w family and team  
88yo F with PMH of Dementia, Parkinson's, h/o Lung CA, HTN, T2DM, and h/o SDH/SAH p/w weakness and found to meet sepsis criteria. Palliative consulted for complex medical decision making in the setting of advanced illness.    ·	more alert, tolerating PO diet and medications without issue  ·	stood with PT but was shaky on her feet, advised HHA to continue bed exercises in the meantime  ·	ordered Lidocaine Patch for back pain (likely from immobility)  ·	will reach out to patient's children for further support and exploration of GOC pending clinical course  ·	patient does not have a hospice qualifying diagnosis since she is still ambulatory at baseline
88 yo woman with long complicated past medical history including hemorrhagic CVA and lung cancer s/p resection and spine surgery currently wheel chair bound lives at home with 24 hour care and supportive family recently treated for e coli UTI on appropriate abx but intermittent HTN and then last night chills and confusion per HHA  =ER evaluation showed normal UA but inc WBC and lacate with normal BP  =patient admited for possible sepsis and started on iv abx  -imaging was unremarkable except for an incidental finding of:  2.   Enlarged left external iliac lymph node. Further evaluation with CT  abdomen and pelvis is recommended.  3.   Rectal thickening, to be further evaluated on CT abdomen and pelvis.  Unlikely that these findings are responsible for her current admission and she has had prior imaging for comparison here at [facility] with her kidney stone hx (Dr. Arnoldo Villalobos)  Need to discuss code status with family    Blood pressure controlled  Follow up urine and blood cultures  Monitor WBC response to IV abx  DVT prophylaxis  AODM - on metformin foolw FSS  PArkinsonian like movt disorder  Dysphagia is old - she has been eating well at home per A  CAse d/w team
Patient has responded well to abx treating proctitis  -in discussion with family they would like to try [facility]  Blood pressure controlled  leukocytosis resolved  medcications reviewed  -to complete a 10 day course of augmentin
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

## 2023-05-26 NOTE — DISCHARGE NOTE NURSING/CASE MANAGEMENT/SOCIAL WORK - NSDCFUADDAPPT_GEN_ALL_CORE_FT
(1) Please note that the office of your Primary Care Provider, Dr. Wilian Johnson will call you from phone number [phone] to schedule an appointment.    Appointment was facilitated by Ms. ADELINA Wilson, Referral Coordinator.

## 2023-05-26 NOTE — PROGRESS NOTE ADULT - SUBJECTIVE AND OBJECTIVE BOX
OVERNIGHT EVENTS: NAEO    SUBJECTIVE: Pt continues to look improved and more alert today at bedside. Tolerating minced and moist diet. Not complaining of any pain. ROS otherwise negative     VITAL SIGNS:  Vital Signs Last 24 Hrs  T(C): 36.7 (24 May 2023 05:33), Max: 36.9 (23 May 2023 21:05)  T(F): 98.1 (24 May 2023 05:33), Max: 98.5 (23 May 2023 21:05)  HR: 97 (24 May 2023 05:33) (65 - 106)  BP: 132/82 (24 May 2023 05:33) (111/67 - 136/72)  BP(mean): --  RR: 18 (24 May 2023 05:33) (18 - 18)  SpO2: 96% (24 May 2023 05:33) (96% - 100%)    Parameters below as of 24 May 2023 05:33  Patient On (Oxygen Delivery Method): room air      PHYSICAL EXAM:  Constitutional: NAD, elderly frail appearing   Eyes: PERRL, EOMI, anicteric sclera  ENT: MMM  Respiratory: No breath sounds on the L. side   Cardiac: tachycardia  Gastrointestinal: soft, NT/ND; no rebound or guarding; +BSx4  Back: spine midline, no bony tenderness or step-offs; no CVAT B/L  Extremities: WWP, no clubbing or cyanosis; no peripheral edema. Capillary refill <2 sec  Vascular: 2+ radial, femoral, DP/PT pulses B/L  Neurologic: AAOx1-2 (person, sometimes place), weakness in b/l upper and lower extremities 3/5, cogwheel rigidity; CNII-XII grossly intact; no focal deficits    MEDICATIONS:  MEDICATIONS  (STANDING):  aspirin  chewable 81 milliGRAM(s) Oral daily  atorvastatin 5 milliGRAM(s) Oral at bedtime  carbidopa/levodopa  25/100 1 Tablet(s) Oral every 8 hours  cefTRIAXone   IVPB 1000 milliGRAM(s) IV Intermittent every 24 hours  dextrose 5%. 1000 milliLiter(s) (50 mL/Hr) IV Continuous <Continuous>  dextrose 5%. 1000 milliLiter(s) (100 mL/Hr) IV Continuous <Continuous>  dextrose 50% Injectable 25 Gram(s) IV Push once  dextrose 50% Injectable 12.5 Gram(s) IV Push once  dextrose 50% Injectable 25 Gram(s) IV Push once  enoxaparin Injectable 40 milliGRAM(s) SubCutaneous every 24 hours  glucagon  Injectable 1 milliGRAM(s) IntraMuscular once  insulin lispro (ADMELOG) corrective regimen sliding scale   SubCutaneous three times a day before meals  insulin lispro (ADMELOG) corrective regimen sliding scale   SubCutaneous at bedtime  metoprolol succinate ER 25 milliGRAM(s) Oral daily  pantoprazole    Tablet 40 milliGRAM(s) Oral before breakfast  senna 1 Tablet(s) Oral at bedtime  sertraline 100 milliGRAM(s) Oral daily    MEDICATIONS  (PRN):  acetaminophen     Tablet .. 650 milliGRAM(s) Oral every 6 hours PRN Temp greater or equal to 38C (100.4F), Mild Pain (1 - 3)  aluminum hydroxide/magnesium hydroxide/simethicone Suspension 30 milliLiter(s) Oral every 4 hours PRN Dyspepsia  dextrose Oral Gel 15 Gram(s) Oral once PRN Blood Glucose LESS THAN 70 milliGRAM(s)/deciliter  melatonin 3 milliGRAM(s) Oral at bedtime PRN Insomnia  ondansetron Injectable 4 milliGRAM(s) IV Push every 8 hours PRN Nausea and/or Vomiting      ALLERGIES:  Allergies    No Known Allergies    Intolerances        LABS:                        9.9    10.54 )-----------( 188      ( 23 May 2023 05:30 )             30.5     05-23    130<L>  |  98  |  38<H>  ----------------------------<  118<H>  3.9   |  24  |  1.00    Ca    9.1      23 May 2023 05:30  Phos  2.9     05-23  Mg     1.6     05-23    TPro  5.1<L>  /  Alb  3.1<L>  /  TBili  0.3  /  DBili  x   /  AST  16  /  ALT  13  /  AlkPhos  113  05-23    PT/INR - ( 23 May 2023 05:30 )   PT: 12.6 sec;   INR: 1.06          PTT - ( 23 May 2023 05:30 )  PTT:25.2 sec    RADIOLOGY & ADDITIONAL TESTS: Reviewed.

## 2023-05-26 NOTE — PROGRESS NOTE ADULT - PROBLEM SELECTOR PLAN 6
.  Patient is Full Code  -daughter and son are designated HCPs, they have not discussed code status with the patient but are confident that they would make the appropriate decisions if the patient is decompensating  -if patient is suddenly decompensating, call daughter and she will be prepared to make a decision
E: as needed  N: minced and moist   DVT: Lovenox 40 mg SC QD  C: Full Code  D: Regional
E: as needed  N: minced and moist   DVT: Lovenox 40 mg SC QD  C: Full Code  D: Regional

## 2023-05-26 NOTE — PROGRESS NOTE ADULT - PROBLEM SELECTOR PLAN 3
Dysphagia: JOSÉ MIGUEL says that the patient's dysphagia has been getting increasingly worse the past two to three days. Now she is unable to take PO. Dysphagia has always been present in the setting of PD. It used to take the patient up to an hour to finish a meal. Patient failed dysphagia screen at the bedside.     -Tolerating minced and moist, improved
.  Tolerating diet and medications  -recommended for a diet by S&S  -family would not be inclined to pursue feeding tube if offered
Dysphagia: JOSÉ MIGUEL says that the patient's dysphagia has been getting increasingly worse the past two to three days. Now she is unable to take PO. Dysphagia has always been present in the setting of PD. It used to take the patient up to an hour to finish a meal. Patient failed dysphagia screen at the bedside.     -Tolerating minced and moist, improved

## 2023-05-26 NOTE — DISCHARGE NOTE NURSING/CASE MANAGEMENT/SOCIAL WORK - NSDCPEFALRISK_GEN_ALL_CORE
For information on Fall & Injury Prevention, visit: [email or web address] OR  [email or web address] OR  https://www.cdc.gov/steadi/patient.html

## 2023-05-26 NOTE — PROGRESS NOTE ADULT - SUBJECTIVE AND OBJECTIVE BOX
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    UTI: Patient was having dysuria last week and was brought to her Urologist's office. She was straight catheterized and found to have a UTI. Urine Cx came back with pansensitive E Coli. Patient is s/p 7 day course of Bactrim since Tuesday of last week.    Weakness: HHA says that patient has been getting progressively weaker the past two to three days. Patient is normally able to ambulate with a walker with assistance. This weekend, the patient had to be transferred from her wheelchair to her bed.    Since admission CT revealled proctitis which has responded well to abx and patients lethargy and fatigue is improved as well.  No events overnight    MEDICATIONS  (STANDING):  amoxicillin  875 milliGRAM(s)/clavulanate 1 Tablet(s) Oral every 12 hours  aspirin  chewable 81 milliGRAM(s) Oral daily  atorvastatin 5 milliGRAM(s) Oral at bedtime  carbidopa/levodopa  25/100 1 Tablet(s) Oral every 8 hours  dextrose 5%. 1000 milliLiter(s) (50 mL/Hr) IV Continuous <Continuous>  dextrose 5%. 1000 milliLiter(s) (100 mL/Hr) IV Continuous <Continuous>  dextrose 50% Injectable 25 Gram(s) IV Push once  dextrose 50% Injectable 12.5 Gram(s) IV Push once  dextrose 50% Injectable 25 Gram(s) IV Push once  glucagon  Injectable 1 milliGRAM(s) IntraMuscular once  heparin   Injectable 5000 Unit(s) SubCutaneous every 8 hours  insulin lispro (ADMELOG) corrective regimen sliding scale   SubCutaneous three times a day before meals  insulin lispro (ADMELOG) corrective regimen sliding scale   SubCutaneous at bedtime  lidocaine   4% Patch 1 Patch Transdermal daily  metoprolol succinate ER 25 milliGRAM(s) Oral daily  pantoprazole    Tablet 40 milliGRAM(s) Oral before breakfast  senna 1 Tablet(s) Oral at bedtime  sertraline 100 milliGRAM(s) Oral daily    MEDICATIONS  (PRN):  acetaminophen     Tablet .. 650 milliGRAM(s) Oral every 6 hours PRN Temp greater or equal to 38C (100.4F), Mild Pain (1 - 3)  aluminum hydroxide/magnesium hydroxide/simethicone Suspension 30 milliLiter(s) Oral every 4 hours PRN Dyspepsia  bisacodyl 10 milliGRAM(s) Oral every 12 hours PRN Constipation  dextrose Oral Gel 15 Gram(s) Oral once PRN Blood Glucose LESS THAN 70 milliGRAM(s)/deciliter  melatonin 3 milliGRAM(s) Oral at bedtime PRN Insomnia  ondansetron Injectable 4 milliGRAM(s) IV Push every 8 hours PRN Nausea and/or Vomiting    Vital Signs Last 24 Hrs  T(C): 36.4 (26 May 2023 05:49), Max: 36.8 (25 May 2023 21:00)  T(F): 97.6 (26 May 2023 05:49), Max: 98.2 (25 May 2023 21:00)  HR: 97 (26 May 2023 05:49) (97 - 103)  BP: 147/80 (26 May 2023 05:49) (117/70 - 147/80)  BP(mean): --  RR: 17 (26 May 2023 05:49) (17 - 18)  SpO2: 97% (26 May 2023 05:49) (96% - 98%)    Parameters below as of 26 May 2023 05:49  Patient On (Oxygen Delivery Method): room air    NAD  No JVD  Chest clear  CV RRR s1s2 no murmur  Abd soft  Ext no edema                          10.6   6.90  )-----------( 202      ( 25 May 2023 07:22 )             33.1   05-25    133<L>  |  103  |  23  ----------------------------<  158<H>  See Note   |  21<L>  |  0.74    Ca    10.1      25 May 2023 07:22  Phos  2.4     05-25  Mg     1.5     05-25

## 2023-05-26 NOTE — PROGRESS NOTE ADULT - PROBLEM SELECTOR PLAN 5
Home medication: metformin 500 mg PO BID.  - sliding scale
Home medication: metformin 500 mg PO BID.  - sliding scale
.  FAST 7, still ambulatory at baseline per family/HHA  -since patient remains ambulatory, dementia is not be a hospice qualifying diagnosis at this time  -PT Eval to assess and maintain functionality.

## 2023-05-26 NOTE — DISCHARGE NOTE NURSING/CASE MANAGEMENT/SOCIAL WORK - PATIENT PORTAL LINK FT
You can access the FollowMyHealth Patient Portal offered by [facility] by registering at the following website: [email or web address]. By joining [employer]’s FollowMyHealth portal, you will also be able to view your health information using other applications (apps) compatible with our system.

## 2023-05-26 NOTE — PROGRESS NOTE ADULT - PROVIDER SPECIALTY LIST ADULT
Cardiology
Internal Medicine
Cardiology
Cardiology
Rehab Medicine
Cardiology
Palliative Care
Internal Medicine

## 2023-05-26 NOTE — PROGRESS NOTE ADULT - PROBLEM SELECTOR PLAN 2
.  -c/w bowel regimen
HHLAVELLE says that patient has been getting progressively weaker the past two to three days. Patient is normally able to ambulate with a walker with assistance. This weekend, the patient had to be transferred from her wheelchair to her bed. Possible 2/2 sepsis VS progression of PD    - see management of severe sepsis above
HHLAVELLE says that patient has been getting progressively weaker the past two to three days. Patient is normally able to ambulate with a walker with assistance. This weekend, the patient had to be transferred from her wheelchair to her bed. Possible 2/2 sepsis VS progression of PD    - see management of severe sepsis above

## 2023-05-27 LAB
CULTURE RESULTS: SIGNIFICANT CHANGE UP
CULTURE RESULTS: SIGNIFICANT CHANGE UP
SPECIMEN SOURCE: SIGNIFICANT CHANGE UP
SPECIMEN SOURCE: SIGNIFICANT CHANGE UP

## 2023-05-28 ENCOUNTER — INPATIENT (INPATIENT)
Facility: HOSPITAL | Age: 88
LOS: 2 days | Discharge: NURSING, REHAB OR LONG-TERM CARE | DRG: 872 | End: 2023-05-31
Attending: INTERNAL MEDICINE | Admitting: INTERNAL MEDICINE
Payer: MEDICARE

## 2023-05-28 VITALS
HEIGHT: 64 IN | TEMPERATURE: 98 F | RESPIRATION RATE: 16 BRPM | WEIGHT: 125 LBS | OXYGEN SATURATION: 96 % | HEART RATE: 109 BPM | DIASTOLIC BLOOD PRESSURE: 76 MMHG | SYSTOLIC BLOOD PRESSURE: 123 MMHG

## 2023-05-28 DIAGNOSIS — Z98.890 OTHER SPECIFIED POSTPROCEDURAL STATES: Chronic | ICD-10-CM

## 2023-05-28 DIAGNOSIS — R65.10 SYSTEMIC INFLAMMATORY RESPONSE SYNDROME (SIRS) OF NON-INFECTIOUS ORIGIN WITHOUT ACUTE ORGAN DYSFUNCTION: ICD-10-CM

## 2023-05-28 DIAGNOSIS — F03.90 UNSPECIFIED DEMENTIA, UNSPECIFIED SEVERITY, WITHOUT BEHAVIORAL DISTURBANCE, PSYCHOTIC DISTURBANCE, MOOD DISTURBANCE, AND ANXIETY: ICD-10-CM

## 2023-05-28 DIAGNOSIS — Z29.9 ENCOUNTER FOR PROPHYLACTIC MEASURES, UNSPECIFIED: ICD-10-CM

## 2023-05-28 DIAGNOSIS — E11.9 TYPE 2 DIABETES MELLITUS WITHOUT COMPLICATIONS: ICD-10-CM

## 2023-05-28 DIAGNOSIS — D64.9 ANEMIA, UNSPECIFIED: ICD-10-CM

## 2023-05-28 DIAGNOSIS — Z90.2 ACQUIRED ABSENCE OF LUNG [PART OF]: Chronic | ICD-10-CM

## 2023-05-28 DIAGNOSIS — Z90.49 ACQUIRED ABSENCE OF OTHER SPECIFIED PARTS OF DIGESTIVE TRACT: Chronic | ICD-10-CM

## 2023-05-28 DIAGNOSIS — I10 ESSENTIAL (PRIMARY) HYPERTENSION: ICD-10-CM

## 2023-05-28 DIAGNOSIS — A41.9 SEPSIS, UNSPECIFIED ORGANISM: ICD-10-CM

## 2023-05-28 LAB
ALBUMIN SERPL ELPH-MCNC: 3.6 G/DL — SIGNIFICANT CHANGE UP (ref 3.3–5)
ALP SERPL-CCNC: 144 U/L — HIGH (ref 40–120)
ALT FLD-CCNC: 7 U/L — LOW (ref 10–45)
ANION GAP SERPL CALC-SCNC: 12 MMOL/L — SIGNIFICANT CHANGE UP (ref 5–17)
ANISOCYTOSIS BLD QL: SIGNIFICANT CHANGE UP
APPEARANCE UR: CLEAR — SIGNIFICANT CHANGE UP
AST SERPL-CCNC: 24 U/L — SIGNIFICANT CHANGE UP (ref 10–40)
BACTERIA # UR AUTO: PRESENT /HPF
BASOPHILS # BLD AUTO: 0 K/UL — SIGNIFICANT CHANGE UP (ref 0–0.2)
BASOPHILS NFR BLD AUTO: 0 % — SIGNIFICANT CHANGE UP (ref 0–2)
BILIRUB SERPL-MCNC: 0.2 MG/DL — SIGNIFICANT CHANGE UP (ref 0.2–1.2)
BILIRUB UR-MCNC: NEGATIVE — SIGNIFICANT CHANGE UP
BUN SERPL-MCNC: 28 MG/DL — HIGH (ref 7–23)
BURR CELLS BLD QL SMEAR: PRESENT — SIGNIFICANT CHANGE UP
CALCIUM SERPL-MCNC: 10.5 MG/DL — SIGNIFICANT CHANGE UP (ref 8.4–10.5)
CHLORIDE SERPL-SCNC: 96 MMOL/L — SIGNIFICANT CHANGE UP (ref 96–108)
CO2 SERPL-SCNC: 25 MMOL/L — SIGNIFICANT CHANGE UP (ref 22–31)
COLOR SPEC: YELLOW — SIGNIFICANT CHANGE UP
COMMENT - URINE: SIGNIFICANT CHANGE UP
CREAT SERPL-MCNC: 0.95 MG/DL — SIGNIFICANT CHANGE UP (ref 0.5–1.3)
DACRYOCYTES BLD QL SMEAR: SLIGHT — SIGNIFICANT CHANGE UP
DIFF PNL FLD: NEGATIVE — SIGNIFICANT CHANGE UP
EGFR: 58 ML/MIN/1.73M2 — LOW
EOSINOPHIL # BLD AUTO: 0 K/UL — SIGNIFICANT CHANGE UP (ref 0–0.5)
EOSINOPHIL NFR BLD AUTO: 0 % — SIGNIFICANT CHANGE UP (ref 0–6)
EPI CELLS # UR: SIGNIFICANT CHANGE UP /HPF (ref 0–5)
GIANT PLATELETS BLD QL SMEAR: PRESENT — SIGNIFICANT CHANGE UP
GLUCOSE SERPL-MCNC: 243 MG/DL — HIGH (ref 70–99)
GLUCOSE UR QL: 100
HCT VFR BLD CALC: 35 % — SIGNIFICANT CHANGE UP (ref 34.5–45)
HGB BLD-MCNC: 11 G/DL — LOW (ref 11.5–15.5)
KETONES UR-MCNC: NEGATIVE — SIGNIFICANT CHANGE UP
LACTATE SERPL-SCNC: 3.5 MMOL/L — HIGH (ref 0.5–2)
LEUKOCYTE ESTERASE UR-ACNC: ABNORMAL
LYMPHOCYTES # BLD AUTO: 1.05 K/UL — SIGNIFICANT CHANGE UP (ref 1–3.3)
LYMPHOCYTES # BLD AUTO: 6.9 % — LOW (ref 13–44)
MANUAL SMEAR VERIFICATION: SIGNIFICANT CHANGE UP
MCHC RBC-ENTMCNC: 25.1 PG — LOW (ref 27–34)
MCHC RBC-ENTMCNC: 31.4 GM/DL — LOW (ref 32–36)
MCV RBC AUTO: 79.9 FL — LOW (ref 80–100)
MICROCYTES BLD QL: SIGNIFICANT CHANGE UP
MONOCYTES # BLD AUTO: 0.53 K/UL — SIGNIFICANT CHANGE UP (ref 0–0.9)
MONOCYTES NFR BLD AUTO: 3.5 % — SIGNIFICANT CHANGE UP (ref 2–14)
NEUTROPHILS # BLD AUTO: 13.48 K/UL — HIGH (ref 1.8–7.4)
NEUTROPHILS NFR BLD AUTO: 85.2 % — HIGH (ref 43–77)
NEUTS BAND # BLD: 3.5 % — SIGNIFICANT CHANGE UP (ref 0–8)
NITRITE UR-MCNC: NEGATIVE — SIGNIFICANT CHANGE UP
OVALOCYTES BLD QL SMEAR: SLIGHT — SIGNIFICANT CHANGE UP
PH UR: 5.5 — SIGNIFICANT CHANGE UP (ref 5–8)
PLAT MORPH BLD: ABNORMAL
PLATELET # BLD AUTO: 314 K/UL — SIGNIFICANT CHANGE UP (ref 150–400)
POIKILOCYTOSIS BLD QL AUTO: SLIGHT — SIGNIFICANT CHANGE UP
POLYCHROMASIA BLD QL SMEAR: SLIGHT — SIGNIFICANT CHANGE UP
POTASSIUM SERPL-MCNC: 4.7 MMOL/L — SIGNIFICANT CHANGE UP (ref 3.5–5.3)
POTASSIUM SERPL-SCNC: 4.7 MMOL/L — SIGNIFICANT CHANGE UP (ref 3.5–5.3)
PROT SERPL-MCNC: 6.2 G/DL — SIGNIFICANT CHANGE UP (ref 6–8.3)
PROT UR-MCNC: ABNORMAL MG/DL
RAPID RVP RESULT: SIGNIFICANT CHANGE UP
RBC # BLD: 4.38 M/UL — SIGNIFICANT CHANGE UP (ref 3.8–5.2)
RBC # FLD: 16.7 % — HIGH (ref 10.3–14.5)
RBC BLD AUTO: ABNORMAL
RBC CASTS # UR COMP ASSIST: < 5 /HPF — SIGNIFICANT CHANGE UP
SARS-COV-2 RNA SPEC QL NAA+PROBE: SIGNIFICANT CHANGE UP
SODIUM SERPL-SCNC: 133 MMOL/L — LOW (ref 135–145)
SP GR SPEC: 1.02 — SIGNIFICANT CHANGE UP (ref 1–1.03)
SPHEROCYTES BLD QL SMEAR: SLIGHT — SIGNIFICANT CHANGE UP
UROBILINOGEN FLD QL: 0.2 E.U./DL — SIGNIFICANT CHANGE UP
VARIANT LYMPHS # BLD: 0.9 % — SIGNIFICANT CHANGE UP (ref 0–6)
WBC # BLD: 15.2 K/UL — HIGH (ref 3.8–10.5)
WBC # FLD AUTO: 15.2 K/UL — HIGH (ref 3.8–10.5)
WBC UR QL: ABNORMAL /HPF

## 2023-05-28 PROCEDURE — 71045 X-RAY EXAM CHEST 1 VIEW: CPT | Mod: 26

## 2023-05-28 PROCEDURE — 99291 CRITICAL CARE FIRST HOUR: CPT

## 2023-05-28 RX ORDER — SODIUM CHLORIDE 9 MG/ML
1000 INJECTION, SOLUTION INTRAVENOUS
Refills: 0 | Status: DISCONTINUED | OUTPATIENT
Start: 2023-05-28 | End: 2023-05-31

## 2023-05-28 RX ORDER — SERTRALINE 25 MG/1
50 TABLET, FILM COATED ORAL DAILY
Refills: 0 | Status: DISCONTINUED | OUTPATIENT
Start: 2023-05-28 | End: 2023-05-31

## 2023-05-28 RX ORDER — ATORVASTATIN CALCIUM 80 MG/1
5 TABLET, FILM COATED ORAL AT BEDTIME
Refills: 0 | Status: DISCONTINUED | OUTPATIENT
Start: 2023-05-28 | End: 2023-05-31

## 2023-05-28 RX ORDER — ENOXAPARIN SODIUM 100 MG/ML
40 INJECTION SUBCUTANEOUS EVERY 24 HOURS
Refills: 0 | Status: DISCONTINUED | OUTPATIENT
Start: 2023-05-29 | End: 2023-05-29

## 2023-05-28 RX ORDER — DEXTROSE 50 % IN WATER 50 %
25 SYRINGE (ML) INTRAVENOUS ONCE
Refills: 0 | Status: DISCONTINUED | OUTPATIENT
Start: 2023-05-28 | End: 2023-05-31

## 2023-05-28 RX ORDER — LIDOCAINE 4 G/100G
1 CREAM TOPICAL EVERY 24 HOURS
Refills: 0 | Status: DISCONTINUED | OUTPATIENT
Start: 2023-05-28 | End: 2023-05-31

## 2023-05-28 RX ORDER — DEXTROSE 50 % IN WATER 50 %
15 SYRINGE (ML) INTRAVENOUS ONCE
Refills: 0 | Status: DISCONTINUED | OUTPATIENT
Start: 2023-05-28 | End: 2023-05-31

## 2023-05-28 RX ORDER — SODIUM CHLORIDE 9 MG/ML
1000 INJECTION INTRAMUSCULAR; INTRAVENOUS; SUBCUTANEOUS ONCE
Refills: 0 | Status: COMPLETED | OUTPATIENT
Start: 2023-05-28 | End: 2023-05-28

## 2023-05-28 RX ORDER — PIPERACILLIN AND TAZOBACTAM 4; .5 G/20ML; G/20ML
3.38 INJECTION, POWDER, LYOPHILIZED, FOR SOLUTION INTRAVENOUS ONCE
Refills: 0 | Status: COMPLETED | OUTPATIENT
Start: 2023-05-28 | End: 2023-05-28

## 2023-05-28 RX ORDER — INSULIN LISPRO 100/ML
VIAL (ML) SUBCUTANEOUS
Refills: 0 | Status: DISCONTINUED | OUTPATIENT
Start: 2023-05-28 | End: 2023-05-31

## 2023-05-28 RX ORDER — CARBIDOPA AND LEVODOPA 25; 100 MG/1; MG/1
1 TABLET ORAL EVERY 8 HOURS
Refills: 0 | Status: DISCONTINUED | OUTPATIENT
Start: 2023-05-28 | End: 2023-05-31

## 2023-05-28 RX ORDER — VANCOMYCIN HCL 1 G
1000 VIAL (EA) INTRAVENOUS EVERY 24 HOURS
Refills: 0 | Status: DISCONTINUED | OUTPATIENT
Start: 2023-05-29 | End: 2023-05-29

## 2023-05-28 RX ORDER — ACETAMINOPHEN 500 MG
850 TABLET ORAL EVERY 6 HOURS
Refills: 0 | Status: DISCONTINUED | OUTPATIENT
Start: 2023-05-28 | End: 2023-05-30

## 2023-05-28 RX ORDER — ASPIRIN/CALCIUM CARB/MAGNESIUM 324 MG
81 TABLET ORAL DAILY
Refills: 0 | Status: DISCONTINUED | OUTPATIENT
Start: 2023-05-28 | End: 2023-05-31

## 2023-05-28 RX ORDER — LEVETIRACETAM 250 MG/1
500 TABLET, FILM COATED ORAL EVERY 12 HOURS
Refills: 0 | Status: DISCONTINUED | OUTPATIENT
Start: 2023-05-28 | End: 2023-05-30

## 2023-05-28 RX ORDER — VANCOMYCIN HCL 1 G
1000 VIAL (EA) INTRAVENOUS ONCE
Refills: 0 | Status: COMPLETED | OUTPATIENT
Start: 2023-05-28 | End: 2023-05-28

## 2023-05-28 RX ORDER — GLUCAGON INJECTION, SOLUTION 0.5 MG/.1ML
1 INJECTION, SOLUTION SUBCUTANEOUS ONCE
Refills: 0 | Status: DISCONTINUED | OUTPATIENT
Start: 2023-05-28 | End: 2023-05-31

## 2023-05-28 RX ORDER — DEXTROSE 50 % IN WATER 50 %
12.5 SYRINGE (ML) INTRAVENOUS ONCE
Refills: 0 | Status: DISCONTINUED | OUTPATIENT
Start: 2023-05-28 | End: 2023-05-31

## 2023-05-28 RX ORDER — FOLIC ACID 0.8 MG
1 TABLET ORAL DAILY
Refills: 0 | Status: DISCONTINUED | OUTPATIENT
Start: 2023-05-28 | End: 2023-05-31

## 2023-05-28 RX ORDER — PIPERACILLIN AND TAZOBACTAM 4; .5 G/20ML; G/20ML
3.38 INJECTION, POWDER, LYOPHILIZED, FOR SOLUTION INTRAVENOUS EVERY 8 HOURS
Refills: 0 | Status: DISCONTINUED | OUTPATIENT
Start: 2023-05-29 | End: 2023-05-29

## 2023-05-28 RX ORDER — PANTOPRAZOLE SODIUM 20 MG/1
40 TABLET, DELAYED RELEASE ORAL
Refills: 0 | Status: DISCONTINUED | OUTPATIENT
Start: 2023-05-28 | End: 2023-05-31

## 2023-05-28 RX ORDER — POLYETHYLENE GLYCOL 3350 17 G/17G
17 POWDER, FOR SOLUTION ORAL EVERY 24 HOURS
Refills: 0 | Status: DISCONTINUED | OUTPATIENT
Start: 2023-05-28 | End: 2023-05-31

## 2023-05-28 RX ADMIN — CARBIDOPA AND LEVODOPA 1 TABLET(S): 25; 100 TABLET ORAL at 22:38

## 2023-05-28 RX ADMIN — ATORVASTATIN CALCIUM 5 MILLIGRAM(S): 80 TABLET, FILM COATED ORAL at 22:38

## 2023-05-28 RX ADMIN — Medication 250 MILLIGRAM(S): at 19:29

## 2023-05-28 RX ADMIN — SODIUM CHLORIDE 1000 MILLILITER(S): 9 INJECTION INTRAMUSCULAR; INTRAVENOUS; SUBCUTANEOUS at 18:17

## 2023-05-28 RX ADMIN — SODIUM CHLORIDE 1000 MILLILITER(S): 9 INJECTION INTRAMUSCULAR; INTRAVENOUS; SUBCUTANEOUS at 19:23

## 2023-05-28 RX ADMIN — PIPERACILLIN AND TAZOBACTAM 200 GRAM(S): 4; .5 INJECTION, POWDER, LYOPHILIZED, FOR SOLUTION INTRAVENOUS at 18:59

## 2023-05-28 NOTE — ED ADULT TRIAGE NOTE - OTHER COMPLAINTS
per caretaker reports pt was hot/cold/diaphoretic this evening. reports oral temp (pt received tylenol) with low BP at ues. recent discharge from hospital fri dx UTI. pt nonverbal

## 2023-05-28 NOTE — ED ADULT NURSE NOTE - PAIN RATING/NUMBER SCALE (0-10): ACTIVITY
0 (no pain/absence of nonverbal indicators of pain) [facility] [place] Screening Program/Breastfeeding Log/Guide to Postpartum Care/[facility] Hearing Screen Program/Shaken Baby Prevention Handout/Birth Certificate Instructions/Discharge Medication Information for Patients and Families Pocket Guide/Tdap Vaccination (VIS Pub Date: 2012)

## 2023-05-28 NOTE — H&P ADULT - PROBLEM SELECTOR PLAN 3
#Parkinsons disease  #Alzheimers dementia   -patient with both Parkinsons disease and Alzheimers dementia. On home keppra 500mg q12, sertraline 50mg qd, and sinemet 25-100mg q8  -c/w home meds   -per discussion with palliative care and HCP (daughter) previous admission, patient to remain full code however if patient is suddenly decompensating, call daughter and she will be prepared to make a decision  -PT consult   -minced and moist diet #Parkinsons disease  #Alzheimers dementia   -patient with both Parkinsons disease and Alzheimers dementia. On home keppra 500mg q12, sertraline 50mg qd, and sinemet 25-100mg q8  -c/w home meds   -per discussion with palliative care and HCP (daughter) previous admission, patient to remain full code however if patient is suddenly decompensating, call daughter and she will be prepared to make a decision  -PT consult   -minced and moist diet  -lidocaine patch for back pain   -bowel regimen

## 2023-05-28 NOTE — H&P ADULT - PROBLEM SELECTOR PLAN 5
History of DM on home metformin 500mg BID. Per caregiver blood sugar elevated to 339 today at rehab and 243 on admission. A1C 6.9 5/2023.  -home metformin while inpatient   -mISS while inpatient   -monitor fingersticks and add basal coverage if persistently elevated   -CC diet

## 2023-05-28 NOTE — ED PROVIDER NOTE - OBJECTIVE STATEMENT
89 y/o f with PMH of Parkinsons, HTN, HLD, DM, spinal surgery, removal of left lung, recent admission from 5/23 to 5/26 for sepsis secondary to colitis/UTI discharged to [facility] with Augmentin x 14 days.  Today pt returns from NH due to abnormal vital sings including BP 90s  and RR 40.  Pt presents with caregiver who states pt may have been gurgling today while eating and possibly aspirated.  Pt in ED denies complaints and answers some questions - pt states she does not have pain .  As per caregiver, no recent vomiting, diarrhea, cough.

## 2023-05-28 NOTE — H&P ADULT - ASSESSMENT
88F PMH Alzheimer's dementia, Parkinson's Disease, SDH/SAH s/p repair of cerebral aneurysm 1960s, Lung Ca s/p lobectomy of L lung, HTN, HLD, DM, recent admission for sepsis 2/2 colitis and dysphagia discharged to [facility] rehab now re-presenting from [facility] accompanied by formal caregiver with hypotension and chills today at the rehab, found to be meeting sepsis criteria, admitted for further management.

## 2023-05-28 NOTE — ED ADULT TRIAGE NOTE - WEIGHT IN LBS
Post-Care Instructions: I reviewed with the patient in detail post-care instructions. Patient is to wear sunprotection, and avoid picking at any of the treated lesions. Pt may apply Vaseline to crusted or scabbing areas. [address] Post-Care Instructions In Note?: no Duration Of Freeze Thaw-Cycle (Seconds): 3 Number Of Freeze-Thaw Cycles: 2 freeze-thaw cycles Detail Level: Simple Consent: The patient's consent was obtained including but not limited to risks of crusting, scabbing, blistering, scarring, darker or lighter pigmentary change, recurrence, incomplete removal and infection.

## 2023-05-28 NOTE — PATIENT PROFILE ADULT - FALL HARM RISK - HARM RISK INTERVENTIONS

## 2023-05-28 NOTE — ED ADULT NURSE NOTE - NSFALLHARMRISKINTERV_ED_ALL_ED
Assistance OOB with selected safe patient handling equipment if applicable/Communicate risk of Fall with Harm to all staff, patient, and family/Monitor for mental status changes and reorient to person, place, and time, as needed/Move patient closer to nursing station/within visual sight of ED staff/Provide visual cue: red socks, yellow wristband, yellow gown, etc/Reinforce activity limits and safety measures with patient and family/Toileting schedule using arm’s reach rule for commode and bathroom/Use of alarms - bed, stretcher, chair and/or video monitoring/Bed in lowest position, wheels locked, appropriate side rails in place/Call bell, personal items and telephone in reach/Instruct patient to call for assistance before getting out of bed/chair/stretcher/Non-slip footwear applied when patient is off stretcher/[place] to call system/Physically safe environment - no spills, clutter or unnecessary equipment/Purposeful Proactive Rounding/Room/bathroom lighting operational, light cord in reach

## 2023-05-28 NOTE — H&P ADULT - NSICDXPASTMEDICALHX_GEN_ALL_CORE_FT
negative... PAST MEDICAL HISTORY:  Alzheimer disease     Diabetes     High cholesterol     HTN (hypertension)     Parkinson disease

## 2023-05-28 NOTE — ED PROVIDER NOTE - CLINICAL SUMMARY MEDICAL DECISION MAKING FREE TEXT BOX
Pt from [facility] with abnormal vitals - hypotensive, tachycardic, tachypneic, r/o sepsis, in ed rectal temp 99.5, mildly tachy to 110, bp ok, no complaints.  Full set of sepsis labs ordered - wbc 15 and LA 3.5,  Xray unchanged from prior, ekg sinus tach, treated with zosyn and vanc for sepsis and pt admitted to medicine for further mgmt of sepsis.  ? source - still pending U/A at admission.  Will repeat lactate after fluids.  Discussed case extensively with daughter and son on phone and caregiver at bedside.

## 2023-05-28 NOTE — H&P ADULT - PROBLEM SELECTOR PLAN 2
Hgb 11.0 on admission, normocytic. Baseline around 10 per previous admissions. Currently at baseline. No signs of bleeding on exam. No hematuria or bloody/black stools.  -monitor CBC and transfuse for hgb <7  -maintain active T&S

## 2023-05-28 NOTE — H&P ADULT - NSHPSOCIALHISTORY_GEN_ALL_CORE
Patient currently resides at [facility] rehab   Ambulates with walker however has not been walking since previous admission   Former smoker  No alcohol or recreational drug use

## 2023-05-28 NOTE — H&P ADULT - NSHPPHYSICALEXAM_GEN_ALL_CORE
VITALS:   T(C): 36.9 (05-28-23 @ 20:18), Max: 37.3 (05-28-23 @ 19:00)  HR: 100 (05-28-23 @ 20:18) (100 - 109)  BP: 129/70 (05-28-23 @ 20:18) (123/76 - 129/70)  RR: 16 (05-28-23 @ 20:18) (16 - 16)  SpO2: 97% (05-28-23 @ 20:18) (96% - 97%)    GENERAL: NAD, lying in bed comfortably  HEAD:  Atraumatic, normocephalic  EYES: EOMI, PERRLA, conjunctiva and sclera clear  ENT: Moist mucous membranes  NECK: Supple, no JVD  HEART: Regular rate and rhythm, no murmurs, rubs, or gallops  LUNGS: Unlabored respirations.  Clear to auscultation bilaterally, no crackles, wheezing, or rhonchi  ABDOMEN: Soft, nontender, nondistended, +BS  EXTREMITIES: 2+ peripheral pulses bilaterally. No clubbing, cyanosis, or edema  BACK: no midline spinal tenderness, no CVA tenderness. no sacral ulcer.   NERVOUS SYSTEM:  A&Ox2 (person and place), no focal deficits. b/l UE tremor.   SKIN: No rashes or lesions

## 2023-05-28 NOTE — H&P ADULT - PROBLEM SELECTOR PLAN 4
Patient with history of HTN on home losartan 100mg qd   -hold home losartan in setting of sepsis and hypotension   -restart as tolerated   -monitor BPs    #HLD  -c/w atorvastatin 5mg qhs

## 2023-05-28 NOTE — H&P ADULT - PROBLEM SELECTOR PLAN 1
Patient meeting severe sepsis criteria with tachycardia, leukocytosis, and elevated lactate on admission. Etiology unknown at this time. Previous admission for colitis, discharged on PO augmentin. Exam unremarkable. Pt denies dysuria/urinary problems, SOB, abd pain. Afebrile however reports hypotension at rehab today.   -RVP negative   -CXR (wet read) with left-sided pneumonectomy, unchanged from prior, no acute consolidation   -EKG: sinus tachycardia with non-specific ST-T changes, unchanged from prior   -s/p vancomycin 1g IV x1, zosyn 3.375g IV x1, 2LNS in ED  -f/u UA and UCs  -f/u BCs   -f/u repeat lactate post IVF, trend to clear   -tylenol prn fevers   -consider re-imaging CT A/P as could be augmentin failure/worsening proctocolitis   -c/w broad coverage with vancomycin and zosyn for now; can de-escalate as appropriate

## 2023-05-28 NOTE — H&P ADULT - PROBLEM SELECTOR PLAN 6
F: s/p 2LNS in ED  E: replete as needed  N: minced and moist CC diet   DVT ppx: lovenox   Full Code  Dispo: DIOGO

## 2023-05-28 NOTE — ED ADULT NURSE NOTE - OBJECTIVE STATEMENT
88 y.o. Female BIBEMS from [facility] rehab c/o fever pt feeling hot/cold and low BP. PMHx Alzheimer's baseline A/Ox1, minimal verbal response. Recent hospital admission for UTI. Caregiver at bedside. Pt placed on CCM. MD Regan at bedside.

## 2023-05-28 NOTE — H&P ADULT - HISTORY OF PRESENT ILLNESS
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 accompanied by formal caregiver with hypotension and chills today at the rehab. Per caregiver at bedside patient was noted to have low BP readings at [facility] today of 94/60 and 108/47. Patient also with chills and non-productive cough that started this morning. Also had high blood sugar reading of 339 today at rehab. Denies fevers, headache, chest pain, SOB, abd pain, N/V/D/C, problems with urination, dysuria, hematuria, extremity pain or swelling. No changes in baseline mental status. Has been eating a soft diet and taking her augmentin which she has not completed the course yet. Patient herself currently only endorsing lower back pain that started today.   Of note, patient with recent admission to [facility] from 5/23-5/26/23 for sepsis 2/2 possible UTI and proctocolitis seen on CT. Patient initially started on CTX for suspected UTI. CT AP revealed proctocolitis, recommended to ruled out neoplasm, however not consistent with goals of care per charlee care proxy and will not be further explored. Patient markedly improved and was discharged to [facility] on 10 day course of PO augmentin (until 6/5). Course also c/b dysphagia which improved on its own; pt also evaluated by S&S and cleared for minced and moist diet.     In the ED:   Initial vitals: Temp 97.9F, , /76, RR 16, O2 96% RA   Labs significant for: WBC 15.20, hgb 11.0, Na 133, BUN 28, glucose 243, alk phos 144, lactate 3.5  RVP negative   CXR (wet read) with left-sided pneumonectomy, unchanged from prior  EKG: sinus tachycardia with non-specific ST-T changes, unchanged from prior   Medications: vancomycin 1g IV x1, zosyn 3.375g IV x1, 2LNS

## 2023-05-28 NOTE — H&P ADULT - NSHPLABSRESULTS_GEN_ALL_CORE
.  LABS:                         11.0   15.20 )-----------( 314      ( 28 May 2023 16:58 )             35.0     05-28    133<L>  |  96  |  28<H>  ----------------------------<  243<H>  4.7   |  25  |  0.95    Ca    10.5      28 May 2023 16:58    TPro  6.2  /  Alb  3.6  /  TBili  0.2  /  DBili  x   /  AST  24  /  ALT  7<L>  /  AlkPhos  144<H>  05-28          Lactate, Blood: 3.5 mmol/L (05-28 @ 16:58)      RADIOLOGY, EKG & ADDITIONAL TESTS: Reviewed.

## 2023-05-29 LAB
ACANTHOCYTES BLD QL SMEAR: SLIGHT — SIGNIFICANT CHANGE UP
ALBUMIN SERPL ELPH-MCNC: 3 G/DL — LOW (ref 3.3–5)
ALP SERPL-CCNC: 127 U/L — HIGH (ref 40–120)
ALT FLD-CCNC: <5 U/L — LOW (ref 10–45)
ANION GAP SERPL CALC-SCNC: 9 MMOL/L — SIGNIFICANT CHANGE UP (ref 5–17)
ANISOCYTOSIS BLD QL: SLIGHT — SIGNIFICANT CHANGE UP
AST SERPL-CCNC: 17 U/L — SIGNIFICANT CHANGE UP (ref 10–40)
BASOPHILS # BLD AUTO: 0.07 K/UL — SIGNIFICANT CHANGE UP (ref 0–0.2)
BASOPHILS NFR BLD AUTO: 0.9 % — SIGNIFICANT CHANGE UP (ref 0–2)
BILIRUB SERPL-MCNC: 0.3 MG/DL — SIGNIFICANT CHANGE UP (ref 0.2–1.2)
BLD GP AB SCN SERPL QL: POSITIVE — SIGNIFICANT CHANGE UP
BLD GP AB SCN SERPL QL: POSITIVE — SIGNIFICANT CHANGE UP
BUN SERPL-MCNC: 18 MG/DL — SIGNIFICANT CHANGE UP (ref 7–23)
BURR CELLS BLD QL SMEAR: PRESENT — SIGNIFICANT CHANGE UP
CALCIUM SERPL-MCNC: 9.4 MG/DL — SIGNIFICANT CHANGE UP (ref 8.4–10.5)
CHLORIDE SERPL-SCNC: 106 MMOL/L — SIGNIFICANT CHANGE UP (ref 96–108)
CO2 SERPL-SCNC: 22 MMOL/L — SIGNIFICANT CHANGE UP (ref 22–31)
CREAT SERPL-MCNC: 0.7 MG/DL — SIGNIFICANT CHANGE UP (ref 0.5–1.3)
EGFR: 84 ML/MIN/1.73M2 — SIGNIFICANT CHANGE UP
EOSINOPHIL # BLD AUTO: 0.35 K/UL — SIGNIFICANT CHANGE UP (ref 0–0.5)
EOSINOPHIL NFR BLD AUTO: 4.4 % — SIGNIFICANT CHANGE UP (ref 0–6)
GIANT PLATELETS BLD QL SMEAR: PRESENT — SIGNIFICANT CHANGE UP
GLUCOSE BLDC GLUCOMTR-MCNC: 160 MG/DL — HIGH (ref 70–99)
GLUCOSE BLDC GLUCOMTR-MCNC: 175 MG/DL — HIGH (ref 70–99)
GLUCOSE BLDC GLUCOMTR-MCNC: 206 MG/DL — HIGH (ref 70–99)
GLUCOSE BLDC GLUCOMTR-MCNC: 208 MG/DL — HIGH (ref 70–99)
GLUCOSE SERPL-MCNC: 161 MG/DL — HIGH (ref 70–99)
HCT VFR BLD CALC: 31.9 % — LOW (ref 34.5–45)
HGB BLD-MCNC: 10 G/DL — LOW (ref 11.5–15.5)
LACTATE SERPL-SCNC: 1.5 MMOL/L — SIGNIFICANT CHANGE UP (ref 0.5–2)
LYMPHOCYTES # BLD AUTO: 1.04 K/UL — SIGNIFICANT CHANGE UP (ref 1–3.3)
LYMPHOCYTES # BLD AUTO: 13 % — SIGNIFICANT CHANGE UP (ref 13–44)
MAGNESIUM SERPL-MCNC: 1.5 MG/DL — LOW (ref 1.6–2.6)
MANUAL SMEAR VERIFICATION: SIGNIFICANT CHANGE UP
MCHC RBC-ENTMCNC: 25.6 PG — LOW (ref 27–34)
MCHC RBC-ENTMCNC: 31.3 GM/DL — LOW (ref 32–36)
MCV RBC AUTO: 81.6 FL — SIGNIFICANT CHANGE UP (ref 80–100)
METAMYELOCYTES # FLD: 1.7 % — HIGH (ref 0–0)
MICROCYTES BLD QL: SLIGHT — SIGNIFICANT CHANGE UP
MONOCYTES # BLD AUTO: 0 K/UL — SIGNIFICANT CHANGE UP (ref 0–0.9)
MONOCYTES NFR BLD AUTO: 0 % — LOW (ref 2–14)
MYELOCYTES NFR BLD: 2.6 % — HIGH (ref 0–0)
NEUTROPHILS # BLD AUTO: 6.17 K/UL — SIGNIFICANT CHANGE UP (ref 1.8–7.4)
NEUTROPHILS NFR BLD AUTO: 77.4 % — HIGH (ref 43–77)
OVALOCYTES BLD QL SMEAR: SLIGHT — SIGNIFICANT CHANGE UP
PHOSPHATE SERPL-MCNC: 2.6 MG/DL — SIGNIFICANT CHANGE UP (ref 2.5–4.5)
PLAT MORPH BLD: NORMAL — SIGNIFICANT CHANGE UP
PLATELET # BLD AUTO: 220 K/UL — SIGNIFICANT CHANGE UP (ref 150–400)
POIKILOCYTOSIS BLD QL AUTO: SIGNIFICANT CHANGE UP
POTASSIUM SERPL-MCNC: 4 MMOL/L — SIGNIFICANT CHANGE UP (ref 3.5–5.3)
POTASSIUM SERPL-SCNC: 4 MMOL/L — SIGNIFICANT CHANGE UP (ref 3.5–5.3)
PROT SERPL-MCNC: 5.2 G/DL — LOW (ref 6–8.3)
RBC # BLD: 3.91 M/UL — SIGNIFICANT CHANGE UP (ref 3.8–5.2)
RBC # FLD: 17.1 % — HIGH (ref 10.3–14.5)
RBC BLD AUTO: ABNORMAL
RH IG SCN BLD-IMP: POSITIVE — SIGNIFICANT CHANGE UP
RH IG SCN BLD-IMP: POSITIVE — SIGNIFICANT CHANGE UP
SCHISTOCYTES BLD QL AUTO: SIGNIFICANT CHANGE UP
SODIUM SERPL-SCNC: 137 MMOL/L — SIGNIFICANT CHANGE UP (ref 135–145)
WBC # BLD: 7.97 K/UL — SIGNIFICANT CHANGE UP (ref 3.8–10.5)
WBC # FLD AUTO: 7.97 K/UL — SIGNIFICANT CHANGE UP (ref 3.8–10.5)

## 2023-05-29 PROCEDURE — 86077 PHYS BLOOD BANK SERV XMATCH: CPT

## 2023-05-29 PROCEDURE — 99222 1ST HOSP IP/OBS MODERATE 55: CPT

## 2023-05-29 RX ORDER — METRONIDAZOLE 500 MG
500 TABLET ORAL EVERY 8 HOURS
Refills: 0 | Status: DISCONTINUED | OUTPATIENT
Start: 2023-05-29 | End: 2023-05-31

## 2023-05-29 RX ORDER — HEPARIN SODIUM 5000 [USP'U]/ML
5000 INJECTION INTRAVENOUS; SUBCUTANEOUS EVERY 8 HOURS
Refills: 0 | Status: DISCONTINUED | OUTPATIENT
Start: 2023-05-29 | End: 2023-05-31

## 2023-05-29 RX ORDER — CEFTRIAXONE 500 MG/1
1000 INJECTION, POWDER, FOR SOLUTION INTRAMUSCULAR; INTRAVENOUS EVERY 24 HOURS
Refills: 0 | Status: DISCONTINUED | OUTPATIENT
Start: 2023-05-29 | End: 2023-05-31

## 2023-05-29 RX ORDER — MAGNESIUM SULFATE 500 MG/ML
2 VIAL (ML) INJECTION ONCE
Refills: 0 | Status: COMPLETED | OUTPATIENT
Start: 2023-05-29 | End: 2023-05-29

## 2023-05-29 RX ADMIN — PANTOPRAZOLE SODIUM 40 MILLIGRAM(S): 20 TABLET, DELAYED RELEASE ORAL at 06:34

## 2023-05-29 RX ADMIN — Medication 4: at 18:09

## 2023-05-29 RX ADMIN — CARBIDOPA AND LEVODOPA 1 TABLET(S): 25; 100 TABLET ORAL at 14:35

## 2023-05-29 RX ADMIN — ATORVASTATIN CALCIUM 5 MILLIGRAM(S): 80 TABLET, FILM COATED ORAL at 22:39

## 2023-05-29 RX ADMIN — HEPARIN SODIUM 5000 UNIT(S): 5000 INJECTION INTRAVENOUS; SUBCUTANEOUS at 14:35

## 2023-05-29 RX ADMIN — LIDOCAINE 1 PATCH: 4 CREAM TOPICAL at 12:56

## 2023-05-29 RX ADMIN — HEPARIN SODIUM 5000 UNIT(S): 5000 INJECTION INTRAVENOUS; SUBCUTANEOUS at 22:40

## 2023-05-29 RX ADMIN — PIPERACILLIN AND TAZOBACTAM 25 GRAM(S): 4; .5 INJECTION, POWDER, LYOPHILIZED, FOR SOLUTION INTRAVENOUS at 03:04

## 2023-05-29 RX ADMIN — LEVETIRACETAM 500 MILLIGRAM(S): 250 TABLET, FILM COATED ORAL at 06:34

## 2023-05-29 RX ADMIN — POLYETHYLENE GLYCOL 3350 17 GRAM(S): 17 POWDER, FOR SOLUTION ORAL at 12:56

## 2023-05-29 RX ADMIN — Medication 1 TABLET(S): at 12:56

## 2023-05-29 RX ADMIN — Medication 1 MILLIGRAM(S): at 12:56

## 2023-05-29 RX ADMIN — Medication 4: at 13:16

## 2023-05-29 RX ADMIN — SERTRALINE 50 MILLIGRAM(S): 25 TABLET, FILM COATED ORAL at 12:56

## 2023-05-29 RX ADMIN — CARBIDOPA AND LEVODOPA 1 TABLET(S): 25; 100 TABLET ORAL at 22:39

## 2023-05-29 RX ADMIN — CARBIDOPA AND LEVODOPA 1 TABLET(S): 25; 100 TABLET ORAL at 06:34

## 2023-05-29 RX ADMIN — LIDOCAINE 1 PATCH: 4 CREAM TOPICAL at 18:16

## 2023-05-29 RX ADMIN — Medication 500 MILLIGRAM(S): at 18:09

## 2023-05-29 RX ADMIN — PIPERACILLIN AND TAZOBACTAM 25 GRAM(S): 4; .5 INJECTION, POWDER, LYOPHILIZED, FOR SOLUTION INTRAVENOUS at 12:55

## 2023-05-29 RX ADMIN — Medication 500 MILLIGRAM(S): at 22:40

## 2023-05-29 RX ADMIN — Medication 25 GRAM(S): at 10:08

## 2023-05-29 RX ADMIN — CEFTRIAXONE 100 MILLIGRAM(S): 500 INJECTION, POWDER, FOR SOLUTION INTRAMUSCULAR; INTRAVENOUS at 18:09

## 2023-05-29 RX ADMIN — LEVETIRACETAM 500 MILLIGRAM(S): 250 TABLET, FILM COATED ORAL at 18:09

## 2023-05-29 RX ADMIN — Medication 81 MILLIGRAM(S): at 12:56

## 2023-05-29 RX ADMIN — Medication 2: at 09:20

## 2023-05-29 NOTE — PROGRESS NOTE ADULT - SUBJECTIVE AND OBJECTIVE BOX
O/N Events: Patient admitted o/n.     Subjective/ROS: Patient seen and examined at bedside. Resting comfortably. No new complaints.     Denies Fever/Chills, HA, CP, SOB, n/v, changes in bowel/urinary habits.  12pt ROS otherwise negative.    VITALS  Vital Signs Last 24 Hrs  T(C): 36.9 (29 May 2023 05:42), Max: 37.3 (28 May 2023 19:00)  T(F): 98.4 (29 May 2023 05:42), Max: 99.2 (28 May 2023 19:00)  HR: 96 (29 May 2023 05:42) (96 - 109)  BP: 138/66 (29 May 2023 05:42) (123/76 - 138/66)  BP(mean): --  RR: 17 (29 May 2023 05:42) (16 - 17)  SpO2: 98% (29 May 2023 05:42) (96% - 98%)    Parameters below as of 28 May 2023 22:33  Patient On (Oxygen Delivery Method): room air        CAPILLARY BLOOD GLUCOSE      POCT Blood Glucose.: 175 mg/dL (29 May 2023 09:10)      PHYSICAL EXAM  GENERAL: NAD, lying in bed comfortably  HEAD: NC/AT, MMM   EYES: wnl   NECK: Trachea midline, soft and supple, no JVD  HEART: RRR, s1 and s2 wnl, no murmurs, rubs, or gallops  LUNGS: Unlabored respirations.  CTABL, no crackles, wheezing, or rhonchi  ABDOMEN: Soft, NTND, BS wnl   EXTREMITIES: 2+ peripheral pulses bilaterally. No clubbing, cyanosis, or edema   NERVOUS SYSTEM:  A&Ox2 (person and place), no focal deficits. b/l UE tremor.   SKIN: No rashes or lesions    MEDICATIONS  (STANDING):  aspirin  chewable 81 milliGRAM(s) Oral daily  atorvastatin 5 milliGRAM(s) Oral at bedtime  carbidopa/levodopa  25/100 1 Tablet(s) Oral every 8 hours  dextrose 5%. 1000 milliLiter(s) (50 mL/Hr) IV Continuous <Continuous>  dextrose 5%. 1000 milliLiter(s) (100 mL/Hr) IV Continuous <Continuous>  dextrose 50% Injectable 25 Gram(s) IV Push once  dextrose 50% Injectable 12.5 Gram(s) IV Push once  dextrose 50% Injectable 25 Gram(s) IV Push once  folic acid 1 milliGRAM(s) Oral daily  glucagon  Injectable 1 milliGRAM(s) IntraMuscular once  heparin   Injectable 5000 Unit(s) SubCutaneous every 8 hours  insulin lispro (ADMELOG) corrective regimen sliding scale   SubCutaneous three times a day before meals  levETIRAcetam 500 milliGRAM(s) Oral every 12 hours  lidocaine   4% Patch 1 Patch Transdermal every 24 hours  multivitamin  Chewable 1 Tablet(s) Oral daily  pantoprazole   Suspension 40 milliGRAM(s) Oral before breakfast  piperacillin/tazobactam IVPB.. 3.375 Gram(s) IV Intermittent every 8 hours  polyethylene glycol 3350 17 Gram(s) Oral every 24 hours  sertraline 50 milliGRAM(s) Oral daily  vancomycin  IVPB 1000 milliGRAM(s) IV Intermittent every 24 hours    MEDICATIONS  (PRN):  acetaminophen   Oral Liquid .. 850 milliGRAM(s) Oral every 6 hours PRN Temp greater or equal to 38C (100.4F), Mild Pain (1 - 3)  aluminum hydroxide/magnesium hydroxide/simethicone Suspension 30 milliLiter(s) Oral every 6 hours PRN Dyspepsia  dextrose Oral Gel 15 Gram(s) Oral once PRN Blood Glucose LESS THAN 70 milliGRAM(s)/deciliter      No Known Allergies      LABS                        10.0   7.97  )-----------( 220      ( 29 May 2023 05:30 )             31.9     05    137  |  106  |  18  ----------------------------<  161<H>  4.0   |  22  |  0.70    Ca    9.4      29 May 2023 05:30  Phos  2.6       Mg     1.5         TPro  5.2<L>  /  Alb  3.0<L>  /  TBili  0.3  /  DBili  x   /  AST  17  /  ALT  <5<L>  /  AlkPhos  127<H>        Urinalysis Basic - ( 28 May 2023 16:58 )    Color: Yellow / Appearance: Clear / S.025 / pH: x  Gluc: x / Ketone: NEGATIVE  / Bili: Negative / Urobili: 0.2 E.U./dL   Blood: x / Protein: Trace mg/dL / Nitrite: NEGATIVE   Leuk Esterase: Small / RBC: < 5 /HPF / WBC 5-10 /HPF   Sq Epi: x / Non Sq Epi: x / Bacteria: Present /HPF            Urinalysis with Rflx Culture (collected 23 @ 16:58)    Culture - Blood (collected 23 @ 16:30)  Source: .Blood Blood  Preliminary Report (23 @ 08:00):    No growth at 12 hours    Culture - Blood (collected 23 @ 16:25)  Source: .Blood Blood  Preliminary Report (23 @ 08:00):    No growth at 12 hours        IMAGING/EKG/ETC  Reviewed

## 2023-05-29 NOTE — PHYSICAL THERAPY INITIAL EVALUATION ADULT - LEVEL OF INDEPENDENCE: STAND/SIT, REHAB EVAL
Pt performed 3 STS, initially ModAx2 then progressed to MaxAx2/moderate assist (50% patients effort)/maximum assist (25% patients effort)

## 2023-05-29 NOTE — PROGRESS NOTE ADULT - ASSESSMENT
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

## 2023-05-29 NOTE — PROGRESS NOTE ADULT - ASSESSMENT
89 yo woman with recent history of out patient UTI who was admitted to [facility] last week with ?UTI on abx but evaluation was negative and instead imaging lead to the possible diagnosis of proctitis with thickening of wall of the lower bowel.  She responded well to abx and was transiftionted to augmentin and discharged to [facility] where yesterday the [facility] called with increasing heart rate and FSS and decreased blood pressure (she has labile HTN at baseline) and she was transferred to [facility] for evaluation  -currently back to her baseline hemodynamics -she always has a heart rate   -baseline labile HTN 90 systolic to 140's systolic  -controlled FSS now 100-150   -leukocytosis in the ER 15 down to 8 today  -recommend ID consultation for choice of antibiotic therapy  -goals of care - family met with palliatice care last admission and defined their goals for her to be treated unless there is no likely recovery  -dc planning return to [facility]  -DVT prophylaxis  -continue out patient meds including metformin  -d/w team adn family

## 2023-05-29 NOTE — PHYSICAL THERAPY INITIAL EVALUATION ADULT - PERTINENT HX OF CURRENT PROBLEM, REHAB EVAL
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

## 2023-05-29 NOTE — PHYSICAL THERAPY INITIAL EVALUATION ADULT - ORIENTATION, REHAB EVAL
unable to name [facility] but states shes in a hospital in [facility]; requires choices for month and year/person/place

## 2023-05-29 NOTE — PHYSICAL THERAPY INITIAL EVALUATION ADULT - GENERAL OBSERVATIONS, REHAB EVAL
PT IE Completed. Pt received semi-supine in bed, A&Ox4, +RA,+heplock,  in NAD and agreeable to work with PT, JAY Ortega notified.Pt re-presents to [facility] after d/c to [facility] with hypotension and chills today; +sepsis criteria. PT exam shows during functional mobility. Pt completed bed mob, transfers and bedside gait assessment with 2 person assist.  Pt left seated in chair position of bed, +bed alarm, +call harris within reach, JAY Ortega notified. Pt can benefit from PT in order to improve quality of life by increasing strength/endurance/balance with functional mobility and ADLS.

## 2023-05-29 NOTE — PHYSICAL THERAPY INITIAL EVALUATION ADULT - IMPAIRMENTS FOUND, PT EVAL
aerobic capacity/endurance/gait, locomotion, and balance/muscle strength/neuromotor development and sensory integration/posture/ROM

## 2023-05-30 ENCOUNTER — APPOINTMENT (OUTPATIENT)
Dept: UROLOGY | Facility: CLINIC | Age: 88
End: 2023-05-30

## 2023-05-30 ENCOUNTER — TRANSCRIPTION ENCOUNTER (OUTPATIENT)
Age: 88
End: 2023-05-30

## 2023-05-30 LAB
ALBUMIN SERPL ELPH-MCNC: 3 G/DL — LOW (ref 3.3–5)
ALP SERPL-CCNC: 122 U/L — HIGH (ref 40–120)
ALT FLD-CCNC: <5 U/L — LOW (ref 10–45)
ANION GAP SERPL CALC-SCNC: 7 MMOL/L — SIGNIFICANT CHANGE UP (ref 5–17)
AST SERPL-CCNC: 16 U/L — SIGNIFICANT CHANGE UP (ref 10–40)
BASOPHILS # BLD AUTO: 0.03 K/UL — SIGNIFICANT CHANGE UP (ref 0–0.2)
BASOPHILS NFR BLD AUTO: 0.4 % — SIGNIFICANT CHANGE UP (ref 0–2)
BILIRUB SERPL-MCNC: 0.2 MG/DL — SIGNIFICANT CHANGE UP (ref 0.2–1.2)
BUN SERPL-MCNC: 17 MG/DL — SIGNIFICANT CHANGE UP (ref 7–23)
CALCIUM SERPL-MCNC: 9.2 MG/DL — SIGNIFICANT CHANGE UP (ref 8.4–10.5)
CHLORIDE SERPL-SCNC: 105 MMOL/L — SIGNIFICANT CHANGE UP (ref 96–108)
CO2 SERPL-SCNC: 25 MMOL/L — SIGNIFICANT CHANGE UP (ref 22–31)
CREAT SERPL-MCNC: 0.86 MG/DL — SIGNIFICANT CHANGE UP (ref 0.5–1.3)
CULTURE RESULTS: SIGNIFICANT CHANGE UP
EGFR: 65 ML/MIN/1.73M2 — SIGNIFICANT CHANGE UP
EOSINOPHIL # BLD AUTO: 0.14 K/UL — SIGNIFICANT CHANGE UP (ref 0–0.5)
EOSINOPHIL NFR BLD AUTO: 2.1 % — SIGNIFICANT CHANGE UP (ref 0–6)
FERRITIN SERPL-MCNC: 152 NG/ML — HIGH (ref 15–150)
GLUCOSE BLDC GLUCOMTR-MCNC: 179 MG/DL — HIGH (ref 70–99)
GLUCOSE BLDC GLUCOMTR-MCNC: 185 MG/DL — HIGH (ref 70–99)
GLUCOSE BLDC GLUCOMTR-MCNC: 185 MG/DL — HIGH (ref 70–99)
GLUCOSE BLDC GLUCOMTR-MCNC: 187 MG/DL — HIGH (ref 70–99)
GLUCOSE SERPL-MCNC: 145 MG/DL — HIGH (ref 70–99)
HCT VFR BLD CALC: 29.2 % — LOW (ref 34.5–45)
HGB BLD-MCNC: 9.1 G/DL — LOW (ref 11.5–15.5)
IMM GRANULOCYTES NFR BLD AUTO: 2.5 % — HIGH (ref 0–0.9)
IRON SATN MFR SERPL: 19 % — SIGNIFICANT CHANGE UP (ref 14–50)
IRON SATN MFR SERPL: 40 UG/DL — SIGNIFICANT CHANGE UP (ref 30–160)
LYMPHOCYTES # BLD AUTO: 1.34 K/UL — SIGNIFICANT CHANGE UP (ref 1–3.3)
LYMPHOCYTES # BLD AUTO: 20 % — SIGNIFICANT CHANGE UP (ref 13–44)
MAGNESIUM SERPL-MCNC: 1.4 MG/DL — LOW (ref 1.6–2.6)
MCHC RBC-ENTMCNC: 25.2 PG — LOW (ref 27–34)
MCHC RBC-ENTMCNC: 31.2 GM/DL — LOW (ref 32–36)
MCV RBC AUTO: 80.9 FL — SIGNIFICANT CHANGE UP (ref 80–100)
MONOCYTES # BLD AUTO: 0.43 K/UL — SIGNIFICANT CHANGE UP (ref 0–0.9)
MONOCYTES NFR BLD AUTO: 6.4 % — SIGNIFICANT CHANGE UP (ref 2–14)
NEUTROPHILS # BLD AUTO: 4.6 K/UL — SIGNIFICANT CHANGE UP (ref 1.8–7.4)
NEUTROPHILS NFR BLD AUTO: 68.6 % — SIGNIFICANT CHANGE UP (ref 43–77)
NRBC # BLD: 0 /100 WBCS — SIGNIFICANT CHANGE UP (ref 0–0)
PHOSPHATE SERPL-MCNC: 2.4 MG/DL — LOW (ref 2.5–4.5)
PLATELET # BLD AUTO: 204 K/UL — SIGNIFICANT CHANGE UP (ref 150–400)
POTASSIUM SERPL-MCNC: 4.3 MMOL/L — SIGNIFICANT CHANGE UP (ref 3.5–5.3)
POTASSIUM SERPL-SCNC: 4.3 MMOL/L — SIGNIFICANT CHANGE UP (ref 3.5–5.3)
PROT SERPL-MCNC: 5 G/DL — LOW (ref 6–8.3)
RBC # BLD: 3.61 M/UL — LOW (ref 3.8–5.2)
RBC # BLD: 3.88 M/UL — SIGNIFICANT CHANGE UP (ref 3.8–5.2)
RBC # FLD: 17 % — HIGH (ref 10.3–14.5)
RETICS #: 115.2 K/UL — SIGNIFICANT CHANGE UP (ref 25–125)
RETICS/RBC NFR: 3 % — HIGH (ref 0.5–2.5)
SODIUM SERPL-SCNC: 137 MMOL/L — SIGNIFICANT CHANGE UP (ref 135–145)
SPECIMEN SOURCE: SIGNIFICANT CHANGE UP
TIBC SERPL-MCNC: 214 UG/DL — LOW (ref 220–430)
UIBC SERPL-MCNC: 174 UG/DL — SIGNIFICANT CHANGE UP (ref 110–370)
WBC # BLD: 6.71 K/UL — SIGNIFICANT CHANGE UP (ref 3.8–10.5)
WBC # FLD AUTO: 6.71 K/UL — SIGNIFICANT CHANGE UP (ref 3.8–10.5)

## 2023-05-30 PROCEDURE — 99232 SBSQ HOSP IP/OBS MODERATE 35: CPT

## 2023-05-30 RX ORDER — MAGNESIUM SULFATE 500 MG/ML
4 VIAL (ML) INJECTION ONCE
Refills: 0 | Status: COMPLETED | OUTPATIENT
Start: 2023-05-30 | End: 2023-05-30

## 2023-05-30 RX ORDER — ACETAMINOPHEN 500 MG
650 TABLET ORAL EVERY 6 HOURS
Refills: 0 | Status: DISCONTINUED | OUTPATIENT
Start: 2023-05-30 | End: 2023-05-31

## 2023-05-30 RX ORDER — LEVETIRACETAM 250 MG/1
500 TABLET, FILM COATED ORAL EVERY 12 HOURS
Refills: 0 | Status: DISCONTINUED | OUTPATIENT
Start: 2023-05-30 | End: 2023-05-30

## 2023-05-30 RX ADMIN — Medication 25 GRAM(S): at 08:08

## 2023-05-30 RX ADMIN — CEFTRIAXONE 100 MILLIGRAM(S): 500 INJECTION, POWDER, FOR SOLUTION INTRAMUSCULAR; INTRAVENOUS at 18:46

## 2023-05-30 RX ADMIN — Medication 2: at 13:39

## 2023-05-30 RX ADMIN — LIDOCAINE 1 PATCH: 4 CREAM TOPICAL at 00:56

## 2023-05-30 RX ADMIN — SERTRALINE 50 MILLIGRAM(S): 25 TABLET, FILM COATED ORAL at 14:05

## 2023-05-30 RX ADMIN — Medication 81 MILLIGRAM(S): at 14:06

## 2023-05-30 RX ADMIN — Medication 2: at 09:12

## 2023-05-30 RX ADMIN — ATORVASTATIN CALCIUM 5 MILLIGRAM(S): 80 TABLET, FILM COATED ORAL at 22:48

## 2023-05-30 RX ADMIN — HEPARIN SODIUM 5000 UNIT(S): 5000 INJECTION INTRAVENOUS; SUBCUTANEOUS at 14:06

## 2023-05-30 RX ADMIN — LIDOCAINE 1 PATCH: 4 CREAM TOPICAL at 17:35

## 2023-05-30 RX ADMIN — Medication 1 TABLET(S): at 14:06

## 2023-05-30 RX ADMIN — HEPARIN SODIUM 5000 UNIT(S): 5000 INJECTION INTRAVENOUS; SUBCUTANEOUS at 06:27

## 2023-05-30 RX ADMIN — LIDOCAINE 1 PATCH: 4 CREAM TOPICAL at 14:11

## 2023-05-30 RX ADMIN — Medication 2: at 18:32

## 2023-05-30 RX ADMIN — Medication 500 MILLIGRAM(S): at 22:48

## 2023-05-30 RX ADMIN — Medication 1 MILLIGRAM(S): at 14:05

## 2023-05-30 RX ADMIN — Medication 500 MILLIGRAM(S): at 06:27

## 2023-05-30 RX ADMIN — LEVETIRACETAM 500 MILLIGRAM(S): 250 TABLET, FILM COATED ORAL at 18:46

## 2023-05-30 RX ADMIN — LEVETIRACETAM 500 MILLIGRAM(S): 250 TABLET, FILM COATED ORAL at 06:27

## 2023-05-30 RX ADMIN — CARBIDOPA AND LEVODOPA 1 TABLET(S): 25; 100 TABLET ORAL at 06:27

## 2023-05-30 RX ADMIN — Medication 2: at 22:49

## 2023-05-30 RX ADMIN — CARBIDOPA AND LEVODOPA 1 TABLET(S): 25; 100 TABLET ORAL at 14:05

## 2023-05-30 RX ADMIN — Medication 500 MILLIGRAM(S): at 14:05

## 2023-05-30 RX ADMIN — HEPARIN SODIUM 5000 UNIT(S): 5000 INJECTION INTRAVENOUS; SUBCUTANEOUS at 22:48

## 2023-05-30 RX ADMIN — POLYETHYLENE GLYCOL 3350 17 GRAM(S): 17 POWDER, FOR SOLUTION ORAL at 14:07

## 2023-05-30 RX ADMIN — PANTOPRAZOLE SODIUM 40 MILLIGRAM(S): 20 TABLET, DELAYED RELEASE ORAL at 06:27

## 2023-05-30 RX ADMIN — CARBIDOPA AND LEVODOPA 1 TABLET(S): 25; 100 TABLET ORAL at 22:48

## 2023-05-30 NOTE — OCCUPATIONAL THERAPY INITIAL EVALUATION ADULT - PERTINENT HX OF CURRENT PROBLEM, REHAB EVAL
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

## 2023-05-30 NOTE — OCCUPATIONAL THERAPY INITIAL EVALUATION ADULT - MODIFIED CLINICAL TEST OF SENSORY INTEGRATION IN BALANCE TEST
Pt tolerated to sit at EOB for ~10 minutes with CGA-min A for trunk control as pt with mild retropulsive lean at times but able to correct with verbal cues. Mod x2 person assist for sit<>stand x2 trials, pt with retropulsive lean and flexed posture, unable to weight shift to take side steps, tolerated static stand for <10 seconds.

## 2023-05-30 NOTE — PROGRESS NOTE ADULT - SUBJECTIVE AND OBJECTIVE BOX
INFECTIOUS DISEASES CONSULT FOLLOW-UP NOTE    INTERVAL HPI/OVERNIGHT EVENTS:    No acute interval events, patient sleeping on exam this AM and asked that we allow her to sleep.       ROS:   Constitutional, eyes, ENT, cardiovascular, respiratory, gastrointestinal, genitourinary, integumentary, neurological, psychiatric and heme/lymph are otherwise negative other than noted above       ANTIBIOTICS/RELEVANT:    MEDICATIONS  (STANDING):  aspirin  chewable 81 milliGRAM(s) Oral daily  atorvastatin 5 milliGRAM(s) Oral at bedtime  carbidopa/levodopa  25/100 1 Tablet(s) Oral every 8 hours  cefTRIAXone   IVPB 1000 milliGRAM(s) IV Intermittent every 24 hours  dextrose 5%. 1000 milliLiter(s) (100 mL/Hr) IV Continuous <Continuous>  dextrose 5%. 1000 milliLiter(s) (50 mL/Hr) IV Continuous <Continuous>  dextrose 50% Injectable 25 Gram(s) IV Push once  dextrose 50% Injectable 25 Gram(s) IV Push once  dextrose 50% Injectable 12.5 Gram(s) IV Push once  folic acid 1 milliGRAM(s) Oral daily  glucagon  Injectable 1 milliGRAM(s) IntraMuscular once  heparin   Injectable 5000 Unit(s) SubCutaneous every 8 hours  insulin lispro (ADMELOG) corrective regimen sliding scale   SubCutaneous Before meals and at bedtime  levETIRAcetam 500 milliGRAM(s) Oral every 12 hours  lidocaine   4% Patch 1 Patch Transdermal every 24 hours  metroNIDAZOLE    Tablet 500 milliGRAM(s) Oral every 8 hours  multivitamin  Chewable 1 Tablet(s) Oral daily  pantoprazole   Suspension 40 milliGRAM(s) Oral before breakfast  polyethylene glycol 3350 17 Gram(s) Oral every 24 hours  sertraline 50 milliGRAM(s) Oral daily    MEDICATIONS  (PRN):  acetaminophen   Oral Liquid .. 850 milliGRAM(s) Oral every 6 hours PRN Temp greater or equal to 38C (100.4F), Mild Pain (1 - 3)  aluminum hydroxide/magnesium hydroxide/simethicone Suspension 30 milliLiter(s) Oral every 6 hours PRN Dyspepsia  dextrose Oral Gel 15 Gram(s) Oral once PRN Blood Glucose LESS THAN 70 milliGRAM(s)/deciliter        Vital Signs Last 24 Hrs  T(C): 36.5 (30 May 2023 12:22), Max: 36.8 (30 May 2023 05:09)  T(F): 97.7 (30 May 2023 12:22), Max: 98.2 (30 May 2023 05:09)  HR: 98 (30 May 2023 12:22) (98 - 102)  BP: 119/78 (30 May 2023 12:22) (119/78 - 127/64)  BP(mean): --  RR: 18 (30 May 2023 12:22) (18 - 19)  SpO2: 96% (30 May 2023 12:22) (96% - 97%)    Parameters below as of 30 May 2023 12:22  Patient On (Oxygen Delivery Method): room air        23 @ 07:01  -  23 @ 07:00  --------------------------------------------------------  IN: 0 mL / OUT: 700 mL / NET: -700 mL      PHYSICAL EXAM:  Constitutional: non-toxic, no distress  Eyes:ZHANNA, EOMI  Ear/Nose/Throat: no oral lesions  Neck:  supple  Respiratory: CTA BL  Cardiovascular: S1S2 RRR, no murmurs  Gastrointestinal:soft, (+) BS, no HSM  Extremities:no e/e/c  Vascular: DP and radial 2+ BL        LABS:                        9.1    6.71  )-----------( 204      ( 30 May 2023 05:30 )             29.2     05-30    137  |  105  |  17  ----------------------------<  145<H>  4.3   |  25  |  0.86    Ca    9.2      30 May 2023 05:30  Phos  2.4     05-30  Mg     1.4     05-30    TPro  5.0<L>  /  Alb  3.0<L>  /  TBili  0.2  /  DBili  x   /  AST  16  /  ALT  <5<L>  /  AlkPhos  122<H>  05-30      Urinalysis Basic - ( 28 May 2023 16:58 )    Color: Yellow / Appearance: Clear / S.025 / pH: x  Gluc: x / Ketone: NEGATIVE  / Bili: Negative / Urobili: 0.2 E.U./dL   Blood: x / Protein: Trace mg/dL / Nitrite: NEGATIVE   Leuk Esterase: Small / RBC: < 5 /HPF / WBC 5-10 /HPF   Sq Epi: x / Non Sq Epi: x / Bacteria: Present /HPF        MICROBIOLOGY:      RADIOLOGY & ADDITIONAL STUDIES:  Reviewed

## 2023-05-30 NOTE — DISCHARGE NOTE PROVIDER - HOSPITAL COURSE
88F PMH Alzheimer's dementia, Parkinson's Disease, SDH/SAH s/p repair of cerebral aneurysm 1960s, Lung Ca s/p lobectomy of L lung, HTN, HLD, DM, recent admission for sepsis 2/2 colitis and dysphagia discharged to [facility] rehab now re-presenting from [facility] with hypotension and chills at rehab, found to be meeting sepsis criteria, likely 2/2 colitis that did not respond to previous antibiotic course of Augmentin, now escalated to CTX and metronidazole, pending de-escalation once clinical stability for 48 hours is established. BCX NGTD. ID following. Pt additionally found to be anemic, likely dilutional vs AOCD as iron panel did not reveal MERRILL, consistent with AOCD. Will likely be discharged on antibiotic regimen if clinically stable on 5/31. Patient clinically stable on IV antibiotic, regimen, stable for discharge on oral antibiotic regimen     Inpatient treatment course:     #Proctocolitis   Admitted last week for proctocolitis, discharged on augmentin, returned for sepsis criteria likely 2/2 colitis that did not respond to augmentin   Improved on IV CTX and metronidazole   ID following over admission  No worsening clinical symptoms on admission, remains HDS, BCX NGTD  -Discharge on PO cefpodoxime (200mg BID) + PO metronidazole (500 mg every 8 hours) to complete 10 day course (including days of IV abx- last day of total course will be 6/7)     #Anemia   Iron studies consistent with AOCD vs could be dilutional etiology, no evidence of MERRILL, no s/s bleeding   -Continue to monitor outpatient     Problem List/Main Diagnoses:     New medications/therapies: PO cefpodoxime (200mg BID) + PO metronidazole (500 mg every 8 hours) to complete 10 day course (including days of IV Abx - last day 6/7)  New lines/hardware: None  Labs to be followed outpatient: None   Exam to be followed outpatient: Follow-up with Dr. Johnson     Discharge plan: discharge to [facility]    PHYSICAL EXAM ON DISCHARGE:   GENERAL: NAD, lying in bed comfortably  HEAD: NC/AT, MMM   EYES: wnl   NECK: Trachea midline, soft and supple, no JVD  HEART: RRR, s1 and s2 wnl, no murmurs, rubs, or gallops  LUNGS: Unlabored respirations.  CTABL, no crackles, wheezing, or rhonchi  ABDOMEN: Soft, NTND, BS wnl   EXTREMITIES: 2+ peripheral pulses bilaterally. No clubbing, cyanosis, or edema   NERVOUS SYSTEM:  A&Ox2 (person and place), no focal deficits. b/l UE tremor.   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 with hypotension and chills at rehab, found to be meeting sepsis criteria, likely 2/2 colitis that did not respond to previous antibiotic course of Augmentin, now escalated to CTX and metronidazole, pending de-escalation once clinical stability for 48 hours is established. BCX NGTD. ID following. Pt additionally found to be anemic, likely dilutional vs AOCD as iron panel did not reveal MERRILL, consistent with AOCD. Will likely be discharged on antibiotic regimen if clinically stable on 5/31. Patient clinically stable on IV antibiotic, regimen, stable for discharge on oral antibiotic regimen     Inpatient treatment course:     #Proctocolitis   Admitted last week for proctocolitis, discharged on augmentin, returned for sepsis criteria likely 2/2 colitis that did not respond to augmentin   Improved on IV CTX and metronidazole   ID following over admission  No worsening clinical symptoms on admission, remains HDS, BCX NGTD  -Discharge on PO cefpodoxime (200mg BID) + PO metronidazole (500 mg every 8 hours) to complete 10 day course (including days of IV abx- last day of total course will be 6/7)     #Anemia   Iron studies consistent with AOCD vs could be dilutional etiology, no evidence of MERRILL, no s/s bleeding   -Continue to monitor outpatient      Parkinsons disease  #Alzheimers dementia   Patient with both Parkinsons disease and Alzheimers dementia. On home keppra 500mg q12, sertraline 50mg qd  While inpatient, was given sinemet 25-100mg TID. Per HHA and surescripts patient is taking Sinemet  mg 2.5 tabs three times a day  - resumed home Sinemet dose on discharge with follow up with Neurologist Dr. Ni  -c/w home meds   -per discussion with palliative care and HCP (daughter) previous admission, patient to remain full code however if patient is suddenly decompensating, call daughter and she will be prepared to make a decision  -PT consult   - OT consult   -minced and moist diet      Problem List/Main Diagnoses:     New medications/therapies: PO cefpodoxime (200mg BID) + PO metronidazole (500 mg every 8 hours) to complete 10 day course (including days of IV Abx - last day 6/7)  New lines/hardware: None  Labs to be followed outpatient: None   Exam to be followed outpatient: Follow-up with Dr. Johnson     Discharge plan: discharge to [facility]    PHYSICAL EXAM ON DISCHARGE:   GENERAL: NAD, lying in bed comfortably  HEAD: NC/AT, MMM   EYES: wnl   NECK: Trachea midline, soft and supple, no JVD  HEART: RRR, s1 and s2 wnl, no murmurs, rubs, or gallops  LUNGS: Unlabored respirations.  CTABL, no crackles, wheezing, or rhonchi  ABDOMEN: Soft, NTND, BS wnl   EXTREMITIES: 2+ peripheral pulses bilaterally. No clubbing, cyanosis, or edema   NERVOUS SYSTEM:  A&Ox2 (person and place), no focal deficits. b/l UE tremor.   SKIN: No rashes or lesions

## 2023-05-30 NOTE — OCCUPATIONAL THERAPY INITIAL EVALUATION ADULT - GENERAL OBSERVATIONS, REHAB EVAL
Pt received semi-supine in bed, personal aide at bedside, +primafit, +IV, in NAD and agreeable to OT. Cleared by JAY Becker to see.

## 2023-05-30 NOTE — PROGRESS NOTE ADULT - PROBLEM SELECTOR PLAN 4
Patient with history of HTN on home losartan 100mg qd   -hold home losartan in setting of sepsis and hypotension   -restart as tolerated   -monitor BPs    #HLD  -c/w atorvastatin 5mg qhs
Patient with history of HTN on home losartan 100mg qd   -hold home losartan in setting of sepsis and hypotension   -restart as tolerated   -monitor BPs    #HLD  -c/w atorvastatin 5mg qhs

## 2023-05-30 NOTE — OCCUPATIONAL THERAPY INITIAL EVALUATION ADULT - DIAGNOSIS, OT EVAL
Pt admitted for sepsis and hypotension presents with impaired balance, decreased activity tolerance, and generalized deconditioning impacting overall ease of completing ADLs and functional mobility tasks at [facility].

## 2023-05-30 NOTE — PROGRESS NOTE ADULT - SUBJECTIVE AND OBJECTIVE BOX
Events since admission   =continued improvement in leukocytosis  -hemodynamically stable  -ID consult - ceftriaxone and metronidazole for proctocolitis  -progressive anemia  -afebrile    MEDICATIONS  (STANDING):  aspirin  chewable 81 milliGRAM(s) Oral daily  atorvastatin 5 milliGRAM(s) Oral at bedtime  carbidopa/levodopa  25/100 1 Tablet(s) Oral every 8 hours  cefTRIAXone   IVPB 1000 milliGRAM(s) IV Intermittent every 24 hours  dextrose 5%. 1000 milliLiter(s) (50 mL/Hr) IV Continuous <Continuous>  dextrose 5%. 1000 milliLiter(s) (100 mL/Hr) IV Continuous <Continuous>  dextrose 50% Injectable 12.5 Gram(s) IV Push once  dextrose 50% Injectable 25 Gram(s) IV Push once  dextrose 50% Injectable 25 Gram(s) IV Push once  folic acid 1 milliGRAM(s) Oral daily  glucagon  Injectable 1 milliGRAM(s) IntraMuscular once  heparin   Injectable 5000 Unit(s) SubCutaneous every 8 hours  insulin lispro (ADMELOG) corrective regimen sliding scale   SubCutaneous Before meals and at bedtime  levETIRAcetam 500 milliGRAM(s) Oral every 12 hours  lidocaine   4% Patch 1 Patch Transdermal every 24 hours  magnesium sulfate  IVPB 4 Gram(s) IV Intermittent once  metroNIDAZOLE    Tablet 500 milliGRAM(s) Oral every 8 hours  multivitamin  Chewable 1 Tablet(s) Oral daily  pantoprazole   Suspension 40 milliGRAM(s) Oral before breakfast  polyethylene glycol 3350 17 Gram(s) Oral every 24 hours  sertraline 50 milliGRAM(s) Oral daily    MEDICATIONS  (PRN):  acetaminophen   Oral Liquid .. 850 milliGRAM(s) Oral every 6 hours PRN Temp greater or equal to 38C (100.4F), Mild Pain (1 - 3)  aluminum hydroxide/magnesium hydroxide/simethicone Suspension 30 milliLiter(s) Oral every 6 hours PRN Dyspepsia  dextrose Oral Gel 15 Gram(s) Oral once PRN Blood Glucose LESS THAN 70 milliGRAM(s)/deciliter    Vital Signs Last 24 Hrs  T(C): 36.8 (05-30-23 @ 05:09), Max: 36.8 (05-30-23 @ 05:09)  T(F): 98.2 (05-30-23 @ 05:09), Max: 98.2 (05-30-23 @ 05:09)  HR: 98 (05-30-23 @ 05:09) (98 - 102)  BP: 127/64 (05-30-23 @ 05:09) (122/73 - 127/64)  BP(mean): --  RR: 18 (05-30-23 @ 05:09) (18 - 19)  SpO2: 97% (05-30-23 @ 05:09) (97% - 97%)    NAD seated in bed and appropirately verbal  No JVD  Chest absent breath sounds on left, right CTA  CV RRR s1s2 no murmur  Abd soft non-tender  No edema                          9.1    6.71  )-----------( 204      ( 30 May 2023 05:30 )             29.2   05-30    137  |  105  |  17  ----------------------------<  145<H>  4.3   |  25  |  0.86    Ca    9.2      30 May 2023 05:30  Phos  2.4     05-30  Mg     1.4     05-30    TPro  5.0<L>  /  Alb  3.0<L>  /  TBili  0.2  /  DBili  x   /  AST  16  /  ALT  <5<L>  /  AlkPhos  122<H>  05-30

## 2023-05-30 NOTE — PROGRESS NOTE ADULT - SUBJECTIVE AND OBJECTIVE BOX
O/N Events: Patient admitted o/n.     Subjective/ROS: Patient seen and examined at bedside. Resting comfortably. No new complaints.     Denies Fever/Chills, HA, CP, SOB, n/v, changes in bowel/urinary habits.  12pt ROS otherwise negative.    VITALS  Vital Signs Last 24 Hrs  T(C): 36.9 (29 May 2023 05:42), Max: 37.3 (28 May 2023 19:00)  T(F): 98.4 (29 May 2023 05:42), Max: 99.2 (28 May 2023 19:00)  HR: 96 (29 May 2023 05:42) (96 - 109)  BP: 138/66 (29 May 2023 05:42) (123/76 - 138/66)  BP(mean): --  RR: 17 (29 May 2023 05:42) (16 - 17)  SpO2: 98% (29 May 2023 05:42) (96% - 98%)    Parameters below as of 28 May 2023 22:33  Patient On (Oxygen Delivery Method): room air        CAPILLARY BLOOD GLUCOSE      POCT Blood Glucose.: 175 mg/dL (29 May 2023 09:10)      PHYSICAL EXAM  GENERAL: NAD, lying in bed comfortably  HEAD: NC/AT, MMM   EYES: wnl   NECK: Trachea midline, soft and supple, no JVD  HEART: RRR, s1 and s2 wnl, no murmurs, rubs, or gallops  LUNGS: Unlabored respirations.  CTABL, no crackles, wheezing, or rhonchi  ABDOMEN: Soft, NTND, BS wnl   EXTREMITIES: 2+ peripheral pulses bilaterally. No clubbing, cyanosis, or edema   NERVOUS SYSTEM:  A&Ox2 (person and place), no focal deficits. b/l UE tremor.   SKIN: No rashes or lesions    MEDICATIONS  (STANDING):  aspirin  chewable 81 milliGRAM(s) Oral daily  atorvastatin 5 milliGRAM(s) Oral at bedtime  carbidopa/levodopa  25/100 1 Tablet(s) Oral every 8 hours  dextrose 5%. 1000 milliLiter(s) (50 mL/Hr) IV Continuous <Continuous>  dextrose 5%. 1000 milliLiter(s) (100 mL/Hr) IV Continuous <Continuous>  dextrose 50% Injectable 25 Gram(s) IV Push once  dextrose 50% Injectable 12.5 Gram(s) IV Push once  dextrose 50% Injectable 25 Gram(s) IV Push once  folic acid 1 milliGRAM(s) Oral daily  glucagon  Injectable 1 milliGRAM(s) IntraMuscular once  heparin   Injectable 5000 Unit(s) SubCutaneous every 8 hours  insulin lispro (ADMELOG) corrective regimen sliding scale   SubCutaneous three times a day before meals  levETIRAcetam 500 milliGRAM(s) Oral every 12 hours  lidocaine   4% Patch 1 Patch Transdermal every 24 hours  multivitamin  Chewable 1 Tablet(s) Oral daily  pantoprazole   Suspension 40 milliGRAM(s) Oral before breakfast  piperacillin/tazobactam IVPB.. 3.375 Gram(s) IV Intermittent every 8 hours  polyethylene glycol 3350 17 Gram(s) Oral every 24 hours  sertraline 50 milliGRAM(s) Oral daily  vancomycin  IVPB 1000 milliGRAM(s) IV Intermittent every 24 hours    MEDICATIONS  (PRN):  acetaminophen   Oral Liquid .. 850 milliGRAM(s) Oral every 6 hours PRN Temp greater or equal to 38C (100.4F), Mild Pain (1 - 3)  aluminum hydroxide/magnesium hydroxide/simethicone Suspension 30 milliLiter(s) Oral every 6 hours PRN Dyspepsia  dextrose Oral Gel 15 Gram(s) Oral once PRN Blood Glucose LESS THAN 70 milliGRAM(s)/deciliter      No Known Allergies      LABS                        10.0   7.97  )-----------( 220      ( 29 May 2023 05:30 )             31.9     05    137  |  106  |  18  ----------------------------<  161<H>  4.0   |  22  |  0.70    Ca    9.4      29 May 2023 05:30  Phos  2.6       Mg     1.5         TPro  5.2<L>  /  Alb  3.0<L>  /  TBili  0.3  /  DBili  x   /  AST  17  /  ALT  <5<L>  /  AlkPhos  127<H>        Urinalysis Basic - ( 28 May 2023 16:58 )    Color: Yellow / Appearance: Clear / S.025 / pH: x  Gluc: x / Ketone: NEGATIVE  / Bili: Negative / Urobili: 0.2 E.U./dL   Blood: x / Protein: Trace mg/dL / Nitrite: NEGATIVE   Leuk Esterase: Small / RBC: < 5 /HPF / WBC 5-10 /HPF   Sq Epi: x / Non Sq Epi: x / Bacteria: Present /HPF            Urinalysis with Rflx Culture (collected 23 @ 16:58)    Culture - Blood (collected 23 @ 16:30)  Source: .Blood Blood  Preliminary Report (23 @ 08:00):    No growth at 12 hours    Culture - Blood (collected 23 @ 16:25)  Source: .Blood Blood  Preliminary Report (23 @ 08:00):    No growth at 12 hours        IMAGING/EKG/ETC  Reviewed    88F PMH Alzheimer's dementia, Parkinson's Disease, SDH/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 BCX NGTD. ID following. Pt additionally found to be anemic, likely dilutional vs AOCD as iron panel did not reveal MERRILL, consistent with AOCD. Will likely be discharged on antibiotic regimen if clinically stable on .     O/N Events: NAEO    Subjective/ROS: Patient seen and examined at bedside. Resting comfortably. No new complaints.     Denies Fever/Chills, HA, CP, SOB, n/v, changes in bowel/urinary habits.  12pt ROS otherwise negative.    VITALS  Vital Signs Last 24 Hrs  T(C): 36.9 (29 May 2023 05:42), Max: 37.3 (28 May 2023 19:00)  T(F): 98.4 (29 May 2023 05:42), Max: 99.2 (28 May 2023 19:00)  HR: 96 (29 May 2023 05:42) (96 - 109)  BP: 138/66 (29 May 2023 05:42) (123/76 - 138/66)  BP(mean): --  RR: 17 (29 May 2023 05:42) (16 - 17)  SpO2: 98% (29 May 2023 05:42) (96% - 98%)    Parameters below as of 28 May 2023 22:33  Patient On (Oxygen Delivery Method): room air        CAPILLARY BLOOD GLUCOSE      POCT Blood Glucose.: 175 mg/dL (29 May 2023 09:10)      PHYSICAL EXAM  GENERAL: NAD, lying in bed comfortably  HEAD: NC/AT, MMM   EYES: wnl   NECK: Trachea midline, soft and supple, no JVD  HEART: RRR, s1 and s2 wnl, no murmurs, rubs, or gallops  LUNGS: Unlabored respirations.  CTABL, no crackles, wheezing, or rhonchi  ABDOMEN: Soft, NTND, BS wnl   EXTREMITIES: 2+ peripheral pulses bilaterally. No clubbing, cyanosis, or edema   NERVOUS SYSTEM:  A&Ox2 (person and place), no focal deficits. b/l UE tremor.   SKIN: No rashes or lesions    MEDICATIONS  (STANDING):  aspirin  chewable 81 milliGRAM(s) Oral daily  atorvastatin 5 milliGRAM(s) Oral at bedtime  carbidopa/levodopa  25/100 1 Tablet(s) Oral every 8 hours  dextrose 5%. 1000 milliLiter(s) (50 mL/Hr) IV Continuous <Continuous>  dextrose 5%. 1000 milliLiter(s) (100 mL/Hr) IV Continuous <Continuous>  dextrose 50% Injectable 25 Gram(s) IV Push once  dextrose 50% Injectable 12.5 Gram(s) IV Push once  dextrose 50% Injectable 25 Gram(s) IV Push once  folic acid 1 milliGRAM(s) Oral daily  glucagon  Injectable 1 milliGRAM(s) IntraMuscular once  heparin   Injectable 5000 Unit(s) SubCutaneous every 8 hours  insulin lispro (ADMELOG) corrective regimen sliding scale   SubCutaneous three times a day before meals  levETIRAcetam 500 milliGRAM(s) Oral every 12 hours  lidocaine   4% Patch 1 Patch Transdermal every 24 hours  multivitamin  Chewable 1 Tablet(s) Oral daily  pantoprazole   Suspension 40 milliGRAM(s) Oral before breakfast  piperacillin/tazobactam IVPB.. 3.375 Gram(s) IV Intermittent every 8 hours  polyethylene glycol 3350 17 Gram(s) Oral every 24 hours  sertraline 50 milliGRAM(s) Oral daily  vancomycin  IVPB 1000 milliGRAM(s) IV Intermittent every 24 hours    MEDICATIONS  (PRN):  acetaminophen   Oral Liquid .. 850 milliGRAM(s) Oral every 6 hours PRN Temp greater or equal to 38C (100.4F), Mild Pain (1 - 3)  aluminum hydroxide/magnesium hydroxide/simethicone Suspension 30 milliLiter(s) Oral every 6 hours PRN Dyspepsia  dextrose Oral Gel 15 Gram(s) Oral once PRN Blood Glucose LESS THAN 70 milliGRAM(s)/deciliter      No Known Allergies      LABS                        10.0   7.97  )-----------( 220      ( 29 May 2023 05:30 )             31.9         137  |  106  |  18  ----------------------------<  161<H>  4.0   |  22  |  0.70    Ca    9.4      29 May 2023 05:30  Phos  2.6       Mg     1.5         TPro  5.2<L>  /  Alb  3.0<L>  /  TBili  0.3  /  DBili  x   /  AST  17  /  ALT  <5<L>  /  AlkPhos  127<H>        Urinalysis Basic - ( 28 May 2023 16:58 )    Color: Yellow / Appearance: Clear / S.025 / pH: x  Gluc: x / Ketone: NEGATIVE  / Bili: Negative / Urobili: 0.2 E.U./dL   Blood: x / Protein: Trace mg/dL / Nitrite: NEGATIVE   Leuk Esterase: Small / RBC: < 5 /HPF / WBC 5-10 /HPF   Sq Epi: x / Non Sq Epi: x / Bacteria: Present /HPF            Urinalysis with Rflx Culture (collected 23 @ 16:58)    Culture - Blood (collected 23 @ 16:30)  Source: .Blood Blood  Preliminary Report (23 @ 08:00):    No growth at 12 hours    Culture - Blood (collected 23 @ 16:25)  Source: .Blood Blood  Preliminary Report (23 @ 08:00):    No growth at 12 hours        IMAGING/EKG/ETC  Reviewed

## 2023-05-30 NOTE — PROGRESS NOTE ADULT - ASSESSMENT
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

## 2023-05-30 NOTE — DISCHARGE NOTE PROVIDER - CARE PROVIDER_API CALL
Wilian Johnson  Cardiovascular Disease  [address] [facility], [place] [address]  Phone: [phone]  Fax: [phone]  Established Patient  Follow Up Time: 2 weeks   Wilian Johnson  Cardiovascular Disease  [address] [place] [address]  Phone: [phone]  Fax: [phone]  Established Patient  Follow Up Time: 2 weeks    ELO LAWLER  [id number]  [place] [address]  Phone: [phone]  Fax: ([phone]  Follow Up Time:

## 2023-05-30 NOTE — DISCHARGE NOTE PROVIDER - NSDCCPCAREPLAN_GEN_ALL_CORE_FT
PRINCIPAL DISCHARGE DIAGNOSIS  Diagnosis: Proctocolitis  Assessment and Plan of Treatment: Colitis is swelling (inflammation) of the large intestine (colon). Your goals of care are not in line with getting further work up of colonoscopies or work-up for cancer.  You were given antibiotics at your previous admission however did not improve. We have broadened your antibiotics this admission and you are improving.   --  -Please continue to take cefpodoxime 200mg two times a day, and metronidazole 500mg three times a day until 6/7/2023  -Please follow with Dr. Vanegas office within 2 weeks to ensure your symptoms are continuing to improve

## 2023-05-30 NOTE — PROGRESS NOTE ADULT - PROBLEM SELECTOR PLAN 5
History of DM on home metformin 500mg BID. Per caregiver blood sugar elevated to 339 today at rehab and 243 on admission. A1C 6.9 5/2023.  - POCT glucose 175  -c/w metformin while inpatient   -mISS while inpatient   -monitor fingersticks and add basal coverage if persistently elevated   -CC diet
History of DM on home metformin 500mg BID. Per caregiver blood sugar elevated to 339 today at rehab and 243 on admission. A1C 6.9 5/2023.  - POCT glucose 175  -c/w metformin while inpatient   -mISS while inpatient   -monitor fingersticks and add basal coverage if persistently elevated   -CC diet

## 2023-05-30 NOTE — DISCHARGE NOTE PROVIDER - NSDCFUSCHEDAPPT_GEN_ALL_CORE_FT
Arnoldo Villalobos  [facility] Physician [facility]  UROLOGY [address]  Scheduled Appointment: 05/30/2023

## 2023-05-30 NOTE — PROGRESS NOTE ADULT - PROBLEM SELECTOR PLAN 2
Hgb 11.0 on admission, normocytic. Baseline around 10 per previous admissions. Currently at baseline. No signs of bleeding on exam. No hematuria or bloody/black stools    -monitor CBC and transfuse for hgb <7  -maintain active T&S
Hgb 11.0 on admission, normocytic. Baseline around 10 per previous admissions. Currently at baseline. No signs of bleeding on exam. No hematuria or bloody/black stools.  -monitor CBC and transfuse for hgb <7  -maintain active T&S

## 2023-05-30 NOTE — DISCHARGE NOTE PROVIDER - PROVIDER TOKENS
PROVIDER:[TOKEN:[[id number]],FOLLOWUP:[2 weeks],ESTABLISHEDPATIENT:[T]] PROVIDER:[TOKEN:[[id number]],FOLLOWUP:[2 weeks],ESTABLISHEDPATIENT:[T]],PROVIDER:[TOKEN:[[id number]]]

## 2023-05-30 NOTE — DISCHARGE NOTE PROVIDER - NPI NUMBER (FOR SYSADMIN USE ONLY) :
Virtual Regular Visit    Verification of patient location:    Patient is located in the following state in which I hold an active license [place]      Assessment/Plan:    Problem List Items Addressed This Visit    None  Visit Diagnoses     Bipolar II disorder ([facility] [address] )    -  Primary    Relevant Medications    busPIRone (BUSPAR) 5 mg tablet    Generalized anxiety disorder        Relevant Medications    busPIRone (BUSPAR) 5 mg tablet          Goals addressed in session: Goal 1          Reason for visit is   Chief Complaint   Patient presents with   • Medication Management        Encounter provider Jyothi Li MD    Provider located at [id number] [facility]  [address]  [phone]      Recent Visits  No visits were found meeting these conditions  Showing recent visits within past 7 days and meeting all other requirements  Today's Visits  Date Type Provider Dept   12/01/22 Telemedicine Jyothi Li, [facility] [id number] [[id number]],[[id number]]

## 2023-05-30 NOTE — DISCHARGE NOTE PROVIDER - NSDCMRMEDTOKEN_GEN_ALL_CORE_FT
acetaminophen 325 mg oral tablet: 2 tab(s) orally every 6 hours, As needed, Temp greater or equal to 38C (100.4F), Mild Pain (1 - 3)  aluminum hydroxide-magnesium hydroxide 200 mg-200 mg/5 mL oral suspension: 30 milliliter(s) orally every 4 hours as needed for Dyspepsia  amoxicillin-clavulanate 875 mg-125 mg oral tablet: 1 tab(s) orally every 12 hours PLEASE CONTINUE 10 DAY TOTAL DAY COURSE FROM 5/26-6/5/23  aspirin 81 mg oral tablet, chewable: 1 chewed once a day  atorvastatin: 5 milligram(s) orally once a day (at bedtime)  Centrum: 1 tab(s) orally once a day  Claritin: 5 milligram(s) orally once a day  Colace 100 mg oral capsule: 1 cap(s) orally once a day (at bedtime)  folic acid 1 mg oral tablet: 1 tab(s) orally once a day  levETIRAcetam 500 mg oral tablet: 1 tab(s) orally every 12 hours  losartan 100 mg oral tablet: 1 tab(s) orally once a day  metFORMIN 500 mg oral tablet: 500  orally 2 times a day  omeprazole 20 mg oral delayed release capsule:   sertraline 50 mg oral tablet: 1 tab(s) orally once a day  Sinemet 25 mg-100 mg oral tablet: 1 orally every 8 hours  Vitamin B12 250 mcg oral tablet:   Vitamin D3: 5000u  orally   acetaminophen 325 mg oral tablet: 2 tab(s) orally every 6 hours, As needed, Temp greater or equal to 38C (100.4F), Mild Pain (1 - 3)  aluminum hydroxide-magnesium hydroxide 200 mg-200 mg/5 mL oral suspension: 30 milliliter(s) orally every 4 hours as needed for Dyspepsia  amoxicillin-clavulanate 875 mg-125 mg oral tablet: 1 tab(s) orally every 12 hours PLEASE CONTINUE 10 DAY TOTAL DAY COURSE FROM 5/26-6/5/23  aspirin 81 mg oral tablet, chewable: 1 chewed once a day  atorvastatin: 5 milligram(s) orally once a day (at bedtime)  cefpodoxime 200 mg oral tablet: 1 tab(s) orally 2 times a day  Centrum: 1 tab(s) orally once a day  Claritin: 5 milligram(s) orally once a day  Colace 100 mg oral capsule: 1 cap(s) orally once a day (at bedtime)  folic acid 1 mg oral tablet: 1 tab(s) orally once a day  levETIRAcetam 500 mg oral tablet: 1 tab(s) orally every 12 hours  losartan 100 mg oral tablet: 1 tab(s) orally once a day  metFORMIN 500 mg oral tablet: 500  orally 2 times a day  metroNIDAZOLE 500 mg oral tablet: 1 tab(s) orally every 8 hours  omeprazole 20 mg oral delayed release capsule:   sertraline 50 mg oral tablet: 1 tab(s) orally once a day  Sinemet 25 mg-100 mg oral tablet: 1 orally every 8 hours  Vitamin B12 250 mcg oral tablet:   Vitamin D3: 5000u  orally   acetaminophen 325 mg oral tablet: 2 tab(s) orally every 6 hours, As needed, Temp greater or equal to 38C (100.4F), Mild Pain (1 - 3)  aluminum hydroxide-magnesium hydroxide 200 mg-200 mg/5 mL oral suspension: 30 milliliter(s) orally every 4 hours as needed for Dyspepsia  aspirin 81 mg oral tablet, chewable: 1 chewed once a day  atorvastatin: 5 milligram(s) orally once a day (at bedtime)  cefpodoxime 200 mg oral tablet: 1 tab(s) orally 2 times a day  Centrum: 1 tab(s) orally once a day  Claritin: 5 milligram(s) orally once a day  Colace 100 mg oral capsule: 1 cap(s) orally once a day (at bedtime)  folic acid 1 mg oral tablet: 1 tab(s) orally once a day  levETIRAcetam 500 mg oral tablet: 1 tab(s) orally every 12 hours  losartan 100 mg oral tablet: 1 tab(s) orally once a day  metFORMIN 500 mg oral tablet: 500  orally 2 times a day  metroNIDAZOLE 500 mg oral tablet: 1 tab(s) orally every 8 hours  omeprazole 20 mg oral delayed release capsule:   sertraline 50 mg oral tablet: 1 tab(s) orally once a day  Sinemet 25 mg-100 mg oral tablet: 2.5 tab(s) orally 3 times a day  Vitamin B12 250 mcg oral tablet:   Vitamin D3: 5000u  orally

## 2023-05-30 NOTE — CONSULT NOTE ADULT - SUBJECTIVE AND OBJECTIVE BOX
HPI:  88F [facility] Alzheimer's dementia, Parkinson's Disease, SDH/SAH s/p repair of cerebral aneurysm 1960s, Lung Ca s/p lobectomy of L lung, HTN, HLD, DM, recent admission for sepsis 2/2 colitis and dysphagia discharged to [facility] rehab now re-presenting from [facility] accompanied by formal caregiver with hypotension and chills today at the rehab. Per caregiver at bedside patient was noted to have low BP readings at [facility] today of 94/60 and 108/47. Patient also with chills and non-productive cough that started this morning. Also had high blood sugar reading of 339 today at rehab. Denies fevers, headache, chest pain, SOB, abd pain, N/V/D/C, problems with urination, dysuria, hematuria, extremity pain or swelling. No changes in baseline mental status. Has been eating a soft diet and taking her augmentin which she has not completed the course yet. Patient herself currently only endorsing lower back pain that started today.   Of note, patient with recent admission to [facility] from -23 for sepsis 2/2 possible UTI and proctocolitis seen on CT. Patient initially started on CTX for suspected UTI. CT AP revealed proctocolitis, recommended to ruled out neoplasm, however not consistent with goals of care per charlee care proxy and will not be further explored. Patient markedly improved and was discharged to [facility] on 10 day course of PO augmentin (until ). Course also c/b dysphagia which improved on its own; pt also evaluated by S&S and cleared for minced and moist diet.        PAST MEDICAL & SURGICAL HISTORY:  HTN (hypertension)      Diabetes      High cholesterol      Parkinson disease      Alzheimer disease      S/P removal of lung  left,  partial and  complete      S/P cholecystectomy      Personal history of spine surgery      H/O cerebral aneurysm repair  , "metal plates"            REVIEW OF SYSTEMS:    General:	 no weakness; no fevers, no chills  Skin/Breast: no rash  Respiratory and Thorax: no SOB, no cough  Cardiovascular:	No chest pain  Gastrointestinal:	 no nausea, vomiting , diarrhea  Genitourinary:	no dysuria, no difficulty urinating, no hematuria  Musculoskeletal:	no weakness, no joint swelling/pain  Neurological:	no focal weakness/numbness  Endocrine:	no polyuria, no polydipsia      ANTIBIOTICS:  MEDICATIONS  (STANDING):  aspirin  chewable 81 milliGRAM(s) Oral daily  atorvastatin 5 milliGRAM(s) Oral at bedtime  carbidopa/levodopa  25/100 1 Tablet(s) Oral every 8 hours  dextrose 5%. 1000 milliLiter(s) (50 mL/Hr) IV Continuous <Continuous>  dextrose 5%. 1000 milliLiter(s) (100 mL/Hr) IV Continuous <Continuous>  dextrose 50% Injectable 12.5 Gram(s) IV Push once  dextrose 50% Injectable 25 Gram(s) IV Push once  dextrose 50% Injectable 25 Gram(s) IV Push once  folic acid 1 milliGRAM(s) Oral daily  glucagon  Injectable 1 milliGRAM(s) IntraMuscular once  heparin   Injectable 5000 Unit(s) SubCutaneous every 8 hours  insulin lispro (ADMELOG) corrective regimen sliding scale   SubCutaneous three times a day before meals  levETIRAcetam 500 milliGRAM(s) Oral every 12 hours  lidocaine   4% Patch 1 Patch Transdermal every 24 hours  multivitamin  Chewable 1 Tablet(s) Oral daily  pantoprazole   Suspension 40 milliGRAM(s) Oral before breakfast  piperacillin/tazobactam IVPB.. 3.375 Gram(s) IV Intermittent every 8 hours  polyethylene glycol 3350 17 Gram(s) Oral every 24 hours  sertraline 50 milliGRAM(s) Oral daily  vancomycin  IVPB 1000 milliGRAM(s) IV Intermittent every 24 hours    MEDICATIONS  (PRN):  acetaminophen   Oral Liquid .. 850 milliGRAM(s) Oral every 6 hours PRN Temp greater or equal to 38C (100.4F), Mild Pain (1 - 3)  aluminum hydroxide/magnesium hydroxide/simethicone Suspension 30 milliLiter(s) Oral every 6 hours PRN Dyspepsia  dextrose Oral Gel 15 Gram(s) Oral once PRN Blood Glucose LESS THAN 70 milliGRAM(s)/deciliter      Allergies    No Known Allergies    Intolerances        SOCIAL HISTORY:    FAMILY HISTORY:  No pertinent family history in first degree relatives        Vital Signs Last 24 Hrs  T(C): 36.4 (29 May 2023 12:40), Max: 37.3 (28 May 2023 19:00)  T(F): 97.6 (29 May 2023 12:40), Max: 99.2 (28 May 2023 19:00)  HR: 102 (29 May 2023 12:40) (96 - 109)  BP: 122/73 (29 May 2023 12:40) (122/73 - 138/66)  BP(mean): --  RR: 18 (29 May 2023 12:40) (16 - 18)  SpO2: 97% (29 May 2023 12:40) (96% - 98%)    Parameters below as of 29 May 2023 12:40  Patient On (Oxygen Delivery Method): room air        PHYSICAL EXAM:  Constitutional: non-toxic, no distress  Eyes:ZHANNA, EOMI  Ear/Nose/Throat: no oral lesion  Neck:  supple  Respiratory: CTA shane  Cardiovascular: S1S2 RRR, no murmurs  Gastrointestinal:soft, (+) BS, no HSM  Extremities:no e/e/c  Vascular: DP Pulse:	right normal; left normal            LABS:                        10.0   7.97  )-----------( 220      ( 29 May 2023 05:30 )             31.9     0529    137  |  106  |  18  ----------------------------<  161<H>  4.0   |  22  |  0.70    Ca    9.4      29 May 2023 05:30  Phos  2.6       Mg     1.5         TPro  5.2<L>  /  Alb  3.0<L>  /  TBili  0.3  /  DBili  x   /  AST  17  /  ALT  <5<L>  /  AlkPhos  127<H>        Urinalysis Basic - ( 28 May 2023 16:58 )    Color: Yellow / Appearance: Clear / S.025 / pH: x  Gluc: x / Ketone: NEGATIVE  / Bili: Negative / Urobili: 0.2 E.U./dL   Blood: x / Protein: Trace mg/dL / Nitrite: NEGATIVE   Leuk Esterase: Small / RBC: < 5 /HPF / WBC 5-10 /HPF   Sq Epi: x / Non Sq Epi: x / Bacteria: Present /HPF    RVP: negative     MICROBIOLOGY:    Culture - Blood (23 @ 16:30)    Specimen Source: .Blood Blood   Culture Results:   No growth at 12 hours    Culture - Urine (23 @ 17:48)    Specimen Source: Clean Catch Clean Catch (Midstream)   Culture Results:   Normal Urogenital des present including  10,000 CFU/ml Aerococcus urinae Aerococcus species  "Aerococcus spp. are predictably susceptible to penicillin,  ampicillin, tetracycline, and vancomycin.  All isolates are  resistant to sulfonamides"      RADIOLOGY & ADDITIONAL STUDIES:    < from: CT Abdomen and Pelvis No Cont (23 @ 14:52) >  Proctocolitis. Masslike thickening of the rectum, possibly inflammatory,   however correlate with direct inspection to exclude neoplasm.    No evidence of obstructive uropathy. Other incidental comments as above.    < from: Xray Chest 1 View- PORTABLE-Urgent (Xray Chest 1 View- PORTABLE-Urgent .) (23 @ 18:25) >    Findings/  impression: Redemonstration left hemithorax opacification, status post   left pneumonectomy, thoracotomy, postsurgical changes, surgical clips.   Right infrahilar nodules. Stable heart size, thoracic aortic and cardiac   calcifications. Stable bony structures.    < end of copied text >    
  Patient is a 88y old  Female who presents with a chief complaint of sepsis (30 May 2023 08:45)      HPI:  88F PMH Alzheimer's dementia, Parkinson's Disease, SDH/SAH s/p repair of cerebral aneurysm , Lung Ca s/p lobectomy of L lung, HTN, HLD, DM, recent admission for sepsis 2/2 colitis and dysphagia discharged to [facility] rehab now re-presenting from [facility] accompanied by formal caregiver with hypotension and chills today at the rehab. Per caregiver at bedside patient was noted to have low BP readings at [facility] today of 94/60 and 108/47. Patient also with chills and non-productive cough that started this morning. Also had high blood sugar reading of 339 today at rehab. Denies fevers, headache, chest pain, SOB, abd pain, N/V/D/C, problems with urination, dysuria, hematuria, extremity pain or swelling. No changes in baseline mental status. Has been eating a soft diet and taking her augmentin which she has not completed the course yet. Patient herself currently only endorsing lower back pain that started today.   Of note, patient with recent admission to [facility] from -23 for sepsis 2/2 possible UTI and proctocolitis seen on CT. Patient initially started on CTX for suspected UTI. CT AP revealed proctocolitis, recommended to ruled out neoplasm, however not consistent with goals of care per charlee care proxy and will not be further explored. Patient markedly improved and was discharged to [facility] on 10 day course of PO augmentin (until ). Course also c/b dysphagia which improved on its own; pt also evaluated by S&S and cleared for minced and moist diet.     In the ED:   Initial vitals: Temp 97.9F, , /76, RR 16, O2 96% RA   Labs significant for: WBC 15.20, hgb 11.0, Na 133, BUN 28, glucose 243, alk phos 144, lactate 3.5  RVP negative   CXR (wet read) with left-sided pneumonectomy, unchanged from prior  EKG: sinus tachycardia with non-specific ST-T changes, unchanged from prior   Medications: vancomycin 1g IV x1, zosyn 3.375g IV x1, 2LNS   (28 May 2023 21:09)    PAST MEDICAL & SURGICAL HISTORY:  HTN (hypertension)      Diabetes      High cholesterol      Parkinson disease      Alzheimer disease      S/P removal of lung  left,  partial and  complete      S/P cholecystectomy      Personal history of spine surgery      H/O cerebral aneurysm repair  , "metal plates"        MEDICATIONS  (STANDING):  aspirin  chewable 81 milliGRAM(s) Oral daily  atorvastatin 5 milliGRAM(s) Oral at bedtime  carbidopa/levodopa  25/100 1 Tablet(s) Oral every 8 hours  cefTRIAXone   IVPB 1000 milliGRAM(s) IV Intermittent every 24 hours  dextrose 5%. 1000 milliLiter(s) (50 mL/Hr) IV Continuous <Continuous>  dextrose 5%. 1000 milliLiter(s) (100 mL/Hr) IV Continuous <Continuous>  dextrose 50% Injectable 25 Gram(s) IV Push once  dextrose 50% Injectable 25 Gram(s) IV Push once  dextrose 50% Injectable 12.5 Gram(s) IV Push once  folic acid 1 milliGRAM(s) Oral daily  glucagon  Injectable 1 milliGRAM(s) IntraMuscular once  heparin   Injectable 5000 Unit(s) SubCutaneous every 8 hours  insulin lispro (ADMELOG) corrective regimen sliding scale   SubCutaneous Before meals and at bedtime  levETIRAcetam 500 milliGRAM(s) Oral every 12 hours  lidocaine   4% Patch 1 Patch Transdermal every 24 hours  metroNIDAZOLE    Tablet 500 milliGRAM(s) Oral every 8 hours  multivitamin  Chewable 1 Tablet(s) Oral daily  pantoprazole   Suspension 40 milliGRAM(s) Oral before breakfast  polyethylene glycol 3350 17 Gram(s) Oral every 24 hours  sertraline 50 milliGRAM(s) Oral daily    MEDICATIONS  (PRN):  acetaminophen   Oral Liquid .. 850 milliGRAM(s) Oral every 6 hours PRN Temp greater or equal to 38C (100.4F), Mild Pain (1 - 3)  aluminum hydroxide/magnesium hydroxide/simethicone Suspension 30 milliLiter(s) Oral every 6 hours PRN Dyspepsia  dextrose Oral Gel 15 Gram(s) Oral once PRN Blood Glucose LESS THAN 70 milliGRAM(s)/deciliter        FAMILY HISTORY:  No pertinent family history in first degree relatives        CBC Full  -  ( 30 May 2023 05:30 )  WBC Count : 6.71 K/uL  RBC Count : 3.61 M/uL  Hemoglobin : 9.1 g/dL  Hematocrit : 29.2 %  Platelet Count - Automated : 204 K/uL  Mean Cell Volume : 80.9 fl  Mean Cell Hemoglobin : 25.2 pg  Mean Cell Hemoglobin Concentration : 31.2 gm/dL  Auto Neutrophil # : 4.60 K/uL  Auto Lymphocyte # : 1.34 K/uL  Auto Monocyte # : 0.43 K/uL  Auto Eosinophil # : 0.14 K/uL  Auto Basophil # : 0.03 K/uL  Auto Neutrophil % : 68.6 %  Auto Lymphocyte % : 20.0 %  Auto Monocyte % : 6.4 %  Auto Eosinophil % : 2.1 %  Auto Basophil % : 0.4 %          137  |  105  |  17  ----------------------------<  145<H>  4.3   |  25  |  0.86    Ca    9.2      30 May 2023 05:30  Phos  2.4       Mg     1.4         TPro  5.0<L>  /  Alb  3.0<L>  /  TBili  0.2  /  DBili  x   /  AST  16  /  ALT  <5<L>  /  AlkPhos  122<H>        Urinalysis Basic - ( 28 May 2023 16:58 )    Color: Yellow / Appearance: Clear / S.025 / pH: x  Gluc: x / Ketone: NEGATIVE  / Bili: Negative / Urobili: 0.2 E.U./dL   Blood: x / Protein: Trace mg/dL / Nitrite: NEGATIVE   Leuk Esterase: Small / RBC: < 5 /HPF / WBC 5-10 /HPF   Sq Epi: x / Non Sq Epi: x / Bacteria: Present /HPF        Radiology :     < from: Xray Chest 1 View- PORTABLE-Urgent (Xray Chest 1 View- PORTABLE-Urgent .) (23 @ 18:25) >  ACC: [id number] EXAM:  XR CHEST PORTABLE URGENT 1V   ORDERED BY: DORON WHITLEY     PROCEDURE DATE:  2023          INTERPRETATION:  Clinical history reason for exam: Fever.    Frontal chest.    COMPARISON: May 22, 2023.    Findings/  impression: Redemonstration left hemithorax opacification, status post   left pneumonectomy, thoracotomy, postsurgical changes, surgical clips.   Right infrahilar nodules. Stable heart size, thoracic aortic and cardiac   calcifications. Stable bony structures.           REVIEW OF SYSTEMS:  per hpi       Vital Signs Last 24 Hrs  T(C): 36.8 (30 May 2023 05:09), Max: 36.8 (30 May 2023 05:09)  T(F): 98.2 (30 May 2023 05:09), Max: 98.2 (30 May 2023 05:09)  HR: 98 (30 May 2023 05:09) (98 - 102)  BP: 127/64 (30 May 2023 05:09) (122/73 - 127/64)  BP(mean): --  RR: 18 (30 May 2023 05:09) (18 - 19)  SpO2: 97% (30 May 2023 05:09) (97% - 97%)    Parameters below as of 30 May 2023 05:09  Patient On (Oxygen Delivery Method): room air            Physical Exam :   89 y o woman lying comfortably in semi Ann's position , awake ,  NAD     Head : normocephalic , atraumatic    Eyes : PERRLA , EOMI , no nystagmus , sclera anicteric    ENT / FACE : neg nasal discharge , uvula midline , no oropharyngeal erythema / exudate    Neck : supple , negative JVD , negative carotid bruits , no thyromegaly    Chest : CTA bilaterally , neg wheeze / rhonchi / rales / crackles / egophany    Cardiovascular : regular rate and rhythm , neg murmurs / rubs / gallops    Abdomen : soft , non distended , non tender to palpation in all 4 quadrants ,  normal bowel sounds     Extremities : WWP , neg cyanosis /clubbing / edema     Neurologic Exam :    Alert and oriented to person , place , speech fluent w/o dysarthria , follows commands     Cranial Nerves:     II :                         pupils equal , round and reactive to light , visual fields intact   III/ IV/VI :              extraocular movements intact , neg nystagmus , neg ptosis  V :                        facial sensation intact , V1-3 normal  VII :                      face symmetric , no droop , normal eye closure and smile  VIII :                     hearing intact to finger rub bilaterally  IX and X :             no hoarseness , gag intact , palate/ uvula rise symmetrically  XI :                       SCM / trapezius strength intact bilateral  XII :                      no tongue deviation    Motor Exam:          Right UE:               no focal weakness ,  > 3+/5 throughout      Left UE:                 no focal weakness ,  > 3+/5 throughout          Right LE:    no focal weakness ,  > 3+/5 throughout        Left LE:    no focal weakness ,  > 3+/5 throughout         Sensation :         intact to light touch x 4 extremities       DTR :     biceps/brachioradialis : equal                      patella/ankle : equal        Gait :  not tested          PM&R Impression :         - deconditioned    - no focal weakness           Recommendations / Plan :              1) Physical / Occupational therapy focusing on therapeutic exercises , equipment evaluation , bed mobility/transfer out of bed evaluation , progressive ambulation with assistive devices prn .    2) Current disposition plan recommendation  :       subacute rehab placement

## 2023-05-30 NOTE — CONSULT NOTE ADULT - ASSESSMENT
IM    88 y o F PMH Alzheimer's dementia, Parkinson's Disease, SDH/SAH s/p repair of cerebral aneurysm 1960s, Lung Ca s/p lobectomy of L lung, HTN, HLD, DM, recent admission for sepsis 2/2 colitis and dysphagia discharged to [facility] rehab now re-presenting from [facility] accompanied by formal caregiver with hypotension and chills today at the rehab, found to be meeting sepsis criteria, admitted for further management.    Problem/Plan - 1:  ·  Problem: Sepsis.   ·  Plan: Patient meeting severe sepsis criteria with tachycardia, leukocytosis, and elevated lactate on admission. Etiology unknown at this time. Previous admission for colitis, discharged on PO augmentin. Exam unremarkable. Pt denies dysuria/urinary problems, SOB, abd pain. Afebrile however reports hypotension at rehab today.   -s/p vancomycin 1g IV x1, zosyn 3.375g IV x1, 2LNS in ED  - ID consulted, f/u recs   - c/w vanc 1g and zosyn 3.375 g until ID recs   -RVP negative   -CXR (wet read) with left-sided pneumonectomy, unchanged from prior, no acute consolidation   -EKG: sinus tachycardia with non-specific ST-T changes, unchanged from prior   -f/u UA and UCxs  -f/u BCxs   -lactate cleared   -wbc downtrending   -tylenol prn fevers.    Problem/Plan - 2:  ·  Problem: Anemia.   ·  Plan: Hgb 11.0 on admission, normocytic. Baseline around 10 per previous admissions. Currently at baseline. No signs of bleeding on exam. No hematuria or bloody/black stools.  -monitor CBC and transfuse for hgb <7  -maintain active T&S.    Problem/Plan - 3:  ·  Problem: Dementia.   ·  Plan: #Parkinsons disease  #Alzheimers dementia   -patient with both Parkinsons disease and Alzheimers dementia. On home keppra 500mg q12, sertraline 50mg qd, and sinemet 25-100mg q8  -c/w home meds   -per discussion with palliative care and HCP (daughter) previous admission, patient to remain full code however if patient is suddenly decompensating, call daughter and she will be prepared to make a decision  -PT consult   - OT consult   -minced and moist diet  -lidocaine patch for back pain   -bowel regimen.    Problem/Plan - 4:  ·  Problem: HTN (hypertension).   ·  Plan: Patient with history of HTN on home losartan 100mg qd   -hold home losartan in setting of sepsis and hypotension   -restart as tolerated   -monitor BPs    #HLD  -c/w atorvastatin 5mg qhs.    Problem/Plan - 5:  ·  Problem: Diabetes.   ·  Plan: History of DM on home metformin 500mg BID. Per caregiver blood sugar elevated to 339 today at rehab and 243 on admission. A1C 6.9 5/2023.  - POCT glucose 175  -c/w metformin while inpatient   -mISS while inpatient   -monitor fingersticks and add basal coverage if persistently elevated   -CC diet.    Problem/Plan - 6:  ·  Problem: Prophylactic measure.   ·  Plan: F: s/p 2LNS in ED  E: replete as needed  N: minced and moist CC diet   DVT ppx: lovenox   Full Code  Dispo: [facility].    
IMPRESSION:  Unclear etiology of sepsis.  Could be due to infectious colitis. She seemed to respond to IV Ceftriaxone but did not respond to PO augmentin. Unfortunately Augmentin does not have as good gram negative coverage as augmentin which could account for this replace.  Doubt PNA given negative CXR and lack of respiratory symptoms. Doubt UTI.  Doubt Viral pneumonia given negative RVP.  Blood cultures with NGTD    Recommend:  1. Stop Vancomycin and Zosyn  2.  Start Ceftriaxone 1 gram IV daily + Metronidazole 500 mg PO/IV q8hrs  3.  If patient stable on this regimen for 48hrs, it is reasonable to switch to PO cefpdoxime + PO metronidazole to completw 10 day course  4. Follow up blood cultures     ID team 1 will follow

## 2023-05-30 NOTE — PROGRESS NOTE ADULT - PROBLEM SELECTOR PLAN 3
#Parkinsons disease  #Alzheimers dementia   -patient with both Parkinsons disease and Alzheimers dementia. On home keppra 500mg q12, sertraline 50mg qd, and sinemet 25-100mg q8  -c/w home meds   -per discussion with palliative care and HCP (daughter) previous admission, patient to remain full code however if patient is suddenly decompensating, call daughter and she will be prepared to make a decision  -PT consult   - OT consult   -minced and moist diet  -lidocaine patch for back pain   -bowel regimen
#Parkinsons disease  #Alzheimers dementia   -patient with both Parkinsons disease and Alzheimers dementia. On home keppra 500mg q12, sertraline 50mg qd, and sinemet 25-100mg q8    -c/w home meds   -per discussion with palliative care and HCP (daughter) previous admission, patient to remain full code however if patient is suddenly decompensating, call daughter and she will be prepared to make a decision  -PT consult   - OT consult   -minced and moist diet  -lidocaine patch for back pain   -bowel regimen

## 2023-05-30 NOTE — PROGRESS NOTE ADULT - PROBLEM SELECTOR PLAN 1
Likely 2/2 colitis from previous admission insufficiently covered by previous antibiotic regimen. Patient meeting severe sepsis criteria with tachycardia, leukocytosis, and elevated lactate on admission.  Previous admission last week for colitis, discharged on PO augmentin. Exam unremarkable. Pt denies dysuria/urinary problems, SOB, abd pain. Afebrile however reports hypotension at rehab today. Lactate cleared, WBCs nl  -s/p vancomycin 1g IV x1, zosyn 3.375g IV x1, 2LNS in ED, RVP negative, CXR unchanged from previous admission with no acute consolidation noted, EKG unchanged from prior with sinus tachycardia and nonspecific ST changes     -f/u UA and UCxs  -f/u BCxs - NGTD  -Tylenol PRN for fever   -ID following - Ceftriaxone + Metronidazole (D1: 5/29). Can de-escalate to cefpodoxime and flagyl if clinically stable for 48 hours
Patient meeting severe sepsis criteria with tachycardia, leukocytosis, and elevated lactate on admission. Etiology unknown at this time. Previous admission for colitis, discharged on PO augmentin. Exam unremarkable. Pt denies dysuria/urinary problems, SOB, abd pain. Afebrile however reports hypotension at rehab today.   -s/p vancomycin 1g IV x1, zosyn 3.375g IV x1, 2LNS in ED  - ID consulted, f/u recs   - c/w vanc 1g and zosyn 3.375 g until ID recs   -RVP negative   -CXR (wet read) with left-sided pneumonectomy, unchanged from prior, no acute consolidation   -EKG: sinus tachycardia with non-specific ST-T changes, unchanged from prior   -f/u UA and UCxs  -f/u BCxs   -lactate cleared   -wbc downtrending   -tylenol prn fevers

## 2023-05-30 NOTE — PROGRESS NOTE ADULT - ASSESSMENT
Unclear etiology of sepsis.  Could be due to infectious colitis. She seemed to respond to IV Ceftriaxone but did not respond to PO augmentin. Unfortunately Augmentin does not have as good gram negative coverage as augmentin which could account for this replace.  Doubt PNA given negative CXR and lack of respiratory symptoms. Doubt UTI.  Doubt Viral pneumonia given negative RVP.      - BCx NGTD 5/28    Recommend:  - c/w Ceftriaxone 1 gram IV daily + Metronidazole 500 mg PO/IV q8hrs  - If patient stable on this regimen for 48hrs, it is reasonable to switch to PO cefpdoxime (200mg BID) + PO metronidazole to complete 10 day course (including days of IV Abx - last day 6/7)  - Follow up blood cultures     ID team 1 will continue to follow.

## 2023-05-30 NOTE — PROGRESS NOTE ADULT - ASSESSMENT
87 yo woman admitted with likely proctocolitis, now improving:  =continued improvement in leukocytosis  -hemodynamically stable  -ID consult - ceftriaxone and metronidazole for proctocolitis  -progressive anemia ?dilutional? - check iron panel and stool guiac  -family aware of goals fo care  -DVT prophyalxis  -dc planning return to [facility] when anemia is better understood and patient is stable on current antibiotic regimen  d/w family and team

## 2023-05-31 ENCOUNTER — TRANSCRIPTION ENCOUNTER (OUTPATIENT)
Age: 88
End: 2023-05-31

## 2023-05-31 VITALS
RESPIRATION RATE: 17 BRPM | SYSTOLIC BLOOD PRESSURE: 123 MMHG | TEMPERATURE: 98 F | OXYGEN SATURATION: 97 % | DIASTOLIC BLOOD PRESSURE: 76 MMHG | HEART RATE: 88 BPM

## 2023-05-31 LAB
ANION GAP SERPL CALC-SCNC: 7 MMOL/L — SIGNIFICANT CHANGE UP (ref 5–17)
BASOPHILS # BLD AUTO: 0.04 K/UL — SIGNIFICANT CHANGE UP (ref 0–0.2)
BASOPHILS NFR BLD AUTO: 0.5 % — SIGNIFICANT CHANGE UP (ref 0–2)
BUN SERPL-MCNC: 18 MG/DL — SIGNIFICANT CHANGE UP (ref 7–23)
CALCIUM SERPL-MCNC: 9.6 MG/DL — SIGNIFICANT CHANGE UP (ref 8.4–10.5)
CHLORIDE SERPL-SCNC: 102 MMOL/L — SIGNIFICANT CHANGE UP (ref 96–108)
CO2 SERPL-SCNC: 27 MMOL/L — SIGNIFICANT CHANGE UP (ref 22–31)
CREAT SERPL-MCNC: 0.79 MG/DL — SIGNIFICANT CHANGE UP (ref 0.5–1.3)
EGFR: 72 ML/MIN/1.73M2 — SIGNIFICANT CHANGE UP
EOSINOPHIL # BLD AUTO: 0.14 K/UL — SIGNIFICANT CHANGE UP (ref 0–0.5)
EOSINOPHIL NFR BLD AUTO: 1.8 % — SIGNIFICANT CHANGE UP (ref 0–6)
GLUCOSE BLDC GLUCOMTR-MCNC: 173 MG/DL — HIGH (ref 70–99)
GLUCOSE BLDC GLUCOMTR-MCNC: 216 MG/DL — HIGH (ref 70–99)
GLUCOSE SERPL-MCNC: 162 MG/DL — HIGH (ref 70–99)
HCT VFR BLD CALC: 29.6 % — LOW (ref 34.5–45)
HGB BLD-MCNC: 9.2 G/DL — LOW (ref 11.5–15.5)
IMM GRANULOCYTES NFR BLD AUTO: 1.8 % — HIGH (ref 0–0.9)
LYMPHOCYTES # BLD AUTO: 1.04 K/UL — SIGNIFICANT CHANGE UP (ref 1–3.3)
LYMPHOCYTES # BLD AUTO: 13.5 % — SIGNIFICANT CHANGE UP (ref 13–44)
MAGNESIUM SERPL-MCNC: 1.8 MG/DL — SIGNIFICANT CHANGE UP (ref 1.6–2.6)
MCHC RBC-ENTMCNC: 25.1 PG — LOW (ref 27–34)
MCHC RBC-ENTMCNC: 31.1 GM/DL — LOW (ref 32–36)
MCV RBC AUTO: 80.7 FL — SIGNIFICANT CHANGE UP (ref 80–100)
MONOCYTES # BLD AUTO: 0.48 K/UL — SIGNIFICANT CHANGE UP (ref 0–0.9)
MONOCYTES NFR BLD AUTO: 6.3 % — SIGNIFICANT CHANGE UP (ref 2–14)
NEUTROPHILS # BLD AUTO: 5.84 K/UL — SIGNIFICANT CHANGE UP (ref 1.8–7.4)
NEUTROPHILS NFR BLD AUTO: 76.1 % — SIGNIFICANT CHANGE UP (ref 43–77)
NRBC # BLD: 0 /100 WBCS — SIGNIFICANT CHANGE UP (ref 0–0)
PHOSPHATE SERPL-MCNC: 2.8 MG/DL — SIGNIFICANT CHANGE UP (ref 2.5–4.5)
PLATELET # BLD AUTO: 223 K/UL — SIGNIFICANT CHANGE UP (ref 150–400)
POTASSIUM SERPL-MCNC: 4.5 MMOL/L — SIGNIFICANT CHANGE UP (ref 3.5–5.3)
POTASSIUM SERPL-SCNC: 4.5 MMOL/L — SIGNIFICANT CHANGE UP (ref 3.5–5.3)
RBC # BLD: 3.67 M/UL — LOW (ref 3.8–5.2)
RBC # FLD: 17.1 % — HIGH (ref 10.3–14.5)
SODIUM SERPL-SCNC: 136 MMOL/L — SIGNIFICANT CHANGE UP (ref 135–145)
WBC # BLD: 7.68 K/UL — SIGNIFICANT CHANGE UP (ref 3.8–10.5)
WBC # FLD AUTO: 7.68 K/UL — SIGNIFICANT CHANGE UP (ref 3.8–10.5)

## 2023-05-31 PROCEDURE — 83735 ASSAY OF MAGNESIUM: CPT

## 2023-05-31 PROCEDURE — 97165 OT EVAL LOW COMPLEX 30 MIN: CPT

## 2023-05-31 PROCEDURE — 86901 BLOOD TYPING SEROLOGIC RH(D): CPT

## 2023-05-31 PROCEDURE — 97166 OT EVAL MOD COMPLEX 45 MIN: CPT

## 2023-05-31 PROCEDURE — 86900 BLOOD TYPING SEROLOGIC ABO: CPT

## 2023-05-31 PROCEDURE — 86870 RBC ANTIBODY IDENTIFICATION: CPT

## 2023-05-31 PROCEDURE — 84100 ASSAY OF PHOSPHORUS: CPT

## 2023-05-31 PROCEDURE — 86850 RBC ANTIBODY SCREEN: CPT

## 2023-05-31 PROCEDURE — 87086 URINE CULTURE/COLONY COUNT: CPT

## 2023-05-31 PROCEDURE — 80053 COMPREHEN METABOLIC PANEL: CPT

## 2023-05-31 PROCEDURE — 83605 ASSAY OF LACTIC ACID: CPT

## 2023-05-31 PROCEDURE — 83550 IRON BINDING TEST: CPT

## 2023-05-31 PROCEDURE — 99291 CRITICAL CARE FIRST HOUR: CPT

## 2023-05-31 PROCEDURE — 97162 PT EVAL MOD COMPLEX 30 MIN: CPT

## 2023-05-31 PROCEDURE — 81001 URINALYSIS AUTO W/SCOPE: CPT

## 2023-05-31 PROCEDURE — 71045 X-RAY EXAM CHEST 1 VIEW: CPT

## 2023-05-31 PROCEDURE — 36415 COLL VENOUS BLD VENIPUNCTURE: CPT

## 2023-05-31 PROCEDURE — 83540 ASSAY OF IRON: CPT

## 2023-05-31 PROCEDURE — 85045 AUTOMATED RETICULOCYTE COUNT: CPT

## 2023-05-31 PROCEDURE — 0225U NFCT DS DNA&RNA 21 SARSCOV2: CPT

## 2023-05-31 PROCEDURE — 86880 COOMBS TEST DIRECT: CPT

## 2023-05-31 PROCEDURE — 80048 BASIC METABOLIC PNL TOTAL CA: CPT

## 2023-05-31 PROCEDURE — 99232 SBSQ HOSP IP/OBS MODERATE 35: CPT

## 2023-05-31 PROCEDURE — 82962 GLUCOSE BLOOD TEST: CPT

## 2023-05-31 PROCEDURE — 96375 TX/PRO/DX INJ NEW DRUG ADDON: CPT

## 2023-05-31 PROCEDURE — 82728 ASSAY OF FERRITIN: CPT

## 2023-05-31 PROCEDURE — 97530 THERAPEUTIC ACTIVITIES: CPT

## 2023-05-31 PROCEDURE — 87040 BLOOD CULTURE FOR BACTERIA: CPT

## 2023-05-31 PROCEDURE — 85025 COMPLETE CBC W/AUTO DIFF WBC: CPT

## 2023-05-31 PROCEDURE — 96374 THER/PROPH/DIAG INJ IV PUSH: CPT

## 2023-05-31 RX ORDER — METRONIDAZOLE 500 MG
1 TABLET ORAL
Qty: 21 | Refills: 0
Start: 2023-05-31 | End: 2023-06-06

## 2023-05-31 RX ORDER — CEFPODOXIME PROXETIL 100 MG
1 TABLET ORAL
Qty: 14 | Refills: 0
Start: 2023-05-31 | End: 2023-06-06

## 2023-05-31 RX ORDER — CARBIDOPA AND LEVODOPA 25; 100 MG/1; MG/1
1 TABLET ORAL
Refills: 0 | DISCHARGE

## 2023-05-31 RX ADMIN — CARBIDOPA AND LEVODOPA 1 TABLET(S): 25; 100 TABLET ORAL at 05:42

## 2023-05-31 RX ADMIN — Medication 500 MILLIGRAM(S): at 13:14

## 2023-05-31 RX ADMIN — POLYETHYLENE GLYCOL 3350 17 GRAM(S): 17 POWDER, FOR SOLUTION ORAL at 11:23

## 2023-05-31 RX ADMIN — PANTOPRAZOLE SODIUM 40 MILLIGRAM(S): 20 TABLET, DELAYED RELEASE ORAL at 05:42

## 2023-05-31 RX ADMIN — Medication 81 MILLIGRAM(S): at 11:23

## 2023-05-31 RX ADMIN — Medication 500 MILLIGRAM(S): at 05:42

## 2023-05-31 RX ADMIN — HEPARIN SODIUM 5000 UNIT(S): 5000 INJECTION INTRAVENOUS; SUBCUTANEOUS at 05:42

## 2023-05-31 RX ADMIN — Medication 1 MILLIGRAM(S): at 11:23

## 2023-05-31 RX ADMIN — LIDOCAINE 1 PATCH: 4 CREAM TOPICAL at 11:24

## 2023-05-31 RX ADMIN — SERTRALINE 50 MILLIGRAM(S): 25 TABLET, FILM COATED ORAL at 11:23

## 2023-05-31 RX ADMIN — Medication 4: at 12:51

## 2023-05-31 RX ADMIN — Medication 1 TABLET(S): at 11:22

## 2023-05-31 RX ADMIN — Medication 2: at 09:23

## 2023-05-31 RX ADMIN — CEFTRIAXONE 100 MILLIGRAM(S): 500 INJECTION, POWDER, FOR SOLUTION INTRAMUSCULAR; INTRAVENOUS at 14:22

## 2023-05-31 RX ADMIN — HEPARIN SODIUM 5000 UNIT(S): 5000 INJECTION INTRAVENOUS; SUBCUTANEOUS at 13:14

## 2023-05-31 RX ADMIN — CARBIDOPA AND LEVODOPA 1 TABLET(S): 25; 100 TABLET ORAL at 13:14

## 2023-05-31 NOTE — DISCHARGE NOTE NURSING/CASE MANAGEMENT/SOCIAL WORK - PATIENT PORTAL LINK FT
You can access the FollowMyHealth Patient Portal offered by [facility] by registering at the following website: [email or web address]. By joining [employer]’s FollowMyHealth portal, you will also be able to view your health information using other applications (apps) compatible with our system.

## 2023-05-31 NOTE — PROGRESS NOTE ADULT - ATTENDING COMMENTS
Agree with above.  Patient was seen and examined at bedside.  She looks well on exam and all signs of sepsis have resolved.  Can stop IV Ceftriaxone and flagyl and switch to Cefpodoxime 200 mg PO q12 + Metronidazole 500 mg PO q8hrs to complete a 10 day total course of antibiotics  ID team 1 will sign off.  Reconsult as needed
Agree with above.  Sepsis appears to be resolving.  Can continue Ceftriaxone + Metronidazole with switch to cefpodoxime + flagyl if she is clinically stable for 48hrs

## 2023-05-31 NOTE — PROGRESS NOTE ADULT - SUBJECTIVE AND OBJECTIVE BOX
INFECTIOUS DISEASES CONSULT FOLLOW-UP NOTE    INTERVAL HPI/OVERNIGHT EVENTS:    RADHA overnight , feeling well this AM, no new subjective complaints overnight , no BM overnight       ROS:   Constitutional, eyes, ENT, cardiovascular, respiratory, gastrointestinal, genitourinary, integumentary, neurological, psychiatric and heme/lymph are otherwise negative other than noted above       ANTIBIOTICS/RELEVANT:    MEDICATIONS  (STANDING):  aspirin  chewable 81 milliGRAM(s) Oral daily  atorvastatin 5 milliGRAM(s) Oral at bedtime  carbidopa/levodopa  25/100 1 Tablet(s) Oral every 8 hours  cefTRIAXone   IVPB 1000 milliGRAM(s) IV Intermittent every 24 hours  dextrose 5%. 1000 milliLiter(s) (50 mL/Hr) IV Continuous <Continuous>  dextrose 5%. 1000 milliLiter(s) (100 mL/Hr) IV Continuous <Continuous>  dextrose 50% Injectable 25 Gram(s) IV Push once  dextrose 50% Injectable 25 Gram(s) IV Push once  dextrose 50% Injectable 12.5 Gram(s) IV Push once  folic acid 1 milliGRAM(s) Oral daily  glucagon  Injectable 1 milliGRAM(s) IntraMuscular once  heparin   Injectable 5000 Unit(s) SubCutaneous every 8 hours  insulin lispro (ADMELOG) corrective regimen sliding scale   SubCutaneous Before meals and at bedtime  lidocaine   4% Patch 1 Patch Transdermal every 24 hours  metroNIDAZOLE    Tablet 500 milliGRAM(s) Oral every 8 hours  multivitamin  Chewable 1 Tablet(s) Oral daily  pantoprazole   Suspension 40 milliGRAM(s) Oral before breakfast  polyethylene glycol 3350 17 Gram(s) Oral every 24 hours  sertraline 50 milliGRAM(s) Oral daily    MEDICATIONS  (PRN):  acetaminophen   Oral Liquid .. 650 milliGRAM(s) Oral every 6 hours PRN Temp greater or equal to 38C (100.4F), Mild Pain (1 - 3)  aluminum hydroxide/magnesium hydroxide/simethicone Suspension 30 milliLiter(s) Oral every 6 hours PRN Dyspepsia  dextrose Oral Gel 15 Gram(s) Oral once PRN Blood Glucose LESS THAN 70 milliGRAM(s)/deciliter        Vital Signs Last 24 Hrs  T(C): 36.6 (31 May 2023 05:41), Max: 36.6 (31 May 2023 05:41)  T(F): 97.8 (31 May 2023 05:41), Max: 97.8 (31 May 2023 05:41)  HR: 97 (31 May 2023 05:41) (97 - 101)  BP: 127/78 (31 May 2023 05:41) (119/78 - 133/79)  BP(mean): --  RR: 18 (31 May 2023 05:41) (17 - 18)  SpO2: 97% (31 May 2023 05:41) (96% - 97%)    Parameters below as of 31 May 2023 05:41  Patient On (Oxygen Delivery Method): room air        PHYSICAL EXAM:  Constitutional: non-toxic, no distress  Eyes:ZHANNA, EOMI  Ear/Nose/Throat: no oral lesions  Neck:  supple  Respiratory: CTA BL  Cardiovascular: S1S2 RRR, no murmurs  Gastrointestinal:soft, (+) BS, no HSM  Extremities:no e/e/c  Vascular: DP and radial 2+ BL        LABS:                        9.2    7.68  )-----------( 223      ( 31 May 2023 05:30 )             29.6     05-31    136  |  102  |  18  ----------------------------<  162<H>  4.5   |  27  |  0.79    Ca    9.6      31 May 2023 05:30  Phos  2.8     05-31  Mg     1.8     05-31    TPro  5.0<L>  /  Alb  3.0<L>  /  TBili  0.2  /  DBili  x   /  AST  16  /  ALT  <5<L>  /  AlkPhos  122<H>  05-30          MICROBIOLOGY:      RADIOLOGY & ADDITIONAL STUDIES:  Reviewed

## 2023-05-31 NOTE — PROGRESS NOTE ADULT - SUBJECTIVE AND OBJECTIVE BOX
Events:  HGB now stable at 9 concistent with prior levels  Afebrile on CTX and metro  Blood pressure controlled    MEDICATIONS  (STANDING):  aspirin  chewable 81 milliGRAM(s) Oral daily  atorvastatin 5 milliGRAM(s) Oral at bedtime  carbidopa/levodopa  25/100 1 Tablet(s) Oral every 8 hours  cefTRIAXone   IVPB 1000 milliGRAM(s) IV Intermittent every 24 hours  dextrose 5%. 1000 milliLiter(s) (50 mL/Hr) IV Continuous <Continuous>  dextrose 5%. 1000 milliLiter(s) (100 mL/Hr) IV Continuous <Continuous>  dextrose 50% Injectable 25 Gram(s) IV Push once  dextrose 50% Injectable 25 Gram(s) IV Push once  dextrose 50% Injectable 12.5 Gram(s) IV Push once  folic acid 1 milliGRAM(s) Oral daily  glucagon  Injectable 1 milliGRAM(s) IntraMuscular once  heparin   Injectable 5000 Unit(s) SubCutaneous every 8 hours  insulin lispro (ADMELOG) corrective regimen sliding scale   SubCutaneous Before meals and at bedtime  lidocaine   4% Patch 1 Patch Transdermal every 24 hours  metroNIDAZOLE    Tablet 500 milliGRAM(s) Oral every 8 hours  multivitamin  Chewable 1 Tablet(s) Oral daily  pantoprazole   Suspension 40 milliGRAM(s) Oral before breakfast  polyethylene glycol 3350 17 Gram(s) Oral every 24 hours  sertraline 50 milliGRAM(s) Oral daily    MEDICATIONS  (PRN):  acetaminophen   Oral Liquid .. 650 milliGRAM(s) Oral every 6 hours PRN Temp greater or equal to 38C (100.4F), Mild Pain (1 - 3)  aluminum hydroxide/magnesium hydroxide/simethicone Suspension 30 milliLiter(s) Oral every 6 hours PRN Dyspepsia  dextrose Oral Gel 15 Gram(s) Oral once PRN Blood Glucose LESS THAN 70 milliGRAM(s)/deciliter    Vital Signs Last 24 Hrs  T(C): 36.6 (31 May 2023 05:41), Max: 36.6 (31 May 2023 05:41)  T(F): 97.8 (31 May 2023 05:41), Max: 97.8 (31 May 2023 05:41)  HR: 97 (31 May 2023 05:41) (97 - 101)  BP: 127/78 (31 May 2023 05:41) (119/78 - 133/79)  BP(mean): --  RR: 18 (31 May 2023 05:41) (17 - 18)  SpO2: 97% (31 May 2023 05:41) (96% - 97%)    Parameters below as of 31 May 2023 05:41  Patient On (Oxygen Delivery Method): room air    NAD seated in bed  No JVD  Chest clear on right - absent bs on left  CV RRR s1s2 no murmur  Abd soft  Ext no edema                          9.2    7.68  )-----------( 223      ( 31 May 2023 05:30 )             29.6   05-30    137  |  105  |  17  ----------------------------<  145<H>  4.3   |  25  |  0.86    Ca    9.2      30 May 2023 05:30  Phos  2.4     05-30  Mg     1.4     05-30    TPro  5.0<L>  /  Alb  3.0<L>  /  TBili  0.2  /  DBili  x   /  AST  16  /  ALT  <5<L>  /  AlkPhos  122<H>  05-30

## 2023-05-31 NOTE — DISCHARGE NOTE NURSING/CASE MANAGEMENT/SOCIAL WORK - NSDCPEFALRISK_GEN_ALL_CORE
For information on Fall & Injury Prevention, visit: [email or web address] OR  [email or web address] OR  https://www.cdc.gov/steadi/patient.html

## 2023-05-31 NOTE — PROGRESS NOTE ADULT - PROVIDER SPECIALTY LIST ADULT
Cardiology
Internal Medicine
Infectious Disease
Infectious Disease
Cardiology
Cardiology
Internal Medicine

## 2023-05-31 NOTE — PROGRESS NOTE ADULT - ASSESSMENT
Unclear etiology of sepsis.  Could be due to infectious colitis. She seemed to respond to IV Ceftriaxone but did not respond to PO augmentin. Unfortunately Augmentin does not have as good gram negative coverage as augmentin which could account for this replace.  Doubt PNA given negative CXR and lack of respiratory symptoms. Doubt UTI.  Doubt Viral pneumonia given negative RVP.      - BCx NGTD 5/28    Recommend:  - c/w Ceftriaxone 1 gram IV daily + Metronidazole 500 mg PO/IV q8hrs  - If patient stable on this regimen for 48hrs, it is reasonable to switch to PO cefpdoxime (200mg BID) + PO metronidazole (500mg TID) to complete 10 day course (including days of IV Abx - last day 6/7)    ID team 1 to sign off.  Unclear etiology of sepsis.  Could be due to infectious colitis. She seemed to respond to IV Ceftriaxone but did not respond to PO augmentin. Unfortunately Augmentin does not have as good gram negative coverage as augmentin which could account for this replace.  Doubt PNA given negative CXR and lack of respiratory symptoms. Doubt UTI.  Doubt Viral pneumonia given negative RVP.      - BCx NGTD 5/28    Recommend:  - c/w Ceftriaxone 1 gram IV daily + Metronidazole 500 mg PO/IV q8hrs  - Given stability and improvement over the past two days, it is reasonable to switch to PO cefpdoxime (200mg BID) + PO metronidazole (500mg TID) to complete 10 day course (including days of IV Abx - last day 6/7)    ID team 1 to sign off.

## 2023-05-31 NOTE — PROGRESS NOTE ADULT - ASSESSMENT
87 yo admitted with proctocolitis and leukocytosis  -now with normal WBC  -blood pressure controlled  -baseline heart rate  -baseline mental status  -on abx per ID to complete appropriate course  -now stable for transfer back to [facility]  -medications reviewed  -case d/x family and team

## 2023-06-01 NOTE — PROGRESS NOTE ADULT - PROBLEM SELECTOR PLAN 7
tablet       Information about where to get these medications is not yet available    Ask your nurse or doctor about these medications  amoxicillin-clavulanate 875-125 MG per tablet  magnesium oxide 400 (240 Mg) MG tablet          Patient Instructions: Activity: DO NOT LIFT, PUSH, OR PULL ANYTHING OVER 5 POUNDS FOR 6 WEEK from the day of surgery  Diet:  Diet recommendation from speech therapy (after MBS): IDDSI 4 Puree Solids; IDDSI 0 Thin Liquids - NO straws ; Meds whole with thin liquids, 1 at a time   Wound Care:  [facility] YOUR INCISIONS DAILY WITH A CLEAN WASHCLOTH AND ANTIBACTERIAL SOAP. Do not wash your incisions after you have cleansed other parts of your body    Follow up with Cardiothoracic Surgeon, Dr. Candace Boas one week after discharge. Follow up with Oncologist in 1-2 weeks.      JOEL Akins - CNP .  Complex medical decision making / symptom management in the setting of advanced illness.    Will continue to follow for ongoing GOC discussion / titration of palliative regimen. Emotional support provided, questions answered.  Active Psychosocial Referrals:  [x]Social Work/Case management [x]PT/OT []Chaplaincy []Hospice  []Patient/Family Support []Holistic RN [x]Massage Therapy []Music Therapy []Ethics  Coping: [x] well [] with difficulty [] poor coping [] unable to assess  Support system: [x] strong [] adequate [] inadequate    For new or uncontrolled symptoms, please call Palliative Care at [phone]). The service is available 24/7 (including nights & weekends) to provide symptom management recommendations over the phone as appropriate

## 2023-06-02 DIAGNOSIS — Z51.5 ENCOUNTER FOR PALLIATIVE CARE: ICD-10-CM

## 2023-06-02 DIAGNOSIS — K51.30 ULCERATIVE (CHRONIC) RECTOSIGMOIDITIS WITHOUT COMPLICATIONS: ICD-10-CM

## 2023-06-02 DIAGNOSIS — E78.00 PURE HYPERCHOLESTEROLEMIA, UNSPECIFIED: ICD-10-CM

## 2023-06-02 DIAGNOSIS — F02.80 DEMENTIA IN OTHER DISEASES CLASSIFIED ELSEWHERE, UNSPECIFIED SEVERITY, WITHOUT BEHAVIORAL DISTURBANCE, PSYCHOTIC DISTURBANCE, MOOD DISTURBANCE, AND ANXIETY: ICD-10-CM

## 2023-06-02 DIAGNOSIS — A41.9 SEPSIS, UNSPECIFIED ORGANISM: ICD-10-CM

## 2023-06-02 DIAGNOSIS — G93.40 ENCEPHALOPATHY, UNSPECIFIED: ICD-10-CM

## 2023-06-02 DIAGNOSIS — K59.00 CONSTIPATION, UNSPECIFIED: ICD-10-CM

## 2023-06-02 DIAGNOSIS — R65.20 SEVERE SEPSIS WITHOUT SEPTIC SHOCK: ICD-10-CM

## 2023-06-02 DIAGNOSIS — G20 PARKINSON'S DISEASE: ICD-10-CM

## 2023-06-02 DIAGNOSIS — Z85.118 PERSONAL HISTORY OF OTHER MALIGNANT NEOPLASM OF BRONCHUS AND LUNG: ICD-10-CM

## 2023-06-02 DIAGNOSIS — N39.0 URINARY TRACT INFECTION, SITE NOT SPECIFIED: ICD-10-CM

## 2023-06-02 DIAGNOSIS — Z90.2 ACQUIRED ABSENCE OF LUNG [PART OF]: ICD-10-CM

## 2023-06-02 DIAGNOSIS — I10 ESSENTIAL (PRIMARY) HYPERTENSION: ICD-10-CM

## 2023-06-02 DIAGNOSIS — K52.29 OTHER ALLERGIC AND DIETETIC GASTROENTERITIS AND COLITIS: ICD-10-CM

## 2023-06-02 DIAGNOSIS — E11.9 TYPE 2 DIABETES MELLITUS WITHOUT COMPLICATIONS: ICD-10-CM

## 2023-06-02 DIAGNOSIS — I95.9 HYPOTENSION, UNSPECIFIED: ICD-10-CM

## 2023-06-02 DIAGNOSIS — K21.9 GASTRO-ESOPHAGEAL REFLUX DISEASE WITHOUT ESOPHAGITIS: ICD-10-CM

## 2023-06-02 DIAGNOSIS — R13.10 DYSPHAGIA, UNSPECIFIED: ICD-10-CM

## 2023-06-02 DIAGNOSIS — J90 PLEURAL EFFUSION, NOT ELSEWHERE CLASSIFIED: ICD-10-CM

## 2023-06-05 DIAGNOSIS — A09 INFECTIOUS GASTROENTERITIS AND COLITIS, UNSPECIFIED: ICD-10-CM

## 2023-06-05 DIAGNOSIS — R13.10 DYSPHAGIA, UNSPECIFIED: ICD-10-CM

## 2023-06-05 DIAGNOSIS — Z87.891 PERSONAL HISTORY OF NICOTINE DEPENDENCE: ICD-10-CM

## 2023-06-05 DIAGNOSIS — A41.9 SEPSIS, UNSPECIFIED ORGANISM: ICD-10-CM

## 2023-06-05 DIAGNOSIS — Z79.84 LONG TERM (CURRENT) USE OF ORAL HYPOGLYCEMIC DRUGS: ICD-10-CM

## 2023-06-05 DIAGNOSIS — G20 PARKINSON'S DISEASE: ICD-10-CM

## 2023-06-05 DIAGNOSIS — F02.80 DEMENTIA IN OTHER DISEASES CLASSIFIED ELSEWHERE, UNSPECIFIED SEVERITY, WITHOUT BEHAVIORAL DISTURBANCE, PSYCHOTIC DISTURBANCE, MOOD DISTURBANCE, AND ANXIETY: ICD-10-CM

## 2023-06-05 DIAGNOSIS — D64.9 ANEMIA, UNSPECIFIED: ICD-10-CM

## 2023-06-05 DIAGNOSIS — E11.9 TYPE 2 DIABETES MELLITUS WITHOUT COMPLICATIONS: ICD-10-CM

## 2023-06-05 DIAGNOSIS — I10 ESSENTIAL (PRIMARY) HYPERTENSION: ICD-10-CM

## 2023-06-05 DIAGNOSIS — R65.20 SEVERE SEPSIS WITHOUT SEPTIC SHOCK: ICD-10-CM

## 2023-06-14 ENCOUNTER — INPATIENT (INPATIENT)
Facility: HOSPITAL | Age: 88
LOS: 9 days | Discharge: HOSPICE | DRG: 871 | End: 2023-06-24
Attending: GENERAL ACUTE CARE HOSPITAL | Admitting: INTERNAL MEDICINE
Payer: MEDICARE

## 2023-06-14 VITALS
HEIGHT: 64 IN | SYSTOLIC BLOOD PRESSURE: 113 MMHG | OXYGEN SATURATION: 99 % | HEART RATE: 162 BPM | RESPIRATION RATE: 26 BRPM | TEMPERATURE: 98 F | DIASTOLIC BLOOD PRESSURE: 84 MMHG

## 2023-06-14 DIAGNOSIS — Z98.890 OTHER SPECIFIED POSTPROCEDURAL STATES: Chronic | ICD-10-CM

## 2023-06-14 DIAGNOSIS — Z90.2 ACQUIRED ABSENCE OF LUNG [PART OF]: Chronic | ICD-10-CM

## 2023-06-14 DIAGNOSIS — Z90.49 ACQUIRED ABSENCE OF OTHER SPECIFIED PARTS OF DIGESTIVE TRACT: Chronic | ICD-10-CM

## 2023-06-14 PROBLEM — G20 PARKINSON'S DISEASE: Chronic | Status: ACTIVE | Noted: 2023-05-28

## 2023-06-14 PROBLEM — G30.9 ALZHEIMER'S DISEASE, UNSPECIFIED: Chronic | Status: ACTIVE | Noted: 2023-05-28

## 2023-06-14 LAB
ALBUMIN SERPL ELPH-MCNC: 3.8 G/DL — SIGNIFICANT CHANGE UP (ref 3.3–5)
ALP SERPL-CCNC: 195 U/L — HIGH (ref 40–120)
ALT FLD-CCNC: 12 U/L — SIGNIFICANT CHANGE UP (ref 10–45)
ANION GAP SERPL CALC-SCNC: 10 MMOL/L — SIGNIFICANT CHANGE UP (ref 5–17)
APTT BLD: 32.1 SEC — SIGNIFICANT CHANGE UP (ref 27.5–35.5)
AST SERPL-CCNC: 13 U/L — SIGNIFICANT CHANGE UP (ref 10–40)
BASE EXCESS BLDV CALC-SCNC: 6 MMOL/L — HIGH (ref -2–3)
BASOPHILS # BLD AUTO: 0.04 K/UL — SIGNIFICANT CHANGE UP (ref 0–0.2)
BASOPHILS NFR BLD AUTO: 0.3 % — SIGNIFICANT CHANGE UP (ref 0–2)
BILIRUB SERPL-MCNC: 0.4 MG/DL — SIGNIFICANT CHANGE UP (ref 0.2–1.2)
BUN SERPL-MCNC: 26 MG/DL — HIGH (ref 7–23)
CA-I SERPL-SCNC: 1.45 MMOL/L — HIGH (ref 1.15–1.33)
CALCIUM SERPL-MCNC: 11.2 MG/DL — HIGH (ref 8.4–10.5)
CHLORIDE SERPL-SCNC: 104 MMOL/L — SIGNIFICANT CHANGE UP (ref 96–108)
CO2 BLDV-SCNC: 35.1 MMOL/L — HIGH (ref 22–26)
CO2 SERPL-SCNC: 30 MMOL/L — SIGNIFICANT CHANGE UP (ref 22–31)
CREAT SERPL-MCNC: 0.72 MG/DL — SIGNIFICANT CHANGE UP (ref 0.5–1.3)
EGFR: 81 ML/MIN/1.73M2 — SIGNIFICANT CHANGE UP
EOSINOPHIL # BLD AUTO: 0.02 K/UL — SIGNIFICANT CHANGE UP (ref 0–0.5)
EOSINOPHIL NFR BLD AUTO: 0.2 % — SIGNIFICANT CHANGE UP (ref 0–6)
FLUAV AG NPH QL: SIGNIFICANT CHANGE UP
FLUBV AG NPH QL: SIGNIFICANT CHANGE UP
GAS PNL BLDV: 139 MMOL/L — SIGNIFICANT CHANGE UP (ref 136–145)
GAS PNL BLDV: SIGNIFICANT CHANGE UP
GLUCOSE SERPL-MCNC: 165 MG/DL — HIGH (ref 70–99)
HCO3 BLDV-SCNC: 33 MMOL/L — HIGH (ref 22–29)
HCT VFR BLD CALC: 36.9 % — SIGNIFICANT CHANGE UP (ref 34.5–45)
HGB BLD-MCNC: 11.5 G/DL — SIGNIFICANT CHANGE UP (ref 11.5–15.5)
IMM GRANULOCYTES NFR BLD AUTO: 1 % — HIGH (ref 0–0.9)
INR BLD: 1.11 — SIGNIFICANT CHANGE UP (ref 0.88–1.16)
LACTATE SERPL-SCNC: 1.4 MMOL/L — SIGNIFICANT CHANGE UP (ref 0.5–2)
LYMPHOCYTES # BLD AUTO: 0.64 K/UL — LOW (ref 1–3.3)
LYMPHOCYTES # BLD AUTO: 5.4 % — LOW (ref 13–44)
MAGNESIUM SERPL-MCNC: 1.5 MG/DL — LOW (ref 1.6–2.6)
MCHC RBC-ENTMCNC: 25.7 PG — LOW (ref 27–34)
MCHC RBC-ENTMCNC: 31.2 GM/DL — LOW (ref 32–36)
MCV RBC AUTO: 82.4 FL — SIGNIFICANT CHANGE UP (ref 80–100)
MONOCYTES # BLD AUTO: 0.51 K/UL — SIGNIFICANT CHANGE UP (ref 0–0.9)
MONOCYTES NFR BLD AUTO: 4.3 % — SIGNIFICANT CHANGE UP (ref 2–14)
NEUTROPHILS # BLD AUTO: 10.45 K/UL — HIGH (ref 1.8–7.4)
NEUTROPHILS NFR BLD AUTO: 88.8 % — HIGH (ref 43–77)
NRBC # BLD: 0 /100 WBCS — SIGNIFICANT CHANGE UP (ref 0–0)
PCO2 BLDV: 59 MMHG — HIGH (ref 39–42)
PH BLDV: 7.36 — SIGNIFICANT CHANGE UP (ref 7.32–7.43)
PHOSPHATE SERPL-MCNC: 2.7 MG/DL — SIGNIFICANT CHANGE UP (ref 2.5–4.5)
PLATELET # BLD AUTO: 290 K/UL — SIGNIFICANT CHANGE UP (ref 150–400)
PO2 BLDV: <33 MMHG — SIGNIFICANT CHANGE UP (ref 25–45)
POTASSIUM BLDV-SCNC: 4.7 MMOL/L — SIGNIFICANT CHANGE UP (ref 3.5–5.1)
POTASSIUM SERPL-MCNC: 4.8 MMOL/L — SIGNIFICANT CHANGE UP (ref 3.5–5.3)
POTASSIUM SERPL-SCNC: 4.8 MMOL/L — SIGNIFICANT CHANGE UP (ref 3.5–5.3)
PROT SERPL-MCNC: 7.1 G/DL — SIGNIFICANT CHANGE UP (ref 6–8.3)
PROTHROM AB SERPL-ACNC: 13.2 SEC — SIGNIFICANT CHANGE UP (ref 10.5–13.4)
RBC # BLD: 4.48 M/UL — SIGNIFICANT CHANGE UP (ref 3.8–5.2)
RBC # FLD: 17.3 % — HIGH (ref 10.3–14.5)
RSV RNA NPH QL NAA+NON-PROBE: SIGNIFICANT CHANGE UP
SAO2 % BLDV: 45 % — LOW (ref 67–88)
SARS-COV-2 RNA SPEC QL NAA+PROBE: SIGNIFICANT CHANGE UP
SODIUM SERPL-SCNC: 144 MMOL/L — SIGNIFICANT CHANGE UP (ref 135–145)
TROPONIN T, HIGH SENSITIVITY RESULT: 52 NG/L — CRITICAL HIGH (ref 0–51)
TROPONIN T, HIGH SENSITIVITY RESULT: 58 NG/L — CRITICAL HIGH (ref 0–51)
WBC # BLD: 11.78 K/UL — HIGH (ref 3.8–10.5)
WBC # FLD AUTO: 11.78 K/UL — HIGH (ref 3.8–10.5)

## 2023-06-14 PROCEDURE — 71045 X-RAY EXAM CHEST 1 VIEW: CPT | Mod: 26

## 2023-06-14 PROCEDURE — 99291 CRITICAL CARE FIRST HOUR: CPT

## 2023-06-14 RX ORDER — DILTIAZEM HCL 120 MG
20 CAPSULE, EXT RELEASE 24 HR ORAL ONCE
Refills: 0 | Status: COMPLETED | OUTPATIENT
Start: 2023-06-14 | End: 2023-06-14

## 2023-06-14 RX ORDER — DILTIAZEM HCL 120 MG
90 CAPSULE, EXT RELEASE 24 HR ORAL ONCE
Refills: 0 | Status: COMPLETED | OUTPATIENT
Start: 2023-06-14 | End: 2023-06-14

## 2023-06-14 RX ORDER — METOPROLOL TARTRATE 50 MG
5 TABLET ORAL ONCE
Refills: 0 | Status: COMPLETED | OUTPATIENT
Start: 2023-06-14 | End: 2023-06-14

## 2023-06-14 RX ORDER — DILTIAZEM HCL 120 MG
5 CAPSULE, EXT RELEASE 24 HR ORAL
Qty: 125 | Refills: 0 | Status: DISCONTINUED | OUTPATIENT
Start: 2023-06-14 | End: 2023-06-15

## 2023-06-14 RX ORDER — SODIUM CHLORIDE 9 MG/ML
1700 INJECTION INTRAMUSCULAR; INTRAVENOUS; SUBCUTANEOUS ONCE
Refills: 0 | Status: COMPLETED | OUTPATIENT
Start: 2023-06-14 | End: 2023-06-14

## 2023-06-14 RX ORDER — LANOLIN ALCOHOL/MO/W.PET/CERES
3 CREAM (GRAM) TOPICAL AT BEDTIME
Refills: 0 | Status: DISCONTINUED | OUTPATIENT
Start: 2023-06-14 | End: 2023-06-24

## 2023-06-14 RX ORDER — DILTIAZEM HCL 120 MG
10 CAPSULE, EXT RELEASE 24 HR ORAL ONCE
Refills: 0 | Status: COMPLETED | OUTPATIENT
Start: 2023-06-14 | End: 2023-06-14

## 2023-06-14 RX ORDER — ACETAMINOPHEN 500 MG
650 TABLET ORAL ONCE
Refills: 0 | Status: DISCONTINUED | OUTPATIENT
Start: 2023-06-14 | End: 2023-06-14

## 2023-06-14 RX ORDER — ACETAMINOPHEN 500 MG
1000 TABLET ORAL ONCE
Refills: 0 | Status: COMPLETED | OUTPATIENT
Start: 2023-06-14 | End: 2023-06-14

## 2023-06-14 RX ORDER — MAGNESIUM SULFATE 500 MG/ML
2 VIAL (ML) INJECTION ONCE
Refills: 0 | Status: COMPLETED | OUTPATIENT
Start: 2023-06-14 | End: 2023-06-14

## 2023-06-14 RX ORDER — VANCOMYCIN HCL 1 G
1000 VIAL (EA) INTRAVENOUS ONCE
Refills: 0 | Status: COMPLETED | OUTPATIENT
Start: 2023-06-14 | End: 2023-06-14

## 2023-06-14 RX ORDER — ACETAMINOPHEN 500 MG
650 TABLET ORAL EVERY 6 HOURS
Refills: 0 | Status: DISCONTINUED | OUTPATIENT
Start: 2023-06-14 | End: 2023-06-15

## 2023-06-14 RX ORDER — ACETAMINOPHEN 500 MG
650 TABLET ORAL ONCE
Refills: 0 | Status: COMPLETED | OUTPATIENT
Start: 2023-06-14 | End: 2023-06-14

## 2023-06-14 RX ORDER — PIPERACILLIN AND TAZOBACTAM 4; .5 G/20ML; G/20ML
3.38 INJECTION, POWDER, LYOPHILIZED, FOR SOLUTION INTRAVENOUS ONCE
Refills: 0 | Status: COMPLETED | OUTPATIENT
Start: 2023-06-14 | End: 2023-06-14

## 2023-06-14 RX ADMIN — PIPERACILLIN AND TAZOBACTAM 200 GRAM(S): 4; .5 INJECTION, POWDER, LYOPHILIZED, FOR SOLUTION INTRAVENOUS at 14:36

## 2023-06-14 RX ADMIN — Medication 20 MILLIGRAM(S): at 16:31

## 2023-06-14 RX ADMIN — Medication 5 MILLIGRAM(S): at 15:32

## 2023-06-14 RX ADMIN — Medication 5 MILLIGRAM(S): at 15:03

## 2023-06-14 RX ADMIN — Medication 250 MILLIGRAM(S): at 14:46

## 2023-06-14 RX ADMIN — Medication 100 GRAM(S): at 22:50

## 2023-06-14 RX ADMIN — Medication 400 MILLIGRAM(S): at 22:30

## 2023-06-14 RX ADMIN — SODIUM CHLORIDE 1700 MILLILITER(S): 9 INJECTION INTRAMUSCULAR; INTRAVENOUS; SUBCUTANEOUS at 14:20

## 2023-06-14 RX ADMIN — Medication 10 MILLIGRAM(S): at 22:50

## 2023-06-14 RX ADMIN — Medication 650 MILLIGRAM(S): at 14:46

## 2023-06-14 RX ADMIN — Medication 20 MILLIGRAM(S): at 20:33

## 2023-06-14 RX ADMIN — Medication 650 MILLIGRAM(S): at 18:34

## 2023-06-14 RX ADMIN — Medication 5 MILLIGRAM(S): at 14:46

## 2023-06-14 NOTE — ED PROVIDER NOTE - CLINICAL SUMMARY MEDICAL DECISION MAKING FREE TEXT BOX
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     ED Course:   Vital signs noted. Pt is normotensive with rectal temp of 100.1, HR in 150s, and pulse ox of 96% on 2L NC. Sepsis work up obtained. CXR reveals left hemithorax opacification consistent with prior CXR. Labs reviewed and significant for WBCs 11.78 with a shift. Pt given zosyn and vancomycin for sepsis. Also given lopressor 5mg x3 doses for Afib with improvement of HR to 110s. Case discussed with PCP Dr. Johnson who reports pt does not have a h/o Afib. Pt to be admitted to Dr. Johnson's service. Pt stable in ED. 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     ED Course:   Vital signs noted. Pt is normotensive with rectal temp of 100.1, HR in 150s, and pulse ox of 96% on 2L NC. Sepsis work up obtained. CXR reveals left sided opacification consistent with prior CXR. Labs reviewed and significant for WBCs 11.78 with a shift. Pt given zosyn and vancomycin for sepsis. Also given lopressor 5mg x3 doses for Afib with improvement of HR to 110s. Case discussed with PCP Dr. Johnson who reports pt does not have a h/o Afib or CHF. While in ED pt with HR increasing to 140s. Given Cardizem with rate control. Case discussed with Dr. Johnson and pt admitted to his service. Stable in ED. 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     ED Course:   Vital signs noted. Pt is normotensive with rectal temp of 100.1, HR in 150s, and pulse ox of 96% on 2L NC. ECG with afib with RVR of 170s. Sepsis work up obtained. CXR reveals left sided opacification consistent with prior CXR. Labs reviewed and significant for WBCs 11.78 with a shift. Pt given zosyn and vancomycin for sepsis. Also given lopressor 5mg x3 doses for Afib with improvement of HR to 110s. Case discussed with PCP Dr. Johnson who reports pt does not have a h/o Afib or CHF. While in ED pt with HR increasing to 140s. Given Cardizem with rate control. Case discussed with Dr. Johnson and pt admitted to his service. Stable in ED.

## 2023-06-14 NOTE — ED ADULT NURSE NOTE - NSICDXPASTMEDICALHX_GEN_ALL_CORE_FT
PAST MEDICAL HISTORY:  Alzheimer disease     Diabetes     High cholesterol     HTN (hypertension)     Parkinson disease

## 2023-06-14 NOTE — ED PROVIDER NOTE - OBJECTIVE STATEMENT
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 2 weeks ago from [facility] rehab for HTN and chills, found to be septic likely 2/2 colitis, discharged back to [facility] re[facility] approximately 2 weeks ago with PICC line in place and PO abx regimen only now presents to ED with "aspiration pneumonia due to Afib" as per nursing home papers from [facility] re[facility].  Case discussed with Dr. Spangler who states pt was noted to cough while eating breakfast this morning and then desatted to the 80s. Pt was also noted to be tachycardic with HR in the 150s which prompted ED visit. O2 sat improved to mid 90s on NC. As per HHA at bedside, pt is at baseline mental status. Pt denies chest pain, SOB, or any other complaints.

## 2023-06-14 NOTE — CONSULT NOTE ADULT - SUBJECTIVE AND OBJECTIVE BOX
Patient is a 88y old  Female who presents with a chief complaint of Infection/atrial fibrillation (14 Jun 2023 20:41)      Consult reason: afib rvr  ED vitals: T   HR   RR  BP  SpO2  Labs signficant:  Imaging/EKG:   Interventions:    HPI:  87yo F with PMHx Alzheimer's dementia, Parkinson's Disease, SDH/SAH s/p repair of cerebral aneurysm 1960s, Lung Ca s/p lobectomy of L lung, HTN, HLD, DM, recent admission for sepsis 2/2 colitis and dysphagia discharged to [facility] rehab, re-presented to [facility] 2 weeks ago from [facility] rehab for HTN and chills, found to be septic likely 2/2 colitis, discharged back to [facility] rehab approximately 2 weeks ago with PICC line in place and PO abx regimen only now presents to ED with "aspiration pneumonia due to Afib" as per nursing home papers from [facility] rehab. ICU consulted for afib rvr.      Allergies    No Known Allergies    Intolerances      Home Medications:  acetaminophen 325 mg oral tablet: 2 tab(s) orally every 6 hours, As needed, Temp greater or equal to 38C (100.4F), Mild Pain (1 - 3) (09 Feb 2021 11:45)  aluminum hydroxide-magnesium hydroxide 200 mg-200 mg/5 mL oral suspension: 30 milliliter(s) orally every 4 hours as needed for Dyspepsia (26 May 2023 13:04)  aspirin 81 mg oral tablet, chewable: 1 chewed once a day (25 May 2023 15:48)  atorvastatin: 5 milligram(s) orally once a day (at bedtime) (25 May 2023 15:52)  Centrum: 1 tab(s) orally once a day (26 May 2023 13:04)  Claritin: 5 milligram(s) orally once a day (26 May 2023 13:04)  Colace 100 mg oral capsule: 1 cap(s) orally once a day (at bedtime) (05 Feb 2021 21:05)  folic acid 1 mg oral tablet: 1 tab(s) orally once a day (05 Feb 2021 21:05)  losartan 100 mg oral tablet: 1 tab(s) orally once a day (05 Feb 2021 21:05)  metFORMIN 500 mg oral tablet: 500  orally 2 times a day (05 Feb 2021 21:05)  omeprazole 20 mg oral delayed release capsule:  (05 Feb 2021 21:05)  sertraline 50 mg oral tablet: 1 tab(s) orally once a day (05 Feb 2021 21:05)  Sinemet 25 mg-100 mg oral tablet: 2.5 tab(s) orally 3 times a day (31 May 2023 13:47)  Vitamin B12 250 mcg oral tablet:  (05 Feb 2021 21:05)  Vitamin D3: 5000u  orally (05 Feb 2021 21:05)      SOCIAL HX:     Smoking          ETOH/Illicit drugs          Occupation    PAST MEDICAL & SURGICAL HISTORY:  HTN (hypertension)      Diabetes      High cholesterol      Parkinson disease      Alzheimer disease      S/P removal of lung  left, 2000 partial and 2004 complete      S/P cholecystectomy      Personal history of spine surgery      H/O cerebral aneurysm repair  1968, "metal plates"          FAMILY HISTORY:  No pertinent family history in first degree relatives    :    No known cardiovascular or pulmonary family history     ROS:  See HPI     PHYSICAL EXAM    ICU Vital Signs Last 24 Hrs  T(C): 38.3 (14 Jun 2023 22:35), Max: 38.3 (14 Jun 2023 22:35)  T(F): 100.9 (14 Jun 2023 22:35), Max: 100.9 (14 Jun 2023 22:35)  HR: 150 (14 Jun 2023 23:48) (102 - 170)  BP: 132/90 (14 Jun 2023 23:48) (106/57 - 156/100)  BP(mean): --  ABP: --  ABP(mean): --  RR: 26 (14 Jun 2023 23:48) (22 - 26)  SpO2: 98% (14 Jun 2023 23:48) (89% - 99%)    O2 Parameters below as of 14 Jun 2023 23:48  Patient On (Oxygen Delivery Method): nasal cannula  O2 Flow (L/min): 3          General: Not in distress  HEENT:  ZHANNA              Lymphatic system: No LN  Lungs: Bilateral BS  Cardiovascular: Regular  Gastrointestinal: Soft, Positive BS  Musculoskeletal: No clubbing.  Moves all extremities.    Skin: Warm.  Intact  Neurological: No motor or sensory deficit         LABS:                          11.5   11.78 )-----------( 290      ( 14 Jun 2023 14:17 )             36.9                                               06-14    144  |  104  |  26<H>  ----------------------------<  165<H>  4.8   |  30  |  0.72    Ca    11.2<H>      14 Jun 2023 14:17  Phos  2.7     06-14  Mg     1.5     06-14    TPro  7.1  /  Alb  3.8  /  TBili  0.4  /  DBili  x   /  AST  13  /  ALT  12  /  AlkPhos  195<H>  06-14      PT/INR - ( 14 Jun 2023 14:17 )   PT: 13.2 sec;   INR: 1.11          PTT - ( 14 Jun 2023 14:17 )  PTT:32.1 sec                                       Urinalysis Basic - ( 14 Jun 2023 17:43 )    Color: Yellow / Appearance: SL Cloudy / SG: >=1.030 / pH: x  Gluc: x / Ketone: Trace mg/dL  / Bili: Negative / Urobili: 0.2 E.U./dL   Blood: x / Protein: 100 mg/dL / Nitrite: NEGATIVE   Leuk Esterase: Moderate / RBC: < 5 /HPF / WBC > 10 /HPF   Sq Epi: x / Non Sq Epi: x / Bacteria: Present /HPF                                                  LIVER FUNCTIONS - ( 14 Jun 2023 14:17 )  Alb: 3.8 g/dL / Pro: 7.1 g/dL / ALK PHOS: 195 U/L / ALT: 12 U/L / AST: 13 U/L / GGT: x                                                                                                                                           CXR:    ECHO:    MEDICATIONS  (STANDING):  diltiazem Infusion 5 mG/Hr (5 mL/Hr) IV Continuous <Continuous>    MEDICATIONS  (PRN):  acetaminophen     Tablet .. 650 milliGRAM(s) Oral every 6 hours PRN Temp greater or equal to 38C (100.4F), Mild Pain (1 - 3)  melatonin 3 milliGRAM(s) Oral at bedtime PRN Insomnia         Patient is a 88y old  Female who presents with a chief complaint of Infection/atrial fibrillation (14 Jun 2023 20:41)      Consult reason: afib rvr  ED vitals: T 100.1 -> 100.9 -160  RR 24 /75 SpO2 94% 4LNC  Labs signficant: WBC 11.78, trop 52 <- 58, BUN 26, calcium 11.2. alk phos 195, magnesium 1.5, vbg ph 7.36, pCO2 59, UA +LE and WBC  CXR: complete opacification of left unchanged, right lung clear  EKG: atrial fibrillation  Interventions: 1700cc NS, Diltiazem IVP 20mg x2, lopressor 5mg x3, vanc, zosyn    HPI:  87yo F with PMHx Alzheimer's dementia, Parkinson's Disease, SDH/SAH s/p repair of cerebral aneurysm 1960s, Lung Ca s/p lobectomy of L lung, HTN, HLD, DM, recent admission for sepsis 2/2 colitis and dysphagia discharged to [facility] rehab, re-presented to [facility] 2 weeks ago from [facility] rehab for HTN and chills, found to be septic likely 2/2 colitis, discharged back to [facility] rehab approximately 2 weeks ago with PICC line in place and PO abx regimen only now presents to ED with "aspiration pneumonia due to Afib" as per nursing home papers from [facility] rehab. ICU consulted for afib rvr.      Allergies    No Known Allergies    Intolerances      Home Medications:  acetaminophen 325 mg oral tablet: 2 tab(s) orally every 6 hours, As needed, Temp greater or equal to 38C (100.4F), Mild Pain (1 - 3) (09 Feb 2021 11:45)  aluminum hydroxide-magnesium hydroxide 200 mg-200 mg/5 mL oral suspension: 30 milliliter(s) orally every 4 hours as needed for Dyspepsia (26 May 2023 13:04)  aspirin 81 mg oral tablet, chewable: 1 chewed once a day (25 May 2023 15:48)  atorvastatin: 5 milligram(s) orally once a day (at bedtime) (25 May 2023 15:52)  Centrum: 1 tab(s) orally once a day (26 May 2023 13:04)  Claritin: 5 milligram(s) orally once a day (26 May 2023 13:04)  Colace 100 mg oral capsule: 1 cap(s) orally once a day (at bedtime) (05 Feb 2021 21:05)  folic acid 1 mg oral tablet: 1 tab(s) orally once a day (05 Feb 2021 21:05)  losartan 100 mg oral tablet: 1 tab(s) orally once a day (05 Feb 2021 21:05)  metFORMIN 500 mg oral tablet: 500  orally 2 times a day (05 Feb 2021 21:05)  omeprazole 20 mg oral delayed release capsule:  (05 Feb 2021 21:05)  sertraline 50 mg oral tablet: 1 tab(s) orally once a day (05 Feb 2021 21:05)  Sinemet 25 mg-100 mg oral tablet: 2.5 tab(s) orally 3 times a day (31 May 2023 13:47)  Vitamin B12 250 mcg oral tablet:  (05 Feb 2021 21:05)  Vitamin D3: 5000u  orally (05 Feb 2021 21:05)      SOCIAL HX:     Smoking          ETOH/Illicit drugs          Occupation    PAST MEDICAL & SURGICAL HISTORY:  HTN (hypertension)      Diabetes      High cholesterol      Parkinson disease      Alzheimer disease      S/P removal of lung  left, 2000 partial and 2004 complete      S/P cholecystectomy      Personal history of spine surgery      H/O cerebral aneurysm repair  1968, "metal plates"          FAMILY HISTORY:  No pertinent family history in first degree relatives    :    No known cardiovascular or pulmonary family history     ROS:  See HPI     PHYSICAL EXAM    ICU Vital Signs Last 24 Hrs  T(C): 38.3 (14 Jun 2023 22:35), Max: 38.3 (14 Jun 2023 22:35)  T(F): 100.9 (14 Jun 2023 22:35), Max: 100.9 (14 Jun 2023 22:35)  HR: 150 (14 Jun 2023 23:48) (102 - 170)  BP: 132/90 (14 Jun 2023 23:48) (106/57 - 156/100)  BP(mean): --  ABP: --  ABP(mean): --  RR: 26 (14 Jun 2023 23:48) (22 - 26)  SpO2: 98% (14 Jun 2023 23:48) (89% - 99%)    O2 Parameters below as of 14 Jun 2023 23:48  Patient On (Oxygen Delivery Method): nasal cannula  O2 Flow (L/min): 3          General: Not in distress  HEENT:  ZHANNA              Lungs: right lung with mild wheezing and rales, left lung without breath sounds, breathing comfortably  Cardiovascular: irregular rate and tachycardic, no M/R/G  Gastrointestinal: Soft, Positive BS, non-tender, non-distended  Musculoskeletal: No clubbing.  Moves all extremities.    Skin: Warm. Intact  Vascular: bilateral trace edema  Neurological: No motor or sensory deficit, AAOx1-2 (knows name and in hospital)        LABS:                          11.5   11.78 )-----------( 290      ( 14 Jun 2023 14:17 )             36.9                                               06-14    144  |  104  |  26<H>  ----------------------------<  165<H>  4.8   |  30  |  0.72    Ca    11.2<H>      14 Jun 2023 14:17  Phos  2.7     06-14  Mg     1.5     06-14    TPro  7.1  /  Alb  3.8  /  TBili  0.4  /  DBili  x   /  AST  13  /  ALT  12  /  AlkPhos  195<H>  06-14      PT/INR - ( 14 Jun 2023 14:17 )   PT: 13.2 sec;   INR: 1.11          PTT - ( 14 Jun 2023 14:17 )  PTT:32.1 sec                                       Urinalysis Basic - ( 14 Jun 2023 17:43 )    Color: Yellow / Appearance: SL Cloudy / SG: >=1.030 / pH: x  Gluc: x / Ketone: Trace mg/dL  / Bili: Negative / Urobili: 0.2 E.U./dL   Blood: x / Protein: 100 mg/dL / Nitrite: NEGATIVE   Leuk Esterase: Moderate / RBC: < 5 /HPF / WBC > 10 /HPF   Sq Epi: x / Non Sq Epi: x / Bacteria: Present /HPF                                                  LIVER FUNCTIONS - ( 14 Jun 2023 14:17 )  Alb: 3.8 g/dL / Pro: 7.1 g/dL / ALK PHOS: 195 U/L / ALT: 12 U/L / AST: 13 U/L / GGT: x                                                                                                                                           CXR:    ECHO:    MEDICATIONS  (STANDING):  diltiazem Infusion 5 mG/Hr (5 mL/Hr) IV Continuous <Continuous>    MEDICATIONS  (PRN):  acetaminophen     Tablet .. 650 milliGRAM(s) Oral every 6 hours PRN Temp greater or equal to 38C (100.4F), Mild Pain (1 - 3)  melatonin 3 milliGRAM(s) Oral at bedtime PRN Insomnia         Patient is a 88y old  Female who presents with a chief complaint of Infection/atrial fibrillation (14 Jun 2023 20:41)      Consult reason: afib rvr  ED vitals: T 100.1 -> 100.9 -160  RR 24 /75 SpO2 94% 4LNC  Labs signficant: WBC 11.78, trop 52 <- 58, BUN 26, calcium 11.2. alk phos 195, magnesium 1.5, vbg ph 7.36, pCO2 59, UA +LE and WBC  CXR: complete opacification of left unchanged, right lung clear  EKG: atrial fibrillation  Interventions: 1700cc NS, Diltiazem IVP 20mg x2, lopressor 5mg x3, vanc, zosyn    HPI:  89yo F with PMHx Alzheimer's dementia, Parkinson's Disease, SDH/SAH s/p repair of cerebral aneurysm 1960s, Lung Ca s/p lobectomy of L lung, HTN, HLD, DM, recent admission for sepsis 2/2 colitis and dysphagia discharged to [facility] rehab, re-presented to [facility] 2 weeks ago from [facility] rehab for HTN and chills, found to be septic likely 2/2 colitis, discharged back to [facility] rehab approximately 2 weeks ago with PICC line in place and PO abx regimen only now presents to ED with "aspiration pneumonia due to Afib" as per nursing home papers from [facility] rehab. ICU consulted for afib rvr.    Allergies    No Known Allergies    Intolerances      Home Medications:  acetaminophen 325 mg oral tablet: 2 tab(s) orally every 6 hours, As needed, Temp greater or equal to 38C (100.4F), Mild Pain (1 - 3) (09 Feb 2021 11:45)  aluminum hydroxide-magnesium hydroxide 200 mg-200 mg/5 mL oral suspension: 30 milliliter(s) orally every 4 hours as needed for Dyspepsia (26 May 2023 13:04)  aspirin 81 mg oral tablet, chewable: 1 chewed once a day (25 May 2023 15:48)  atorvastatin: 5 milligram(s) orally once a day (at bedtime) (25 May 2023 15:52)  Centrum: 1 tab(s) orally once a day (26 May 2023 13:04)  Claritin: 5 milligram(s) orally once a day (26 May 2023 13:04)  Colace 100 mg oral capsule: 1 cap(s) orally once a day (at bedtime) (05 Feb 2021 21:05)  folic acid 1 mg oral tablet: 1 tab(s) orally once a day (05 Feb 2021 21:05)  losartan 100 mg oral tablet: 1 tab(s) orally once a day (05 Feb 2021 21:05)  metFORMIN 500 mg oral tablet: 500  orally 2 times a day (05 Feb 2021 21:05)  omeprazole 20 mg oral delayed release capsule:  (05 Feb 2021 21:05)  sertraline 50 mg oral tablet: 1 tab(s) orally once a day (05 Feb 2021 21:05)  Sinemet 25 mg-100 mg oral tablet: 2.5 tab(s) orally 3 times a day (31 May 2023 13:47)  Vitamin B12 250 mcg oral tablet:  (05 Feb 2021 21:05)  Vitamin D3: 5000u  orally (05 Feb 2021 21:05)      SOCIAL HX:     Smoking          ETOH/Illicit drugs          Occupation    PAST MEDICAL & SURGICAL HISTORY:  HTN (hypertension)      Diabetes      High cholesterol      Parkinson disease      Alzheimer disease      S/P removal of lung  left, 2000 partial and 2004 complete      S/P cholecystectomy      Personal history of spine surgery      H/O cerebral aneurysm repair  1968, "metal plates"          FAMILY HISTORY:  No pertinent family history in first degree relatives    :    No known cardiovascular or pulmonary family history     ROS:  See HPI     PHYSICAL EXAM    ICU Vital Signs Last 24 Hrs  T(C): 38.3 (14 Jun 2023 22:35), Max: 38.3 (14 Jun 2023 22:35)  T(F): 100.9 (14 Jun 2023 22:35), Max: 100.9 (14 Jun 2023 22:35)  HR: 150 (14 Jun 2023 23:48) (102 - 170)  BP: 132/90 (14 Jun 2023 23:48) (106/57 - 156/100)  BP(mean): --  ABP: --  ABP(mean): --  RR: 26 (14 Jun 2023 23:48) (22 - 26)  SpO2: 98% (14 Jun 2023 23:48) (89% - 99%)    O2 Parameters below as of 14 Jun 2023 23:48  Patient On (Oxygen Delivery Method): nasal cannula  O2 Flow (L/min): 3          General: Not in distress  HEENT:  ZHANNA              Lungs: right lung with mild wheezing and rales, left lung without breath sounds, breathing comfortably  Cardiovascular: irregular rate and tachycardic, no M/R/G  Gastrointestinal: Soft, Positive BS, non-tender, non-distended  Musculoskeletal: No clubbing.  Moves all extremities.    Skin: Warm. Intact  Vascular: bilateral trace edema  Neurological: No motor or sensory deficit, AAOx1-2 (knows name and in hospital)        LABS:                          11.5   11.78 )-----------( 290      ( 14 Jun 2023 14:17 )             36.9                                               06-14    144  |  104  |  26<H>  ----------------------------<  165<H>  4.8   |  30  |  0.72    Ca    11.2<H>      14 Jun 2023 14:17  Phos  2.7     06-14  Mg     1.5     06-14    TPro  7.1  /  Alb  3.8  /  TBili  0.4  /  DBili  x   /  AST  13  /  ALT  12  /  AlkPhos  195<H>  06-14      PT/INR - ( 14 Jun 2023 14:17 )   PT: 13.2 sec;   INR: 1.11          PTT - ( 14 Jun 2023 14:17 )  PTT:32.1 sec                                       Urinalysis Basic - ( 14 Jun 2023 17:43 )    Color: Yellow / Appearance: SL Cloudy / SG: >=1.030 / pH: x  Gluc: x / Ketone: Trace mg/dL  / Bili: Negative / Urobili: 0.2 E.U./dL   Blood: x / Protein: 100 mg/dL / Nitrite: NEGATIVE   Leuk Esterase: Moderate / RBC: < 5 /HPF / WBC > 10 /HPF   Sq Epi: x / Non Sq Epi: x / Bacteria: Present /HPF                                                  LIVER FUNCTIONS - ( 14 Jun 2023 14:17 )  Alb: 3.8 g/dL / Pro: 7.1 g/dL / ALK PHOS: 195 U/L / ALT: 12 U/L / AST: 13 U/L / GGT: x                                                                                                                                           CXR:    ECHO:    MEDICATIONS  (STANDING):  diltiazem Infusion 5 mG/Hr (5 mL/Hr) IV Continuous <Continuous>    MEDICATIONS  (PRN):  acetaminophen     Tablet .. 650 milliGRAM(s) Oral every 6 hours PRN Temp greater or equal to 38C (100.4F), Mild Pain (1 - 3)  melatonin 3 milliGRAM(s) Oral at bedtime PRN Insomnia

## 2023-06-14 NOTE — PROGRESS NOTE ADULT - ASSESSMENT
UTI vs Asp PNA with fever and new onset AF with RVR now controlled on dilt  -no hx of AF  -no indication for AC at this time as AF is new and may resolve with treatment of underlying infection  -follow up UA and culture  -consider chest CT and pulm consult and speech and swallow  -goals of care previously d/w family who do not want intubation (DNR DNI) but do want antibiotics and supportive care  -DVT prophylaxis  -NPO until evaluated by speech and swallow

## 2023-06-14 NOTE — ED PROVIDER NOTE - PROGRESS NOTE
No previous records in [place] or [facility] everywhere  Noted on problem list that pt is on dialysis  This should be followed by renal as I am assuming he is getting dialysis 3 days per week as is standard  Can we verify this with patient.     Shamika Erazo MD     Stable.

## 2023-06-14 NOTE — CONSULT NOTE ADULT - ATTENDING COMMENTS
This is an 89 y/o female who resides in Rehab facility with Alzheimer's dementia, SAH, SDH, lung cancer, DM, HTN with fever, tachycardia, tachypnea, elevated trop.  She is in new onset Afib with RVR, not responding to metoprolol and diltiazem accepted to MICU for Afib rate control.  -new onset Afib with RVR  -aspiration pneumonitis vs PNA  -UTI  -sepsis without shock  -acute respiratory failure on N/C  -dehydration  -hypomagnesemia  >diltiazem gtt  >swallow eval  >[TSH]  >subtherapeutic INR, start Lovenox and c/w coumadin  Please see the resident's note above for the rest of the details. This is an 89 y/o female who resides in Rehab facility with Alzheimer's dementia, SAH, SDH, lung cancer, DM, HTN with fever, tachycardia, tachypnea, elevated trop.  She is in new onset Afib with RVR, not responding to metoprolol and diltiazem accepted to MICU for Afib rate control.  -new onset Afib with RVR  -aspiration pneumonitis vs PNA  -UTI  -sepsis without shock  -acute respiratory failure on N/C  -dehydration  -hypomagnesemia  >diltiazem gtt  >swallow eval  >[TSH]  Please see the resident's note above for the rest of the details.

## 2023-06-14 NOTE — ED ADULT NURSE NOTE - NSFALLHARMRISKINTERV_ED_ALL_ED

## 2023-06-14 NOTE — PROGRESS NOTE ADULT - SUBJECTIVE AND OBJECTIVE BOX
HPI:    88F PMH Alzheimer's dementia, Parkinson's Disease, SDH/SAH s/p repair of cerebral aneurysm 1960s, Lung Ca s/p lobectomy of L lung, HTN, HLD, DM, recent admission for sepsis 2/2 colitis and dysphagia discharged to [facility] rehab now re-presenting from [facility] accompanied by formal caregiver with hypotension and chills today at the rehab. Per caregiver at bedside patient was noted to have low BP readings at [facility] today of 94/60 and 108/47. Patient also with chills and non-productive cough that started this morning.     Was observed to be coughing while being fed by HHA at [facility] and [facility] MD though possible aspiration pna and noted new onset AF at 170 confirmed in the ED    Denies headache, chest pain, SOB, abd pain, N/V/D/C, problems with urination, dysuria, hematuria, extremity pain or swelling. No changes in baseline mental status.     PAST MEDICAL & SURGICAL HISTORY:  HTN (hypertension)  Diabetes  High cholesterol  Parkinson disease  Alzheimer disease  S/P removal of lung  left, 2000 partial and 2004 complete  S/P cholecystectomy  Personal history of spine surgery  H/O cerebral aneurysm repair  1968, "metal plates"    MEDICATIONS  (STANDING):  diltiazem    Tablet 90 milliGRAM(s) Oral once    MEDICATIONS  (PRN):  acetaminophen     Tablet .. 650 milliGRAM(s) Oral every 6 hours PRN Temp greater or equal to 38C (100.4F), Mild Pain (1 - 3)  melatonin 3 milliGRAM(s) Oral at bedtime PRN Insomnia    Vital Signs Last 24 Hrs  T(C): 37.2 (14 Jun 2023 19:21), Max: 37.8 (14 Jun 2023 14:00)  T(F): 99 (14 Jun 2023 19:21), Max: 100.1 (14 Jun 2023 14:00)  HR: 160 (14 Jun 2023 20:15) (102 - 170)  BP: 156/100 (14 Jun 2023 20:15) (106/57 - 156/100)  BP(mean): --  RR: 24 (14 Jun 2023 20:15) (24 - 26)  SpO2: 93% (14 Jun 2023 20:15) (89% - 99%)    Parameters below as of 14 Jun 2023 20:15  Patient On (Oxygen Delivery Method): nasal cannula    NAD  No JVD  Chest absent BS on left rhonchi on right  CV IrrIrr  ABD soft  Ext no edema                          11.5   11.78 )-----------( 290      ( 14 Jun 2023 14:17 )             36.9   06-14    144  |  104  |  26<H>  ----------------------------<  165<H>  4.8   |  30  |  0.72    Ca    11.2<H>      14 Jun 2023 14:17  Phos  2.7     06-14  Mg     1.5     06-14    TPro  7.1  /  Alb  3.8  /  TBili  0.4  /  DBili  x   /  AST  13  /  ALT  12  /  AlkPhos  195<H>  06-14    Urinalysis Basic - ( 14 Jun 2023 17:43 )    Color: Yellow / Appearance: SL Cloudy / SG: >=1.030 / pH: x  Gluc: x / Ketone: Trace mg/dL  / Bili: Negative / Urobili: 0.2 E.U./dL   Blood: x / Protein: 100 mg/dL / Nitrite: NEGATIVE   Leuk Esterase: Moderate / RBC: < 5 /HPF / WBC > 10 /HPF   Sq Epi: x / Non Sq Epi: x / Bacteria: Present /HPF

## 2023-06-14 NOTE — CONSULT NOTE ADULT - ASSESSMENT
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    CNS  #Alzheimer's dementia  #PD  AAOx1-2, reportedly at baseline mental status  - Monitor mentation    Card  #New onset atrial fibrillation  EKG with afib s/p dilt IVP 20mg x2, lopressor IVP 5mg x3, reportedly diltiazem well controlled heart rate however on exam patient noted to be in afib 130-170. Repeat rectal temp 100.9  - Tylenol for fever  - Diltiazem IVP 10mg and start dilt gtt with tiration  - TSH  - No AC at this time, will treat suspected underlying infection likely driving afib    Pulm  #acute respiratory failure  Suspect aspiration pneumonitis vs PNA, CXR without infiltrate  - O2 goal >94%  - CTX 2g q24h  - Will attempt to obtain sputum culture  - NPO, S&S evaluation    GI  - NPO  - S&S eval    Renal  NAVI    ID  #Sepsis  Met 2/4 SIRS criteria  - Treatment as above for acute respiratory failure  - Procal  - follow up blood culture and urine culture  - Obtain sputum culture  - urine strep/legionella    Endo  - TSH  - ISS    Heme  #Leukocytosis  WBC 11 however previous admission WBC 7, Suspect 2/2 underlying aspiration event  - Abx and follow cultures  - Trend WBC    Fluids: s/p 1700cc NS  Electrolytes: Replete to keep K>4 and Mg>2  Nutrition: NPO pending speech eval  DVT ppx: Lovenox  GI ppx: None  Code: Full code  Dispo: MICU 89yo F with PMHx Alzheimer's dementia, Parkinson's Disease, 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    CNS  #Alzheimer's dementia  #PD  AAOx1-2, reportedly at baseline mental status  - Monitor mentation    Card  #New onset atrial fibrillation  EKG with afib s/p dilt IVP 20mg x2, lopressor IVP 5mg x3, reportedly diltiazem well controlled heart rate however on exam patient noted to be in afib 130-170. Repeat rectal temp 100.9  - Tylenol for fever  - Diltiazem IVP 10mg and start dilt gtt with tiration  - TSH  - No AC at this time, will treat suspected underlying infection likely driving afib    Pulm  #acute respiratory failure  Suspect aspiration pneumonitis vs PNA, CXR without infiltrate  - O2 goal >94%  - CTX 2g q24h  - Will attempt to obtain sputum culture  - NPO, S&S evaluation    GI  - NPO  - S&S eval    Renal  NAVI    ID  #Sepsis  Met 2/4 SIRS criteria. 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    CNS  #Alzheimer's dementia  #PD  AAOx1-2, reportedly at baseline mental status  - Monitor mentation    Card  #New onset atrial fibrillation  EKG with afib s/p dilt IVP 20mg x2, lopressor IVP 5mg x3, reportedly diltiazem well controlled heart rate however on exam patient noted to be in afib 130-170. Repeat rectal temp 100.9  - Tylenol for fever  - Diltiazem IVP 10mg and start dilt gtt with tiration  - TSH  - No AC at this time, will treat suspected underlying infection likely driving afib    Pulm  #acute respiratory failure  Suspect aspiration pneumonitis vs PNA, CXR without infiltrate  - O2 goal >94%  - CTX 2g q24h  - Will attempt to obtain sputum culture  - NPO, S&S evaluation    GI  - NPO  - S&S eval    Renal  NAVI    ID  #Sepsis  Met 2/4 SIRS criteria. UA with LE+ and WBC  - Treatment as above for acute respiratory failure, CTX will treat UTI  - Procal  - follow up blood culture and urine culture  - Obtain sputum culture  - urine strep/legionella    Endo  - TSH  - ISS    Heme  #Leukocytosis  WBC 11 however previous admission WBC 7, Suspect 2/2 underlying aspiration event  - Abx and follow cultures  - Trend WBC    Fluids: s/p 1700cc NS  Electrolytes: Replete to keep K>4 and Mg>2  Nutrition: NPO pending speech eval  DVT ppx: Lovenox  GI ppx: None  Code: Full code  Dispo: MICU  Case discussed with intensivist Dr. 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    CNS  #Alzheimer's dementia  #PD  AAOx1-2, reportedly at baseline mental status  - Monitor mentation    Card  #New onset atrial fibrillation  EKG with afib s/p dilt IVP 20mg x2, lopressor IVP 5mg x3, reportedly diltiazem well controlled heart rate however on exam patient noted to be in afib 130-170. Repeat rectal temp 100.9  - Tylenol for fever  - Diltiazem IVP 10mg and start dilt gtt with tiration  - TSH  - No AC at this time, will treat suspected underlying infection likely driving afib    Pulm  #acute respiratory failure  Suspect aspiration pneumonitis vs PNA, CXR without infiltrate  - O2 goal >94%  - CTX 2g q24h  - Will attempt to obtain sputum culture  - NPO, S&S evaluation    GI  - NPO  - S&S eval    Renal  NAVI    ID  #Sepsis  Met 2/4 SIRS criteria. UA with LE+ and WBC  - Treatment as above for acute respiratory failure, CTX will treat UTI  - Procal  - follow up blood culture and urine culture  - Obtain sputum culture  - urine strep/legionella    Endo  - TSH  - ISS    Heme  #Leukocytosis  WBC 11 however previous admission WBC 7, Suspect 2/2 underlying aspiration event  - Abx and follow cultures  - Trend WBC    Fluids: s/p 1700cc NS  Electrolytes: Replete to keep K>4 and Mg>2  Nutrition: NPO pending speech eval  DVT ppx: Lovenox  GI ppx: None  Code: Full code however pending GOC discussion with family   Dispo: MICU  Case discussed with intensivist Dr. Stanford

## 2023-06-14 NOTE — ED ADULT NURSE NOTE - CAS TRG GEN SKIN CONDITION
Warm/Dry Patient is a 69 year old domiciled, , , English speaking male with a PMHx of colitis and prostate surgery and no PPHx.  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  Psychiatry consulted at the request of his family due to concerns regarding agitation and irritability, agitation slightly improved but now more problems with withdrawal from care and becoming nonparticipatory, affecting ability to go to rehab.     Patient remained non-verbal during interview today but communicated with nods and gesturing. He has also begun using writing to communicate. He has had intermittent episodes of agitation when being moved which he is aware of and appears to be due to frustration with his new limitations, per patient his back pain during movement is also a factor. Does not appear to be due to delirium or confusion. Patient appears to have need for increased control over his situation, requesting to brush his teeth during the interview. Appears more depressed today.     Recommendations:   1. Would increase depakote from 250mg qd to 250mg bid.  2. When approaching patient participation, recommend giving options to patient and allowing him to retain some control over his care, may increase participation. Patient still stating motivation to go to rehab. In the room patient responded to taking very small steps towards activity and appears more participatory than yesterday.   3. Continue Zoloft 25mg daily  4. Limit Xanax if possible as this may increase agitation  5. Avoid any stimulants for treatment of depressive symptoms which are likely adjustment disorder/underlying personality trait. Would avoid given major cardiac risk factors.   6. D/c testosterone patch which may be contributing to patient's increased aggression.   7. Avoid seroquel as patient has no dementia/delirium, is not confused.    Discussed with Dr. Harrington, attending psychiatrist. We will continue to follow.

## 2023-06-14 NOTE — ED ADULT NURSE NOTE - CHIEF COMPLAINT QUOTE
BIBA from [facility] c.o. palpitations. sent in to rule out aspiration pneumonia. PICC line to L arm, received 900 cc NS bolus. receiving abx via PICC line for "urosepsis"

## 2023-06-14 NOTE — ED PROVIDER NOTE - PHYSICAL EXAMINATION
CONSTITUTIONAL: Well appearing, awake, alert, oriented and in no apparent distress.  ENMT: Airway patent.  EYES: Clear bilaterally.  CARDIAC: Tachycardic.  Heart sounds S1, S2.   RESPIRATORY: No breath sounds on left side. Scattered crackles on right side.   GASTROINTESTINAL: Abdomen soft, non-tender, no guarding.  MUSCULOSKELETAL: Spine appears normal, range of motion is not limited, no muscle or joint tenderness  NEUROLOGICAL: Alert and oriented, no focal deficits, no motor or sensory deficits.  SKIN: Skin normal color for race, warm, dry and intact. No evidence of rash.  PSYCHIATRIC: Alert and oriented. normal mood and affect. no apparent risk to self or others. CONSTITUTIONAL: Well appearing, awake and in no apparent distress.  ENMT: Airway patent.  EYES: Clear bilaterally.  CARDIAC: Tachycardic.  Heart sounds S1, S2.   RESPIRATORY: No breath sounds on left side. Scattered crackles on right side.   GASTROINTESTINAL: Abdomen soft, non-tender, no guarding.  MUSCULOSKELETAL: Spine appears normal, range of motion is not limited   NEUROLOGICAL: Alert, no focal deficits   SKIN: Skin normal color for race, warm, dry and intact.    PSYCHIATRIC: Normal mood and affect.

## 2023-06-14 NOTE — ED ADULT NURSE NOTE - OBJECTIVE STATEMENT
Pt presents to ED BIBA from [facility] c/o palpitations. Was sent in to rule out aspiration pneumonia. PICC line to L arm, received 900 cc NS bolus via ems/receiving abx via PICC line for "urosepsis" Pt upgraded to MD [facility]. CCM initiated. Placed pt on 4L NC. Denies CP

## 2023-06-14 NOTE — ED ADULT TRIAGE NOTE - CHIEF COMPLAINT QUOTE
BIBA from [facility] c.o. palpitations. sent in to rule out aspiration pneumonia. PICC line to L arm, received 900 cc NS bolus. receiving abx via PICC line for "urosepsis"

## 2023-06-15 LAB
ALBUMIN SERPL ELPH-MCNC: 3.2 G/DL — LOW (ref 3.3–5)
ALP SERPL-CCNC: 213 U/L — HIGH (ref 40–120)
ALT FLD-CCNC: 28 U/L — SIGNIFICANT CHANGE UP (ref 10–45)
ANION GAP SERPL CALC-SCNC: 11 MMOL/L — SIGNIFICANT CHANGE UP (ref 5–17)
AST SERPL-CCNC: 22 U/L — SIGNIFICANT CHANGE UP (ref 10–40)
BASOPHILS # BLD AUTO: 0.04 K/UL — SIGNIFICANT CHANGE UP (ref 0–0.2)
BASOPHILS NFR BLD AUTO: 0.4 % — SIGNIFICANT CHANGE UP (ref 0–2)
BILIRUB SERPL-MCNC: 0.5 MG/DL — SIGNIFICANT CHANGE UP (ref 0.2–1.2)
BLD GP AB SCN SERPL QL: POSITIVE — SIGNIFICANT CHANGE UP
BUN SERPL-MCNC: 25 MG/DL — HIGH (ref 7–23)
CALCIUM SERPL-MCNC: 10.1 MG/DL — SIGNIFICANT CHANGE UP (ref 8.4–10.5)
CHLORIDE SERPL-SCNC: 103 MMOL/L — SIGNIFICANT CHANGE UP (ref 96–108)
CO2 SERPL-SCNC: 27 MMOL/L — SIGNIFICANT CHANGE UP (ref 22–31)
CREAT SERPL-MCNC: 0.77 MG/DL — SIGNIFICANT CHANGE UP (ref 0.5–1.3)
CULTURE RESULTS: SIGNIFICANT CHANGE UP
EGFR: 75 ML/MIN/1.73M2 — SIGNIFICANT CHANGE UP
EOSINOPHIL # BLD AUTO: 0.01 K/UL — SIGNIFICANT CHANGE UP (ref 0–0.5)
EOSINOPHIL NFR BLD AUTO: 0.1 % — SIGNIFICANT CHANGE UP (ref 0–6)
GLUCOSE BLDC GLUCOMTR-MCNC: 227 MG/DL — HIGH (ref 70–99)
GLUCOSE BLDC GLUCOMTR-MCNC: 231 MG/DL — HIGH (ref 70–99)
GLUCOSE BLDC GLUCOMTR-MCNC: 243 MG/DL — HIGH (ref 70–99)
GLUCOSE BLDC GLUCOMTR-MCNC: 282 MG/DL — HIGH (ref 70–99)
GLUCOSE SERPL-MCNC: 226 MG/DL — HIGH (ref 70–99)
GRAM STN FLD: SIGNIFICANT CHANGE UP
HCT VFR BLD CALC: 33.3 % — LOW (ref 34.5–45)
HGB BLD-MCNC: 10.2 G/DL — LOW (ref 11.5–15.5)
IMM GRANULOCYTES NFR BLD AUTO: 1 % — HIGH (ref 0–0.9)
LYMPHOCYTES # BLD AUTO: 0.74 K/UL — LOW (ref 1–3.3)
LYMPHOCYTES # BLD AUTO: 7 % — LOW (ref 13–44)
MAGNESIUM SERPL-MCNC: 2 MG/DL — SIGNIFICANT CHANGE UP (ref 1.6–2.6)
MCHC RBC-ENTMCNC: 25.4 PG — LOW (ref 27–34)
MCHC RBC-ENTMCNC: 30.6 GM/DL — LOW (ref 32–36)
MCV RBC AUTO: 83 FL — SIGNIFICANT CHANGE UP (ref 80–100)
MONOCYTES # BLD AUTO: 0.44 K/UL — SIGNIFICANT CHANGE UP (ref 0–0.9)
MONOCYTES NFR BLD AUTO: 4.1 % — SIGNIFICANT CHANGE UP (ref 2–14)
NEUTROPHILS # BLD AUTO: 9.3 K/UL — HIGH (ref 1.8–7.4)
NEUTROPHILS NFR BLD AUTO: 87.4 % — HIGH (ref 43–77)
NRBC # BLD: 0 /100 WBCS — SIGNIFICANT CHANGE UP (ref 0–0)
NT-PROBNP SERPL-SCNC: 8742 PG/ML — HIGH (ref 0–300)
PHOSPHATE SERPL-MCNC: 3.3 MG/DL — SIGNIFICANT CHANGE UP (ref 2.5–4.5)
PLATELET # BLD AUTO: 265 K/UL — SIGNIFICANT CHANGE UP (ref 150–400)
POTASSIUM SERPL-MCNC: 4.7 MMOL/L — SIGNIFICANT CHANGE UP (ref 3.5–5.3)
POTASSIUM SERPL-SCNC: 4.7 MMOL/L — SIGNIFICANT CHANGE UP (ref 3.5–5.3)
PROCALCITONIN SERPL-MCNC: 0.31 NG/ML — HIGH (ref 0.02–0.1)
PROT SERPL-MCNC: 6.6 G/DL — SIGNIFICANT CHANGE UP (ref 6–8.3)
RBC # BLD: 4.01 M/UL — SIGNIFICANT CHANGE UP (ref 3.8–5.2)
RBC # FLD: 17.3 % — HIGH (ref 10.3–14.5)
RH IG SCN BLD-IMP: POSITIVE — SIGNIFICANT CHANGE UP
SODIUM SERPL-SCNC: 141 MMOL/L — SIGNIFICANT CHANGE UP (ref 135–145)
SPECIMEN SOURCE: SIGNIFICANT CHANGE UP
TSH SERPL-MCNC: 0.68 UIU/ML — SIGNIFICANT CHANGE UP (ref 0.27–4.2)
TSH SERPL-MCNC: 0.69 UIU/ML — SIGNIFICANT CHANGE UP (ref 0.27–4.2)
WBC # BLD: 10.64 K/UL — HIGH (ref 3.8–10.5)
WBC # FLD AUTO: 10.64 K/UL — HIGH (ref 3.8–10.5)

## 2023-06-15 PROCEDURE — 36000 PLACE NEEDLE IN VEIN: CPT | Mod: 59

## 2023-06-15 PROCEDURE — 76937 US GUIDE VASCULAR ACCESS: CPT | Mod: 26,77

## 2023-06-15 PROCEDURE — 99222 1ST HOSP IP/OBS MODERATE 55: CPT | Mod: GC

## 2023-06-15 PROCEDURE — 99223 1ST HOSP IP/OBS HIGH 75: CPT

## 2023-06-15 PROCEDURE — 76937 US GUIDE VASCULAR ACCESS: CPT | Mod: 26,59

## 2023-06-15 PROCEDURE — 36000 PLACE NEEDLE IN VEIN: CPT | Mod: 77,59

## 2023-06-15 PROCEDURE — 71045 X-RAY EXAM CHEST 1 VIEW: CPT | Mod: 26

## 2023-06-15 PROCEDURE — 71045 X-RAY EXAM CHEST 1 VIEW: CPT | Mod: 26,77,76

## 2023-06-15 PROCEDURE — 36000 PLACE NEEDLE IN VEIN: CPT | Mod: 59,77

## 2023-06-15 PROCEDURE — 93306 TTE W/DOPPLER COMPLETE: CPT | Mod: 26

## 2023-06-15 PROCEDURE — 99232 SBSQ HOSP IP/OBS MODERATE 35: CPT | Mod: GC

## 2023-06-15 PROCEDURE — 43752 NASAL/OROGASTRIC W/TUBE PLMT: CPT

## 2023-06-15 RX ORDER — AMIODARONE HYDROCHLORIDE 400 MG/1
1 TABLET ORAL
Qty: 450 | Refills: 0 | Status: DISCONTINUED | OUTPATIENT
Start: 2023-06-15 | End: 2023-06-15

## 2023-06-15 RX ORDER — PREGABALIN 225 MG/1
0 CAPSULE ORAL
Qty: 0 | Refills: 0 | DISCHARGE

## 2023-06-15 RX ORDER — PIPERACILLIN AND TAZOBACTAM 4; .5 G/20ML; G/20ML
4.5 INJECTION, POWDER, LYOPHILIZED, FOR SOLUTION INTRAVENOUS EVERY 8 HOURS
Refills: 0 | Status: DISCONTINUED | OUTPATIENT
Start: 2023-06-15 | End: 2023-06-16

## 2023-06-15 RX ORDER — DILTIAZEM HCL 120 MG
10 CAPSULE, EXT RELEASE 24 HR ORAL
Qty: 125 | Refills: 0 | Status: DISCONTINUED | OUTPATIENT
Start: 2023-06-15 | End: 2023-06-15

## 2023-06-15 RX ORDER — AMIODARONE HYDROCHLORIDE 400 MG/1
0.5 TABLET ORAL
Qty: 450 | Refills: 0 | Status: DISCONTINUED | OUTPATIENT
Start: 2023-06-15 | End: 2023-06-17

## 2023-06-15 RX ORDER — AMIODARONE HYDROCHLORIDE 400 MG/1
150 TABLET ORAL ONCE
Refills: 0 | Status: COMPLETED | OUTPATIENT
Start: 2023-06-15 | End: 2023-06-15

## 2023-06-15 RX ORDER — CHLORHEXIDINE GLUCONATE 213 G/1000ML
1 SOLUTION TOPICAL
Refills: 0 | Status: DISCONTINUED | OUTPATIENT
Start: 2023-06-15 | End: 2023-06-18

## 2023-06-15 RX ORDER — FUROSEMIDE 40 MG
20 TABLET ORAL ONCE
Refills: 0 | Status: COMPLETED | OUTPATIENT
Start: 2023-06-15 | End: 2023-06-15

## 2023-06-15 RX ORDER — PIPERACILLIN AND TAZOBACTAM 4; .5 G/20ML; G/20ML
4.5 INJECTION, POWDER, LYOPHILIZED, FOR SOLUTION INTRAVENOUS ONCE
Refills: 0 | Status: COMPLETED | OUTPATIENT
Start: 2023-06-15 | End: 2023-06-15

## 2023-06-15 RX ORDER — FOLIC ACID 0.8 MG
1 TABLET ORAL
Qty: 0 | Refills: 0 | DISCHARGE

## 2023-06-15 RX ORDER — LOSARTAN POTASSIUM 100 MG/1
1 TABLET, FILM COATED ORAL
Qty: 0 | Refills: 0 | DISCHARGE

## 2023-06-15 RX ORDER — INSULIN LISPRO 100/ML
VIAL (ML) SUBCUTANEOUS EVERY 6 HOURS
Refills: 0 | Status: DISCONTINUED | OUTPATIENT
Start: 2023-06-15 | End: 2023-06-24

## 2023-06-15 RX ORDER — SERTRALINE 25 MG/1
1 TABLET, FILM COATED ORAL
Refills: 0 | DISCHARGE

## 2023-06-15 RX ORDER — DILTIAZEM HCL 120 MG
10 CAPSULE, EXT RELEASE 24 HR ORAL
Qty: 125 | Refills: 0 | Status: DISCONTINUED | OUTPATIENT
Start: 2023-06-15 | End: 2023-06-16

## 2023-06-15 RX ORDER — DOCUSATE SODIUM 100 MG
1 CAPSULE ORAL
Qty: 0 | Refills: 0 | DISCHARGE

## 2023-06-15 RX ORDER — OMEPRAZOLE 10 MG/1
0 CAPSULE, DELAYED RELEASE ORAL
Qty: 0 | Refills: 0 | DISCHARGE

## 2023-06-15 RX ORDER — MULTIVIT-MIN/FERROUS GLUCONATE 9 MG/15 ML
1 LIQUID (ML) ORAL
Qty: 0 | Refills: 0 | DISCHARGE

## 2023-06-15 RX ORDER — SERTRALINE 25 MG/1
50 TABLET, FILM COATED ORAL EVERY 24 HOURS
Refills: 0 | Status: DISCONTINUED | OUTPATIENT
Start: 2023-06-15 | End: 2023-06-24

## 2023-06-15 RX ORDER — ASPIRIN/CALCIUM CARB/MAGNESIUM 324 MG
1 TABLET ORAL
Refills: 0 | DISCHARGE

## 2023-06-15 RX ORDER — CARBIDOPA AND LEVODOPA 25; 100 MG/1; MG/1
2.5 TABLET ORAL
Qty: 0 | Refills: 0 | DISCHARGE

## 2023-06-15 RX ORDER — SERTRALINE 25 MG/1
1 TABLET, FILM COATED ORAL
Qty: 0 | Refills: 0 | DISCHARGE

## 2023-06-15 RX ORDER — IPRATROPIUM/ALBUTEROL SULFATE 18-103MCG
3 AEROSOL WITH ADAPTER (GRAM) INHALATION ONCE
Refills: 0 | Status: COMPLETED | OUTPATIENT
Start: 2023-06-15 | End: 2023-06-15

## 2023-06-15 RX ORDER — CHOLECALCIFEROL (VITAMIN D3) 125 MCG
5000 CAPSULE ORAL
Qty: 0 | Refills: 0 | DISCHARGE

## 2023-06-15 RX ORDER — ATORVASTATIN CALCIUM 80 MG/1
5 TABLET, FILM COATED ORAL AT BEDTIME
Refills: 0 | Status: DISCONTINUED | OUTPATIENT
Start: 2023-06-15 | End: 2023-06-15

## 2023-06-15 RX ORDER — ACETAMINOPHEN 500 MG
1000 TABLET ORAL ONCE
Refills: 0 | Status: DISCONTINUED | OUTPATIENT
Start: 2023-06-15 | End: 2023-06-15

## 2023-06-15 RX ORDER — LORATADINE 10 MG/1
5 TABLET ORAL
Qty: 0 | Refills: 0 | DISCHARGE

## 2023-06-15 RX ORDER — LEVETIRACETAM 250 MG/1
500 TABLET, FILM COATED ORAL EVERY 12 HOURS
Refills: 0 | Status: DISCONTINUED | OUTPATIENT
Start: 2023-06-15 | End: 2023-06-16

## 2023-06-15 RX ORDER — ACETAMINOPHEN 500 MG
650 TABLET ORAL EVERY 6 HOURS
Refills: 0 | Status: DISCONTINUED | OUTPATIENT
Start: 2023-06-15 | End: 2023-06-24

## 2023-06-15 RX ORDER — SERTRALINE 25 MG/1
50 TABLET, FILM COATED ORAL DAILY
Refills: 0 | Status: DISCONTINUED | OUTPATIENT
Start: 2023-06-15 | End: 2023-06-15

## 2023-06-15 RX ORDER — ENOXAPARIN SODIUM 100 MG/ML
40 INJECTION SUBCUTANEOUS EVERY 24 HOURS
Refills: 0 | Status: DISCONTINUED | OUTPATIENT
Start: 2023-06-15 | End: 2023-06-24

## 2023-06-15 RX ORDER — ATORVASTATIN CALCIUM 80 MG/1
5 TABLET, FILM COATED ORAL AT BEDTIME
Refills: 0 | Status: DISCONTINUED | OUTPATIENT
Start: 2023-06-16 | End: 2023-06-24

## 2023-06-15 RX ORDER — CARBIDOPA AND LEVODOPA 25; 100 MG/1; MG/1
2.5 TABLET ORAL EVERY 8 HOURS
Refills: 0 | Status: DISCONTINUED | OUTPATIENT
Start: 2023-06-15 | End: 2023-06-24

## 2023-06-15 RX ORDER — INSULIN LISPRO 100/ML
VIAL (ML) SUBCUTANEOUS AT BEDTIME
Refills: 0 | Status: DISCONTINUED | OUTPATIENT
Start: 2023-06-15 | End: 2023-06-15

## 2023-06-15 RX ORDER — ACETAMINOPHEN 500 MG
1000 TABLET ORAL ONCE
Refills: 0 | Status: COMPLETED | OUTPATIENT
Start: 2023-06-15 | End: 2023-06-15

## 2023-06-15 RX ORDER — LEVETIRACETAM 250 MG/1
500 TABLET, FILM COATED ORAL
Refills: 0 | Status: DISCONTINUED | OUTPATIENT
Start: 2023-06-15 | End: 2023-06-15

## 2023-06-15 RX ORDER — AMIODARONE HYDROCHLORIDE 400 MG/1
1 TABLET ORAL
Qty: 450 | Refills: 0 | Status: DISCONTINUED | OUTPATIENT
Start: 2023-06-15 | End: 2023-06-17

## 2023-06-15 RX ADMIN — Medication 10 MG/HR: at 03:19

## 2023-06-15 RX ADMIN — Medication 3: at 05:23

## 2023-06-15 RX ADMIN — CHLORHEXIDINE GLUCONATE 1 APPLICATION(S): 213 SOLUTION TOPICAL at 05:15

## 2023-06-15 RX ADMIN — AMIODARONE HYDROCHLORIDE 33.3 MG/MIN: 400 TABLET ORAL at 18:11

## 2023-06-15 RX ADMIN — Medication 10 MG/HR: at 09:52

## 2023-06-15 RX ADMIN — LEVETIRACETAM 400 MILLIGRAM(S): 250 TABLET, FILM COATED ORAL at 18:37

## 2023-06-15 RX ADMIN — Medication 400 MILLIGRAM(S): at 13:06

## 2023-06-15 RX ADMIN — Medication 1000 MILLIGRAM(S): at 13:30

## 2023-06-15 RX ADMIN — PIPERACILLIN AND TAZOBACTAM 25 GRAM(S): 4; .5 INJECTION, POWDER, LYOPHILIZED, FOR SOLUTION INTRAVENOUS at 15:00

## 2023-06-15 RX ADMIN — AMIODARONE HYDROCHLORIDE 600 MILLIGRAM(S): 400 TABLET ORAL at 10:29

## 2023-06-15 RX ADMIN — AMIODARONE HYDROCHLORIDE 600 MILLIGRAM(S): 400 TABLET ORAL at 06:09

## 2023-06-15 RX ADMIN — ATORVASTATIN CALCIUM 5 MILLIGRAM(S): 80 TABLET, FILM COATED ORAL at 21:19

## 2023-06-15 RX ADMIN — SERTRALINE 50 MILLIGRAM(S): 25 TABLET, FILM COATED ORAL at 23:20

## 2023-06-15 RX ADMIN — Medication 2: at 23:18

## 2023-06-15 RX ADMIN — CARBIDOPA AND LEVODOPA 2.5 TABLET(S): 25; 100 TABLET ORAL at 21:18

## 2023-06-15 RX ADMIN — Medication 20 MILLIGRAM(S): at 01:40

## 2023-06-15 RX ADMIN — Medication 5 MG/HR: at 00:41

## 2023-06-15 RX ADMIN — Medication 3 MILLILITER(S): at 22:57

## 2023-06-15 RX ADMIN — AMIODARONE HYDROCHLORIDE 33.3 MG/MIN: 400 TABLET ORAL at 10:50

## 2023-06-15 RX ADMIN — LEVETIRACETAM 400 MILLIGRAM(S): 250 TABLET, FILM COATED ORAL at 05:29

## 2023-06-15 RX ADMIN — PIPERACILLIN AND TAZOBACTAM 200 GRAM(S): 4; .5 INJECTION, POWDER, LYOPHILIZED, FOR SOLUTION INTRAVENOUS at 10:28

## 2023-06-15 RX ADMIN — Medication 2: at 18:12

## 2023-06-15 RX ADMIN — PIPERACILLIN AND TAZOBACTAM 25 GRAM(S): 4; .5 INJECTION, POWDER, LYOPHILIZED, FOR SOLUTION INTRAVENOUS at 21:18

## 2023-06-15 RX ADMIN — AMIODARONE HYDROCHLORIDE 600 MILLIGRAM(S): 400 TABLET ORAL at 05:03

## 2023-06-15 RX ADMIN — ENOXAPARIN SODIUM 40 MILLIGRAM(S): 100 INJECTION SUBCUTANEOUS at 12:09

## 2023-06-15 RX ADMIN — Medication 2: at 12:09

## 2023-06-15 RX ADMIN — AMIODARONE HYDROCHLORIDE 16.7 MG/MIN: 400 TABLET ORAL at 23:53

## 2023-06-15 NOTE — H&P ADULT - NSHPSOCIALHISTORY_GEN_ALL_CORE
Patient currently resides at [facility] rehab   Ambulates with walker however has not been walking since previous admission   Former smoker  No alcohol or recreational drug use

## 2023-06-15 NOTE — PATIENT PROFILE ADULT - FALL HARM RISK - HARM RISK INTERVENTIONS

## 2023-06-15 NOTE — CONSULT NOTE ADULT - PROBLEM SELECTOR RECOMMENDATION 2
.  In the setting of PNA and AFib  -wean supplemental O2 as tolerated  -address reversible etiologies

## 2023-06-15 NOTE — CHART NOTE - NSCHARTNOTEFT_GEN_A_CORE
Attestation of Midline Checklist review      Patient admitted with R upper arm midline  Dressing and site clean, dry and intact.   Ultrasound guidance to evaluate placement of catheter in the vessel, saline flush visualized upstream.  Catheter patent.   As per team, patient had midline placed for recent IV antibiotic course.    The patient has one L AC 20G IV that was inserted in ED this admission, and ultrasound was used to verify placement.   Writer placed one more L upper arm USGPIV just in case team wants to remove midline in the morning.    BCx were obtained in ED, currently NGTD; if it becomes positive, then the midline should be removed promptly.

## 2023-06-15 NOTE — PROCEDURE NOTE - ADDITIONAL PROCEDURE DETAILS
NGT placed to left nare, x 1 attempt and patient tolerated well.   Patient pending CXR to confirm placement. NGT placed to left nare, x 1 attempt and patient tolerated well.   Patient pending CXR to confirm placement.    1810: NGT advanced 10 cm to 65 @ left nare and confirmed via radiology

## 2023-06-15 NOTE — DIETITIAN INITIAL EVALUATION ADULT - PERTINENT LABORATORY DATA
06-15    141  |  103  |  25<H>  ----------------------------<  226<H>  4.7   |  27  |  0.77    Ca    10.1      15 Hugh 2023 04:33  Phos  3.3     06-15  Mg     2.0     06-15    TPro  6.6  /  Alb  3.2<L>  /  TBili  0.5  /  DBili  x   /  AST  22  /  ALT  28  /  AlkPhos  213<H>  06-15  POCT Blood Glucose.: 282 mg/dL (06-15-23 @ 05:13)  A1C with Estimated Average Glucose Result: 6.9 % (05-24-23 @ 07:11)

## 2023-06-15 NOTE — PROGRESS NOTE ADULT - SUBJECTIVE AND OBJECTIVE BOX
**INCOMPLETE NOTE    OVERNIGHT EVENTS:    SUBJECTIVE:  Patient seen and examined at bedside.    Vital Signs Last 12 Hrs  T(F): 97.5 (06-15-23 @ 04:11), Max: 100.9 (06-14-23 @ 22:35)  HR: 133 (06-15-23 @ 06:00) (108 - 160)  BP: 139/87 (06-15-23 @ 06:00) (106/57 - 166/68)  BP(mean): 105 (06-15-23 @ 06:00) (72 - 110)  RR: 25 (06-15-23 @ 06:00) (22 - 36)  SpO2: 95% (06-15-23 @ 06:00) (93% - 100%)  I&O's Summary    14 Jun 2023 07:01  -  15 Hugh 2023 06:22  --------------------------------------------------------  IN: 345 mL / OUT: 700 mL / NET: -355 mL        PHYSICAL EXAM:  Constitutional: NAD, comfortable in bed.  HEENT: NC/AT, PERRLA, EOMI, no conjunctival pallor or scleral icterus, MMM  Neck: Supple, no JVD  Respiratory: CTA B/L. No w/r/r.   Cardiovascular: RRR, normal S1 and S2, no m/r/g.   Gastrointestinal: +BS, soft NTND, no guarding or rebound tenderness, no palpable masses   Extremities: wwp; no cyanosis, clubbing or edema.   Vascular: Pulses equal and strong throughout.   Neurological: AAOx3, no CN deficits, strength and sensation intact throughout.   Skin: No gross skin abnormalities or rashes        LABS:                        10.2   10.64 )-----------( 265      ( 15 Hugh 2023 04:33 )             33.3     06-15    141  |  103  |  25<H>  ----------------------------<  226<H>  4.7   |  27  |  0.77    Ca    10.1      15 Hugh 2023 04:33  Phos  3.3     06-15  Mg     2.0     06-15    TPro  6.6  /  Alb  3.2<L>  /  TBili  0.5  /  DBili  x   /  AST  22  /  ALT  28  /  AlkPhos  213<H>  06-15    PT/INR - ( 14 Jun 2023 14:17 )   PT: 13.2 sec;   INR: 1.11          PTT - ( 14 Jun 2023 14:17 )  PTT:32.1 sec  Urinalysis Basic - ( 14 Jun 2023 17:43 )    Color: Yellow / Appearance: SL Cloudy / SG: >=1.030 / pH: x  Gluc: x / Ketone: Trace mg/dL  / Bili: Negative / Urobili: 0.2 E.U./dL   Blood: x / Protein: 100 mg/dL / Nitrite: NEGATIVE   Leuk Esterase: Moderate / RBC: < 5 /HPF / WBC > 10 /HPF   Sq Epi: x / Non Sq Epi: x / Bacteria: Present /HPF          RADIOLOGY & ADDITIONAL TESTS:    MEDICATIONS  (STANDING):  atorvastatin 5 milliGRAM(s) Oral at bedtime  chlorhexidine 2% Cloths 1 Application(s) Topical <User Schedule>  diltiazem Infusion 10 mG/Hr (10 mL/Hr) IV Continuous <Continuous>  enoxaparin Injectable 40 milliGRAM(s) SubCutaneous every 24 hours  insulin lispro (ADMELOG) corrective regimen sliding scale   SubCutaneous Before meals and at bedtime  levETIRAcetam  IVPB 500 milliGRAM(s) IV Intermittent every 12 hours  sertraline 50 milliGRAM(s) Oral daily    MEDICATIONS  (PRN):  acetaminophen     Tablet .. 650 milliGRAM(s) Oral every 6 hours PRN Temp greater or equal to 38C (100.4F), Mild Pain (1 - 3)  melatonin 3 milliGRAM(s) Oral at bedtime PRN Insomnia   OVERNIGHT EVENTS: NAEO.    SUBJECTIVE: Patient seen and examined at bedside. Unable to assess ROS given pt's dementia.    Vital Signs Last 12 Hrs  T(F): 97.5 (06-15-23 @ 04:11), Max: 100.9 (06-14-23 @ 22:35)  HR: 133 (06-15-23 @ 06:00) (108 - 160)  BP: 139/87 (06-15-23 @ 06:00) (106/57 - 166/68)  BP(mean): 105 (06-15-23 @ 06:00) (72 - 110)  RR: 25 (06-15-23 @ 06:00) (22 - 36)  SpO2: 95% (06-15-23 @ 06:00) (93% - 100%)  I&O's Summary    14 Jun 2023 07:01  -  15 Hugh 2023 06:22  --------------------------------------------------------  IN: 345 mL / OUT: 700 mL / NET: -355 mL        PHYSICAL EXAM:  Constitutional: NAD, comfortable in bed.  HEENT: NC/AT, EOMI, no conjunctival pallor or scleral icterus, MMM, neck supple  Respiratory: right lung with mild wheezing and rales, left lung without breath sounds, breathing comfortably   Cardiovascular: tachycardia, regular rate, normal S1 and S2, no m/r/g.   Gastrointestinal: soft NTND, no guarding or rebound tenderness, no palpable masses   Extremities: wwp; no cyanosis, clubbing or edema.   Vascular: Pulses equal and strong throughout.   Neurological: AAOx1 (self)        LABS:                        10.2   10.64 )-----------( 265      ( 15 Hugh 2023 04:33 )             33.3     06-15    141  |  103  |  25<H>  ----------------------------<  226<H>  4.7   |  27  |  0.77    Ca    10.1      15 Hugh 2023 04:33  Phos  3.3     06-15  Mg     2.0     06-15    TPro  6.6  /  Alb  3.2<L>  /  TBili  0.5  /  DBili  x   /  AST  22  /  ALT  28  /  AlkPhos  213<H>  06-15    PT/INR - ( 14 Jun 2023 14:17 )   PT: 13.2 sec;   INR: 1.11          PTT - ( 14 Jun 2023 14:17 )  PTT:32.1 sec  Urinalysis Basic - ( 14 Jun 2023 17:43 )    Color: Yellow / Appearance: SL Cloudy / SG: >=1.030 / pH: x  Gluc: x / Ketone: Trace mg/dL  / Bili: Negative / Urobili: 0.2 E.U./dL   Blood: x / Protein: 100 mg/dL / Nitrite: NEGATIVE   Leuk Esterase: Moderate / RBC: < 5 /HPF / WBC > 10 /HPF   Sq Epi: x / Non Sq Epi: x / Bacteria: Present /HPF          RADIOLOGY & ADDITIONAL TESTS:    MEDICATIONS  (STANDING):  atorvastatin 5 milliGRAM(s) Oral at bedtime  chlorhexidine 2% Cloths 1 Application(s) Topical <User Schedule>  diltiazem Infusion 10 mG/Hr (10 mL/Hr) IV Continuous <Continuous>  enoxaparin Injectable 40 milliGRAM(s) SubCutaneous every 24 hours  insulin lispro (ADMELOG) corrective regimen sliding scale   SubCutaneous Before meals and at bedtime  levETIRAcetam  IVPB 500 milliGRAM(s) IV Intermittent every 12 hours  sertraline 50 milliGRAM(s) Oral daily    MEDICATIONS  (PRN):  acetaminophen     Tablet .. 650 milliGRAM(s) Oral every 6 hours PRN Temp greater or equal to 38C (100.4F), Mild Pain (1 - 3)  melatonin 3 milliGRAM(s) Oral at bedtime PRN Insomnia

## 2023-06-15 NOTE — PROCEDURE NOTE - NSPROCDETAILS_GEN_ALL_CORE
nasogastric/audible air bolus/placement confirmed by auscultation
location identified, draped/prepped, sterile technique used/blood seen on insertion/dressing applied/flushes easily/secured in place
ultrasound-guidance/location identified, draped/prepped, sterile technique used/blood seen on insertion/dressing applied/flushes easily/secured in place/sterile technique, catheter placed

## 2023-06-15 NOTE — PROGRESS NOTE ADULT - ASSESSMENT
89yo F with PMHx Alzheimer's dementia, Parkinson's Disease, 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    CNS  #Alzheimer's dementia  #PD  Dementia.   -patient with both Parkinsons disease and Alzheimers dementia. On home keppra 500mg q12, sertraline 50mg qd, and sinemet 25-100mg q8  AAOx1-2, reportedly at baseline mental status  - Monitor mentation    Card  #New onset atrial fibrillation  EKG with afib s/p dilt IVP 20mg x2, lopressor IVP 5mg x3, reportedly diltiazem well controlled heart rate however on exam patient noted to be in afib 130-170. Repeat rectal temp 100.9  - Tylenol for fever  - Diltiazem IVP 10mg and started dilt gtt with tiration  - patient persistently tachy,  gave Amio 150mg over 10 mins once  - TSH  - No AC at this time, will treat suspected underlying infection likely driving afib  - repeat ECHO    #HTN (hypertension). Patient with history of HTN on home losartan 100mg qd   -hold home losartan for now   -restart as tolerated   -monitor BPs    #HLD  -c/w atorvastatin 5mg qhs.    Pulm  #acute respiratory failure  Suspect aspiration pneumonitis vs PNA, CXR without infiltrate  increased WOB, POCUS with b-lines, lasix IVP 20mg x1 and HFNC.  - O2 goal >94%  - CTX 2g q24h  - Will attempt to obtain sputum culture  - NPO, S&S evaluation    GI  - NPO  - S&S eval  - CT a/p given recent suspected colitis?  - Patient recently completed course of PO cefpodoxime (200mg BID) + PO metronidazole (500 mg every 8 hours) to complete 10 day course (including days of IV Abx - last day 6/7)  Renal  NAVI    ID  #Sepsis  Met 2/4 SIRS criteria. UA with LE+ and WBC  - Treatment as above for acute respiratory failure, CTX will treat UTI  - Procal  - follow up blood culture and urine culture  - Obtain sputum culture  - urine strep/legionella    Endo  #Diabetes  History of DM on home metformin 500mg BID. A1C 6.9 5/2023.  -mISS while inpatient   -monitor fingersticks and add basal coverage if persistently elevated   - TSH    Heme  #Leukocytosis  WBC 11 however previous admission WBC 7, Suspect 2/2 underlying aspiration event  - Abx and follow cultures  - Trend WBC    Fluids: s/p 1700cc NS  Electrolytes: Replete to keep K>4 and Mg>2  Nutrition: NPO pending speech eval  DVT ppx: Lovenox  GI ppx: None  Code: pending GOC discussion  Dispo: MICU  Case discussed with intensivist Dr. Stanford   87yo F with PMHx Alzheimer's dementia, Parkinson's Disease, 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    CNS  #Alzheimer's dementia  #PD  Patient with both Parkinsons disease and Alzheimers dementia. On home keppra 500mg q12, sertraline 50mg qd, and sinemet 25-100mg x 2.5 tabs q8. AAOx1-2, reportedly at baseline mental status.  - monitor mentation  - c/w sinemet 25/100 mg x 2.5 tabs PO via NGT TID  - c/w sertraline 50 mg PO via NGT qd    Card  #New onset atrial fibrillation  EKG with afib s/p dilt IVP 20mg x2, lopressor IVP 5mg x3, reportedly diltiazem well controlled heart rate however on exam patient noted to be in afib 130-170. Repeat rectal temp 100.9. Diltiazem IVP 10mg and started dilt gtt with titration. Patient persistently tachy,  gave Amio 150mg over 10 mins x 3 and started amio gtt.  - c/w tylenol 650 mg PO q6h PRN for fever/pain  - s/p amiodarone 150 mg IV x3, started gtt at 1mg/min   - TSH wnl  - No AC at this time, will treat suspected underlying infection likely driving afib  - TTE EF 60-65%; tachycardia; small LV; mild to moderate symmetric LVH; severely dilated LA; moderate TR (worse than 2/8/21); aortic sclerosis; pulm HTN w/ PASP 51 (higher than 2/8/21); pericardial fat pad anterior to R ventricle (cannot r/o trivial pericardial effusion).     #HTN (hypertension). Patient with history of HTN on home losartan 100mg qd.   - hold home losartan, restart as tolerated   - monitor BPs    #HLD  - c/w atorvastatin 5mg PO via NGT qhs    Pulm  #acute respiratory failure  Suspect aspiration pneumonitis vs PNA, CXR without infiltrate  increased WOB, POCUS with b-lines, lasix IVP 20mg x1 and HFNC.  - O2 goal >94%  - c/w CTX 2g q24h  - Will attempt to obtain sputum culture  - NPO    GI  Patient recently completed course of PO cefpodoxime (200mg BID) + PO metronidazole (500 mg every 8 hours) to complete 10 day course (including days of IV Abx - last day 6/7).  - NPO  - consider CT a/p given recent suspected colitis  - Renal    ID  #Sepsis  Met 2/4 SIRS criteria. UA with LE+ and WBC  - Treatment as above for acute respiratory failure, CTX will treat UTI  - Procal  - f/u BCx -- NGTD  - f/u UCx  - sputum Cx contaminated, will repeat  - f/u urine strep/legionella    Endo  #Diabetes  History of DM on home metformin 500mg BID. A1C 6.9 5/2023.  - mISS while inpatient   - monitor fingersticks and add basal coverage if persistently elevated   - TSH wnl    Heme  #Leukocytosis  WBC 11 however previous admission WBC 7, Suspect 2/2 underlying aspiration event  - zosyn 4.5 gm IV q8h  - Trend WBC    GOC  - f/u palliative    Fluids: s/p 1700cc NS  Electrolytes: Replete to keep K>4 and Mg>2  Nutrition: NPO  DVT ppx: lovenoxd 40 mg SQ qd  GI ppx: None  Code: FULL CODE pending GOC discussion  Dispo: MICU

## 2023-06-15 NOTE — H&P ADULT - NSHPPHYSICALEXAM_GEN_ALL_CORE
General: Not in distress  HEENT:  ZHANNA              Lungs: right lung with mild wheezing and rales, left lung without breath sounds, breathing comfortably  Cardiovascular: irregular rate and tachycardic, no M/R/G  Gastrointestinal: Soft, Positive BS, non-tender, non-distended  Musculoskeletal: No clubbing.  Moves all extremities.    Skin: Warm. Intact  Vascular: bilateral trace edema  Neurological: No motor or sensory deficit, AAOx1-2 (knows name and in hospital)

## 2023-06-15 NOTE — CONSULT NOTE ADULT - PROBLEM SELECTOR RECOMMENDATION 3
.  Advancing disease  -at baseline, patient is alert but requires prompting; currently not at baseline  -will address hospice eligibility pending clinical course

## 2023-06-15 NOTE — H&P ADULT - ASSESSMENT
87yo F with PMHx Alzheimer's dementia, Parkinson's Disease, 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    CNS  #Alzheimer's dementia  #PD  AAOx1-2, reportedly at baseline mental status  - Monitor mentation    Card  #New onset atrial fibrillation  EKG with afib s/p dilt IVP 20mg x2, lopressor IVP 5mg x3, reportedly diltiazem well controlled heart rate however on exam patient noted to be in afib 130-170. Repeat rectal temp 100.9  - Tylenol for fever  - Diltiazem IVP 10mg and start dilt gtt with tiration  - TSH  - No AC at this time, will treat suspected underlying infection likely driving afib    Pulm  #acute respiratory failure  Suspect aspiration pneumonitis vs PNA, CXR without infiltrate  increased WOB, POCUS with b-lines, lasix IVP 20mg x1 and HFNC.  - O2 goal >94%  - CTX 2g q24h  - Will attempt to obtain sputum culture  - NPO, S&S evaluation    GI  - NPO  - S&S eval    Renal  NAVI    ID  #Sepsis  Met 2/4 SIRS criteria. UA with LE+ and WBC  - Treatment as above for acute respiratory failure, CTX will treat UTI  - Procal  - follow up blood culture and urine culture  - Obtain sputum culture  - urine strep/legionella    Endo  - TSH  - ISS    Heme  #Leukocytosis  WBC 11 however previous admission WBC 7, Suspect 2/2 underlying aspiration event  - Abx and follow cultures  - Trend WBC    Fluids: s/p 1700cc NS  Electrolytes: Replete to keep K>4 and Mg>2  Nutrition: NPO pending speech eval  DVT ppx: Lovenox  GI ppx: None  Code: Full code  Dispo: MICU  Case discussed with intensivist Dr. Stanford   87yo F with PMHx Alzheimer's dementia, Parkinson's Disease, 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    CNS  #Alzheimer's dementia  #PD  AAOx1-2, reportedly at baseline mental status  - Monitor mentation    Card  #New onset atrial fibrillation  EKG with afib s/p dilt IVP 20mg x2, lopressor IVP 5mg x3, reportedly diltiazem well controlled heart rate however on exam patient noted to be in afib 130-170. Repeat rectal temp 100.9  - Tylenol for fever  - Diltiazem IVP 10mg and start dilt gtt with tiration  - TSH  - No AC at this time, will treat suspected underlying infection likely driving afib    Pulm  #acute respiratory failure  Suspect aspiration pneumonitis vs PNA, CXR without infiltrate  increased WOB, POCUS with b-lines, lasix IVP 20mg x1 and HFNC.  - O2 goal >94%  - CTX 2g q24h  - Will attempt to obtain sputum culture  - NPO, S&S evaluation    GI  - NPO  - S&S eval    Renal  NAVI    ID  #Sepsis  Met 2/4 SIRS criteria. UA with LE+ and WBC  - Treatment as above for acute respiratory failure, CTX will treat UTI  - Procal  - follow up blood culture and urine culture  - Obtain sputum culture  - urine strep/legionella    Endo  - TSH  - ISS    Heme  #Leukocytosis  WBC 11 however previous admission WBC 7, Suspect 2/2 underlying aspiration event  - Abx and follow cultures  - Trend WBC    Fluids: s/p 1700cc NS  Electrolytes: Replete to keep K>4 and Mg>2  Nutrition: NPO pending speech eval  DVT ppx: Lovenox  GI ppx: None  Code: pending GOC discussion  Dispo: MICU  Case discussed with intensivist Dr. Stanford   89yo F with PMHx Alzheimer's dementia, Parkinson's Disease, 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    CNS  #Alzheimer's dementia  #PD  Dementia.   -patient with both Parkinsons disease and Alzheimers dementia. On home keppra 500mg q12, sertraline 50mg qd, and sinemet 25-100mg q8  AAOx1-2, reportedly at baseline mental status  - Monitor mentation    Card  #New onset atrial fibrillation  EKG with afib s/p dilt IVP 20mg x2, lopressor IVP 5mg x3, reportedly diltiazem well controlled heart rate however on exam patient noted to be in afib 130-170. Repeat rectal temp 100.9  - Tylenol for fever  - Diltiazem IVP 10mg and start dilt gtt with tiration  - TSH  - No AC at this time, will treat suspected underlying infection likely driving afib  - repeat ECHO    #HTN (hypertension). Patient with history of HTN on home losartan 100mg qd   -hold home losartan for now   -restart as tolerated   -monitor BPs    #HLD  -c/w atorvastatin 5mg qhs.    Pulm  #acute hypoxic respiratory failure  Suspect aspiration pneumonitis vs PNA, CXR without infiltrate  increased WOB, POCUS with b-lines, lasix IVP 20mg x1 and HFNC.  - O2 goal >94%  - CTX 2g q24h  - Will attempt to obtain sputum culture  - NPO, S&S evaluation    GI  - NPO  - S&S eval  - CT a/p given recent suspected colitis?  - Patient recently completed course of PO cefpodoxime (200mg BID) + PO metronidazole (500 mg every 8 hours) to complete 10 day course (including days of IV Abx - last day 6/7)  Renal  NAVI    ID  #Sepsis  Met 2/4 SIRS criteria. UA with LE+ and WBC  - Treatment as above for acute respiratory failure, CTX will treat UTI  - Procal  - follow up blood culture and urine culture  - Obtain sputum culture  - urine strep/legionella    Endo  #Diabetes  History of DM on home metformin 500mg BID. A1C 6.9 5/2023.  -mISS while inpatient   -monitor fingersticks and add basal coverage if persistently elevated   - TSH    Heme  #Leukocytosis  WBC 11 however previous admission WBC 7, Suspect 2/2 underlying aspiration event  - Abx and follow cultures  - Trend WBC    Fluids: s/p 1700cc NS  Electrolytes: Replete to keep K>4 and Mg>2  Nutrition: NPO pending speech eval  DVT ppx: Lovenox  GI ppx: None  Code: pending GOC discussion  Dispo: MICU  Case discussed with intensivist Dr. Stanford   89yo F with PMHx Alzheimer's dementia, Parkinson's Disease, 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    CNS  #Alzheimer's dementia  #PD  Dementia.   -patient with both Parkinsons disease and Alzheimers dementia. On home keppra 500mg q12, sertraline 50mg qd, and sinemet 25-100mg q8  AAOx1-2, reportedly at baseline mental status  - Monitor mentation    Card  #New onset atrial fibrillation  EKG with afib s/p dilt IVP 20mg x2, lopressor IVP 5mg x3, reportedly diltiazem well controlled heart rate however on exam patient noted to be in afib 130-170. Repeat rectal temp 100.9  - Tylenol for fever  - Diltiazem IVP 10mg and started dilt gtt with tiration  - patient persistently tachy,  gave Amio 150mg over 10 mins once  - TSH  - No AC at this time, will treat suspected underlying infection likely driving afib  - repeat ECHO    #HTN (hypertension). Patient with history of HTN on home losartan 100mg qd   -hold home losartan for now   -restart as tolerated   -monitor BPs    #HLD  -c/w atorvastatin 5mg qhs.    Pulm  #acute respiratory failure  Suspect aspiration pneumonitis vs PNA, CXR without infiltrate  increased WOB, POCUS with b-lines, lasix IVP 20mg x1 and HFNC.  - O2 goal >94%  - CTX 2g q24h  - Will attempt to obtain sputum culture  - NPO, S&S evaluation    GI  - NPO  - S&S eval  - CT a/p given recent suspected colitis?  - Patient recently completed course of PO cefpodoxime (200mg BID) + PO metronidazole (500 mg every 8 hours) to complete 10 day course (including days of IV Abx - last day 6/7)  Renal  NAVI    ID  #Sepsis  Met 2/4 SIRS criteria. UA with LE+ and WBC  - Treatment as above for acute respiratory failure, CTX will treat UTI  - Procal  - follow up blood culture and urine culture  - Obtain sputum culture  - urine strep/legionella    Endo  #Diabetes  History of DM on home metformin 500mg BID. A1C 6.9 5/2023.  -mISS while inpatient   -monitor fingersticks and add basal coverage if persistently elevated   - TSH    Heme  #Leukocytosis  WBC 11 however previous admission WBC 7, Suspect 2/2 underlying aspiration event  - Abx and follow cultures  - Trend WBC    Fluids: s/p 1700cc NS  Electrolytes: Replete to keep K>4 and Mg>2  Nutrition: NPO pending speech eval  DVT ppx: Lovenox  GI ppx: None  Code: pending GOC discussion  Dispo: MICU  Case discussed with intensivist Dr. Stanford   87yo F with PMHx Alzheimer's dementia, Parkinson's Disease, 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    CNS  #Alzheimer's dementia  #PD  Dementia.   -patient with both Parkinsons disease and Alzheimers dementia. On home keppra 500mg q12, sertraline 50mg qd, and sinemet 25-100mg q8  AAOx1-2, reportedly at baseline mental status  - Monitor mentation    Card  #New onset atrial fibrillation  EKG with afib s/p dilt IVP 20mg x2, lopressor IVP 5mg x3, reportedly diltiazem well controlled heart rate however on exam patient noted to be in afib 130-170. Repeat rectal temp 100.9  - Tylenol for fever  - Diltiazem IVP 10mg and started dilt gtt with tiration  - patient persistently tachy,  gave Amio 150mg over 10 mins once  - TSH  - No AC at this time, will treat suspected underlying infection likely driving afib  - repeat ECHO    #HTN (hypertension). Patient with history of HTN on home losartan 100mg qd   -hold home losartan for now   -restart as tolerated   -monitor BPs    #HLD  -c/w atorvastatin 5mg qhs.    Pulm  #acute respiratory failure  Suspect aspiration pneumonitis vs PNA, CXR without infiltrate  increased WOB, POCUS with b-lines, lasix IVP 20mg x1 and HFNC.  - O2 goal >94%  - CTX 2g q24h  - Will attempt to obtain sputum culture  - NPO, S&S evaluation    GI  - NPO  - S&S eval  - CT a/p given recent suspected colitis?  - Patient recently completed course of PO cefpodoxime (200mg BID) + PO metronidazole (500 mg every 8 hours) to complete 10 day course (including days of IV Abx - last day 6/7)  Renal  NAVI    ID  #Sepsis  Met 2/4 SIRS criteria. UA with LE+ and WBC  - Treatment as above for acute respiratory failure, CTX will treat UTI  - Procal  - follow up blood culture and urine culture  - Obtain sputum culture  - urine strep/legionella  CT a/p given recent suspected colitis?    Endo  #Diabetes  History of DM on home metformin 500mg BID. A1C 6.9 5/2023.  -mISS while inpatient   -monitor fingersticks and add basal coverage if persistently elevated   - TSH    Heme  #Leukocytosis  WBC 11 however previous admission WBC 7, Suspect 2/2 underlying aspiration event  - Abx and follow cultures  - Trend WBC    Fluids: s/p 1700cc NS  Electrolytes: Replete to keep K>4 and Mg>2  Nutrition: NPO pending speech eval  DVT ppx: Lovenox  GI ppx: None  Code: pending GO discussion  Dispo: MICU  Case discussed with intensivist Dr. Stanford

## 2023-06-15 NOTE — CONSULT NOTE ADULT - PROBLEM SELECTOR RECOMMENDATION 5
.  Complex medical decision making / symptom management in the setting of critical illness.    Will continue to follow for ongoing GOC discussion / titration of palliative regimen. Emotional support provided, questions answered.  Active Psychosocial Referrals:  [x]Social Work/Case management []PT/OT []Chaplaincy []Hospice  []Patient/Family Support []Holistic RN []Massage Therapy []Music Therapy []Ethics  Coping: [] well [] with difficulty [] poor coping [x] unable to assess  Support system: [x] strong [] adequate [] inadequate    For new or uncontrolled symptoms, please call Palliative Care at [phone]). The service is available 24/7 (including nights & weekends) to provide symptom management recommendations over the phone as appropriate

## 2023-06-15 NOTE — CONSULT NOTE ADULT - SUBJECTIVE AND OBJECTIVE BOX
[facility] Geriatrics and Palliative Care  Last Bobo, Palliative Care Attending  Contact Info: Call [phone] (HEAL Line) or message on Microsoft Teams (Last Bobo)    HPI:  HPI:  [id number] F with PMHx Alzheimer's dementia, Parkinson's Disease, SDH/SAH s/p repair of cerebral aneurysm 1960s, Lung Ca s/p lobectomy of L lung, HTN, HLD, DM, recent admission for sepsis 2/2 colitis and dysphagia discharged to [facility] rehab, re-presented to [facility] 2 weeks ago from [facility] rehab for HTN and chills, found to be septic likely 2/2 colitis, discharged back to [facility] rehab approximately 2 weeks ago with PICC line in place and PO abx regimen only now presents to ED with "aspiration pneumonia due to Afib" as per nursing home papers from [facility] rehab. ICU consulted for afib rvr.    ED vitals: T 100.1 -> 100.9 -160  RR 24 /75 SpO2 94% 4LNC  Labs signficant: WBC 11.78, trop 52 <- 58, BUN 26, calcium 11.2. alk phos 195, magnesium 1.5, vbg ph 7.36, pCO2 59, UA +LE and WBC  CXR: complete opacification of left unchanged, right lung clear  EKG: atrial fibrillation  Interventions: 1700cc NS, Diltiazem IVP 20mg x2, lopressor 5mg x3, vanc, zosyn  Consults: ICU  (15 Hugh 2023 01:47)    Patient seen and examined at bedside. Appears overtly comfortable. Denies any significant complaint. Comprehensive symptom assessment and GOC exploration as noted below. Further collateral obtained from primary team, chart, and family.    PERTINENT PM/SXH:   HTN (hypertension)    Diabetes    High cholesterol    Parkinson disease    Alzheimer disease      No significant past surgical history    S/P removal of lung    S/P cholecystectomy    Personal history of spine surgery    H/O cerebral aneurysm repair      FAMILY HISTORY:  No pertinent family history in first degree relatives      No history of  in first degree relatives  Unable to obtain due to encephalopathy    ITEMS NOT CHECKED ARE NOT PRESENT  SOCIAL HISTORY:   Significant other/partner:  []  Children:  []  Substance hx:  []   Tobacco hx:  []   Alcohol hx: []   Home Opioid hx:  [] I-Stop Reference No:  - no active Rx's / see chart note  Living Situation: []Home  []Long term care  []Rehab []Other  Restorationism/Spiritual practice: ; Role of organized Buddhism [] important [] some [] unable to assess  Coping: [] well [] with difficulty [] poor coping [] unable to assess  Support system: [] strong [] adequate [] inadequate    ADVANCE DIRECTIVES:    []MOLST  []Living Will  DECISION MAKER(s):  [] Health Care Proxy(s)  [] Surrogate(s)  [] Guardian           Name(s)/Phone Number(s):     BASELINE (I)ADLs (prior to admission):  [place]: []Total  [] Moderate []Dependent    ALLERGIES:  No Known Allergies    MEDICATIONS  (STANDING):  aMIOdarone Infusion 1 mG/Min (33.3 mL/Hr) IV Continuous <Continuous>  atorvastatin 5 milliGRAM(s) Oral at bedtime  chlorhexidine 2% Cloths 1 Application(s) Topical <User Schedule>  diltiazem Infusion 10 mG/Hr (10 mL/Hr) IV Continuous <Continuous>  enoxaparin Injectable 40 milliGRAM(s) SubCutaneous every 24 hours  insulin lispro (ADMELOG) corrective regimen sliding scale   SubCutaneous every 6 hours  levETIRAcetam  IVPB 500 milliGRAM(s) IV Intermittent every 12 hours  piperacillin/tazobactam IVPB.- 4.5 Gram(s) IV Intermittent once  piperacillin/tazobactam IVPB.. 4.5 Gram(s) IV Intermittent every 8 hours  sertraline 50 milliGRAM(s) Oral daily    MEDICATIONS  (PRN):  acetaminophen     Tablet .. 650 milliGRAM(s) Oral every 6 hours PRN Temp greater or equal to 38C (100.4F), Mild Pain (1 - 3)  melatonin 3 milliGRAM(s) Oral at bedtime PRN Insomnia    Analgesic Use (Scheduled and PRNs) for past 24 hours:    acetaminophen   IVPB ..   400 mL/Hr IV Intermittent (06-14-23 @ 22:30)    acetaminophen   IVPB ..   400 mL/Hr IV Intermittent (06-15-23 @ 13:06)    acetaminophen  Suppository ..   650 milliGRAM(s) Rectal (06-14-23 @ 14:46)    levETIRAcetam  IVPB   400 mL/Hr IV Intermittent (06-15-23 @ 05:29)      PRESENT SYMPTOMS: []Unable to obtain due to poor mentation/encephalopathy  Source if other than patient:  []Family   []Team     Pain: [] yes [] no  QOL impact -   Location -                    Aggravating Factors -  Quality -  Radiation -  Timing -  Severity (0-10 scale) -   Minimal Acceptable Level (0-10 scale) -    CPOT Score:  (Critical Care Pain Assessment)    PAIN AD Score:  (Nonverbal Pain Assessment)    Dyspnea:                           []Mild  []Moderate []Severe  Anxiety:                             []Mild []Moderate []Severe  Fatigue:                             []Mild []Moderate []Severe  Nausea:                             []Mild []Moderate []Severe  Loss of Appetite:              []Mild []Moderate []Severe  Constipation:                    []Mild []Moderate []Severe    Other Symptoms:  [x]All other review of systems negative     Palliative Performance Status Version 2:  %  (Functional Assessment Tool)    GENERAL:  [] NAD []Alert []Lethargic  []Cachexia  []Unarousable  []Verbal  []Non-Verbal  BEHAVIORAL:   []Anxiety  []Delirium []Agitation []Cooperative []Oriented x  HEENT:  []Normal  [] Moist Mucous Membranes []Dry mouth   []ET Tube/Trach  []Oral lesions  PULMONARY:   []Clear []Tachypnea  []Audible excessive secretions  []Normal Work of Breathing []Labored Breathing  []Rhonchi []Crackles []Wheezing  CARDIOVASCULAR:    []Regular Rate []Regular Rhythm []Irregular []Tachy  []Timi  GASTROINTESTINAL:  []Soft  []Distended   []+BS  []Non tender []Tender  []PEG []OGT/ NGT  Last BM:  GENITOURINARY:  []Normal [] Incontinent   []Oliguria/Anuria   []Juarez  MUSCULOSKELETAL:   []Normal Extremities  []Weakness  []Bed/Wheelchair bound []Edema  NEUROLOGIC:   []No focal deficits  []Cognitive impairment  []Dysphagia []Dysarthria []Paresis []Encephalopathic  SKIN:   []Normal   []Pressure ulcer(s)  []Rash    CRITICAL CARE:  [ ]Shock Present  [ ]Septic [ ]Cardiogenic [ ]Neurologic [ ]Hypovolemic  [ ] Vasopressors [ ]Inotropes   [ ]Respiratory failure present [ ]Mechanical Ventilation [ ]Non-invasive ventilatory support [ ]High-Flow  [ ]Acute  [ ]Chronic [ ]Hypoxic  [ ]Hypercarbic   [ ]Other organ failure    Vital Signs Last 24 Hrs  T(C): 37.2 (15 Hugh 2023 13:00), Max: 38.3 (14 Jun 2023 22:35)  T(F): 99 (15 Hugh 2023 13:00), Max: 100.9 (14 Jun 2023 22:35)  HR: 112 (15 Hugh 2023 14:00) (102 - 160)  BP: 105/54 (15 Hugh 2023 14:00) (105/54 - 166/68)  BP(mean): 72 (15 Hugh 2023 14:00) (72 - 110)  RR: 30 (15 Hugh 2023 14:00) (22 - 43)  SpO2: 98% (15 Hugh 2023 14:00) (92% - 100%)    Parameters below as of 15 Hugh 2023 14:00  Patient On (Oxygen Delivery Method): nasal cannula, high flow  O2 Flow (L/min): 50  O2 Concentration (%): 50     LABS:                        10.2   10.64 )-----------( 265      ( 15 Hugh 2023 04:33 )             33.3   06-15    141  |  103  |  25<H>  ----------------------------<  226<H>  4.7   |  27  |  0.77    Ca    10.1      15 Hugh 2023 04:33  Phos  3.3     06-15  Mg     2.0     06-15    TPro  6.6  /  Alb  3.2<L>  /  TBili  0.5  /  DBili  x   /  AST  22  /  ALT  28  /  AlkPhos  213<H>  06-15    RADIOLOGY & ADDITIONAL STUDIES:    REFERRALS:  [x]Social Work  []Case management []PT/OT []Chaplaincy  []Hospice  []Patient/Family Support []Massage Therapy []Music Therapy []Holistic RN    DISCUSSION OF CASE: Family - to provide updates and emotional support; - [facility]iatrics and Palliative Care  Last Bobo, Palliative Care Attending  Contact Info: Call [phone] (HEAL Line) or message on [employer] Teams (Last Bobo)    HPI:  [id number] F with PMHx Alzheimer's dementia, Parkinson's Disease, SDH/SAH s/p repair of cerebral aneurysm 1960s, Lung Ca s/p lobectomy of L lung, HTN, HLD, DM, recent admission for sepsis 2/2 colitis and dysphagia discharged to [facility] rehab, re-presented to [facility] 2 weeks ago from [facility] rehab for HTN and chills, found to be septic likely 2/2 colitis, discharged back to [facility] rehab approximately 2 weeks ago with PICC line in place and PO abx regimen only now presents to ED with "aspiration pneumonia due to Afib" as per nursing home papers from [facility] rehab. ICU consulted for afib rvr.    Patient seen and examined at bedside. Unable to participate in interview. Comprehensive symptom assessment and GOC exploration as noted below. Further collateral obtained from primary team, chart, and family.    PERTINENT PM/SXH:   HTN (hypertension)    Diabetes    High cholesterol    Parkinson disease    Alzheimer disease      No significant past surgical history    S/P removal of lung    S/P cholecystectomy    Personal history of spine surgery    H/O cerebral aneurysm repair      FAMILY HISTORY:  No pertinent family history of AFib in first degree relatives      No history of  in first degree relatives  Unable to obtain due to encephalopathy    ITEMS NOT CHECKED ARE NOT PRESENT  SOCIAL HISTORY:   Significant other/partner:  []  Children:  [x]  Substance hx:  []   Tobacco hx:  []   Alcohol hx: []   Home Opioid hx:  [] I-Stop Reference No:  - no active Rx's  Living Situation: [x]Home  []Long term care  [x]Rehab []Other  Amish/Spiritual practice: ; Role of organized Judaism [] important [x] some [] unable to assess  Coping: [] well [] with difficulty [] poor coping [x] unable to assess  Support system: [x] strong [] adequate [] inadequate    ADVANCE DIRECTIVES:    []MOLST  []Living Will  DECISION MAKER(s):  [x] Health Care Proxy(s)  [] Surrogate(s)  [] Guardian           Name(s)/Phone Number(s): Children    BASELINE (I)ADLs (prior to admission):  [place]: []Total  [x] Moderate []Dependent    ALLERGIES:  No Known Allergies    MEDICATIONS  (STANDING):  aMIOdarone Infusion 1 mG/Min (33.3 mL/Hr) IV Continuous <Continuous>  atorvastatin 5 milliGRAM(s) Oral at bedtime  chlorhexidine 2% Cloths 1 Application(s) Topical <User Schedule>  diltiazem Infusion 10 mG/Hr (10 mL/Hr) IV Continuous <Continuous>  enoxaparin Injectable 40 milliGRAM(s) SubCutaneous every 24 hours  insulin lispro (ADMELOG) corrective regimen sliding scale   SubCutaneous every 6 hours  levETIRAcetam  IVPB 500 milliGRAM(s) IV Intermittent every 12 hours  piperacillin/tazobactam IVPB.- 4.5 Gram(s) IV Intermittent once  piperacillin/tazobactam IVPB.. 4.5 Gram(s) IV Intermittent every 8 hours  sertraline 50 milliGRAM(s) Oral daily    MEDICATIONS  (PRN):  acetaminophen     Tablet .. 650 milliGRAM(s) Oral every 6 hours PRN Temp greater or equal to 38C (100.4F), Mild Pain (1 - 3)  melatonin 3 milliGRAM(s) Oral at bedtime PRN Insomnia    Analgesic Use (Scheduled and PRNs) for past 24 hours:    acetaminophen   IVPB ..   400 mL/Hr IV Intermittent (06-14-23 @ 22:30)    acetaminophen   IVPB ..   400 mL/Hr IV Intermittent (06-15-23 @ 13:06)    acetaminophen  Suppository ..   650 milliGRAM(s) Rectal (06-14-23 @ 14:46)    levETIRAcetam  IVPB   400 mL/Hr IV Intermittent (06-15-23 @ 05:29)      PRESENT SYMPTOMS: [x]Unable to obtain due to poor mentation/encephalopathy  Source if other than patient:  []Family   []Team     Pain: [] yes [x] no  QOL impact -   Location -                    Aggravating Factors -  Quality -  Radiation -  Timing -  Severity (0-10 scale) -   Minimal Acceptable Level (0-10 scale) -    CPOT Score: 0  (Critical Care Pain Assessment)    PAIN AD Score: 0  (Nonverbal Pain Assessment)    Dyspnea:                           []Mild  []Moderate []Severe  Anxiety:                             []Mild []Moderate []Severe  Fatigue:                             []Mild []Moderate []Severe  Nausea:                             []Mild []Moderate []Severe  Loss of Appetite:              []Mild []Moderate []Severe  Constipation:                    []Mild []Moderate []Severe    Other Symptoms:  [x]All other review of systems negative     Palliative Performance Status Version 2:  40%  (Functional Assessment Tool)    GENERAL:  [] NAD []Alert []Lethargic  []Cachexia  []Unarousable  []Verbal  []Non-Verbal  BEHAVIORAL:   []Anxiety  []Delirium []Agitation []Cooperative []Oriented x  HEENT:  []Normal  [] Moist Mucous Membranes []Dry mouth   []ET Tube/Trach  []Oral lesions  PULMONARY:   []Clear []Tachypnea  []Audible excessive secretions  []Normal Work of Breathing []Labored Breathing  []Rhonchi []Crackles []Wheezing  CARDIOVASCULAR:    []Regular Rate []Regular Rhythm []Irregular []Tachy  []Timi  GASTROINTESTINAL:  []Soft  []Distended   []+BS  []Non tender []Tender  []PEG []OGT/ NGT  Last BM:  GENITOURINARY:  []Normal [] Incontinent   []Oliguria/Anuria   []Juarez  MUSCULOSKELETAL:   []Normal Extremities  []Weakness  []Bed/Wheelchair bound []Edema  NEUROLOGIC:   []No focal deficits  []Cognitive impairment  []Dysphagia []Dysarthria []Paresis []Encephalopathic  SKIN:   []Normal   []Pressure ulcer(s)  []Rash    CRITICAL CARE:  [ ]Shock Present  [ ]Septic [ ]Cardiogenic [ ]Neurologic [ ]Hypovolemic  [ ] Vasopressors [ ]Inotropes   [ ]Respiratory failure present [ ]Mechanical Ventilation [ ]Non-invasive ventilatory support [ ]High-Flow  [ ]Acute  [ ]Chronic [ ]Hypoxic  [ ]Hypercarbic   [ ]Other organ failure    Vital Signs Last 24 Hrs  T(C): 37.2 (15 Hugh 2023 13:00), Max: 38.3 (14 Jun 2023 22:35)  T(F): 99 (15 Hugh 2023 13:00), Max: 100.9 (14 Jun 2023 22:35)  HR: 112 (15 Hugh 2023 14:00) (102 - 160)  BP: 105/54 (15 Hugh 2023 14:00) (105/54 - 166/68)  BP(mean): 72 (15 Hugh 2023 14:00) (72 - 110)  RR: 30 (15 Hugh 2023 14:00) (22 - 43)  SpO2: 98% (15 Hugh 2023 14:00) (92% - 100%)    Parameters below as of 15 Hugh 2023 14:00  Patient On (Oxygen Delivery Method): nasal cannula, high flow  O2 Flow (L/min): 50  O2 Concentration (%): 50     LABS:                        10.2   10.64 )-----------( 265      ( 15 Hugh 2023 04:33 )             33.3   06-15    141  |  103  |  25<H>  ----------------------------<  226<H>  4.7   |  27  |  0.77    Ca    10.1      15 Hugh 2023 04:33  Phos  3.3     06-15  Mg     2.0     06-15    TPro  6.6  /  Alb  3.2<L>  /  TBili  0.5  /  DBili  x   /  AST  22  /  ALT  28  /  AlkPhos  213<H>  06-15    RADIOLOGY & ADDITIONAL STUDIES:    REFERRALS:  [x]Social Work  []Case management []PT/OT []Chaplaincy  []Hospice  []Patient/Family Support []Massage Therapy []Music Therapy []Holistic RN    DISCUSSION OF CASE: Family - to provide updates and emotional support; - [facility] Geriatrics and Palliative Care  Last Bobo, Palliative Care Attending  Contact Info: Call [phone] (HEAL Line) or message on [employer] Teams (Last Bobo)    HPI:  89yo F with PMHx Alzheimer's dementia, Parkinson's Disease, SDH/SAH s/p repair of cerebral aneurysm 1960s, Lung Ca s/p lobectomy of L lung, HTN, HLD, DM, recent admission for sepsis 2/2 colitis and dysphagia discharged to [facility] rehab, re-presented to [facility] 2 weeks ago from [facility] rehab for HTN and chills, found to be septic likely 2/2 colitis, discharged back to [facility] rehab approximately 2 weeks ago with PICC line in place and PO abx regimen only now presents to ED with "aspiration pneumonia due to Afib" as per nursing home papers from [facility] rehab. ICU consulted for afib rvr.    Patient seen and examined at bedside. Unable to participate in interview. Comprehensive symptom assessment and GOC exploration as noted below. Further collateral obtained from primary team, chart, and family.    PERTINENT PM/SXH:   HTN (hypertension)  Diabetes  High cholesterol  Parkinson disease  Alzheimer disease  S/P removal of lung  S/P cholecystectomy  Personal history of spine surgery  H/O cerebral aneurysm repair    FAMILY HISTORY:  No pertinent family history of AFib in first degree relatives  Unable to obtain due to encephalopathy    ITEMS NOT CHECKED ARE NOT PRESENT  SOCIAL HISTORY:   Significant other/partner:  []  Children:  [x]  Substance hx:  []   Tobacco hx:  []   Alcohol hx: []   Home Opioid hx:  [] I-Stop Reference No:  - no active Rx's  Living Situation: [x]Home  []Long term care  [x]Rehab []Other  Zoroastrian/Spiritual practice: ; Role of organized Sabianist [] important [x] some [] unable to assess  Coping: [] well [] with difficulty [] poor coping [x] unable to assess  Support system: [x] strong [] adequate [] inadequate    ADVANCE DIRECTIVES:    []MOLST  []Living Will  DECISION MAKER(s):  [x] Health Care Proxy(s)  [] Surrogate(s)  [] Guardian           Name(s)/Phone Number(s): Children    BASELINE (I)ADLs (prior to admission):  [place]: []Total  [x] Moderate []Dependent    ALLERGIES:  No Known Allergies    MEDICATIONS  (STANDING):  aMIOdarone Infusion 1 mG/Min (33.3 mL/Hr) IV Continuous <Continuous>  atorvastatin 5 milliGRAM(s) Oral at bedtime  chlorhexidine 2% Cloths 1 Application(s) Topical <User Schedule>  diltiazem Infusion 10 mG/Hr (10 mL/Hr) IV Continuous <Continuous>  enoxaparin Injectable 40 milliGRAM(s) SubCutaneous every 24 hours  insulin lispro (ADMELOG) corrective regimen sliding scale   SubCutaneous every 6 hours  levETIRAcetam  IVPB 500 milliGRAM(s) IV Intermittent every 12 hours  piperacillin/tazobactam IVPB.- 4.5 Gram(s) IV Intermittent once  piperacillin/tazobactam IVPB.. 4.5 Gram(s) IV Intermittent every 8 hours  sertraline 50 milliGRAM(s) Oral daily    MEDICATIONS  (PRN):  acetaminophen     Tablet .. 650 milliGRAM(s) Oral every 6 hours PRN Temp greater or equal to 38C (100.4F), Mild Pain (1 - 3)  melatonin 3 milliGRAM(s) Oral at bedtime PRN Insomnia    Analgesic Use (Scheduled and PRNs) for past 24 hours:  acetaminophen   IVPB ..   400 mL/Hr IV Intermittent (06-14-23 @ 22:30)  acetaminophen   IVPB ..   400 mL/Hr IV Intermittent (06-15-23 @ 13:06)  acetaminophen  Suppository ..   650 milliGRAM(s) Rectal (06-14-23 @ 14:46)  levETIRAcetam  IVPB   400 mL/Hr IV Intermittent (06-15-23 @ 05:29)    PRESENT SYMPTOMS: [x]Unable to obtain due to poor mentation/encephalopathy  Source if other than patient:  []Family   []Team     Pain: [] yes [x] no  QOL impact -   Location -                    Aggravating Factors -  Quality -  Radiation -  Timing -  Severity (0-10 scale) -   Minimal Acceptable Level (0-10 scale) -    CPOT Score: 0  (Critical Care Pain Assessment)    PAIN AD Score: 0  (Nonverbal Pain Assessment)    Dyspnea:                           []Mild  []Moderate []Severe  Anxiety:                             []Mild []Moderate []Severe  Fatigue:                             []Mild []Moderate []Severe  Nausea:                             []Mild []Moderate []Severe  Loss of Appetite:              []Mild []Moderate []Severe  Constipation:                    []Mild []Moderate []Severe    Other Symptoms:  [x]All other review of systems negative     Palliative Performance Status Version 2:  40%  (Functional Assessment Tool)    GENERAL:  [] NAD []Alert [x]Lethargic  []Cachexia  []Unarousable  []Verbal  [x]Non-Verbal  BEHAVIORAL:   []Anxiety  [x]Delirium []Agitation []Cooperative []Oriented x  HEENT:  [x]Normal  [] Moist Mucous Membranes [x]Dry mouth   []ET Tube/Trach  []Oral lesions  PULMONARY:   []Clear []Tachypnea  []Audible excessive secretions  [x]Normal Work of Breathing []Labored Breathing  [x]Rhonchi []Crackles []Wheezing  CARDIOVASCULAR:    []Regular Rate []Regular Rhythm [x]Irregular [x]Tachy  []Timi  GASTROINTESTINAL:  [x]Soft  []Distended   []+BS  [x]Non tender []Tender  []PEG []OGT/ NGT  Last BM:  GENITOURINARY:  []Normal [x] Incontinent   []Oliguria/Anuria   []Juarez  MUSCULOSKELETAL:   []Normal Extremities  [x]Weakness  [x]Bed/Wheelchair bound []Edema  NEUROLOGIC:   []No focal deficits  [x]Cognitive impairment  [x]Dysphagia []Dysarthria []Paresis [x]Encephalopathic  SKIN:   [x]Normal   []Pressure ulcer(s)  []Rash    CRITICAL CARE:  [ ]Shock Present  [ ]Septic [ ]Cardiogenic [ ]Neurologic [ ]Hypovolemic  [ ] Vasopressors [ ]Inotropes   [x]Respiratory failure present [ ]Mechanical Ventilation [ ]Non-invasive ventilatory support [x]High-Flow  [x]Acute  [ ]Chronic [x]Hypoxic  [ ]Hypercarbic   [ ]Other organ failure    Vital Signs Last 24 Hrs  T(C): 37.2 (15 Hugh 2023 13:00), Max: 38.3 (14 Jun 2023 22:35)  T(F): 99 (15 Hugh 2023 13:00), Max: 100.9 (14 Jun 2023 22:35)  HR: 112 (15 Hugh 2023 14:00) (102 - 160)  BP: 105/54 (15 Hugh 2023 14:00) (105/54 - 166/68)  BP(mean): 72 (15 Hugh 2023 14:00) (72 - 110)  RR: 30 (15 Hugh 2023 14:00) (22 - 43)  SpO2: 98% (15 Hugh 2023 14:00) (92% - 100%)    Parameters below as of 15 Hugh 2023 14:00  Patient On (Oxygen Delivery Method): nasal cannula, high flow  O2 Flow (L/min): 50  O2 Concentration (%): 50     LABS:                        10.2   10.64 )-----------( 265      ( 15 Hugh 2023 04:33 )             33.3   06-15    141  |  103  |  25<H>  ----------------------------<  226<H>  4.7   |  27  |  0.77    Ca    10.1      15 Hugh 2023 04:33  Phos  3.3     06-15  Mg     2.0     06-15  TPro  6.6  /  Alb  3.2<L>  /  TBili  0.5  /  DBili  x   /  AST  22  /  ALT  28  /  AlkPhos  213<H>  06-15    RADIOLOGY & ADDITIONAL STUDIES:  < from: Xray Chest 1 View-PORTABLE IMMEDIATE (Xray Chest 1 View-PORTABLE IMMEDIATE .) (06.14.23 @ 14:28) >  Support Devices: None  Heart:  Unremarkable  Mediastinum:  Unremarkable  Lungs/Airways: Complete opacification of the left hemithorax unchanged.   Right lung clear.  Bones/Soft tissues: Unremarkable    REFERRALS:  [x]Social Work  []Case management []PT/OT []Chaplaincy  []Hospice  []Patient/Family Support []Massage Therapy []Music Therapy []Holistic RN    DISCUSSION OF CASE: Son - to provide updates and emotional support; MICU - to discuss plan of care

## 2023-06-15 NOTE — DIETITIAN INITIAL EVALUATION ADULT - ENTER TO (CAL/KG)
Patient denied for stress echo test again. He does not meet clinical criteria for this based on his ECG results, CXR, and visit notes. However, he can be approved for a treadmill test. Needs this ordered.    32

## 2023-06-15 NOTE — PATIENT PROFILE ADULT - CAREGIVER RELATION TO PATIENT
History of Present Illness





- General


Chief Complaint: CVA/TIA


Stated Complaint: FACE NUMBNESS, DARIA ARM NUMBNESS


Time Seen by Provider: 03/18/19 13:23





- History of Present Illness


Initial Comments: 





03/18/19 15:35


"The patient is a 17 year old female, with no significant PMH, who presents to 

the emergency department for evaluation of right frontal headache that started 

while she was at school at 9am this morning. Headache progressed gradually and 

was associated with light sensitivity and blurred vision that has resolved. Pt 

also endorses numbness to bilateral hands. Pt states that she has had about 6 

similar episodes in her life. She states that she often has numbness associated 

with headaches. However, they usually resolve within 30 min, but today it has 

not subsided. Pt denies any weakness. Denies any blurred vision or double 

vision currently.





The patient denies chest pain, shortness of breath, and dizziness.


Denies fever, chills, nausea, vomit, diarrhea and constipation.


Denies dysuria, frequency, urgency and hematuria.





Allergies: NKA


Social history: None reported


"





Past History





- Past Medical History


Allergies/Adverse Reactions: 


 Allergies











Allergy/AdvReac Type Severity Reaction Status Date / Time


 


No Known Allergies Allergy   Verified 03/18/19 13:23











Home Medications: 


Ambulatory Orders





NK [No Known Home Medication]  03/18/19 








CVA: No


COPD: No


CHF: No





- Immunization History


Immunization Up to Date: Yes





- Suicide/Smoking/Psychosocial Hx


Smoking History: Never smoked


Information on smoking cessation initiated: No


Hx Alcohol Use: No


Drug/Substance Use Hx: No





**Review of Systems





- Review of Systems


Comments:: 





03/18/19 15:38


"GENERAL/CONSTITUTIONAL: No fever or chills. No weakness.


HEAD, EYES, EARS, NOSE AND THROAT: No change in vision. No ear pain or 

discharge. No sore throat.


CARDIOVASCULAR: No chest pain, no shortness of breath, no loss of consciousness


RESPIRATORY: No cough, wheezing, or hemoptysis.


GASTROINTESTINAL: No nausea, vomiting, diarrhea or constipation.


GENITOURINARY: No dysuria, frequency, or change in urination.


MUSCULOSKELETAL: No joint or muscle swelling or pain. No neck or back pain.


SKIN: No rash


NEUROLOGIC: + headache, + bilateral hand numbness, No vertigo, no change in 

strength.


ENDOCRINE: No increased thirst. No abnormal weight change.


HEMATOLOGIC/LYMPHATIC: No anemia, easy bleeding, or history of blood clots.


ALLERGIC/IMMUNOLOGIC: No hives or skin allergy.








*Physical Exam





- Vital Signs


 Last Vital Signs











Temp Pulse Resp BP Pulse Ox


 


 98.2 F   109 H  16   137/77   100 


 


 03/18/19 13:22  03/18/19 13:22  03/18/19 13:22  03/18/19 13:22  03/18/19 13:22














- Physical Exam


Comments: 





03/18/19 15:38


GENERAL: Awake, alert, and fully oriented, in no acute distress.


HEAD: No signs of trauma


EYES: PERRLA, EOMI, sclera anicteric, conjunctiva clear


ENT: Auricles normal inspection, hearing grossly normal, nares patent, 

oropharynx clear without exudates. Moist mucosa


NECK: Nontender, no stepoffs, Normal ROM, supple, no lymphadenopathy, JVD, or 

masses


LUNGS: Breath sounds equal, clear to auscultation bilaterally.  No wheezes, and 

no crackles


HEART: Regular rate and rhythm, normal S1 and S2, no murmurs, rubs or gallops


ABDOMEN: Soft, nontender, normoactive bowel sounds.  No guarding, no rebound.  

No masses


EXTREMITIES: Normal range of motion, no edema.  No clubbing or cyanosis. No 

cords, erythema, or tenderness


NEUROLOGICAL: Cranial nerves II through XII intact. 5/5 strength and sensation 

in all extremities, Normal speech, normal gait, normal cerebellar function


SKIN: Warm, Dry, normal turgor, no rashes or lesions noted.





Moderate Sedation





- Procedure Monitoring


Vital Signs: 


Procedure Monitoring Vital Signs











Temperature  98.2 F   03/18/19 13:22


 


Pulse Rate  109 H  03/18/19 13:22


 


Respiratory Rate  16   03/18/19 13:22


 


Blood Pressure  137/77   03/18/19 13:22


 


O2 Sat by Pulse Oximetry (%)  100   03/18/19 13:22











ED Treatment Course





- LABORATORY


CBC & Chemistry Diagram: 


 03/18/19 14:05





 03/18/19 14:05





- ADDITIONAL ORDERS


Additional order review: 


 Laboratory  Results











  03/18/19 03/18/19





  14:05 14:05


 


Sodium   137


 


Potassium   3.6


 


Chloride   106


 


Carbon Dioxide   24


 


Anion Gap   7 L


 


BUN   8


 


Creatinine   0.5 L


 


Creat Clearance w eGFR   No Result Required.


 


Random Glucose   85


 


Calcium   8.9


 


Magnesium   Cancelled


 


Total Bilirubin   0.5


 


AST   13 L


 


ALT   20


 


Alkaline Phosphatase   72


 


Troponin I   Cancelled


 


Total Protein   7.6


 


Albumin   4.2


 


Urine Color  Ltyellow 


 


Urine Appearance  Slcloudy 


 


Urine pH  5.0 


 


Ur Specific Gravity  1.005 L 


 


Urine Protein  Negative 


 


Urine Glucose (UA)  Negative 


 


Urine Ketones  Negative 


 


Urine Blood  Negative 


 


Urine Nitrite  Negative 


 


Urine Bilirubin  Negative 


 


Urine Urobilinogen  Negative 


 


Ur Leukocyte Esterase  1+ H 


 


Urine WBC (Auto)  6 


 


Urine RBC (Auto)  3 


 


Ur Epithelial Cells  Few 


 


Urine Bacteria  Few 


 


Hyaline Casts  1 


 


Urine Mucus  Rare 


 


Urine Yeast  Few 


 


Urine HCG, Qual  Negative 








 











  03/18/19





  14:05


 


RBC  4.85


 


MCV  59.8 L


 


MCHC  30.4 L


 


RDW  17.8 H


 


MPV  8.8


 


Neutrophils %  80.3


 


Lymphocytes %  11.4


 


Monocytes %  7.4


 


Eosinophils %  0.2


 


Basophils %  0.7














- Medications


Given in the ED: 


ED Medications














Discontinued Medications














Generic Name Dose Route Start Last Admin





  Trade Name Freq  PRN Reason Stop Dose Admin


 


Acetaminophen  1,000 mg  03/18/19 14:31  03/18/19 15:08





  Ofirmev Injection -  IVPB  03/18/19 14:32  1,000 mg





  ONCE ONE   Administration





     





     





     





     














Medical Decision Making





- Medical Decision Making





03/18/19 15:38


16 yo F with headache and bilateral hand numbness. Likely atypical migraine 

given multiple similar episodes in the past. No objective neuro deficits on 

exam to suggest serious intracranial pathology. Pt with no known FH of MS or 

other demyelinating disease according to father. Will check UPT to r/o 

pregnancy. Pt not on OCP, not at increased risk for clots.


- Labs


- UA, UPT


- Tylenol, reglan


- Reassess





03/18/19 16:10


Labs notable for Hb 8


Pt notes that she has h/o iron def anemia. Last time she had bloodwork, her Hb 

was 7.


Pt reassessed after tylenol and reglan - reports complete resolution of all 

symptoms. Denies any numbness/tingling. Denies headache.





Pt continues to have normal neuro exam.





Repeat heart rate 95


Pt is well appearing, with normal vitals. Clinically stable for DC at this time.


I discussed the physical exam findings, ancillary test results and final 

diagnoses with the patient. I answered all of the patient's questions. The 

patient was satisfied with the care received and felt comfortable with the 

discharge plan and treatment plan.  The patient agrees to follow up with the 

primary care physician within 24-72 hours.








*DC/Admit/Observation/Transfer


Diagnosis at time of Disposition: 


 Headache








- Discharge Dispostion


Disposition: HOME





- Referrals


Referrals: 


Shaun Stovall DO [Staff Physician] - 





- Patient Instructions


Printed Discharge Instructions:  DI for Migraine


Additional Instructions: 


Your symptoms today are likely due to migraine headaches. However, we cannot 

rule out all neurological diseases without further evaluation by a neurologist.


You must follow up with a neurologist for further treatment of your migraines 

and for evaluation of other possible causes of your symptoms.


Call the number provided to make an appointment with a neurologist. If you are 

unable to make an appointment with them, ask your pediatrician for a referral 

to a neurologist.


Take tylenol as needed for headaches.


If you experience worsening or persistent headaches, weakness or numbness in 

your arms or legs, slurred speech, confusion, or any other concerning symptoms, 

return to the ER immediately.





- Post Discharge Activity





- Attestations


Physician Attestion: 





03/18/19 16:18








I, Dr. Helio Roy MD, attest that this document has been prepared under my 

direction and personally reviewed by me in its entirety.   I further attest, 

that it accurately reflects all work, treatment, procedures and medical decision

-making performed by me.
Rapid Medical Evaluation


Chief Complaint: CVA/TIA


Time Seen by Provider: 03/18/19 13:23


Medical Evaluation: 


 Allergies











Allergy/AdvReac Type Severity Reaction Status Date / Time


 


No Known Allergies Allergy   Verified 03/18/19 13:23











03/18/19 13:28


I have performed a brief in-person evaluation of this patient.  





The patient presents with a chief compliant of


right sided headache, numbness to left side of face and arm.  States unable 


to see peripheral vision since this am at 10 am . Denies shortness of breath, 

dizziness


or weakness





Pertinent physical exam findings


NAD


unlabored breathing 


heart s1s2


grossly neurologically intact 





I have ordered the following 


ekg, labs, and urine 


The patient will proceed to the ED for further evaluation. 





**Discharge Disposition





- Diagnosis


 Headache








- Referrals





- Patient Instructions





- Post Discharge Activity
caregiver

## 2023-06-15 NOTE — DIETITIAN INITIAL EVALUATION ADULT - OTHER CALCULATIONS
Ideal body weight used to calculate energy needs due to generalized 1+ edema. Adjust for age, pneumonia, hospital course. Fluids per team

## 2023-06-15 NOTE — DIETITIAN INITIAL EVALUATION ADULT - ADD RECOMMEND
1. Start feeds within 24-48hrs   2. s&s evaluation before resuming PO; align nutrition with GOC/SLP at all times   > if PO, recommend consistent carbs  > if EN, recommend initiate TF of Glucerna 1.2 @20ml/hr. If tolerated, advance by 10ml q4hrs to goal rate of 52ml/hr x 24hrs to provide 1498Kcal, 75g protein, 1005ml free water in 1248ml total TF volume. Adjust fluids per team.   3. Continue insulin regimen; FSBG q4-6hrs   4. Monitor GI fxn, chemistry, skin integrity, biweekly wts   5. RD to remain available for recs adjustment prn or will follow up pt per organizational policy

## 2023-06-15 NOTE — PROCEDURE NOTE - ADDITIONAL PROCEDURE DETAILS
Care for catheter as per unit/ICU protocols  PIV 22g inserted to left wrist, x 1 attempt, +blood return and patient tolerated well.

## 2023-06-15 NOTE — PROGRESS NOTE ADULT - ASSESSMENT
87 yo woman admitted from rehab with AF and RVR in the setting of aspiration pna vs recurrent UTI  -abx per ICU team  -AF rate control with amiodarone and diltiazzem  -HTN hold home meds currently controlled on dilt  -HLD controlled on statin  -DM per protocol  -family aware - she is DNR DNI but they want abx and treatment for aspiration pna and UTI  -at baseline she is interactive and verbal with her HHA's  -DVT prophyalxis  Will follow

## 2023-06-15 NOTE — CONSULT NOTE ADULT - PROBLEM SELECTOR RECOMMENDATION 4
.  -children are designated HCP: son stated he needed to confirm DNR/DNI status with daughter who is travelling out of the country -> advised him that risks outweigh benefits for CPR and Intubation given patient's dramatic decline in the past few months -> son will be flying to [facility] tomorrow  -they are currently accepting of interventions that could allow the possibility of stabilization/recovery (HFNC, NGT, ABx)

## 2023-06-15 NOTE — ED ADULT NURSE REASSESSMENT NOTE - NS ED NURSE REASSESS COMMENT FT1
HR remains 140-160's. Waqas with MD Brandt, no repeat EKG necessary at this time. Repeat trop sent and meds given as per MAR. Remains on cardiac monitor at this time. Plan to be admitted.
Per MD Brandt, pt to be administered 5mg IV Lopressor as ordered. Pt to receive second dose IV Lopressor 5-10 after dose 1 if HR not effectively reduced and BP maintained. 1dose administered. Pt BP wnl. HR remains 145. 2nd dose administered according to MD order.
Pt in A-fib, HR 150s bpm. [facility] medicine team made aware and Viral ICU team made aware. ICU team at bedside evaluating pt. No interventions at this time. Will continue to monitor.
Pt slotted for regional bed admission. On routine VS assessment pt noted to have HR again 135-155. DU BUI made aware. Pt BP wnl. A&ox4. On tele monitor.
RN at bedside, pt w/increasingly labored respirations, appears lethargic, alert to touch while previously alert to verbal stimuli.  Pt HR remains in 140s after Dilt. DU BUI paged. Plan to initiate Dilt drip. Per MD ICU concult ongoing, pt likely MICU admission. Pt remains on cardiac monitor.
Received pt. lethargic, responsive to verbal stimuli, skin cool diaphoretic, breathing labored with accessory muscles use, pt. is tachypneic and tachycardic, Cardizem infusion was initiated, O2 sat via nc at 4L/min, O2 sat 97%, HR range 120-165, ICU aware, awaiting bed.
Patient on Diltiazem drip at 5mg/hr, patient remains tachy up to 160's, Dr. Stratton at the bedside and made aware, verbalized he will increase the Diltiazem to 10mg/hr.

## 2023-06-15 NOTE — PROCEDURE NOTE - NSICDXPROCEDURE_GEN_ALL_CORE_FT
PROCEDURES:  Insertion of intravenous catheter with ultrasound guidance 15-Hugh-2023 04:49:52  Clara Royal  
PROCEDURES:  Insertion, nasogastric tube 15-Hugh-2023 17:47:55  Hussein Rock  
PROCEDURES:  Insertion of intravenous catheter with ultrasound guidance 15-Hugh-2023 04:49:52  Clara Royal  Blood draw 15-Hugh-2023 04:49:57  Clara Royal

## 2023-06-15 NOTE — H&P ADULT - NSHPLABSRESULTS_GEN_ALL_CORE
LABS:                         11.5   11.78 )-----------( 290      ( 14 Jun 2023 14:17 )             36.9     06-14    144  |  104  |  26<H>  ----------------------------<  165<H>  4.8   |  30  |  0.72    Ca    11.2<H>      14 Jun 2023 14:17  Phos  2.7     06-14  Mg     1.5     06-14    TPro  7.1  /  Alb  3.8  /  TBili  0.4  /  DBili  x   /  AST  13  /  ALT  12  /  AlkPhos  195<H>  06-14    PT/INR - ( 14 Jun 2023 14:17 )   PT: 13.2 sec;   INR: 1.11          PTT - ( 14 Jun 2023 14:17 )  PTT:32.1 sec  Urinalysis Basic - ( 14 Jun 2023 17:43 )    Color: Yellow / Appearance: SL Cloudy / SG: >=1.030 / pH: x  Gluc: x / Ketone: Trace mg/dL  / Bili: Negative / Urobili: 0.2 E.U./dL   Blood: x / Protein: 100 mg/dL / Nitrite: NEGATIVE   Leuk Esterase: Moderate / RBC: < 5 /HPF / WBC > 10 /HPF   Sq Epi: x / Non Sq Epi: x / Bacteria: Present /HPF            Lactate, Blood: 1.4 mmol/L (06-14 @ 14:17)      RADIOLOGY, EKG & ADDITIONAL TESTS:

## 2023-06-15 NOTE — PROCEDURE NOTE - NSINDICATIONS_GEN_A_CORE
fluid administration
antibiotic therapy/emergency venous access/fluid administration
feeds
antibiotic therapy

## 2023-06-15 NOTE — DIETITIAN INITIAL EVALUATION ADULT - PERTINENT MEDS FT
MEDICATIONS  (STANDING):  aMIOdarone Infusion 1 mG/Min (33.3 mL/Hr) IV Continuous <Continuous>  atorvastatin 5 milliGRAM(s) Oral at bedtime  chlorhexidine 2% Cloths 1 Application(s) Topical <User Schedule>  diltiazem Infusion 10 mG/Hr (10 mL/Hr) IV Continuous <Continuous>  enoxaparin Injectable 40 milliGRAM(s) SubCutaneous every 24 hours  insulin lispro (ADMELOG) corrective regimen sliding scale   SubCutaneous every 6 hours  levETIRAcetam  IVPB 500 milliGRAM(s) IV Intermittent every 12 hours  piperacillin/tazobactam IVPB.- 4.5 Gram(s) IV Intermittent once  piperacillin/tazobactam IVPB.. 4.5 Gram(s) IV Intermittent every 8 hours  sertraline 50 milliGRAM(s) Oral daily    MEDICATIONS  (PRN):  acetaminophen     Tablet .. 650 milliGRAM(s) Oral every 6 hours PRN Temp greater or equal to 38C (100.4F), Mild Pain (1 - 3)  melatonin 3 milliGRAM(s) Oral at bedtime PRN Insomnia

## 2023-06-15 NOTE — CONSULT NOTE ADULT - ASSESSMENT
·	children are designated HCP: son stated he needed to confirm DNR/DNI status with daughter who is travelling out of the country -> advised him that risks outweigh benefits for CPR and Intubation given patient's dramatic decline in the past few months -> son will be flying to [facility] tomorrow  ·	they are currently accepting of interventions that could allow the possibility of stabilization/recovery (HFNC, NGT, ABx)  ·	patient was last seen by me on her birthday (3 weeks ago) and she was awake, slowly conversant, participating in exercises, and reading the newspaper 88yo F with PMH of Dementia, Parkinson's, h/o Lung CA, HTN, T2DM, and h/o SDH/SAH p/w respiratory failure and AFib. Palliative consulted for complex medical decision making in the setting of advanced illness.    ·	children are designated HCP: son stated he needed to confirm DNR/DNI status with daughter who is travelling out of the country -> advised him that risks outweigh benefits for CPR and Intubation given patient's dramatic decline in the past few months -> son will be flying to [facility] tomorrow  ·	they are currently accepting of interventions that could allow the possibility of stabilization/recovery (HFNC, NGT, ABx)  ·	patient was last seen by me on her birthday (3 weeks ago) and she was awake, slowly conversant, participating in exercises, and reading the newspaper

## 2023-06-15 NOTE — DIETITIAN INITIAL EVALUATION ADULT - OTHER INFO
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    Visited pt a bedside this AM on 7EA, daughter in the room. On assessment pt is lethargic, arouses to voice. Per daughter report, pt with worsening swallowing ability 2x weeks pta. Pt is coming from NH where she was consuming pureed foods with mildly thick liquids. Daughter reports possible wt loss over time however unable to quantify. Per chart review, wt ranged between 54kg and 60kg 3x years, thus no clinically significant changes noted. No overt fat/muscle wasting noted; pt appears well nourished. Currently NPO; awaiting for palliative consult for hospice eval. Per chart review, no nvdc noted, LBM unknown. Pt has dysphagia. Noted generalized 1+ edema; no PUs noted; bimal scale 13. Nutrition related labs reviewed; high FSBG (282, 227, 216) - insulin regimen ordered. Non verbal indicators of pain absent. Please view full recs below. Clinical nutrition services will continue to follow up pt per organizational policy. Please place new consult for any acute nutrition-related issues that may arise prior to follow up

## 2023-06-15 NOTE — H&P ADULT - HISTORY OF PRESENT ILLNESS
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 ICU consulted for afib rvr.    ED vitals: T 100.1 -> 100.9 -160  RR 24 /75 SpO2 94% 4LNC  Labs signficant: WBC 11.78, trop 52 <- 58, BUN 26, calcium 11.2. alk phos 195, magnesium 1.5, vbg ph 7.36, pCO2 59, UA +LE and WBC  CXR: complete opacification of left unchanged, right lung clear  EKG: atrial fibrillation  Interventions: 1700cc NS, Diltiazem IVP 20mg x2, lopressor 5mg x3, vanc, zosyn  Consults: ICU

## 2023-06-16 LAB
ALBUMIN SERPL ELPH-MCNC: 2.7 G/DL — LOW (ref 3.3–5)
ALP SERPL-CCNC: 369 U/L — HIGH (ref 40–120)
ALT FLD-CCNC: 10 U/L — SIGNIFICANT CHANGE UP (ref 10–45)
ANION GAP SERPL CALC-SCNC: 11 MMOL/L — SIGNIFICANT CHANGE UP (ref 5–17)
AST SERPL-CCNC: 117 U/L — HIGH (ref 10–40)
BASOPHILS # BLD AUTO: 0.03 K/UL — SIGNIFICANT CHANGE UP (ref 0–0.2)
BASOPHILS NFR BLD AUTO: 0.3 % — SIGNIFICANT CHANGE UP (ref 0–2)
BILIRUB SERPL-MCNC: 0.3 MG/DL — SIGNIFICANT CHANGE UP (ref 0.2–1.2)
BUN SERPL-MCNC: 26 MG/DL — HIGH (ref 7–23)
CALCIUM SERPL-MCNC: 9.6 MG/DL — SIGNIFICANT CHANGE UP (ref 8.4–10.5)
CHLORIDE SERPL-SCNC: 94 MMOL/L — LOW (ref 96–108)
CO2 SERPL-SCNC: 26 MMOL/L — SIGNIFICANT CHANGE UP (ref 22–31)
CREAT SERPL-MCNC: 0.71 MG/DL — SIGNIFICANT CHANGE UP (ref 0.5–1.3)
CULTURE RESULTS: SIGNIFICANT CHANGE UP
EGFR: 82 ML/MIN/1.73M2 — SIGNIFICANT CHANGE UP
EOSINOPHIL # BLD AUTO: 0.01 K/UL — SIGNIFICANT CHANGE UP (ref 0–0.5)
EOSINOPHIL NFR BLD AUTO: 0.1 % — SIGNIFICANT CHANGE UP (ref 0–6)
GLUCOSE BLDC GLUCOMTR-MCNC: 140 MG/DL — HIGH (ref 70–99)
GLUCOSE BLDC GLUCOMTR-MCNC: 170 MG/DL — HIGH (ref 70–99)
GLUCOSE BLDC GLUCOMTR-MCNC: 230 MG/DL — HIGH (ref 70–99)
GLUCOSE BLDC GLUCOMTR-MCNC: 235 MG/DL — HIGH (ref 70–99)
GLUCOSE SERPL-MCNC: 421 MG/DL — HIGH (ref 70–99)
HCT VFR BLD CALC: 26.9 % — LOW (ref 34.5–45)
HGB BLD-MCNC: 8.5 G/DL — LOW (ref 11.5–15.5)
IMM GRANULOCYTES NFR BLD AUTO: 2.1 % — HIGH (ref 0–0.9)
LEGIONELLA AG UR QL: NEGATIVE — SIGNIFICANT CHANGE UP
LYMPHOCYTES # BLD AUTO: 0.64 K/UL — LOW (ref 1–3.3)
LYMPHOCYTES # BLD AUTO: 6 % — LOW (ref 13–44)
MAGNESIUM SERPL-MCNC: 1.6 MG/DL — SIGNIFICANT CHANGE UP (ref 1.6–2.6)
MCHC RBC-ENTMCNC: 26.1 PG — LOW (ref 27–34)
MCHC RBC-ENTMCNC: 31.6 GM/DL — LOW (ref 32–36)
MCV RBC AUTO: 82.5 FL — SIGNIFICANT CHANGE UP (ref 80–100)
MONOCYTES # BLD AUTO: 0.48 K/UL — SIGNIFICANT CHANGE UP (ref 0–0.9)
MONOCYTES NFR BLD AUTO: 4.5 % — SIGNIFICANT CHANGE UP (ref 2–14)
MRSA PCR RESULT.: POSITIVE
NEUTROPHILS # BLD AUTO: 9.21 K/UL — HIGH (ref 1.8–7.4)
NEUTROPHILS NFR BLD AUTO: 87 % — HIGH (ref 43–77)
NRBC # BLD: 0 /100 WBCS — SIGNIFICANT CHANGE UP (ref 0–0)
OSMOLALITY UR: 664 MOSM/KG — SIGNIFICANT CHANGE UP (ref 300–900)
PHOSPHATE SERPL-MCNC: 2.5 MG/DL — SIGNIFICANT CHANGE UP (ref 2.5–4.5)
PLATELET # BLD AUTO: 216 K/UL — SIGNIFICANT CHANGE UP (ref 150–400)
POTASSIUM SERPL-MCNC: 3.5 MMOL/L — SIGNIFICANT CHANGE UP (ref 3.5–5.3)
POTASSIUM SERPL-SCNC: 3.5 MMOL/L — SIGNIFICANT CHANGE UP (ref 3.5–5.3)
PROT SERPL-MCNC: 5.8 G/DL — LOW (ref 6–8.3)
RBC # BLD: 3.26 M/UL — LOW (ref 3.8–5.2)
RBC # FLD: 17.2 % — HIGH (ref 10.3–14.5)
S AUREUS DNA NOSE QL NAA+PROBE: POSITIVE
S PNEUM AG UR QL: NEGATIVE — SIGNIFICANT CHANGE UP
SODIUM SERPL-SCNC: 131 MMOL/L — LOW (ref 135–145)
SODIUM UR-SCNC: 108 MMOL/L — SIGNIFICANT CHANGE UP
SPECIMEN SOURCE: SIGNIFICANT CHANGE UP
WBC # BLD: 10.59 K/UL — HIGH (ref 3.8–10.5)
WBC # FLD AUTO: 10.59 K/UL — HIGH (ref 3.8–10.5)

## 2023-06-16 PROCEDURE — 99232 SBSQ HOSP IP/OBS MODERATE 35: CPT | Mod: GC,25

## 2023-06-16 PROCEDURE — 76604 US EXAM CHEST: CPT | Mod: 26,GC

## 2023-06-16 RX ORDER — AMIODARONE HYDROCHLORIDE 400 MG/1
200 TABLET ORAL EVERY 24 HOURS
Refills: 0 | Status: DISCONTINUED | OUTPATIENT
Start: 2023-06-21 | End: 2023-06-24

## 2023-06-16 RX ORDER — FUROSEMIDE 40 MG
40 TABLET ORAL ONCE
Refills: 0 | Status: DISCONTINUED | OUTPATIENT
Start: 2023-06-16 | End: 2023-06-16

## 2023-06-16 RX ORDER — FUROSEMIDE 40 MG
20 TABLET ORAL ONCE
Refills: 0 | Status: COMPLETED | OUTPATIENT
Start: 2023-06-16 | End: 2023-06-16

## 2023-06-16 RX ORDER — VANCOMYCIN HCL 1 G
1000 VIAL (EA) INTRAVENOUS EVERY 24 HOURS
Refills: 0 | Status: COMPLETED | OUTPATIENT
Start: 2023-06-16 | End: 2023-06-18

## 2023-06-16 RX ORDER — DILTIAZEM HCL 120 MG
30 CAPSULE, EXT RELEASE 24 HR ORAL EVERY 6 HOURS
Refills: 0 | Status: DISCONTINUED | OUTPATIENT
Start: 2023-06-16 | End: 2023-06-18

## 2023-06-16 RX ORDER — POTASSIUM CHLORIDE 20 MEQ
10 PACKET (EA) ORAL
Refills: 0 | Status: COMPLETED | OUTPATIENT
Start: 2023-06-16 | End: 2023-06-16

## 2023-06-16 RX ORDER — VANCOMYCIN HCL 1 G
750 VIAL (EA) INTRAVENOUS EVERY 12 HOURS
Refills: 0 | Status: DISCONTINUED | OUTPATIENT
Start: 2023-06-16 | End: 2023-06-16

## 2023-06-16 RX ORDER — PIPERACILLIN AND TAZOBACTAM 4; .5 G/20ML; G/20ML
4.5 INJECTION, POWDER, LYOPHILIZED, FOR SOLUTION INTRAVENOUS EVERY 8 HOURS
Refills: 0 | Status: COMPLETED | OUTPATIENT
Start: 2023-06-16 | End: 2023-06-21

## 2023-06-16 RX ORDER — AMIODARONE HYDROCHLORIDE 400 MG/1
400 TABLET ORAL EVERY 12 HOURS
Refills: 0 | Status: DISCONTINUED | OUTPATIENT
Start: 2023-06-16 | End: 2023-06-17

## 2023-06-16 RX ORDER — MAGNESIUM SULFATE 500 MG/ML
2 VIAL (ML) INJECTION ONCE
Refills: 0 | Status: COMPLETED | OUTPATIENT
Start: 2023-06-16 | End: 2023-06-16

## 2023-06-16 RX ORDER — DILTIAZEM HCL 120 MG
15 CAPSULE, EXT RELEASE 24 HR ORAL
Qty: 125 | Refills: 0 | Status: DISCONTINUED | OUTPATIENT
Start: 2023-06-16 | End: 2023-06-16

## 2023-06-16 RX ORDER — LEVETIRACETAM 250 MG/1
1 TABLET, FILM COATED ORAL
Refills: 0 | DISCHARGE

## 2023-06-16 RX ADMIN — Medication 2: at 05:25

## 2023-06-16 RX ADMIN — Medication 30 MILLIGRAM(S): at 19:06

## 2023-06-16 RX ADMIN — Medication 1: at 18:28

## 2023-06-16 RX ADMIN — CHLORHEXIDINE GLUCONATE 1 APPLICATION(S): 213 SOLUTION TOPICAL at 05:25

## 2023-06-16 RX ADMIN — Medication 30 MILLIGRAM(S): at 23:20

## 2023-06-16 RX ADMIN — Medication 100 MILLIEQUIVALENT(S): at 09:54

## 2023-06-16 RX ADMIN — LEVETIRACETAM 400 MILLIGRAM(S): 250 TABLET, FILM COATED ORAL at 05:24

## 2023-06-16 RX ADMIN — Medication 100 MILLIEQUIVALENT(S): at 09:01

## 2023-06-16 RX ADMIN — PIPERACILLIN AND TAZOBACTAM 25 GRAM(S): 4; .5 INJECTION, POWDER, LYOPHILIZED, FOR SOLUTION INTRAVENOUS at 21:09

## 2023-06-16 RX ADMIN — Medication 100 MILLIEQUIVALENT(S): at 11:11

## 2023-06-16 RX ADMIN — ENOXAPARIN SODIUM 40 MILLIGRAM(S): 100 INJECTION SUBCUTANEOUS at 11:11

## 2023-06-16 RX ADMIN — SERTRALINE 50 MILLIGRAM(S): 25 TABLET, FILM COATED ORAL at 23:16

## 2023-06-16 RX ADMIN — PIPERACILLIN AND TAZOBACTAM 25 GRAM(S): 4; .5 INJECTION, POWDER, LYOPHILIZED, FOR SOLUTION INTRAVENOUS at 05:25

## 2023-06-16 RX ADMIN — CARBIDOPA AND LEVODOPA 2.5 TABLET(S): 25; 100 TABLET ORAL at 21:09

## 2023-06-16 RX ADMIN — ATORVASTATIN CALCIUM 5 MILLIGRAM(S): 80 TABLET, FILM COATED ORAL at 21:09

## 2023-06-16 RX ADMIN — Medication 25 GRAM(S): at 07:09

## 2023-06-16 RX ADMIN — Medication 10 MG/HR: at 04:00

## 2023-06-16 RX ADMIN — Medication 15 MG/HR: at 12:04

## 2023-06-16 RX ADMIN — AMIODARONE HYDROCHLORIDE 16.7 MG/MIN: 400 TABLET ORAL at 14:24

## 2023-06-16 RX ADMIN — CARBIDOPA AND LEVODOPA 2.5 TABLET(S): 25; 100 TABLET ORAL at 05:24

## 2023-06-16 RX ADMIN — PIPERACILLIN AND TAZOBACTAM 25 GRAM(S): 4; .5 INJECTION, POWDER, LYOPHILIZED, FOR SOLUTION INTRAVENOUS at 14:09

## 2023-06-16 RX ADMIN — Medication 250 MILLIGRAM(S): at 12:04

## 2023-06-16 RX ADMIN — AMIODARONE HYDROCHLORIDE 400 MILLIGRAM(S): 400 TABLET ORAL at 18:27

## 2023-06-16 RX ADMIN — CARBIDOPA AND LEVODOPA 2.5 TABLET(S): 25; 100 TABLET ORAL at 14:08

## 2023-06-16 RX ADMIN — Medication 20 MILLIGRAM(S): at 10:43

## 2023-06-16 RX ADMIN — Medication 2: at 12:26

## 2023-06-16 NOTE — SWALLOW BEDSIDE ASSESSMENT ADULT - SLP PERTINENT HISTORY OF CURRENT PROBLEM
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

## 2023-06-16 NOTE — SWALLOW BEDSIDE ASSESSMENT ADULT - SLP GENERAL OBSERVATIONS
Awake, sitting upright in bed, receiving supplemental O2 via HFNC, oriented to self and place with yes/no questioning. Otherwise, limited verbal output.

## 2023-06-16 NOTE — PROGRESS NOTE ADULT - SUBJECTIVE AND OBJECTIVE BOX
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 ICU consulted for afib rvr.    Now in NSR at 71 on amio and dilt  Echo confirms normal LVEF with baseline elevated PA pressures consistent with pneumonectomy.  Afebrile  Responsive  Sa02 100%    MEDICATIONS  (STANDING):  aMIOdarone Infusion 0.5 mG/Min (16.7 mL/Hr) IV Continuous <Continuous>  aMIOdarone Infusion 0.999 mG/Min (33.3 mL/Hr) IV Continuous <Continuous>  atorvastatin 5 milliGRAM(s) Oral at bedtime  carbidopa/levodopa  25/100 2.5 Tablet(s) Oral every 8 hours  chlorhexidine 2% Cloths 1 Application(s) Topical <User Schedule>  diltiazem Infusion 10 mG/Hr (10 mL/Hr) IV Continuous <Continuous>  enoxaparin Injectable 40 milliGRAM(s) SubCutaneous every 24 hours  insulin lispro (ADMELOG) corrective regimen sliding scale   SubCutaneous every 6 hours  levETIRAcetam  IVPB 500 milliGRAM(s) IV Intermittent every 12 hours  piperacillin/tazobactam IVPB.. 4.5 Gram(s) IV Intermittent every 8 hours  potassium chloride  10 mEq/100 mL IVPB 10 milliEquivalent(s) IV Intermittent every 1 hour  sertraline 50 milliGRAM(s) Oral every 24 hours    MEDICATIONS  (PRN):  acetaminophen     Tablet .. 650 milliGRAM(s) Oral every 6 hours PRN Temp greater or equal to 38C (100.4F), Mild Pain (1 - 3)  melatonin 3 milliGRAM(s) Oral at bedtime PRN Insomnia      ICU Vital Signs Last 24 Hrs  T(C): 36.8 (16 Jun 2023 06:03), Max: 37.5 (15 Hugh 2023 14:34)  T(F): 98.2 (16 Jun 2023 06:03), Max: 99.5 (15 Hugh 2023 14:34)  HR: 70 (16 Jun 2023 07:00) (70 - 133)  BP: 95/50 (16 Jun 2023 07:00) (82/57 - 173/70)  BP(mean): 68 (16 Jun 2023 07:00) (62 - 104)  ABP: --  ABP(mean): --  RR: 30 (16 Jun 2023 07:00) (20 - 39)  SpO2: 100% (16 Jun 2023 07:00) (92% - 100%)    O2 Parameters below as of 16 Jun 2023 07:00  Patient On (Oxygen Delivery Method): nasal cannula, high flow  O2 Flow (L/min): 50  O2 Concentration (%): 50    NAD - eyes open and responisve  Chest - left BS absent, right bs coarse rhonchi  CV RRR s1s2   Abd soft non-tender  Ext edema                          8.5    10.59 )-----------( 216      ( 16 Jun 2023 05:27 )             26.9   06-16    131<L>  |  94<L>  |  26<H>  ----------------------------<  421<H>  3.5   |  26  |  0.71    Ca    9.6      16 Jun 2023 05:27  Phos  2.5     06-16  Mg     1.6     06-16    TPro  5.8<L>  /  Alb  2.7<L>  /  TBili  0.3  /  DBili  x   /  AST  117<H>  /  ALT  10  /  AlkPhos  369<H>  06-16    CONCLUSIONS:     1. Technically difficult study.   2. Patient was tachycardic during the study.   3. Small left ventricular size.   4. Mild to moderate symmetric left ventricular hypertrophy.   5. Normal left ventricular systolic function.   6. Normal right ventricular size and systolic function.   7. Severely dilated left atrium.   8. Moderate tricuspid regurgitation.   9. Aortic sclerosis without significant stenosis.  10. Pulmonary hypertension present, pulmonary artery systolic pressure is   51 mmHg.  11. Pericardial fat pad is seen anterior to the right ventricle, cannot   rule out a trivial pericardial effusion.  12. Compared to the previous TTE performed on 2/8/2021, the PASP is   higher and the tricuspid regurgitation has progressed.

## 2023-06-16 NOTE — PROGRESS NOTE ADULT - SUBJECTIVE AND OBJECTIVE BOX
**INCOMPLETE NOTE    OVERNIGHT EVENTS:    SUBJECTIVE:  Patient seen and examined at bedside.    Vital Signs Last 12 Hrs  T(F): 98.2 (06-16-23 @ 06:03), Max: 98.8 (06-15-23 @ 22:01)  HR: 74 (06-16-23 @ 06:03) (72 - 133)  BP: 123/53 (06-16-23 @ 06:00) (93/56 - 139/78)  BP(mean): 83 (06-16-23 @ 06:00) (70 - 104)  RR: 30 (06-16-23 @ 06:03) (20 - 37)  SpO2: 99% (06-16-23 @ 06:03) (96% - 100%)  I&O's Summary    14 Jun 2023 07:01  -  15 Hugh 2023 07:00  --------------------------------------------------------  IN: 345 mL / OUT: 700 mL / NET: -355 mL    15 Hugh 2023 07:01  -  16 Jun 2023 06:49  --------------------------------------------------------  IN: 1678.2 mL / OUT: 250 mL / NET: 1428.2 mL        PHYSICAL EXAM:  Constitutional: NAD, comfortable in bed.  HEENT: NC/AT, PERRLA, EOMI, no conjunctival pallor or scleral icterus, MMM  Neck: Supple, no JVD  Respiratory: CTA B/L. No w/r/r.   Cardiovascular: RRR, normal S1 and S2, no m/r/g.   Gastrointestinal: +BS, soft NTND, no guarding or rebound tenderness, no palpable masses   Extremities: wwp; no cyanosis, clubbing or edema.   Vascular: Pulses equal and strong throughout.   Neurological: AAOx3, no CN deficits, strength and sensation intact throughout.   Skin: No gross skin abnormalities or rashes        LABS:                        8.5    10.59 )-----------( 216      ( 16 Jun 2023 05:27 )             26.9     06-16    131<L>  |  94<L>  |  26<H>  ----------------------------<  421<H>  3.5   |  26  |  0.71    Ca    9.6      16 Jun 2023 05:27  Phos  2.5     06-16  Mg     1.6     06-16    TPro  5.8<L>  /  Alb  2.7<L>  /  TBili  0.3  /  DBili  x   /  AST  117<H>  /  ALT  10  /  AlkPhos  369<H>  06-16    PT/INR - ( 14 Jun 2023 14:17 )   PT: 13.2 sec;   INR: 1.11          PTT - ( 14 Jun 2023 14:17 )  PTT:32.1 sec  Urinalysis Basic - ( 14 Jun 2023 17:43 )    Color: Yellow / Appearance: SL Cloudy / SG: >=1.030 / pH: x  Gluc: x / Ketone: Trace mg/dL  / Bili: Negative / Urobili: 0.2 E.U./dL   Blood: x / Protein: 100 mg/dL / Nitrite: NEGATIVE   Leuk Esterase: Moderate / RBC: < 5 /HPF / WBC > 10 /HPF   Sq Epi: x / Non Sq Epi: x / Bacteria: Present /HPF          RADIOLOGY & ADDITIONAL TESTS:    MEDICATIONS  (STANDING):  aMIOdarone Infusion 0.5 mG/Min (16.7 mL/Hr) IV Continuous <Continuous>  aMIOdarone Infusion 0.999 mG/Min (33.3 mL/Hr) IV Continuous <Continuous>  atorvastatin 5 milliGRAM(s) Oral at bedtime  carbidopa/levodopa  25/100 2.5 Tablet(s) Oral every 8 hours  chlorhexidine 2% Cloths 1 Application(s) Topical <User Schedule>  diltiazem Infusion 10 mG/Hr (10 mL/Hr) IV Continuous <Continuous>  enoxaparin Injectable 40 milliGRAM(s) SubCutaneous every 24 hours  insulin lispro (ADMELOG) corrective regimen sliding scale   SubCutaneous every 6 hours  levETIRAcetam  IVPB 500 milliGRAM(s) IV Intermittent every 12 hours  piperacillin/tazobactam IVPB.. 4.5 Gram(s) IV Intermittent every 8 hours  sertraline 50 milliGRAM(s) Oral every 24 hours    MEDICATIONS  (PRN):  acetaminophen     Tablet .. 650 milliGRAM(s) Oral every 6 hours PRN Temp greater or equal to 38C (100.4F), Mild Pain (1 - 3)  melatonin 3 milliGRAM(s) Oral at bedtime PRN Insomnia   OVERNIGHT EVENTS: NAEO    SUBJECTIVE: Patient seen and examined at bedside. Unable to assess ROS given dementia.    Vital Signs Last 12 Hrs  T(F): 98.2 (06-16-23 @ 06:03), Max: 98.8 (06-15-23 @ 22:01)  HR: 74 (06-16-23 @ 06:03) (72 - 133)  BP: 123/53 (06-16-23 @ 06:00) (93/56 - 139/78)  BP(mean): 83 (06-16-23 @ 06:00) (70 - 104)  RR: 30 (06-16-23 @ 06:03) (20 - 37)  SpO2: 99% (06-16-23 @ 06:03) (96% - 100%)  I&O's Summary    14 Jun 2023 07:01  -  15 Hugh 2023 07:00  --------------------------------------------------------  IN: 345 mL / OUT: 700 mL / NET: -355 mL    15 Hugh 2023 07:01  -  16 Jun 2023 06:49  --------------------------------------------------------  IN: 1678.2 mL / OUT: 250 mL / NET: 1428.2 mL        PHYSICAL EXAM:  Constitutional: NAD, comfortable in bed.  HEENT: NC/AT, EOMI, no conjunctival pallor or scleral icterus, MMM, neck supple  Respiratory: right lung with mild wheezing and rales, left lung without breath sounds, breathing comfortably   Cardiovascular: tachycardia, regular rate, normal S1 and S2, no m/r/g.   Gastrointestinal: soft NTND, no guarding or rebound tenderness, no palpable masses   Extremities: wwp; no cyanosis, clubbing or edema.   Vascular: Pulses equal and strong throughout.   Neurological: AAOx3        LABS:                        8.5    10.59 )-----------( 216      ( 16 Jun 2023 05:27 )             26.9     06-16    131<L>  |  94<L>  |  26<H>  ----------------------------<  421<H>  3.5   |  26  |  0.71    Ca    9.6      16 Jun 2023 05:27  Phos  2.5     06-16  Mg     1.6     06-16    TPro  5.8<L>  /  Alb  2.7<L>  /  TBili  0.3  /  DBili  x   /  AST  117<H>  /  ALT  10  /  AlkPhos  369<H>  06-16    PT/INR - ( 14 Jun 2023 14:17 )   PT: 13.2 sec;   INR: 1.11          PTT - ( 14 Jun 2023 14:17 )  PTT:32.1 sec  Urinalysis Basic - ( 14 Jun 2023 17:43 )    Color: Yellow / Appearance: SL Cloudy / SG: >=1.030 / pH: x  Gluc: x / Ketone: Trace mg/dL  / Bili: Negative / Urobili: 0.2 E.U./dL   Blood: x / Protein: 100 mg/dL / Nitrite: NEGATIVE   Leuk Esterase: Moderate / RBC: < 5 /HPF / WBC > 10 /HPF   Sq Epi: x / Non Sq Epi: x / Bacteria: Present /HPF          RADIOLOGY & ADDITIONAL TESTS:    MEDICATIONS  (STANDING):  aMIOdarone Infusion 0.5 mG/Min (16.7 mL/Hr) IV Continuous <Continuous>  aMIOdarone Infusion 0.999 mG/Min (33.3 mL/Hr) IV Continuous <Continuous>  atorvastatin 5 milliGRAM(s) Oral at bedtime  carbidopa/levodopa  25/100 2.5 Tablet(s) Oral every 8 hours  chlorhexidine 2% Cloths 1 Application(s) Topical <User Schedule>  diltiazem Infusion 10 mG/Hr (10 mL/Hr) IV Continuous <Continuous>  enoxaparin Injectable 40 milliGRAM(s) SubCutaneous every 24 hours  insulin lispro (ADMELOG) corrective regimen sliding scale   SubCutaneous every 6 hours  levETIRAcetam  IVPB 500 milliGRAM(s) IV Intermittent every 12 hours  piperacillin/tazobactam IVPB.. 4.5 Gram(s) IV Intermittent every 8 hours  sertraline 50 milliGRAM(s) Oral every 24 hours    MEDICATIONS  (PRN):  acetaminophen     Tablet .. 650 milliGRAM(s) Oral every 6 hours PRN Temp greater or equal to 38C (100.4F), Mild Pain (1 - 3)  melatonin 3 milliGRAM(s) Oral at bedtime PRN Insomnia

## 2023-06-16 NOTE — SWALLOW BEDSIDE ASSESSMENT ADULT - SWALLOW EVAL: DIAGNOSIS
Clinical concerns for oropharyngeal dysphagia in pt with multiple chronic (dementia, PD) and acute (PNA, increased O2 requirements) dysphagia risk factors. Suspect PNA is likely representative of a dysphagia-related sequela. Pt is at high risk for further pulmonary complications associated with aspiration, given poor oral hygiene, poor physical mobility, cognitive deficits/poor safety awareness, and overall acuity of illness. Would benefit from instrumental swallow study to further assess swallow function/determine safest, least restrictive diet prior to initiating PO diet IF consistent with GOC. Recommend GOC to help guide POC as it relates to feeding.

## 2023-06-16 NOTE — SWALLOW BEDSIDE ASSESSMENT ADULT - COMMENTS
Known to our c from recent prior admission in May. Recommendations from most recent bedside swallowing evaluation were for a modified diet of minced and moist solids and mildly thick liquids.   Pt's HHA at bedside who assisted in providing pertinent case hx information. According to her, a little over a week ago, she observed a care provider at NH feeding pt while she was supine in bed. Pt was reportedly followed by an SLP at NH who, as of last week, modified her diet to mildly thick liquids and purees. Pt was reportedly tolerating this diet up until a few days ago when she began coughing with both liquids and purees. Known to our c from recent prior admission in May. Recommendations from most recent bedside swallowing evaluation were for a modified diet of minced and moist solids and mildly thick liquids.     Pt's HHA at bedside who assisted in providing pertinent case hx information. According to her, a little over a week ago, she observed a care provider at NH feeding pt while she was supine in bed. Pt was reportedly followed by an SLP at NH who, as of last week, modified her diet to mildly thick liquids and purees. Pt was reportedly tolerating this diet up until a few days ago when she began coughing with both liquids and purees.

## 2023-06-16 NOTE — SWALLOW BEDSIDE ASSESSMENT ADULT - SWALLOW EVAL: RECOMMENDED DIET
NPO with alternate means of nutrition, hydration, meds via NGT pending FEES + consideration for conservative Ice chip trials would be beneficial in improving secretion management and hopefully prevent mucus plugging.

## 2023-06-16 NOTE — SWALLOW BEDSIDE ASSESSMENT ADULT - NS SPL SWALLOW CLINIC TRIAL FT
+Wet gurgly breath sounds at baseline. Unable to hawk up and expectoration secretions independently. Oropharyngeal suctioning provided with red rubber catheter prior to PO trials with slight improvement in upper airway sounds. PO trials limited to 3 tsp d/t concerns for airway protection deficits. Slow prolonged bolus processing of liquids. Laryngeal movement palpated with ?delay in movement. Pt with notable increase in wet/gurgly breath sounds with limited trials. Volitional cough was weak and non-productive. Retrieval of mild thick and creamy secretions upon suctioning.

## 2023-06-16 NOTE — PROGRESS NOTE ADULT - ASSESSMENT
89yo F with PMHx Alzheimer's dementia, Parkinson's Disease, 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    CNS  #Alzheimer's dementia  #PD  Patient with both Parkinsons disease and Alzheimers dementia. On home keppra 500mg q12, sertraline 50mg qd, and sinemet 25-100mg x 2.5 tabs q8. AAOx1-2, reportedly at baseline mental status.  - monitor mentation  - c/w sinemet 25/100 mg x 2.5 tabs PO via NGT TID  - c/w sertraline 50 mg PO via NGT qd    Card  #New onset atrial fibrillation  EKG with afib s/p dilt IVP 20mg x2, lopressor IVP 5mg x3, reportedly diltiazem well controlled heart rate however on exam patient noted to be in afib 130-170. Repeat rectal temp 100.9. Diltiazem IVP 10mg and started dilt gtt with titration. Patient persistently tachy,  gave Amio 150mg over 10 mins x 3 and started amio gtt.  - c/w tylenol 650 mg PO q6h PRN for fever/pain  - s/p amiodarone 150 mg IV x3, started gtt at 1mg/min   - TSH wnl  - No AC at this time, will treat suspected underlying infection likely driving afib  - TTE EF 60-65%; tachycardia; small LV; mild to moderate symmetric LVH; severely dilated LA; moderate TR (worse than 2/8/21); aortic sclerosis; pulm HTN w/ PASP 51 (higher than 2/8/21); pericardial fat pad anterior to R ventricle (cannot r/o trivial pericardial effusion).     #HTN (hypertension). Patient with history of HTN on home losartan 100mg qd.   - hold home losartan, restart as tolerated   - monitor BPs    #HLD  - c/w atorvastatin 5mg PO via NGT qhs    Pulm  #acute respiratory failure  Suspect aspiration pneumonitis vs PNA, CXR without infiltrate  increased WOB, POCUS with b-lines, lasix IVP 20mg x1 and HFNC.  - O2 goal >94%  - c/w CTX 2g q24h  - Will attempt to obtain sputum culture  - NPO    GI  Patient recently completed course of PO cefpodoxime (200mg BID) + PO metronidazole (500 mg every 8 hours) to complete 10 day course (including days of IV Abx - last day 6/7).  - NPO  - consider CT a/p given recent suspected colitis  - Renal    ID  #Sepsis  Met 2/4 SIRS criteria. UA with LE+ and WBC  - Treatment as above for acute respiratory failure, CTX will treat UTI  - Procal  - f/u BCx -- NGTD  - f/u UCx  - sputum Cx contaminated, will repeat  - f/u urine strep/legionella    Endo  #Diabetes  History of DM on home metformin 500mg BID. A1C 6.9 5/2023.  - mISS while inpatient   - monitor fingersticks and add basal coverage if persistently elevated   - TSH wnl    Heme  #Leukocytosis  WBC 11 however previous admission WBC 7, Suspect 2/2 underlying aspiration event  - zosyn 4.5 gm IV q8h  - Trend WBC    GOC  - f/u palliative    Fluids: s/p 1700cc NS  Electrolytes: Replete to keep K>4 and Mg>2  Nutrition: NPO  DVT ppx: lovenoxd 40 mg SQ qd  GI ppx: None  Code: FULL CODE pending GOC discussion  Dispo: MICU 87yo F with PMHx Alzheimer's dementia, Parkinson's Disease, 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    CNS  #Alzheimer's dementia  #PD  Patient with both Parkinsons disease and Alzheimers dementia. On home keppra 500mg q12, sertraline 50mg qd, and sinemet 25-100mg x 2.5 tabs q8. AAOx1-2, reportedly at baseline mental status.  - monitor mentation  - c/w sinemet 25/100 mg x 2.5 tabs PO via NGT TID  - c/w sertraline 50 mg PO via NGT qd    Card  #New onset atrial fibrillation  EKG with afib s/p dilt IVP 20mg x2, lopressor IVP 5mg x3, reportedly diltiazem well controlled heart rate however on exam patient noted to be in afib 130-170. Repeat rectal temp 100.9. Diltiazem IVP 10mg and started dilt gtt with titration. Patient persistently tachy,  gave Amio 150mg over 10 mins x 3 and started amio gtt.  - c/w tylenol 650 mg PO q6h PRN for fever/pain  - s/p amiodarone 150 mg IV x3, gtt at 1mg/min x 12 hrs, and 0.5 mg/min x 18 hrs --> transitioned to amiodarone 400 mg PO BID x 5 days, followed by amiodarone 200 mg PO qd  - TSH wnl  - No AC at this time, will treat suspected underlying infection likely driving afib  - TTE EF 60-65%; tachycardia; small LV; mild to moderate symmetric LVH; severely dilated LA; moderate TR (worse than 2/8/21); aortic sclerosis; pulm HTN w/ PASP 51 (higher than 2/8/21); pericardial fat pad anterior to R ventricle (cannot r/o trivial pericardial effusion).     #HTN (hypertension). Patient with history of HTN on home losartan 100mg qd.   - hold home losartan, restart as tolerated   - monitor BPs    #HLD  - c/w atorvastatin 5mg PO via NGT qhs    Pulm  #acute respiratory failure  Suspect aspiration pneumonitis vs PNA, CXR without infiltrate  increased WOB, POCUS with b-lines, lasix IVP 20mg x1 and HFNC.  - O2 goal >94%  - c/w CTX 2g q24h  - Will attempt to obtain sputum culture  - NPO    GI  Patient recently completed course of PO cefpodoxime (200mg BID) + PO metronidazole (500 mg every 8 hours) to complete 10 day course (including days of IV Abx - last day 6/7).  - NPO  - consider CT a/p given recent suspected colitis  - Renal    ID  #Sepsis  Met 2/4 SIRS criteria. UA with LE+ and WBC  - Treatment as above for acute respiratory failure, CTX will treat UTI  - Procal  - f/u BCx -- NGTD  - f/u UCx  - sputum Cx contaminated, will repeat  - f/u urine strep/legionella    Endo  #Diabetes  History of DM on home metformin 500mg BID. A1C 6.9 5/2023.  - mISS while inpatient   - monitor fingersticks and add basal coverage if persistently elevated   - TSH wnl    Heme  #Leukocytosis  WBC 11 however previous admission WBC 7, Suspect 2/2 underlying aspiration event  - zosyn 4.5 gm IV q8h  - Trend WBC    GOC  - f/u palliative    Fluids: s/p 1700cc NS  Electrolytes: Replete to keep K>4 and Mg>2  Nutrition: NPO  DVT ppx: lovenoxd 40 mg SQ qd  GI ppx: None  Code: FULL CODE pending C discussion  Dispo: MICU 87yo F with PMHx Alzheimer's dementia, Parkinson's Disease, 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    CNS  #Alzheimer's dementia  #PD  Patient with both Parkinsons disease and Alzheimers dementia. On home keppra 500mg q12, sertraline 50mg qd, and sinemet 25-100mg x 2.5 tabs q8. AAOx1-2, reportedly at baseline mental status.  - monitor mentation  - c/w sinemet 25/100 mg x 2.5 tabs PO via NGT TID  - c/w sertraline 50 mg PO via NGT qd    Card  #New onset atrial fibrillation  EKG with afib s/p dilt IVP 20mg x2, lopressor IVP 5mg x3, reportedly diltiazem well controlled heart rate however on exam patient noted to be in afib 130-170. Repeat rectal temp 100.9. Diltiazem IVP 10mg and started dilt gtt with titration. Patient persistently tachy,  gave Amio 150mg over 10 mins x 3 and started amio gtt.  - c/w tylenol 650 mg PO q6h PRN for fever/pain  - s/p amiodarone 150 mg IV x3, gtt at 1mg/min x 12 hrs, and 0.5 mg/min x 18 hrs --> transitioned to amiodarone 400 mg PO BID x 5 days, followed by amiodarone 200 mg PO qd  - TSH wnl  - No AC at this time, will treat suspected underlying infection likely driving afib  - TTE EF 60-65%; tachycardia; small LV; mild to moderate symmetric LVH; severely dilated LA; moderate TR (worse than 2/8/21); aortic sclerosis; pulm HTN w/ PASP 51 (higher than 2/8/21); pericardial fat pad anterior to R ventricle (cannot r/o trivial pericardial effusion).     #HTN (hypertension). Patient with history of HTN on home losartan 100mg qd.   - hold home losartan, restart as tolerated   - monitor BPs    #HLD  - c/w atorvastatin 5mg PO via NGT qhs    Pulm  #acute respiratory failure  Suspect aspiration pneumonitis vs PNA, CXR without infiltrate  increased WOB, POCUS with b-lines, lasix IVP 20mg x1 and HFNC.  - O2 goal >94%  - c/w CTX 2g q24h  - Will attempt to obtain sputum culture  - NPO    GI  Patient recently completed course of PO cefpodoxime (200mg BID) + PO metronidazole (500 mg every 8 hours) to complete 10 day course (including days of IV Abx - last day 6/7).  - NPO  - consider CT a/p given recent suspected colitis  - Renal  - speech and swallow consulted, recommending NPO w/ alternate means of nutrition + FEES  - nutrition consulted for NGT tube feed recs    ID  #Sepsis  Met 2/4 SIRS criteria. UA with LE+ and WBC  - Treatment as above for acute respiratory failure, CTX will treat UTI  - Procal 0.31  - f/u BCx -- NGTD  - UCx growing candida albicans, no indication to treat  - sputum Cx contaminated, will repeat  - MRSA positive  - COVID/flu/RSV negative  - f/u urine strep  - f/u urine legionella  - c/w zosyn 4.5 gm IV q8h  - c/w vanc 1g IV qd    Endo  #Diabetes  History of DM on home metformin 500mg BID. A1C 6.9 5/2023.  - mISS while inpatient   - monitor fingersticks and add basal coverage if persistently elevated   - TSH wnl    Heme  #Leukocytosis  WBC 11 however previous admission WBC 7, Suspect 2/2 underlying aspiration event  - c/w zosyn 4.5 gm IV q8h  - c/w vanc 1g IV qd  - Trend WBC    GOC  - f/u palliative    Fluids: s/p 1700cc NS  Electrolytes: Replete to keep K>4 and Mg>2  Nutrition: NPO  DVT ppx: lovenoxd 40 mg SQ qd  GI ppx: None  Code: FULL CODE pending GOC discussion  Dispo: MICU

## 2023-06-16 NOTE — PROGRESS NOTE ADULT - ASSESSMENT
89 yo woman admitted with likely aspiration pna and new onset AF with RVR  AF now in NSR at 70 on amiodarone and dilt  ANEMIA HGB 10.2 down to 8.3 ?secondary to fluid overload as she is edematous today  Echo effecively unchaged from baseline with normal LVEF and elevated PA pressures c/w pneumonectomy  DVT prophylaxis  Wean 02 as tolerated  Will need to have discussion with family regarding aspiration pna, and how to move forward with her wishes (?PEG)  Will follow

## 2023-06-16 NOTE — PROCEDURE NOTE - NSPROCNAME_GEN_A_CORE
Point of Care Ultrasound Lung
Peripheral Line Insertion
Peripheral Line Insertion
Gastric Intubation/Gastric Lavage
Peripheral Line Insertion

## 2023-06-17 LAB
ALBUMIN SERPL ELPH-MCNC: 2.2 G/DL — LOW (ref 3.3–5)
ALP SERPL-CCNC: 344 U/L — HIGH (ref 40–120)
ALT FLD-CCNC: 10 U/L — SIGNIFICANT CHANGE UP (ref 10–45)
ANION GAP SERPL CALC-SCNC: 10 MMOL/L — SIGNIFICANT CHANGE UP (ref 5–17)
AST SERPL-CCNC: 71 U/L — HIGH (ref 10–40)
BASOPHILS # BLD AUTO: 0.03 K/UL — SIGNIFICANT CHANGE UP (ref 0–0.2)
BASOPHILS NFR BLD AUTO: 0.4 % — SIGNIFICANT CHANGE UP (ref 0–2)
BILIRUB SERPL-MCNC: 0.4 MG/DL — SIGNIFICANT CHANGE UP (ref 0.2–1.2)
BUN SERPL-MCNC: 20 MG/DL — SIGNIFICANT CHANGE UP (ref 7–23)
CALCIUM SERPL-MCNC: 9.2 MG/DL — SIGNIFICANT CHANGE UP (ref 8.4–10.5)
CHLORIDE SERPL-SCNC: 98 MMOL/L — SIGNIFICANT CHANGE UP (ref 96–108)
CO2 SERPL-SCNC: 26 MMOL/L — SIGNIFICANT CHANGE UP (ref 22–31)
CREAT SERPL-MCNC: 0.63 MG/DL — SIGNIFICANT CHANGE UP (ref 0.5–1.3)
EGFR: 86 ML/MIN/1.73M2 — SIGNIFICANT CHANGE UP
EOSINOPHIL # BLD AUTO: 0.03 K/UL — SIGNIFICANT CHANGE UP (ref 0–0.5)
EOSINOPHIL NFR BLD AUTO: 0.4 % — SIGNIFICANT CHANGE UP (ref 0–6)
FERRITIN SERPL-MCNC: 250 NG/ML — HIGH (ref 15–150)
GLUCOSE BLDC GLUCOMTR-MCNC: 128 MG/DL — HIGH (ref 70–99)
GLUCOSE BLDC GLUCOMTR-MCNC: 149 MG/DL — HIGH (ref 70–99)
GLUCOSE BLDC GLUCOMTR-MCNC: 185 MG/DL — HIGH (ref 70–99)
GLUCOSE SERPL-MCNC: 148 MG/DL — HIGH (ref 70–99)
HCT VFR BLD CALC: 27 % — LOW (ref 34.5–45)
HGB BLD-MCNC: 8.4 G/DL — LOW (ref 11.5–15.5)
IMM GRANULOCYTES NFR BLD AUTO: 1.9 % — HIGH (ref 0–0.9)
IRON SATN MFR SERPL: 20 % — SIGNIFICANT CHANGE UP (ref 14–50)
IRON SATN MFR SERPL: 27 UG/DL — LOW (ref 30–160)
LYMPHOCYTES # BLD AUTO: 0.48 K/UL — LOW (ref 1–3.3)
LYMPHOCYTES # BLD AUTO: 7.1 % — LOW (ref 13–44)
MAGNESIUM SERPL-MCNC: 1.6 MG/DL — SIGNIFICANT CHANGE UP (ref 1.6–2.6)
MCHC RBC-ENTMCNC: 25.1 PG — LOW (ref 27–34)
MCHC RBC-ENTMCNC: 31.1 GM/DL — LOW (ref 32–36)
MCV RBC AUTO: 80.8 FL — SIGNIFICANT CHANGE UP (ref 80–100)
MONOCYTES # BLD AUTO: 0.28 K/UL — SIGNIFICANT CHANGE UP (ref 0–0.9)
MONOCYTES NFR BLD AUTO: 4.1 % — SIGNIFICANT CHANGE UP (ref 2–14)
NEUTROPHILS # BLD AUTO: 5.83 K/UL — SIGNIFICANT CHANGE UP (ref 1.8–7.4)
NEUTROPHILS NFR BLD AUTO: 86.1 % — HIGH (ref 43–77)
NRBC # BLD: 0 /100 WBCS — SIGNIFICANT CHANGE UP (ref 0–0)
PHOSPHATE SERPL-MCNC: 2.8 MG/DL — SIGNIFICANT CHANGE UP (ref 2.5–4.5)
PLATELET # BLD AUTO: 200 K/UL — SIGNIFICANT CHANGE UP (ref 150–400)
POTASSIUM SERPL-MCNC: 3.6 MMOL/L — SIGNIFICANT CHANGE UP (ref 3.5–5.3)
POTASSIUM SERPL-SCNC: 3.6 MMOL/L — SIGNIFICANT CHANGE UP (ref 3.5–5.3)
PROT SERPL-MCNC: 5.5 G/DL — LOW (ref 6–8.3)
RBC # BLD: 3.34 M/UL — LOW (ref 3.8–5.2)
RBC # FLD: 16.9 % — HIGH (ref 10.3–14.5)
SODIUM SERPL-SCNC: 134 MMOL/L — LOW (ref 135–145)
TIBC SERPL-MCNC: 132 UG/DL — LOW (ref 220–430)
TRANSFERRIN SERPL-MCNC: 95 MG/DL — LOW (ref 200–360)
UIBC SERPL-MCNC: 105 UG/DL — LOW (ref 110–370)
WBC # BLD: 6.78 K/UL — SIGNIFICANT CHANGE UP (ref 3.8–10.5)
WBC # FLD AUTO: 6.78 K/UL — SIGNIFICANT CHANGE UP (ref 3.8–10.5)

## 2023-06-17 PROCEDURE — 99232 SBSQ HOSP IP/OBS MODERATE 35: CPT

## 2023-06-17 RX ORDER — FUROSEMIDE 40 MG
20 TABLET ORAL ONCE
Refills: 0 | Status: COMPLETED | OUTPATIENT
Start: 2023-06-17 | End: 2023-06-17

## 2023-06-17 RX ORDER — AMIODARONE HYDROCHLORIDE 400 MG/1
400 TABLET ORAL EVERY 12 HOURS
Refills: 0 | Status: COMPLETED | OUTPATIENT
Start: 2023-06-17 | End: 2023-06-21

## 2023-06-17 RX ORDER — ACETYLCYSTEINE 200 MG/ML
4 VIAL (ML) MISCELLANEOUS EVERY 6 HOURS
Refills: 0 | Status: DISCONTINUED | OUTPATIENT
Start: 2023-06-17 | End: 2023-06-18

## 2023-06-17 RX ORDER — SODIUM CHLORIDE 9 MG/ML
3 INJECTION INTRAMUSCULAR; INTRAVENOUS; SUBCUTANEOUS THREE TIMES A DAY
Refills: 0 | Status: DISCONTINUED | OUTPATIENT
Start: 2023-06-17 | End: 2023-06-17

## 2023-06-17 RX ORDER — AMIODARONE HYDROCHLORIDE 400 MG/1
400 TABLET ORAL EVERY 12 HOURS
Refills: 0 | Status: DISCONTINUED | OUTPATIENT
Start: 2023-06-17 | End: 2023-06-17

## 2023-06-17 RX ORDER — DILTIAZEM HCL 120 MG
30 CAPSULE, EXT RELEASE 24 HR ORAL ONCE
Refills: 0 | Status: COMPLETED | OUTPATIENT
Start: 2023-06-17 | End: 2023-06-17

## 2023-06-17 RX ORDER — POTASSIUM CHLORIDE 20 MEQ
10 PACKET (EA) ORAL
Refills: 0 | Status: COMPLETED | OUTPATIENT
Start: 2023-06-17 | End: 2023-06-17

## 2023-06-17 RX ORDER — MAGNESIUM SULFATE 500 MG/ML
2 VIAL (ML) INJECTION ONCE
Refills: 0 | Status: COMPLETED | OUTPATIENT
Start: 2023-06-17 | End: 2023-06-17

## 2023-06-17 RX ORDER — IPRATROPIUM/ALBUTEROL SULFATE 18-103MCG
3 AEROSOL WITH ADAPTER (GRAM) INHALATION EVERY 6 HOURS
Refills: 0 | Status: DISCONTINUED | OUTPATIENT
Start: 2023-06-17 | End: 2023-06-18

## 2023-06-17 RX ORDER — POTASSIUM CHLORIDE 20 MEQ
40 PACKET (EA) ORAL ONCE
Refills: 0 | Status: COMPLETED | OUTPATIENT
Start: 2023-06-17 | End: 2023-06-17

## 2023-06-17 RX ORDER — SODIUM CHLORIDE 9 MG/ML
4 INJECTION INTRAMUSCULAR; INTRAVENOUS; SUBCUTANEOUS THREE TIMES A DAY
Refills: 0 | Status: DISCONTINUED | OUTPATIENT
Start: 2023-06-17 | End: 2023-06-17

## 2023-06-17 RX ADMIN — CARBIDOPA AND LEVODOPA 2.5 TABLET(S): 25; 100 TABLET ORAL at 13:09

## 2023-06-17 RX ADMIN — Medication 4 MILLILITER(S): at 18:38

## 2023-06-17 RX ADMIN — Medication 1: at 06:30

## 2023-06-17 RX ADMIN — Medication 40 MILLIEQUIVALENT(S): at 09:21

## 2023-06-17 RX ADMIN — Medication 30 MILLIGRAM(S): at 12:06

## 2023-06-17 RX ADMIN — CHLORHEXIDINE GLUCONATE 1 APPLICATION(S): 213 SOLUTION TOPICAL at 05:50

## 2023-06-17 RX ADMIN — SERTRALINE 50 MILLIGRAM(S): 25 TABLET, FILM COATED ORAL at 23:27

## 2023-06-17 RX ADMIN — Medication 250 MILLIGRAM(S): at 09:23

## 2023-06-17 RX ADMIN — PIPERACILLIN AND TAZOBACTAM 25 GRAM(S): 4; .5 INJECTION, POWDER, LYOPHILIZED, FOR SOLUTION INTRAVENOUS at 05:18

## 2023-06-17 RX ADMIN — Medication 30 MILLIGRAM(S): at 05:19

## 2023-06-17 RX ADMIN — ENOXAPARIN SODIUM 40 MILLIGRAM(S): 100 INJECTION SUBCUTANEOUS at 12:06

## 2023-06-17 RX ADMIN — AMIODARONE HYDROCHLORIDE 400 MILLIGRAM(S): 400 TABLET ORAL at 05:19

## 2023-06-17 RX ADMIN — PIPERACILLIN AND TAZOBACTAM 25 GRAM(S): 4; .5 INJECTION, POWDER, LYOPHILIZED, FOR SOLUTION INTRAVENOUS at 12:06

## 2023-06-17 RX ADMIN — Medication 25 GRAM(S): at 06:45

## 2023-06-17 RX ADMIN — Medication 100 MILLIEQUIVALENT(S): at 07:56

## 2023-06-17 RX ADMIN — ATORVASTATIN CALCIUM 5 MILLIGRAM(S): 80 TABLET, FILM COATED ORAL at 22:24

## 2023-06-17 RX ADMIN — Medication 20 MILLIGRAM(S): at 09:22

## 2023-06-17 RX ADMIN — Medication 100 MILLIEQUIVALENT(S): at 06:45

## 2023-06-17 RX ADMIN — Medication 3 MILLILITER(S): at 18:37

## 2023-06-17 RX ADMIN — PIPERACILLIN AND TAZOBACTAM 25 GRAM(S): 4; .5 INJECTION, POWDER, LYOPHILIZED, FOR SOLUTION INTRAVENOUS at 22:25

## 2023-06-17 RX ADMIN — CARBIDOPA AND LEVODOPA 2.5 TABLET(S): 25; 100 TABLET ORAL at 05:19

## 2023-06-17 RX ADMIN — AMIODARONE HYDROCHLORIDE 400 MILLIGRAM(S): 400 TABLET ORAL at 17:34

## 2023-06-17 RX ADMIN — Medication 30 MILLIGRAM(S): at 22:00

## 2023-06-17 RX ADMIN — Medication 30 MILLIGRAM(S): at 17:34

## 2023-06-17 RX ADMIN — Medication 100 MILLIEQUIVALENT(S): at 09:21

## 2023-06-17 NOTE — PROGRESS NOTE ADULT - ASSESSMENT
87yo F with PMHx Alzheimer's dementia, Parkinson's Disease, 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    CNS  #Alzheimer's dementia  #PD  Patient with both Parkinsons disease and Alzheimers dementia. On home keppra 500mg q12, sertraline 50mg qd, and sinemet 25-100mg x 2.5 tabs q8. AAOx1-2, reportedly at baseline mental status.  - monitor mentation  - c/w sinemet 25/100 mg x 2.5 tabs PO via NGT TID  - c/w sertraline 50 mg PO via NGT qd    Card  #New onset atrial fibrillation  EKG with afib s/p dilt IVP 20mg x2, lopressor IVP 5mg x3, reportedly diltiazem well controlled heart rate however on exam patient noted to be in afib 130-170. Repeat rectal temp 100.9. Diltiazem IVP 10mg and started dilt gtt with titration. Patient persistently tachy,  gave Amio 150mg over 10 mins x 3 and started amio gtt.  - c/w tylenol 650 mg PO q6h PRN for fever/pain  - s/p amiodarone 150 mg IV x3, gtt at 1mg/min x 12 hrs, and 0.5 mg/min x 18 hrs --> transitioned to amiodarone 400 mg PO BID x 5 days, followed by amiodarone 200 mg PO qd  - TSH wnl  - No AC at this time, will treat suspected underlying infection likely driving afib  - TTE EF 60-65%; tachycardia; small LV; mild to moderate symmetric LVH; severely dilated LA; moderate TR (worse than 2/8/21); aortic sclerosis; pulm HTN w/ PASP 51 (higher than 2/8/21); pericardial fat pad anterior to R ventricle (cannot r/o trivial pericardial effusion).     #HTN (hypertension). Patient with history of HTN on home losartan 100mg qd.   - hold home losartan, restart as tolerated   - monitor BPs    #HLD  - c/w atorvastatin 5mg PO via NGT qhs    Pulm  #acute respiratory failure  Suspect aspiration pneumonitis vs PNA, CXR without infiltrate  increased WOB, POCUS with b-lines, lasix IVP 20mg x1 and HFNC.  - O2 goal >94%  - c/w CTX 2g q24h  - Will attempt to obtain sputum culture  - NPO    GI  Patient recently completed course of PO cefpodoxime (200mg BID) + PO metronidazole (500 mg every 8 hours) to complete 10 day course (including days of IV Abx - last day 6/7).  - NPO  - consider CT a/p given recent suspected colitis  - Renal  - speech and swallow consulted, recommending NPO w/ alternate means of nutrition + FEES  - nutrition consulted for NGT tube feed recs    ID  #Sepsis  Met 2/4 SIRS criteria. UA with LE+ and WBC  - Treatment as above for acute respiratory failure, CTX will treat UTI  - Procal 0.31  - f/u BCx -- NGTD  - UCx growing candida albicans, no indication to treat  - sputum Cx contaminated, will repeat  - MRSA positive  - COVID/flu/RSV negative  - f/u urine strep  - f/u urine legionella  - c/w zosyn 4.5 gm IV q8h  - c/w vanc 1g IV qd    Endo  #Diabetes  History of DM on home metformin 500mg BID. A1C 6.9 5/2023.  - mISS while inpatient   - monitor fingersticks and add basal coverage if persistently elevated   - TSH wnl    Heme  #Leukocytosis  WBC 11 however previous admission WBC 7, Suspect 2/2 underlying aspiration event  - c/w zosyn 4.5 gm IV q8h  - c/w vanc 1g IV qd  - Trend WBC    GOC  - f/u palliative    Fluids: s/p 1700cc NS  Electrolytes: Replete to keep K>4 and Mg>2  Nutrition: NPO  DVT ppx: lovenoxd 40 mg SQ qd  GI ppx: None  Code: FULL CODE pending [facility] discussion  Dispo: MICU 89yo F with PMHx Alzheimer's dementia, Parkinson's Disease, 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    CNS  #Alzheimer's dementia  #PD  Patient with both Parkinsons disease and Alzheimers dementia. On home keppra 500mg q12, sertraline 50mg qd, and sinemet 25-100mg x 2.5 tabs q8. AAOx1-2, reportedly at baseline mental status.  - monitor mentation  - c/w sinemet 25/100 mg x 2.5 tabs PO via NGT TID  - c/w sertraline 50 mg PO via NGT qd    Card  #New onset atrial fibrillation  EKG with afib s/p dilt IVP 20mg x2, lopressor IVP 5mg x3, reportedly diltiazem well controlled heart rate however on exam patient noted to be in afib 130-170. Repeat rectal temp 100.9. Diltiazem IVP 10mg and started dilt gtt with titration. Patient persistently tachy, s/p amiodarone 150 mg IV x3, gtt at 1mg/min x 12 hrs, and 0.5 mg/min x 18 hrs --> transitioned to PO amiodarone.  - TSH wnl  - TTE EF 60-65%; tachycardia; small LV; mild to moderate symmetric LVH; severely dilated LA; moderate TR (worse than 2/8/21); aortic sclerosis; pulm HTN w/ PASP 51 (higher than 2/8/21); pericardial fat pad anterior to R ventricle (cannot r/o trivial pericardial effusion).   - c/w tylenol 650 mg PO q6h PRN for fever/pain  - c/w amiodarone 400 mg PO BID x 5 days, followed by amiodarone 200 mg PO qd  - No AC at this time, will treat suspected underlying infection likely driving afib    #HTN (hypertension). Patient with history of HTN on home losartan 100mg qd.   - hold home losartan, restart as tolerated   - monitor BPs    #HLD  - c/w atorvastatin 5mg PO via NGT qhs    Pulm  #acute respiratory failure  Suspect aspiration pneumonitis vs PNA, CXR without infiltrate  increased WOB, POCUS with b-lines, lasix IVP 20mg x1 and HFNC.  - O2 goal >94%  - c/w CTX 2g q24h  - Will attempt to obtain sputum culture  - NPO    GI  Patient recently completed course of PO cefpodoxime (200mg BID) + PO metronidazole (500 mg every 8 hours) to complete 10 day course (including days of IV Abx - last day 6/7).  - NPO  - consider CT a/p given recent suspected colitis  - Renal  - speech and swallow consulted, recommending NPO w/ alternate means of nutrition + FEES  - nutrition consulted for NGT tube feed recs    ID  #Sepsis  Met 2/4 SIRS criteria. UA with LE+ and WBC  - Treatment as above for acute respiratory failure, CTX will treat UTI  - Procal 0.31  - f/u BCx -- NGTD  - UCx growing candida albicans, no indication to treat  - sputum Cx contaminated, will repeat  - MRSA positive  - COVID/flu/RSV negative  - urine strep negative  - urine legionella negative  - c/w zosyn 4.5 gm IV q8h  - c/w vanc 1g IV qd    Endo  #Diabetes  History of DM on home metformin 500mg BID. A1C 6.9 5/2023.  - mISS while inpatient   - monitor fingersticks and add basal coverage if persistently elevated   - TSH wnl    Heme  #Leukocytosis  WBC 11 however previous admission WBC 7, Suspect 2/2 underlying aspiration event  - c/w zosyn 4.5 gm IV q8h  - c/w vanc 1g IV qd  - Trend WBC    GOC  - f/u palliative    Fluids: s/p 1700cc NS  Electrolytes: Replete to keep K>4 and Mg>2  Nutrition: NPO  DVT ppx: lovenoxd 40 mg SQ qd  GI ppx: None  Code: FULL CODE pending [facility] discussion  Dispo: MICU 89yo F with PMHx Alzheimer's dementia, Parkinson's Disease, SDH/SAH s/p repair of cerebral aneurysm 1960s, ICH in 2021, Lung Ca s/p pneumonectomy of L lung, HTN, HLD, DM, recent admission for sepsis 2/2 colitis presenting with aspiration event with hypoxia and now ICU consulted for afib RVR.    CNS  #Alzheimer's dementia  #PD  Patient with both Parkinsons disease and Alzheimers dementia. On home keppra 500mg q12, sertraline 50mg qd, and sinemet 25-100mg x 2.5 tabs q8. AAOx1-2, reportedly at baseline mental status.  - monitor mentation  - c/w sinemet 25/100 mg x 2.5 tabs PO via NGT TID  - c/w sertraline 50 mg PO via NGT qd    Card  #New onset atrial fibrillation  EKG with afib s/p dilt IVP 20mg x2, lopressor IVP 5mg x3, reportedly diltiazem well controlled heart rate however on exam patient noted to be in afib 130-170. Repeat rectal temp 100.9. Diltiazem IVP 10mg and started dilt gtt with titration. Patient persistently tachy, s/p amiodarone 150 mg IV x3, gtt at 1mg/min x 12 hrs, and 0.5 mg/min x 18 hrs --> transitioned to PO amiodarone.  - TSH wnl  - TTE EF 60-65%; tachycardia; small LV; mild to moderate symmetric LVH; severely dilated LA; moderate TR (worse than 2/8/21); aortic sclerosis; pulm HTN w/ PASP 51 (higher than 2/8/21); pericardial fat pad anterior to R ventricle (cannot r/o trivial pericardial effusion).   - c/w tylenol 650 mg PO q6h PRN for fever/pain  - c/w amiodarone 400 mg PO BID x 5 days, followed by amiodarone 200 mg PO qd  - No AC at this time, will treat suspected underlying infection likely driving afib -- plan to consult stroke to determine risk vs benefit of AC (pt has hx of SDH/SAH s/p repair of cerebral aneurysm 1960s, and ICH in 2021)    #HTN (hypertension). Patient with history of HTN on home losartan 100mg qd.   - hold home losartan, restart as tolerated   - monitor BPs    #HLD  - c/w atorvastatin 5mg PO via NGT qhs    Pulm  #acute respiratory failure  Suspect aspiration pneumonitis vs PNA, CXR without infiltrate. Increased WOB, POCUS with b-lines, lasix IVP 20mg x1 and HFNC. Broadened from CTX 2g qd to vanc and zosyn to cover for HAP i/s/o recent admission and living in healthcare setting (nursing home).  - O2 goal >94%  - MRSA swab positive  - c/w vanc 1g IV qd  - c/w zosyn 4.5 mg q8h IV (pseudomonas dosing)  - Will attempt to obtain sputum culture  - NPO, pending FEES  - mucomyst 20% q6h  - duonebs q6h    GI  Patient recently completed course of PO cefpodoxime (200mg BID) + PO metronidazole (500 mg every 8 hours) to complete 10 day course (including days of IV Abx - last day 6/7).  - NPO pending FEES  - speech and swallow consulted, recommending NPO w/ alternate means of nutrition + FEES  - nutrition consulted for NGT tube feed recs    #Hyponatremia  Had serum hyponatremia 141 -> 131 in one day. Occurred after receiving dilt/amio gtt running around the clock + IV abx all running in D5. Switched all to run in NS, Na improved to 134. Urine lytes consistent w/ SIADH.  - monitor Na    ID  #Sepsis  Met 2/4 SIRS criteria. UA with LE+ and WBC  - Treatment as above for acute respiratory failure, CTX will treat UTI  - Procal 0.31  - f/u BCx -- NGTD  - UCx growing candida albicans, no indication to treat  - sputum Cx contaminated, will repeat  - MRSA positive  - COVID/flu/RSV negative  - urine strep negative  - urine legionella negative  - c/w zosyn 4.5 gm IV q8h  - c/w vanc 1g IV qd    Endo  #Diabetes  History of DM on home metformin 500mg BID. A1C 6.9 5/2023.  - mISS while inpatient   - monitor fingersticks and add basal coverage if persistently elevated   - TSH wnl    Heme  #Leukocytosis  WBC 11 however previous admission WBC 7, Suspect 2/2 underlying aspiration event  - c/w zosyn 4.5 gm IV q8h  - c/w vanc 1g IV qd  - Trend WBC    GOC  - f/u palliative    Fluids: s/p 1700cc NS  Electrolytes: Replete to keep K>4 and Mg>2  Nutrition: NPO  DVT ppx: lovenoxd 40 mg SQ qd  GI ppx: None  Code: FULL CODE pending [facility] discussion  Dispo: MICU

## 2023-06-17 NOTE — PROGRESS NOTE ADULT - SUBJECTIVE AND OBJECTIVE BOX
**INCOMPLETE NOTE    OVERNIGHT EVENTS:    SUBJECTIVE:  Patient seen and examined at bedside.    Vital Signs Last 12 Hrs  T(F): 97.9 (06-17-23 @ 09:34), Max: 98.4 (06-17-23 @ 05:10)  HR: 83 (06-17-23 @ 14:00) (81 - 92)  BP: 129/62 (06-17-23 @ 14:00) (114/58 - 169/73)  BP(mean): 89 (06-17-23 @ 14:00) (81 - 120)  RR: 24 (06-17-23 @ 14:00) (20 - 38)  SpO2: 100% (06-17-23 @ 14:00) (100% - 100%)  I&O's Summary    16 Jun 2023 07:01  -  17 Jun 2023 07:00  --------------------------------------------------------  IN: 1192 mL / OUT: 1100 mL / NET: 92 mL    17 Jun 2023 07:01  -  17 Jun 2023 14:11  --------------------------------------------------------  IN: 425 mL / OUT: 500 mL / NET: -75 mL        PHYSICAL EXAM:  Constitutional: NAD, comfortable in bed.  HEENT: NC/AT, PERRLA, EOMI, no conjunctival pallor or scleral icterus, MMM  Neck: Supple, no JVD  Respiratory: CTA B/L. No w/r/r.   Cardiovascular: RRR, normal S1 and S2, no m/r/g.   Gastrointestinal: +BS, soft NTND, no guarding or rebound tenderness, no palpable masses   Extremities: wwp; no cyanosis, clubbing or edema.   Vascular: Pulses equal and strong throughout.   Neurological: AAOx3, no CN deficits, strength and sensation intact throughout.   Skin: No gross skin abnormalities or rashes        LABS:                        8.4    6.78  )-----------( 200      ( 17 Jun 2023 05:30 )             27.0     06-17    134<L>  |  98  |  20  ----------------------------<  148<H>  3.6   |  26  |  0.63    Ca    9.2      17 Jun 2023 05:30  Phos  2.8     06-17  Mg     1.6     06-17    TPro  5.5<L>  /  Alb  2.2<L>  /  TBili  0.4  /  DBili  x   /  AST  71<H>  /  ALT  10  /  AlkPhos  344<H>  06-17            RADIOLOGY & ADDITIONAL TESTS:    MEDICATIONS  (STANDING):  aMIOdarone    Tablet 400 milliGRAM(s) Oral every 12 hours  aMIOdarone Infusion 0.5 mG/Min (16.7 mL/Hr) IV Continuous <Continuous>  aMIOdarone Infusion 0.999 mG/Min (33.3 mL/Hr) IV Continuous <Continuous>  atorvastatin 5 milliGRAM(s) Oral at bedtime  carbidopa/levodopa  25/100 2.5 Tablet(s) Oral every 8 hours  chlorhexidine 2% Cloths 1 Application(s) Topical <User Schedule>  diltiazem    Tablet 30 milliGRAM(s) Oral every 6 hours  enoxaparin Injectable 40 milliGRAM(s) SubCutaneous every 24 hours  insulin lispro (ADMELOG) corrective regimen sliding scale   SubCutaneous every 6 hours  piperacillin/tazobactam IVPB.. 4.5 Gram(s) IV Intermittent every 8 hours  sertraline 50 milliGRAM(s) Oral every 24 hours  vancomycin  IVPB 1000 milliGRAM(s) IV Intermittent every 24 hours    MEDICATIONS  (PRN):  acetaminophen     Tablet .. 650 milliGRAM(s) Oral every 6 hours PRN Temp greater or equal to 38C (100.4F), Mild Pain (1 - 3)  melatonin 3 milliGRAM(s) Oral at bedtime PRN Insomnia   ****************** STEPDOWN NOTE MICU TO MED TELE ******************    HOSPITAL COURSE:  89yo F with PMHx Alzheimer's dementia, Parkinson's Disease, SDH/SAH s/p repair of cerebral aneurysm 1960s, Lung Ca s/p pneumonectomy of L lung, HTN, HLD, DM, recent admission for sepsis 2/2 colitis presenting with aspiration event, found to have aspiration PNA with hypoxia and afib with RVR, admitted to MICU for further monitoring.    89yo F with PMHx Alzheimer's dementia, Parkinson's Disease, SDH/SAH s/p repair of cerebral aneurysm 1960s, Lung Ca s/p lobectomy of L lung, HTN, HLD, DM, recent admission for sepsis 2/2 colitis and dysphagia discharged to [facility] rehab, re-presented to [facility] 2 weeks ago from [facility] rehab for HTN and chills, found to be septic likely 2/2 colitis, discharged back to [facility] rehab approximately 2 weeks ago with PICC line in place and PO abx regimen only now presents to ED with "aspiration pneumonia due to Afib" as per nursing home papers from [facility] rehab. ICU consulted for afib rvr.    ED vitals: T 100.1 -> 100.9 -160  RR 24 /75 SpO2 94% 4LNC  Labs signficant: WBC 11.78, trop 52 <- 58, BUN 26, calcium 11.2. alk phos 195, magnesium 1.5, vbg ph 7.36, pCO2 59, UA +LE and WBC  CXR: complete opacification of left unchanged, right lung clear  EKG: atrial fibrillation  Interventions: 1700cc NS, Diltiazem IVP 20mg x2, lopressor 5mg x3, vanc, zosyn  Consults: ICU     o/n: increased WOB, POCUS with b-lines, lasix IVP 20mg x1 and HFNC. Amio 150mg over 10 min, improved -110. Tachy 110-150s, additional amio 150mg given.  6/15: procal 0.31, proBNP 8742. zosyn pseudomonal dosage started, additional amio 150 IVP and started amio ggt for -150. POCUS R consolidation, tylenol IV for fever. TTE EF 60-65%; tachycardia; small LV; mild to moderate symmetric LVH; severely dilated LA; moderate TR (worse than 2/8/21); aortic sclerosis; pulm HTN w/ PASP 51 (higher than 2/8/21); pericardial fat pad anterior to R ventricle (cannot r/o trivial pericardial effusion). sputum cx contaminated. NGT placed, confirmed by CXR. restarted home sinemet.  o/n: Pt complains some SOB, appears comfortable and saturating well, duoneb x1. amio 0.5mg for 18 hours.  6/16: K and Mg repleted. POCUS showing R consolidation and effusion, a-lines. gave 20 IV lasix x1. Na 131 (from 141 yesterday), changed meds to run in NS instead of D5. started vanc 1g qd per pharmacy, sent MRSA swab. consulted speech and swallow. ordered PO amio starting after gtt ends. MRSA positive. UCx growing candida albicans - no indication to treat. diltiazem gtt d/c'ed and transitioned to PO diltiazem 30 q6h. urine studies showing concentrated urine w/ high sodium, suggest possible SIADH? per speech and swallow keeping NPO and ordered FEES.  o/n started on PO amio at 6:30pm HR 78-81, repleted Mg and K  6/17: K repleted additional 40 PO. 20 IV lasix x1. dc HFNC to NC 6L. up for stepdown to tele. nutrition consulted for TFs. started Jevity 1.2 startin at 10 ml to increased to 20ml in 12 hours. ordered aerobika, incentive spirometry, mucomyst q6h, duonebs q6h, volara to help bring up sputum.      INTERVAL EVENTS/SUBJECTIVE:  Patient seen and examined at bedside.    Vital Signs Last 12 Hrs  T(F): 97.9 (06-17-23 @ 09:34), Max: 98.4 (06-17-23 @ 05:10)  HR: 83 (06-17-23 @ 14:00) (81 - 92)  BP: 129/62 (06-17-23 @ 14:00) (114/58 - 169/73)  BP(mean): 89 (06-17-23 @ 14:00) (81 - 120)  RR: 24 (06-17-23 @ 14:00) (20 - 38)  SpO2: 100% (06-17-23 @ 14:00) (100% - 100%)  I&O's Summary    16 Jun 2023 07:01  -  17 Jun 2023 07:00  --------------------------------------------------------  IN: 1192 mL / OUT: 1100 mL / NET: 92 mL    17 Jun 2023 07:01  -  17 Jun 2023 14:11  --------------------------------------------------------  IN: 425 mL / OUT: 500 mL / NET: -75 mL        PHYSICAL EXAM:  Constitutional: NAD, comfortable in bed.  HEENT: NC/AT, PERRLA, EOMI, no conjunctival pallor or scleral icterus, MMM  Neck: Supple, no JVD  Respiratory: CTA B/L. No w/r/r.   Cardiovascular: RRR, normal S1 and S2, no m/r/g.   Gastrointestinal: +BS, soft NTND, no guarding or rebound tenderness, no palpable masses   Extremities: wwp; no cyanosis, clubbing or edema.   Vascular: Pulses equal and strong throughout.   Neurological: AAOx3, no CN deficits, strength and sensation intact throughout.   Skin: No gross skin abnormalities or rashes        LABS:                        8.4    6.78  )-----------( 200      ( 17 Jun 2023 05:30 )             27.0     06-17    134<L>  |  98  |  20  ----------------------------<  148<H>  3.6   |  26  |  0.63    Ca    9.2      17 Jun 2023 05:30  Phos  2.8     06-17  Mg     1.6     06-17    TPro  5.5<L>  /  Alb  2.2<L>  /  TBili  0.4  /  DBili  x   /  AST  71<H>  /  ALT  10  /  AlkPhos  344<H>  06-17            RADIOLOGY & ADDITIONAL TESTS:    MEDICATIONS  (STANDING):  aMIOdarone    Tablet 400 milliGRAM(s) Oral every 12 hours  aMIOdarone Infusion 0.5 mG/Min (16.7 mL/Hr) IV Continuous <Continuous>  aMIOdarone Infusion 0.999 mG/Min (33.3 mL/Hr) IV Continuous <Continuous>  atorvastatin 5 milliGRAM(s) Oral at bedtime  carbidopa/levodopa  25/100 2.5 Tablet(s) Oral every 8 hours  chlorhexidine 2% Cloths 1 Application(s) Topical <User Schedule>  diltiazem    Tablet 30 milliGRAM(s) Oral every 6 hours  enoxaparin Injectable 40 milliGRAM(s) SubCutaneous every 24 hours  insulin lispro (ADMELOG) corrective regimen sliding scale   SubCutaneous every 6 hours  piperacillin/tazobactam IVPB.. 4.5 Gram(s) IV Intermittent every 8 hours  sertraline 50 milliGRAM(s) Oral every 24 hours  vancomycin  IVPB 1000 milliGRAM(s) IV Intermittent every 24 hours    MEDICATIONS  (PRN):  acetaminophen     Tablet .. 650 milliGRAM(s) Oral every 6 hours PRN Temp greater or equal to 38C (100.4F), Mild Pain (1 - 3)  melatonin 3 milliGRAM(s) Oral at bedtime PRN Insomnia   ****************** STEPDOWN NOTE MICU TO MED TELE ******************    HOSPITAL COURSE:  89yo F with PMHx Alzheimer's dementia, Parkinson's Disease, SDH/SAH s/p repair of cerebral aneurysm 1960s, Lung Ca s/p pneumonectomy of L lung, HTN, HLD, DM, recent admission for sepsis 2/2 colitis presenting with aspiration event, found to have aspiration PNA with hypoxia and afib with RVR, admitted to MICU for further monitoring. In MICU, she was broadened from CTX to vanc and zosyn (pseudomonas dosing) given recent hospital admission and living in a healthcare setting (nursing home), as well as her worsening respiratory status. She was tachy to 140s-150s, required diltiazem gtt and amiodarone gtt for rate and rhythm control. She is now s/p amiodarone 150 mg IV x3, amio gtt at 1 mg/min x 12 hrs, amio gtt 0.5 mg/min x 18 hrs, and has since been transitioned to PO amiodarone. Diltiazem gtt was transitioned to to PO diltiazem. She was diuresed intermittently for pleural effusions. Had a drop in her serum sodium after receiving dilt/amio gtt and IV abx all running in d5, which were switched to run in NS. Patient failed speech & swallow assessment, currently pending a FEES. NGT placed and started on TFs, nutrition consulted, pending TF recs. She was weaned from HFNC to 6L NC, is saturating well on 6L. Ordered aerobika, incentive spirometry, mucomyst, and duonebs for chest congestion/secretions. Mental status is improving, HR now well controlled and NSR, respiratory status improving. Pt is HDS for stepdown to med tele.    INTERVAL EVENTS/SUBJECTIVE:  Patient seen and examined at bedside.    Vital Signs Last 12 Hrs  T(F): 97.9 (06-17-23 @ 09:34), Max: 98.4 (06-17-23 @ 05:10)  HR: 83 (06-17-23 @ 14:00) (81 - 92)  BP: 129/62 (06-17-23 @ 14:00) (114/58 - 169/73)  BP(mean): 89 (06-17-23 @ 14:00) (81 - 120)  RR: 24 (06-17-23 @ 14:00) (20 - 38)  SpO2: 100% (06-17-23 @ 14:00) (100% - 100%)  I&O's Summary    16 Jun 2023 07:01  -  17 Jun 2023 07:00  --------------------------------------------------------  IN: 1192 mL / OUT: 1100 mL / NET: 92 mL    17 Jun 2023 07:01  -  17 Jun 2023 14:11  --------------------------------------------------------  IN: 425 mL / OUT: 500 mL / NET: -75 mL        PHYSICAL EXAM:  Constitutional: NAD, comfortable in bed.  HEENT: NC/AT, PERRLA, EOMI, no conjunctival pallor or scleral icterus, MMM  Neck: Supple, no JVD  Respiratory: CTA B/L. No w/r/r.   Cardiovascular: RRR, normal S1 and S2, no m/r/g.   Gastrointestinal: +BS, soft NTND, no guarding or rebound tenderness, no palpable masses   Extremities: wwp; no cyanosis, clubbing or edema.   Vascular: Pulses equal and strong throughout.   Neurological: AAOx3, no CN deficits, strength and sensation intact throughout.   Skin: No gross skin abnormalities or rashes        LABS:                        8.4    6.78  )-----------( 200      ( 17 Jun 2023 05:30 )             27.0     06-17    134<L>  |  98  |  20  ----------------------------<  148<H>  3.6   |  26  |  0.63    Ca    9.2      17 Jun 2023 05:30  Phos  2.8     06-17  Mg     1.6     06-17    TPro  5.5<L>  /  Alb  2.2<L>  /  TBili  0.4  /  DBili  x   /  AST  71<H>  /  ALT  10  /  AlkPhos  344<H>  06-17            RADIOLOGY & ADDITIONAL TESTS:    MEDICATIONS  (STANDING):  aMIOdarone    Tablet 400 milliGRAM(s) Oral every 12 hours  aMIOdarone Infusion 0.5 mG/Min (16.7 mL/Hr) IV Continuous <Continuous>  aMIOdarone Infusion 0.999 mG/Min (33.3 mL/Hr) IV Continuous <Continuous>  atorvastatin 5 milliGRAM(s) Oral at bedtime  carbidopa/levodopa  25/100 2.5 Tablet(s) Oral every 8 hours  chlorhexidine 2% Cloths 1 Application(s) Topical <User Schedule>  diltiazem    Tablet 30 milliGRAM(s) Oral every 6 hours  enoxaparin Injectable 40 milliGRAM(s) SubCutaneous every 24 hours  insulin lispro (ADMELOG) corrective regimen sliding scale   SubCutaneous every 6 hours  piperacillin/tazobactam IVPB.. 4.5 Gram(s) IV Intermittent every 8 hours  sertraline 50 milliGRAM(s) Oral every 24 hours  vancomycin  IVPB 1000 milliGRAM(s) IV Intermittent every 24 hours    MEDICATIONS  (PRN):  acetaminophen     Tablet .. 650 milliGRAM(s) Oral every 6 hours PRN Temp greater or equal to 38C (100.4F), Mild Pain (1 - 3)  melatonin 3 milliGRAM(s) Oral at bedtime PRN Insomnia   ****************** STEPDOWN NOTE MICU TO MED TELE ******************    HOSPITAL COURSE:  87yo F with PMHx Alzheimer's dementia, Parkinson's Disease, SDH/SAH s/p repair of cerebral aneurysm 1960s, Lung Ca s/p pneumonectomy of L lung, HTN, HLD, DM, recent admission for sepsis 2/2 colitis presenting with aspiration event, found to have aspiration PNA with hypoxia and afib with RVR, admitted to MICU for further monitoring. In MICU, she was broadened from CTX to vanc and zosyn (pseudomonas dosing) given recent hospital admission and living in a healthcare setting (nursing home), as well as her worsening respiratory status. She was tachy to 140s-150s, required diltiazem gtt and amiodarone gtt for rate and rhythm control. She is now s/p amiodarone 150 mg IV x3, amio gtt at 1 mg/min x 12 hrs, amio gtt 0.5 mg/min x 18 hrs, and has since been transitioned to PO amiodarone. Diltiazem gtt was transitioned to to PO diltiazem. She was diuresed intermittently for pleural effusions. Had a drop in her serum sodium after receiving dilt/amio gtt and IV abx all running in d5, which were switched to run in NS. Patient failed speech & swallow assessment, currently pending a FEES. NGT placed and started on TFs, nutrition consulted, pending TF recs. She was weaned from HFNC to 6L NC, is saturating well on 6L. Ordered aerobika, incentive spirometry, mucomyst, and duonebs for chest congestion/secretions. Mental status is improving, HR now well controlled and NSR, respiratory status improving. Pt is HDS for stepdown to med tele.    INTERVAL EVENTS/SUBJECTIVE:  Patient seen and examined at bedside.    Vital Signs Last 12 Hrs  T(F): 97.9 (06-17-23 @ 09:34), Max: 98.4 (06-17-23 @ 05:10)  HR: 83 (06-17-23 @ 14:00) (81 - 92)  BP: 129/62 (06-17-23 @ 14:00) (114/58 - 169/73)  BP(mean): 89 (06-17-23 @ 14:00) (81 - 120)  RR: 24 (06-17-23 @ 14:00) (20 - 38)  SpO2: 100% (06-17-23 @ 14:00) (100% - 100%)  I&O's Summary    16 Jun 2023 07:01  -  17 Jun 2023 07:00  --------------------------------------------------------  IN: 1192 mL / OUT: 1100 mL / NET: 92 mL    17 Jun 2023 07:01  -  17 Jun 2023 14:11  --------------------------------------------------------  IN: 425 mL / OUT: 500 mL / NET: -75 mL        PHYSICAL EXAM:  Constitutional: NAD, comfortable in bed.  HEENT: NC/AT, EOMI, no conjunctival pallor or scleral icterus, MMM, neck supple  Respiratory: right lung with improving rhonchi, left lung without breath sounds, secretions and chest congestion, breathing comfortably  Cardiovascular: tachycardia, regular rate, normal S1 and S2, no m/r/g.   Gastrointestinal: soft NTND, no guarding or rebound tenderness, no palpable masses   Extremities: wwp; no cyanosis, clubbing or edema.   Vascular: Pulses equal and strong throughout.   Neurological: AAOx3          LABS:                        8.4    6.78  )-----------( 200      ( 17 Jun 2023 05:30 )             27.0     06-17    134<L>  |  98  |  20  ----------------------------<  148<H>  3.6   |  26  |  0.63    Ca    9.2      17 Jun 2023 05:30  Phos  2.8     06-17  Mg     1.6     06-17    TPro  5.5<L>  /  Alb  2.2<L>  /  TBili  0.4  /  DBili  x   /  AST  71<H>  /  ALT  10  /  AlkPhos  344<H>  06-17            RADIOLOGY & ADDITIONAL TESTS:    MEDICATIONS  (STANDING):  aMIOdarone    Tablet 400 milliGRAM(s) Oral every 12 hours  aMIOdarone Infusion 0.5 mG/Min (16.7 mL/Hr) IV Continuous <Continuous>  aMIOdarone Infusion 0.999 mG/Min (33.3 mL/Hr) IV Continuous <Continuous>  atorvastatin 5 milliGRAM(s) Oral at bedtime  carbidopa/levodopa  25/100 2.5 Tablet(s) Oral every 8 hours  chlorhexidine 2% Cloths 1 Application(s) Topical <User Schedule>  diltiazem    Tablet 30 milliGRAM(s) Oral every 6 hours  enoxaparin Injectable 40 milliGRAM(s) SubCutaneous every 24 hours  insulin lispro (ADMELOG) corrective regimen sliding scale   SubCutaneous every 6 hours  piperacillin/tazobactam IVPB.. 4.5 Gram(s) IV Intermittent every 8 hours  sertraline 50 milliGRAM(s) Oral every 24 hours  vancomycin  IVPB 1000 milliGRAM(s) IV Intermittent every 24 hours    MEDICATIONS  (PRN):  acetaminophen     Tablet .. 650 milliGRAM(s) Oral every 6 hours PRN Temp greater or equal to 38C (100.4F), Mild Pain (1 - 3)  melatonin 3 milliGRAM(s) Oral at bedtime PRN Insomnia

## 2023-06-18 DIAGNOSIS — E11.9 TYPE 2 DIABETES MELLITUS WITHOUT COMPLICATIONS: ICD-10-CM

## 2023-06-18 DIAGNOSIS — I48.91 UNSPECIFIED ATRIAL FIBRILLATION: ICD-10-CM

## 2023-06-18 DIAGNOSIS — J69.0 PNEUMONITIS DUE TO INHALATION OF FOOD AND VOMIT: ICD-10-CM

## 2023-06-18 DIAGNOSIS — G20 PARKINSON'S DISEASE: ICD-10-CM

## 2023-06-18 DIAGNOSIS — G30.9 ALZHEIMER'S DISEASE, UNSPECIFIED: ICD-10-CM

## 2023-06-18 DIAGNOSIS — E78.5 HYPERLIPIDEMIA, UNSPECIFIED: ICD-10-CM

## 2023-06-18 DIAGNOSIS — J96.01 ACUTE RESPIRATORY FAILURE WITH HYPOXIA: ICD-10-CM

## 2023-06-18 DIAGNOSIS — Z29.9 ENCOUNTER FOR PROPHYLACTIC MEASURES, UNSPECIFIED: ICD-10-CM

## 2023-06-18 DIAGNOSIS — I10 ESSENTIAL (PRIMARY) HYPERTENSION: ICD-10-CM

## 2023-06-18 LAB
ALBUMIN SERPL ELPH-MCNC: 2.6 G/DL — LOW (ref 3.3–5)
ALP SERPL-CCNC: 337 U/L — HIGH (ref 40–120)
ALT FLD-CCNC: 12 U/L — SIGNIFICANT CHANGE UP (ref 10–45)
ANION GAP SERPL CALC-SCNC: 7 MMOL/L — SIGNIFICANT CHANGE UP (ref 5–17)
AST SERPL-CCNC: 37 U/L — SIGNIFICANT CHANGE UP (ref 10–40)
BASOPHILS # BLD AUTO: 0.03 K/UL — SIGNIFICANT CHANGE UP (ref 0–0.2)
BASOPHILS NFR BLD AUTO: 0.3 % — SIGNIFICANT CHANGE UP (ref 0–2)
BILIRUB SERPL-MCNC: 0.2 MG/DL — SIGNIFICANT CHANGE UP (ref 0.2–1.2)
BUN SERPL-MCNC: 17 MG/DL — SIGNIFICANT CHANGE UP (ref 7–23)
CALCIUM SERPL-MCNC: 9.5 MG/DL — SIGNIFICANT CHANGE UP (ref 8.4–10.5)
CHLORIDE SERPL-SCNC: 98 MMOL/L — SIGNIFICANT CHANGE UP (ref 96–108)
CO2 SERPL-SCNC: 29 MMOL/L — SIGNIFICANT CHANGE UP (ref 22–31)
CREAT SERPL-MCNC: 0.56 MG/DL — SIGNIFICANT CHANGE UP (ref 0.5–1.3)
EGFR: 88 ML/MIN/1.73M2 — SIGNIFICANT CHANGE UP
EOSINOPHIL # BLD AUTO: 0.04 K/UL — SIGNIFICANT CHANGE UP (ref 0–0.5)
EOSINOPHIL NFR BLD AUTO: 0.4 % — SIGNIFICANT CHANGE UP (ref 0–6)
GLUCOSE BLDC GLUCOMTR-MCNC: 128 MG/DL — HIGH (ref 70–99)
GLUCOSE BLDC GLUCOMTR-MCNC: 191 MG/DL — HIGH (ref 70–99)
GLUCOSE BLDC GLUCOMTR-MCNC: 285 MG/DL — HIGH (ref 70–99)
GLUCOSE SERPL-MCNC: 196 MG/DL — HIGH (ref 70–99)
HCT VFR BLD CALC: 28.1 % — LOW (ref 34.5–45)
HGB BLD-MCNC: 9.1 G/DL — LOW (ref 11.5–15.5)
IMM GRANULOCYTES NFR BLD AUTO: 2.7 % — HIGH (ref 0–0.9)
LYMPHOCYTES # BLD AUTO: 0.73 K/UL — LOW (ref 1–3.3)
LYMPHOCYTES # BLD AUTO: 7.1 % — LOW (ref 13–44)
MAGNESIUM SERPL-MCNC: 1.6 MG/DL — SIGNIFICANT CHANGE UP (ref 1.6–2.6)
MCHC RBC-ENTMCNC: 25.3 PG — LOW (ref 27–34)
MCHC RBC-ENTMCNC: 32.4 GM/DL — SIGNIFICANT CHANGE UP (ref 32–36)
MCV RBC AUTO: 78.3 FL — LOW (ref 80–100)
MONOCYTES # BLD AUTO: 0.5 K/UL — SIGNIFICANT CHANGE UP (ref 0–0.9)
MONOCYTES NFR BLD AUTO: 4.9 % — SIGNIFICANT CHANGE UP (ref 2–14)
NEUTROPHILS # BLD AUTO: 8.7 K/UL — HIGH (ref 1.8–7.4)
NEUTROPHILS NFR BLD AUTO: 84.6 % — HIGH (ref 43–77)
NRBC # BLD: 0 /100 WBCS — SIGNIFICANT CHANGE UP (ref 0–0)
PHOSPHATE SERPL-MCNC: 2.2 MG/DL — LOW (ref 2.5–4.5)
PLATELET # BLD AUTO: 255 K/UL — SIGNIFICANT CHANGE UP (ref 150–400)
POTASSIUM SERPL-MCNC: 3.9 MMOL/L — SIGNIFICANT CHANGE UP (ref 3.5–5.3)
POTASSIUM SERPL-SCNC: 3.9 MMOL/L — SIGNIFICANT CHANGE UP (ref 3.5–5.3)
PROT SERPL-MCNC: 5.8 G/DL — LOW (ref 6–8.3)
RBC # BLD: 3.59 M/UL — LOW (ref 3.8–5.2)
RBC # FLD: 17.1 % — HIGH (ref 10.3–14.5)
SODIUM SERPL-SCNC: 134 MMOL/L — LOW (ref 135–145)
WBC # BLD: 10.28 K/UL — SIGNIFICANT CHANGE UP (ref 3.8–10.5)
WBC # FLD AUTO: 10.28 K/UL — SIGNIFICANT CHANGE UP (ref 3.8–10.5)

## 2023-06-18 PROCEDURE — 99233 SBSQ HOSP IP/OBS HIGH 50: CPT

## 2023-06-18 RX ORDER — FUROSEMIDE 40 MG
20 TABLET ORAL ONCE
Refills: 0 | Status: COMPLETED | OUTPATIENT
Start: 2023-06-18 | End: 2023-06-18

## 2023-06-18 RX ORDER — DILTIAZEM HCL 120 MG
60 CAPSULE, EXT RELEASE 24 HR ORAL EVERY 6 HOURS
Refills: 0 | Status: DISCONTINUED | OUTPATIENT
Start: 2023-06-18 | End: 2023-06-18

## 2023-06-18 RX ORDER — SODIUM CHLORIDE 9 MG/ML
4 INJECTION INTRAMUSCULAR; INTRAVENOUS; SUBCUTANEOUS EVERY 6 HOURS
Refills: 0 | Status: DISCONTINUED | OUTPATIENT
Start: 2023-06-18 | End: 2023-06-24

## 2023-06-18 RX ORDER — IPRATROPIUM/ALBUTEROL SULFATE 18-103MCG
3 AEROSOL WITH ADAPTER (GRAM) INHALATION EVERY 6 HOURS
Refills: 0 | Status: DISCONTINUED | OUTPATIENT
Start: 2023-06-18 | End: 2023-06-19

## 2023-06-18 RX ORDER — MAGNESIUM SULFATE 500 MG/ML
2 VIAL (ML) INJECTION ONCE
Refills: 0 | Status: COMPLETED | OUTPATIENT
Start: 2023-06-18 | End: 2023-06-18

## 2023-06-18 RX ORDER — DILTIAZEM HCL 120 MG
30 CAPSULE, EXT RELEASE 24 HR ORAL ONCE
Refills: 0 | Status: COMPLETED | OUTPATIENT
Start: 2023-06-18 | End: 2023-06-18

## 2023-06-18 RX ORDER — DILTIAZEM HCL 120 MG
60 CAPSULE, EXT RELEASE 24 HR ORAL EVERY 8 HOURS
Refills: 0 | Status: DISCONTINUED | OUTPATIENT
Start: 2023-06-18 | End: 2023-06-19

## 2023-06-18 RX ORDER — METOPROLOL TARTRATE 50 MG
5 TABLET ORAL ONCE
Refills: 0 | Status: COMPLETED | OUTPATIENT
Start: 2023-06-18 | End: 2023-06-18

## 2023-06-18 RX ORDER — SODIUM CHLORIDE 9 MG/ML
4 INJECTION INTRAMUSCULAR; INTRAVENOUS; SUBCUTANEOUS EVERY 8 HOURS
Refills: 0 | Status: DISCONTINUED | OUTPATIENT
Start: 2023-06-18 | End: 2023-06-18

## 2023-06-18 RX ORDER — IPRATROPIUM/ALBUTEROL SULFATE 18-103MCG
3 AEROSOL WITH ADAPTER (GRAM) INHALATION ONCE
Refills: 0 | Status: COMPLETED | OUTPATIENT
Start: 2023-06-18 | End: 2023-06-18

## 2023-06-18 RX ORDER — ACETYLCYSTEINE 200 MG/ML
4 VIAL (ML) MISCELLANEOUS EVERY 6 HOURS
Refills: 0 | Status: DISCONTINUED | OUTPATIENT
Start: 2023-06-18 | End: 2023-06-24

## 2023-06-18 RX ADMIN — Medication 4 MILLILITER(S): at 12:28

## 2023-06-18 RX ADMIN — Medication 4 MILLILITER(S): at 01:57

## 2023-06-18 RX ADMIN — SERTRALINE 50 MILLIGRAM(S): 25 TABLET, FILM COATED ORAL at 23:14

## 2023-06-18 RX ADMIN — SODIUM CHLORIDE 4 MILLILITER(S): 9 INJECTION INTRAMUSCULAR; INTRAVENOUS; SUBCUTANEOUS at 23:13

## 2023-06-18 RX ADMIN — PIPERACILLIN AND TAZOBACTAM 25 GRAM(S): 4; .5 INJECTION, POWDER, LYOPHILIZED, FOR SOLUTION INTRAVENOUS at 21:14

## 2023-06-18 RX ADMIN — Medication 250 MILLIGRAM(S): at 09:55

## 2023-06-18 RX ADMIN — Medication 20 MILLIGRAM(S): at 17:37

## 2023-06-18 RX ADMIN — Medication 5 MILLIGRAM(S): at 14:50

## 2023-06-18 RX ADMIN — Medication 3 MILLILITER(S): at 06:51

## 2023-06-18 RX ADMIN — Medication 25 GRAM(S): at 17:37

## 2023-06-18 RX ADMIN — Medication 2: at 13:28

## 2023-06-18 RX ADMIN — PIPERACILLIN AND TAZOBACTAM 25 GRAM(S): 4; .5 INJECTION, POWDER, LYOPHILIZED, FOR SOLUTION INTRAVENOUS at 06:52

## 2023-06-18 RX ADMIN — Medication 3: at 23:46

## 2023-06-18 RX ADMIN — Medication 85 MILLIMOLE(S): at 18:30

## 2023-06-18 RX ADMIN — AMIODARONE HYDROCHLORIDE 400 MILLIGRAM(S): 400 TABLET ORAL at 17:21

## 2023-06-18 RX ADMIN — Medication 3 MILLILITER(S): at 01:57

## 2023-06-18 RX ADMIN — AMIODARONE HYDROCHLORIDE 400 MILLIGRAM(S): 400 TABLET ORAL at 06:52

## 2023-06-18 RX ADMIN — Medication 30 MILLIGRAM(S): at 12:28

## 2023-06-18 RX ADMIN — Medication 30 MILLIGRAM(S): at 19:23

## 2023-06-18 RX ADMIN — Medication 4 MILLILITER(S): at 23:13

## 2023-06-18 RX ADMIN — CHLORHEXIDINE GLUCONATE 1 APPLICATION(S): 213 SOLUTION TOPICAL at 06:53

## 2023-06-18 RX ADMIN — Medication 1: at 18:30

## 2023-06-18 RX ADMIN — CARBIDOPA AND LEVODOPA 2.5 TABLET(S): 25; 100 TABLET ORAL at 17:20

## 2023-06-18 RX ADMIN — Medication 3 MILLILITER(S): at 17:21

## 2023-06-18 RX ADMIN — CARBIDOPA AND LEVODOPA 2.5 TABLET(S): 25; 100 TABLET ORAL at 01:55

## 2023-06-18 RX ADMIN — Medication 3 MILLILITER(S): at 23:13

## 2023-06-18 RX ADMIN — Medication 3 MILLILITER(S): at 19:47

## 2023-06-18 RX ADMIN — ATORVASTATIN CALCIUM 5 MILLIGRAM(S): 80 TABLET, FILM COATED ORAL at 21:14

## 2023-06-18 RX ADMIN — Medication 3 MILLIGRAM(S): at 23:14

## 2023-06-18 RX ADMIN — CARBIDOPA AND LEVODOPA 2.5 TABLET(S): 25; 100 TABLET ORAL at 09:54

## 2023-06-18 RX ADMIN — ENOXAPARIN SODIUM 40 MILLIGRAM(S): 100 INJECTION SUBCUTANEOUS at 12:28

## 2023-06-18 RX ADMIN — Medication 4 MILLILITER(S): at 06:34

## 2023-06-18 RX ADMIN — Medication 60 MILLIGRAM(S): at 21:15

## 2023-06-18 RX ADMIN — Medication 30 MILLIGRAM(S): at 01:34

## 2023-06-18 RX ADMIN — PIPERACILLIN AND TAZOBACTAM 25 GRAM(S): 4; .5 INJECTION, POWDER, LYOPHILIZED, FOR SOLUTION INTRAVENOUS at 12:28

## 2023-06-18 RX ADMIN — Medication 30 MILLIGRAM(S): at 17:21

## 2023-06-18 RX ADMIN — Medication 3 MILLILITER(S): at 12:28

## 2023-06-18 RX ADMIN — CARBIDOPA AND LEVODOPA 2.5 TABLET(S): 25; 100 TABLET ORAL at 21:15

## 2023-06-18 RX ADMIN — Medication 4 MILLILITER(S): at 17:20

## 2023-06-18 RX ADMIN — Medication 30 MILLIGRAM(S): at 06:55

## 2023-06-18 NOTE — PROGRESS NOTE ADULT - PROBLEM SELECTOR PLAN 4
Patient with both Parkinsons disease and Alzheimers dementia. On home keppra 500mg q12, sertraline 50mg qd, and sinemet 25-100mg x 2.5 tabs q8. AAOx1-2, reportedly at baseline mental status.  - Monitor mentation  - C/w sinemet 25/100 mg x 2.5 tabs PO via NGT TID  - C/w sertraline 50 mg PO via NGT qd  - Speech and swallow consulted, recommending NPO w/ alternate means of nutrition + FEES  - NPO pending FEES  - Nutrition consulted for NGT tube feed recs

## 2023-06-18 NOTE — PROGRESS NOTE ADULT - PROBLEM SELECTOR PLAN 7
History of DM on home metformin 500mg BID. A1C 6.9 5/2023.  - mISS while inpatient   - monitor fingersticks and add basal coverage if persistently elevated   - TSH wnl

## 2023-06-18 NOTE — PROGRESS NOTE ADULT - PROBLEM SELECTOR PLAN 3
EKG with afib, noted to be in RVR with -170 on admission. TSH wnl. Likely i/s/o infection. Initially on amiodarone gtt and diltiazem gtt, now transitioned to PO with better control.  - TTE EF 60-65%; tachycardia; small LV; mild to moderate symmetric LVH; severely dilated LA; moderate TR (worse than 2/8/21); aortic sclerosis; pulm HTN w/ PASP 51 (higher than 2/8/21); pericardial fat pad anterior to R ventricle (cannot r/o trivial pericardial effusion).   - C/w amiodarone 400 mg PO BID x5 days, followed by amiodarone 200 mg PO qd  - C/w diltiazem 30mg PO q6h  - No AC at this time, will treat suspected underlying infection likely driving afib  - Consult stroke to determine risk vs benefit of AC (pt has hx of SDH/SAH s/p repair of cerebral aneurysm 1960s, and ICH in 2021)

## 2023-06-18 NOTE — PROGRESS NOTE ADULT - SUBJECTIVE AND OBJECTIVE BOX
****************** STEPDOWN NOTE MICU TO MED TELE ******************    HOSPITAL COURSE:  87yo F with PMHx Alzheimer's dementia, Parkinson's Disease, SDH/SAH s/p repair of cerebral aneurysm 1960s, Lung Ca s/p pneumonectomy of L lung, HTN, HLD, DM, recent admission for sepsis 2/2 colitis presenting with aspiration event, found to have aspiration PNA with hypoxia and afib with RVR, admitted to MICU for further monitoring. In MICU, she was broadened from CTX to vanc and zosyn (pseudomonas dosing) given recent hospital admission and living in a healthcare setting (nursing home), as well as her worsening respiratory status. She was tachy to 140s-150s, required diltiazem gtt and amiodarone gtt for rate and rhythm control. She is now s/p amiodarone 150 mg IV x3, amio gtt at 1 mg/min x 12 hrs, amio gtt 0.5 mg/min x 18 hrs, and has since been transitioned to PO amiodarone. Diltiazem gtt was transitioned to to PO diltiazem. She was diuresed intermittently for pleural effusions. Had a drop in her serum sodium after receiving dilt/amio gtt and IV abx all running in d5, which were switched to run in NS. Patient failed speech & swallow assessment, currently pending a FEES. NGT placed and started on TFs, nutrition consulted, pending TF recs. She was weaned from HFNC to 6L NC, is saturating well on 6L. Ordered aerobika, incentive spirometry, mucomyst, and duonebs for chest congestion/secretions. Mental status is improving, HR now well controlled and NSR, respiratory status improving. Pt is HDS for stepdown to med tele.    SUBJECTIVE / INTERVAL HPI: Patient seen and examined at bedside.     ROS: negative unless otherwise stated above.    VITAL SIGNS:  Vital Signs Last 24 Hrs  T(C): 36.7 (18 Jun 2023 00:00), Max: 36.9 (17 Jun 2023 05:10)  T(F): 98 (18 Jun 2023 00:00), Max: 98.4 (17 Jun 2023 05:10)  HR: 128 (17 Jun 2023 20:22) (81 - 128)  BP: 110/83 (17 Jun 2023 20:22) (100/64 - 169/73)  BP(mean): 92 (17 Jun 2023 20:22) (77 - 120)  RR: 18 (17 Jun 2023 20:22) (17 - 38)  SpO2: 100% (17 Jun 2023 20:22) (96% - 100%)    Parameters below as of 17 Jun 2023 20:22  Patient On (Oxygen Delivery Method): nasal cannula w/ humidification  O2 Flow (L/min): 6        06-16-23 @ 07:01  -  06-17-23 @ 07:00  --------------------------------------------------------  IN: 1192 mL / OUT: 1100 mL / NET: 92 mL    06-17-23 @ 07:01  -  06-18-23 @ 03:42  --------------------------------------------------------  IN: 425 mL / OUT: 900 mL / NET: -475 mL      PHYSICAL EXAM:  Constitutional: thin frail elderly female resting comfortably with NG tube in place, NAD  HEENT: NC/AT, PERRL, anicteric sclera, MMM  Neck: supple; no JVD or thyromegaly  Respiratory: +mild rhonchi b/l, no retractions  Cardiac: +S1/S2; +tachycardic, +irregular, no M/R/G  Gastrointestinal: soft, NT/ND; no rebound or guarding; +BS  Extremities: WWP, no clubbing or cyanosis; no peripheral edema  Vascular: 2+ radial, DP pulses B/L  Dermatologic: skin warm, dry and intact; no rashes, wounds, or scars  Neurologic: AAOx2 (self & place), follows commands, answers questions    MEDICATIONS:  MEDICATIONS  (STANDING):  acetylcysteine 20%  Inhalation 4 milliLiter(s) Inhalation every 6 hours  albuterol/ipratropium for Nebulization 3 milliLiter(s) Nebulizer every 6 hours  aMIOdarone    Tablet 400 milliGRAM(s) Oral every 12 hours  atorvastatin 5 milliGRAM(s) Oral at bedtime  carbidopa/levodopa  25/100 2.5 Tablet(s) Oral every 8 hours  chlorhexidine 2% Cloths 1 Application(s) Topical <User Schedule>  diltiazem    Tablet 30 milliGRAM(s) Oral every 6 hours  enoxaparin Injectable 40 milliGRAM(s) SubCutaneous every 24 hours  insulin lispro (ADMELOG) corrective regimen sliding scale   SubCutaneous every 6 hours  piperacillin/tazobactam IVPB.. 4.5 Gram(s) IV Intermittent every 8 hours  sertraline 50 milliGRAM(s) Oral every 24 hours  vancomycin  IVPB 1000 milliGRAM(s) IV Intermittent every 24 hours    MEDICATIONS  (PRN):  acetaminophen     Tablet .. 650 milliGRAM(s) Oral every 6 hours PRN Temp greater or equal to 38C (100.4F), Mild Pain (1 - 3)  melatonin 3 milliGRAM(s) Oral at bedtime PRN Insomnia      ALLERGIES:  Allergies    No Known Allergies    Intolerances        LABS:                        8.4    6.78  )-----------( 200      ( 17 Jun 2023 05:30 )             27.0     06-17    134<L>  |  98  |  20  ----------------------------<  148<H>  3.6   |  26  |  0.63    Ca    9.2      17 Jun 2023 05:30  Phos  2.8     06-17  Mg     1.6     06-17    TPro  5.5<L>  /  Alb  2.2<L>  /  TBili  0.4  /  DBili  x   /  AST  71<H>  /  ALT  10  /  AlkPhos  344<H>  06-17        CAPILLARY BLOOD GLUCOSE      POCT Blood Glucose.: 128 mg/dL (18 Jun 2023 00:42)      RADIOLOGY & ADDITIONAL TESTS: Reviewed.   ****************** STEPDOWN NOTE MICU TO MED TELE ******************    HOSPITAL COURSE:  89yo F with PMHx Alzheimer's dementia, Parkinson's Disease, SDH/SAH s/p repair of cerebral aneurysm 1960s, Lung Ca s/p pneumonectomy of L lung, HTN, HLD, DM, recent admission for sepsis 2/2 colitis presenting with aspiration event, found to have aspiration PNA with hypoxia and afib with RVR, admitted to MICU for further monitoring. In MICU, she was broadened from CTX to vanc and zosyn (pseudomonas dosing) given recent hospital admission and living in a healthcare setting (nursing home), as well as her worsening respiratory status. She was tachy to 140s-150s, required diltiazem gtt and amiodarone gtt for rate and rhythm control. She is now s/p amiodarone 150 mg IV x3, amio gtt at 1 mg/min x 12 hrs, amio gtt 0.5 mg/min x 18 hrs, and has since been transitioned to PO amiodarone. Diltiazem gtt was transitioned to to PO diltiazem. She was diuresed intermittently for pleural effusions. Had a drop in her serum sodium after receiving dilt/amio gtt and IV abx all running in d5, which were switched to run in NS. Patient failed speech & swallow assessment, currently pending a FEES. NGT placed and started on TFs, nutrition consulted, pending TF recs. She was weaned from HFNC to 6L NC, is saturating well on 6L. Ordered aerobika, incentive spirometry, mucomyst, and duonebs for chest congestion/secretions. Mental status is improving, HR now well controlled and NSR, respiratory status improving. Pt is HDS for stepdown to med tele.    SUBJECTIVE / INTERVAL HPI: Patient seen and examined at bedside.     ROS: negative unless otherwise stated above.    VITAL SIGNS:  Vital Signs Last 24 Hrs  T(C): 36.7 (18 Jun 2023 00:00), Max: 36.9 (17 Jun 2023 05:10)  T(F): 98 (18 Jun 2023 00:00), Max: 98.4 (17 Jun 2023 05:10)  HR: 128 (17 Jun 2023 20:22) (81 - 128)  BP: 110/83 (17 Jun 2023 20:22) (100/64 - 169/73)  BP(mean): 92 (17 Jun 2023 20:22) (77 - 120)  RR: 18 (17 Jun 2023 20:22) (17 - 38)  SpO2: 100% (17 Jun 2023 20:22) (96% - 100%)    Parameters below as of 17 Jun 2023 20:22  Patient On (Oxygen Delivery Method): nasal cannula w/ humidification  O2 Flow (L/min): 6        06-16-23 @ 07:01  -  06-17-23 @ 07:00  --------------------------------------------------------  IN: 1192 mL / OUT: 1100 mL / NET: 92 mL    06-17-23 @ 07:01  -  06-18-23 @ 03:42  --------------------------------------------------------  IN: 425 mL / OUT: 900 mL / NET: -475 mL      PHYSICAL EXAM:  Constitutional: thin frail elderly female resting comfortably with NG tube in place, NAD  HEENT: NC/AT, PERRL, anicteric sclera, MMM  Neck: supple; no JVD or thyromegaly  Respiratory: +mild rhonchi b/l, no retractions  Cardiac: +S1/S2; +tachycardic, +irregular, no M/R/G  Gastrointestinal: soft, NT/ND; no rebound or guarding; +BS  Extremities: WWP, no clubbing or cyanosis; no peripheral edema  Vascular: 2+ radial, DP pulses B/L  Dermatologic: skin warm, dry and intact; no rashes, wounds, or scars  Neurologic: AAOx2 (self & place), follows commands, answers questions    MEDICATIONS:  MEDICATIONS  (STANDING):  acetylcysteine 20%  Inhalation 4 milliLiter(s) Inhalation every 6 hours  albuterol/ipratropium for Nebulization 3 milliLiter(s) Nebulizer every 6 hours  aMIOdarone    Tablet 400 milliGRAM(s) Oral every 12 hours  atorvastatin 5 milliGRAM(s) Oral at bedtime  carbidopa/levodopa  25/100 2.5 Tablet(s) Oral every 8 hours  chlorhexidine 2% Cloths 1 Application(s) Topical <User Schedule>  diltiazem    Tablet 30 milliGRAM(s) Oral every 6 hours  enoxaparin Injectable 40 milliGRAM(s) SubCutaneous every 24 hours  insulin lispro (ADMELOG) corrective regimen sliding scale   SubCutaneous every 6 hours  piperacillin/tazobactam IVPB.. 4.5 Gram(s) IV Intermittent every 8 hours  sertraline 50 milliGRAM(s) Oral every 24 hours  vancomycin  IVPB 1000 milliGRAM(s) IV Intermittent every 24 hours    MEDICATIONS  (PRN):  acetaminophen     Tablet .. 650 milliGRAM(s) Oral every 6 hours PRN Temp greater or equal to 38C (100.4F), Mild Pain (1 - 3)  melatonin 3 milliGRAM(s) Oral at bedtime PRN Insomnia      ALLERGIES:  Allergies    No Known Allergies    Intolerances        LABS:                        8.4    6.78  )-----------( 200      ( 17 Jun 2023 05:30 )             27.0     06-17    134<L>  |  98  |  20  ----------------------------<  148<H>  3.6   |  26  |  0.63    Ca    9.2      17 Jun 2023 05:30  Phos  2.8     06-17  Mg     1.6     06-17    TPro  5.5<L>  /  Alb  2.2<L>  /  TBili  0.4  /  DBili  x   /  AST  71<H>  /  ALT  10  /  AlkPhos  344<H>  06-17      CAPILLARY BLOOD GLUCOSE      POCT Blood Glucose.: 128 mg/dL (18 Jun 2023 00:42)      RADIOLOGY & ADDITIONAL TESTS: Reviewed.

## 2023-06-18 NOTE — PROGRESS NOTE ADULT - ASSESSMENT
87yo F with PMHx Alzheimer's dementia, Parkinson's Disease, SDH/SAH s/p repair of cerebral aneurysm 1960s, Lung Ca s/p pneumonectomy of L lung, HTN, HLD, DM, recent admission for sepsis 2/2 colitis presenting with aspiration event, found to have aspiration PNA with hypoxia and afib with RVR, admitted to MICU for further monitoring.  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 Initially admitted to MICU, now receiving tube feeds via NG tube and stable for stepdown to Tele pending FEES.

## 2023-06-18 NOTE — PROGRESS NOTE ADULT - PROBLEM SELECTOR PLAN 9
F: S/p 1700cc NS  E: Replete to keep K>4 and Mg>2  N: NPO, pending S&S recs  DVT ppx: Lovenox 40 mg SQ qd  GI ppx: None    Code Status: FULL  Dispo: MICU --> Tele F: S/p 1700cc NS  E: Replete to keep K>4 and Mg>2  N: NPO, pending S&S recs  DVT ppx: Lovenox 40 mg SQ qd  GI ppx: None    Code Status: FULL  Dispo: Tele

## 2023-06-18 NOTE — PROGRESS NOTE ADULT - PROBLEM SELECTOR PLAN 1
Increased WOB, POCUS with b-lines, lasix IVP 20mg x1 and HFNC on admission. Suspect aspiration pneumonitis vs PNA, CXR without infiltrate. Broadened from CTX to Vanc and Zosyn to cover for HAP i/s/o recent admission and living in healthcare setting (nursing home). Clinical improvement   - O2 goal >94%  - MRSA swab positive  - c/w vanc 1g IV qd  - c/w zosyn 4.5 mg q8h IV (pseudomonas dosing)  - Will attempt to obtain sputum culture  - NPO, pending FEES  - mucomyst 20% q6h  - duonebs q6h Increased WOB, POCUS with b-lines, lasix IVP 20mg x1 and HFNC on admission. Suspect aspiration pneumonitis vs PNA, CXR without infiltrate. MRSA positive. Broadened from CTX to Vanc and Zosyn to cover for HAP i/s/o recent admission and living in healthcare setting (nursing home). Clinical improvement on new abx regimen.  - Wean O2 as tolerated with goal O2 sat >94%  - C/w Vanc 1g IV qd  - C/w Zosyn 4.5 mg q8h IV (pseudomonal dosing)  - Incentive spirometry   - Aerobika device  - Mucomyst 20% q6h  - Duonebs q6h

## 2023-06-18 NOTE — PROGRESS NOTE ADULT - PROBLEM SELECTOR PLAN 6
Patient with history of HTN on home losartan 100mg qd.   - hold home losartan, restart as tolerated   - monitor BPs

## 2023-06-19 LAB
ACANTHOCYTES BLD QL SMEAR: SIGNIFICANT CHANGE UP
ALBUMIN SERPL ELPH-MCNC: 2.5 G/DL — LOW (ref 3.3–5)
ALP SERPL-CCNC: 280 U/L — HIGH (ref 40–120)
ALT FLD-CCNC: 7 U/L — LOW (ref 10–45)
ANION GAP SERPL CALC-SCNC: 10 MMOL/L — SIGNIFICANT CHANGE UP (ref 5–17)
ANISOCYTOSIS BLD QL: SLIGHT — SIGNIFICANT CHANGE UP
AST SERPL-CCNC: 16 U/L — SIGNIFICANT CHANGE UP (ref 10–40)
BASOPHILS # BLD AUTO: 0 K/UL — SIGNIFICANT CHANGE UP (ref 0–0.2)
BASOPHILS NFR BLD AUTO: 0 % — SIGNIFICANT CHANGE UP (ref 0–2)
BILIRUB SERPL-MCNC: 0.2 MG/DL — SIGNIFICANT CHANGE UP (ref 0.2–1.2)
BUN SERPL-MCNC: 15 MG/DL — SIGNIFICANT CHANGE UP (ref 7–23)
CALCIUM SERPL-MCNC: 8.9 MG/DL — SIGNIFICANT CHANGE UP (ref 8.4–10.5)
CHLORIDE SERPL-SCNC: 94 MMOL/L — LOW (ref 96–108)
CO2 SERPL-SCNC: 30 MMOL/L — SIGNIFICANT CHANGE UP (ref 22–31)
CREAT SERPL-MCNC: 0.53 MG/DL — SIGNIFICANT CHANGE UP (ref 0.5–1.3)
CULTURE RESULTS: SIGNIFICANT CHANGE UP
CULTURE RESULTS: SIGNIFICANT CHANGE UP
EGFR: 89 ML/MIN/1.73M2 — SIGNIFICANT CHANGE UP
EOSINOPHIL # BLD AUTO: 0 K/UL — SIGNIFICANT CHANGE UP (ref 0–0.5)
EOSINOPHIL NFR BLD AUTO: 0 % — SIGNIFICANT CHANGE UP (ref 0–6)
GIANT PLATELETS BLD QL SMEAR: PRESENT — SIGNIFICANT CHANGE UP
GLUCOSE BLDC GLUCOMTR-MCNC: 163 MG/DL — HIGH (ref 70–99)
GLUCOSE BLDC GLUCOMTR-MCNC: 196 MG/DL — HIGH (ref 70–99)
GLUCOSE BLDC GLUCOMTR-MCNC: 196 MG/DL — HIGH (ref 70–99)
GLUCOSE BLDC GLUCOMTR-MCNC: 198 MG/DL — HIGH (ref 70–99)
GLUCOSE BLDC GLUCOMTR-MCNC: 218 MG/DL — HIGH (ref 70–99)
GLUCOSE BLDC GLUCOMTR-MCNC: 227 MG/DL — HIGH (ref 70–99)
GLUCOSE BLDC GLUCOMTR-MCNC: 249 MG/DL — HIGH (ref 70–99)
GLUCOSE SERPL-MCNC: 227 MG/DL — HIGH (ref 70–99)
HCOV PNL SPEC NAA+PROBE: DETECTED
HCT VFR BLD CALC: 27.3 % — LOW (ref 34.5–45)
HGB BLD-MCNC: 8.6 G/DL — LOW (ref 11.5–15.5)
HYPOCHROMIA BLD QL: SLIGHT — SIGNIFICANT CHANGE UP
LYMPHOCYTES # BLD AUTO: 0.61 K/UL — LOW (ref 1–3.3)
LYMPHOCYTES # BLD AUTO: 6.1 % — LOW (ref 13–44)
MACROCYTES BLD QL: SLIGHT — SIGNIFICANT CHANGE UP
MAGNESIUM SERPL-MCNC: 1.8 MG/DL — SIGNIFICANT CHANGE UP (ref 1.6–2.6)
MANUAL SMEAR VERIFICATION: SIGNIFICANT CHANGE UP
MCHC RBC-ENTMCNC: 25.4 PG — LOW (ref 27–34)
MCHC RBC-ENTMCNC: 31.5 GM/DL — LOW (ref 32–36)
MCV RBC AUTO: 80.5 FL — SIGNIFICANT CHANGE UP (ref 80–100)
MICROCYTES BLD QL: SLIGHT — SIGNIFICANT CHANGE UP
MONOCYTES # BLD AUTO: 0.43 K/UL — SIGNIFICANT CHANGE UP (ref 0–0.9)
MONOCYTES NFR BLD AUTO: 4.3 % — SIGNIFICANT CHANGE UP (ref 2–14)
MYELOCYTES NFR BLD: 2.6 % — HIGH (ref 0–0)
NEUTROPHILS # BLD AUTO: 8.68 K/UL — HIGH (ref 1.8–7.4)
NEUTROPHILS NFR BLD AUTO: 83.5 % — HIGH (ref 43–77)
NEUTS BAND # BLD: 2.6 % — SIGNIFICANT CHANGE UP (ref 0–8)
OVALOCYTES BLD QL SMEAR: SLIGHT — SIGNIFICANT CHANGE UP
PHOSPHATE SERPL-MCNC: 4.2 MG/DL — SIGNIFICANT CHANGE UP (ref 2.5–4.5)
PLAT MORPH BLD: ABNORMAL
PLATELET # BLD AUTO: 240 K/UL — SIGNIFICANT CHANGE UP (ref 150–400)
POIKILOCYTOSIS BLD QL AUTO: SIGNIFICANT CHANGE UP
POLYCHROMASIA BLD QL SMEAR: SLIGHT — SIGNIFICANT CHANGE UP
POTASSIUM SERPL-MCNC: 3.3 MMOL/L — LOW (ref 3.5–5.3)
POTASSIUM SERPL-SCNC: 3.3 MMOL/L — LOW (ref 3.5–5.3)
PROT SERPL-MCNC: 5.5 G/DL — LOW (ref 6–8.3)
RAPID RVP RESULT: DETECTED
RBC # BLD: 3.39 M/UL — LOW (ref 3.8–5.2)
RBC # FLD: 17.2 % — HIGH (ref 10.3–14.5)
RBC BLD AUTO: ABNORMAL
SARS-COV-2 RNA SPEC QL NAA+PROBE: SIGNIFICANT CHANGE UP
SCHISTOCYTES BLD QL AUTO: SLIGHT — SIGNIFICANT CHANGE UP
SODIUM SERPL-SCNC: 134 MMOL/L — LOW (ref 135–145)
SPECIMEN SOURCE: SIGNIFICANT CHANGE UP
SPECIMEN SOURCE: SIGNIFICANT CHANGE UP
SPHEROCYTES BLD QL SMEAR: SLIGHT — SIGNIFICANT CHANGE UP
VARIANT LYMPHS # BLD: 0.9 % — SIGNIFICANT CHANGE UP (ref 0–6)
WBC # BLD: 10.08 K/UL — SIGNIFICANT CHANGE UP (ref 3.8–10.5)
WBC # FLD AUTO: 10.08 K/UL — SIGNIFICANT CHANGE UP (ref 3.8–10.5)

## 2023-06-19 PROCEDURE — 71045 X-RAY EXAM CHEST 1 VIEW: CPT | Mod: 26

## 2023-06-19 PROCEDURE — 99233 SBSQ HOSP IP/OBS HIGH 50: CPT | Mod: GC

## 2023-06-19 RX ORDER — VANCOMYCIN HCL 1 G
1000 VIAL (EA) INTRAVENOUS ONCE
Refills: 0 | Status: COMPLETED | OUTPATIENT
Start: 2023-06-19 | End: 2023-06-19

## 2023-06-19 RX ORDER — POTASSIUM CHLORIDE 20 MEQ
40 PACKET (EA) ORAL ONCE
Refills: 0 | Status: COMPLETED | OUTPATIENT
Start: 2023-06-19 | End: 2023-06-19

## 2023-06-19 RX ORDER — IPRATROPIUM BROMIDE 0.2 MG/ML
500 SOLUTION, NON-ORAL INHALATION EVERY 6 HOURS
Refills: 0 | Status: DISCONTINUED | OUTPATIENT
Start: 2023-06-19 | End: 2023-06-24

## 2023-06-19 RX ORDER — DIGOXIN 250 MCG
250 TABLET ORAL ONCE
Refills: 0 | Status: COMPLETED | OUTPATIENT
Start: 2023-06-19 | End: 2023-06-19

## 2023-06-19 RX ORDER — CHLORHEXIDINE GLUCONATE 213 G/1000ML
1 SOLUTION TOPICAL DAILY
Refills: 0 | Status: DISCONTINUED | OUTPATIENT
Start: 2023-06-19 | End: 2023-06-24

## 2023-06-19 RX ORDER — AMIODARONE HYDROCHLORIDE 400 MG/1
150 TABLET ORAL ONCE
Refills: 0 | Status: COMPLETED | OUTPATIENT
Start: 2023-06-19 | End: 2023-06-19

## 2023-06-19 RX ORDER — DILTIAZEM HCL 120 MG
60 CAPSULE, EXT RELEASE 24 HR ORAL EVERY 6 HOURS
Refills: 0 | Status: DISCONTINUED | OUTPATIENT
Start: 2023-06-19 | End: 2023-06-24

## 2023-06-19 RX ORDER — POTASSIUM CHLORIDE 20 MEQ
20 PACKET (EA) ORAL
Refills: 0 | Status: COMPLETED | OUTPATIENT
Start: 2023-06-19 | End: 2023-06-19

## 2023-06-19 RX ORDER — METOPROLOL TARTRATE 50 MG
5 TABLET ORAL ONCE
Refills: 0 | Status: DISCONTINUED | OUTPATIENT
Start: 2023-06-19 | End: 2023-06-19

## 2023-06-19 RX ORDER — MAGNESIUM SULFATE 500 MG/ML
1 VIAL (ML) INJECTION ONCE
Refills: 0 | Status: COMPLETED | OUTPATIENT
Start: 2023-06-19 | End: 2023-06-19

## 2023-06-19 RX ORDER — FUROSEMIDE 40 MG
20 TABLET ORAL ONCE
Refills: 0 | Status: COMPLETED | OUTPATIENT
Start: 2023-06-19 | End: 2023-06-19

## 2023-06-19 RX ADMIN — AMIODARONE HYDROCHLORIDE 400 MILLIGRAM(S): 400 TABLET ORAL at 05:36

## 2023-06-19 RX ADMIN — Medication 4 MILLILITER(S): at 05:00

## 2023-06-19 RX ADMIN — Medication 2: at 05:58

## 2023-06-19 RX ADMIN — PIPERACILLIN AND TAZOBACTAM 25 GRAM(S): 4; .5 INJECTION, POWDER, LYOPHILIZED, FOR SOLUTION INTRAVENOUS at 05:00

## 2023-06-19 RX ADMIN — SODIUM CHLORIDE 4 MILLILITER(S): 9 INJECTION INTRAMUSCULAR; INTRAVENOUS; SUBCUTANEOUS at 10:36

## 2023-06-19 RX ADMIN — PIPERACILLIN AND TAZOBACTAM 25 GRAM(S): 4; .5 INJECTION, POWDER, LYOPHILIZED, FOR SOLUTION INTRAVENOUS at 12:14

## 2023-06-19 RX ADMIN — AMIODARONE HYDROCHLORIDE 400 MILLIGRAM(S): 400 TABLET ORAL at 17:15

## 2023-06-19 RX ADMIN — Medication 250 MICROGRAM(S): at 01:15

## 2023-06-19 RX ADMIN — Medication 4 MILLILITER(S): at 16:17

## 2023-06-19 RX ADMIN — Medication 1: at 12:14

## 2023-06-19 RX ADMIN — Medication 60 MILLIGRAM(S): at 17:15

## 2023-06-19 RX ADMIN — ATORVASTATIN CALCIUM 5 MILLIGRAM(S): 80 TABLET, FILM COATED ORAL at 22:48

## 2023-06-19 RX ADMIN — Medication 500 MICROGRAM(S): at 22:49

## 2023-06-19 RX ADMIN — Medication 50 MILLIEQUIVALENT(S): at 13:22

## 2023-06-19 RX ADMIN — Medication 50 MILLIEQUIVALENT(S): at 08:48

## 2023-06-19 RX ADMIN — Medication 500 MICROGRAM(S): at 04:00

## 2023-06-19 RX ADMIN — SERTRALINE 50 MILLIGRAM(S): 25 TABLET, FILM COATED ORAL at 22:49

## 2023-06-19 RX ADMIN — Medication 60 MILLIGRAM(S): at 23:02

## 2023-06-19 RX ADMIN — SODIUM CHLORIDE 4 MILLILITER(S): 9 INJECTION INTRAMUSCULAR; INTRAVENOUS; SUBCUTANEOUS at 05:00

## 2023-06-19 RX ADMIN — Medication 60 MILLIGRAM(S): at 05:37

## 2023-06-19 RX ADMIN — CHLORHEXIDINE GLUCONATE 1 APPLICATION(S): 213 SOLUTION TOPICAL at 12:22

## 2023-06-19 RX ADMIN — Medication 40 MILLIEQUIVALENT(S): at 08:48

## 2023-06-19 RX ADMIN — CARBIDOPA AND LEVODOPA 2.5 TABLET(S): 25; 100 TABLET ORAL at 22:49

## 2023-06-19 RX ADMIN — CARBIDOPA AND LEVODOPA 2.5 TABLET(S): 25; 100 TABLET ORAL at 05:36

## 2023-06-19 RX ADMIN — Medication 100 GRAM(S): at 08:49

## 2023-06-19 RX ADMIN — SODIUM CHLORIDE 4 MILLILITER(S): 9 INJECTION INTRAMUSCULAR; INTRAVENOUS; SUBCUTANEOUS at 22:48

## 2023-06-19 RX ADMIN — Medication 500 MICROGRAM(S): at 16:17

## 2023-06-19 RX ADMIN — Medication 250 MILLIGRAM(S): at 17:55

## 2023-06-19 RX ADMIN — Medication 60 MILLIGRAM(S): at 11:35

## 2023-06-19 RX ADMIN — SODIUM CHLORIDE 4 MILLILITER(S): 9 INJECTION INTRAMUSCULAR; INTRAVENOUS; SUBCUTANEOUS at 16:17

## 2023-06-19 RX ADMIN — AMIODARONE HYDROCHLORIDE 300 MILLIGRAM(S): 400 TABLET ORAL at 03:03

## 2023-06-19 RX ADMIN — Medication 20 MILLIGRAM(S): at 10:36

## 2023-06-19 RX ADMIN — Medication 4 MILLILITER(S): at 10:35

## 2023-06-19 RX ADMIN — Medication 4 MILLILITER(S): at 22:48

## 2023-06-19 RX ADMIN — Medication 500 MICROGRAM(S): at 10:35

## 2023-06-19 RX ADMIN — CARBIDOPA AND LEVODOPA 2.5 TABLET(S): 25; 100 TABLET ORAL at 13:22

## 2023-06-19 RX ADMIN — ENOXAPARIN SODIUM 40 MILLIGRAM(S): 100 INJECTION SUBCUTANEOUS at 11:35

## 2023-06-19 RX ADMIN — PIPERACILLIN AND TAZOBACTAM 25 GRAM(S): 4; .5 INJECTION, POWDER, LYOPHILIZED, FOR SOLUTION INTRAVENOUS at 22:49

## 2023-06-19 RX ADMIN — Medication 50 MILLIEQUIVALENT(S): at 10:51

## 2023-06-19 RX ADMIN — Medication 250 MICROGRAM(S): at 01:05

## 2023-06-19 RX ADMIN — Medication 1: at 17:54

## 2023-06-19 NOTE — PROGRESS NOTE ADULT - PROBLEM SELECTOR PLAN 1
Increased WOB, POCUS with b-lines, lasix IVP 20mg x1 and HFNC on admission. Suspect aspiration pneumonitis vs PNA, CXR without infiltrate. MRSA positive. Broadened from CTX to Vanc and Zosyn to cover for HAP i/s/o recent admission and living in healthcare setting (nursing home). Clinical improvement on new abx regimen.  - Wean O2 as tolerated with goal O2 sat >94%  - C/w Vanc 1g IV qd  - C/w Zosyn 4.5 mg q8h IV (pseudomonal dosing)  - Incentive spirometry   - Aerobika device  - Mucomyst 20% q6h  - Duonebs q6h Increased WOB, POCUS with b-lines, lasix IVP 20mg x1 and HFNC on admission. Suspect aspiration pneumonitis vs PNA, CXR without infiltrate. MRSA positive. Broadened from CTX to Vanc and Zosyn to cover for HAP i/s/o recent admission and living in healthcare setting (nursing home). Clinical improvement on new abx regimen.  Plan:  - Wean O2 as tolerated with goal O2 sat >94%  - C/w Zosyn 4.5 mg q8h IV (pseudomonal dosing)  - Incentive spirometry   - Aerobika device  - Mucomyst 20% q6h, 3% hypertonic neb q6h  - Duonebs q6h Increased WOB, POCUS with b-lines, lasix IVP 20mg x1 and HFNC on admission. Suspect aspiration pneumonitis vs PNA, CXR without infiltrate. MRSA positive. Broadened from CTX to Vanc and Zosyn to cover for HAP i/s/o recent admission and living in healthcare setting (nursing home). Clinical improvement on new abx regimen.  Plan:  - Wean O2 as tolerated with goal O2 sat >94%  - C/w Zosyn 4.5 mg q8h IV (pseudomonal dosing)  - Incentive spirometry   - Aerobika device  - Mucomyst 20% q6h, 3% hypertonic neb q6h  - Duonebs q6h  - give addtl Lasix 20mg IVP x 1 Increased WOB, POCUS with b-lines, lasix IVP 20mg x1 and HFNC on admission. Suspect aspiration pneumonitis vs PNA, CXR without infiltrate. MRSA positive. Broadened from CTX to Vanc and Zosyn to cover for HAP i/s/o recent admission and living in healthcare setting (nursing home). Clinical improvement on new abx regimen.  Plan:  - Wean O2 as tolerated with goal O2 sat >94%  - C/w Zosyn 4.5 mg q8h IV (pseudomonal dosing) for 5d total course (6/16-6/20)  - c/w Vanc 1g (dose by trough)   - Incentive spirometry   - Aerobika device  - Mucomyst 20% q6h, 3% hypertonic neb q6h  - Duonebs q6h  - give addtl Lasix 20mg IVP x 1

## 2023-06-19 NOTE — SWALLOW FEES ASSESSMENT ADULT - ORAL PHASE COMMENTS
Consistently opened her mouth to accept bolus but with delayed response to its presence. Limited oral movements during bolus processing followed by prolonged bolus holding. Inconsistently able to expedite A-P transport upon the introduction of liquids into oral cavity. Reduced oral clearance with pocketing of minimally chewed bolus within buccal cavities.

## 2023-06-19 NOTE — SWALLOW FEES ASSESSMENT ADULT - DIAGNOSTIC IMPRESSIONS
FEES revealed severe oral and mild pharyngeal phase deficits. Oral phase deficits impacted processing speed, efficiency, A-P transport, and clearance. Despite severity of oral phase deficits, airway protection was not impacted. Pharyngeal swallow efficiency was reduced which resulted in bolus stasis with solids. However, residue was mostly cleared with a liquid wash.     Pt is at a heightened risk for aspiration and its negative sequela given poor mobility, poor safety awareness, and dependence on others for feeding and oral hygiene. Strict aspiration precautions advised.     Suspect that deficits are largely chronic i/s/o advanced dementia; however, may be exacerbated by current medical status. Prognosis is, therefore, guarded, especially since pt would be unable to participate in exercise-based swallow rehabilitation.

## 2023-06-19 NOTE — PROGRESS NOTE ADULT - PROBLEM SELECTOR PLAN 3
EKG with afib, noted to be in RVR with -170 on admission. TSH wnl. Likely i/s/o infection. Initially on amiodarone gtt and diltiazem gtt, now transitioned to PO with better control.  - TTE EF 60-65%; tachycardia; small LV; mild to moderate symmetric LVH; severely dilated LA; moderate TR (worse than 2/8/21); aortic sclerosis; pulm HTN w/ PASP 51 (higher than 2/8/21); pericardial fat pad anterior to R ventricle (cannot r/o trivial pericardial effusion).   - C/w amiodarone 400 mg PO BID x5 days, followed by amiodarone 200 mg PO qd  - C/w diltiazem 30mg PO q6h  - No AC at this time, will treat suspected underlying infection likely driving afib  - Consult stroke to determine risk vs benefit of AC (pt has hx of SDH/SAH s/p repair of cerebral aneurysm 1960s, and ICH in 2021) EKG with afib, noted to be in RVR with -170 on admission. TSH wnl. Likely i/s/o infection. Initially on amiodarone gtt and diltiazem gtt, now transitioned to PO with better control.  - TTE EF 60-65%; tachycardia; small LV; mild to moderate symmetric LVH; severely dilated LA; moderate TR (worse than 2/8/21); aortic sclerosis; pulm HTN w/ PASP 51 (higher than 2/8/21); pericardial fat pad anterior to R ventricle (cannot r/o trivial pericardial effusion).   S/p amiodarone 400mg po BID x 5d  - C/w amiodarone 200mg po qd  - increase diltiazem to 60mg PO q6h  - No AC at this time, will treat suspected underlying infection likely driving afib  - Consult stroke to determine risk vs benefit of AC (pt has hx of SDH/SAH s/p repair of cerebral aneurysm 1960s, and ICH in 2021) EKG with afib, noted to be in RVR with -170 on admission. TSH wnl. Likely i/s/o infection. Initially on amiodarone gtt and diltiazem gtt, now transitioned to PO with better control.  - TTE EF 60-65%; tachycardia; small LV; mild to moderate symmetric LVH; severely dilated LA; moderate TR (worse than 2/8/21); aortic sclerosis; pulm HTN w/ PASP 51 (higher than 2/8/21); pericardial fat pad anterior to R ventricle (cannot r/o trivial pericardial effusion).   - c/w amiodarone 400mg po BID x 5d then transition to  amiodarone 200mg po qd  - increase diltiazem to 60mg PO q6h  - No AC at this time, will treat suspected underlying infection likely driving afib  - Consult stroke to determine risk vs benefit of AC (pt has hx of SDH/SAH s/p repair of cerebral aneurysm 1960s, and ICH in 2021)

## 2023-06-19 NOTE — SWALLOW FEES ASSESSMENT ADULT - RECOMMENDED CONSISTENCY
Thin liquids + puree + supplemental means of nutrition, hydration, meds via NGT; defer to RD for recommendations

## 2023-06-19 NOTE — SWALLOW FEES ASSESSMENT ADULT - COMMENTS
Risks, benefits, and alternatives to this study were reviewed with pt, son, HHA, and medical team who were all in agreement with proceeding with study. Flexible endoscope passed transnasally to further assess swallow anatomy and physiology. Pt tolerated the passing of the scope and its presence. Slight glottic gap upon phonation

## 2023-06-19 NOTE — PROGRESS NOTE ADULT - PROBLEM SELECTOR PLAN 4
Patient with both Parkinsons disease and Alzheimers dementia. On home keppra 500mg q12, sertraline 50mg qd, and sinemet 25-100mg x 2.5 tabs q8. AAOx1-2, reportedly at baseline mental status.  - Monitor mentation  - C/w sinemet 25/100 mg x 2.5 tabs PO via NGT TID  - C/w sertraline 50 mg PO via NGT qd  - Speech and swallow consulted, recommending NPO w/ alternate means of nutrition + FEES  - NPO pending FEES  - Nutrition consulted for NGT tube feed recs Patient with both Parkinsons disease and Alzheimers dementia. On home keppra 500mg q12, sertraline 50mg qd, and sinemet 25-100mg x 2.5 tabs q8. AAOx1-2, reportedly at baseline mental status.  FEES: revealed severe oral and mild pharyngeal phase deficits. Oral phase deficits impacted processing speed, efficiency, A-P transport, and clearance. Despite severity of oral phase deficits, airway protection was not impacted.  - Monitor mentation  - C/w sinemet 25/100 mg x 2.5 tabs PO via NGT TID  - C/w sertraline 50 mg PO via NGT qd  - pureed diet/thin liquids for comfort, c/w tube feeds Glucerna 1.2 @ 52cc/h for nutrition

## 2023-06-19 NOTE — PROGRESS NOTE ADULT - SUBJECTIVE AND OBJECTIVE BOX
dPatient seen at the bedside tonight awake alert and responsive: Overnight she had episodes of AFib with RVR, given diltiazem, amiodarone, digoxin. Currently in NSRcomfortable on HFNC intermittently coughing. Pt responds in 1 word answers, states she is not in any pain, states she is short of breath but does no t appear to be. Sa02 =100% on nasal canula    MEDICATIONS  (STANDING):  acetylcysteine 20%  Inhalation 4 milliLiter(s) Inhalation every 6 hours  aMIOdarone    Tablet 400 milliGRAM(s) Oral every 12 hours  atorvastatin 5 milliGRAM(s) Oral at bedtime  carbidopa/levodopa  25/100 2.5 Tablet(s) Oral every 8 hours  chlorhexidine 2% Cloths 1 Application(s) Topical daily  diltiazem    Tablet 60 milliGRAM(s) Oral every 6 hours  enoxaparin Injectable 40 milliGRAM(s) SubCutaneous every 24 hours  insulin lispro (ADMELOG) corrective regimen sliding scale   SubCutaneous every 6 hours  ipratropium    for Nebulization 500 MICROGram(s) Nebulizer every 6 hours  piperacillin/tazobactam IVPB.. 4.5 Gram(s) IV Intermittent every 8 hours  sertraline 50 milliGRAM(s) Oral every 24 hours  sodium chloride 3%  Inhalation 4 milliLiter(s) Inhalation every 6 hours    MEDICATIONS  (PRN):  acetaminophen     Tablet .. 650 milliGRAM(s) Oral every 6 hours PRN Temp greater or equal to 38C (100.4F), Mild Pain (1 - 3)  melatonin 3 milliGRAM(s) Oral at bedtime PRN Insomnia       Vital Signs Last 24 Hrs  T(C): 36.1 (19 Jun 2023 17:15), Max: 36.6 (19 Jun 2023 14:00)  T(F): 97 (19 Jun 2023 17:15), Max: 97.8 (19 Jun 2023 14:00)  HR: 74 (19 Jun 2023 20:32) (74 - 136)  BP: 135/60 (19 Jun 2023 20:32) (94/50 - 148/80)  BP(mean): 87 (19 Jun 2023 20:32) (64 - 100)  RR: 18 (19 Jun 2023 20:32) (17 - 24)  SpO2: 100% (19 Jun 2023 20:32) (97% - 100%)    Parameters below as of 19 Jun 2023 20:32  Patient On (Oxygen Delivery Method): nasal cannula w/ humidification  O2 Flow (L/min): 50  O2 Concentration (%): 35    NAD seated in bed alert and responsive with one workd answers  Chest course rhonchi on right absent bs on left  CV RRR s1s2 no murmur  Abd soft non-tender  Ext no edma                          8.6    10.08 )-----------( 240      ( 19 Jun 2023 05:30 )             27.3   06-19    134<L>  |  94<L>  |  15  ----------------------------<  227<H>  3.3<L>   |  30  |  0.53    Ca    8.9      19 Jun 2023 05:30  Phos  4.2     06-19  Mg     1.8     06-19    TPro  5.5<L>  /  Alb  2.5<L>  /  TBili  0.2  /  DBili  x   /  AST  16  /  ALT  7<L>  /  AlkPhos  280<H>  06-19

## 2023-06-19 NOTE — SWALLOW FEES ASSESSMENT ADULT - SLP PERTINENT HISTORY OF CURRENT PROBLEM
PMHx significant for Alzheimer's, PD, Lung Ca s/p pneumonectomy of L lung, presented with aspiration event, found to have aspiration PNA with hypoxia and afib with RVR.

## 2023-06-19 NOTE — PROGRESS NOTE ADULT - PROBLEM SELECTOR PLAN 2
Met 2/4 SIRS criteria. Witnessed aspiration event at nursing home. No consolidations noted on CT scan, however CXR appearing with possible infiltrate on RLL, consistent with aspiration PNA. MRSA positive. COVID/flu/RSV and urine strep/legionella negative. Broadened from CTX to Vanc and Zosyn to cover for HAP i/s/o recent admission and living in healthcare setting (nursing home). Clinical improvement on new abx regimen.  - C/w Vanc 1g IV qd  - C/w Zosyn 4.5 mg q8h IV (pseudomonal dosing)  - Blood cxs NGTD x3 days  - Sputum culture rejected (normal des)  - Rest of plan as above Met 2/4 SIRS criteria. Witnessed aspiration event at nursing home. No consolidations noted on CT scan, however CXR appearing with possible infiltrate on RLL, consistent with aspiration PNA. MRSA positive. COVID/flu/RSV and urine strep/legionella negative. Broadened from CTX to Vanc and Zosyn to cover for HAP i/s/o recent admission and living in healthcare setting (nursing home). Clinical improvement on new abx regimen.  Plan:   - d/c Vanc 1g IV qd  - if spikes new fever, restart Vanc 1g IV qd  - C/w Zosyn 4.5 mg q8h IV (pseudomonal dosing)  - Blood cxs NGTD x 4 days  - Sputum culture rejected (normal des)  - Rest of plan as above Met 2/4 SIRS criteria. Witnessed aspiration event at nursing home. No consolidations noted on CT scan, however CXR appearing with possible infiltrate on RLL, consistent with aspiration PNA. MRSA positive. COVID/flu/RSV and urine strep/legionella negative. Broadened from CTX to Vanc and Zosyn to cover for HAP i/s/o recent admission and living in healthcare setting (nursing home). Clinical improvement on new abx regimen.  Plan:   - c/w Vanc 1g IV qd  - if spikes new fever, restart Vanc 1g IV qd  - C/w Zosyn 4.5 mg q8h IV (pseudomonal dosing)  - Blood cxs NGTD x 4 days  - Sputum culture rejected (normal des)  - Rest of plan as above

## 2023-06-19 NOTE — PROGRESS NOTE ADULT - ASSESSMENT
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 Initially admitted to MICU, now receiving tube feeds via NG tube and stable for stepdown to Tele pending FEES.  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 Initially admitted to MICU, now receiving tube feeds via NG tube, course c/b + coronavirus.

## 2023-06-19 NOTE — PROGRESS NOTE ADULT - PROBLEM SELECTOR PLAN 9
F: S/p 1700cc NS  E: Replete to keep K>4 and Mg>2  N: NPO, pending S&S recs  DVT ppx: Lovenox 40 mg SQ qd  GI ppx: None    Code Status: FULL  Dispo: Tele F: None  E: Replete to keep K>4 and Mg>2  N: Pureed diet/thin liquids, NGT for tube feeds  DVT ppx: Lovenox 40 mg SQ qd  GI ppx: None    Code Status: FULL  Dispo: Tele

## 2023-06-19 NOTE — SWALLOW FEES ASSESSMENT ADULT - PHARYNGEAL PHASE COMMENTS
At times, liquids, specifically larger volumes, and minced and moist solids trickled down into the pharynx prior to the swallow. Epiglottic movement and laryngeal vestibule closure were WFL which resulted in complete airway protection across trials. Reduced tongue base to posterior pharyngeal wall contact resulted in stasis of minimally chewed solid bolus, most noteworthy within the vallecula and along the laryngeal surface of the epiglottis. Material was mostly cleared with the use of a liquid wash.

## 2023-06-19 NOTE — PROGRESS NOTE ADULT - ASSESSMENT
Intermittent AF with RVR secondary to pulmonary distress in the setting of recent aspiration pneumonia in this baseline deconditioned elderly woman with recent hospital admission and baseline decreased functional status - she was cleared for thickened diet today though it is unclear if she will tolerate  -her long term prognosis is guarded as is her hope for a meaningful recoveery given her weakend state  -family has met with palliative care in the past and addressed DNR SNI but would like abx  -family will need to make a decision about PEG as she will ikely aspirate again  -AF - at this time the AF is a rate issue as I am not certain she is a safe anticoagulation candidate with history of brain bleed and chronic coughing with elevated pulmonary pressures rather we should address the aspiration and given she has no prior history of AF avoid AC if possible  Currently in NSR she resoponds to amiodarone and dilt and her blood p[ressure is stable  -ongoing discussion with family (daughter is currently in [place])  Will follw

## 2023-06-20 LAB
ANION GAP SERPL CALC-SCNC: 10 MMOL/L — SIGNIFICANT CHANGE UP (ref 5–17)
BASOPHILS # BLD AUTO: 0.08 K/UL — SIGNIFICANT CHANGE UP (ref 0–0.2)
BASOPHILS NFR BLD AUTO: 0.9 % — SIGNIFICANT CHANGE UP (ref 0–2)
BUN SERPL-MCNC: 14 MG/DL — SIGNIFICANT CHANGE UP (ref 7–23)
CALCIUM SERPL-MCNC: 9.8 MG/DL — SIGNIFICANT CHANGE UP (ref 8.4–10.5)
CHLORIDE SERPL-SCNC: 98 MMOL/L — SIGNIFICANT CHANGE UP (ref 96–108)
CO2 SERPL-SCNC: 30 MMOL/L — SIGNIFICANT CHANGE UP (ref 22–31)
CREAT SERPL-MCNC: 0.71 MG/DL — SIGNIFICANT CHANGE UP (ref 0.5–1.3)
EGFR: 82 ML/MIN/1.73M2 — SIGNIFICANT CHANGE UP
EOSINOPHIL # BLD AUTO: 0.12 K/UL — SIGNIFICANT CHANGE UP (ref 0–0.5)
EOSINOPHIL NFR BLD AUTO: 1.3 % — SIGNIFICANT CHANGE UP (ref 0–6)
GLUCOSE BLDC GLUCOMTR-MCNC: 172 MG/DL — HIGH (ref 70–99)
GLUCOSE BLDC GLUCOMTR-MCNC: 179 MG/DL — HIGH (ref 70–99)
GLUCOSE BLDC GLUCOMTR-MCNC: 201 MG/DL — HIGH (ref 70–99)
GLUCOSE BLDC GLUCOMTR-MCNC: 216 MG/DL — HIGH (ref 70–99)
GLUCOSE BLDC GLUCOMTR-MCNC: 244 MG/DL — HIGH (ref 70–99)
GLUCOSE BLDC GLUCOMTR-MCNC: 271 MG/DL — HIGH (ref 70–99)
GLUCOSE SERPL-MCNC: 152 MG/DL — HIGH (ref 70–99)
HCT VFR BLD CALC: 30.5 % — LOW (ref 34.5–45)
HGB BLD-MCNC: 9.2 G/DL — LOW (ref 11.5–15.5)
IMM GRANULOCYTES NFR BLD AUTO: 4.9 % — HIGH (ref 0–0.9)
LYMPHOCYTES # BLD AUTO: 0.76 K/UL — LOW (ref 1–3.3)
LYMPHOCYTES # BLD AUTO: 8.3 % — LOW (ref 13–44)
MAGNESIUM SERPL-MCNC: 1.7 MG/DL — SIGNIFICANT CHANGE UP (ref 1.6–2.6)
MCHC RBC-ENTMCNC: 25.5 PG — LOW (ref 27–34)
MCHC RBC-ENTMCNC: 30.2 GM/DL — LOW (ref 32–36)
MCV RBC AUTO: 84.5 FL — SIGNIFICANT CHANGE UP (ref 80–100)
MONOCYTES # BLD AUTO: 0.51 K/UL — SIGNIFICANT CHANGE UP (ref 0–0.9)
MONOCYTES NFR BLD AUTO: 5.6 % — SIGNIFICANT CHANGE UP (ref 2–14)
NEUTROPHILS # BLD AUTO: 7.24 K/UL — SIGNIFICANT CHANGE UP (ref 1.8–7.4)
NEUTROPHILS NFR BLD AUTO: 79 % — HIGH (ref 43–77)
NRBC # BLD: 0 /100 WBCS — SIGNIFICANT CHANGE UP (ref 0–0)
PHOSPHATE SERPL-MCNC: 2.6 MG/DL — SIGNIFICANT CHANGE UP (ref 2.5–4.5)
PLATELET # BLD AUTO: 227 K/UL — SIGNIFICANT CHANGE UP (ref 150–400)
POTASSIUM SERPL-MCNC: 4.2 MMOL/L — SIGNIFICANT CHANGE UP (ref 3.5–5.3)
POTASSIUM SERPL-SCNC: 4.2 MMOL/L — SIGNIFICANT CHANGE UP (ref 3.5–5.3)
RBC # BLD: 3.61 M/UL — LOW (ref 3.8–5.2)
RBC # FLD: 17.5 % — HIGH (ref 10.3–14.5)
SODIUM SERPL-SCNC: 138 MMOL/L — SIGNIFICANT CHANGE UP (ref 135–145)
VANCOMYCIN TROUGH SERPL-MCNC: 12.9 UG/ML — SIGNIFICANT CHANGE UP (ref 10–20)
WBC # BLD: 9.16 K/UL — SIGNIFICANT CHANGE UP (ref 3.8–10.5)
WBC # FLD AUTO: 9.16 K/UL — SIGNIFICANT CHANGE UP (ref 3.8–10.5)

## 2023-06-20 PROCEDURE — 99233 SBSQ HOSP IP/OBS HIGH 50: CPT

## 2023-06-20 PROCEDURE — 99497 ADVNCD CARE PLAN 30 MIN: CPT | Mod: 25

## 2023-06-20 PROCEDURE — 99233 SBSQ HOSP IP/OBS HIGH 50: CPT | Mod: GC

## 2023-06-20 RX ORDER — MAGNESIUM SULFATE 500 MG/ML
2 VIAL (ML) INJECTION ONCE
Refills: 0 | Status: COMPLETED | OUTPATIENT
Start: 2023-06-20 | End: 2023-06-20

## 2023-06-20 RX ORDER — VANCOMYCIN HCL 1 G
1250 VIAL (EA) INTRAVENOUS ONCE
Refills: 0 | Status: COMPLETED | OUTPATIENT
Start: 2023-06-20 | End: 2023-06-20

## 2023-06-20 RX ADMIN — SODIUM CHLORIDE 4 MILLILITER(S): 9 INJECTION INTRAMUSCULAR; INTRAVENOUS; SUBCUTANEOUS at 06:42

## 2023-06-20 RX ADMIN — SODIUM CHLORIDE 4 MILLILITER(S): 9 INJECTION INTRAMUSCULAR; INTRAVENOUS; SUBCUTANEOUS at 10:07

## 2023-06-20 RX ADMIN — CARBIDOPA AND LEVODOPA 2.5 TABLET(S): 25; 100 TABLET ORAL at 22:23

## 2023-06-20 RX ADMIN — PIPERACILLIN AND TAZOBACTAM 25 GRAM(S): 4; .5 INJECTION, POWDER, LYOPHILIZED, FOR SOLUTION INTRAVENOUS at 06:27

## 2023-06-20 RX ADMIN — AMIODARONE HYDROCHLORIDE 400 MILLIGRAM(S): 400 TABLET ORAL at 17:06

## 2023-06-20 RX ADMIN — Medication 60 MILLIGRAM(S): at 06:51

## 2023-06-20 RX ADMIN — Medication 166.67 MILLIGRAM(S): at 19:39

## 2023-06-20 RX ADMIN — Medication 500 MICROGRAM(S): at 03:28

## 2023-06-20 RX ADMIN — SODIUM CHLORIDE 4 MILLILITER(S): 9 INJECTION INTRAMUSCULAR; INTRAVENOUS; SUBCUTANEOUS at 23:58

## 2023-06-20 RX ADMIN — CARBIDOPA AND LEVODOPA 2.5 TABLET(S): 25; 100 TABLET ORAL at 13:10

## 2023-06-20 RX ADMIN — Medication 2: at 18:27

## 2023-06-20 RX ADMIN — CARBIDOPA AND LEVODOPA 2.5 TABLET(S): 25; 100 TABLET ORAL at 06:31

## 2023-06-20 RX ADMIN — SODIUM CHLORIDE 4 MILLILITER(S): 9 INJECTION INTRAMUSCULAR; INTRAVENOUS; SUBCUTANEOUS at 17:05

## 2023-06-20 RX ADMIN — SERTRALINE 50 MILLIGRAM(S): 25 TABLET, FILM COATED ORAL at 23:58

## 2023-06-20 RX ADMIN — Medication 4 MILLILITER(S): at 23:59

## 2023-06-20 RX ADMIN — Medication 60 MILLIGRAM(S): at 17:06

## 2023-06-20 RX ADMIN — Medication 2: at 00:42

## 2023-06-20 RX ADMIN — AMIODARONE HYDROCHLORIDE 400 MILLIGRAM(S): 400 TABLET ORAL at 06:58

## 2023-06-20 RX ADMIN — Medication 500 MICROGRAM(S): at 17:05

## 2023-06-20 RX ADMIN — PIPERACILLIN AND TAZOBACTAM 25 GRAM(S): 4; .5 INJECTION, POWDER, LYOPHILIZED, FOR SOLUTION INTRAVENOUS at 12:20

## 2023-06-20 RX ADMIN — ATORVASTATIN CALCIUM 5 MILLIGRAM(S): 80 TABLET, FILM COATED ORAL at 22:22

## 2023-06-20 RX ADMIN — Medication 62.5 MILLIMOLE(S): at 10:07

## 2023-06-20 RX ADMIN — Medication 4 MILLILITER(S): at 06:37

## 2023-06-20 RX ADMIN — CHLORHEXIDINE GLUCONATE 1 APPLICATION(S): 213 SOLUTION TOPICAL at 11:17

## 2023-06-20 RX ADMIN — Medication 1: at 07:39

## 2023-06-20 RX ADMIN — Medication 500 MICROGRAM(S): at 21:58

## 2023-06-20 RX ADMIN — Medication 60 MILLIGRAM(S): at 11:45

## 2023-06-20 RX ADMIN — PIPERACILLIN AND TAZOBACTAM 25 GRAM(S): 4; .5 INJECTION, POWDER, LYOPHILIZED, FOR SOLUTION INTRAVENOUS at 21:27

## 2023-06-20 RX ADMIN — Medication 500 MICROGRAM(S): at 09:58

## 2023-06-20 RX ADMIN — ENOXAPARIN SODIUM 40 MILLIGRAM(S): 100 INJECTION SUBCUTANEOUS at 11:45

## 2023-06-20 RX ADMIN — Medication 25 GRAM(S): at 07:56

## 2023-06-20 RX ADMIN — Medication 2: at 12:12

## 2023-06-20 RX ADMIN — Medication 4 MILLILITER(S): at 10:08

## 2023-06-20 RX ADMIN — Medication 4 MILLILITER(S): at 17:05

## 2023-06-20 NOTE — PROGRESS NOTE ADULT - PROBLEM SELECTOR PLAN 3
.  Advancing disease  -at baseline, patient is alert but requires prompting; trending towards baseline  -will address hospice eligibility pending clinical course

## 2023-06-20 NOTE — PROGRESS NOTE ADULT - CONVERSATION DETAILS
Reviewed patient's hospital course and interval developments. Discussed advanced directives including code status, HCP completion, and hospice eligibility. MOLST completed with DNR/DNI. Will discuss utility of home hospice referral for added support when family returns to bedside. Currently opting against replacement of NGT, PEG is unlikely to significantly change patient's overall disease trajectory.

## 2023-06-20 NOTE — PROGRESS NOTE ADULT - PROBLEM SELECTOR PLAN 4
Patient with both Parkinsons disease and Alzheimers dementia. On home keppra 500mg q12, sertraline 50mg qd, and sinemet 25-100mg x 2.5 tabs q8. AAOx1-2, reportedly at baseline mental status.  FEES: revealed severe oral and mild pharyngeal phase deficits. Oral phase deficits impacted processing speed, efficiency, A-P transport, and clearance. Despite severity of oral phase deficits, airway protection was not impacted.  - Monitor mentation  - C/w sinemet 25/100 mg x 2.5 tabs PO via NGT TID  - C/w sertraline 50 mg PO via NGT qd  - pureed diet/thin liquids for comfort, c/w tube feeds Glucerna 1.2 @ 52cc/h for nutrition

## 2023-06-20 NOTE — PROGRESS NOTE ADULT - SUBJECTIVE AND OBJECTIVE BOX
No evernts overnight   Patient seen at the [facility] with the son    MEDICATIONS  (STANDING):  acetylcysteine 20%  Inhalation 4 milliLiter(s) Inhalation every 6 hours  aMIOdarone    Tablet 400 milliGRAM(s) Oral every 12 hours  atorvastatin 5 milliGRAM(s) Oral at bedtime  carbidopa/levodopa  25/100 2.5 Tablet(s) Oral every 8 hours  chlorhexidine 2% Cloths 1 Application(s) Topical daily  diltiazem    Tablet 60 milliGRAM(s) Oral every 6 hours  enoxaparin Injectable 40 milliGRAM(s) SubCutaneous every 24 hours  insulin lispro (ADMELOG) corrective regimen sliding scale   SubCutaneous every 6 hours  ipratropium    for Nebulization 500 MICROGram(s) Nebulizer every 6 hours  magnesium sulfate  IVPB 2 Gram(s) IV Intermittent once  piperacillin/tazobactam IVPB.. 4.5 Gram(s) IV Intermittent every 8 hours  sertraline 50 milliGRAM(s) Oral every 24 hours  sodium chloride 3%  Inhalation 4 milliLiter(s) Inhalation every 6 hours  sodium phosphate 15 milliMole(s)/250 mL IVPB 15 milliMole(s) IV Intermittent once    MEDICATIONS  (PRN):  acetaminophen     Tablet .. 650 milliGRAM(s) Oral every 6 hours PRN Temp greater or equal to 38C (100.4F), Mild Pain (1 - 3)  melatonin 3 milliGRAM(s) Oral at bedtime PRN Insomnia    Vital Signs Last 24 Hrs  T(C): 36.6 (06-20-23 @ 07:00), Max: 36.6 (06-19-23 @ 14:00)  T(F): 97.8 (06-20-23 @ 07:00), Max: 97.8 (06-19-23 @ 14:00)  HR: 74 (06-20-23 @ 07:18) (72 - 122)  BP: 171/74 (06-20-23 @ 07:18) (100/61 - 171/74)  BP(mean): 107 (06-20-23 @ 07:18) (73 - 107)  RR: 18 (06-20-23 @ 05:20) (18 - 20)  SpO2: 100% (06-20-23 @ 07:18) (98% - 100%)    NAD seated in bed  Chest absnet bs on left and coarse rhonchi on right  CV RRR s1s2 no murmur  Abd soft  Ext left arm edema no lower ext edema                          9.2    9.16  )-----------( 227      ( 20 Jun 2023 05:30 )             30.5   06-20    138  |  98  |  14  ----------------------------<  152<H>  4.2   |  30  |  0.71    Ca    9.8      20 Jun 2023 05:30  Phos  2.6     06-20  Mg     1.7     06-20    TPro  5.5<L>  /  Alb  2.5<L>  /  TBili  0.2  /  DBili  x   /  AST  16  /  ALT  7<L>  /  AlkPhos  280<H>  06-19

## 2023-06-20 NOTE — PROGRESS NOTE ADULT - SUBJECTIVE AND OBJECTIVE BOX
Internal Medicine Progress Note  Krysten Martinez, PGY-1    ******INCOMPLETE******    OVERNIGHT EVENTS/INTERVAL HPI:    OBJECTIVE:  Vital Signs Last 24 Hrs  T(C): 36.6 (20 Jun 2023 01:00), Max: 36.6 (19 Jun 2023 14:00)  T(F): 97.8 (20 Jun 2023 01:00), Max: 97.8 (19 Jun 2023 14:00)  HR: 74 (20 Jun 2023 05:20) (72 - 122)  BP: 129/62 (20 Jun 2023 04:24) (100/61 - 148/80)  BP(mean): 86 (20 Jun 2023 04:24) (73 - 100)  RR: 18 (20 Jun 2023 05:20) (18 - 20)  SpO2: 100% (20 Jun 2023 05:20) (98% - 100%)    Parameters below as of 20 Jun 2023 05:20  Patient On (Oxygen Delivery Method): nasal cannula w/ humidification  O2 Flow (L/min): 6    I&O's Detail    19 Jun 2023 07:01  -  20 Jun 2023 07:00  --------------------------------------------------------  IN:    Glucerna: 120 mL  Total IN: 120 mL    OUT:    Voided (mL): 900 mL  Total OUT: 900 mL    Total NET: -780 mL        Physical Exam:  GENERAL: Awake, alert and interactive, no acute distress, appears comfortable  NEURO: A&Ox4, no focal deficits, 5/5 strength in all ext, reflexes 2+ throughout, CN 2-12 intact  HEENT: Normocephalic, atraumatic, no conjunctivitis or scleral icterus, oral mucosa moist, no oral lesions noted  NECK: Supple, no LAD, no JVD, thyroid not palpable  CARDIAC: Regular rate and rhythm, +S1/S2, no murmurs/rubs/gallops  PULM: Breathing comfortably on RA, clear to auscultation bilaterally, no wheezes/rales/rhonchi  ABDOMEN: Soft, nontender, nondistended, +bs, no hepatosplenomegaly, no rebound tenderness or fluid wave, no CVA tenderness  : Deferred  MSK: Range of motion grossly intact, no back tenderness  SKIN: Warm and dry, no rashes, lesions  VASC: Cap refil < 2 sec, 2+ peripheral pulses, no edema, no LE tenderness  Psych: Appropriate affect    Medications:  MEDICATIONS  (STANDING):  acetylcysteine 20%  Inhalation 4 milliLiter(s) Inhalation every 6 hours  aMIOdarone    Tablet 400 milliGRAM(s) Oral every 12 hours  atorvastatin 5 milliGRAM(s) Oral at bedtime  carbidopa/levodopa  25/100 2.5 Tablet(s) Oral every 8 hours  chlorhexidine 2% Cloths 1 Application(s) Topical daily  diltiazem    Tablet 60 milliGRAM(s) Oral every 6 hours  enoxaparin Injectable 40 milliGRAM(s) SubCutaneous every 24 hours  insulin lispro (ADMELOG) corrective regimen sliding scale   SubCutaneous every 6 hours  ipratropium    for Nebulization 500 MICROGram(s) Nebulizer every 6 hours  piperacillin/tazobactam IVPB.. 4.5 Gram(s) IV Intermittent every 8 hours  sertraline 50 milliGRAM(s) Oral every 24 hours  sodium chloride 3%  Inhalation 4 milliLiter(s) Inhalation every 6 hours    MEDICATIONS  (PRN):  acetaminophen     Tablet .. 650 milliGRAM(s) Oral every 6 hours PRN Temp greater or equal to 38C (100.4F), Mild Pain (1 - 3)  melatonin 3 milliGRAM(s) Oral at bedtime PRN Insomnia      Labs:                        9.2    9.16  )-----------( 227      ( 20 Jun 2023 05:30 )             30.5     06-19    134<L>  |  94<L>  |  15  ----------------------------<  227<H>  3.3<L>   |  30  |  0.53    Ca    8.9      19 Jun 2023 05:30  Phos  4.2     06-19  Mg     1.8     06-19    TPro  5.5<L>  /  Alb  2.5<L>  /  TBili  0.2  /  DBili  x   /  AST  16  /  ALT  7<L>  /  AlkPhos  280<H>  06-19          SARS-CoV-2: NotDetec (18 Jun 2023 19:19)  SARS-CoV-2: NotDetec (28 May 2023 16:58)  SARS-CoV-2: NotDetec (22 May 2023 18:49)      Radiology: Reviewed OVERNIGHT EVENTS/INTERVAL HPI: Patient examined at bedside this morning, appears improved from yesterday. Pt is awake, answering questions with one word answers. Pt    OBJECTIVE:  Vital Signs Last 24 Hrs  T(C): 36.6 (20 Jun 2023 01:00), Max: 36.6 (19 Jun 2023 14:00)  T(F): 97.8 (20 Jun 2023 01:00), Max: 97.8 (19 Jun 2023 14:00)  HR: 74 (20 Jun 2023 05:20) (72 - 122)  BP: 129/62 (20 Jun 2023 04:24) (100/61 - 148/80)  BP(mean): 86 (20 Jun 2023 04:24) (73 - 100)  RR: 18 (20 Jun 2023 05:20) (18 - 20)  SpO2: 100% (20 Jun 2023 05:20) (98% - 100%)    Parameters below as of 20 Jun 2023 05:20  Patient On (Oxygen Delivery Method): nasal cannula w/ humidification  O2 Flow (L/min): 6    I&O's Detail    19 Jun 2023 07:01  -  20 Jun 2023 07:00  --------------------------------------------------------  IN:    Glucerna: 120 mL  Total IN: 120 mL    OUT:    Voided (mL): 900 mL  Total OUT: 900 mL    Total NET: -780 mL        Physical Exam:  GENERAL: Awake, alert and interactive, no acute distress, appears comfortable  NEURO: A&Ox4, no focal deficits, 5/5 strength in all ext, reflexes 2+ throughout, CN 2-12 intact  HEENT: Normocephalic, atraumatic, no conjunctivitis or scleral icterus, oral mucosa moist, no oral lesions noted  NECK: Supple, no LAD, no JVD, thyroid not palpable  CARDIAC: Regular rate and rhythm, +S1/S2, no murmurs/rubs/gallops  PULM: Breathing comfortably on RA, clear to auscultation bilaterally, no wheezes/rales/rhonchi  ABDOMEN: Soft, nontender, nondistended, +bs, no hepatosplenomegaly, no rebound tenderness or fluid wave, no CVA tenderness  : Deferred  MSK: Range of motion grossly intact, no back tenderness  SKIN: Warm and dry, no rashes, lesions  VASC: Cap refil < 2 sec, 2+ peripheral pulses, no edema, no LE tenderness  Psych: Appropriate affect    Medications:  MEDICATIONS  (STANDING):  acetylcysteine 20%  Inhalation 4 milliLiter(s) Inhalation every 6 hours  aMIOdarone    Tablet 400 milliGRAM(s) Oral every 12 hours  atorvastatin 5 milliGRAM(s) Oral at bedtime  carbidopa/levodopa  25/100 2.5 Tablet(s) Oral every 8 hours  chlorhexidine 2% Cloths 1 Application(s) Topical daily  diltiazem    Tablet 60 milliGRAM(s) Oral every 6 hours  enoxaparin Injectable 40 milliGRAM(s) SubCutaneous every 24 hours  insulin lispro (ADMELOG) corrective regimen sliding scale   SubCutaneous every 6 hours  ipratropium    for Nebulization 500 MICROGram(s) Nebulizer every 6 hours  piperacillin/tazobactam IVPB.. 4.5 Gram(s) IV Intermittent every 8 hours  sertraline 50 milliGRAM(s) Oral every 24 hours  sodium chloride 3%  Inhalation 4 milliLiter(s) Inhalation every 6 hours    MEDICATIONS  (PRN):  acetaminophen     Tablet .. 650 milliGRAM(s) Oral every 6 hours PRN Temp greater or equal to 38C (100.4F), Mild Pain (1 - 3)  melatonin 3 milliGRAM(s) Oral at bedtime PRN Insomnia      Labs:                        9.2    9.16  )-----------( 227      ( 20 Jun 2023 05:30 )             30.5     06-19    134<L>  |  94<L>  |  15  ----------------------------<  227<H>  3.3<L>   |  30  |  0.53    Ca    8.9      19 Jun 2023 05:30  Phos  4.2     06-19  Mg     1.8     06-19    TPro  5.5<L>  /  Alb  2.5<L>  /  TBili  0.2  /  DBili  x   /  AST  16  /  ALT  7<L>  /  AlkPhos  280<H>  06-19          SARS-CoV-2: NotDetec (18 Jun 2023 19:19)  SARS-CoV-2: NotDetec (28 May 2023 16:58)  SARS-CoV-2: NotDetec (22 May 2023 18:49)      Radiology: Reviewed

## 2023-06-20 NOTE — PROGRESS NOTE ADULT - PROBLEM SELECTOR PLAN 4
.  Patient is DNR/DNI, MOLST in chart    In addition to the E/M visit, an advance care planning meeting was performed. Start time: 11:30AM; End time: 11:46AM; Total time: 16min. A face to face meeting to discuss advance care planning was held today regarding: FERNANDO ASHBY. Primary decision maker: Patient is unable to participate in decision making; Alternate/surrogate: Children. Discussed advance directives including, but not limited to, healthcare proxy and code status. Decision regarding code status: DNR/DNI; Documentation completed today: MOLST [facility] note

## 2023-06-20 NOTE — PROGRESS NOTE ADULT - PROBLEM SELECTOR PLAN 9
F: None  E: Replete to keep K>4 and Mg>2  N: Pureed diet/thin liquids, NGT for tube feeds  DVT ppx: Lovenox 40 mg SQ qd  GI ppx: None    Code Status: FULL  Dispo: Tele

## 2023-06-20 NOTE — PROGRESS NOTE ADULT - PROBLEM SELECTOR PLAN 2
Met 2/4 SIRS criteria. Witnessed aspiration event at nursing home. No consolidations noted on CT scan, however CXR appearing with possible infiltrate on RLL, consistent with aspiration PNA. MRSA positive. COVID/flu/RSV and urine strep/legionella negative. Broadened from CTX to Vanc and Zosyn to cover for HAP i/s/o recent admission and living in healthcare setting (nursing home). Clinical improvement on new abx regimen.  Plan:   - c/w Vanc 1g IV qd  - if spikes new fever, restart Vanc 1g IV qd  - C/w Zosyn 4.5 mg q8h IV (pseudomonal dosing)  - Blood cxs NGTD x 4 days  - Sputum culture rejected (normal des)  - Rest of plan as above

## 2023-06-20 NOTE — PROGRESS NOTE ADULT - ASSESSMENT
·	MOLST completed with DNR/DNI  ·	will discuss utility of home hospice referral for added support when family returns to bedside 88yo F with PMH of Dementia, Parkinson's, h/o Lung CA, HTN, T2DM, and h/o SDH/SAH p/w respiratory failure and AFib. Palliative consulted for complex medical decision making in the setting of advanced illness.    ·	MOLST completed with DNR/DNI  ·	will discuss utility of home hospice referral for added support when family returns to bedside  ·	currently opting against replacement of NGT, PEG is unlikely to significantly change patient's overall disease trajectory

## 2023-06-20 NOTE — PROGRESS NOTE ADULT - ASSESSMENT
OVerall she continues to trend in the right direction on nc 02 remains in NSR afebrile however the long term picture is complicated and I have spoiken with the family to make her DNR/DNI today and they will likely opt for transition to home on discharge  -to complete abx for aspiration pna  -coronavirus supportive care  DVT prophylaxis  -PAF no AC as it could potentially cause bleeding which would complicate her home care and goals of care  -AF was secondary to pulmonary events which are imporveimng  -would continue amiodarone 400mg bid  -prn dilt for rate control  will follow  d/w family and team

## 2023-06-20 NOTE — PROGRESS NOTE ADULT - PROBLEM SELECTOR PLAN 5
.  Complex medical decision making / symptom management in the setting of critical illness.    Will continue to follow for ongoing GOC discussion / titration of palliative regimen. Emotional support provided, questions answered.  Active Psychosocial Referrals:  [x]Social Work/Case management []PT/OT []Chaplaincy []Hospice  []Patient/Family Support []Holistic RN []Massage Therapy []Music Therapy []Ethics  Coping: [] well [] with difficulty [] poor coping [x] unable to assess  Support system: [x] strong [] adequate [] inadequate    For new or uncontrolled symptoms, please call Palliative Care at [phone]). The service is available 24/7 (including nights & weekends) to provide symptom management recommendations over the phone as appropriate

## 2023-06-20 NOTE — PROGRESS NOTE ADULT - SUBJECTIVE AND OBJECTIVE BOX
[facility]  Last Bobo, Palliative Care Attending  Contact Info: Call [phone] (HEAL Line) or message on Microsoft Teams (Last Bobo)    SUBJECTIVE AND OBJECTIVE:  INTERVAL HPI/OVERNIGHT EVENTS: Interval events noted. See patient's PRN use for the past 24hrs noted below. Comprehensive symptom assessment and GOC exploration as noted below. Extensive time spent discussing plan of care with HHA.    ALLERGIES:  No Known Allergies    MEDICATIONS  (STANDING):  acetylcysteine 20%  Inhalation 4 milliLiter(s) Inhalation every 6 hours  aMIOdarone    Tablet 400 milliGRAM(s) Oral every 12 hours  atorvastatin 5 milliGRAM(s) Oral at bedtime  carbidopa/levodopa  25/100 2.5 Tablet(s) Oral every 8 hours  chlorhexidine 2% Cloths 1 Application(s) Topical daily  diltiazem    Tablet 60 milliGRAM(s) Oral every 6 hours  enoxaparin Injectable 40 milliGRAM(s) SubCutaneous every 24 hours  insulin lispro (ADMELOG) corrective regimen sliding scale   SubCutaneous every 6 hours  ipratropium    for Nebulization 500 MICROGram(s) Nebulizer every 6 hours  piperacillin/tazobactam IVPB.. 4.5 Gram(s) IV Intermittent every 8 hours  sertraline 50 milliGRAM(s) Oral every 24 hours  sodium chloride 3%  Inhalation 4 milliLiter(s) Inhalation every 6 hours    MEDICATIONS  (PRN):  acetaminophen     Tablet .. 650 milliGRAM(s) Oral every 6 hours PRN Temp greater or equal to 38C (100.4F), Mild Pain (1 - 3)  melatonin 3 milliGRAM(s) Oral at bedtime PRN Insomnia      Analgesic Use (Scheduled and PRNs) for past 24 hours:    carbidopa/levodopa  25/100   2.5 Tablet(s) Oral (06-20-23 @ 13:10)   2.5 Tablet(s) Oral (06-20-23 @ 06:31)   2.5 Tablet(s) Oral (06-19-23 @ 22:49)    sertraline   50 milliGRAM(s) Oral (06-19-23 @ 22:49)      ITEMS UNCHECKED ARE NOT PRESENT  PRESENT SYMPTOMS/REVIEW OF SYSTEMS: []Unable to obtain due to poor mentation   Source if other than patient:  []Family   []Team         Vital Signs Last 24 Hrs  T(C): 36.3 (20 Jun 2023 14:29), Max: 36.6 (19 Jun 2023 21:30)  T(F): 97.3 (20 Jun 2023 14:29), Max: 97.8 (19 Jun 2023 21:30)  HR: 82 (20 Jun 2023 11:45) (69 - 82)  BP: 135/65 (20 Jun 2023 11:45) (128/57 - 171/74)  BP(mean): 93 (20 Jun 2023 11:45) (82 - 107)  RR: 18 (20 Jun 2023 11:45) (18 - 18)  SpO2: 93% (20 Jun 2023 11:45) (93% - 100%)    Parameters below as of 20 Jun 2023 11:45  Patient On (Oxygen Delivery Method): nasal cannula w/ humidification  O2 Flow (L/min): 6      LABS:                         9.2    9.16  )-----------( 227      ( 20 Jun 2023 05:30 )             30.5   06-20    138  |  98  |  14  ----------------------------<  152<H>  4.2   |  30  |  0.71    Ca    9.8      20 Jun 2023 05:30  Phos  2.6     06-20  Mg     1.7     06-20    TPro  5.5<L>  /  Alb  2.5<L>  /  TBili  0.2  /  DBili  x   /  AST  16  /  ALT  7<L>  /  AlkPhos  280<H>  06-19      RADIOLOGY & ADDITIONAL STUDIES: None new    DISCUSSION OF CASE: Family - to provide updates and emotional support [facility]  Last Bobo, Palliative Care Attending  Contact Info: Call [phone] (HEAL Line) or message on Microsoft Teams (Last Bobo)    SUBJECTIVE AND OBJECTIVE:  INTERVAL HPI/OVERNIGHT EVENTS: Interval events noted.  Patient awake and responding with few word answers, appears much closer to previous baseline from a month ago. Denies pain or SOB. See patient's PRN use for the past 24hrs noted below. Comprehensive symptom assessment and GOC exploration as noted below. Extensive time spent discussing plan of care with HHA.    ALLERGIES:  No Known Allergies    MEDICATIONS  (STANDING):  acetylcysteine 20%  Inhalation 4 milliLiter(s) Inhalation every 6 hours  aMIOdarone    Tablet 400 milliGRAM(s) Oral every 12 hours  atorvastatin 5 milliGRAM(s) Oral at bedtime  carbidopa/levodopa  25/100 2.5 Tablet(s) Oral every 8 hours  chlorhexidine 2% Cloths 1 Application(s) Topical daily  diltiazem    Tablet 60 milliGRAM(s) Oral every 6 hours  enoxaparin Injectable 40 milliGRAM(s) SubCutaneous every 24 hours  insulin lispro (ADMELOG) corrective regimen sliding scale   SubCutaneous every 6 hours  ipratropium    for Nebulization 500 MICROGram(s) Nebulizer every 6 hours  piperacillin/tazobactam IVPB.. 4.5 Gram(s) IV Intermittent every 8 hours  sertraline 50 milliGRAM(s) Oral every 24 hours  sodium chloride 3%  Inhalation 4 milliLiter(s) Inhalation every 6 hours    MEDICATIONS  (PRN):  acetaminophen     Tablet .. 650 milliGRAM(s) Oral every 6 hours PRN Temp greater or equal to 38C (100.4F), Mild Pain (1 - 3)  melatonin 3 milliGRAM(s) Oral at bedtime PRN Insomnia      Analgesic Use (Scheduled and PRNs) for past 24 hours:  carbidopa/levodopa  25/100   2.5 Tablet(s) Oral (06-20-23 @ 13:10)   2.5 Tablet(s) Oral (06-20-23 @ 06:31)   2.5 Tablet(s) Oral (06-19-23 @ 22:49)  sertraline   50 milliGRAM(s) Oral (06-19-23 @ 22:49)    ITEMS UNCHECKED ARE NOT PRESENT  PRESENT SYMPTOMS/REVIEW OF SYSTEMS: []Unable to obtain due to poor mentation   Source if other than patient:  []Family   []Team     Pain: [] yes [x] no  QOL impact -  Location -  Aggravating factors -  Quality -  Radiation -  Timing -  Severity (0-10 scale) -  Minimal acceptable level (0-10 scale) -    PAINAD Score:  CPOT Score:    Dyspnea:                           []Mild  []Moderate []Severe  Anxiety:                             []Mild []Moderate []Severe  Fatigue:                             []Mild []Moderate []Severe  Nausea:                             []Mild []Moderate []Severe  Loss of appetite:              []Mild []Moderate []Severe  Constipation:                    []Mild []Moderate []Severe    Other Symptoms:  [x]All other review of systems negative     Palliative Performance Status Version 2: 30%    GENERAL:  [x] NAD []Alert []Lethargic  []Cachexia  []Unarousable  [x]Minimally Verbal  []Non-Verbal  BEHAVIORAL:   []Anxiety  [x]Delirium []Agitation [x]Cooperative [x]Oriented x1  HEENT:  [x]Normal  [x] Moist Mucous Membranes []Dry mouth   []ET Tube/Trach  []Oral lesions  PULMONARY:   [x]Clear []Tachypnea  []Audible excessive secretions  [x]Normal Work of Breathing []Labored Breathing  []Rhonchi []Crackles []Wheezing  CARDIOVASCULAR:    [x]Regular Rate [x]Regular Rhythm []Irregular []Tachy  []Timi  GASTROINTESTINAL:  [x]Soft  []Distended   [x]+BS  [x]Non tender []Tender  []PEG []OGT/ NGT  Last BM:  GENITOURINARY:  []Normal [x] Incontinent   []Oliguria/Anuria   []Juarez  MUSCULOSKELETAL:   [x]Normal Extremities  [x]Weakness  []Bed/Wheelchair bound []Edema  NEUROLOGIC:   []No focal deficits  [x]Cognitive impairment  [x]Dysphagia []Dysarthria []Paresis []Encephalopathic  SKIN:   [x]Normal   []Pressure ulcer(s)  []Rash    Vital Signs Last 24 Hrs  T(C): 36.3 (20 Jun 2023 14:29), Max: 36.6 (19 Jun 2023 21:30)  T(F): 97.3 (20 Jun 2023 14:29), Max: 97.8 (19 Jun 2023 21:30)  HR: 82 (20 Jun 2023 11:45) (69 - 82)  BP: 135/65 (20 Jun 2023 11:45) (128/57 - 171/74)  BP(mean): 93 (20 Jun 2023 11:45) (82 - 107)  RR: 18 (20 Jun 2023 11:45) (18 - 18)  SpO2: 93% (20 Jun 2023 11:45) (93% - 100%)    Parameters below as of 20 Jun 2023 11:45  Patient On (Oxygen Delivery Method): nasal cannula w/ humidification  O2 Flow (L/min): 6    LABS:                         9.2    9.16  )-----------( 227      ( 20 Jun 2023 05:30 )             30.5   06-20    138  |  98  |  14  ----------------------------<  152<H>  4.2   |  30  |  0.71    Ca    9.8      20 Jun 2023 05:30  Phos  2.6     06-20  Mg     1.7     06-20  TPro  5.5<L>  /  Alb  2.5<L>  /  TBili  0.2  /  DBili  x   /  AST  16  /  ALT  7<L>  /  AlkPhos  280<H>  06-19    RADIOLOGY & ADDITIONAL STUDIES: None new    DISCUSSION OF CASE: Family - to provide updates and emotional support; Medicine - to discuss plan of care

## 2023-06-20 NOTE — PROGRESS NOTE ADULT - PROBLEM SELECTOR PLAN 3
EKG with afib, noted to be in RVR with -170 on admission. TSH wnl. Likely i/s/o infection. Initially on amiodarone gtt and diltiazem gtt, now transitioned to PO with better control.  - TTE EF 60-65%; tachycardia; small LV; mild to moderate symmetric LVH; severely dilated LA; moderate TR (worse than 2/8/21); aortic sclerosis; pulm HTN w/ PASP 51 (higher than 2/8/21); pericardial fat pad anterior to R ventricle (cannot r/o trivial pericardial effusion).   - c/w amiodarone 400mg po BID x 5d then transition to  amiodarone 200mg po qd  - increase diltiazem to 60mg PO q6h  - No AC at this time, will treat suspected underlying infection likely driving afib  - Consult stroke to determine risk vs benefit of AC (pt has hx of SDH/SAH s/p repair of cerebral aneurysm 1960s, and ICH in 2021)

## 2023-06-20 NOTE — PROGRESS NOTE ADULT - PROBLEM SELECTOR PLAN 1
Increased WOB, POCUS with b-lines, lasix IVP 20mg x1 and HFNC on admission. Suspect aspiration pneumonitis vs PNA, CXR without infiltrate. MRSA positive. Broadened from CTX to Vanc and Zosyn to cover for HAP i/s/o recent admission and living in healthcare setting (nursing home). Clinical improvement on new abx regimen.  Plan:  - Wean O2 as tolerated with goal O2 sat >94%  - C/w Zosyn 4.5 mg q8h IV (pseudomonal dosing) for 5d total course (6/16-6/20)  - c/w Vanc 1g (dose by trough)   - Incentive spirometry   - Aerobika device  - Mucomyst 20% q6h, 3% hypertonic neb q6h  - Duonebs q6h  - give addtl Lasix 20mg IVP x 1

## 2023-06-20 NOTE — PROGRESS NOTE ADULT - PROBLEM SELECTOR PLAN 2
.  In the setting of PNA and AFib  -wean supplemental O2 as tolerated  -addressing reversible etiologies

## 2023-06-20 NOTE — CHART NOTE - NSCHARTNOTEFT_GEN_A_CORE
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      S/P cholecystectomy      Personal history of spine surgery      H/O cerebral aneurysm repair  1968, "metal plates"    Current Nutrition Order:   Diet, Pureed (06-20-23 @ 12:04)    PO Intake: Good (%) [   ]  Fair (50-75%) [ x ] Poor (<25%) [   ]    GI Issues: Abdomen ND/NT, +BS x4, LBM 6/19    Pain: 0 per chart review     Skin Integrity: WDI, +1 L arm     Labs:   06-20    138  |  98  |  14  ----------------------------<  152<H>  4.2   |  30  |  0.71    Ca    9.8      20 Jun 2023 05:30  Phos  2.6     06-20  Mg     1.7     06-20    TPro  5.5<L>  /  Alb  2.5<L>  /  TBili  0.2  /  DBili  x   /  AST  16  /  ALT  7<L>  /  AlkPhos  280<H>  06-19    CAPILLARY BLOOD GLUCOSE      POCT Blood Glucose.: 244 mg/dL (20 Jun 2023 12:00)  POCT Blood Glucose.: 179 mg/dL (20 Jun 2023 07:37)  POCT Blood Glucose.: 172 mg/dL (20 Jun 2023 05:51)  POCT Blood Glucose.: 216 mg/dL (20 Jun 2023 00:10)  POCT Blood Glucose.: 163 mg/dL (19 Jun 2023 17:36)      Medications:  MEDICATIONS  (STANDING):  acetylcysteine 20%  Inhalation 4 milliLiter(s) Inhalation every 6 hours  aMIOdarone    Tablet 400 milliGRAM(s) Oral every 12 hours  atorvastatin 5 milliGRAM(s) Oral at bedtime  carbidopa/levodopa  25/100 2.5 Tablet(s) Oral every 8 hours  chlorhexidine 2% Cloths 1 Application(s) Topical daily  diltiazem    Tablet 60 milliGRAM(s) Oral every 6 hours  enoxaparin Injectable 40 milliGRAM(s) SubCutaneous every 24 hours  insulin lispro (ADMELOG) corrective regimen sliding scale   SubCutaneous every 6 hours  ipratropium    for Nebulization 500 MICROGram(s) Nebulizer every 6 hours  piperacillin/tazobactam IVPB.. 4.5 Gram(s) IV Intermittent every 8 hours  sertraline 50 milliGRAM(s) Oral every 24 hours  sodium chloride 3%  Inhalation 4 milliLiter(s) Inhalation every 6 hours    MEDICATIONS  (PRN):  acetaminophen     Tablet .. 650 milliGRAM(s) Oral every 6 hours PRN Temp greater or equal to 38C (100.4F), Mild Pain (1 - 3)  melatonin 3 milliGRAM(s) Oral at bedtime PRN Insomnia      Weight: 59.1 kg   Height: 162.6 cm   BMI: 22.4 kg/m2   IBW: 54.5 kg     Estimated energy needs:   Weight used for calculations	IBW  Estimated Energy Needs Weight (lbs)	120 lb  Estimated Energy Needs Weight (kg)	54.4 kg  Estimated Energy Needs From (martha/kg)	27  Estimated Energy Needs To (martha/kg)	32  Estimated Energy Needs Calculated From (martha/kg)	1468  Estimated Energy Needs Calculated To (martha/kg)	1740  Weight used for calculations	IBW  Estimated Protein Needs Weight (lbs)	120 lb  Estimated Protein Needs Weight (kg)	54.4 kg  Estimated Protein Needs From (g/kg)	1.2  Estimated Protein Needs To (g/kg)	1.4  Estimated Protein Needs Calculated From (g/kg)	65.28  Estimated Protein Needs Calculated To (g/kg)	76.16  Other Calculations	Ideal body weight used to calculate energy needs due to generalized 1+ edema. Adjust for age, pneumonia, hospital course. Fluids per team    Subjective: 87yo F with PMHx Alzheimer's dementia, Parkinson's Disease, SDH/SAH s/p repair of cerebral aneurysm 1960s, Lung Ca s/p lobectomy of L lung, HTN, HLD, DM, recent admission for sepsis 2/2 colitis presenting with aspiration event with hypoxia.    Pt assessed at bedside on [facility]. Rx and labs reviewed. Pt with family at bedside; pt more communicative since prior assessment. Pt reports a fair appetite and noted improving PO intake from family. NGT out last night; spoke with MD who notes plan not to replace NGT at this time in favor of stimulating increased PO intake. Pt previously without diet texture restrictions; SLP following who cleared pt for puree with thin liquids. Pt drinks Glucerna at home; recommend add to diet regimen BID for nutritional insurance now off tube feedings. Monitor ability to liberalize diet textures as able per SLP. No new c/o NVCD or difficult chew/swallow with current therapeutic diet textures/consistencies. RDN will continue to reassess, intervene, and monitor as appropriate.     Nutrition Diagnosis: Inadequate oral intake r/t pt condition AEB variable PO intake while transitioning to full time PO meals.     Active [ x ]  Resolved [   ]    If resolved, new PES:     Goal: Pt will meet 75% or more of protein/energy needs via most appropriate route for nutrition.     Recommendations:  -Continue current diet with textures/consistencies per SLP   *If PO intake falls, consider calorie count and evaluate need to reestablish enteral route.   -Add Glucerna BID  -Encourage good PO intake and nutritional supplement compliance   -Honor food preferences as able   -Monitor chemistry, GI fxn, and skin integrity     **Communicated with [facility] team**    Risk Level: High [ x ] Moderate [   ] Low [   ]

## 2023-06-20 NOTE — PROGRESS NOTE ADULT - ASSESSMENT
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 Initially admitted to MICU, now receiving tube feeds via NG tube, course c/b + coronavirus.

## 2023-06-21 DIAGNOSIS — A41.9 SEPSIS, UNSPECIFIED ORGANISM: ICD-10-CM

## 2023-06-21 DIAGNOSIS — A41.02 SEPSIS DUE TO METHICILLIN RESISTANT STAPHYLOCOCCUS AUREUS: ICD-10-CM

## 2023-06-21 DIAGNOSIS — J15.6 PNEUMONIA DUE TO OTHER GRAM-NEGATIVE BACTERIA: ICD-10-CM

## 2023-06-21 LAB
GLUCOSE BLDC GLUCOMTR-MCNC: 180 MG/DL — HIGH (ref 70–99)
GLUCOSE BLDC GLUCOMTR-MCNC: 222 MG/DL — HIGH (ref 70–99)
GLUCOSE BLDC GLUCOMTR-MCNC: 232 MG/DL — HIGH (ref 70–99)
GLUCOSE BLDC GLUCOMTR-MCNC: 272 MG/DL — HIGH (ref 70–99)

## 2023-06-21 PROCEDURE — 99233 SBSQ HOSP IP/OBS HIGH 50: CPT | Mod: GC

## 2023-06-21 PROCEDURE — 99223 1ST HOSP IP/OBS HIGH 75: CPT | Mod: GC,AI

## 2023-06-21 PROCEDURE — 99233 SBSQ HOSP IP/OBS HIGH 50: CPT

## 2023-06-21 RX ORDER — FAMOTIDINE 10 MG/ML
20 INJECTION INTRAVENOUS EVERY 24 HOURS
Refills: 0 | Status: DISCONTINUED | OUTPATIENT
Start: 2023-06-22 | End: 2023-06-24

## 2023-06-21 RX ORDER — ESTRADIOL 4 UG/1
1 INSERT VAGINAL
Refills: 0 | DISCHARGE

## 2023-06-21 RX ORDER — LOSARTAN POTASSIUM 100 MG/1
100 TABLET, FILM COATED ORAL EVERY 24 HOURS
Refills: 0 | Status: DISCONTINUED | OUTPATIENT
Start: 2023-06-21 | End: 2023-06-24

## 2023-06-21 RX ORDER — ATORVASTATIN CALCIUM 80 MG/1
0.5 TABLET, FILM COATED ORAL
Refills: 0 | DISCHARGE

## 2023-06-21 RX ORDER — HYDROCHLOROTHIAZIDE 25 MG
1 TABLET ORAL
Refills: 0 | DISCHARGE

## 2023-06-21 RX ORDER — LOPERAMIDE HCL 2 MG
2 TABLET ORAL ONCE
Refills: 0 | Status: COMPLETED | OUTPATIENT
Start: 2023-06-21 | End: 2023-06-21

## 2023-06-21 RX ORDER — FERROUS SULFATE 325(65) MG
1 TABLET ORAL
Refills: 0 | DISCHARGE

## 2023-06-21 RX ORDER — ASPIRIN/CALCIUM CARB/MAGNESIUM 324 MG
1 TABLET ORAL
Refills: 0 | DISCHARGE

## 2023-06-21 RX ORDER — LACTOBACILLUS ACIDOPHILUS 100MM CELL
1 CAPSULE ORAL DAILY
Refills: 0 | Status: DISCONTINUED | OUTPATIENT
Start: 2023-06-21 | End: 2023-06-24

## 2023-06-21 RX ADMIN — PIPERACILLIN AND TAZOBACTAM 25 GRAM(S): 4; .5 INJECTION, POWDER, LYOPHILIZED, FOR SOLUTION INTRAVENOUS at 06:06

## 2023-06-21 RX ADMIN — Medication 4 MILLILITER(S): at 06:01

## 2023-06-21 RX ADMIN — Medication 500 MICROGRAM(S): at 18:37

## 2023-06-21 RX ADMIN — ATORVASTATIN CALCIUM 5 MILLIGRAM(S): 80 TABLET, FILM COATED ORAL at 23:00

## 2023-06-21 RX ADMIN — Medication 650 MILLIGRAM(S): at 23:58

## 2023-06-21 RX ADMIN — Medication 60 MILLIGRAM(S): at 18:29

## 2023-06-21 RX ADMIN — Medication 2: at 11:37

## 2023-06-21 RX ADMIN — Medication 3 MILLIGRAM(S): at 23:58

## 2023-06-21 RX ADMIN — CARBIDOPA AND LEVODOPA 2.5 TABLET(S): 25; 100 TABLET ORAL at 13:55

## 2023-06-21 RX ADMIN — Medication 4 MILLILITER(S): at 23:53

## 2023-06-21 RX ADMIN — LOSARTAN POTASSIUM 100 MILLIGRAM(S): 100 TABLET, FILM COATED ORAL at 13:55

## 2023-06-21 RX ADMIN — SERTRALINE 50 MILLIGRAM(S): 25 TABLET, FILM COATED ORAL at 23:01

## 2023-06-21 RX ADMIN — Medication 2: at 18:30

## 2023-06-21 RX ADMIN — Medication 60 MILLIGRAM(S): at 06:01

## 2023-06-21 RX ADMIN — Medication 1: at 06:06

## 2023-06-21 RX ADMIN — Medication 60 MILLIGRAM(S): at 23:43

## 2023-06-21 RX ADMIN — Medication 4 MILLILITER(S): at 18:29

## 2023-06-21 RX ADMIN — SODIUM CHLORIDE 4 MILLILITER(S): 9 INJECTION INTRAMUSCULAR; INTRAVENOUS; SUBCUTANEOUS at 06:02

## 2023-06-21 RX ADMIN — Medication 3: at 22:33

## 2023-06-21 RX ADMIN — CHLORHEXIDINE GLUCONATE 1 APPLICATION(S): 213 SOLUTION TOPICAL at 12:44

## 2023-06-21 RX ADMIN — Medication 4 MILLILITER(S): at 10:29

## 2023-06-21 RX ADMIN — ENOXAPARIN SODIUM 40 MILLIGRAM(S): 100 INJECTION SUBCUTANEOUS at 11:29

## 2023-06-21 RX ADMIN — Medication 60 MILLIGRAM(S): at 11:30

## 2023-06-21 RX ADMIN — AMIODARONE HYDROCHLORIDE 400 MILLIGRAM(S): 400 TABLET ORAL at 06:03

## 2023-06-21 RX ADMIN — CARBIDOPA AND LEVODOPA 2.5 TABLET(S): 25; 100 TABLET ORAL at 06:00

## 2023-06-21 RX ADMIN — AMIODARONE HYDROCHLORIDE 200 MILLIGRAM(S): 400 TABLET ORAL at 18:29

## 2023-06-21 RX ADMIN — Medication 500 MICROGRAM(S): at 06:00

## 2023-06-21 RX ADMIN — Medication 60 MILLIGRAM(S): at 00:01

## 2023-06-21 RX ADMIN — SODIUM CHLORIDE 4 MILLILITER(S): 9 INJECTION INTRAMUSCULAR; INTRAVENOUS; SUBCUTANEOUS at 10:29

## 2023-06-21 RX ADMIN — CARBIDOPA AND LEVODOPA 2.5 TABLET(S): 25; 100 TABLET ORAL at 23:00

## 2023-06-21 RX ADMIN — Medication 2 MILLIGRAM(S): at 12:57

## 2023-06-21 RX ADMIN — SODIUM CHLORIDE 4 MILLILITER(S): 9 INJECTION INTRAMUSCULAR; INTRAVENOUS; SUBCUTANEOUS at 18:29

## 2023-06-21 RX ADMIN — Medication 3: at 00:00

## 2023-06-21 RX ADMIN — Medication 500 MICROGRAM(S): at 10:29

## 2023-06-21 RX ADMIN — SODIUM CHLORIDE 4 MILLILITER(S): 9 INJECTION INTRAMUSCULAR; INTRAVENOUS; SUBCUTANEOUS at 23:43

## 2023-06-21 NOTE — PROGRESS NOTE ADULT - PROBLEM SELECTOR PLAN 10
F: None  E: Replete to keep K>4 and Mg>2  N: Pureed diet/thin liquids  DVT ppx: Lovenox 40 mg SQ qd  GI ppx: None  Code Status: FULL  Dispo: [facility]

## 2023-06-21 NOTE — PROGRESS NOTE ADULT - PROBLEM SELECTOR PLAN 5
.  Complex medical decision making / symptom management in the setting of critical illness.    Will continue to follow for ongoing GOC discussion / titration of palliative regimen. Emotional support provided, questions answered.  Active Psychosocial Referrals:  [x]Social Work/Case management []PT/OT []Chaplaincy []Hospice  []Patient/Family Support []Holistic RN []Massage Therapy []Music Therapy []Ethics  Coping: [] well [] with difficulty [] poor coping [x] unable to assess  Support system: [x] strong [] adequate [] inadequate    For new or uncontrolled symptoms, please call Palliative Care at [phone]). The service is available 24/7 (including nights & weekends) to provide symptom management recommendations over the phone as appropriate

## 2023-06-21 NOTE — PROGRESS NOTE ADULT - PROBLEM SELECTOR PLAN 7
History of DM on home metformin 500mg BID. A1C 6.9 5/2023.  - mISS while inpatient   - monitor fingersticks and add basal coverage if persistently elevated   - TSH wnl Patient with history of HTN on home losartan 100mg qd.   - restarted losartan today 6/21  - c/t hold Toprol 25mg qd   - monitor BPs

## 2023-06-21 NOTE — PROGRESS NOTE ADULT - PROBLEM SELECTOR PLAN 1
.  PPSV = 40%, requires assistance for most ADLs  -PT Eval recommending [facility], family will likely opt for home  -c/w supportive care, OOB, encourage movement

## 2023-06-21 NOTE — PROGRESS NOTE ADULT - PROBLEM SELECTOR PLAN 6
Patient with history of HTN on home losartan 100mg qd.   - restarted losartan today 6/21  - monitor BPs Patient with history of HTN on home losartan 100mg qd.   - restarted losartan today 6/21  - c/t hold Toprol 25mg qd   - monitor BPs Plan as above

## 2023-06-21 NOTE — PROGRESS NOTE ADULT - PROBLEM SELECTOR PLAN 3
EKG with afib, noted to be in RVR with -170 on admission. TSH wnl. Likely i/s/o infection. Initially on amiodarone gtt and diltiazem gtt, now transitioned to PO with better control.  - TTE EF 60-65%; tachycardia; small LV; mild to moderate symmetric LVH; severely dilated LA; moderate TR (worse than 2/8/21); aortic sclerosis; pulm HTN w/ PASP 51 (higher than 2/8/21); pericardial fat pad anterior to R ventricle (cannot r/o trivial pericardial effusion).   - start amiodarone 200mg po qd (completed amiodarone load and amio 400mg po BIDx5- 617-6/21)   - c/w diltiazem 60mg PO q6h  - No AC at this time, will treat suspected underlying infection likely driving afib  - Consult stroke to determine risk vs benefit of AC (pt has hx of SDH/SAH s/p repair of cerebral aneurysm 1960s, and ICH in 2021) EKG with afib, noted to be in RVR with -170 on admission. TSH wnl. Likely i/s/o infection. Initially on amiodarone gtt and diltiazem gtt, now transitioned to PO with better control. Afib now resolved.   - TTE EF 60-65%; tachycardia; small LV; mild to moderate symmetric LVH; severely dilated LA; moderate TR (worse than 2/8/21); aortic sclerosis; pulm HTN w/ PASP 51 (higher than 2/8/21); pericardial fat pad anterior to R ventricle (cannot r/o trivial pericardial effusion).   - s/p amio load with gtt and 400mg bid    Plan:  - start amiodarone 200mg po qd  - c/w diltiazem to 60mg PO q6h  - No need for AC  - f/u cards recs w/ Dr. Johnson (patients PCP)

## 2023-06-21 NOTE — PROGRESS NOTE ADULT - ASSESSMENT
86yo F with PMH of Dementia, Parkinson's, h/o Lung CA, HTN, T2DM, and h/o SDH/SAH p/w respiratory failure and AFib. Palliative consulted for complex medical decision making in the setting of advanced illness.    ·	unable to reach HCP today to discuss home hospice referral  ·	if patient is ready for discharge, then can always send a community referral but will try again tomorrow

## 2023-06-21 NOTE — PROGRESS NOTE ADULT - PROBLEM SELECTOR PLAN 4
.  Patient is DNR/DNI, MOLST in chart  -see GOC note  -patient is hospice eligible, will discuss with family

## 2023-06-21 NOTE — PROGRESS NOTE ADULT - SUBJECTIVE AND OBJECTIVE BOX
[facility]  Last Bobo, Palliative Care Attending  Contact Info: Call [phone] (HEAL Line) or message on Microsoft Teams (Last Bobo)    SUBJECTIVE AND OBJECTIVE:  INTERVAL HPI/OVERNIGHT EVENTS: Interval events noted. See patient's PRN use for the past 24hrs noted below. Comprehensive symptom assessment and GOC exploration as noted below. Extensive time spent discussing plan of care with patient and HHA.    ALLERGIES:  No Known Allergies    MEDICATIONS  (STANDING):  acetylcysteine 20%  Inhalation 4 milliLiter(s) Inhalation every 6 hours  aMIOdarone    Tablet 200 milliGRAM(s) Oral every 24 hours  atorvastatin 5 milliGRAM(s) Oral at bedtime  carbidopa/levodopa  25/100 2.5 Tablet(s) Oral every 8 hours  chlorhexidine 2% Cloths 1 Application(s) Topical daily  diltiazem    Tablet 60 milliGRAM(s) Oral every 6 hours  enoxaparin Injectable 40 milliGRAM(s) SubCutaneous every 24 hours  insulin lispro (ADMELOG) corrective regimen sliding scale   SubCutaneous every 6 hours  ipratropium    for Nebulization 500 MICROGram(s) Nebulizer every 6 hours  lactobacillus acidophilus 1 Tablet(s) Oral daily  losartan 100 milliGRAM(s) Oral every 24 hours  sertraline 50 milliGRAM(s) Oral every 24 hours  sodium chloride 3%  Inhalation 4 milliLiter(s) Inhalation every 6 hours    MEDICATIONS  (PRN):  acetaminophen     Tablet .. 650 milliGRAM(s) Oral every 6 hours PRN Temp greater or equal to 38C (100.4F), Mild Pain (1 - 3)  melatonin 3 milliGRAM(s) Oral at bedtime PRN Insomnia      Analgesic Use (Scheduled and PRNs) for past 24 hours:    carbidopa/levodopa  25/100   2.5 Tablet(s) Oral (06-21-23 @ 13:55)   2.5 Tablet(s) Oral (06-21-23 @ 06:00)   2.5 Tablet(s) Oral (06-20-23 @ 22:23)    sertraline   50 milliGRAM(s) Oral (06-20-23 @ 23:58)      ITEMS UNCHECKED ARE NOT PRESENT  PRESENT SYMPTOMS/REVIEW OF SYSTEMS: []Unable to obtain due to poor mentation   Source if other than patient:  []Family   []Team         Vital Signs Last 24 Hrs  T(C): 36.8 (21 Jun 2023 13:55), Max: 36.8 (21 Jun 2023 13:55)  T(F): 98.2 (21 Jun 2023 13:55), Max: 98.2 (21 Jun 2023 13:55)  HR: 78 (21 Jun 2023 13:55) (68 - 86)  BP: 133/77 (21 Jun 2023 13:55) (133/77 - 169/69)  BP(mean): 94 (21 Jun 2023 12:21) (86 - 113)  RR: 18 (21 Jun 2023 13:55) (17 - 18)  SpO2: 98% (21 Jun 2023 13:55) (92% - 100%)    Parameters below as of 21 Jun 2023 13:55  Patient On (Oxygen Delivery Method): nasal cannula w/ humidification  O2 Flow (L/min): 2      LABS:                         9.2    9.16  )-----------( 227      ( 20 Jun 2023 05:30 )             30.5   06-20    138  |  98  |  14  ----------------------------<  152<H>  4.2   |  30  |  0.71    Ca    9.8      20 Jun 2023 05:30  Phos  2.6     06-20  Mg     1.7     06-20        RADIOLOGY & ADDITIONAL STUDIES: None new    DISCUSSION OF CASE: Family - to provide updates and emotional support [facility]  Last Bobo, Palliative Care Attending  Contact Info: Call [phone] (HEAL Line) or message on [employer] Teams (Last Bobo)    SUBJECTIVE AND OBJECTIVE:  INTERVAL HPI/OVERNIGHT EVENTS: Interval events noted. Denies any specific complaints. Tolerating diet. See patient's PRN use for the past 24hrs noted below. Comprehensive symptom assessment and GOC exploration as noted below. Extensive time spent discussing plan of care with patient and HHA.    ALLERGIES:  No Known Allergies    MEDICATIONS  (STANDING):  acetylcysteine 20%  Inhalation 4 milliLiter(s) Inhalation every 6 hours  aMIOdarone    Tablet 200 milliGRAM(s) Oral every 24 hours  atorvastatin 5 milliGRAM(s) Oral at bedtime  carbidopa/levodopa  25/100 2.5 Tablet(s) Oral every 8 hours  chlorhexidine 2% Cloths 1 Application(s) Topical daily  diltiazem    Tablet 60 milliGRAM(s) Oral every 6 hours  enoxaparin Injectable 40 milliGRAM(s) SubCutaneous every 24 hours  insulin lispro (ADMELOG) corrective regimen sliding scale   SubCutaneous every 6 hours  ipratropium    for Nebulization 500 MICROGram(s) Nebulizer every 6 hours  lactobacillus acidophilus 1 Tablet(s) Oral daily  losartan 100 milliGRAM(s) Oral every 24 hours  sertraline 50 milliGRAM(s) Oral every 24 hours  sodium chloride 3%  Inhalation 4 milliLiter(s) Inhalation every 6 hours    MEDICATIONS  (PRN):  acetaminophen     Tablet .. 650 milliGRAM(s) Oral every 6 hours PRN Temp greater or equal to 38C (100.4F), Mild Pain (1 - 3)  melatonin 3 milliGRAM(s) Oral at bedtime PRN Insomnia      Analgesic Use (Scheduled and PRNs) for past 24 hours:    carbidopa/levodopa  25/100   2.5 Tablet(s) Oral (06-21-23 @ 13:55)   2.5 Tablet(s) Oral (06-21-23 @ 06:00)   2.5 Tablet(s) Oral (06-20-23 @ 22:23)    sertraline   50 milliGRAM(s) Oral (06-20-23 @ 23:58)      ITEMS UNCHECKED ARE NOT PRESENT  PRESENT SYMPTOMS/REVIEW OF SYSTEMS: []Unable to obtain due to poor mentation   Source if other than patient:  []Family   []Team     Pain: [] yes [x] no  QOL impact -  Location -  Aggravating factors -  Quality -  Radiation -  Timing -  Severity (0-10 scale) -  Minimal acceptable level (0-10 scale) -    PAINAD Score:  CPOT Score:    Dyspnea:                           []Mild  []Moderate []Severe  Anxiety:                             []Mild []Moderate []Severe  Fatigue:                             []Mild []Moderate []Severe  Nausea:                             []Mild []Moderate []Severe  Loss of appetite:              []Mild []Moderate []Severe  Constipation:                    []Mild []Moderate []Severe    Other Symptoms:  [x]All other review of systems negative     Palliative Performance Status Version 2: 30%    GENERAL:  [x] NAD []Alert []Lethargic  []Cachexia  []Unarousable  [x]Minimally Verbal  []Non-Verbal  BEHAVIORAL:   []Anxiety  [x]Delirium []Agitation [x]Cooperative [x]Oriented x1  HEENT:  [x]Normal  [x] Moist Mucous Membranes []Dry mouth   []ET Tube/Trach  []Oral lesions  PULMONARY:   [x]Clear []Tachypnea  []Audible excessive secretions  [x]Normal Work of Breathing []Labored Breathing  []Rhonchi []Crackles []Wheezing  CARDIOVASCULAR:    [x]Regular Rate [x]Regular Rhythm []Irregular []Tachy  []Timi  GASTROINTESTINAL:  [x]Soft  []Distended   [x]+BS  [x]Non tender []Tender  []PEG []OGT/ NGT  Last BM:  GENITOURINARY:  []Normal [x] Incontinent   []Oliguria/Anuria   []Juarez  MUSCULOSKELETAL:   [x]Normal Extremities  [x]Weakness  []Bed/Wheelchair bound []Edema  NEUROLOGIC:   []No focal deficits  [x]Cognitive impairment  [x]Dysphagia []Dysarthria []Paresis []Encephalopathic  SKIN:   [x]Normal   []Pressure ulcer(s)  []Rash    Vital Signs Last 24 Hrs  T(C): 36.8 (21 Jun 2023 13:55), Max: 36.8 (21 Jun 2023 13:55)  T(F): 98.2 (21 Jun 2023 13:55), Max: 98.2 (21 Jun 2023 13:55)  HR: 78 (21 Jun 2023 13:55) (68 - 86)  BP: 133/77 (21 Jun 2023 13:55) (133/77 - 169/69)  BP(mean): 94 (21 Jun 2023 12:21) (86 - 113)  RR: 18 (21 Jun 2023 13:55) (17 - 18)  SpO2: 98% (21 Jun 2023 13:55) (92% - 100%)    Parameters below as of 21 Jun 2023 13:55  Patient On (Oxygen Delivery Method): nasal cannula w/ humidification  O2 Flow (L/min): 2    LABS:                         9.2    9.16  )-----------( 227      ( 20 Jun 2023 05:30 )             30.5   06-20    138  |  98  |  14  ----------------------------<  152<H>  4.2   |  30  |  0.71    Ca    9.8      20 Jun 2023 05:30  Phos  2.6     06-20  Mg     1.7     06-20    RADIOLOGY & ADDITIONAL STUDIES: None new    DISCUSSION OF CASE: Family - to provide updates and emotional support; Medicine - to discuss plan of care

## 2023-06-21 NOTE — PROGRESS NOTE ADULT - PROBLEM SELECTOR PLAN 7
History of DM on home metformin 500mg BID. A1C 6.9 5/2023.  - mISS while inpatient   - monitor fingersticks and add basal coverage if persistently elevated   - TSH wnl History of DM on home metformin 500mg BID. A1C 6.9 5/2023.  - mISS while inpatient    - TSH wnl

## 2023-06-21 NOTE — PROGRESS NOTE ADULT - PROBLEM SELECTOR PLAN 4
Patient with both Parkinsons disease and Alzheimers dementia. On home keppra 500mg q12, sertraline 50mg qd, and sinemet 25-100mg x 2.5 tabs q8. AAOx1-2, reportedly at baseline mental status.  FEES: revealed severe oral and mild pharyngeal phase deficits. Oral phase deficits impacted processing speed, efficiency, A-P transport, and clearance. Despite severity of oral phase deficits, airway protection was not impacted.  - Monitor mentation  - C/w sinemet 25/100 mg x 2.5 tabs PO via NGT TID  - C/w sertraline 50 mg PO via NGT qd  - pureed diet/thin liquids for comfort, c/w tube feeds Glucerna 1.2 @ 52cc/h for nutrition Patient with both Parkinsons disease and Alzheimers dementia. On home keppra 500mg q12, sertraline 50mg qd, and sinemet 25-100mg x 2.5 tabs q8. AAOx1-2, reportedly at baseline mental status.  FEES: revealed severe oral and mild pharyngeal phase deficits. Oral phase deficits impacted processing speed, efficiency, A-P transport, and clearance. Despite severity of oral phase deficits, airway protection was not impacted.  Plan:  - C/w sinemet 25/100 mg x 2.5 tabs PO via NGT TID  - C/w sertraline 50 mg PO via NGT qd  - pureed diet/thin liquids

## 2023-06-21 NOTE — PROGRESS NOTE ADULT - PROBLEM SELECTOR PLAN 1
Increased WOB, POCUS with b-lines, lasix IVP 20mg x1 and HFNC on admission. Suspect aspiration pneumonitis vs PNA, CXR without infiltrate. MRSA positive. Broadened from CTX to Vanc and Zosyn to cover for HAP i/s/o recent admission and living in healthcare setting (nursing home). Clinical improvement on new abx regimen.  Plan:  - Wean O2 as tolerated with goal O2 sat >94%  - C/w Zosyn 4.5 mg q8h IV (pseudomonal dosing) for 5d total course (6/16-6/20)  - c/w Vanc 1g (dose by trough)   - Incentive spirometry   - Aerobika device  - Mucomyst 20% q6h, 3% hypertonic neb q6h  - Duonebs q6h  - give addtl Lasix 20mg IVP x 1 Increased WOB, POCUS with b-lines, on HFNC on admission. Suspect aspiration pneumonitis vs PNA, CXR without infiltrate. MRSA positive. Broadened from CTX to Vanc and Zosyn to cover for HAP i/s/o recent admission and living in healthcare setting (nursing home). Clinical improvement on new abx regimen.  Plan:  - Wean O2 as tolerated with goal O2 sat >94%  - C/w Zosyn 4.5 mg q8h IV (pseudomonal dosing) for 5d total course (6/16-6/20)  - c/w Vanc 1g (dose by trough)   - Incentive spirometry   - Aerobika device  - Mucomyst 20% q6h, 3% hypertonic neb q6h  - Duonebs q6h  - give addtl Lasix 20mg IVP x 1 Increased WOB, POCUS with b-lines, on HFNC on admission. Suspect aspiration pneumonitis vs PNA. MRSA positive. Broadened from CTX to Vanc and Zosyn to cover for HAP i/s/o recent admission and living in healthcare setting (nursing home). Clinical improvement on new abx regimen.  - S/p Vanc/Zosyn x 5d  Plan:  - Wean O2 as tolerated with goal O2 sat >94%  - Incentive spirometry   - Duonebs q6h followed by Mucomyst 20% q6h, 3% hypertonic neb q6h

## 2023-06-21 NOTE — PROGRESS NOTE ADULT - PROBLEM SELECTOR PLAN 9
F: None  E: Replete to keep K>4 and Mg>2  N: Pureed diet/thin liquids, NGT for tube feeds  DVT ppx: Lovenox 40 mg SQ qd  GI ppx: None    Code Status: FULL  Dispo: Tele F: None  E: Replete to keep K>4 and Mg>2  N: Pureed diet/thin liquids  DVT ppx: Lovenox 40 mg SQ qd  GI ppx: None  Code Status: FULL  Dispo: Tele

## 2023-06-21 NOTE — PROGRESS NOTE ADULT - PROBLEM SELECTOR PLAN 2
Met 2/4 SIRS criteria. Witnessed aspiration event at nursing home. No consolidations noted on CT scan, however CXR appearing with possible infiltrate on RLL, consistent with aspiration PNA. MRSA positive. COVID/flu/RSV and urine strep/legionella negative. Broadened from CTX to Vanc and Zosyn to cover for HAP i/s/o recent admission and living in healthcare setting (nursing home). Clinical improvement on new abx regimen.  Plan:   - completed course of IV antibiotics- Zosyn 4.5mg q8h IV and Vanc 1g IV qd  - if spikes new fever, restart Vanc 1g IV qd  - Blood cxs NGTD x 4 days  - Sputum culture rejected (normal des)  - Rest of plan as above Met 2/4 SIRS criteria. Witnessed aspiration event at nursing home. No consolidations noted on CT scan, however CXR appearing with possible infiltrate on RLL, consistent with aspiration PNA. MRSA positive. COVID/flu/RSV and urine strep/legionella negative. Broadened from CTX to Vanc and Zosyn to cover for HAP i/s/o recent admission and living in healthcare setting (nursing home). Clinical improvement on new abx regimen.  Plan:   - completed course of IV antibiotics- Zosyn 4.5mg q8h IV and Vanc   - if spikes new fever, consider restarting broad-spec antibiotics  - Blood cxs NGTD x 4 days  - Sputum culture rejected (normal des)  - Rest of plan as above

## 2023-06-21 NOTE — PROGRESS NOTE ADULT - SUBJECTIVE AND OBJECTIVE BOX
*****[facility]***    **incomplete note    Hospital course:      SUBJECTIVE / INTERVAL HPI: Patient seen and examined at bedside.     VITAL SIGNS:  Vital Signs Last 24 Hrs  T(C): 36.3 (21 Jun 2023 10:00), Max: 36.7 (21 Jun 2023 01:00)  T(F): 97.3 (21 Jun 2023 10:00), Max: 98 (21 Jun 2023 01:00)  HR: 74 (21 Jun 2023 12:21) (68 - 86)  BP: 142/65 (21 Jun 2023 12:21) (134/60 - 169/69)  BP(mean): 94 (21 Jun 2023 12:21) (86 - 113)  RR: 18 (21 Jun 2023 12:21) (17 - 18)  SpO2: 97% (21 Jun 2023 12:21) (92% - 100%)    Parameters below as of 21 Jun 2023 12:21  Patient On (Oxygen Delivery Method): nasal cannula w/ humidification    O2 Concentration (%): 2    PHYSICAL EXAM:    Constitutional: thin frail elderly female resting comfortably on 2LNC, NAD  HEENT: NC/AT, PERRL, anicteric sclera, MMM  Neck: supple; no JVD or thyromegaly  Respiratory: rhonchi bilaterally, no crackles/wheezes, no retractions  Cardiac: +S1/S2; RRR, no M/R/G  Gastrointestinal: soft, NT/ND; no rebound or guarding; +BS  Extremities: WWP, no clubbing or cyanosis; slight edema bilateral hands  Vascular: 2+ radial, DP pulses B/L  Dermatologic: skin warm, dry and intact; no rashes, wounds, or scars  Neurologic: AAOx2 (self & place), follows commands, answers some questions    MEDICATIONS:  MEDICATIONS  (STANDING):  acetylcysteine 20%  Inhalation 4 milliLiter(s) Inhalation every 6 hours  aMIOdarone    Tablet 200 milliGRAM(s) Oral every 24 hours  atorvastatin 5 milliGRAM(s) Oral at bedtime  carbidopa/levodopa  25/100 2.5 Tablet(s) Oral every 8 hours  chlorhexidine 2% Cloths 1 Application(s) Topical daily  diltiazem    Tablet 60 milliGRAM(s) Oral every 6 hours  enoxaparin Injectable 40 milliGRAM(s) SubCutaneous every 24 hours  insulin lispro (ADMELOG) corrective regimen sliding scale   SubCutaneous every 6 hours  ipratropium    for Nebulization 500 MICROGram(s) Nebulizer every 6 hours  lactobacillus acidophilus 1 Tablet(s) Oral daily  losartan 100 milliGRAM(s) Oral every 24 hours  sertraline 50 milliGRAM(s) Oral every 24 hours  sodium chloride 3%  Inhalation 4 milliLiter(s) Inhalation every 6 hours    MEDICATIONS  (PRN):  acetaminophen     Tablet .. 650 milliGRAM(s) Oral every 6 hours PRN Temp greater or equal to 38C (100.4F), Mild Pain (1 - 3)  melatonin 3 milliGRAM(s) Oral at bedtime PRN Insomnia      ALLERGIES:  Allergies    No Known Allergies    Intolerances        LABS:                        9.2    9.16  )-----------( 227      ( 20 Jun 2023 05:30 )             30.5     06-20    138  |  98  |  14  ----------------------------<  152<H>  4.2   |  30  |  0.71    Ca    9.8      20 Jun 2023 05:30  Phos  2.6     06-20  Mg     1.7     06-20          CAPILLARY BLOOD GLUCOSE      POCT Blood Glucose.: 222 mg/dL (21 Jun 2023 11:35)      RADIOLOGY & ADDITIONAL TESTS: Reviewed. *****ACCEPTANCE NOTE FROM 7LACHMAN TO 7[facility]*******    Hospital course:   87yo F with PMHx Alzheimer's dementia, Parkinson's Disease, SDH/SAH s/p repair of cerebral aneurysm (1960s), lung Ca s/p pneumonectomy of L lung, HTN, HLD, DM, recent admission for sepsis 2/2 colitis presenting with aspiration event, found to have aspiration PNA with hypoxia c/b new Afib with RVR, admitted to MICU. Started on CTX, switched to Vanc/Zosyn given recent hospital admission and living in a healthcare setting (nursing home). D/t RVR, required diltiazem gtt and amiodarone gtt for rate and rhythm control. She is now s/p amiodarone load, transitioned to po. Diltiazem gtt was transitioned to to PO diltiazem. Required intermittent diuresis for pleural effusions. NGT placed and started on tube feeds while pending FEES study which showed no aspiration, but delayed oropharyngeal phase. Tube feeds stopped, transitioned to home pureed diet. Course c/b +RVP for coronavirus, temporarily required more O2. Weaned from HFNC to 2LNC. Spontaneously converted to NSR, HR now well controlled. Respiratory status improving. Pt is stable for stepdown to [facility] pending further GOC.     SUBJECTIVE / INTERVAL HPI: Patient seen and examined at bedside. Has no acute complaints. Pt able to say her name, birthday, that she is in the hospital, and the year.     VITAL SIGNS:  Vital Signs Last 24 Hrs  T(C): 36.3 (21 Jun 2023 10:00), Max: 36.7 (21 Jun 2023 01:00)  T(F): 97.3 (21 Jun 2023 10:00), Max: 98 (21 Jun 2023 01:00)  HR: 74 (21 Jun 2023 12:21) (68 - 86)  BP: 142/65 (21 Jun 2023 12:21) (134/60 - 169/69)  BP(mean): 94 (21 Jun 2023 12:21) (86 - 113)  RR: 18 (21 Jun 2023 12:21) (17 - 18)  SpO2: 97% (21 Jun 2023 12:21) (92% - 100%)    Parameters below as of 21 Jun 2023 12:21  Patient On (Oxygen Delivery Method): nasal cannula w/ humidification    O2 Concentration (%): 2    PHYSICAL EXAM:    Constitutional: thin frail elderly female resting comfortably on 2LNC, NAD  HEENT: NC/AT, PERRL, anicteric sclera, MMM  Neck: supple; no JVD or thyromegaly  Respiratory: rhonchi bilaterally, no crackles/wheezes, no retractions, no increased work of breathing   Cardiac: +S1/S2; RRR, no M/R/G  Gastrointestinal: soft, NT/ND; no rebound or guarding; +BS  Extremities: WWP, no clubbing or cyanosis; slight edema bilateral hands  Dermatologic: skin warm, dry and intact; no rashes, wounds, or scars  Neurologic: AAOx2 (self & place), follows commands, answers some questions    MEDICATIONS:  MEDICATIONS  (STANDING):  acetylcysteine 20%  Inhalation 4 milliLiter(s) Inhalation every 6 hours  aMIOdarone    Tablet 200 milliGRAM(s) Oral every 24 hours  atorvastatin 5 milliGRAM(s) Oral at bedtime  carbidopa/levodopa  25/100 2.5 Tablet(s) Oral every 8 hours  chlorhexidine 2% Cloths 1 Application(s) Topical daily  diltiazem    Tablet 60 milliGRAM(s) Oral every 6 hours  enoxaparin Injectable 40 milliGRAM(s) SubCutaneous every 24 hours  insulin lispro (ADMELOG) corrective regimen sliding scale   SubCutaneous every 6 hours  ipratropium    for Nebulization 500 MICROGram(s) Nebulizer every 6 hours  lactobacillus acidophilus 1 Tablet(s) Oral daily  losartan 100 milliGRAM(s) Oral every 24 hours  sertraline 50 milliGRAM(s) Oral every 24 hours  sodium chloride 3%  Inhalation 4 milliLiter(s) Inhalation every 6 hours    MEDICATIONS  (PRN):  acetaminophen     Tablet .. 650 milliGRAM(s) Oral every 6 hours PRN Temp greater or equal to 38C (100.4F), Mild Pain (1 - 3)  melatonin 3 milliGRAM(s) Oral at bedtime PRN Insomnia      ALLERGIES:  Allergies    No Known Allergies    Intolerances        LABS:                        9.2    9.16  )-----------( 227      ( 20 Jun 2023 05:30 )             30.5     06-20    138  |  98  |  14  ----------------------------<  152<H>  4.2   |  30  |  0.71    Ca    9.8      20 Jun 2023 05:30  Phos  2.6     06-20  Mg     1.7     06-20          CAPILLARY BLOOD GLUCOSE      POCT Blood Glucose.: 222 mg/dL (21 Jun 2023 11:35)      RADIOLOGY & ADDITIONAL TESTS: Reviewed.

## 2023-06-21 NOTE — PROGRESS NOTE ADULT - PROBLEM SELECTOR PLAN 1
Increased WOB, POCUS with b-lines, lasix IVP 20mg x1 and HFNC on admission. Suspect aspiration pneumonitis vs PNA, CXR without infiltrate. MRSA positive. Broadened from CTX to Vanc and Zosyn to cover for HAP i/s/o recent admission and living in healthcare setting (nursing home). Clinical improvement on new abx regimen.  Plan:  - Wean O2 as tolerated with goal O2 sat >94%  - Completed course of IV antibiotics- Zosyn 4.5 mg q8h IV (pseudomonal dosing) for 5d total course (6/16-6/20) and Vanc- dosed by trough  - Incentive spirometry   - Aerobika device  - Mucomyst 20% q6h, 3% hypertonic neb q6h  - Duonebs q6h  - give addtl Lasix 20mg IVP x 1 Increased WOB, POCUS with b-lines, lasix IVP 20mg x1 and HFNC on admission. Suspect aspiration pneumonitis vs PNA, CXR without infiltrate. MRSA positive. Broadened from CTX to Vanc and Zosyn to cover for HAP i/s/o recent admission and living in healthcare setting (nursing home). Clinical improvement on new abx regimen.  - Completed course of antibiotics- Zosyn 4.5 mg q8h IV and Vanc for 5d total course (6/16-6/20)     Plan:  - Wean O2 as tolerated with goal O2 sat >94%  - Incentive spirometry   - Aerobika device  - Duonebs q6h followed by Mucomyst 20% q6h, 3% hypertonic neb q6h.  - has been getting diuresis as needed

## 2023-06-21 NOTE — PROGRESS NOTE ADULT - PROBLEM SELECTOR PLAN 6
Patient with history of HTN on home losartan 100mg qd.   - hold home losartan, restart as tolerated   - monitor BPs Patient with history of HTN on home losartan 100mg qd, Toprol 25mg qd.   Plan:  - restart home losartan 100mg qd  - c/t hold Toprol 25mg qd   - monitor BPs

## 2023-06-21 NOTE — PROGRESS NOTE ADULT - PROBLEM SELECTOR PLAN 2
Met 2/4 SIRS criteria. Witnessed aspiration event at nursing home. No consolidations noted on CT scan, however CXR appearing with possible infiltrate on RLL, consistent with aspiration PNA. MRSA positive. COVID/flu/RSV and urine strep/legionella negative. Broadened from CTX to Vanc and Zosyn to cover for HAP i/s/o recent admission and living in healthcare setting (nursing home). Clinical improvement on new abx regimen.  Plan:   - c/w Vanc 1g IV qd  - if spikes new fever, restart Vanc 1g IV qd  - C/w Zosyn 4.5 mg q8h IV (pseudomonal dosing)  - Blood cxs NGTD x 4 days  - Sputum culture rejected (normal des)  - Rest of plan as above Met 2/4 SIRS criteria. Witnessed aspiration event at nursing home. No consolidations noted on CT scan, however CXR appearing with possible infiltrate on RLL, consistent with aspiration PNA. MRSA positive. COVID/flu/RSV and urine strep/legionella negative. Broadened from CTX to Vanc and Zosyn to cover for HAP i/s/o recent admission and living in healthcare setting (nursing home). Clinical improvement on new abx regimen.  - S/p Vanc/Zosyn x 5d  Plan:   - c/w Vanc 1g IV qd  - if spikes new fever, restart Vanc 1g IV qd  - C/w Zosyn 4.5 mg q8h IV (pseudomonal dosing)  - Blood cxs NGTD x 4 days  - Sputum culture rejected (normal des)  - Rest of plan as above

## 2023-06-21 NOTE — PROGRESS NOTE ADULT - PROBLEM SELECTOR PLAN 4
Patient with both Parkinsons disease and Alzheimers dementia. On home keppra 500mg q12, sertraline 50mg qd, and sinemet 25-100mg x 2.5 tabs q8. AAOx1-2, reportedly at baseline mental status.  FEES: revealed severe oral and mild pharyngeal phase deficits. Oral phase deficits impacted processing speed, efficiency, A-P transport, and clearance. Despite severity of oral phase deficits, airway protection was not impacted.  - Monitor mentation  - C/w sinemet 25/100 mg x 2.5 tabs PO via NGT TID  - C/w sertraline 50 mg PO via NGT qd  - pureed diet/thin liquids for comfort, c/w tube feeds Glucerna 1.2 @ 52cc/h for nutrition Patient with both Parkinsons disease and Alzheimers dementia. On home keppra 500mg q12, sertraline 50mg qd, and sinemet 25-100mg x 2.5 tabs q8. AAOx1-2, reportedly at baseline mental status.  FEES: revealed severe oral and mild pharyngeal phase deficits. Oral phase deficits impacted processing speed, efficiency, A-P transport, and clearance. Despite severity of oral phase deficits, airway protection was not impacted.  - Monitor mentation  - C/w sinemet 25/100 mg x 2.5 tabs PO   - C/w sertraline 50 mg PO   - pureed diet/thin liquids Pt met 2/4 SIRS criteria on admission for fever and tachycardia. Witnessed aspiration event at nursing home. Sepsis 2/2 aspiration pneumonia. Now RESOLVED.   - see plan above

## 2023-06-21 NOTE — PROGRESS NOTE ADULT - PROBLEM SELECTOR PLAN 3
EKG with afib, noted to be in RVR with -170 on admission. TSH wnl. Likely i/s/o infection. Initially on amiodarone gtt and diltiazem gtt, now transitioned to PO with better control.  - TTE EF 60-65%; tachycardia; small LV; mild to moderate symmetric LVH; severely dilated LA; moderate TR (worse than 2/8/21); aortic sclerosis; pulm HTN w/ PASP 51 (higher than 2/8/21); pericardial fat pad anterior to R ventricle (cannot r/o trivial pericardial effusion).   - c/w amiodarone 400mg po BID x 5d then transition to  amiodarone 200mg po qd  - increase diltiazem to 60mg PO q6h  - No AC at this time, will treat suspected underlying infection likely driving afib  - Consult stroke to determine risk vs benefit of AC (pt has hx of SDH/SAH s/p repair of cerebral aneurysm 1960s, and ICH in 2021) EKG with afib, noted to be in RVR with -170 on admission. TSH wnl. i/s/o infection. Initially on amiodarone gtt and diltiazem gtt, now transitioned to PO with better control.  - TTE EF 60-65%; tachycardia; small LV; mild to moderate symmetric LVH; severely dilated LA; moderate TR (worse than 2/8/21); aortic sclerosis; pulm HTN w/ PASP 51 (higher than 2/8/21); pericardial fat pad anterior to R ventricle (cannot r/o trivial pericardial effusion).   - s/p amio load with gtt and 400mg bid    Plan:  - start amiodarone 200mg po qd  - c/w diltiazem to 60mg PO q6h  - No need for AC

## 2023-06-21 NOTE — PROGRESS NOTE ADULT - SUBJECTIVE AND OBJECTIVE BOX
Internal Medicine Progress Note  Krysten Martinez, PGY-1    ******INCOMPLETE******    OVERNIGHT EVENTS/INTERVAL HPI:    OBJECTIVE:  Vital Signs Last 24 Hrs  T(C): 36.3 (21 Jun 2023 10:00), Max: 36.7 (21 Jun 2023 01:00)  T(F): 97.3 (21 Jun 2023 10:00), Max: 98 (21 Jun 2023 01:00)  HR: 72 (21 Jun 2023 11:28) (68 - 86)  BP: 149/67 (21 Jun 2023 11:28) (134/60 - 169/69)  BP(mean): 97 (21 Jun 2023 11:28) (86 - 113)  RR: 18 (21 Jun 2023 11:28) (17 - 18)  SpO2: 100% (21 Jun 2023 11:28) (92% - 100%)    Parameters below as of 21 Jun 2023 11:28  Patient On (Oxygen Delivery Method): nasal cannula w/ humidification  O2 Flow (L/min): 2    I&O's Detail    20 Jun 2023 07:01  -  21 Jun 2023 07:00  --------------------------------------------------------  IN:    IV PiggyBack: 250 mL  Total IN: 250 mL    OUT:    Voided (mL): 300 mL  Total OUT: 300 mL    Total NET: -50 mL      21 Jun 2023 07:01  -  21 Jun 2023 12:48  --------------------------------------------------------  IN:  Total IN: 0 mL    OUT:    Voided (mL): 250 mL  Total OUT: 250 mL    Total NET: -250 mL        Physical Exam:  GENERAL: Awake, alert and interactive, no acute distress, appears comfortable  NEURO: A&Ox4, no focal deficits, 5/5 strength in all ext, reflexes 2+ throughout, CN 2-12 intact  HEENT: Normocephalic, atraumatic, no conjunctivitis or scleral icterus, oral mucosa moist, no oral lesions noted  NECK: Supple, no LAD, no JVD, thyroid not palpable  CARDIAC: Regular rate and rhythm, +S1/S2, no murmurs/rubs/gallops  PULM: Breathing comfortably on RA, clear to auscultation bilaterally, no wheezes/rales/rhonchi  ABDOMEN: Soft, nontender, nondistended, +bs, no hepatosplenomegaly, no rebound tenderness or fluid wave, no CVA tenderness  : Deferred  MSK: Range of motion grossly intact, no back tenderness  SKIN: Warm and dry, no rashes, lesions  VASC: Cap refil < 2 sec, 2+ peripheral pulses, no edema, no LE tenderness  Psych: Appropriate affect    Medications:  MEDICATIONS  (STANDING):  acetylcysteine 20%  Inhalation 4 milliLiter(s) Inhalation every 6 hours  aMIOdarone    Tablet 200 milliGRAM(s) Oral every 24 hours  atorvastatin 5 milliGRAM(s) Oral at bedtime  carbidopa/levodopa  25/100 2.5 Tablet(s) Oral every 8 hours  chlorhexidine 2% Cloths 1 Application(s) Topical daily  diltiazem    Tablet 60 milliGRAM(s) Oral every 6 hours  enoxaparin Injectable 40 milliGRAM(s) SubCutaneous every 24 hours  insulin lispro (ADMELOG) corrective regimen sliding scale   SubCutaneous every 6 hours  ipratropium    for Nebulization 500 MICROGram(s) Nebulizer every 6 hours  lactobacillus acidophilus 1 Tablet(s) Oral daily  loperamide 2 milliGRAM(s) Oral once  sertraline 50 milliGRAM(s) Oral every 24 hours  sodium chloride 3%  Inhalation 4 milliLiter(s) Inhalation every 6 hours    MEDICATIONS  (PRN):  acetaminophen     Tablet .. 650 milliGRAM(s) Oral every 6 hours PRN Temp greater or equal to 38C (100.4F), Mild Pain (1 - 3)  melatonin 3 milliGRAM(s) Oral at bedtime PRN Insomnia      Labs:                        9.2    9.16  )-----------( 227      ( 20 Jun 2023 05:30 )             30.5     06-20    138  |  98  |  14  ----------------------------<  152<H>  4.2   |  30  |  0.71    Ca    9.8      20 Jun 2023 05:30  Phos  2.6     06-20  Mg     1.7     06-20            SARS-CoV-2: NotDetec (18 Jun 2023 19:19)  SARS-CoV-2: NotDetec (28 May 2023 16:58)  SARS-CoV-2: NotDetec (22 May 2023 18:49)      Radiology: Reviewed HOSPITAL COURSE:      OVERNIGHT EVENTS/INTERVAL HPI: Patient examined at bedside this morning, sitting up in bed. Pt appeared comfortable. ROS limited due to mental status/dementia. However, pt able to state she does not have any pain, feels the same as yesterday. Patient appears to be coughing more.     OBJECTIVE:  Vital Signs Last 24 Hrs  T(C): 36.3 (21 Jun 2023 10:00), Max: 36.7 (21 Jun 2023 01:00)  T(F): 97.3 (21 Jun 2023 10:00), Max: 98 (21 Jun 2023 01:00)  HR: 72 (21 Jun 2023 11:28) (68 - 86)  BP: 149/67 (21 Jun 2023 11:28) (134/60 - 169/69)  BP(mean): 97 (21 Jun 2023 11:28) (86 - 113)  RR: 18 (21 Jun 2023 11:28) (17 - 18)  SpO2: 100% (21 Jun 2023 11:28) (92% - 100%)    Parameters below as of 21 Jun 2023 11:28  Patient On (Oxygen Delivery Method): nasal cannula w/ humidification  O2 Flow (L/min): 2    I&O's Detail    20 Jun 2023 07:01  -  21 Jun 2023 07:00  --------------------------------------------------------  IN:    IV PiggyBack: 250 mL  Total IN: 250 mL    OUT:    Voided (mL): 300 mL  Total OUT: 300 mL    Total NET: -50 mL      21 Jun 2023 07:01  -  21 Jun 2023 12:48  --------------------------------------------------------  IN:  Total IN: 0 mL    OUT:    Voided (mL): 250 mL  Total OUT: 250 mL    Total NET: -250 mL      PHYSICAL EXAM:  Constitutional: thin frail elderly female resting comfortably on 2LNC, NAD  HEENT: NC/AT, PERRL, anicteric sclera, MMM  Neck: supple; no JVD or thyromegaly  Respiratory: rhonchi bilaterally, no crackles/wheezes, no retractions  Cardiac: +S1/S2; RRR, no M/R/G  Gastrointestinal: soft, NT/ND; no rebound or guarding; +BS  Extremities: WWP, no clubbing or cyanosis; slight edema bilateral hands  Vascular: 2+ radial, DP pulses B/L  Dermatologic: skin warm, dry and intact; no rashes, wounds, or scars  Neurologic: AAOx2 (self & place), follows commands, answers some questions      Medications:  MEDICATIONS  (STANDING):  acetylcysteine 20%  Inhalation 4 milliLiter(s) Inhalation every 6 hours  aMIOdarone    Tablet 200 milliGRAM(s) Oral every 24 hours  atorvastatin 5 milliGRAM(s) Oral at bedtime  carbidopa/levodopa  25/100 2.5 Tablet(s) Oral every 8 hours  chlorhexidine 2% Cloths 1 Application(s) Topical daily  diltiazem    Tablet 60 milliGRAM(s) Oral every 6 hours  enoxaparin Injectable 40 milliGRAM(s) SubCutaneous every 24 hours  insulin lispro (ADMELOG) corrective regimen sliding scale   SubCutaneous every 6 hours  ipratropium    for Nebulization 500 MICROGram(s) Nebulizer every 6 hours  lactobacillus acidophilus 1 Tablet(s) Oral daily  loperamide 2 milliGRAM(s) Oral once  sertraline 50 milliGRAM(s) Oral every 24 hours  sodium chloride 3%  Inhalation 4 milliLiter(s) Inhalation every 6 hours    MEDICATIONS  (PRN):  acetaminophen     Tablet .. 650 milliGRAM(s) Oral every 6 hours PRN Temp greater or equal to 38C (100.4F), Mild Pain (1 - 3)  melatonin 3 milliGRAM(s) Oral at bedtime PRN Insomnia      Labs:                        9.2    9.16  )-----------( 227      ( 20 Jun 2023 05:30 )             30.5     06-20    138  |  98  |  14  ----------------------------<  152<H>  4.2   |  30  |  0.71    Ca    9.8      20 Jun 2023 05:30  Phos  2.6     06-20  Mg     1.7     06-20            SARS-CoV-2: NotDetec (18 Jun 2023 19:19)  SARS-CoV-2: NotDetec (28 May 2023 16:58)  SARS-CoV-2: NotDetec (22 May 2023 18:49)     HOSPITAL COURSE: 87yo F with PMHx Alzheimer's dementia, Parkinson's Disease, SDH/SAH s/p repair of cerebral aneurysm (1960s), lung Ca s/p pneumonectomy of L lung, HTN, HLD, DM, recent admission for sepsis 2/2 colitis presenting with aspiration event, found to have aspiration PNA with hypoxia c/b new Afib with RVR, admitted to MICU. Started on CTX, switched to Vanc/Zosyn given recent hospital admission and living in a healthcare setting (nursing home). D/t RVR, required diltiazem gtt and amiodarone gtt for rate and rhythm control. She is now s/p amiodarone load, transitioned to po. Diltiazem gtt was transitioned to to PO diltiazem. Required intermittent diuresis for pleural effusions. NGT placed and started on tube feeds while pending FEES study which showed no aspiration, but delayed oropharyngeal phase. Tube feeds stopped, transitioned to home pureed diet. Course c/b +RVP for coronavirus, temporarily required more O2. Weaned from HFNC to 2LNC. Spontaneously converted to NSR, HR now well controlled. Respiratory status improving. Pt is stable for stepdown to RMF pending further GOC.     ------------------  OVERNIGHT EVENTS/INTERVAL HPI: Patient examined at bedside this morning, sitting up in bed. Pt appeared comfortable. ROS limited due to mental status/dementia. However, pt able to state she does not have any pain, feels the same as yesterday. Patient appears to be coughing more.     OBJECTIVE:  Vital Signs Last 24 Hrs  T(C): 36.3 (21 Jun 2023 10:00), Max: 36.7 (21 Jun 2023 01:00)  T(F): 97.3 (21 Jun 2023 10:00), Max: 98 (21 Jun 2023 01:00)  HR: 72 (21 Jun 2023 11:28) (68 - 86)  BP: 149/67 (21 Jun 2023 11:28) (134/60 - 169/69)  BP(mean): 97 (21 Jun 2023 11:28) (86 - 113)  RR: 18 (21 Jun 2023 11:28) (17 - 18)  SpO2: 100% (21 Jun 2023 11:28) (92% - 100%)    Parameters below as of 21 Jun 2023 11:28  Patient On (Oxygen Delivery Method): nasal cannula w/ humidification  O2 Flow (L/min): 2    I&O's Detail    20 Jun 2023 07:01  -  21 Jun 2023 07:00  --------------------------------------------------------  IN:    IV PiggyBack: 250 mL  Total IN: 250 mL    OUT:    Voided (mL): 300 mL  Total OUT: 300 mL    Total NET: -50 mL      21 Jun 2023 07:01  -  21 Jun 2023 12:48  --------------------------------------------------------  IN:  Total IN: 0 mL    OUT:    Voided (mL): 250 mL  Total OUT: 250 mL    Total NET: -250 mL      PHYSICAL EXAM:  Constitutional: thin frail elderly female resting comfortably on 2LNC, NAD  HEENT: NC/AT, PERRL, anicteric sclera, MMM  Neck: supple; no JVD or thyromegaly  Respiratory: rhonchi bilaterally, no crackles/wheezes, no retractions  Cardiac: +S1/S2; RRR, no M/R/G  Gastrointestinal: soft, NT/ND; no rebound or guarding; +BS  Extremities: WWP, no clubbing or cyanosis; slight edema bilateral hands  Vascular: 2+ radial, DP pulses B/L  Dermatologic: skin warm, dry and intact; no rashes, wounds, or scars  Neurologic: AAOx2 (self & place), follows commands, answers some questions      Medications:  MEDICATIONS  (STANDING):  acetylcysteine 20%  Inhalation 4 milliLiter(s) Inhalation every 6 hours  aMIOdarone    Tablet 200 milliGRAM(s) Oral every 24 hours  atorvastatin 5 milliGRAM(s) Oral at bedtime  carbidopa/levodopa  25/100 2.5 Tablet(s) Oral every 8 hours  chlorhexidine 2% Cloths 1 Application(s) Topical daily  diltiazem    Tablet 60 milliGRAM(s) Oral every 6 hours  enoxaparin Injectable 40 milliGRAM(s) SubCutaneous every 24 hours  insulin lispro (ADMELOG) corrective regimen sliding scale   SubCutaneous every 6 hours  ipratropium    for Nebulization 500 MICROGram(s) Nebulizer every 6 hours  lactobacillus acidophilus 1 Tablet(s) Oral daily  loperamide 2 milliGRAM(s) Oral once  sertraline 50 milliGRAM(s) Oral every 24 hours  sodium chloride 3%  Inhalation 4 milliLiter(s) Inhalation every 6 hours    MEDICATIONS  (PRN):  acetaminophen     Tablet .. 650 milliGRAM(s) Oral every 6 hours PRN Temp greater or equal to 38C (100.4F), Mild Pain (1 - 3)  melatonin 3 milliGRAM(s) Oral at bedtime PRN Insomnia      Labs:                        9.2    9.16  )-----------( 227      ( 20 Jun 2023 05:30 )             30.5     06-20    138  |  98  |  14  ----------------------------<  152<H>  4.2   |  30  |  0.71    Ca    9.8      20 Jun 2023 05:30  Phos  2.6     06-20  Mg     1.7     06-20            SARS-CoV-2: NotDetec (18 Jun 2023 19:19)  SARS-CoV-2: NotDetec (28 May 2023 16:58)  SARS-CoV-2: NotDetec (22 May 2023 18:49)

## 2023-06-21 NOTE — PROGRESS NOTE ADULT - PROBLEM SELECTOR PLAN 9
F: None  E: Replete to keep K>4 and Mg>2  N: Pureed diet/thin liquids, NGT for tube feeds  DVT ppx: Lovenox 40 mg SQ qd  GI ppx: None    Code Status: FULL  Dispo: Tele F: None  E: Replete to keep K>4 and Mg>2  N: Pureed diet/thin liquids  DVT ppx: Lovenox 40 mg SQ qd  GI ppx: None  Code Status: FULL  Dispo: [facility] - C/w atorvastatin 5mg PO v

## 2023-06-21 NOTE — PROGRESS NOTE ADULT - PROBLEM SELECTOR PLAN 8
- C/w atorvastatin 5mg PO via NGT qhs - C/w atorvastatin 5mg PO v History of DM on home metformin 500mg BID. A1C 6.9 5/2023.  - mISS while inpatient   - monitor fingersticks and add basal coverage if persistently elevated   - TSH wnl

## 2023-06-21 NOTE — PROGRESS NOTE ADULT - PROBLEM SELECTOR PLAN 5
Plan as above Patient with both Parkinsons disease and Alzheimers dementia. On home keppra 500mg q12, sertraline 50mg qd, and sinemet 25-100mg x 2.5 tabs q8. AAOx1-2, reportedly at baseline mental status.  FEES: revealed severe oral and mild pharyngeal phase deficits. Oral phase deficits impacted processing speed, efficiency, A-P transport, and clearance. Despite severity of oral phase deficits, airway protection was not impacted.  - Monitor mentation  - C/w sinemet 25/100 mg x 2.5 tabs PO   - C/w sertraline 50 mg PO   - pureed diet/thin liquids

## 2023-06-21 NOTE — PROGRESS NOTE ADULT - ASSESSMENT
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 Initially admitted to MICU, Pt  receiving tube feeds via NG tube, course c/b + coronavirus.   87yo F with PMHx Alzheimer's dementia, Parkinson's Disease, SDH/SAH s/p repair of 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 Initially admitted to MICU, then stepped down to telemetry, now HDS for RMF. Afib w/ RVR resolved.

## 2023-06-21 NOTE — PROGRESS NOTE ADULT - ATTENDING COMMENTS
89 yo F with dementia admitted for a/fib with rvr in the setting of sepsis 2/2 UTI and aspiration. c/w antibiotics. transition to PO amiodarone. rest as above.
89 yo F with dementia admitted for a/fib with rvr in the setting of sepsis 2/2 UTI and aspiration. c/w ceftriaxone f/u cultures. c/w dilt drip, and amiodarone. palliative care consult.
Parkinsons with dementia, DM2, HTN, aspiration pna with AF  physical as above  aspiration precautions with diet  more alert  amiodarone and cardizem  finishing abx  consider AC but there is risk of bleeding with instability  decision making of high complexity
Acute hypoxic resp failure with aspiration pneumonia with atrial fibrillation with RVR (increase dose of diltiazem, continue Amiodarone load). FEST done today, no aspiration, pooling in mouth. Continue Zosyn. Rest as above. Restart feeding, NG tube for tube feed to continue. Will consider to d/c tube feed once enough calorie intake is achieved.
Parkinsons with dementia, DM2, HTN, aspiration pna with AF  physical as above  aspiration precautions with diet  even more alert today  amiodarone and cardizem  finishing abx  consider AC but there is risk of bleeding with instability  some diarrhea, add probiotic  decision making of high complexity

## 2023-06-21 NOTE — PROGRESS NOTE ADULT - PROBLEM SELECTOR PLAN 3
.  Advancing disease  -at baseline, patient is alert but requires prompting; trending towards baseline  -will address hospice eligibility once family returns to bedside

## 2023-06-22 ENCOUNTER — TRANSCRIPTION ENCOUNTER (OUTPATIENT)
Age: 88
End: 2023-06-22

## 2023-06-22 LAB
ANION GAP SERPL CALC-SCNC: 7 MMOL/L — SIGNIFICANT CHANGE UP (ref 5–17)
BUN SERPL-MCNC: 13 MG/DL — SIGNIFICANT CHANGE UP (ref 7–23)
CALCIUM SERPL-MCNC: 9.5 MG/DL — SIGNIFICANT CHANGE UP (ref 8.4–10.5)
CHLORIDE SERPL-SCNC: 100 MMOL/L — SIGNIFICANT CHANGE UP (ref 96–108)
CO2 SERPL-SCNC: 33 MMOL/L — HIGH (ref 22–31)
CREAT SERPL-MCNC: 0.66 MG/DL — SIGNIFICANT CHANGE UP (ref 0.5–1.3)
EGFR: 85 ML/MIN/1.73M2 — SIGNIFICANT CHANGE UP
GLUCOSE BLDC GLUCOMTR-MCNC: 147 MG/DL — HIGH (ref 70–99)
GLUCOSE BLDC GLUCOMTR-MCNC: 219 MG/DL — HIGH (ref 70–99)
GLUCOSE BLDC GLUCOMTR-MCNC: 249 MG/DL — HIGH (ref 70–99)
GLUCOSE SERPL-MCNC: 159 MG/DL — HIGH (ref 70–99)
HCT VFR BLD CALC: 28.7 % — LOW (ref 34.5–45)
HGB BLD-MCNC: 8.8 G/DL — LOW (ref 11.5–15.5)
MAGNESIUM SERPL-MCNC: 1.7 MG/DL — SIGNIFICANT CHANGE UP (ref 1.6–2.6)
MCHC RBC-ENTMCNC: 25.4 PG — LOW (ref 27–34)
MCHC RBC-ENTMCNC: 30.7 GM/DL — LOW (ref 32–36)
MCV RBC AUTO: 82.7 FL — SIGNIFICANT CHANGE UP (ref 80–100)
NRBC # BLD: 0 /100 WBCS — SIGNIFICANT CHANGE UP (ref 0–0)
PHOSPHATE SERPL-MCNC: 2.6 MG/DL — SIGNIFICANT CHANGE UP (ref 2.5–4.5)
PLATELET # BLD AUTO: 168 K/UL — SIGNIFICANT CHANGE UP (ref 150–400)
POTASSIUM SERPL-MCNC: 4.4 MMOL/L — SIGNIFICANT CHANGE UP (ref 3.5–5.3)
POTASSIUM SERPL-SCNC: 4.4 MMOL/L — SIGNIFICANT CHANGE UP (ref 3.5–5.3)
RBC # BLD: 3.47 M/UL — LOW (ref 3.8–5.2)
RBC # FLD: 17.4 % — HIGH (ref 10.3–14.5)
SODIUM SERPL-SCNC: 140 MMOL/L — SIGNIFICANT CHANGE UP (ref 135–145)
WBC # BLD: 8.34 K/UL — SIGNIFICANT CHANGE UP (ref 3.8–10.5)
WBC # FLD AUTO: 8.34 K/UL — SIGNIFICANT CHANGE UP (ref 3.8–10.5)

## 2023-06-22 PROCEDURE — 99232 SBSQ HOSP IP/OBS MODERATE 35: CPT | Mod: GC

## 2023-06-22 PROCEDURE — 99497 ADVNCD CARE PLAN 30 MIN: CPT | Mod: 25

## 2023-06-22 PROCEDURE — 99233 SBSQ HOSP IP/OBS HIGH 50: CPT

## 2023-06-22 RX ORDER — MAGNESIUM SULFATE 500 MG/ML
1 VIAL (ML) INJECTION ONCE
Refills: 0 | Status: COMPLETED | OUTPATIENT
Start: 2023-06-22 | End: 2023-06-22

## 2023-06-22 RX ADMIN — Medication 2: at 22:06

## 2023-06-22 RX ADMIN — Medication 500 MICROGRAM(S): at 00:07

## 2023-06-22 RX ADMIN — FAMOTIDINE 20 MILLIGRAM(S): 10 INJECTION INTRAVENOUS at 06:52

## 2023-06-22 RX ADMIN — SODIUM CHLORIDE 4 MILLILITER(S): 9 INJECTION INTRAMUSCULAR; INTRAVENOUS; SUBCUTANEOUS at 06:53

## 2023-06-22 RX ADMIN — SODIUM CHLORIDE 4 MILLILITER(S): 9 INJECTION INTRAMUSCULAR; INTRAVENOUS; SUBCUTANEOUS at 17:42

## 2023-06-22 RX ADMIN — SERTRALINE 50 MILLIGRAM(S): 25 TABLET, FILM COATED ORAL at 23:04

## 2023-06-22 RX ADMIN — Medication 4 MILLILITER(S): at 17:42

## 2023-06-22 RX ADMIN — AMIODARONE HYDROCHLORIDE 200 MILLIGRAM(S): 400 TABLET ORAL at 17:42

## 2023-06-22 RX ADMIN — Medication 60 MILLIGRAM(S): at 23:03

## 2023-06-22 RX ADMIN — SODIUM CHLORIDE 4 MILLILITER(S): 9 INJECTION INTRAMUSCULAR; INTRAVENOUS; SUBCUTANEOUS at 23:00

## 2023-06-22 RX ADMIN — Medication 60 MILLIGRAM(S): at 06:57

## 2023-06-22 RX ADMIN — SODIUM CHLORIDE 4 MILLILITER(S): 9 INJECTION INTRAMUSCULAR; INTRAVENOUS; SUBCUTANEOUS at 12:41

## 2023-06-22 RX ADMIN — Medication 100 GRAM(S): at 07:58

## 2023-06-22 RX ADMIN — Medication 4 MILLILITER(S): at 12:41

## 2023-06-22 RX ADMIN — ATORVASTATIN CALCIUM 5 MILLIGRAM(S): 80 TABLET, FILM COATED ORAL at 22:08

## 2023-06-22 RX ADMIN — CARBIDOPA AND LEVODOPA 2.5 TABLET(S): 25; 100 TABLET ORAL at 15:12

## 2023-06-22 RX ADMIN — Medication 500 MICROGRAM(S): at 17:42

## 2023-06-22 RX ADMIN — Medication 60 MILLIGRAM(S): at 17:42

## 2023-06-22 RX ADMIN — Medication 650 MILLIGRAM(S): at 17:42

## 2023-06-22 RX ADMIN — CARBIDOPA AND LEVODOPA 2.5 TABLET(S): 25; 100 TABLET ORAL at 06:54

## 2023-06-22 RX ADMIN — Medication 500 MICROGRAM(S): at 06:52

## 2023-06-22 RX ADMIN — LOSARTAN POTASSIUM 100 MILLIGRAM(S): 100 TABLET, FILM COATED ORAL at 15:13

## 2023-06-22 RX ADMIN — Medication 650 MILLIGRAM(S): at 18:22

## 2023-06-22 RX ADMIN — CARBIDOPA AND LEVODOPA 2.5 TABLET(S): 25; 100 TABLET ORAL at 22:16

## 2023-06-22 RX ADMIN — Medication 4 MILLILITER(S): at 06:51

## 2023-06-22 RX ADMIN — Medication 650 MILLIGRAM(S): at 00:50

## 2023-06-22 RX ADMIN — Medication 500 MICROGRAM(S): at 12:40

## 2023-06-22 RX ADMIN — Medication 500 MICROGRAM(S): at 23:00

## 2023-06-22 RX ADMIN — Medication 3: at 17:54

## 2023-06-22 RX ADMIN — CHLORHEXIDINE GLUCONATE 1 APPLICATION(S): 213 SOLUTION TOPICAL at 13:18

## 2023-06-22 NOTE — DISCHARGE NOTE NURSING/CASE MANAGEMENT/SOCIAL WORK - NSDCPEFALRISK_GEN_ALL_CORE
For information on Fall & Injury Prevention, visit: [email or web address] OR  [email or web address] OR  https://www.cdc.gov/steadi/patient.html

## 2023-06-22 NOTE — DISCHARGE NOTE NURSING/CASE MANAGEMENT/SOCIAL WORK - PATIENT PORTAL LINK FT
You can access the FollowMyHealth Patient Portal offered by [facility] by registering at the following website: [email or web address]. By joining [employer]’s FollowMyHealth portal, you will also be able to view your health information using other applications (apps) compatible with our system.

## 2023-06-22 NOTE — PROGRESS NOTE ADULT - PROBLEM SELECTOR PLAN 1
.  PPSV = 40%, requires assistance for most ADLs  -PT Eval recommending [facility], family opting for home  -c/w supportive care, OOB, encourage movement

## 2023-06-22 NOTE — PROGRESS NOTE ADULT - PROBLEM SELECTOR PLAN 4
Pt met 2/4 SIRS criteria on admission for fever and tachycardia. Witnessed aspiration event at nursing home. Sepsis 2/2 aspiration pneumonia. Now RESOLVED.   - see plan above

## 2023-06-22 NOTE — PROGRESS NOTE ADULT - NS ATTEST RISK PROBLEM GEN_ALL_CORE FT
Chronic Illness With Severe Exacerbation/Progression  Decision Made To De-escalate Care Due To Poor Prognosis
Chronic Illness With Severe Exacerbation/Progression  Decision Made To Not Resuscitate (DNR)

## 2023-06-22 NOTE — CHART NOTE - NSCHARTNOTESELECT_GEN_ALL_CORE
Recovery mode. Patient denies discomfort. Passing gas, taking fluids. Dr. Izzy Bonilla and Dr. Sia Dennis discussed findings with patient and wife. Report called to [facility] RN on 6K. Discharge instructions provided and understanding verbalized. Nutrition Services

## 2023-06-22 NOTE — PROGRESS NOTE ADULT - PROBLEM SELECTOR PLAN 3
EKG with afib, noted to be in RVR with -170 on admission. TSH wnl. Likely i/s/o infection. Initially on amiodarone gtt and diltiazem gtt, now transitioned to PO with better control. Afib now resolved.   - TTE EF 60-65%; tachycardia; small LV; mild to moderate symmetric LVH; severely dilated LA; moderate TR (worse than 2/8/21); aortic sclerosis; pulm HTN w/ PASP 51 (higher than 2/8/21); pericardial fat pad anterior to R ventricle (cannot r/o trivial pericardial effusion).   - s/p amio load with gtt and 400mg bid    Plan:  - c/w amiodarone 200mg po qd  - c/w diltiazem 60mg PO q6h  - No need for AC  - f/u cards recs w/ Dr. Johnson (patients PCP and cardiologist)

## 2023-06-22 NOTE — PROGRESS NOTE ADULT - PROBLEM SELECTOR PLAN 1
Increased WOB and hypoxia requiring HFNC on admission. Suspect aspiration pneumonitis vs PNA. MRSA positive. Broadened from CTX to Vanc and Zosyn to cover for HAP i/s/o recent admission and living in healthcare setting (nursing home).   - Completed course of antibiotics- Zosyn 4.5 mg q8h IV and Vanc for 5d total course (6/16-6/20) for aspiration pneumonia    Plan:  - Wean O2 as tolerated with goal O2 sat >94%  - Incentive spirometry   - Aerobika device  - Duonebs q6h followed by Mucomyst 20% q6h, 3% hypertonic neb q6h.

## 2023-06-22 NOTE — CHART NOTE - NSCHARTNOTEFT_GEN_A_CORE
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 Last documented bowel movement 6/21.    Pain: Unable to assess at this time.    Skin Integrity: B/L foot edema 1+. B/L hand edema 2+. Right arm, left wrist edema 3+. Artur score: 12.    Labs:   06-22    140  |  100  |  13  ----------------------------<  159<H>  4.4   |  33<H>  |  0.66    Ca    9.5      22 Jun 2023 05:30  Phos  2.6     06-22  Mg     1.7     06-22      CAPILLARY BLOOD GLUCOSE      POCT Blood Glucose.: 249 mg/dL (22 Jun 2023 12:09)  POCT Blood Glucose.: 147 mg/dL (22 Jun 2023 08:30)  POCT Blood Glucose.: 272 mg/dL (21 Jun 2023 21:51)      Medications:  MEDICATIONS  (STANDING):  acetylcysteine 20%  Inhalation 4 milliLiter(s) Inhalation every 6 hours  aMIOdarone    Tablet 200 milliGRAM(s) Oral every 24 hours  atorvastatin 5 milliGRAM(s) Oral at bedtime  carbidopa/levodopa  25/100 2.5 Tablet(s) Oral every 8 hours  chlorhexidine 2% Cloths 1 Application(s) Topical daily  diltiazem    Tablet 60 milliGRAM(s) Oral every 6 hours  enoxaparin Injectable 40 milliGRAM(s) SubCutaneous every 24 hours  famotidine    Tablet 20 milliGRAM(s) Oral every 24 hours  insulin lispro (ADMELOG) corrective regimen sliding scale   SubCutaneous every 6 hours  ipratropium    for Nebulization 500 MICROGram(s) Nebulizer every 6 hours  lactobacillus acidophilus 1 Tablet(s) Oral daily  losartan 100 milliGRAM(s) Oral every 24 hours  sertraline 50 milliGRAM(s) Oral every 24 hours  sodium chloride 3%  Inhalation 4 milliLiter(s) Inhalation every 6 hours    MEDICATIONS  (PRN):  acetaminophen     Tablet .. 650 milliGRAM(s) Oral every 6 hours PRN Temp greater or equal to 38C (100.4F), Mild Pain (1 - 3)  melatonin 3 milliGRAM(s) Oral at bedtime PRN Insomnia      Weight: 59.1 kg   Height: 162.6 cm   BMI: 22.4 kg/m2   IBW: 54.5 kg     Estimated energy needs:   Weight used for calculations	IBW  Estimated Energy Needs Weight (lbs)	120 lb  Estimated Energy Needs Weight (kg)	54.4 kg  Estimated Energy Needs From (martha/kg)	27  Estimated Energy Needs To (martha/kg)	32  Estimated Energy Needs Calculated From (martha/kg)	1468  Estimated Energy Needs Calculated To (martha/kg)	1740  Weight used for calculations	IBW  Estimated Protein Needs Weight (lbs)	120 lb  Estimated Protein Needs Weight (kg)	54.4 kg  Estimated Protein Needs From (g/kg)	1.2  Estimated Protein Needs To (g/kg)	1.4  Estimated Protein Needs Calculated From (g/kg)	65.28  Estimated Protein Needs Calculated To (g/kg)	76.16  Other Calculations	Ideal body weight used to calculate energy needs due to generalized 1+ edema. Adjust for age, pneumonia, hospital course. Fluids per team    Subjective: 89yo F with PMHx Alzheimer's dementia, Parkinson's Disease, SDH/SAH s/p repair of 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 Initially admitted to MICU, then stepped down to telemetry, now HDS for RMF. Afib w/ RVR resolved.      Pt assessed at bedside on 7WO. Rx and labs reviewed. Patient on BIPAP thus interview deferred to visitor at bedside. Labs reviewed 6/22; electrolytes within normal limits at this time. Fingersticks 6/21-6/22: 180-272 mg/dL; insulin regimen ordered. Per visitor, pt had "a couple bites" of dinner last night. Likely meeting <50% EER. RD discussed importance of encouraging glucerna intake to promote adequate protein-energy intake; amenable to education. RDN will continue to reassess, intervene, and monitor as appropriate.     Nutrition Diagnosis: Inadequate oral intake r/t pt condition AEB variable PO intake while transitioning to full time PO meals.     Active [ x ]  Resolved [   ]    If resolved, new PES:     Goal: Pt will meet 75% or more of protein/energy needs via most appropriate route for nutrition.     Recommendations:  -Continue current diet with textures/consistencies per SLP   *If PO intake falls, consider calorie count and evaluate need to reestablish enteral route.   -Continue Glucerna BID  -Encourage good PO intake and nutritional supplement compliance   -Honor food preferences as able   -Monitor chemistry, GI fxn, and skin integrity    Risk Level: High [ x ] Moderate [   ] Low [   ].

## 2023-06-22 NOTE — PROGRESS NOTE ADULT - ASSESSMENT
86yo F with PMH of Dementia, Parkinson's, h/o Lung CA, HTN, T2DM, and h/o SDH/SAH p/w respiratory failure and AFib. Palliative consulted for complex medical decision making in the setting of advanced illness.    ·	family is amenable to home hospice referral, will  86yo F with PMH of Dementia, Parkinson's, h/o Lung CA, HTN, T2DM, and h/o SDH/SAH p/w respiratory failure and AFib. Palliative consulted for complex medical decision making in the setting of advanced illness.    ·	family is amenable to home hospice referral, will need delivery of O2 and hospital bed prior to discharge

## 2023-06-22 NOTE — PROGRESS NOTE ADULT - PROBLEM SELECTOR PLAN 3
.  Advancing disease  -at baseline, patient is alert but requires prompting; trending towards baseline  -hospice eligible given new bedbound status, dysphagia, hypoalbuminemia

## 2023-06-22 NOTE — PROGRESS NOTE ADULT - PROBLEM SELECTOR PLAN 4
.  Patient is DNR/DNI, MOLST in chart  -see GOC note  -patient is hospice eligible, family is amenable to referral    In addition to the E/M visit, an advance care planning meeting was performed. Start time: 12:00PM; End time: 12:16PM; Total time: min. A face to face meeting to discuss advance care planning was held today regarding: FERNANDO ASHBY. Primary decision maker: Patient is unable to participate in decision making; Alternate/surrogate: Son. Discussed advance directives including, but not limited to, healthcare proxy and code status. Decision regarding code status: DNR/DNI; Documentation completed today: Hospice Referral

## 2023-06-22 NOTE — PROGRESS NOTE ADULT - ASSESSMENT
87yo F with PMHx Alzheimer's dementia, Parkinson's Disease, SDH/SAH s/p repair of 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 Initially admitted to MICU, then stepped down to telemetry, now HDS for RMF. Afib w/ RVR resolved.

## 2023-06-22 NOTE — PROGRESS NOTE ADULT - PROBLEM SELECTOR PLAN 7
Patient with history of HTN on home losartan 100mg qd.   - c/w losartan 100mg qd  - continue to hold Toprol 25mg qd   - monitor BPs

## 2023-06-22 NOTE — PROGRESS NOTE ADULT - PROBLEM SELECTOR PLAN 5
.  Complex medical decision making / symptom management in the setting of critical illness.    Will continue to follow for ongoing GOC discussion / titration of palliative regimen. Emotional support provided, questions answered.  Active Psychosocial Referrals:  [x]Social Work/Case management []PT/OT []Chaplaincy []Hospice  []Patient/Family Support []Holistic RN []Massage Therapy []Music Therapy []Ethics  Coping: [] well [] with difficulty [] poor coping [x] unable to assess  Support system: [x] strong [] adequate [] inadequate    For new or uncontrolled symptoms, please call Palliative Care at [phone]). The service is available 24/7 (including nights & weekends) to provide symptom management recommendations over the phone as appropriate

## 2023-06-22 NOTE — PROGRESS NOTE ADULT - PROBLEM SELECTOR PLAN 2
Witnessed aspiration event at nursing home. No consolidations noted on CT scan, however CXR appearing with possible infiltrate on RLL, consistent with aspiration PNA. MRSA positive. Broadened from CTX to Vanc and Zosyn to cover for HAP i/s/o recent admission and living in healthcare setting (nursing home)  - completed course of IV antibiotics- Zosyn 4.5mg q8h IV and Vanc     Plan:   - if spikes new fever, consider restarting broad-spec antibiotics  - Blood cxs NGTD   - Sputum culture rejected (normal des)  - Rest of plan as above

## 2023-06-22 NOTE — PROGRESS NOTE ADULT - SUBJECTIVE AND OBJECTIVE BOX
[facility]  Last Bobo, Palliative Care Attending  Contact Info: Call [phone] (HEAL Line) or message on [employer] Teams (Last Bobo)    SUBJECTIVE AND OBJECTIVE:  INTERVAL HPI/OVERNIGHT EVENTS: Interval events noted. See patient's PRN use for the past 24hrs noted below. Comprehensive symptom assessment and GOC exploration as noted below. Extensive time spent discussing plan of care with patient/family. No unexpected adverse effects of opiates noted: no sedation/dizziness/nausea.    ALLERGIES:  No Known Allergies    MEDICATIONS  (STANDING):  acetylcysteine 20%  Inhalation 4 milliLiter(s) Inhalation every 6 hours  aMIOdarone    Tablet 200 milliGRAM(s) Oral every 24 hours  atorvastatin 5 milliGRAM(s) Oral at bedtime  carbidopa/levodopa  25/100 2.5 Tablet(s) Oral every 8 hours  chlorhexidine 2% Cloths 1 Application(s) Topical daily  diltiazem    Tablet 60 milliGRAM(s) Oral every 6 hours  enoxaparin Injectable 40 milliGRAM(s) SubCutaneous every 24 hours  famotidine    Tablet 20 milliGRAM(s) Oral every 24 hours  insulin lispro (ADMELOG) corrective regimen sliding scale   SubCutaneous every 6 hours  ipratropium    for Nebulization 500 MICROGram(s) Nebulizer every 6 hours  lactobacillus acidophilus 1 Tablet(s) Oral daily  losartan 100 milliGRAM(s) Oral every 24 hours  sertraline 50 milliGRAM(s) Oral every 24 hours  sodium chloride 3%  Inhalation 4 milliLiter(s) Inhalation every 6 hours    MEDICATIONS  (PRN):  acetaminophen     Tablet .. 650 milliGRAM(s) Oral every 6 hours PRN Temp greater or equal to 38C (100.4F), Mild Pain (1 - 3)  melatonin 3 milliGRAM(s) Oral at bedtime PRN Insomnia      Analgesic Use (Scheduled and PRNs) for past 24 hours:  acetaminophen     Tablet ..   650 milliGRAM(s) Oral (06-21-23 @ 23:58)  carbidopa/levodopa  25/100   2.5 Tablet(s) Oral (06-22-23 @ 06:54)   2.5 Tablet(s) Oral (06-21-23 @ 23:00)   2.5 Tablet(s) Oral (06-21-23 @ 13:55)  melatonin   3 milliGRAM(s) Oral (06-21-23 @ 23:58)  sertraline   50 milliGRAM(s) Oral (06-21-23 @ 23:01)    ITEMS UNCHECKED ARE NOT PRESENT  PRESENT SYMPTOMS/REVIEW OF SYSTEMS: []Unable to obtain due to poor mentation   Source if other than patient:  []Family   []Team     Pain: [] yes [x] no  QOL impact -  Location -  Aggravating factors -  Quality -  Radiation -  Timing -  Severity (0-10 scale) -  Minimal acceptable level (0-10 scale) -    PAINAD Score:  CPOT Score:    Dyspnea:                           []Mild  []Moderate []Severe  Anxiety:                             []Mild []Moderate []Severe  Fatigue:                             []Mild []Moderate []Severe  Nausea:                             []Mild []Moderate []Severe  Loss of appetite:              []Mild []Moderate []Severe  Constipation:                    []Mild []Moderate []Severe    Other Symptoms:  [x]All other review of systems negative     Palliative Performance Status Version 2: 30%    GENERAL:  [x] NAD [x]Alert []Lethargic  []Cachexia  []Unarousable  [x]Minimally Verbal  []Non-Verbal  BEHAVIORAL:   []Anxiety  [x]Delirium []Agitation [x]Cooperative [x]Oriented x1  HEENT:  [x]Normal  [x] Moist Mucous Membranes []Dry mouth   []ET Tube/Trach  []Oral lesions  PULMONARY:   [x]Clear []Tachypnea  []Audible excessive secretions  [x]Normal Work of Breathing []Labored Breathing  []Rhonchi []Crackles []Wheezing  CARDIOVASCULAR:    [x]Regular Rate [x]Regular Rhythm []Irregular []Tachy  []Timi  GASTROINTESTINAL:  [x]Soft  []Distended   [x]+BS  [x]Non tender []Tender  []PEG []OGT/ NGT  Last BM:  GENITOURINARY:  []Normal [x] Incontinent   []Oliguria/Anuria   []Juarez  MUSCULOSKELETAL:   [x]Normal Extremities  [x]Weakness  []Bed/Wheelchair bound []Edema  NEUROLOGIC:   []No focal deficits  [x]Cognitive impairment  [x]Dysphagia []Dysarthria []Paresis []Encephalopathic  SKIN:   [x]Normal   []Pressure ulcer(s)  []Rash    Vital Signs Last 24 Hrs  T(C): 36.7 (22 Jun 2023 05:51), Max: 37.3 (21 Jun 2023 23:30)  T(F): 98 (22 Jun 2023 05:51), Max: 99.2 (21 Jun 2023 23:30)  HR: 80 (22 Jun 2023 06:30) (74 - 87)  BP: 150/72 (22 Jun 2023 06:30) (133/77 - 163/79)  BP(mean): 94 (21 Jun 2023 12:21) (94 - 94)  RR: 18 (22 Jun 2023 05:51) (18 - 18)  SpO2: 99% (22 Jun 2023 05:51) (97% - 99%)    Parameters below as of 22 Jun 2023 05:51  Patient On (Oxygen Delivery Method): nasal cannula  O2 Flow (L/min): 2    LABS:                         8.8    8.34  )-----------( 168      ( 22 Jun 2023 05:30 )             28.7   06-22    140  |  100  |  13  ----------------------------<  159<H>  4.4   |  33<H>  |  0.66    Ca    9.5      22 Jun 2023 05:30  Phos  2.6     06-22  Mg     1.7     06-22    RADIOLOGY & ADDITIONAL STUDIES: None new    DISCUSSION OF CASE: Son - to provide updates and emotional support; Cards - to discuss plan of care

## 2023-06-22 NOTE — PROGRESS NOTE ADULT - SUBJECTIVE AND OBJECTIVE BOX
OVERNIGHT EVENTS:  NAEON    SUBJECTIVE / INTERVAL HPI: Patient seen and examined at bedside. She is comfortable this morning, denies pain. Currently getting nebulizer.     VITAL SIGNS:  Vital Signs Last 24 Hrs  T(C): 36.7 (22 Jun 2023 05:51), Max: 37.3 (21 Jun 2023 23:30)  T(F): 98 (22 Jun 2023 05:51), Max: 99.2 (21 Jun 2023 23:30)  HR: 80 (22 Jun 2023 06:30) (72 - 87)  BP: 150/72 (22 Jun 2023 06:30) (133/77 - 163/79)  BP(mean): 94 (21 Jun 2023 12:21) (94 - 97)  RR: 18 (22 Jun 2023 05:51) (17 - 18)  SpO2: 99% (22 Jun 2023 05:51) (97% - 100%)    Parameters below as of 22 Jun 2023 05:51  Patient On (Oxygen Delivery Method): nasal cannula  O2 Flow (L/min): 2    PHYSICAL EXAM:  Constitutional: thin frail elderly female resting comfortably on 2LNC, NAD  HEENT: NC/AT, PERRL, anicteric sclera, MMM  Neck: supple; no JVD or thyromegaly  Respiratory: rhonchi bilaterally in upper lung fields, no crackles/wheezes, no retractions, no increased work of breathing   Cardiac: +S1/S2; RRR, no M/R/G  Gastrointestinal: soft, NT/ND; no rebound or guarding; +BS  Extremities: WWP, no clubbing or cyanosis; slight edema bilateral hands  Dermatologic: skin warm, dry and intact; no rashes, wounds, or scars  Neurologic: AAOx2 (self & place), follows commands, answers some questions    MEDICATIONS:  MEDICATIONS  (STANDING):  acetylcysteine 20%  Inhalation 4 milliLiter(s) Inhalation every 6 hours  aMIOdarone    Tablet 200 milliGRAM(s) Oral every 24 hours  atorvastatin 5 milliGRAM(s) Oral at bedtime  carbidopa/levodopa  25/100 2.5 Tablet(s) Oral every 8 hours  chlorhexidine 2% Cloths 1 Application(s) Topical daily  diltiazem    Tablet 60 milliGRAM(s) Oral every 6 hours  enoxaparin Injectable 40 milliGRAM(s) SubCutaneous every 24 hours  famotidine    Tablet 20 milliGRAM(s) Oral every 24 hours  insulin lispro (ADMELOG) corrective regimen sliding scale   SubCutaneous every 6 hours  ipratropium    for Nebulization 500 MICROGram(s) Nebulizer every 6 hours  lactobacillus acidophilus 1 Tablet(s) Oral daily  losartan 100 milliGRAM(s) Oral every 24 hours  sertraline 50 milliGRAM(s) Oral every 24 hours  sodium chloride 3%  Inhalation 4 milliLiter(s) Inhalation every 6 hours    MEDICATIONS  (PRN):  acetaminophen     Tablet .. 650 milliGRAM(s) Oral every 6 hours PRN Temp greater or equal to 38C (100.4F), Mild Pain (1 - 3)  melatonin 3 milliGRAM(s) Oral at bedtime PRN Insomnia      ALLERGIES:  Allergies    No Known Allergies    Intolerances        LABS:                        8.8    8.34  )-----------( 168      ( 22 Jun 2023 05:30 )             28.7     06-22    140  |  100  |  13  ----------------------------<  159<H>  4.4   |  33<H>  |  0.66    Ca    9.5      22 Jun 2023 05:30  Phos  2.6     06-22  Mg     1.7     06-22        Urinalysis Basic - ( 22 Jun 2023 05:30 )    Color: x / Appearance: x / SG: x / pH: x  Gluc: 159 mg/dL / Ketone: x  / Bili: x / Urobili: x   Blood: x / Protein: x / Nitrite: x   Leuk Esterase: x / RBC: x / WBC x   Sq Epi: x / Non Sq Epi: x / Bacteria: x      CAPILLARY BLOOD GLUCOSE      POCT Blood Glucose.: 147 mg/dL (22 Jun 2023 08:30)      RADIOLOGY & ADDITIONAL TESTS: Reviewed.

## 2023-06-22 NOTE — PROGRESS NOTE ADULT - PROBLEM SELECTOR PLAN 5
Patient with both Parkinsons disease and Alzheimers dementia. On home keppra 500mg q12, sertraline 50mg qd, and sinemet 25-100mg x 2.5 tabs q8. AAOx1-2, reportedly at baseline mental status.  FEES: revealed severe oral and mild pharyngeal phase deficits. Oral phase deficits impacted processing speed, efficiency, A-P transport, and clearance. Despite severity of oral phase deficits, airway protection was not impacted.  - Monitor mentation  - C/w sinemet 25/100 mg x 2.5 tabs PO   - C/w sertraline 50 mg PO   - pureed diet/thin liquids  - patient's family interested in home hospice, f/u w/ palliative care

## 2023-06-23 ENCOUNTER — TRANSCRIPTION ENCOUNTER (OUTPATIENT)
Age: 88
End: 2023-06-23

## 2023-06-23 LAB
GLUCOSE BLDC GLUCOMTR-MCNC: 199 MG/DL — HIGH (ref 70–99)
GLUCOSE BLDC GLUCOMTR-MCNC: 268 MG/DL — HIGH (ref 70–99)
GLUCOSE BLDC GLUCOMTR-MCNC: 293 MG/DL — HIGH (ref 70–99)
GLUCOSE BLDC GLUCOMTR-MCNC: 305 MG/DL — HIGH (ref 70–99)

## 2023-06-23 PROCEDURE — 99233 SBSQ HOSP IP/OBS HIGH 50: CPT

## 2023-06-23 PROCEDURE — 99232 SBSQ HOSP IP/OBS MODERATE 35: CPT | Mod: GC

## 2023-06-23 RX ORDER — METFORMIN HYDROCHLORIDE 850 MG/1
500 TABLET ORAL
Qty: 0 | Refills: 0 | DISCHARGE

## 2023-06-23 RX ORDER — IPRATROPIUM BROMIDE 0.2 MG/ML
2.5 SOLUTION, NON-ORAL INHALATION
Qty: 300 | Refills: 0
Start: 2023-06-23 | End: 2023-07-22

## 2023-06-23 RX ORDER — ACETYLCYSTEINE 200 MG/ML
4 VIAL (ML) MISCELLANEOUS
Qty: 480 | Refills: 0
Start: 2023-06-23 | End: 2023-07-22

## 2023-06-23 RX ORDER — LACTOBACILLUS ACIDOPHILUS 100MM CELL
1 CAPSULE ORAL
Qty: 30 | Refills: 0
Start: 2023-06-23 | End: 2023-07-22

## 2023-06-23 RX ORDER — FAMOTIDINE 10 MG/ML
1 INJECTION INTRAVENOUS
Qty: 30 | Refills: 0
Start: 2023-06-23 | End: 2023-07-22

## 2023-06-23 RX ORDER — FERROUS SULFATE 325(65) MG
1 TABLET ORAL
Refills: 0 | DISCHARGE

## 2023-06-23 RX ORDER — AMIODARONE HYDROCHLORIDE 400 MG/1
1 TABLET ORAL
Qty: 30 | Refills: 0
Start: 2023-06-23 | End: 2023-07-22

## 2023-06-23 RX ORDER — DILTIAZEM HCL 120 MG
1 CAPSULE, EXT RELEASE 24 HR ORAL
Qty: 120 | Refills: 0
Start: 2023-06-23 | End: 2023-07-22

## 2023-06-23 RX ORDER — ROBINUL 0.2 MG/ML
0.4 INJECTION INTRAMUSCULAR; INTRAVENOUS ONCE
Refills: 0 | Status: COMPLETED | OUTPATIENT
Start: 2023-06-23 | End: 2023-06-23

## 2023-06-23 RX ORDER — METOPROLOL TARTRATE 50 MG
1 TABLET ORAL
Refills: 0 | DISCHARGE

## 2023-06-23 RX ORDER — ACETAMINOPHEN 500 MG
2 TABLET ORAL
Qty: 0 | Refills: 0 | DISCHARGE
Start: 2023-06-23

## 2023-06-23 RX ORDER — SODIUM CHLORIDE 9 MG/ML
4 INJECTION INTRAMUSCULAR; INTRAVENOUS; SUBCUTANEOUS
Qty: 480 | Refills: 0
Start: 2023-06-23 | End: 2023-07-22

## 2023-06-23 RX ORDER — OMEPRAZOLE 10 MG/1
1 CAPSULE, DELAYED RELEASE ORAL
Refills: 0 | DISCHARGE

## 2023-06-23 RX ADMIN — CARBIDOPA AND LEVODOPA 2.5 TABLET(S): 25; 100 TABLET ORAL at 13:26

## 2023-06-23 RX ADMIN — Medication 60 MILLIGRAM(S): at 06:09

## 2023-06-23 RX ADMIN — CARBIDOPA AND LEVODOPA 2.5 TABLET(S): 25; 100 TABLET ORAL at 06:10

## 2023-06-23 RX ADMIN — Medication 500 MICROGRAM(S): at 18:19

## 2023-06-23 RX ADMIN — Medication 500 MICROGRAM(S): at 23:18

## 2023-06-23 RX ADMIN — Medication 60 MILLIGRAM(S): at 12:39

## 2023-06-23 RX ADMIN — SODIUM CHLORIDE 4 MILLILITER(S): 9 INJECTION INTRAMUSCULAR; INTRAVENOUS; SUBCUTANEOUS at 18:19

## 2023-06-23 RX ADMIN — Medication 60 MILLIGRAM(S): at 18:19

## 2023-06-23 RX ADMIN — CHLORHEXIDINE GLUCONATE 1 APPLICATION(S): 213 SOLUTION TOPICAL at 12:40

## 2023-06-23 RX ADMIN — Medication 500 MICROGRAM(S): at 06:11

## 2023-06-23 RX ADMIN — Medication 4 MILLILITER(S): at 12:39

## 2023-06-23 RX ADMIN — ATORVASTATIN CALCIUM 5 MILLIGRAM(S): 80 TABLET, FILM COATED ORAL at 23:18

## 2023-06-23 RX ADMIN — SODIUM CHLORIDE 4 MILLILITER(S): 9 INJECTION INTRAMUSCULAR; INTRAVENOUS; SUBCUTANEOUS at 12:39

## 2023-06-23 RX ADMIN — AMIODARONE HYDROCHLORIDE 200 MILLIGRAM(S): 400 TABLET ORAL at 18:19

## 2023-06-23 RX ADMIN — Medication 4 MILLILITER(S): at 00:00

## 2023-06-23 RX ADMIN — SODIUM CHLORIDE 4 MILLILITER(S): 9 INJECTION INTRAMUSCULAR; INTRAVENOUS; SUBCUTANEOUS at 23:19

## 2023-06-23 RX ADMIN — Medication 60 MILLIGRAM(S): at 23:21

## 2023-06-23 RX ADMIN — LOSARTAN POTASSIUM 100 MILLIGRAM(S): 100 TABLET, FILM COATED ORAL at 15:24

## 2023-06-23 RX ADMIN — ENOXAPARIN SODIUM 40 MILLIGRAM(S): 100 INJECTION SUBCUTANEOUS at 12:39

## 2023-06-23 RX ADMIN — Medication 4 MILLILITER(S): at 06:10

## 2023-06-23 RX ADMIN — Medication 1: at 06:24

## 2023-06-23 RX ADMIN — CARBIDOPA AND LEVODOPA 2.5 TABLET(S): 25; 100 TABLET ORAL at 23:19

## 2023-06-23 RX ADMIN — Medication 1 TABLET(S): at 12:39

## 2023-06-23 RX ADMIN — SERTRALINE 50 MILLIGRAM(S): 25 TABLET, FILM COATED ORAL at 23:41

## 2023-06-23 RX ADMIN — Medication 4 MILLILITER(S): at 18:19

## 2023-06-23 RX ADMIN — Medication 3: at 18:13

## 2023-06-23 RX ADMIN — FAMOTIDINE 20 MILLIGRAM(S): 10 INJECTION INTRAVENOUS at 06:29

## 2023-06-23 RX ADMIN — Medication 4: at 12:40

## 2023-06-23 RX ADMIN — ROBINUL 0.4 MILLIGRAM(S): 0.2 INJECTION INTRAMUSCULAR; INTRAVENOUS at 19:29

## 2023-06-23 RX ADMIN — SODIUM CHLORIDE 4 MILLILITER(S): 9 INJECTION INTRAMUSCULAR; INTRAVENOUS; SUBCUTANEOUS at 06:11

## 2023-06-23 RX ADMIN — Medication 500 MICROGRAM(S): at 12:39

## 2023-06-23 NOTE — PROGRESS NOTE ADULT - PROBLEM SELECTOR PLAN 5
Patient with both Parkinsons disease and Alzheimers dementia. On home keppra 500mg q12, sertraline 50mg qd, and sinemet 25-100mg x 2.5 tabs q8. AAOx1-2, reportedly at baseline mental status.  FEES: revealed severe oral and mild pharyngeal phase deficits. Oral phase deficits impacted processing speed, efficiency, A-P transport, and clearance. Despite severity of oral phase deficits, airway protection was not impacted.  - Monitor mentation  - C/w sinemet 25/100 mg x 2.5 tabs PO   - C/w sertraline 50 mg PO   - pureed diet/thin liquids  - plan for patient to go home with home hospice on 6/24

## 2023-06-23 NOTE — PROGRESS NOTE ADULT - ASSESSMENT
88yo F with PMH of Dementia, Parkinson's, h/o Lung CA, HTN, T2DM, and h/o SDH/SAH p/w respiratory failure and AFib. Palliative consulted for complex medical decision making in the setting of advanced illness.    ·	ongoing coordination for discharge home with hospice, tentative plan for tomorrow pending delivery of DME

## 2023-06-23 NOTE — PROGRESS NOTE ADULT - PROBLEM SELECTOR PLAN 10
F: None  E: Replete to keep K>4 and Mg>2  N: Pureed diet/thin liquids  DVT ppx: Lovenox 40 mg SQ qd  GI ppx: None  Code Status: FULL  Dispo: MAUREEN F: None  E: Replete to keep K>4 and Mg>2  N: Pureed diet/thin liquids  DVT ppx: Lovenox 40 mg SQ qd  GI ppx: None  Code Status: DNR/DNI  Dispo: F  Home w/ Home hospice on 6/24.

## 2023-06-23 NOTE — DISCHARGE NOTE PROVIDER - NSDCCPCAREPLAN_GEN_ALL_CORE_FT
PRINCIPAL DISCHARGE DIAGNOSIS  Diagnosis: Pneumonia, aspiration  Assessment and Plan of Treatment: Pneumonia is an infection of the lungs. It is a serious illness that can affect people of any age, but it is most common and most dangerous in very young children, people older than 65, and people with underlying medical problems such as heart disease, diabetes, or chronic lung disease.  Common symptoms of pneumonia include fever, chills, shortness of breath, chest pain with breathing, a rapid heart and breathing rate, nausea, vomiting, diarrhea, and a cough that often produces green or yellow sputum (mucus from the lungs); occasionally, the sputum is rust colored. Most people have a fever (temperature greater than 100.5ºF or 38ºC), although this is less common in older adults. Shaking chills (called rigors) and a change in mental status (confusion, unclear thinking) can also occur.  You completed a course of antibiotics in the hospital which helped your pneumonia.  If you develop a fever, nausea, vomiting, or other symptoms listed above, please call your doctor.        SECONDARY DISCHARGE DIAGNOSES  Diagnosis: Atrial fibrillation  Assessment and Plan of Treatment: During this hospital admission you were found to be in Atrial Fibrillation. This is an irregular, often rapid heart rate that commonly causes poor blood flow. The heart's upper chambers (atria) beat out of coordination with the lower chambers (ventricles). This condition may have no symptoms, but when symptoms do appear they include palpitations, shortness of breath, and fatigue. Treatments include drugs, electrical shock (cardioversion), and minimally invasive surgery (ablation). Continue to take you rate controlling medications as directed and follow up with your primary care doctor and cardiologist in 10-14 days.  **Please continue to take Amiodarone 200mg once a day and Diltiazem 60mg 4 times a day.  **Please follow-up with Dr. Johnson your cardiologist and pcp within 2 weeks of discharge.       PRINCIPAL DISCHARGE DIAGNOSIS  Diagnosis: Pneumonia, aspiration  Assessment and Plan of Treatment: Pneumonia is an infection of the lungs. It is a serious illness that can affect people of any age, but it is most common and most dangerous in very young children, people older than 65, and people with underlying medical problems such as heart disease, diabetes, or chronic lung disease.  Common symptoms of pneumonia include fever, chills, shortness of breath, chest pain with breathing, a rapid heart and breathing rate, nausea, vomiting, diarrhea, and a cough that often produces green or yellow sputum (mucus from the lungs); occasionally, the sputum is rust colored. Most people have a fever (temperature greater than 100.5ºF or 38ºC), although this is less common in older adults. Shaking chills (called rigors) and a change in mental status (confusion, unclear thinking) can also occur.  You completed a course of antibiotics in the hospital which helped your pneumonia.  If you develop a fever, nausea, vomiting, or other symptoms listed above, please call your doctor.  **Please continue with nebulizers: - Duoneb(ipatropium-albuterol) nebulizer every 6 hours, followed by Mucomyst/acetylcysteine 20% nebulizer, followed by 3% hypertonic nebulizer treatment every 6 hours. A nebulizer will be delivered to your home with hospice service.        SECONDARY DISCHARGE DIAGNOSES  Diagnosis: Atrial fibrillation  Assessment and Plan of Treatment: During this hospital admission you were found to be in Atrial Fibrillation. This is an irregular, often rapid heart rate that commonly causes poor blood flow. The heart's upper chambers (atria) beat out of coordination with the lower chambers (ventricles). This condition may have no symptoms, but when symptoms do appear they include palpitations, shortness of breath, and fatigue. Treatments include drugs, electrical shock (cardioversion), and minimally invasive surgery (ablation). Continue to take you rate controlling medications as directed and follow up with your primary care doctor and cardiologist in 10-14 days.  **Please continue to take Amiodarone 200mg once a day and Diltiazem 60mg 4 times a day.  **Please follow-up with Dr. Johnson your cardiologist and pcp within 2 weeks of discharge.

## 2023-06-23 NOTE — DISCHARGE NOTE PROVIDER - HOSPITAL COURSE
#Discharge    89yo F with PMHx Alzheimer's dementia, Parkinson's Disease, SDH/SAH s/p repair of 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 Initially admitted to MICU, then stepped down to telemetry, now HDS for RMF. Afib w/ RVR resolved. Plan for discharge home w/ home hospice on 6/24.      Problem List/Main Diagnoses (system-based):      Problem/Plan - 1:  ·  Problem: Acute hypoxemic respiratory failure.   ·  Plan: Increased WOB and hypoxia requiring HFNC on admission. Suspect aspiration pneumonitis vs PNA. MRSA positive. Broadened from CTX to Vanc and Zosyn to cover for HAP i/s/o recent admission and living in healthcare setting (nursing home).   - Completed course of antibiotics- Zosyn 4.5 mg q8h IV and Vanc for 5d total course (6/16-6/20) for aspiration pneumonia    Plan:  - Wean O2 as tolerated with goal O2 sat >94%  - Incentive spirometry   - Aerobika device  - Duonebs q6h followed by Mucomyst 20% q6h, 3% hypertonic neb q6h.     Problem/Plan - 2:  ·  Problem: Aspiration pneumonia.   ·  Plan: Witnessed aspiration event at nursing home. No consolidations noted on CT scan, however CXR appearing with possible infiltrate on RLL, consistent with aspiration PNA. MRSA positive. Broadened from CTX to Vanc and Zosyn to cover for HAP i/s/o recent admission and living in healthcare setting (nursing home)  - completed course of IV antibiotics- Zosyn 4.5mg q8h IV and Vanc     Plan:   - if spikes new fever, consider restarting broad-spec antibiotics  - Blood cxs NGTD   - Sputum culture rejected (normal des)  - Rest of plan as above.     Problem/Plan - 3:  ·  Problem: New onset atrial fibrillation.   ·  Plan: EKG with afib, noted to be in RVR with -170 on admission. TSH wnl. Likely i/s/o infection. Initially on amiodarone gtt and diltiazem gtt, now transitioned to PO with better control. Afib now resolved.   - TTE EF 60-65%; tachycardia; small LV; mild to moderate symmetric LVH; severely dilated LA; moderate TR (worse than 2/8/21); aortic sclerosis; pulm HTN w/ PASP 51 (higher than 2/8/21); pericardial fat pad anterior to R ventricle (cannot r/o trivial pericardial effusion).   - s/p amio load with gtt and 400mg bid    Plan:  - c/w amiodarone 200mg po qd  - c/w diltiazem 60mg PO q6h  - No need for AC  - f/u cards recs w/ Dr. Johnson (patients PCP and cardiologist).     Problem/Plan - 4:  ·  Problem: Sepsis.   ·  Plan: Pt met 2/4 SIRS criteria on admission for fever and tachycardia. Witnessed aspiration event at nursing home. Sepsis 2/2 aspiration pneumonia. Now RESOLVED.   - see plan above.     Problem/Plan - 5:  ·  Problem: Parkinson disease.   ·  Plan: Patient with both Parkinsons disease and Alzheimers dementia. On home keppra 500mg q12, sertraline 50mg qd, and sinemet 25-100mg x 2.5 tabs q8. AAOx1-2, reportedly at baseline mental status.  FEES: revealed severe oral and mild pharyngeal phase deficits. Oral phase deficits impacted processing speed, efficiency, A-P transport, and clearance. Despite severity of oral phase deficits, airway protection was not impacted.  - Monitor mentation  - C/w sinemet 25/100 mg x 2.5 tabs PO   - C/w sertraline 50 mg PO   - pureed diet/thin liquids  - plan for patient to go home with home hospice on 6/24.     Problem/Plan - 6:  ·  Problem: Alzheimer's disease.   ·  Plan: Plan as above.     Problem/Plan - 7:  ·  Problem: HTN (hypertension).   ·  Plan: Patient with history of HTN on home losartan 100mg qd.   - c/w losartan 100mg qd  - continue to hold Toprol 25mg qd   - monitor BPs.     Problem/Plan - 8:  ·  Problem: Diabetes.   ·  Plan: History of DM on home metformin 500mg BID. A1C 6.9 5/2023.  - mISS while inpatient   - monitor fingersticks  - TSH wnl.     Problem/Plan - 9:  ·  Problem: HLD (hyperlipidemia).   ·  Plan: - C/w atorvastatin 5mg PO.    New medications:   Labs to be followed outpatient:   Exam to be followed outpatient:    #Discharge    87yo F with PMHx Alzheimer's dementia, Parkinson's Disease, SDH/SAH s/p repair of 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 Initially admitted to MICU, then stepped down to telemetry, now HDS for RMF. Afib w/ RVR resolved. After GOC conversation, decision made for patient to be discharged home with home hospice.     Problem List/Main Diagnoses (system-based):     #Acute hypoxemic respiratory failure.   Increased WOB and hypoxia requiring HFNC on admission. Suspect aspiration pneumonitis vs PNA. MRSA positive. Broadened from CTX to Vanc and Zosyn to cover for HAP i/s/o recent admission and living in healthcare setting (nursing home). Completed course of antibiotics- Zosyn 4.5 mg q8h IV and Vanc for 5d total course (6/16-6/20) for aspiration pneumonia  - c/w supplementary 02 at home, 02 goal sat >94%  - Home w/ home hospice    #Aspiration pneumonia.   Witnessed aspiration event at nursing home. No consolidations noted on CT scan, however CXR appearing with possible infiltrate on RLL, consistent with aspiration PNA. MRSA positive. Broadened from CTX to Vanc and Zosyn to cover for HAP i/s/o recent admission and living in healthcare setting (nursing home). Patient completed course of IV antibiotics- Zosyn 4.5mg q8h IV and Vanc during hospitalization. BCx's w/ NGTD.    #New onset atrial fibrillation- Resolved  EKG with afib, noted to be in RVR with -170 on admission. TSH wnl. Likely i/s/o infection. Initially on amiodarone gtt and diltiazem gtt, now transitioned to PO with better control. S/p amio load with 400mg bid. Transitioned to amiodarone 200mg po qd and diltiazem 60mg po qhs. No need for a/c.   - TTE EF 60-65%; tachycardia; small LV; mild to moderate symmetric LVH; severely dilated LA; moderate TR (worse than 2/8/21); aortic sclerosis; pulm HTN w/ PASP 51 (higher than 2/8/21); pericardial fat pad anterior to R ventricle (cannot r/o trivial pericardial effusion).   Plan:  - c/w amiodarone 200mg po qd  - c/w diltiazem 60mg PO q6h  - f/u w/ Dr. Johnson (patients PCP and cardiologist).    #Sepsis- Resolved  Pt met 2/4 SIRS criteria on admission for fever and tachycardia. Witnessed aspiration event at nursing home. Sepsis 2/2 aspiration pneumonia. Now RESOLVED.     #Parkinson disease.   #Alzheimer's disease.   Patient with both Parkinsons disease and Alzheimers dementia. On home keppra 500mg q12, sertraline 50mg qd, and sinemet 25-100mg x 2.5 tabs q8. AAOx1-2, reportedly at baseline mental status.  - FEES: revealed severe oral and mild pharyngeal phase deficits. Oral phase deficits impacted processing speed, efficiency, A-P transport, and clearance. Despite severity of oral phase deficits, airway protection was not impacted.  Plan:  - C/w sinemet 25/100 mg x 2.5 tabs PO   - C/w sertraline 50 mg PO   - pureed diet/thin liquids    #HTN (hypertension).   Patient with history of HTN on home losartan 100mg qd.   - c/w losartan 100mg qd    #Diabetes.   History of DM on home metformin 500mg BID. A1C 6.9 5/2023. On insulin sliding scale while inpatient.    - Home with home hospice- discontinue Metformin on discharge    #HLD (hyperlipidemia).   - C/w atorvastatin 5mg PO.    New medications: amiodarone 200mg po qd and diltiazem 60mg PO q6h  Medications being discontinued: Toprol 25mg qd    PE on discharge:  Constitutional: thin frail elderly female resting comfortably on 2LNC, NAD  HEENT: NC/AT, PERRL, anicteric sclera, MMM  Neck: supple;   Respiratory: rhonchi bilaterally in upper lung fields, no crackles/wheezes, no retractions, no increased work of breathing   Cardiac: +S1/S2; RRR, no M/R/G  Gastrointestinal: soft, NT/ND; no rebound or guarding; +BS  Extremities: WWP, no clubbing or cyanosis; slight edema bilateral hands  Dermatologic: skin warm, dry and intact; no rashes, wounds, or scars  Neurologic: AAOx1 (self ), follows commands, answers some questions       #Discharge    89yo F with PMHx Alzheimer's dementia, Parkinson's Disease, SDH/SAH s/p repair of 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 Initially admitted to MICU, then stepped down to telemetry, then to RMF. Afib w/ RVR resolved. After GOC conversation, decision made for patient to be discharged home with home hospice.     Problem List/Main Diagnoses (system-based):     #Acute hypoxemic respiratory failure.   Increased WOB and hypoxia requiring HFNC on admission. Suspect aspiration pneumonitis vs PNA. MRSA positive. Broadened from CTX to Vanc and Zosyn to cover for HAP i/s/o recent admission and living in healthcare setting (nursing home). Completed course of antibiotics- Zosyn 4.5 mg q8h IV and Vanc for 5d total course (6/16-6/20) for aspiration pneumonia  - c/w supplementary 02 at home, 02 goal sat >94%  - Home w/ home hospice    #Aspiration pneumonia.   Witnessed aspiration event at nursing home. No consolidations noted on CT scan, however CXR appearing with possible infiltrate on RLL, consistent with aspiration PNA. MRSA positive. Broadened from CTX to Vanc and Zosyn to cover for HAP i/s/o recent admission and living in healthcare setting (nursing home). Patient completed course of IV antibiotics- Zosyn 4.5mg q8h IV and Vanc during hospitalization. BCx's w/ NGTD.    #New onset atrial fibrillation- Resolved  EKG with afib, noted to be in RVR with -170 on admission. TSH wnl. Likely i/s/o infection. Initially on amiodarone gtt and diltiazem gtt, now transitioned to PO with better control. S/p amio load with 400mg bid. Transitioned to amiodarone 200mg po qd and diltiazem 60mg po qhs. No need for a/c.   - TTE EF 60-65%; tachycardia; small LV; mild to moderate symmetric LVH; severely dilated LA; moderate TR (worse than 2/8/21); aortic sclerosis; pulm HTN w/ PASP 51 (higher than 2/8/21); pericardial fat pad anterior to R ventricle (cannot r/o trivial pericardial effusion).   Plan:  - c/w amiodarone 200mg po qd  - c/w diltiazem 60mg PO q6h  - f/u w/ Dr. Johnson (patients PCP and cardiologist).    #Sepsis- Resolved  Pt met 2/4 SIRS criteria on admission for fever and tachycardia. Witnessed aspiration event at nursing home. Sepsis 2/2 aspiration pneumonia. Now RESOLVED.     #Parkinson disease.   #Alzheimer's disease.   Patient with both Parkinsons disease and Alzheimers dementia. On home keppra 500mg q12, sertraline 50mg qd, and sinemet 25-100mg x 2.5 tabs q8. AAOx1-2, reportedly at baseline mental status.  - FEES: revealed severe oral and mild pharyngeal phase deficits. Oral phase deficits impacted processing speed, efficiency, A-P transport, and clearance. Despite severity of oral phase deficits, airway protection was not impacted.  Plan:  - C/w sinemet 25/100 mg x 2.5 tabs PO   - C/w sertraline 50 mg PO   - pureed diet/thin liquids    #HTN (hypertension).   Patient with history of HTN on home losartan 100mg qd.   - c/w losartan 100mg qd    #Diabetes.   History of DM on home metformin 500mg BID. A1C 6.9 5/2023. On insulin sliding scale while inpatient.    - Home with home hospice- discontinue Metformin on discharge    #HLD (hyperlipidemia).   - C/w atorvastatin 5mg PO.    New medications: amiodarone 200mg po qd and diltiazem 60mg PO q6h  Medications being discontinued: Toprol 25mg qd and Metformin    PE on discharge:  Constitutional: thin frail elderly female resting comfortably on 2LNC, NAD  HEENT: NC/AT, PERRL, anicteric sclera, MMM  Neck: supple;   Respiratory: rhonchi bilaterally in upper lung fields, no crackles/wheezes, no retractions, no increased work of breathing   Cardiac: +S1/S2; RRR, no M/R/G  Gastrointestinal: soft, NT/ND; no rebound or guarding; +BS  Extremities: WWP, no clubbing or cyanosis; slight edema bilateral hands  Dermatologic: skin warm, dry and intact; no rashes, wounds, or scars  Neurologic: AAOx1 (self ), follows commands, answers some questions       #Discharge    89yo F with PMHx Alzheimer's dementia, Parkinson's Disease, SDH/SAH s/p repair of 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 Initially admitted to MICU, then stepped down to telemetry, then to RMF. Afib w/ RVR resolved. After GOC conversation, decision made for patient to be discharged home with home hospice.     Problem List/Main Diagnoses (system-based):     #Acute hypoxemic respiratory failure.   Increased WOB and hypoxia requiring HFNC on admission. Suspect aspiration pneumonitis vs PNA. MRSA positive. Broadened from CTX to Vanc and Zosyn to cover for HAP i/s/o recent admission and living in healthcare setting (nursing home). Completed course of antibiotics- Zosyn 4.5 mg q8h IV and Vanc for 5d total course (6/16-6/20) for aspiration pneumonia  - c/w supplementary 02 at home, 02 goal sat >94%  - Home w/ home hospice    #Aspiration pneumonia.   Witnessed aspiration event at nursing home. No consolidations noted on CT scan, however CXR appearing with possible infiltrate on RLL, consistent with aspiration PNA. MRSA positive. Broadened from CTX to Vanc and Zosyn to cover for HAP i/s/o recent admission and living in healthcare setting (nursing home). Patient completed course of IV antibiotics- Zosyn 4.5mg q8h IV and Vanc during hospitalization. BCx's w/ NGTD.    #New onset atrial fibrillation- Resolved  EKG with afib, noted to be in RVR with -170 on admission. TSH wnl. Likely i/s/o infection. Initially on amiodarone gtt and diltiazem gtt, now transitioned to PO with better control. S/p amio load with 400mg bid. Transitioned to amiodarone 200mg po qd and diltiazem 60mg po every 6 hours. No need for a/c.   - TTE EF 60-65%; tachycardia; small LV; mild to moderate symmetric LVH; severely dilated LA; moderate TR (worse than 2/8/21); aortic sclerosis; pulm HTN w/ PASP 51 (higher than 2/8/21); pericardial fat pad anterior to R ventricle (cannot r/o trivial pericardial effusion).   Plan:  - c/w amiodarone 200mg po qd  - c/w diltiazem 60mg PO q6h  - f/u w/ Dr. Johnson (patients PCP and cardiologist).    #Sepsis- Resolved  Pt met 2/4 SIRS criteria on admission for fever and tachycardia. Witnessed aspiration event at nursing home. Sepsis 2/2 aspiration pneumonia. Now RESOLVED.     #Parkinson disease.   #Alzheimer's disease.   Patient with both Parkinsons disease and Alzheimers dementia. On home keppra 500mg q12, sertraline 50mg qd, and sinemet 25-100mg x 2.5 tabs q8. AAOx1-2, reportedly at baseline mental status.  - FEES: revealed severe oral and mild pharyngeal phase deficits. Oral phase deficits impacted processing speed, efficiency, A-P transport, and clearance. Despite severity of oral phase deficits, airway protection was not impacted.  Plan:  - C/w sinemet 25/100 mg x 2.5 tabs PO   - C/w sertraline 50 mg PO   - pureed diet/thin liquids    #HTN (hypertension).   Patient with history of HTN on home losartan 100mg qd.   - c/w losartan 100mg qd    #Diabetes.   History of DM on home metformin 500mg BID. A1C 6.9 5/2023. On insulin sliding scale while inpatient.    - Home with home hospice- discontinue Metformin on discharge    #HLD (hyperlipidemia).   - C/w atorvastatin 5mg PO.    New medications: amiodarone 200mg po qd and diltiazem 60mg PO q6h  Medications being discontinued: Toprol 25mg qd and Metformin    PE on discharge:  Constitutional: thin frail elderly female resting comfortably on 2LNC, NAD  HEENT: NC/AT, PERRL, anicteric sclera, MMM  Neck: supple;   Respiratory: rhonchi bilaterally in upper lung fields, no crackles/wheezes, no retractions, no increased work of breathing   Cardiac: +S1/S2; RRR, no M/R/G  Gastrointestinal: soft, NT/ND; no rebound or guarding; +BS  Extremities: WWP, no clubbing or cyanosis; slight edema bilateral hands  Dermatologic: skin warm, dry and intact; no rashes, wounds, or scars  Neurologic: AAOx1 (self ), follows commands, answers some questions       #Discharge    89yo F with PMHx Alzheimer's dementia, Parkinson's Disease, SDH/SAH s/p repair of 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 Initially admitted to MICU, then stepped down to telemetry, then to RMF. Afib w/ RVR resolved. After GOC conversation, decision made for patient to be discharged home with home hospice.     Problem List/Main Diagnoses (system-based):     #Acute hypoxemic respiratory failure.   Increased WOB and hypoxia requiring HFNC on admission. Suspect aspiration pneumonitis vs PNA. MRSA positive. Broadened from CTX to Vanc and Zosyn to cover for HAP i/s/o recent admission and living in healthcare setting (nursing home). Completed course of antibiotics- Zosyn 4.5 mg q8h IV and Vanc for 5d total course (6/16-6/20) for aspiration pneumonia  - c/w supplementary 02 at home, 02 goal sat >94%  - Duonebs (ipratropium-albuterol) nebulizer q6h followed by Mucomyst/acetylcysteine 20% nebulizer q6h, followed by 3% hypertonic neb q6h for treatment  - Home w/ home hospice    #Aspiration pneumonia.   Witnessed aspiration event at nursing home. No consolidations noted on CT scan, however CXR appearing with possible infiltrate on RLL, consistent with aspiration PNA. MRSA positive. Broadened from CTX to Vanc and Zosyn to cover for HAP i/s/o recent admission and living in healthcare setting (nursing home). Patient completed course of IV antibiotics- Zosyn 4.5mg q8h IV and Vanc during hospitalization. BCx's w/ NGTD.    #New onset atrial fibrillation- Resolved  EKG with afib, noted to be in RVR with -170 on admission. TSH wnl. Likely i/s/o infection. Initially on amiodarone gtt and diltiazem gtt, now transitioned to PO with better control. S/p amio load with 400mg bid. Transitioned to amiodarone 200mg po qd and diltiazem 60mg po every 6 hours. No need for a/c.   - TTE EF 60-65%; tachycardia; small LV; mild to moderate symmetric LVH; severely dilated LA; moderate TR (worse than 2/8/21); aortic sclerosis; pulm HTN w/ PASP 51 (higher than 2/8/21); pericardial fat pad anterior to R ventricle (cannot r/o trivial pericardial effusion).   Plan:  - c/w amiodarone 200mg po qd  - c/w diltiazem 60mg PO q6h  - f/u w/ Dr. Johnson (patients PCP and cardiologist).    #Sepsis- Resolved  Pt met 2/4 SIRS criteria on admission for fever and tachycardia. Witnessed aspiration event at nursing home. Sepsis 2/2 aspiration pneumonia. Now RESOLVED.     #Parkinson disease.   #Alzheimer's disease.   Patient with both Parkinsons disease and Alzheimers dementia. On home keppra 500mg q12, sertraline 50mg qd, and sinemet 25-100mg x 2.5 tabs q8. AAOx1-2, reportedly at baseline mental status.  - FEES: revealed severe oral and mild pharyngeal phase deficits. Oral phase deficits impacted processing speed, efficiency, A-P transport, and clearance. Despite severity of oral phase deficits, airway protection was not impacted.  Plan:  - C/w sinemet 25/100 mg x 2.5 tabs PO   - C/w sertraline 50 mg PO   - pureed diet/thin liquids    #HTN (hypertension).   Patient with history of HTN on home losartan 100mg qd.   - c/w losartan 100mg qd    #Diabetes.   History of DM on home metformin 500mg BID. A1C 6.9 5/2023. On insulin sliding scale while inpatient.    - Home with home hospice- discontinue Metformin on discharge    #HLD (hyperlipidemia).   - C/w atorvastatin 5mg PO.    New medications: amiodarone 200mg po qd and diltiazem 60mg PO q6h  Medications being discontinued: Toprol 25mg qd and Metformin    PE on discharge:  Constitutional: thin frail elderly female resting comfortably on 2LNC, NAD  HEENT: NC/AT, PERRL, anicteric sclera, MMM  Neck: supple;   Respiratory: rhonchi bilaterally in upper lung fields, no crackles/wheezes, no retractions, no increased work of breathing   Cardiac: +S1/S2; RRR, no M/R/G  Gastrointestinal: soft, NT/ND; no rebound or guarding; +BS  Extremities: WWP, no clubbing or cyanosis; slight edema bilateral hands  Dermatologic: skin warm, dry and intact; no rashes, wounds, or scars  Neurologic: AAOx1 (self ), follows commands, answers some questions       #Discharge    89yo F with PMHx Alzheimer's dementia, Parkinson's Disease, SDH/SAH s/p repair of 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 Initially admitted to MICU, then stepped down to telemetry, then to RMF. Afib w/ RVR resolved. After GOC conversation, decision made for patient to be discharged home with home hospice.     Problem List/Main Diagnoses (system-based):     #Acute hypoxemic respiratory failure.   Increased WOB and hypoxia requiring HFNC on admission. Suspect aspiration pneumonitis vs PNA. MRSA positive. Broadened from CTX to Vanc and Zosyn to cover for HAP i/s/o recent admission and living in healthcare setting (nursing home). Completed course of antibiotics- Zosyn 4.5 mg q8h IV and Vanc for 5d total course (6/16-6/20) for aspiration pneumonia  - c/w supplementary 02 at home, 02 goal sat >94%  - Duonebs (ipratropium-albuterol) nebulizer q6h followed by Mucomyst/acetylcysteine 20% nebulizer q6h, followed by 3% hypertonic neb q6h for treatment  - Home w/ home hospice    #Aspiration pneumonia.   Witnessed aspiration event at nursing home. No consolidations noted on CT scan, however CXR appearing with possible infiltrate on RLL, consistent with aspiration PNA. MRSA positive. Broadened from CTX to Vanc and Zosyn to cover for HAP i/s/o recent admission and living in healthcare setting (nursing home). Patient completed course of IV antibiotics- Zosyn 4.5mg q8h IV and Vanc during hospitalization. BCx's w/ NGTD.    #New onset atrial fibrillation- Resolved  EKG with afib, noted to be in RVR with -170 on admission. TSH wnl. Likely i/s/o infection. Initially on amiodarone gtt and diltiazem gtt, now transitioned to PO with better control. S/p amio load with 400mg bid. Transitioned to amiodarone 200mg po qd and diltiazem 60mg po every 6 hours. No need for a/c.   - TTE EF 60-65%; tachycardia; small LV; mild to moderate symmetric LVH; severely dilated LA; moderate TR (worse than 2/8/21); aortic sclerosis; pulm HTN w/ PASP 51 (higher than 2/8/21); pericardial fat pad anterior to R ventricle (cannot r/o trivial pericardial effusion).   Plan:  - c/w amiodarone 200mg po qd  - c/w diltiazem 60mg PO q6h  - f/u w/ Dr. Johnson (patients PCP and cardiologist).    #Sepsis- Resolved  Pt met 2/4 SIRS criteria on admission for fever and tachycardia. Witnessed aspiration event at nursing home. Sepsis 2/2 aspiration pneumonia. Now RESOLVED.     #Parkinson disease.   #Alzheimer's disease.   Patient with both Parkinsons disease and Alzheimers dementia. On home keppra 500mg q12, sertraline 50mg qd, and sinemet 25-100mg x 2.5 tabs q8. AAOx1-2, reportedly at baseline mental status.  - FEES: revealed severe oral and mild pharyngeal phase deficits. Oral phase deficits impacted processing speed, efficiency, A-P transport, and clearance. Despite severity of oral phase deficits, airway protection was not impacted.  Plan:  - C/w sinemet 25/100 mg x 2.5 tabs PO   - C/w sertraline 50 mg PO   - pureed diet/thin liquids    #HTN (hypertension).   Patient with history of HTN on home losartan 100mg qd.   - c/w losartan 100mg qd    #Diabetes.   History of DM on home metformin 500mg BID. A1C 6.9 5/2023. On insulin sliding scale while inpatient.    - Home with home hospice- discontinue Metformin on discharge    #HLD (hyperlipidemia).   - C/w atorvastatin 5mg PO.    New medications: amiodarone 200mg po qd and diltiazem 60mg PO q6h and Duonebs (ipratropium-albuterol) nebulizer q6h followed by Mucomyst/acetylcysteine 20% nebulizer q6h, followed by 3% hypertonic neb q6h for treatment  Medications being discontinued: Toprol 25mg qd and Metformin    PE on discharge:  Constitutional: thin frail elderly female resting comfortably on 2LNC, NAD  HEENT: NC/AT, PERRL, anicteric sclera, MMM  Neck: supple;   Respiratory: rhonchi bilaterally in upper lung fields, no crackles/wheezes, no retractions, no increased work of breathing   Cardiac: +S1/S2; RRR, no M/R/G  Gastrointestinal: soft, NT/ND; no rebound or guarding; +BS  Extremities: WWP, no clubbing or cyanosis; slight edema bilateral hands  Dermatologic: skin warm, dry and intact; no rashes, wounds, or scars  Neurologic: AAOx1 (self ), follows commands, answers some questions       #Discharge    87yo F with PMHx Alzheimer's dementia, Parkinson's Disease, SDH/SAH s/p repair of 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 Initially admitted to MICU, then stepped down to telemetry, then to RMF. Afib w/ RVR resolved. After GOC conversation, decision made for patient to be discharged home with home hospice.     Problem List/Main Diagnoses (system-based):     #Acute hypoxemic respiratory failure.   Increased WOB and hypoxia requiring HFNC on admission. Suspect aspiration pneumonitis vs PNA. MRSA positive. Broadened from CTX to Vanc and Zosyn to cover for HAP i/s/o recent admission and living in healthcare setting (nursing home). Completed course of antibiotics- Zosyn 4.5 mg q8h IV and Vanc for 5d total course (6/16-6/20) for aspiration pneumonia  - c/w supplementary 02 at home, 02 goal sat >94%  - Duonebs (ipratropium-albuterol) nebulizer q6h followed by Mucomyst/acetylcysteine 20% nebulizer q6h, followed by 3% hypertonic neb q6h for treatment  - Home w/ home hospice    #Aspiration pneumonia.   Witnessed aspiration event at nursing home. No consolidations noted on CT scan, however CXR appearing with possible infiltrate on RLL, consistent with aspiration PNA. MRSA positive. Broadened from CTX to Vanc and Zosyn to cover for HAP i/s/o recent admission and living in healthcare setting (nursing home). Patient completed course of IV antibiotics- Zosyn 4.5mg q8h IV and Vanc during hospitalization. BCx's w/ NGTD.    #New onset atrial fibrillation- Resolved  EKG with afib, noted to be in RVR with -170 on admission. TSH wnl. Likely i/s/o infection. Initially on amiodarone gtt and diltiazem gtt, now transitioned to PO with better control. S/p amio load with 400mg bid. Transitioned to amiodarone 200mg po qd and diltiazem 60mg po every 6 hours. No need for a/c.   - TTE EF 60-65%; tachycardia; small LV; mild to moderate symmetric LVH; severely dilated LA; moderate TR (worse than 2/8/21); aortic sclerosis; pulm HTN w/ PASP 51 (higher than 2/8/21); pericardial fat pad anterior to R ventricle (cannot r/o trivial pericardial effusion).   Plan:  - c/w amiodarone 200mg po qd  - c/w diltiazem 60mg PO q6h  - f/u w/ Dr. Johnson (patients PCP and cardiologist).    #Sepsis- Resolved  Pt met 2/4 SIRS criteria on admission for fever and tachycardia. Witnessed aspiration event at nursing home. Sepsis 2/2 aspiration pneumonia. Now RESOLVED.     #Parkinson disease.   #Alzheimer's disease.   Patient with both Parkinsons disease and Alzheimers dementia. On home keppra 500mg q12, sertraline 50mg qd, and sinemet 25-100mg x 2.5 tabs q8. AAOx1-2, reportedly at baseline mental status.  - FEES: revealed severe oral and mild pharyngeal phase deficits. Oral phase deficits impacted processing speed, efficiency, A-P transport, and clearance. Despite severity of oral phase deficits, airway protection was not impacted.  Plan:  - C/w sinemet 25/100 mg x 2.5 tabs PO   - C/w sertraline 50 mg PO   - pureed diet/thin liquids    #HTN (hypertension).   Patient with history of HTN on home losartan 100mg qd.   - c/w losartan 100mg qd    #Diabetes.   History of DM on home metformin 500mg BID. A1C 6.9 5/2023. On insulin sliding scale while inpatient.    - Home with home hospice- discontinue Metformin on discharge    #HLD (hyperlipidemia).   - C/w atorvastatin 5mg PO.    New medications: amiodarone 200mg po qd and diltiazem 60mg PO q6h and Duonebs (ipratropium-albuterol) nebulizer q6h followed by Mucomyst/acetylcysteine 20% nebulizer q6h, followed by 3% hypertonic neb q6h for treatment  Medications being discontinued: Toprol 25mg qd and Metformin    PE on discharge:  Constitutional: thin frail elderly female resting comfortably on 2LNC, NAD  HEENT: NC/AT, PERRL, anicteric sclera, MMM  Neck: supple;   Respiratory: rhonchi bilaterally in upper lung fields, no crackles/wheezes, no retractions, no increased work of breathing   Cardiac: +S1/S2; RRR, no M/R/G  Gastrointestinal: soft, NT/ND; no rebound or guarding; +BS  Extremities: WWP, no clubbing or cyanosis; slight edema bilateral hands  Dermatologic: skin warm, dry and intact; no rashes, wounds, or scars  Neurologic: AAOx1 (self ), follows commands, answers some questions    ******************************************************  ATTENDING ATTESTATION  Patient seen and discussed with resident team on the day of discharge.     Examined patient on day of discharge with housesta   health aide Lyly at bedside.   Patient is in no respiratory distress. HR 88 morning of DC.   No complaints this morning. Urinating - urine in cannister  ; Shakes head when asked if she is in any discomfort. Discharging home on home hospice    I was physically present for the evaluation and management services provided. I spent > 30 minutes on direct patient care and discharge planning

## 2023-06-23 NOTE — PROGRESS NOTE ADULT - PROBLEM SELECTOR PLAN 8
History of DM on home metformin 500mg BID. A1C 6.9 5/2023.  - mISS while inpatient   - monitor fingersticks  - TSH wnl

## 2023-06-23 NOTE — DISCHARGE NOTE PROVIDER - CARE PROVIDER_API CALL
Wilian Johnson  Cardiovascular Disease  [address] [facility], [place] [address]  Phone: [phone]  Fax: [phone]  Established Patient  Follow Up Time: 2 weeks

## 2023-06-23 NOTE — PROGRESS NOTE ADULT - ASSESSMENT
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 Initially admitted to MICU, then stepped down to telemetry, now HDS for RMF. Afib w/ RVR resolved. Plan for discharge home w/ home hospice on 6/24.

## 2023-06-23 NOTE — PROGRESS NOTE ADULT - SUBJECTIVE AND OBJECTIVE BOX
[facility]  Last Bobo, Palliative Care Attending  Contact Info: Call [phone] (HEAL Line) or message on [employer] Teams (Last Bobo)    SUBJECTIVE AND OBJECTIVE:  INTERVAL HPI/OVERNIGHT EVENTS: Interval events noted. See patient's PRN use for the past 24hrs noted below. Comprehensive symptom assessment and GOC exploration as noted below. Extensive time spent discussing plan of care with patient/family.     ALLERGIES:  No Known Allergies    MEDICATIONS  (STANDING):  acetylcysteine 20%  Inhalation 4 milliLiter(s) Inhalation every 6 hours  aMIOdarone    Tablet 200 milliGRAM(s) Oral every 24 hours  atorvastatin 5 milliGRAM(s) Oral at bedtime  carbidopa/levodopa  25/100 2.5 Tablet(s) Oral every 8 hours  chlorhexidine 2% Cloths 1 Application(s) Topical daily  diltiazem    Tablet 60 milliGRAM(s) Oral every 6 hours  enoxaparin Injectable 40 milliGRAM(s) SubCutaneous every 24 hours  famotidine    Tablet 20 milliGRAM(s) Oral every 24 hours  insulin lispro (ADMELOG) corrective regimen sliding scale   SubCutaneous every 6 hours  ipratropium    for Nebulization 500 MICROGram(s) Nebulizer every 6 hours  lactobacillus acidophilus 1 Tablet(s) Oral daily  losartan 100 milliGRAM(s) Oral every 24 hours  sertraline 50 milliGRAM(s) Oral every 24 hours  sodium chloride 3%  Inhalation 4 milliLiter(s) Inhalation every 6 hours    MEDICATIONS  (PRN):  acetaminophen     Tablet .. 650 milliGRAM(s) Oral every 6 hours PRN Temp greater or equal to 38C (100.4F), Mild Pain (1 - 3)  melatonin 3 milliGRAM(s) Oral at bedtime PRN Insomnia      Analgesic Use (Scheduled and PRNs) for past 24 hours:    acetaminophen     Tablet ..   650 milliGRAM(s) Oral (06-22-23 @ 17:42)    carbidopa/levodopa  25/100   2.5 Tablet(s) Oral (06-23-23 @ 13:26)   2.5 Tablet(s) Oral (06-23-23 @ 06:10)   2.5 Tablet(s) Oral (06-22-23 @ 22:16)    lactobacillus acidophilus   1 Tablet(s) Oral (06-23-23 @ 12:39)    sertraline   50 milliGRAM(s) Oral (06-22-23 @ 23:04)      ITEMS UNCHECKED ARE NOT PRESENT  PRESENT SYMPTOMS/REVIEW OF SYSTEMS: []Unable to obtain due to poor mentation   Source if other than patient:  []Family   []Team         Vital Signs Last 24 Hrs  T(C): 36.4 (23 Jun 2023 12:37), Max: 37.2 (22 Jun 2023 22:20)  T(F): 97.6 (23 Jun 2023 12:37), Max: 99 (22 Jun 2023 22:20)  HR: 92 (23 Jun 2023 15:22) (88 - 96)  BP: 151/70 (23 Jun 2023 15:22) (115/68 - 151/70)  BP(mean): --  RR: 18 (23 Jun 2023 12:37) (18 - 18)  SpO2: 99% (23 Jun 2023 12:37) (99% - 99%)    Parameters below as of 23 Jun 2023 12:37  Patient On (Oxygen Delivery Method): nasal cannula w/ humidification  O2 Flow (L/min): 2      LABS:                         8.8    8.34  )-----------( 168      ( 22 Jun 2023 05:30 )             28.7   06-22    140  |  100  |  13  ----------------------------<  159<H>  4.4   |  33<H>  |  0.66    Ca    9.5      22 Jun 2023 05:30  Phos  2.6     06-22  Mg     1.7     06-22        RADIOLOGY & ADDITIONAL STUDIES: None new    DISCUSSION OF CASE: Family - to provide updates and emotional support [facility]  Last Bobo, Palliative Care Attending  Contact Info: Call [phone] (HEAL Line) or message on [employer] Teams (Last Bobo)    SUBJECTIVE AND OBJECTIVE:  INTERVAL HPI/OVERNIGHT EVENTS: Interval events noted. Appears at baseline, tolerating diet, denies pain or SOB. Remains weak and bedbound. See patient's PRN use for the past 24hrs noted below. Comprehensive symptom assessment and GOC exploration as noted below. Extensive time spent discussing plan of care with patient/family.     ALLERGIES:  No Known Allergies    MEDICATIONS  (STANDING):  acetylcysteine 20%  Inhalation 4 milliLiter(s) Inhalation every 6 hours  aMIOdarone    Tablet 200 milliGRAM(s) Oral every 24 hours  atorvastatin 5 milliGRAM(s) Oral at bedtime  carbidopa/levodopa  25/100 2.5 Tablet(s) Oral every 8 hours  chlorhexidine 2% Cloths 1 Application(s) Topical daily  diltiazem    Tablet 60 milliGRAM(s) Oral every 6 hours  enoxaparin Injectable 40 milliGRAM(s) SubCutaneous every 24 hours  famotidine    Tablet 20 milliGRAM(s) Oral every 24 hours  insulin lispro (ADMELOG) corrective regimen sliding scale   SubCutaneous every 6 hours  ipratropium    for Nebulization 500 MICROGram(s) Nebulizer every 6 hours  lactobacillus acidophilus 1 Tablet(s) Oral daily  losartan 100 milliGRAM(s) Oral every 24 hours  sertraline 50 milliGRAM(s) Oral every 24 hours  sodium chloride 3%  Inhalation 4 milliLiter(s) Inhalation every 6 hours    MEDICATIONS  (PRN):  acetaminophen     Tablet .. 650 milliGRAM(s) Oral every 6 hours PRN Temp greater or equal to 38C (100.4F), Mild Pain (1 - 3)  melatonin 3 milliGRAM(s) Oral at bedtime PRN Insomnia      Analgesic Use (Scheduled and PRNs) for past 24 hours:    acetaminophen     Tablet ..   650 milliGRAM(s) Oral (06-22-23 @ 17:42)    carbidopa/levodopa  25/100   2.5 Tablet(s) Oral (06-23-23 @ 13:26)   2.5 Tablet(s) Oral (06-23-23 @ 06:10)   2.5 Tablet(s) Oral (06-22-23 @ 22:16)    lactobacillus acidophilus   1 Tablet(s) Oral (06-23-23 @ 12:39)    sertraline   50 milliGRAM(s) Oral (06-22-23 @ 23:04)      ITEMS UNCHECKED ARE NOT PRESENT  PRESENT SYMPTOMS/REVIEW OF SYSTEMS: []Unable to obtain due to poor mentation   Source if other than patient:  []Family   []Team     Pain: [] yes [x] no  QOL impact -  Location -  Aggravating factors -  Quality -  Radiation -  Timing -  Severity (0-10 scale) -  Minimal acceptable level (0-10 scale) -    PAINAD Score:  CPOT Score:    Dyspnea:                           []Mild  []Moderate []Severe  Anxiety:                             []Mild []Moderate []Severe  Fatigue:                             []Mild []Moderate []Severe  Nausea:                             []Mild []Moderate []Severe  Loss of appetite:              []Mild []Moderate []Severe  Constipation:                    []Mild []Moderate []Severe    Other Symptoms:  [x]All other review of systems negative     Palliative Performance Status Version 2: 40%    GENERAL:  [x] NAD [x]Alert []Lethargic  []Cachexia  []Unarousable  [x]Minimally Verbal  []Non-Verbal  BEHAVIORAL:   []Anxiety  [x]Delirium []Agitation [x]Cooperative [x]Oriented x1  HEENT:  [x]Normal  [x] Moist Mucous Membranes []Dry mouth   []ET Tube/Trach  []Oral lesions  PULMONARY:   [x]Clear []Tachypnea  []Audible excessive secretions  [x]Normal Work of Breathing []Labored Breathing  []Rhonchi []Crackles []Wheezing  CARDIOVASCULAR:    [x]Regular Rate [x]Regular Rhythm []Irregular []Tachy  []Timi  GASTROINTESTINAL:  [x]Soft  []Distended   [x]+BS  [x]Non tender []Tender  []PEG []OGT/ NGT  Last BM:  GENITOURINARY:  []Normal [x] Incontinent   []Oliguria/Anuria   []Juarez  MUSCULOSKELETAL:   [x]Normal Extremities  [x]Weakness  []Bed/Wheelchair bound []Edema  NEUROLOGIC:   []No focal deficits  [x]Cognitive impairment  [x]Dysphagia []Dysarthria []Paresis []Encephalopathic  SKIN:   [x]Normal   []Pressure ulcer(s)  []Rash    Vital Signs Last 24 Hrs  T(C): 36.4 (23 Jun 2023 12:37), Max: 37.2 (22 Jun 2023 22:20)  T(F): 97.6 (23 Jun 2023 12:37), Max: 99 (22 Jun 2023 22:20)  HR: 92 (23 Jun 2023 15:22) (88 - 96)  BP: 151/70 (23 Jun 2023 15:22) (115/68 - 151/70)  BP(mean): --  RR: 18 (23 Jun 2023 12:37) (18 - 18)  SpO2: 99% (23 Jun 2023 12:37) (99% - 99%)    Parameters below as of 23 Jun 2023 12:37  Patient On (Oxygen Delivery Method): nasal cannula w/ humidification  O2 Flow (L/min): 2    LABS:                         8.8    8.34  )-----------( 168      ( 22 Jun 2023 05:30 )             28.7   06-22    140  |  100  |  13  ----------------------------<  159<H>  4.4   |  33<H>  |  0.66    Ca    9.5      22 Jun 2023 05:30  Phos  2.6     06-22  Mg     1.7     06-22    RADIOLOGY & ADDITIONAL STUDIES: None new    DISCUSSION OF CASE: Family - to provide updates and emotional support; Medicine - to discuss plan of care

## 2023-06-23 NOTE — PROGRESS NOTE ADULT - PROBLEM SELECTOR PROBLEM 1
Acute hypoxemic respiratory failure
Debility
Acute hypoxemic respiratory failure
Acute hypoxemic respiratory failure
Debility
Acute hypoxemic respiratory failure

## 2023-06-23 NOTE — PROGRESS NOTE ADULT - PROBLEM SELECTOR PLAN 1
.  PPSV = 40%, requires assistance for most ADLs  -PT Eval recommending [facility], family opting for home hospice  -c/w supportive care, OOB, encourage movement

## 2023-06-23 NOTE — PROGRESS NOTE ADULT - PROBLEM SELECTOR PROBLEM 3
New onset atrial fibrillation
Parkinson disease
Parkinson disease
New onset atrial fibrillation
Parkinson disease
Parkinson disease
New onset atrial fibrillation
New onset atrial fibrillation

## 2023-06-23 NOTE — PROGRESS NOTE ADULT - PROVIDER SPECIALTY LIST ADULT
Cardiology
Cardiology
MICU
Cardiology
Internal Medicine
Internal Medicine
Palliative Care
Cardiology
Internal Medicine
MICU
Palliative Care
Cardiology
MICU
Palliative Care
Palliative Care
Internal Medicine

## 2023-06-23 NOTE — PROGRESS NOTE ADULT - SUBJECTIVE AND OBJECTIVE BOX
OVERNIGHT EVENTS:  NAEON    SUBJECTIVE / INTERVAL HPI: Patient seen and examined at bedside. No acute complaints this morning. HHA bedside. Unable to complete full ROS due to mental status. Denies current pain.    VITAL SIGNS:  Vital Signs Last 24 Hrs  T(C): 36.4 (23 Jun 2023 12:37), Max: 37.2 (22 Jun 2023 22:20)  T(F): 97.6 (23 Jun 2023 12:37), Max: 99 (22 Jun 2023 22:20)  HR: 96 (23 Jun 2023 12:37) (88 - 96)  BP: 144/76 (23 Jun 2023 12:37) (115/68 - 154/76)  BP(mean): --  RR: 18 (23 Jun 2023 12:37) (18 - 18)  SpO2: 99% (23 Jun 2023 12:37) (95% - 99%)    Parameters below as of 23 Jun 2023 12:37  Patient On (Oxygen Delivery Method): nasal cannula w/ humidification  O2 Flow (L/min): 2    PHYSICAL EXAM:  Constitutional: thin frail elderly female resting comfortably on 2LNC, NAD  HEENT: NC/AT, PERRL, anicteric sclera, MMM  Neck: supple;   Respiratory: rhonchi bilaterally in upper lung fields, no crackles/wheezes, no retractions, no increased work of breathing   Cardiac: +S1/S2; RRR, no M/R/G  Gastrointestinal: soft, NT/ND; no rebound or guarding; +BS  Extremities: WWP, no clubbing or cyanosis; slight edema bilateral hands  Dermatologic: skin warm, dry and intact; no rashes, wounds, or scars  Neurologic: AAOx2 (self & place), follows commands, answers some questions    MEDICATIONS:  MEDICATIONS  (STANDING):  acetylcysteine 20%  Inhalation 4 milliLiter(s) Inhalation every 6 hours  aMIOdarone    Tablet 200 milliGRAM(s) Oral every 24 hours  atorvastatin 5 milliGRAM(s) Oral at bedtime  carbidopa/levodopa  25/100 2.5 Tablet(s) Oral every 8 hours  chlorhexidine 2% Cloths 1 Application(s) Topical daily  diltiazem    Tablet 60 milliGRAM(s) Oral every 6 hours  enoxaparin Injectable 40 milliGRAM(s) SubCutaneous every 24 hours  famotidine    Tablet 20 milliGRAM(s) Oral every 24 hours  insulin lispro (ADMELOG) corrective regimen sliding scale   SubCutaneous every 6 hours  ipratropium    for Nebulization 500 MICROGram(s) Nebulizer every 6 hours  lactobacillus acidophilus 1 Tablet(s) Oral daily  losartan 100 milliGRAM(s) Oral every 24 hours  sertraline 50 milliGRAM(s) Oral every 24 hours  sodium chloride 3%  Inhalation 4 milliLiter(s) Inhalation every 6 hours    MEDICATIONS  (PRN):  acetaminophen     Tablet .. 650 milliGRAM(s) Oral every 6 hours PRN Temp greater or equal to 38C (100.4F), Mild Pain (1 - 3)  melatonin 3 milliGRAM(s) Oral at bedtime PRN Insomnia      ALLERGIES:  Allergies    No Known Allergies    Intolerances        LABS:                        8.8    8.34  )-----------( 168      ( 22 Jun 2023 05:30 )             28.7     06-22    140  |  100  |  13  ----------------------------<  159<H>  4.4   |  33<H>  |  0.66    Ca    9.5      22 Jun 2023 05:30  Phos  2.6     06-22  Mg     1.7     06-22        Urinalysis Basic - ( 22 Jun 2023 05:30 )    Color: x / Appearance: x / SG: x / pH: x  Gluc: 159 mg/dL / Ketone: x  / Bili: x / Urobili: x   Blood: x / Protein: x / Nitrite: x   Leuk Esterase: x / RBC: x / WBC x   Sq Epi: x / Non Sq Epi: x / Bacteria: x      CAPILLARY BLOOD GLUCOSE      POCT Blood Glucose.: 305 mg/dL (23 Jun 2023 12:14)      RADIOLOGY & ADDITIONAL TESTS: Reviewed.

## 2023-06-23 NOTE — PROGRESS NOTE ADULT - PROBLEM SELECTOR PROBLEM 2
Aspiration pneumonia
Acute hypoxemic respiratory failure
Aspiration pneumonia

## 2023-06-23 NOTE — PROGRESS NOTE ADULT - PROBLEM SELECTOR PLAN 5
.  Complex medical decision making / symptom management in the setting of critical illness.    Emotional support provided, questions answered.  Active Psychosocial Referrals:  [x]Social Work/Case management [x]PT/OT []Chaplaincy [x]Hospice  []Patient/Family Support []Holistic RN []Massage Therapy []Music Therapy []Ethics  Coping: [x] well [] with difficulty [] poor coping [] unable to assess  Support system: [x] strong [] adequate [] inadequate    For new or uncontrolled symptoms, please call Palliative Care at [phone]). The service is available 24/7 (including nights & weekends) to provide symptom management recommendations over the phone as appropriate

## 2023-06-23 NOTE — PROGRESS NOTE ADULT - PROBLEM SELECTOR PLAN 4
.  Patient is DNR/DNI, MOLST in chart  -see GOC note  -patient is hospice eligible, family is amenable to referral -> tentative plan for discharge tomorrow

## 2023-06-23 NOTE — PROGRESS NOTE ADULT - REASON FOR ADMISSION
afib with RVR
Infection/atrial fibrillation
afib with RVR

## 2023-06-23 NOTE — PROGRESS NOTE ADULT - TIME BILLING
case complexity as above.
case complexity as above.
Emotional Support/Supportive Care and Clarification of Potential Disease Trajectory related to Advanced Illness  Assessment of Symptom [place] and Palliative regimen  Discharge Facilitation with ongoing coordination  Exploration of GOC including advanced directives such as hospice referral    Time inclusive of chart review, medication ordering, discussion with primary team, clinical documentation, and communication with family/caregiver
Emotional Support/Supportive Care and Clarification of Potential Disease Trajectory related to Parkinson's  Assessment of Symptom [place] and Palliative regimen  Discharge Facilitation with ongoing coordination    Time inclusive of chart review, medication ordering, discussion with primary team, clinical documentation, and communication with family/caregiver

## 2023-06-23 NOTE — DISCHARGE NOTE PROVIDER - NSDCMRMEDTOKEN_GEN_ALL_CORE_FT
aspirin 81 mg oral capsule: 1 cap(s) orally once a day  atorvastatin 10 mg oral tablet: 0.5 tab(s) orally once a day  ferrous sulfate 324 mg (65 mg elemental iron) oral delayed release tablet: 1 tab(s) orally once a day  losartan 100 mg oral tablet: 1 tab(s) orally once a day  metFORMIN 500 mg oral tablet: 500  orally 2 times a day  metoprolol succinate 25 mg oral tablet, extended release: 1 orally once a day  omeprazole 20 mg oral delayed release capsule: 1 cap(s) orally once a day  sertraline 100 mg oral tablet: 1 tab(s) orally once a day  Sinemet 25 mg-100 mg oral tablet: 2.5 tab(s) orally 3 times a day   acetaminophen 325 mg oral tablet: 2 tab(s) orally every 6 hours As needed Temp greater or equal to 38C (100.4F), Mild Pain (1 - 3)  amiodarone 200 mg oral tablet: 1 tab(s) orally every 24 hours  aspirin 81 mg oral capsule: 1 cap(s) orally once a day  atorvastatin 10 mg oral tablet: 0.5 tab(s) orally once a day  dilTIAZem 60 mg oral tablet: 1 tab(s) orally every 6 hours  famotidine 20 mg oral tablet: 1 tab(s) orally every 24 hours  lactobacillus acidophilus oral capsule: 1 tab(s) orally once a day  losartan 100 mg oral tablet: 1 tab(s) orally once a day  sertraline 100 mg oral tablet: 1 tab(s) orally once a day  Sinemet 25 mg-100 mg oral tablet: 2.5 tab(s) orally 3 times a day   acetaminophen 325 mg oral tablet: 2 tab(s) orally every 6 hours As needed Temp greater or equal to 38C (100.4F), Mild Pain (1 - 3)  acetylcysteine 20% inhalation solution: 4 milliliter(s) inhaled every 6 hours  amiodarone 200 mg oral tablet: 1 tab(s) orally every 24 hours  aspirin 81 mg oral capsule: 1 cap(s) orally once a day  atorvastatin 10 mg oral tablet: 0.5 tab(s) orally once a day  dilTIAZem 60 mg oral tablet: 1 tab(s) orally every 6 hours  famotidine 20 mg oral tablet: 1 tab(s) orally every 24 hours  ipratropium 500 mcg/2.5 mL inhalation solution: 2.5 milliliter(s) inhaled every 6 hours  lactobacillus acidophilus oral capsule: 1 tab(s) orally once a day  losartan 100 mg oral tablet: 1 tab(s) orally once a day  sertraline 100 mg oral tablet: 1 tab(s) orally once a day  Sinemet 25 mg-100 mg oral tablet: 2.5 tab(s) orally 3 times a day  sodium chloride 3% inhalation solution: 4 milliliter(s) inhaled every 6 hours

## 2023-06-24 VITALS — SYSTOLIC BLOOD PRESSURE: 146 MMHG | DIASTOLIC BLOOD PRESSURE: 84 MMHG | OXYGEN SATURATION: 95 % | HEART RATE: 88 BPM

## 2023-06-24 DIAGNOSIS — Z71.89 OTHER SPECIFIED COUNSELING: ICD-10-CM

## 2023-06-24 DIAGNOSIS — R53.81 OTHER MALAISE: ICD-10-CM

## 2023-06-24 DIAGNOSIS — Z51.5 ENCOUNTER FOR PALLIATIVE CARE: ICD-10-CM

## 2023-06-24 LAB
GLUCOSE BLDC GLUCOMTR-MCNC: 205 MG/DL — HIGH (ref 70–99)
GLUCOSE BLDC GLUCOMTR-MCNC: 234 MG/DL — HIGH (ref 70–99)

## 2023-06-24 PROCEDURE — 84295 ASSAY OF SERUM SODIUM: CPT

## 2023-06-24 PROCEDURE — 84443 ASSAY THYROID STIM HORMONE: CPT

## 2023-06-24 PROCEDURE — 92526 ORAL FUNCTION THERAPY: CPT

## 2023-06-24 PROCEDURE — 92612 ENDOSCOPY SWALLOW (FEES) VID: CPT

## 2023-06-24 PROCEDURE — 83735 ASSAY OF MAGNESIUM: CPT

## 2023-06-24 PROCEDURE — 87637 SARSCOV2&INF A&B&RSV AMP PRB: CPT

## 2023-06-24 PROCEDURE — 87899 AGENT NOS ASSAY W/OPTIC: CPT

## 2023-06-24 PROCEDURE — 99291 CRITICAL CARE FIRST HOUR: CPT

## 2023-06-24 PROCEDURE — 85025 COMPLETE CBC W/AUTO DIFF WBC: CPT

## 2023-06-24 PROCEDURE — 82962 GLUCOSE BLOOD TEST: CPT

## 2023-06-24 PROCEDURE — 87086 URINE CULTURE/COLONY COUNT: CPT

## 2023-06-24 PROCEDURE — 84145 PROCALCITONIN (PCT): CPT

## 2023-06-24 PROCEDURE — 87449 NOS EACH ORGANISM AG IA: CPT

## 2023-06-24 PROCEDURE — 80048 BASIC METABOLIC PNL TOTAL CA: CPT

## 2023-06-24 PROCEDURE — 85610 PROTHROMBIN TIME: CPT

## 2023-06-24 PROCEDURE — 87040 BLOOD CULTURE FOR BACTERIA: CPT

## 2023-06-24 PROCEDURE — 82330 ASSAY OF CALCIUM: CPT

## 2023-06-24 PROCEDURE — 83935 ASSAY OF URINE OSMOLALITY: CPT

## 2023-06-24 PROCEDURE — 86901 BLOOD TYPING SEROLOGIC RH(D): CPT

## 2023-06-24 PROCEDURE — 84484 ASSAY OF TROPONIN QUANT: CPT

## 2023-06-24 PROCEDURE — 93005 ELECTROCARDIOGRAM TRACING: CPT

## 2023-06-24 PROCEDURE — 82803 BLOOD GASES ANY COMBINATION: CPT

## 2023-06-24 PROCEDURE — 87640 STAPH A DNA AMP PROBE: CPT

## 2023-06-24 PROCEDURE — 83605 ASSAY OF LACTIC ACID: CPT

## 2023-06-24 PROCEDURE — 84466 ASSAY OF TRANSFERRIN: CPT

## 2023-06-24 PROCEDURE — 84132 ASSAY OF SERUM POTASSIUM: CPT

## 2023-06-24 PROCEDURE — 0225U NFCT DS DNA&RNA 21 SARSCOV2: CPT

## 2023-06-24 PROCEDURE — 94640 AIRWAY INHALATION TREATMENT: CPT

## 2023-06-24 PROCEDURE — 93306 TTE W/DOPPLER COMPLETE: CPT

## 2023-06-24 PROCEDURE — 82728 ASSAY OF FERRITIN: CPT

## 2023-06-24 PROCEDURE — 87641 MR-STAPH DNA AMP PROBE: CPT

## 2023-06-24 PROCEDURE — 81001 URINALYSIS AUTO W/SCOPE: CPT

## 2023-06-24 PROCEDURE — 83550 IRON BINDING TEST: CPT

## 2023-06-24 PROCEDURE — 84100 ASSAY OF PHOSPHORUS: CPT

## 2023-06-24 PROCEDURE — 85027 COMPLETE CBC AUTOMATED: CPT

## 2023-06-24 PROCEDURE — 86900 BLOOD TYPING SEROLOGIC ABO: CPT

## 2023-06-24 PROCEDURE — 83880 ASSAY OF NATRIURETIC PEPTIDE: CPT

## 2023-06-24 PROCEDURE — 80053 COMPREHEN METABOLIC PANEL: CPT

## 2023-06-24 PROCEDURE — 85730 THROMBOPLASTIN TIME PARTIAL: CPT

## 2023-06-24 PROCEDURE — 80202 ASSAY OF VANCOMYCIN: CPT

## 2023-06-24 PROCEDURE — 86850 RBC ANTIBODY SCREEN: CPT

## 2023-06-24 PROCEDURE — 87070 CULTURE OTHR SPECIMN AEROBIC: CPT

## 2023-06-24 PROCEDURE — 83540 ASSAY OF IRON: CPT

## 2023-06-24 PROCEDURE — 99239 HOSP IP/OBS DSCHRG MGMT >30: CPT

## 2023-06-24 PROCEDURE — 71045 X-RAY EXAM CHEST 1 VIEW: CPT

## 2023-06-24 PROCEDURE — 84300 ASSAY OF URINE SODIUM: CPT

## 2023-06-24 PROCEDURE — 36415 COLL VENOUS BLD VENIPUNCTURE: CPT

## 2023-06-24 RX ADMIN — Medication 4 MILLILITER(S): at 00:27

## 2023-06-24 RX ADMIN — Medication 60 MILLIGRAM(S): at 06:19

## 2023-06-24 RX ADMIN — SODIUM CHLORIDE 4 MILLILITER(S): 9 INJECTION INTRAMUSCULAR; INTRAVENOUS; SUBCUTANEOUS at 06:20

## 2023-06-24 RX ADMIN — FAMOTIDINE 20 MILLIGRAM(S): 10 INJECTION INTRAVENOUS at 06:19

## 2023-06-24 RX ADMIN — Medication 3: at 00:23

## 2023-06-24 RX ADMIN — CARBIDOPA AND LEVODOPA 2.5 TABLET(S): 25; 100 TABLET ORAL at 06:15

## 2023-06-24 RX ADMIN — Medication 500 MICROGRAM(S): at 06:20

## 2023-06-24 RX ADMIN — Medication 2: at 06:24

## 2023-06-24 RX ADMIN — Medication 4 MILLILITER(S): at 05:58

## 2023-06-25 ENCOUNTER — TRANSCRIPTION ENCOUNTER (OUTPATIENT)
Age: 88
End: 2023-06-25

## 2023-06-28 LAB — GLUCOSE BLDC GLUCOMTR-MCNC: 280 MG/DL — HIGH (ref 70–99)

## 2023-07-03 DIAGNOSIS — J15.6 PNEUMONIA DUE TO OTHER GRAM-NEGATIVE BACTERIA: ICD-10-CM

## 2023-07-03 DIAGNOSIS — J96.01 ACUTE RESPIRATORY FAILURE WITH HYPOXIA: ICD-10-CM

## 2023-07-03 DIAGNOSIS — N39.0 URINARY TRACT INFECTION, SITE NOT SPECIFIED: ICD-10-CM

## 2023-07-03 DIAGNOSIS — I10 ESSENTIAL (PRIMARY) HYPERTENSION: ICD-10-CM

## 2023-07-03 DIAGNOSIS — A41.9 SEPSIS, UNSPECIFIED ORGANISM: ICD-10-CM

## 2023-07-03 DIAGNOSIS — Z51.5 ENCOUNTER FOR PALLIATIVE CARE: ICD-10-CM

## 2023-07-03 DIAGNOSIS — E83.42 HYPOMAGNESEMIA: ICD-10-CM

## 2023-07-03 DIAGNOSIS — I27.20 PULMONARY HYPERTENSION, UNSPECIFIED: ICD-10-CM

## 2023-07-03 DIAGNOSIS — F02.80 DEMENTIA IN OTHER DISEASES CLASSIFIED ELSEWHERE, UNSPECIFIED SEVERITY, WITHOUT BEHAVIORAL DISTURBANCE, PSYCHOTIC DISTURBANCE, MOOD DISTURBANCE, AND ANXIETY: ICD-10-CM

## 2023-07-03 DIAGNOSIS — B34.2 CORONAVIRUS INFECTION, UNSPECIFIED: ICD-10-CM

## 2023-07-03 DIAGNOSIS — E78.5 HYPERLIPIDEMIA, UNSPECIFIED: ICD-10-CM

## 2023-07-03 DIAGNOSIS — E87.1 HYPO-OSMOLALITY AND HYPONATREMIA: ICD-10-CM

## 2023-07-03 DIAGNOSIS — E86.0 DEHYDRATION: ICD-10-CM

## 2023-07-03 DIAGNOSIS — J69.0 PNEUMONITIS DUE TO INHALATION OF FOOD AND VOMIT: ICD-10-CM

## 2023-07-03 DIAGNOSIS — Z66 DO NOT RESUSCITATE: ICD-10-CM

## 2023-07-03 DIAGNOSIS — G30.9 ALZHEIMER'S DISEASE, UNSPECIFIED: ICD-10-CM

## 2023-07-03 DIAGNOSIS — Z85.118 PERSONAL HISTORY OF OTHER MALIGNANT NEOPLASM OF BRONCHUS AND LUNG: ICD-10-CM

## 2023-07-03 DIAGNOSIS — I48.91 UNSPECIFIED ATRIAL FIBRILLATION: ICD-10-CM

## 2023-07-03 DIAGNOSIS — E11.9 TYPE 2 DIABETES MELLITUS WITHOUT COMPLICATIONS: ICD-10-CM

## 2023-07-03 DIAGNOSIS — Z22.322 CARRIER OR SUSPECTED CARRIER OF METHICILLIN RESISTANT STAPHYLOCOCCUS AUREUS: ICD-10-CM

## 2023-07-03 DIAGNOSIS — I08.2 RHEUMATIC DISORDERS OF BOTH AORTIC AND TRICUSPID VALVES: ICD-10-CM

## 2023-07-03 DIAGNOSIS — Z79.84 LONG TERM (CURRENT) USE OF ORAL HYPOGLYCEMIC DRUGS: ICD-10-CM

## 2023-07-03 DIAGNOSIS — G20 PARKINSON'S DISEASE: ICD-10-CM

## 2023-08-13 ENCOUNTER — INPATIENT (INPATIENT)
Facility: HOSPITAL | Age: 88
LOS: 2 days | Discharge: HOSPICE | DRG: 871 | End: 2023-08-16
Attending: STUDENT IN AN ORGANIZED HEALTH CARE EDUCATION/TRAINING PROGRAM | Admitting: STUDENT IN AN ORGANIZED HEALTH CARE EDUCATION/TRAINING PROGRAM
Payer: OTHER MISCELLANEOUS

## 2023-08-13 VITALS
SYSTOLIC BLOOD PRESSURE: 187 MMHG | RESPIRATION RATE: 16 BRPM | DIASTOLIC BLOOD PRESSURE: 90 MMHG | HEART RATE: 150 BPM | OXYGEN SATURATION: 100 %

## 2023-08-13 DIAGNOSIS — Z29.9 ENCOUNTER FOR PROPHYLACTIC MEASURES, UNSPECIFIED: ICD-10-CM

## 2023-08-13 DIAGNOSIS — E11.9 TYPE 2 DIABETES MELLITUS WITHOUT COMPLICATIONS: ICD-10-CM

## 2023-08-13 DIAGNOSIS — Z98.890 OTHER SPECIFIED POSTPROCEDURAL STATES: Chronic | ICD-10-CM

## 2023-08-13 DIAGNOSIS — A41.9 SEPSIS, UNSPECIFIED ORGANISM: ICD-10-CM

## 2023-08-13 DIAGNOSIS — I10 ESSENTIAL (PRIMARY) HYPERTENSION: ICD-10-CM

## 2023-08-13 DIAGNOSIS — Z90.49 ACQUIRED ABSENCE OF OTHER SPECIFIED PARTS OF DIGESTIVE TRACT: Chronic | ICD-10-CM

## 2023-08-13 DIAGNOSIS — I48.91 UNSPECIFIED ATRIAL FIBRILLATION: ICD-10-CM

## 2023-08-13 DIAGNOSIS — G20 PARKINSON'S DISEASE: ICD-10-CM

## 2023-08-13 DIAGNOSIS — E78.5 HYPERLIPIDEMIA, UNSPECIFIED: ICD-10-CM

## 2023-08-13 DIAGNOSIS — Z90.2 ACQUIRED ABSENCE OF LUNG [PART OF]: Chronic | ICD-10-CM

## 2023-08-13 LAB
ANION GAP SERPL CALC-SCNC: 12 MMOL/L — SIGNIFICANT CHANGE UP (ref 5–17)
ANISOCYTOSIS BLD QL: SIGNIFICANT CHANGE UP
APPEARANCE UR: ABNORMAL
BACTERIA # UR AUTO: PRESENT /HPF
BASE EXCESS BLDV CALC-SCNC: 6.3 MMOL/L — HIGH (ref -2–3)
BASOPHILS # BLD AUTO: 0.13 K/UL — SIGNIFICANT CHANGE UP (ref 0–0.2)
BASOPHILS NFR BLD AUTO: 0.9 % — SIGNIFICANT CHANGE UP (ref 0–2)
BILIRUB UR-MCNC: NEGATIVE — SIGNIFICANT CHANGE UP
BUN SERPL-MCNC: 32 MG/DL — HIGH (ref 7–23)
BURR CELLS BLD QL SMEAR: PRESENT — SIGNIFICANT CHANGE UP
CA-I SERPL-SCNC: 1.39 MMOL/L — HIGH (ref 1.15–1.33)
CALCIUM SERPL-MCNC: 11.3 MG/DL — HIGH (ref 8.4–10.5)
CHLORIDE SERPL-SCNC: 94 MMOL/L — LOW (ref 96–108)
CO2 BLDV-SCNC: 34.8 MMOL/L — HIGH (ref 22–26)
CO2 SERPL-SCNC: 31 MMOL/L — SIGNIFICANT CHANGE UP (ref 22–31)
COD CRY URNS QL: ABNORMAL /HPF
COLOR SPEC: YELLOW — SIGNIFICANT CHANGE UP
COMMENT - URINE: SIGNIFICANT CHANGE UP
CREAT SERPL-MCNC: 0.61 MG/DL — SIGNIFICANT CHANGE UP (ref 0.5–1.3)
DACRYOCYTES BLD QL SMEAR: SLIGHT — SIGNIFICANT CHANGE UP
DIFF PNL FLD: ABNORMAL
EGFR: 86 ML/MIN/1.73M2 — SIGNIFICANT CHANGE UP
EOSINOPHIL # BLD AUTO: 0 K/UL — SIGNIFICANT CHANGE UP (ref 0–0.5)
EOSINOPHIL NFR BLD AUTO: 0 % — SIGNIFICANT CHANGE UP (ref 0–6)
EPI CELLS # UR: ABNORMAL /HPF (ref 0–5)
FLUAV AG NPH QL: SIGNIFICANT CHANGE UP
FLUBV AG NPH QL: SIGNIFICANT CHANGE UP
GAS PNL BLDV: 133 MMOL/L — LOW (ref 136–145)
GAS PNL BLDV: SIGNIFICANT CHANGE UP
GLUCOSE BLDC GLUCOMTR-MCNC: 232 MG/DL — HIGH (ref 70–99)
GLUCOSE SERPL-MCNC: 305 MG/DL — HIGH (ref 70–99)
GLUCOSE UR QL: 250
GRAN CASTS # UR COMP ASSIST: ABNORMAL /LPF
HCO3 BLDV-SCNC: 33 MMOL/L — HIGH (ref 22–29)
HCT VFR BLD CALC: 30.2 % — LOW (ref 34.5–45)
HGB BLD-MCNC: 9.4 G/DL — LOW (ref 11.5–15.5)
HYALINE CASTS # UR AUTO: SIGNIFICANT CHANGE UP /LPF (ref 0–2)
HYPOCHROMIA BLD QL: SLIGHT — SIGNIFICANT CHANGE UP
KETONES UR-MCNC: ABNORMAL MG/DL
LACTATE SERPL-SCNC: 2.6 MMOL/L — HIGH (ref 0.5–2)
LACTATE SERPL-SCNC: 2.9 MMOL/L — HIGH (ref 0.5–2)
LEUKOCYTE ESTERASE UR-ACNC: NEGATIVE — SIGNIFICANT CHANGE UP
LYMPHOCYTES # BLD AUTO: 0.38 K/UL — LOW (ref 1–3.3)
LYMPHOCYTES # BLD AUTO: 2.7 % — LOW (ref 13–44)
MANUAL SMEAR VERIFICATION: SIGNIFICANT CHANGE UP
MCHC RBC-ENTMCNC: 23.9 PG — LOW (ref 27–34)
MCHC RBC-ENTMCNC: 31.1 GM/DL — LOW (ref 32–36)
MCV RBC AUTO: 76.8 FL — LOW (ref 80–100)
MICROCYTES BLD QL: SIGNIFICANT CHANGE UP
MONOCYTES # BLD AUTO: 0.25 K/UL — SIGNIFICANT CHANGE UP (ref 0–0.9)
MONOCYTES NFR BLD AUTO: 1.8 % — LOW (ref 2–14)
MYELOCYTES NFR BLD: 0.9 % — HIGH (ref 0–0)
NEUTROPHILS # BLD AUTO: 13.18 K/UL — HIGH (ref 1.8–7.4)
NEUTROPHILS NFR BLD AUTO: 90 % — HIGH (ref 43–77)
NEUTS BAND # BLD: 3.7 % — SIGNIFICANT CHANGE UP (ref 0–8)
NITRITE UR-MCNC: NEGATIVE — SIGNIFICANT CHANGE UP
NT-PROBNP SERPL-SCNC: 1130 PG/ML — HIGH (ref 0–300)
OVALOCYTES BLD QL SMEAR: SLIGHT — SIGNIFICANT CHANGE UP
PCO2 BLDV: 56 MMHG — HIGH (ref 39–42)
PH BLDV: 7.38 — SIGNIFICANT CHANGE UP (ref 7.32–7.43)
PH UR: 5.5 — SIGNIFICANT CHANGE UP (ref 5–8)
PLAT MORPH BLD: ABNORMAL
PLATELET # BLD AUTO: 286 K/UL — SIGNIFICANT CHANGE UP (ref 150–400)
PO2 BLDV: 59 MMHG — HIGH (ref 25–45)
POIKILOCYTOSIS BLD QL AUTO: SIGNIFICANT CHANGE UP
POLYCHROMASIA BLD QL SMEAR: SLIGHT — SIGNIFICANT CHANGE UP
POTASSIUM BLDV-SCNC: 4.5 MMOL/L — SIGNIFICANT CHANGE UP (ref 3.5–5.1)
POTASSIUM SERPL-MCNC: 4.4 MMOL/L — SIGNIFICANT CHANGE UP (ref 3.5–5.3)
POTASSIUM SERPL-SCNC: 4.4 MMOL/L — SIGNIFICANT CHANGE UP (ref 3.5–5.3)
PROCALCITONIN SERPL-MCNC: 0.18 NG/ML — HIGH (ref 0.02–0.1)
PROT UR-MCNC: >=300 MG/DL
RBC # BLD: 3.93 M/UL — SIGNIFICANT CHANGE UP (ref 3.8–5.2)
RBC # FLD: 17.2 % — HIGH (ref 10.3–14.5)
RBC BLD AUTO: ABNORMAL
RBC CASTS # UR COMP ASSIST: ABNORMAL /HPF
RSV RNA NPH QL NAA+NON-PROBE: SIGNIFICANT CHANGE UP
SAO2 % BLDV: 89.2 % — HIGH (ref 67–88)
SARS-COV-2 RNA SPEC QL NAA+PROBE: SIGNIFICANT CHANGE UP
SCHISTOCYTES BLD QL AUTO: SIGNIFICANT CHANGE UP
SODIUM SERPL-SCNC: 137 MMOL/L — SIGNIFICANT CHANGE UP (ref 135–145)
SP GR SPEC: >=1.03 — SIGNIFICANT CHANGE UP (ref 1–1.03)
SPHEROCYTES BLD QL SMEAR: SLIGHT — SIGNIFICANT CHANGE UP
UROBILINOGEN FLD QL: 1 E.U./DL — SIGNIFICANT CHANGE UP
WBC # BLD: 14.07 K/UL — HIGH (ref 3.8–10.5)
WBC # FLD AUTO: 14.07 K/UL — HIGH (ref 3.8–10.5)
WBC UR QL: < 5 /HPF — SIGNIFICANT CHANGE UP

## 2023-08-13 PROCEDURE — 71045 X-RAY EXAM CHEST 1 VIEW: CPT | Mod: 26,GW

## 2023-08-13 PROCEDURE — 99291 CRITICAL CARE FIRST HOUR: CPT

## 2023-08-13 RX ORDER — SENNOSIDES/DOCUSATE SODIUM 8.6MG-50MG
1 TABLET ORAL
Refills: 0 | DISCHARGE

## 2023-08-13 RX ORDER — CARBIDOPA AND LEVODOPA 25; 100 MG/1; MG/1
2.5 TABLET ORAL EVERY 8 HOURS
Refills: 0 | Status: DISCONTINUED | OUTPATIENT
Start: 2023-08-13 | End: 2023-08-13

## 2023-08-13 RX ORDER — METOPROLOL TARTRATE 50 MG
25 TABLET ORAL EVERY 24 HOURS
Refills: 0 | Status: DISCONTINUED | OUTPATIENT
Start: 2023-08-14 | End: 2023-08-15

## 2023-08-13 RX ORDER — PIPERACILLIN AND TAZOBACTAM 4; .5 G/20ML; G/20ML
3.38 INJECTION, POWDER, LYOPHILIZED, FOR SOLUTION INTRAVENOUS EVERY 8 HOURS
Refills: 0 | Status: DISCONTINUED | OUTPATIENT
Start: 2023-08-13 | End: 2023-08-14

## 2023-08-13 RX ORDER — AMIODARONE HYDROCHLORIDE 400 MG/1
200 TABLET ORAL DAILY
Refills: 0 | Status: DISCONTINUED | OUTPATIENT
Start: 2023-08-14 | End: 2023-08-16

## 2023-08-13 RX ORDER — CARBIDOPA AND LEVODOPA 25; 100 MG/1; MG/1
2.5 TABLET ORAL
Refills: 0 | Status: DISCONTINUED | OUTPATIENT
Start: 2023-08-14 | End: 2023-08-16

## 2023-08-13 RX ORDER — ATORVASTATIN CALCIUM 80 MG/1
5 TABLET, FILM COATED ORAL AT BEDTIME
Refills: 0 | Status: DISCONTINUED | OUTPATIENT
Start: 2023-08-14 | End: 2023-08-16

## 2023-08-13 RX ORDER — GLUCAGON INJECTION, SOLUTION 0.5 MG/.1ML
1 INJECTION, SOLUTION SUBCUTANEOUS ONCE
Refills: 0 | Status: DISCONTINUED | OUTPATIENT
Start: 2023-08-13 | End: 2023-08-16

## 2023-08-13 RX ORDER — DEXTROSE 50 % IN WATER 50 %
15 SYRINGE (ML) INTRAVENOUS ONCE
Refills: 0 | Status: DISCONTINUED | OUTPATIENT
Start: 2023-08-13 | End: 2023-08-16

## 2023-08-13 RX ORDER — METFORMIN HYDROCHLORIDE 850 MG/1
1 TABLET ORAL
Refills: 0 | DISCHARGE

## 2023-08-13 RX ORDER — VANCOMYCIN HCL 1 G
750 VIAL (EA) INTRAVENOUS ONCE
Refills: 0 | Status: COMPLETED | OUTPATIENT
Start: 2023-08-13 | End: 2023-08-13

## 2023-08-13 RX ORDER — ACETAMINOPHEN 500 MG
650 TABLET ORAL ONCE
Refills: 0 | Status: COMPLETED | OUTPATIENT
Start: 2023-08-13 | End: 2023-08-13

## 2023-08-13 RX ORDER — SERTRALINE 25 MG/1
100 TABLET, FILM COATED ORAL DAILY
Refills: 0 | Status: DISCONTINUED | OUTPATIENT
Start: 2023-08-14 | End: 2023-08-16

## 2023-08-13 RX ORDER — DEXTROSE 50 % IN WATER 50 %
25 SYRINGE (ML) INTRAVENOUS ONCE
Refills: 0 | Status: DISCONTINUED | OUTPATIENT
Start: 2023-08-13 | End: 2023-08-16

## 2023-08-13 RX ORDER — INSULIN LISPRO 100/ML
VIAL (ML) SUBCUTANEOUS EVERY 6 HOURS
Refills: 0 | Status: DISCONTINUED | OUTPATIENT
Start: 2023-08-13 | End: 2023-08-14

## 2023-08-13 RX ORDER — ACETYLCYSTEINE 200 MG/ML
4 VIAL (ML) MISCELLANEOUS EVERY 4 HOURS
Refills: 0 | Status: DISCONTINUED | OUTPATIENT
Start: 2023-08-14 | End: 2023-08-14

## 2023-08-13 RX ORDER — SODIUM CHLORIDE 9 MG/ML
4 INJECTION INTRAMUSCULAR; INTRAVENOUS; SUBCUTANEOUS EVERY 6 HOURS
Refills: 0 | Status: DISCONTINUED | OUTPATIENT
Start: 2023-08-13 | End: 2023-08-16

## 2023-08-13 RX ORDER — DEXTROSE 50 % IN WATER 50 %
12.5 SYRINGE (ML) INTRAVENOUS ONCE
Refills: 0 | Status: DISCONTINUED | OUTPATIENT
Start: 2023-08-13 | End: 2023-08-16

## 2023-08-13 RX ORDER — VANCOMYCIN HCL 1 G
850 VIAL (EA) INTRAVENOUS ONCE
Refills: 0 | Status: DISCONTINUED | OUTPATIENT
Start: 2023-08-13 | End: 2023-08-13

## 2023-08-13 RX ORDER — ENOXAPARIN SODIUM 100 MG/ML
40 INJECTION SUBCUTANEOUS EVERY 24 HOURS
Refills: 0 | Status: DISCONTINUED | OUTPATIENT
Start: 2023-08-14 | End: 2023-08-16

## 2023-08-13 RX ORDER — CARBIDOPA AND LEVODOPA 25; 100 MG/1; MG/1
1 TABLET ORAL
Refills: 0 | DISCHARGE

## 2023-08-13 RX ORDER — LOSARTAN POTASSIUM 100 MG/1
100 TABLET, FILM COATED ORAL DAILY
Refills: 0 | Status: DISCONTINUED | OUTPATIENT
Start: 2023-08-13 | End: 2023-08-16

## 2023-08-13 RX ORDER — ASPIRIN/CALCIUM CARB/MAGNESIUM 324 MG
81 TABLET ORAL DAILY
Refills: 0 | Status: DISCONTINUED | OUTPATIENT
Start: 2023-08-13 | End: 2023-08-16

## 2023-08-13 RX ORDER — SODIUM CHLORIDE 9 MG/ML
1650 INJECTION INTRAMUSCULAR; INTRAVENOUS; SUBCUTANEOUS ONCE
Refills: 0 | Status: COMPLETED | OUTPATIENT
Start: 2023-08-13 | End: 2023-08-13

## 2023-08-13 RX ORDER — PIPERACILLIN AND TAZOBACTAM 4; .5 G/20ML; G/20ML
3.38 INJECTION, POWDER, LYOPHILIZED, FOR SOLUTION INTRAVENOUS ONCE
Refills: 0 | Status: COMPLETED | OUTPATIENT
Start: 2023-08-13 | End: 2023-08-13

## 2023-08-13 RX ORDER — SODIUM CHLORIDE 9 MG/ML
1000 INJECTION, SOLUTION INTRAVENOUS
Refills: 0 | Status: DISCONTINUED | OUTPATIENT
Start: 2023-08-13 | End: 2023-08-16

## 2023-08-13 RX ORDER — FAMOTIDINE 10 MG/ML
20 INJECTION INTRAVENOUS DAILY
Refills: 0 | Status: DISCONTINUED | OUTPATIENT
Start: 2023-08-13 | End: 2023-08-13

## 2023-08-13 RX ORDER — IPRATROPIUM/ALBUTEROL SULFATE 18-103MCG
3 AEROSOL WITH ADAPTER (GRAM) INHALATION EVERY 6 HOURS
Refills: 0 | Status: DISCONTINUED | OUTPATIENT
Start: 2023-08-13 | End: 2023-08-16

## 2023-08-13 RX ORDER — VANCOMYCIN HCL 1 G
750 VIAL (EA) INTRAVENOUS EVERY 12 HOURS
Refills: 0 | Status: DISCONTINUED | OUTPATIENT
Start: 2023-08-14 | End: 2023-08-15

## 2023-08-13 RX ORDER — PANTOPRAZOLE SODIUM 20 MG/1
40 TABLET, DELAYED RELEASE ORAL
Refills: 0 | Status: DISCONTINUED | OUTPATIENT
Start: 2023-08-14 | End: 2023-08-16

## 2023-08-13 RX ADMIN — SODIUM CHLORIDE 1650 MILLILITER(S): 9 INJECTION INTRAMUSCULAR; INTRAVENOUS; SUBCUTANEOUS at 16:03

## 2023-08-13 RX ADMIN — PIPERACILLIN AND TAZOBACTAM 25 GRAM(S): 4; .5 INJECTION, POWDER, LYOPHILIZED, FOR SOLUTION INTRAVENOUS at 21:26

## 2023-08-13 RX ADMIN — SODIUM CHLORIDE 1650 MILLILITER(S): 9 INJECTION INTRAMUSCULAR; INTRAVENOUS; SUBCUTANEOUS at 14:37

## 2023-08-13 RX ADMIN — Medication 250 MILLIGRAM(S): at 14:59

## 2023-08-13 RX ADMIN — SODIUM CHLORIDE 4 MILLILITER(S): 9 INJECTION INTRAMUSCULAR; INTRAVENOUS; SUBCUTANEOUS at 21:34

## 2023-08-13 RX ADMIN — Medication 650 MILLIGRAM(S): at 16:03

## 2023-08-13 RX ADMIN — PIPERACILLIN AND TAZOBACTAM 3.38 GRAM(S): 4; .5 INJECTION, POWDER, LYOPHILIZED, FOR SOLUTION INTRAVENOUS at 16:03

## 2023-08-13 RX ADMIN — PIPERACILLIN AND TAZOBACTAM 200 GRAM(S): 4; .5 INJECTION, POWDER, LYOPHILIZED, FOR SOLUTION INTRAVENOUS at 14:37

## 2023-08-13 RX ADMIN — Medication 650 MILLIGRAM(S): at 14:22

## 2023-08-13 RX ADMIN — Medication 3 MILLILITER(S): at 21:33

## 2023-08-13 RX ADMIN — Medication 750 MILLIGRAM(S): at 16:03

## 2023-08-13 NOTE — ED PROVIDER NOTE - CARE PLAN
1 Principal Discharge DX:	Sepsis   Principal Discharge DX:	Sepsis  Secondary Diagnosis:	Sepsis with acute hypoxic respiratory failure

## 2023-08-13 NOTE — CONSULT NOTE ADULT - ASSESSMENT
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       NEURO:  #Parkinson disease.   #Alzheimer's disease.   Patient with both Parkinsons disease and Alzheimers dementia. On home keppra 500mg q12, sertraline 50mg qd, and sinemet 25-100mg x 2.5 tabs q8. AAOx1-2, reportedly at baseline mental status.  - FEES: revealed severe oral and mild pharyngeal phase deficits. Oral phase deficits impacted processing speed, efficiency, A-P transport, and clearance. Despite severity of oral phase deficits, airway protection was not impacted.  Plan:  - C/w sinemet 25/100 mg x 2.5 tabs PO   - C/w sertraline 50 mg PO   - pureed diet/thin liquids    CARDIOVASCULAR:  # Hisotry of Afib  During hospitalization in 6/23, patient found to have atrial fibrillation (TSH wnl) which was thought to be in setting of infection.  she was discharged on amiodarone 200mg po qd, diltiazem 60mg PO q6h.  - Confirm medication list with home aids and start as appropraite.    # HLD (hyperlipidemia).   Patient with history of hyperlipidemia, per last visit notes on home medications atorvastatin 5mg.  - Confirm medication list with home aids and start as appropraite.    #HTN (hypertension).   Patient with history of HTN, per last visit note on home losartan 100mg qd.   - Confirm medication list with home aids and start as appropraite.    PULM:  # Hypoxic respiratory failure  Patient with AHRF at home likely in setting of pneumonia.  - Continue supplemental oxygen, wean as tolerated      GI:  Npo for now, order speech and swallow for AM  PPI    RENAL:  Monitor urine output    ENDO:   # Diabetes.   Patient with history of DM, per last visit notes on home metformin 500mg BID. A1C 6.9 5/2023.   - Confirm medication list with home aids and start as appropraite.    ID:   # Severe Sepsis  Patient met 4 SIRS on admission (T 100.9, , RR 36 WBC 14.07) with elevated lactate, likely in setting of pneumonia given patient's history of aspiration pneumonia and likely new infiltrate on right.  Urinary source unlike UA without evidence of infection.  S/p 1.65L NaCl, Vancomycin 750, Zosyn 3.375.  - Continue Vancomycin, Zosyn  - Tylenol PRN for fever  - F/u Bcx and UCx    HEME/ONC:  # Anemia  Hgb on admission 9.4 which is baseline, likely AOCD given previous iron studies showing ferritin elevation.  Currently no signs of active bleeding (no hematochezia, melena, hemoptysis, hematuria)  - trend CBC  - maintain active T&S  - transfuse if Hgb <7    PREVENTIVE:   Fluids: S/p 1.65L NS  Diet: NPO for now  DVT ppx: Lovenox  GI ppx: PPI     DIspo: [facility]

## 2023-08-13 NOTE — ED PROVIDER NOTE - OBJECTIVE STATEMENT
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 Initially admitted to MICU, then stepped down to telemetry, then to F. Afib w/ RVR resolved. After GOC conversation, decision made for patient to be discharged home with home hospice. 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 Initially admitted to MICU, then stepped down to telemetry, then to F. Afib w/ RVR resolved. After GOC conversation, decision made for patient to be discharged home with home hospice. Pt now p/w SOB/ Family wants pt transported to hospital. Care provider reports pt was in her normal state of health this morning, ate w/o difficulty. She was given Miralax to have a BM, had a BM, and then afterwards coughed. Shortly after she was noted to have more inc WOB and her SP02 was in the 60s on 2 two pulse ox machines while on her chronic 3L NC. IN the ED, pt denies CP, SOB. She reports low back pain. She has no other complaints.   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 Initially admitted to MICU, then stepped down to telemetry, then to F. Afib w/ RVR resolved. After GOC conversation, decision made for patient to be discharged home with home hospice. Pt now p/w SOB, onset today. Family wants pt transported to hospital. Care provider reports pt was in her normal state of health this morning, ate w/o difficulty. She was given Miralax to have a BM, had a BM, and then afterwards coughed. Shortly after she was noted to have more inc WOB and her SP02 was in the 60s on 2 two pulse ox machines while on her chronic 3L NC. IN the ED, pt denies CP, SOB. She reports low back pain. She has no other complaints.   HCP Ana Motta [phone], [phone]; son Ricky Gold [phone]

## 2023-08-13 NOTE — H&P ADULT - NSHPREVIEWOFSYSTEMS_GEN_ALL_CORE
REVIEW OF SYSTEMS:    CONSTITUTIONAL:  No weakness, fevers or chills  NECK:  No pain or stiffness  RESPIRATORY:  No cough, wheezing, hemoptysis; SOB  CARDIOVASCULAR:  No chest pain or palpitations  GASTROINTESTINAL:  No abdominal or epigastric pain. No nausea, vomiting, or hematemesis; No diarrhea or constipation. No melena or hematochezia.  GENITOURINARY:  No dysuria, frequency or hematuria  NEUROLOGICAL:  No numbness or weakness  SKIN:  No itching, rashes

## 2023-08-13 NOTE — H&P ADULT - PROBLEM SELECTOR PLAN 1
Patient met 4 SIRS on admission (T 100.9, , RR 36 WBC 14.07) with elevated lactate, likely in setting of pneumonia given patient's history of aspiration pneumonia and likely new infiltrate on right.  Urinary source unlike UA without evidence of infection.  S/p 1.65L NaCl, Vancomycin 750, Zosyn 3.375.  - Continue Vancomycin, Zosyn  - Tylenol PRN for fever  - F/u Bcx and UCx

## 2023-08-13 NOTE — ED PROVIDER NOTE - PROGRESS NOTE DETAILS
D/w HCP MICU consulted for possible 7 [facility] admission D/w HCP - wants pt hospitalized, IVF, abx. Wants trial of tx, but DNR / DNI Admit to [facility], Dr Makkar

## 2023-08-13 NOTE — H&P ADULT - ASSESSMENT
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

## 2023-08-13 NOTE — H&P ADULT - PROBLEM SELECTOR PLAN 6
Patient with history of DM, per last visit notes on home metformin 500mg BID. A1C 6.9 5/2023.   - Confirm medication list with home aids and start as appropriate. Patient with history of DM, per last visit notes on home metformin 500mg BID. A1C 6.9 5/2023.   - resume meds once formal S&S  - c/w ISS

## 2023-08-13 NOTE — H&P ADULT - HISTORY OF PRESENT ILLNESS
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 Initially admitted to MICU, then stepped down to telemetry, then to F. Afib w/ RVR resolved. After GOC conversation, decision made for patient to be discharged home with home hospice. Pt now p/w SOB/ Family wants pt transported to hospital. Care provider reports pt was in her normal state of health this morning, ate w/o difficulty. She was given Miralax to have a BM, had a BM, and then afterwards coughed. Shortly after she was noted to have more inc WOB and her SP02 was in the 60s on 2 two pulse ox machines while on her chronic 3L NC. IN the ED, pt denies CP, SOB. She reports low back pain. She has no other complaints.     HCP Ana Kalia [phone], [phone]; son Ricky Gold [phone]    In the ED:  Vitals: T 100.9, , /81, RR 36, 99% on 3L  labs: lactate 2.6, BUN 32,calcium 11.3, proBNP 1130  UA negative; COVID neg, Influenza neg, RSV neg  Imaging: prior: Slight worsening of right sided pulmonary venous congestive   changes with perihilar opacities possibly representing pulmonary edema.   Multifocal pneumonia and/or ARDS cannot be excluded. Stable appearance of   opacified left hemithorax. Multiple deformed left-sided ribs compatible   with a prior thoracotomy. No new effusions or opacifications. No   right-sided pneumothorax.   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 Initially admitted to MICU, then stepped down to telemetry, then to F. Afib w/ RVR resolved. After GOC conversation, decision made for patient to be discharged home with home hospice. Pt now p/w SOB/ Family wants pt transported to hospital. Care provider reports pt was in her normal state of health this morning, ate w/o difficulty. She was given Miralax to have a BM, had a BM, and then afterwards coughed. Shortly after she was noted to have more inc WOB and her SP02 was in the 60s on 2 two pulse ox machines while on her chronic 3L NC. IN the ED, pt denies CP, SOB. She reports low back pain. She has no other complaints.     HCP Ana Motta [phone], [phone]; son Ricky Gold [phone]    In the ED:  Vitals: T 100.9, , /81, RR 36, 99% on 3L  labs: lactate 2.6, BUN 32,calcium 11.3, proBNP 1130  UA negative; COVID neg, Influenza neg, RSV neg  Imaging: CXR - wet read to follow  Intervention : S/p 1.65L NS, Zosyn 3.375 and Vanc 750

## 2023-08-13 NOTE — H&P ADULT - NSHPLABSRESULTS_GEN_ALL_CORE
LABS:                         9.4    14.07 )-----------( 286      ( 13 Aug 2023 14:35 )             30.2     08-13    137  |  94<L>  |  32<H>  ----------------------------<  305<H>  4.4   |  31  |  0.61    Ca    11.3<H>      13 Aug 2023 14:35        Urinalysis Basic - ( 13 Aug 2023 15:32 )    Color: Yellow / Appearance: SL Cloudy / SG: >=1.030 / pH: x  Gluc: x / Ketone: Trace mg/dL  / Bili: Negative / Urobili: 1.0 E.U./dL   Blood: x / Protein: >=300 mg/dL / Nitrite: NEGATIVE   Leuk Esterase: NEGATIVE / RBC: Many /HPF / WBC < 5 /HPF   Sq Epi: x / Non Sq Epi: x / Bacteria: Present /HPF            Lactate, Blood: 2.9 mmol/L (08-13 @ 16:27)  Lactate, Blood: 2.6 mmol/L (08-13 @ 14:35)      RADIOLOGY, EKG & ADDITIONAL TESTS: Reviewed.

## 2023-08-13 NOTE — CONSULT NOTE ADULT - SUBJECTIVE AND OBJECTIVE BOX
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 Initially admitted to MICU, then stepped down to telemetry, then to F. Afib w/ RVR resolved. After GOC conversation, decision made for patient to be discharged home with home hospice. Pt now p/w SOB/ Family wants pt transported to hospital. Care provider reports pt was in her normal state of health this morning, ate w/o difficulty. She was given Miralax to have a BM, had a BM, and then afterwards coughed. Shortly after she was noted to have more inc WOB and her SP02 was in the 60s on 2 two pulse ox machines while on her chronic 3L NC. IN the ED, pt denies CP, SOB. She reports low back pain. She has no other complaints.   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 Initially admitted to MICU, then stepped down to telemetry, then to F. Afib w/ RVR resolved. After GOC conversation, decision made for patient to be discharged home with home hospice. Pt now p/w SOB/ Family wants pt transported to hospital. Care provider reports pt was in her normal state of health this morning, ate w/o difficulty. She was given Miralax to have a BM, had a BM, and then afterwards coughed. Shortly after she was noted to have more inc WOB and her SP02 was in the 60s on 2 two pulse ox machines while on her chronic 3L NC. IN the ED, pt denies CP, SOB. She reports low back pain. She has no other complaints.     HCP Ana Motta [phone], [phone]; son Ricky Gold [phone]    In the ED patient's Temp 100.9,  --> 114, /90, RR 36, SaO2 99% 3L NC.  He also had elevated WBC and lactate    Patient is a 88y old  Female who presents with a chief complaint of     INTERVAL HPI/OVERNIGHT EVENTS:   No overnight events   Afebrile, hemodynamically stable     ICU Vital Signs Last 24 Hrs  T(C): 36.6 (13 Aug 2023 18:13), Max: 38.3 (13 Aug 2023 14:13)  T(F): 97.9 (13 Aug 2023 18:13), Max: 100.9 (13 Aug 2023 14:13)  HR: 106 (13 Aug 2023 19:33) (106 - 150)  BP: 174/76 (13 Aug 2023 19:33) (146/65 - 187/90)  BP(mean): 109 (13 Aug 2023 19:33) (109 - 109)  ABP: --  ABP(mean): --  RR: 18 (13 Aug 2023 19:33) (16 - 36)  SpO2: 99% (13 Aug 2023 19:33) (98% - 100%)    O2 Parameters below as of 13 Aug 2023 19:33  Patient On (Oxygen Delivery Method): nasal cannula  O2 Flow (L/min): 3    I&O's Summary    LABS:                        9.4    14.07 )-----------( 286      ( 13 Aug 2023 14:35 )             30.2     08-13    137  |  94<L>  |  32<H>  ----------------------------<  305<H>  4.4   |  31  |  0.61    Ca    11.3<H>      13 Aug 2023 14:35    Urinalysis Basic - ( 13 Aug 2023 15:32 )    Color: Yellow / Appearance: SL Cloudy / SG: >=1.030 / pH: x  Gluc: x / Ketone: Trace mg/dL  / Bili: Negative / Urobili: 1.0 E.U./dL   Blood: x / Protein: >=300 mg/dL / Nitrite: NEGATIVE   Leuk Esterase: NEGATIVE / RBC: Many /HPF / WBC < 5 /HPF   Sq Epi: x / Non Sq Epi: x / Bacteria: Present /HPF    CAPILLARY BLOOD GLUCOSE    POCT Blood Glucose.: 271 mg/dL (13 Aug 2023 17:06)  POCT Blood Glucose.: 309 mg/dL (13 Aug 2023 13:47)    RADIOLOGY & ADDITIONAL TESTS:    Consultant(s) Notes Reviewed:  [x ] YES  [ ] NO    MEDICATIONS  (STANDING):    MEDICATIONS  (PRN):    PHYSICAL EXAM:  GENERAL:   HEAD:  Atraumatic, Normocephalic  EYES: EOMI, PERRLA, conjunctiva and sclera clear  NECK: Supple, No JVD, Normal thyroid, no enlarged nodes  NERVOUS SYSTEM:  Alert & Awake.   CHEST/LUNG: No breath sounds on left, right side with crackles, accessory muscle use   HEART: S1S2 normal, no S3, Regular rate and rhythm; No murmurs  ABDOMEN: Soft, Nontender, Nondistended; Bowel sounds present  EXTREMITIES:  2+ Peripheral Pulses, No clubbing, cyanosis, or edema  LYMPH: No lymphadenopathy noted  SKIN: No rashes or lesions    Care Discussed with Consultants/Other Providers [ x] YES  [ ] NO

## 2023-08-13 NOTE — H&P ADULT - NSHPPHYSICALEXAM_GEN_ALL_CORE
MINE Smallwood.   
Patient called stating he wanted to let Dr. Smallwood know that the medication that  prescribed is working.    Writer let patient know that provider and staff are out of office today.    Patient can be reached at [phone]  
GENERAL: iWOB, speaking softly  HEAD:  Atraumatic, normocephalic  EYES: EOMI, PERRLA, conjunctiva and sclera clear  NECK: Supple, trachea midline, no JVD  HEART: Regular rate and rhythm, no murmurs, rubs, or gallops  LUNGS: labored respirations. decreased breath sounds to auscultation bilaterally, no crackles, wheezing, or rhonchi  ABDOMEN: Soft, nontender, nondistended, +BS  EXTREMITIES: 2+ peripheral pulses bilaterally. No clubbing, cyanosis, or edema  NERVOUS SYSTEM:  A&Ox3, moving all extremities, no focal deficits   SKIN: No rashes or lesions  VASCULAR: 2+ DP

## 2023-08-13 NOTE — H&P ADULT - PROBLEM SELECTOR PLAN 2
During hospitalization in 6/23, patient found to have atrial fibrillation (TSH wnl) which was thought to be in setting of infection.  she was discharged on amiodarone 200mg po qd, diltiazem 60mg PO q6h.  - Confirm medication list with home aids and start as appropriate.  - NPO for now, formal S&S for now During hospitalization in 6/23, patient found to have atrial fibrillation (TSH wnl) which was thought to be in setting of infection.  she was discharged on amiodarone 200mg po qd. Pt did not pass bedside dysphagia.  - NPO for now, formal S&S in the AM  - resume meds after formal S&S

## 2023-08-13 NOTE — H&P ADULT - PROBLEM SELECTOR PLAN 5
Patient with history of HTN, per last visit note on home losartan 100mg qd.   - Confirm medication list with home aids and start as appropriate. Patient with history of HTN, per last visit note on home losartan 100mg qd.   - resume meds once formal S&S

## 2023-08-13 NOTE — ED PROVIDER NOTE - PHYSICAL EXAMINATION
Constitutional: Elderly, frail, awake, alert, oriented to person, place, time/situation, chronically ill--appearing  ENMT: Airway patent. Normal MM  Eyes: Clear bilaterally  Cardiac: tachycardic rate, regular rhythm.  Heart sounds S1, S2.  No murmurs, rubs or gallops. No JVD or LE edema  Respiratory: Diminished on L. No increased WOB, hypoxia, or accessory mm use. + tachypnea 30s, SP02 98% on 3LNC   Gastrointestinal: Abd soft, NT, ND, NABS. No guarding, rebound, or rigidity. No pulsatile abdominal masses. No organomegaly appreciated.   Musculoskeletal: Range of motion is not limited  Neuro: Alert and oriented x 3, grossly non focal   Skin: Skin normal color for race, warm, dry and intact. No evidence of rash.  Psych: UTO Constitutional: Elderly, frail, awake, alert, oriented to person, place, time/situation, chronically ill--appearing  ENMT: Airway patent. Normal MM  Eyes: Clear bilaterally  Cardiac: tachycardic rate, regular rhythm.  Heart sounds S1, S2.  No murmurs, rubs or gallops. No JVD or LE edema  Respiratory: Diminished on L. No increased WOB, hypoxia, or accessory mm use. + tachypnea 30s, SP02 98% on 3LNC   Gastrointestinal: Abd soft, NT, ND, NABS. No guarding, rebound, or rigidity. No pulsatile abdominal masses. No organomegaly appreciated.   Musculoskeletal: Range of motion is not limited  Neuro: Alert and oriented x 3, slow to respond, grossly non focal   Skin: Skin normal color for race, warm, dry and intact. No evidence of rash.  Psych: UTO

## 2023-08-13 NOTE — ED ADULT NURSE NOTE - NSFALLHARMRISKINTERV_ED_ALL_ED

## 2023-08-13 NOTE — H&P ADULT - NSHPSOCIALHISTORY_GEN_ALL_CORE
Pt lives with 24/7 HHA. [facility] Ana Motta [phone], [phone]; son Ricky Gold [phone]

## 2023-08-13 NOTE — H&P ADULT - PROBLEM SELECTOR PLAN 4
Patient with history of hyperlipidemia, per last visit notes on home medications atorvastatin 5mg.  - Confirm medication list with home aids and start as appropriate. Patient with history of hyperlipidemia, per last visit notes on home medications atorvastatin 10 mg.  - resume meds once formal S&S

## 2023-08-13 NOTE — ED PROVIDER NOTE - CLINICAL SUMMARY MEDICAL DECISION MAKING FREE TEXT BOX
Arie Sandoval is a 15 year old male presenting for   Chief Complaint   Patient presents with   • Wart     Left foot     Denies Latex allergy or sensitivity.    Medication verified, no changes  Refills needed today: No    Health Maintenance Due   Topic Date Due   • COVID-19 Vaccine (3 - Booster for Pfizer series) 05/16/2022   • HPV Vaccine (2 - Male 2-dose series) 01/22/2023       Patient is due for topics as listed above but is not proceeding with Immunization(s) COVID-19 and HPV at this time.     Last lab results:   CHOLESTEROL (mg/dL)   Date Value   10/04/2018 157     Depression Screening:  Recent Review Flowsheet Data     Date 7/21/2022    Peds PHQ 2 Score 0    Peds PHQ 2 Interpretation No further screening needed    Feeling down, depressed, irritable or hopeless? Not at all    Little interest or pleasure in activity? Not at all          Pt p/w acute hypoxic resp failure, low grade fever, suspect aspiration, mucous plugging, sepsis; also consider other sources of infection, RVI, UTI, other pathology. GOC conversation with family. IVF, abx, antipyretics. Dispo pending w/u, clinical status, GOC conversation w/ family

## 2023-08-13 NOTE — ED ADULT NURSE NOTE - OBJECTIVE STATEMENT
88 y.o. F a&ox3 BIBA From home c.o SOB. as per ems "PT was hypoxic to the 60s on 3 L nasal cannula and tachypneic." upgraded to MD castro. PT placed on CCM, sinus tachycardia noted. rectal temp 100.9F. EKG complete. jayshree t bedside, PT is on at home hospice.  PMH of Alzheimer's dementia, Parkinson's Disease , Lung Cancer,  HTN, HLD, DM.

## 2023-08-13 NOTE — H&P ADULT - PROBLEM SELECTOR PLAN 3
Patient with both Parkinsons disease and Alzheimers dementia. On home keppra 500mg q12, sertraline 50mg qd, and sinemet 25-100mg x 2.5 tabs q8. AAOx1-2, reportedly at baseline mental status.  - FEES: revealed severe oral and mild pharyngeal phase deficits. Oral phase deficits impacted processing speed, efficiency, A-P transport, and clearance. Despite severity of oral phase deficits, airway protection was not impacted.  Plan:  - C/w sinemet 25/100 mg x 2.5 tabs PO once pt passes dysphagia screen  - C/w sertraline 50 mg PO Patient with both Parkinsons disease and Alzheimers dementia. On home keppra 500mg q12, sertraline 50mg qd, and sinemet 25-100mg x 2.5 tabs q8. AAOx1-2, reportedly at baseline mental status. Pt did not pass bedside dysphagia this PM.  Plan:  - C/w sinemet 25/100 mg x 2.5 tabs PO once formal S&S  - C/w sertraline 50 mg PO once formal S&S

## 2023-08-14 ENCOUNTER — TRANSCRIPTION ENCOUNTER (OUTPATIENT)
Age: 88
End: 2023-08-14

## 2023-08-14 DIAGNOSIS — Z71.89 OTHER SPECIFIED COUNSELING: ICD-10-CM

## 2023-08-14 DIAGNOSIS — Z51.5 ENCOUNTER FOR PALLIATIVE CARE: ICD-10-CM

## 2023-08-14 DIAGNOSIS — M54.9 DORSALGIA, UNSPECIFIED: ICD-10-CM

## 2023-08-14 DIAGNOSIS — R53.81 OTHER MALAISE: ICD-10-CM

## 2023-08-14 DIAGNOSIS — R13.10 DYSPHAGIA, UNSPECIFIED: ICD-10-CM

## 2023-08-14 LAB
-  COAGULASE NEGATIVE STAPHYLOCOCCUS: SIGNIFICANT CHANGE UP
A1C WITH ESTIMATED AVERAGE GLUCOSE RESULT: 7 % — HIGH (ref 4–5.6)
ALBUMIN SERPL ELPH-MCNC: 2.7 G/DL — LOW (ref 3.3–5)
ALP SERPL-CCNC: 92 U/L — SIGNIFICANT CHANGE UP (ref 40–120)
ALT FLD-CCNC: 10 U/L — SIGNIFICANT CHANGE UP (ref 10–45)
ANION GAP SERPL CALC-SCNC: 9 MMOL/L — SIGNIFICANT CHANGE UP (ref 5–17)
AST SERPL-CCNC: 20 U/L — SIGNIFICANT CHANGE UP (ref 10–40)
BASOPHILS # BLD AUTO: 0.05 K/UL — SIGNIFICANT CHANGE UP (ref 0–0.2)
BASOPHILS NFR BLD AUTO: 0.6 % — SIGNIFICANT CHANGE UP (ref 0–2)
BILIRUB SERPL-MCNC: 0.3 MG/DL — SIGNIFICANT CHANGE UP (ref 0.2–1.2)
BUN SERPL-MCNC: 26 MG/DL — HIGH (ref 7–23)
CALCIUM SERPL-MCNC: 10.3 MG/DL — SIGNIFICANT CHANGE UP (ref 8.4–10.5)
CHLORIDE SERPL-SCNC: 98 MMOL/L — SIGNIFICANT CHANGE UP (ref 96–108)
CO2 SERPL-SCNC: 28 MMOL/L — SIGNIFICANT CHANGE UP (ref 22–31)
CREAT SERPL-MCNC: 0.58 MG/DL — SIGNIFICANT CHANGE UP (ref 0.5–1.3)
EGFR: 88 ML/MIN/1.73M2 — SIGNIFICANT CHANGE UP
EOSINOPHIL # BLD AUTO: 0.28 K/UL — SIGNIFICANT CHANGE UP (ref 0–0.5)
EOSINOPHIL NFR BLD AUTO: 3.1 % — SIGNIFICANT CHANGE UP (ref 0–6)
ESTIMATED AVERAGE GLUCOSE: 154 MG/DL — HIGH (ref 68–114)
GLUCOSE BLDC GLUCOMTR-MCNC: 260 MG/DL — HIGH (ref 70–99)
GLUCOSE BLDC GLUCOMTR-MCNC: 275 MG/DL — HIGH (ref 70–99)
GLUCOSE BLDC GLUCOMTR-MCNC: 352 MG/DL — HIGH (ref 70–99)
GLUCOSE BLDC GLUCOMTR-MCNC: 382 MG/DL — HIGH (ref 70–99)
GLUCOSE BLDC GLUCOMTR-MCNC: 462 MG/DL — CRITICAL HIGH (ref 70–99)
GLUCOSE BLDC GLUCOMTR-MCNC: 473 MG/DL — CRITICAL HIGH (ref 70–99)
GLUCOSE SERPL-MCNC: 245 MG/DL — HIGH (ref 70–99)
GRAM STN FLD: SIGNIFICANT CHANGE UP
HCT VFR BLD CALC: 27.6 % — LOW (ref 34.5–45)
HGB BLD-MCNC: 8.2 G/DL — LOW (ref 11.5–15.5)
IMM GRANULOCYTES NFR BLD AUTO: 6.1 % — HIGH (ref 0–0.9)
LYMPHOCYTES # BLD AUTO: 0.54 K/UL — LOW (ref 1–3.3)
LYMPHOCYTES # BLD AUTO: 6 % — LOW (ref 13–44)
MAGNESIUM SERPL-MCNC: 1.4 MG/DL — LOW (ref 1.6–2.6)
MCHC RBC-ENTMCNC: 23.2 PG — LOW (ref 27–34)
MCHC RBC-ENTMCNC: 29.7 GM/DL — LOW (ref 32–36)
MCV RBC AUTO: 78.2 FL — LOW (ref 80–100)
METHOD TYPE: SIGNIFICANT CHANGE UP
MONOCYTES # BLD AUTO: 0.42 K/UL — SIGNIFICANT CHANGE UP (ref 0–0.9)
MONOCYTES NFR BLD AUTO: 4.7 % — SIGNIFICANT CHANGE UP (ref 2–14)
NEUTROPHILS # BLD AUTO: 7.16 K/UL — SIGNIFICANT CHANGE UP (ref 1.8–7.4)
NEUTROPHILS NFR BLD AUTO: 79.5 % — HIGH (ref 43–77)
NRBC # BLD: 0 /100 WBCS — SIGNIFICANT CHANGE UP (ref 0–0)
PHOSPHATE SERPL-MCNC: 2.4 MG/DL — LOW (ref 2.5–4.5)
PLATELET # BLD AUTO: 254 K/UL — SIGNIFICANT CHANGE UP (ref 150–400)
POTASSIUM SERPL-MCNC: 3.9 MMOL/L — SIGNIFICANT CHANGE UP (ref 3.5–5.3)
POTASSIUM SERPL-SCNC: 3.9 MMOL/L — SIGNIFICANT CHANGE UP (ref 3.5–5.3)
PROT SERPL-MCNC: 6.3 G/DL — SIGNIFICANT CHANGE UP (ref 6–8.3)
RBC # BLD: 3.53 M/UL — LOW (ref 3.8–5.2)
RBC # FLD: 17.3 % — HIGH (ref 10.3–14.5)
SODIUM SERPL-SCNC: 135 MMOL/L — SIGNIFICANT CHANGE UP (ref 135–145)
WBC # BLD: 9 K/UL — SIGNIFICANT CHANGE UP (ref 3.8–10.5)
WBC # FLD AUTO: 9 K/UL — SIGNIFICANT CHANGE UP (ref 3.8–10.5)

## 2023-08-14 PROCEDURE — 99232 SBSQ HOSP IP/OBS MODERATE 35: CPT | Mod: GC

## 2023-08-14 PROCEDURE — 99223 1ST HOSP IP/OBS HIGH 75: CPT | Mod: GV

## 2023-08-14 PROCEDURE — 99497 ADVNCD CARE PLAN 30 MIN: CPT | Mod: 25

## 2023-08-14 RX ORDER — LIDOCAINE 4 G/100G
1 CREAM TOPICAL DAILY
Refills: 0 | Status: DISCONTINUED | OUTPATIENT
Start: 2023-08-14 | End: 2023-08-16

## 2023-08-14 RX ORDER — MAGNESIUM SULFATE 500 MG/ML
2 VIAL (ML) INJECTION ONCE
Refills: 0 | Status: COMPLETED | OUTPATIENT
Start: 2023-08-14 | End: 2023-08-14

## 2023-08-14 RX ORDER — METOPROLOL TARTRATE 50 MG
5 TABLET ORAL ONCE
Refills: 0 | Status: COMPLETED | OUTPATIENT
Start: 2023-08-14 | End: 2023-08-14

## 2023-08-14 RX ORDER — LIDOCAINE 4 G/100G
1 CREAM TOPICAL ONCE
Refills: 0 | Status: COMPLETED | OUTPATIENT
Start: 2023-08-14 | End: 2023-08-14

## 2023-08-14 RX ORDER — PIPERACILLIN AND TAZOBACTAM 4; .5 G/20ML; G/20ML
3.38 INJECTION, POWDER, LYOPHILIZED, FOR SOLUTION INTRAVENOUS EVERY 8 HOURS
Refills: 0 | Status: DISCONTINUED | OUTPATIENT
Start: 2023-08-14 | End: 2023-08-16

## 2023-08-14 RX ORDER — INSULIN GLARGINE 100 [IU]/ML
10 INJECTION, SOLUTION SUBCUTANEOUS AT BEDTIME
Refills: 0 | Status: DISCONTINUED | OUTPATIENT
Start: 2023-08-14 | End: 2023-08-16

## 2023-08-14 RX ORDER — ACETAMINOPHEN 500 MG
325 TABLET ORAL EVERY 6 HOURS
Refills: 0 | Status: DISCONTINUED | OUTPATIENT
Start: 2023-08-14 | End: 2023-08-15

## 2023-08-14 RX ORDER — AMIODARONE HYDROCHLORIDE 400 MG/1
200 TABLET ORAL ONCE
Refills: 0 | Status: COMPLETED | OUTPATIENT
Start: 2023-08-14 | End: 2023-08-14

## 2023-08-14 RX ORDER — LABETALOL HCL 100 MG
10 TABLET ORAL ONCE
Refills: 0 | Status: COMPLETED | OUTPATIENT
Start: 2023-08-14 | End: 2023-08-14

## 2023-08-14 RX ORDER — INSULIN LISPRO 100/ML
VIAL (ML) SUBCUTANEOUS
Refills: 0 | Status: DISCONTINUED | OUTPATIENT
Start: 2023-08-14 | End: 2023-08-16

## 2023-08-14 RX ADMIN — LIDOCAINE 1 PATCH: 4 CREAM TOPICAL at 11:35

## 2023-08-14 RX ADMIN — ATORVASTATIN CALCIUM 5 MILLIGRAM(S): 80 TABLET, FILM COATED ORAL at 21:21

## 2023-08-14 RX ADMIN — AMIODARONE HYDROCHLORIDE 200 MILLIGRAM(S): 400 TABLET ORAL at 12:54

## 2023-08-14 RX ADMIN — Medication 3 MILLILITER(S): at 10:39

## 2023-08-14 RX ADMIN — LIDOCAINE 1 PATCH: 4 CREAM TOPICAL at 06:31

## 2023-08-14 RX ADMIN — LIDOCAINE 1 PATCH: 4 CREAM TOPICAL at 18:35

## 2023-08-14 RX ADMIN — SODIUM CHLORIDE 4 MILLILITER(S): 9 INJECTION INTRAMUSCULAR; INTRAVENOUS; SUBCUTANEOUS at 21:21

## 2023-08-14 RX ADMIN — SODIUM CHLORIDE 4 MILLILITER(S): 9 INJECTION INTRAMUSCULAR; INTRAVENOUS; SUBCUTANEOUS at 16:43

## 2023-08-14 RX ADMIN — Medication 325 MILLIGRAM(S): at 14:39

## 2023-08-14 RX ADMIN — Medication 85 MILLIMOLE(S): at 10:40

## 2023-08-14 RX ADMIN — SODIUM CHLORIDE 4 MILLILITER(S): 9 INJECTION INTRAMUSCULAR; INTRAVENOUS; SUBCUTANEOUS at 10:39

## 2023-08-14 RX ADMIN — Medication 25 GRAM(S): at 07:42

## 2023-08-14 RX ADMIN — Medication 10 MILLIGRAM(S): at 10:15

## 2023-08-14 RX ADMIN — LIDOCAINE 1 PATCH: 4 CREAM TOPICAL at 06:48

## 2023-08-14 RX ADMIN — Medication 250 MILLIGRAM(S): at 04:02

## 2023-08-14 RX ADMIN — Medication 250 MILLIGRAM(S): at 14:06

## 2023-08-14 RX ADMIN — Medication 3 MILLILITER(S): at 04:03

## 2023-08-14 RX ADMIN — Medication 5 MILLIGRAM(S): at 09:37

## 2023-08-14 RX ADMIN — PIPERACILLIN AND TAZOBACTAM 25 GRAM(S): 4; .5 INJECTION, POWDER, LYOPHILIZED, FOR SOLUTION INTRAVENOUS at 04:03

## 2023-08-14 RX ADMIN — Medication 325 MILLIGRAM(S): at 14:06

## 2023-08-14 RX ADMIN — Medication 3 MILLILITER(S): at 16:43

## 2023-08-14 RX ADMIN — ENOXAPARIN SODIUM 40 MILLIGRAM(S): 100 INJECTION SUBCUTANEOUS at 11:35

## 2023-08-14 RX ADMIN — Medication 5 MILLIGRAM(S): at 09:04

## 2023-08-14 RX ADMIN — Medication 3: at 11:36

## 2023-08-14 RX ADMIN — PIPERACILLIN AND TAZOBACTAM 25 GRAM(S): 4; .5 INJECTION, POWDER, LYOPHILIZED, FOR SOLUTION INTRAVENOUS at 11:35

## 2023-08-14 RX ADMIN — Medication 3 MILLILITER(S): at 21:21

## 2023-08-14 RX ADMIN — Medication 12: at 17:22

## 2023-08-14 RX ADMIN — Medication 3: at 06:35

## 2023-08-14 RX ADMIN — CARBIDOPA AND LEVODOPA 2.5 TABLET(S): 25; 100 TABLET ORAL at 16:48

## 2023-08-14 RX ADMIN — Medication 25 GRAM(S): at 16:44

## 2023-08-14 RX ADMIN — INSULIN GLARGINE 10 UNIT(S): 100 INJECTION, SOLUTION SUBCUTANEOUS at 22:11

## 2023-08-14 RX ADMIN — SODIUM CHLORIDE 4 MILLILITER(S): 9 INJECTION INTRAMUSCULAR; INTRAVENOUS; SUBCUTANEOUS at 04:02

## 2023-08-14 RX ADMIN — PIPERACILLIN AND TAZOBACTAM 25 GRAM(S): 4; .5 INJECTION, POWDER, LYOPHILIZED, FOR SOLUTION INTRAVENOUS at 19:54

## 2023-08-14 NOTE — SWALLOW BEDSIDE ASSESSMENT ADULT - COMMENTS
Pt well known to this service from previous admission. Pt had FEES exam on 6/19/23 which revealed severe oral and mild pharyngeal phase deficits. Pt was judged to be at high aspiration risk given poor mobility, dependence on others for feeding/oral hygiene and poor safety awareness. Home health aide at the bedside reports that at times Pt tends to hold food in her mouth without swallowing and requires verbal cues and prompts to initiate a swallow.

## 2023-08-14 NOTE — CONSULT NOTE ADULT - SUBJECTIVE AND OBJECTIVE BOX
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 Initially admitted to MICU, then stepped down to telemetry, then to [facility]. Afib w/ RVR resolved. After GOC conversation, decision made for patient to be discharged home with home hospice. Pt now p/w SOB/ Family wants pt transported to hospital. Care provider reports pt was in her normal state of health this morning, ate w/o difficulty. She was given Miralax to have a BM, had a BM, and then afterwards coughed. Shortly after she was noted to have more inc WOB and her SP02 was in the 60s on 2 two pulse ox machines while on her chronic 3L NC. IN the ED, pt denies CP, SOB. She reports low back pain. HCP Ana Motta [phone], [phone]; Ricky Gold [phone]    Patient seen and examined at bedside. Awake and answering yes/no questions. Endorses back pain. Appears to be returning to baseline. Comprehensive symptom assessment and GOC exploration as noted below. Further collateral obtained from primary team, chart, and family.    PERTINENT PM/SXH:   HTN (hypertension)  Diabetes  High cholesterol  Parkinson disease  Alzheimer disease  No significant past surgical history  S/P removal of lung  S/P cholecystectomy  Personal history of spine surgery  H/O cerebral aneurysm repair    FAMILY HISTORY:  No pertinent family history of AFib in first degree relatives  Unable to obtain due to encephalopathy    ITEMS NOT CHECKED ARE NOT PRESENT  SOCIAL HISTORY:   Significant other/partner:  []  Children:  [x]  Substance hx:  []   Tobacco hx:  []   Alcohol hx: []   Home Opioid hx:  [] I-Stop Reference No:  - no active Rx's  Living Situation: [x]Home  []Long term care  []Rehab []Other  Taoism/Spiritual practice: ; Role of organized Quaker [] important [x] some [] unable to assess  Coping: [] well [x] with difficulty [] poor coping [] unable to assess  Support system: [x] strong [] adequate [] inadequate    ADVANCE DIRECTIVES:    [x]MOLST  []Living Will  DECISION MAKER(s):  [x] Health Care Proxy(s)  [] Surrogate(s)  [] Guardian           Name(s)/Phone Number(s): Ana Motta [phone], [phone]; Ricky Gold [phone]    BASELINE (I)ADLs (prior to admission):  [place]: []Total  [x] Moderate []Dependent    ALLERGIES:  No Known Allergies    MEDICATIONS  (STANDING):  albuterol/ipratropium for Nebulization 3 milliLiter(s) Nebulizer every 6 hours  aMIOdarone    Tablet 200 milliGRAM(s) Oral once  aMIOdarone    Tablet 200 milliGRAM(s) Oral daily  aspirin  chewable 81 milliGRAM(s) Oral daily  atorvastatin 5 milliGRAM(s) Oral at bedtime  carbidopa/levodopa  25/100 2.5 Tablet(s) Oral <User Schedule>  dextrose 5%. 1000 milliLiter(s) (50 mL/Hr) IV Continuous <Continuous>  dextrose 5%. 1000 milliLiter(s) (100 mL/Hr) IV Continuous <Continuous>  dextrose 50% Injectable 25 Gram(s) IV Push once  dextrose 50% Injectable 25 Gram(s) IV Push once  dextrose 50% Injectable 12.5 Gram(s) IV Push once  enoxaparin Injectable 40 milliGRAM(s) SubCutaneous every 24 hours  glucagon  Injectable 1 milliGRAM(s) IntraMuscular once  insulin lispro (ADMELOG) corrective regimen sliding scale   SubCutaneous every 6 hours  lidocaine   4% Patch 1 Patch Transdermal daily  losartan 100 milliGRAM(s) Oral daily  magnesium sulfate  IVPB 2 Gram(s) IV Intermittent once  metoprolol succinate ER 25 milliGRAM(s) Oral every 24 hours  pantoprazole    Tablet 40 milliGRAM(s) Oral before breakfast  piperacillin/tazobactam IVPB.. 3.375 Gram(s) IV Intermittent every 8 hours  sertraline 100 milliGRAM(s) Oral daily  sodium chloride 3%  Inhalation 4 milliLiter(s) Inhalation every 6 hours  vancomycin  IVPB 750 milliGRAM(s) IV Intermittent every 12 hours    MEDICATIONS  (PRN):  dextrose Oral Gel 15 Gram(s) Oral once PRN Blood Glucose LESS THAN 70 milliGRAM(s)/deciliter    Analgesic Use (Scheduled and PRNs) for past 24 hours:  acetaminophen  Suppository ..   650 milliGRAM(s) Rectal (08-13-23 @ 14:22)    PRESENT SYMPTOMS: []Unable to obtain due to poor mentation/encephalopathy  Source if other than patient:  []Family   []Team     Pain: [x] yes [] no  QOL impact - bothersome  Location - lower back                    Aggravating Factors -  Quality -  Radiation -  Timing -  Severity (0-10 scale) -   Minimal Acceptable Level (0-10 scale) -    Dyspnea:                           []Mild  []Moderate []Severe  Anxiety:                             []Mild []Moderate []Severe  Fatigue:                             []Mild []Moderate []Severe  Nausea:                             []Mild []Moderate []Severe  Loss of Appetite:              []Mild []Moderate []Severe  Constipation:                    []Mild []Moderate []Severe    Other Symptoms:  [x]All other review of systems negative     Palliative Performance Status Version 2:  40%  (Functional Assessment Tool)    GENERAL:  [x] NAD []Alert [x]Lethargic  []Cachexia  []Unarousable  [x]Verbal  []Non-Verbal  BEHAVIORAL:   []Anxiety  [x]Delirium []Agitation [x]Cooperative []Oriented x  HEENT:  [x]Normal  [x] Moist Mucous Membranes []Dry mouth   []ET Tube/Trach  []Oral lesions  PULMONARY:   []Clear []Tachypnea  []Audible excessive secretions  [x]Normal Work of Breathing []Labored Breathing  [x]Rhonchi []Crackles []Wheezing  CARDIOVASCULAR:    []Regular Rate []Regular Rhythm [x]Irregular [x]Tachy  []Timi  GASTROINTESTINAL:  [x]Soft  []Distended   [x]+BS  [x]Non tender []Tender  []PEG []OGT/ NGT  Last BM:  GENITOURINARY:  []Normal [x] Incontinent   []Oliguria/Anuria   []Juarez  MUSCULOSKELETAL:   []Normal Extremities  [x]Weakness  []Bed/Wheelchair bound []Edema  NEUROLOGIC:   []No focal deficits  [x]Cognitive impairment  [x]Dysphagia []Dysarthria []Paresis [x]Encephalopathic  SKIN:   [x]Normal   []Pressure ulcer(s)  []Rash    CRITICAL CARE:  [ ]Shock Present  [ ]Septic [ ]Cardiogenic [ ]Neurologic [ ]Hypovolemic  [ ] Vasopressors [ ]Inotropes   [x]Respiratory failure present [ ]Mechanical Ventilation [ ]Non-invasive ventilatory support [ ]High-Flow  [x]Acute  [ ]Chronic [x]Hypoxic  [ ]Hypercarbic   [ ]Other organ failure    Vital Signs Last 24 Hrs  T(C): 36.2 (14 Aug 2023 10:00), Max: 38.3 (13 Aug 2023 14:13)  T(F): 97.2 (14 Aug 2023 10:00), Max: 100.9 (13 Aug 2023 14:13)  HR: 132 (14 Aug 2023 11:40) (98 - 150)  BP: 119/54 (14 Aug 2023 11:40) (105/66 - 187/90)  BP(mean): 78 (14 Aug 2023 11:40) (78 - 120)  RR: 18 (14 Aug 2023 11:40) (15 - 36)  SpO2: 97% (14 Aug 2023 11:40) (95% - 100%)    Parameters below as of 14 Aug 2023 11:40  Patient On (Oxygen Delivery Method): nasal cannula  O2 Flow (L/min): 3    LABS:                        8.2    9.00  )-----------( 254      ( 14 Aug 2023 05:30 )             27.6   08-14    135  |  98  |  26<H>  ----------------------------<  245<H>  3.9   |  28  |  0.58    Ca    10.3      14 Aug 2023 05:30  Phos  2.4     08-14  Mg     1.4     08-14  TPro  6.3  /  Alb  2.7<L>  /  TBili  0.3  /  DBili  x   /  AST  20  /  ALT  10  /  AlkPhos  92  08-14    RADIOLOGY & ADDITIONAL STUDIES:  < from: Xray Chest 1 View-PORTABLE IMMEDIATE (Xray Chest 1 View-PORTABLE IMMEDIATE .) (08.13.23 @ 14:42) >  Persistent opacification and volume loss in the left upper thorax. Increased infiltrates in the right lung could be due to congestive heart failure or pneumonia.    REFERRALS:  [x]Social Work  []Case management [x]PT/OT []Chaplaincy  [x]Hospice  []Patient/Family Support []Massage Therapy [x]Music Therapy []Holistic RN    DISCUSSION OF CASE: HHA - to provide updates and emotional support; Medicine - to discuss plan of care

## 2023-08-14 NOTE — PROGRESS NOTE ADULT - SUBJECTIVE AND OBJECTIVE BOX
INTERVAL HPI/OVERNIGHT EVENTS: franklin    SUBJECTIVE: Patient seen and examined at bedside, resting comfortably in bed, and does not appear to be in any acute distress.    MEDICATIONS  (STANDING):  albuterol/ipratropium for Nebulization 3 milliLiter(s) Nebulizer every 6 hours  aMIOdarone    Tablet 200 milliGRAM(s) Oral daily  aspirin  chewable 81 milliGRAM(s) Oral daily  atorvastatin 5 milliGRAM(s) Oral at bedtime  carbidopa/levodopa  25/100 2.5 Tablet(s) Oral <User Schedule>  dextrose 5%. 1000 milliLiter(s) (50 mL/Hr) IV Continuous <Continuous>  dextrose 5%. 1000 milliLiter(s) (100 mL/Hr) IV Continuous <Continuous>  dextrose 50% Injectable 25 Gram(s) IV Push once  dextrose 50% Injectable 25 Gram(s) IV Push once  dextrose 50% Injectable 12.5 Gram(s) IV Push once  enoxaparin Injectable 40 milliGRAM(s) SubCutaneous every 24 hours  glucagon  Injectable 1 milliGRAM(s) IntraMuscular once  insulin lispro (ADMELOG) corrective regimen sliding scale   SubCutaneous three times a day before meals  lidocaine   4% Patch 1 Patch Transdermal daily  losartan 100 milliGRAM(s) Oral daily  metoprolol succinate ER 25 milliGRAM(s) Oral every 24 hours  pantoprazole    Tablet 40 milliGRAM(s) Oral before breakfast  piperacillin/tazobactam IVPB.. 3.375 Gram(s) IV Intermittent every 8 hours  sertraline 100 milliGRAM(s) Oral daily  sodium chloride 3%  Inhalation 4 milliLiter(s) Inhalation every 6 hours  vancomycin  IVPB 750 milliGRAM(s) IV Intermittent every 12 hours    MEDICATIONS  (PRN):  acetaminophen     Tablet .. 325 milliGRAM(s) Oral every 6 hours PRN Temp greater or equal to 38C (100.4F), Mild Pain (1 - 3), Moderate Pain (4 - 6)  dextrose Oral Gel 15 Gram(s) Oral once PRN Blood Glucose LESS THAN 70 milliGRAM(s)/deciliter    Vital Signs Last 24 Hrs  T(C): 36.3 (14 Aug 2023 16:00), Max: 37.3 (13 Aug 2023 21:21)  T(F): 97.4 (14 Aug 2023 16:00), Max: 99.1 (13 Aug 2023 21:21)  HR: 132 (14 Aug 2023 11:40) (98 - 146)  BP: 119/54 (14 Aug 2023 11:40) (105/66 - 180/84)  BP(mean): 78 (14 Aug 2023 11:40) (78 - 120)  RR: 18 (14 Aug 2023 11:40) (15 - 19)  SpO2: 97% (14 Aug 2023 11:40) (95% - 100%)    Parameters below as of 14 Aug 2023 11:40  Patient On (Oxygen Delivery Method): nasal cannula  O2 Flow (L/min): 3      PHYSICAL EXAM:  HEAD:  Atraumatic, normocephalic  EYES: EOMI, PERRLA, conjunctiva and sclera clear  NECK: Supple, no JVD  HEART: Regular rate and rhythm, no murmurs, rubs, or gallops  LUNGS: labored respirations, with paradoxical breathing. decreased breath sounds to auscultation bilaterally  ABDOMEN: Soft, nontender, nondistended  EXTREMITIES: 2+ peripheral pulses bilaterally. No clubbing, cyanosis, or edema  NERVOUS SYSTEM:  A&Ox3, moving all extremities, no focal deficits   SKIN: No rashes or lesions  VASCULAR: 2+ DP    LABS:                        8.2    9.00  )-----------( 254      ( 14 Aug 2023 05:30 )             27.6     08-14    135  |  98  |  26<H>  ----------------------------<  245<H>  3.9   |  28  |  0.58    Ca    10.3      14 Aug 2023 05:30  Phos  2.4     08-14  Mg     1.4     08-14    TPro  6.3  /  Alb  2.7<L>  /  TBili  0.3  /  DBili  x   /  AST  20  /  ALT  10  /  AlkPhos  92  08-14      Urinalysis Basic - ( 14 Aug 2023 05:30 )    Color: x / Appearance: x / SG: x / pH: x  Gluc: 245 mg/dL / Ketone: x  / Bili: x / Urobili: x   Blood: x / Protein: x / Nitrite: x   Leuk Esterase: x / RBC: x / WBC x   Sq Epi: x / Non Sq Epi: x / Bacteria: x        MICROBIOLOGY:    RADIOLOGY & ADDITIONAL STUDIES:

## 2023-08-14 NOTE — CONSULT NOTE ADULT - PROBLEM SELECTOR RECOMMENDATION 5
.  Advancing disease  -at baseline, patient is alert but requires prompting; trending towards baseline  -hospice eligible given new bedbound status, dysphagia, hypoalbuminemia.

## 2023-08-14 NOTE — CONSULT NOTE ADULT - PROBLEM SELECTOR RECOMMENDATION 3
.  PPSV = 40%, requires assistance for most ADLs  -PT Eval when appropriate for mobility  -c/w supportive care, OOB, encourage movement

## 2023-08-14 NOTE — CONSULT NOTE ADULT - ASSESSMENT
88yo F with PMH of Dementia, Parkinson's, h/o Lung CA, HTN, T2DM, and h/o SDH/SAH p/w respiratory failure and AFib. Palliative consulted for complex medical decision making in the setting of advanced illness.    ·	patient was enrolled in home hospice, daughter called ambulance instead of hospice agency  ·	goals were established last hospitalization (DNR/DNI, No Feeding Tube); will reinforce expected disease trajectory  ·	patient can likely return home with hospice once stabilized, will update the hospice agency about admission 86yo F with PMH of Dementia, Parkinson's, h/o Lung CA, HTN, T2DM, and h/o SDH/SAH p/w respiratory failure and AFib. Palliative consulted for complex medical decision making in the setting of advanced illness.    ·	patient was enrolled in home hospice, daughter called ambulance instead of hospice agency  ·	goals were established last hospitalization (DNR/DNI, No Feeding Tube); reinforced expected disease trajectory with patient's daughter and reviewed alternative strategies  ·	patient can return home with hospice once stabilized, updated the hospice agency about admission    In addition to the E/M visit, an advance care planning meeting was performed. Start time: 4:10PM; End time: 4:26PM; Total time: 16min. A face to face meeting to discuss advance care planning was held today regarding: FERNANDO ASHBY. Primary decision maker: Patient is unable to participate in decision making; Alternate/surrogate: Daughter. Discussed advance directives including, but not limited to, healthcare proxy and code status. Decision regarding code status: DNR/DNI; Documentation completed today: [facility] Hospice Referral [facility] note

## 2023-08-14 NOTE — PROGRESS NOTE ADULT - ATTENDING COMMENTS
87 yo F with dementia, DNR/DNI ?home hospice admitted for aspiration pneumonia which she is at recurrent risk for. c/w abx, wean o2. pallaitive care consult for ongoing GOC with family.

## 2023-08-14 NOTE — CONSULT NOTE ADULT - PROBLEM SELECTOR RECOMMENDATION 4
.  In the setting of aspiration and AFib/RVR  -c/w treatment of reversible issues  -wean supplemental O2 as tolerated  -transition to PO Abx when appropriate

## 2023-08-14 NOTE — DISCHARGE NOTE NURSING/CASE MANAGEMENT/SOCIAL WORK - PATIENT PORTAL LINK FT
You can access the FollowMyHealth Patient Portal offered by [facility] by registering at the following website: [email or web address]. By joining [employer]’s FollowMyHealth portal, you will also be able to view your health information using other applications (apps) compatible with our system.

## 2023-08-14 NOTE — PROGRESS NOTE ADULT - ASSESSMENT
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

## 2023-08-14 NOTE — SWALLOW BEDSIDE ASSESSMENT ADULT - SLP GENERAL OBSERVATIONS
Pt awake, alert, able to follow some simple commands like "open your mouth" when given visual prompt

## 2023-08-14 NOTE — PROGRESS NOTE ADULT - PROBLEM SELECTOR PLAN 2
During hospitalization in 6/23, patient found to have atrial fibrillation (TSH wnl) which was thought to be in setting of infection.  she was discharged on amiodarone 200mg po qd. Pt did not pass bedside dysphagia.    - S&S recommends based on GOC, mildly thickened liquids with pureed diet, crush medication when feasible

## 2023-08-14 NOTE — CONSULT NOTE ADULT - PROBLEM SELECTOR RECOMMENDATION 6
.  Patient is DNR/DNI, MOLST in chart  -GOC note as noted, goal is stabilize the patient and return home with hospice  -children are designated HCPs

## 2023-08-14 NOTE — CONSULT NOTE ADULT - CONVERSATION DETAILS
Reviewed patient's hospital course and interval developments. Discussed advanced directives including code status, HCP completion, and hospice eligibility. Reaffirmed DNR/DNI and no feeding tube. Patient was on home hospice and doing relatively well when she acutely decompensated prompting the family to bring her to the hospital. Further discussion regarding expected disease trajectory and family is understanding that dysphagia will continue to remain an issue. Discussed contacting the hospice agency first as they can assist with managing the patient at home if feasible.

## 2023-08-14 NOTE — SWALLOW BEDSIDE ASSESSMENT ADULT - SWALLOW EVAL: RECOMMENDED FEEDING/EATING TECHNIQUES
Set up oral suction at bedside/crush medication (when feasible)/no straws/oral hygiene/position upright (90 degrees)/small sips/bites

## 2023-08-14 NOTE — SWALLOW BEDSIDE ASSESSMENT ADULT - SWALLOW EVAL: RECOMMENDED DIET
Feeding recommendations should be based on overall goals of care. If goals of care are to continue PO diet despite aspiration risk and its adverse sequelae, recommend puree and mildly thick liquid diet.

## 2023-08-14 NOTE — CONSULT NOTE ADULT - PROBLEM SELECTOR RECOMMENDATION 7
.  Complex medical decision making / symptom management in the setting of advanced illness.    Will continue to follow for ongoing GOC discussion / titration of palliative regimen. Emotional support provided, questions answered.  Active Psychosocial Referrals:  [x]Social Work/Case management [x]PT/OT []Chaplaincy [x]Hospice  []Patient/Family Support []Holistic RN []Massage Therapy []Music Therapy []Ethics  Coping: [] well [x] with difficulty [] poor coping [] unable to assess  Support system: [x] strong [] adequate [] inadequate    For new or uncontrolled symptoms, please call Palliative Care at [phone]). The service is available 24/7 (including nights & weekends) to provide symptom management recommendations over the phone as appropriate

## 2023-08-14 NOTE — SWALLOW BEDSIDE ASSESSMENT ADULT - NS SPL SWALLOW CLINIC TRIAL FT
Oral stage was significant for prolonged episodes of bolus holding with all textures requiring verbal and tactile cue to manipulate bolus and initiate swallow trigger. Overt signs of airway protection deficits were noted with thin liquids. Even though no overt signs of aspiration were noted with mildly thick liquids, Pt remains at high aspiration risk with all textures/consistencies due to advanced Dementia, recurrent aspiration PNA, dependence on others for feeding and low mobility. Further recommendations should be based on overall goals of care. Research does not support use of feeding tubes in Pts with advanced Dementia.

## 2023-08-14 NOTE — SWALLOW BEDSIDE ASSESSMENT ADULT - SLP PERTINENT HISTORY OF CURRENT PROBLEM
PMHx is significant for advanced Alzheimer's dementia, Parkinson's disease, SDH/SAH s/p repair 1960, lung CA, recent admission for aspiration PNA (June 2023), now presents with increased WOB and desaturation with concern for aspiration PNA

## 2023-08-14 NOTE — PROVIDER CONTACT NOTE (OTHER) - ASSESSMENT
Patient Name: Bryanna Soria  Attending MD: Brennen Wei MD  MRN: [id number]  YOB: 1945  Account Number: [id number]  Age: 74  Admit Type: Outpatient  Gender: Female  Instrument Name: JOAN CASEY [id number]  Procedure Date No Time: 9/25/2019  Grafts or Implants: Grafts or Implants Not Applicable  Procedure:            Colonoscopy  Pre-Op Diagnosis:     Screening for colorectal malignant neoplasm  Providers:            Brennen Wei MD  Sedation:             Monitored Anesthesia Care  Procedure:       Pre-Anesthesia Assessment:       - Prior to the procedure, a History and Physical was performed, and       patient medications and allergies were reviewed. The patient's tolerance       of previous anesthesia was also reviewed. The risks and benefits of the       procedure and the sedation options and risks were discussed with the       patient. All questions were answered, and informed consent was obtained.       Prior Anticoagulants: The patient has taken no previous anticoagulant or       antiplatelet agents. ASA Grade Assessment: II - A patient with mild       systemic disease. After reviewing the risks and benefits, the patient       was deemed in satisfactory condition to undergo the procedure.       A History and Physical was performed prior to the procedure. Patient       medications and allergies were reviewed. The risks and benefits of the       procedure and sedation options were discussed. Questions were answered       and informed consent was obtained. Patient identification and proposed       procedure were verified. The patient was deemed in satisfactory       condition to undergo the procedure. The heart rate, respiratory rate,       oxygen saturations, blood pressure and response to sedation were       monitored throughout the procedure.       The endoscope was passed under direct vision. The Colonoscope was       introduced through the anus and advanced to 5 cm into the ileum. . The        Patient with SBP of 180. See Vital signs flow sheet. Patient eyes closed resting comfortably, with chest rise noted, appears in no acute distress. Asymptomatic. physical status of the patient was re-assessed after the procedure. The       colonoscopy was performed without difficulty. The patient tolerated the       procedure well. The quality of the bowel preparation was fair. The       terminal ileum was photographed.  Scope Withdrawal Time 0 hours 9 minutes 2 seconds  Findings:       Multiple small-mouthed diverticula were found in the entire colon.       Non-bleeding internal hemorrhoids were found during retroflexion. The       hemorrhoids were Grade I (internal hemorrhoids that do not prolapse).       Two sessile polyps were found in the rectum and hepatic flexure. The       polyps were 2 to 4 mm in size. These polyps were removed with a cold       biopsy forceps. Resection and retrieval were complete.       The terminal ileum appeared normal.  Post-Op Diagnosis:       - Preparation of the colon was fair.       - Diverticulosis in the entire examined colon.       - Non-bleeding internal hemorrhoids.       - Two 2 to 4 mm polyps in the rectum and at the hepatic flexure, removed       with a cold biopsy forceps. Resected and retrieved.       - The examined portion of the ileum was normal.       - Specimens were removed  Recommendation:       - Discharge patient to home (ambulatory).       - Resume regular diet daily.       - Continue present medications.       - if adenoma on HF bx, repeat colon in 18 mos.  Complications:       No immediate complications.  Brennen Wei MD  9/25/2019 9:30:05 AM  Number of Addenda: 0  Procedure Code(s):    --- Professional ---                        35677, Colonoscopy, flexible; with biopsy, single or                        multiple  CPT copyright 2017 American Medical Association. All rights reserved.  The codes documented in this report are preliminary and upon  review may  be revised to meet current compliance requirements.  Total Procedure Duration Time 0 hours 20 minutes 27 seconds  Estimated Blood Loss:       Estimated blood  loss: none.  Scope In: 8:51:46 AM  Scope Out: 9:12:13 AM       No specimens removed during this procedure unless otherwise noted.       No assistants present.

## 2023-08-14 NOTE — CONSULT NOTE ADULT - PROBLEM SELECTOR RECOMMENDATION 2
.  In the setting of advanced illness  -will remain at risk for aspiration, continued education provided to family  -allow pleasure feeds as tolerated, recommended for diet by S&S  -will coordinate delivery of suction machine to patient's home  -long-term feeding tube would not be within GOC

## 2023-08-15 DIAGNOSIS — D64.9 ANEMIA, UNSPECIFIED: ICD-10-CM

## 2023-08-15 DIAGNOSIS — Z91.89 OTHER SPECIFIED PERSONAL RISK FACTORS, NOT ELSEWHERE CLASSIFIED: ICD-10-CM

## 2023-08-15 LAB
ALBUMIN SERPL ELPH-MCNC: 2.8 G/DL — LOW (ref 3.3–5)
ALP SERPL-CCNC: 78 U/L — SIGNIFICANT CHANGE UP (ref 40–120)
ALT FLD-CCNC: 6 U/L — LOW (ref 10–45)
ANION GAP SERPL CALC-SCNC: 10 MMOL/L — SIGNIFICANT CHANGE UP (ref 5–17)
ANION GAP SERPL CALC-SCNC: 10 MMOL/L — SIGNIFICANT CHANGE UP (ref 5–17)
AST SERPL-CCNC: 12 U/L — SIGNIFICANT CHANGE UP (ref 10–40)
BASOPHILS # BLD AUTO: 0.03 K/UL — SIGNIFICANT CHANGE UP (ref 0–0.2)
BASOPHILS NFR BLD AUTO: 0.4 % — SIGNIFICANT CHANGE UP (ref 0–2)
BILIRUB SERPL-MCNC: 0.2 MG/DL — SIGNIFICANT CHANGE UP (ref 0.2–1.2)
BUN SERPL-MCNC: 23 MG/DL — SIGNIFICANT CHANGE UP (ref 7–23)
BUN SERPL-MCNC: 24 MG/DL — HIGH (ref 7–23)
CALCIUM SERPL-MCNC: 10 MG/DL — SIGNIFICANT CHANGE UP (ref 8.4–10.5)
CALCIUM SERPL-MCNC: 10 MG/DL — SIGNIFICANT CHANGE UP (ref 8.4–10.5)
CHLORIDE SERPL-SCNC: 96 MMOL/L — SIGNIFICANT CHANGE UP (ref 96–108)
CHLORIDE SERPL-SCNC: 97 MMOL/L — SIGNIFICANT CHANGE UP (ref 96–108)
CO2 SERPL-SCNC: 27 MMOL/L — SIGNIFICANT CHANGE UP (ref 22–31)
CO2 SERPL-SCNC: 29 MMOL/L — SIGNIFICANT CHANGE UP (ref 22–31)
CREAT SERPL-MCNC: 0.48 MG/DL — LOW (ref 0.5–1.3)
CREAT SERPL-MCNC: 0.74 MG/DL — SIGNIFICANT CHANGE UP (ref 0.5–1.3)
CULTURE RESULTS: SIGNIFICANT CHANGE UP
EGFR: 78 ML/MIN/1.73M2 — SIGNIFICANT CHANGE UP
EGFR: 92 ML/MIN/1.73M2 — SIGNIFICANT CHANGE UP
EOSINOPHIL # BLD AUTO: 0.29 K/UL — SIGNIFICANT CHANGE UP (ref 0–0.5)
EOSINOPHIL NFR BLD AUTO: 3.4 % — SIGNIFICANT CHANGE UP (ref 0–6)
GLUCOSE BLDC GLUCOMTR-MCNC: 236 MG/DL — HIGH (ref 70–99)
GLUCOSE BLDC GLUCOMTR-MCNC: 283 MG/DL — HIGH (ref 70–99)
GLUCOSE BLDC GLUCOMTR-MCNC: 289 MG/DL — HIGH (ref 70–99)
GLUCOSE BLDC GLUCOMTR-MCNC: 314 MG/DL — HIGH (ref 70–99)
GLUCOSE BLDC GLUCOMTR-MCNC: 324 MG/DL — HIGH (ref 70–99)
GLUCOSE SERPL-MCNC: 290 MG/DL — HIGH (ref 70–99)
GLUCOSE SERPL-MCNC: 326 MG/DL — HIGH (ref 70–99)
HCT VFR BLD CALC: 23.8 % — LOW (ref 34.5–45)
HCT VFR BLD CALC: 25.9 % — LOW (ref 34.5–45)
HGB BLD-MCNC: 7.3 G/DL — LOW (ref 11.5–15.5)
HGB BLD-MCNC: 7.9 G/DL — LOW (ref 11.5–15.5)
IMM GRANULOCYTES NFR BLD AUTO: 3.6 % — HIGH (ref 0–0.9)
LYMPHOCYTES # BLD AUTO: 0.45 K/UL — LOW (ref 1–3.3)
LYMPHOCYTES # BLD AUTO: 5.3 % — LOW (ref 13–44)
MAGNESIUM SERPL-MCNC: 2.4 MG/DL — SIGNIFICANT CHANGE UP (ref 1.6–2.6)
MCHC RBC-ENTMCNC: 23.2 PG — LOW (ref 27–34)
MCHC RBC-ENTMCNC: 23.3 PG — LOW (ref 27–34)
MCHC RBC-ENTMCNC: 30.5 GM/DL — LOW (ref 32–36)
MCHC RBC-ENTMCNC: 30.7 GM/DL — LOW (ref 32–36)
MCV RBC AUTO: 76 FL — LOW (ref 80–100)
MCV RBC AUTO: 76.2 FL — LOW (ref 80–100)
MONOCYTES # BLD AUTO: 0.47 K/UL — SIGNIFICANT CHANGE UP (ref 0–0.9)
MONOCYTES NFR BLD AUTO: 5.6 % — SIGNIFICANT CHANGE UP (ref 2–14)
NEUTROPHILS # BLD AUTO: 6.91 K/UL — SIGNIFICANT CHANGE UP (ref 1.8–7.4)
NEUTROPHILS NFR BLD AUTO: 81.7 % — HIGH (ref 43–77)
NRBC # BLD: 0 /100 WBCS — SIGNIFICANT CHANGE UP (ref 0–0)
NRBC # BLD: 0 /100 WBCS — SIGNIFICANT CHANGE UP (ref 0–0)
PHOSPHATE SERPL-MCNC: 2.9 MG/DL — SIGNIFICANT CHANGE UP (ref 2.5–4.5)
PLATELET # BLD AUTO: 226 K/UL — SIGNIFICANT CHANGE UP (ref 150–400)
PLATELET # BLD AUTO: 239 K/UL — SIGNIFICANT CHANGE UP (ref 150–400)
POTASSIUM SERPL-MCNC: 3.3 MMOL/L — LOW (ref 3.5–5.3)
POTASSIUM SERPL-MCNC: 3.8 MMOL/L — SIGNIFICANT CHANGE UP (ref 3.5–5.3)
POTASSIUM SERPL-SCNC: 3.3 MMOL/L — LOW (ref 3.5–5.3)
POTASSIUM SERPL-SCNC: 3.8 MMOL/L — SIGNIFICANT CHANGE UP (ref 3.5–5.3)
PROT SERPL-MCNC: 5.8 G/DL — LOW (ref 6–8.3)
RBC # BLD: 3.13 M/UL — LOW (ref 3.8–5.2)
RBC # BLD: 3.4 M/UL — LOW (ref 3.8–5.2)
RBC # FLD: 17.2 % — HIGH (ref 10.3–14.5)
RBC # FLD: 17.3 % — HIGH (ref 10.3–14.5)
SODIUM SERPL-SCNC: 134 MMOL/L — LOW (ref 135–145)
SODIUM SERPL-SCNC: 135 MMOL/L — SIGNIFICANT CHANGE UP (ref 135–145)
SPECIMEN SOURCE: SIGNIFICANT CHANGE UP
VANCOMYCIN TROUGH SERPL-MCNC: 14.7 UG/ML — SIGNIFICANT CHANGE UP (ref 10–20)
VANCOMYCIN TROUGH SERPL-MCNC: 53 UG/ML — CRITICAL HIGH (ref 10–20)
WBC # BLD: 8.12 K/UL — SIGNIFICANT CHANGE UP (ref 3.8–10.5)
WBC # BLD: 8.45 K/UL — SIGNIFICANT CHANGE UP (ref 3.8–10.5)
WBC # FLD AUTO: 8.12 K/UL — SIGNIFICANT CHANGE UP (ref 3.8–10.5)
WBC # FLD AUTO: 8.45 K/UL — SIGNIFICANT CHANGE UP (ref 3.8–10.5)

## 2023-08-15 PROCEDURE — 99232 SBSQ HOSP IP/OBS MODERATE 35: CPT

## 2023-08-15 PROCEDURE — 99233 SBSQ HOSP IP/OBS HIGH 50: CPT

## 2023-08-15 PROCEDURE — 99233 SBSQ HOSP IP/OBS HIGH 50: CPT | Mod: GC

## 2023-08-15 RX ORDER — POTASSIUM CHLORIDE 20 MEQ
40 PACKET (EA) ORAL ONCE
Refills: 0 | Status: COMPLETED | OUTPATIENT
Start: 2023-08-15 | End: 2023-08-15

## 2023-08-15 RX ORDER — ACETAMINOPHEN 500 MG
650 TABLET ORAL EVERY 6 HOURS
Refills: 0 | Status: DISCONTINUED | OUTPATIENT
Start: 2023-08-15 | End: 2023-08-16

## 2023-08-15 RX ORDER — METOPROLOL TARTRATE 50 MG
12.5 TABLET ORAL EVERY 12 HOURS
Refills: 0 | Status: DISCONTINUED | OUTPATIENT
Start: 2023-08-15 | End: 2023-08-16

## 2023-08-15 RX ORDER — VANCOMYCIN HCL 1 G
750 VIAL (EA) INTRAVENOUS EVERY 24 HOURS
Refills: 0 | Status: DISCONTINUED | OUTPATIENT
Start: 2023-08-15 | End: 2023-08-15

## 2023-08-15 RX ORDER — METOPROLOL TARTRATE 50 MG
1 TABLET ORAL
Refills: 0 | DISCHARGE

## 2023-08-15 RX ORDER — POTASSIUM CHLORIDE 20 MEQ
40 PACKET (EA) ORAL ONCE
Refills: 0 | Status: DISCONTINUED | OUTPATIENT
Start: 2023-08-15 | End: 2023-08-15

## 2023-08-15 RX ORDER — POLYETHYLENE GLYCOL 3350 17 G/17G
17 POWDER, FOR SOLUTION ORAL DAILY
Refills: 0 | Status: DISCONTINUED | OUTPATIENT
Start: 2023-08-15 | End: 2023-08-16

## 2023-08-15 RX ADMIN — Medication 62.5 MILLIMOLE(S): at 08:50

## 2023-08-15 RX ADMIN — SERTRALINE 100 MILLIGRAM(S): 25 TABLET, FILM COATED ORAL at 12:01

## 2023-08-15 RX ADMIN — INSULIN GLARGINE 10 UNIT(S): 100 INJECTION, SOLUTION SUBCUTANEOUS at 22:46

## 2023-08-15 RX ADMIN — ATORVASTATIN CALCIUM 5 MILLIGRAM(S): 80 TABLET, FILM COATED ORAL at 22:47

## 2023-08-15 RX ADMIN — LOSARTAN POTASSIUM 100 MILLIGRAM(S): 100 TABLET, FILM COATED ORAL at 05:20

## 2023-08-15 RX ADMIN — Medication 6: at 22:47

## 2023-08-15 RX ADMIN — ENOXAPARIN SODIUM 40 MILLIGRAM(S): 100 INJECTION SUBCUTANEOUS at 12:01

## 2023-08-15 RX ADMIN — PIPERACILLIN AND TAZOBACTAM 25 GRAM(S): 4; .5 INJECTION, POWDER, LYOPHILIZED, FOR SOLUTION INTRAVENOUS at 12:58

## 2023-08-15 RX ADMIN — Medication 12.5 MILLIGRAM(S): at 12:02

## 2023-08-15 RX ADMIN — Medication 81 MILLIGRAM(S): at 12:01

## 2023-08-15 RX ADMIN — Medication 3 MILLILITER(S): at 03:33

## 2023-08-15 RX ADMIN — PIPERACILLIN AND TAZOBACTAM 25 GRAM(S): 4; .5 INJECTION, POWDER, LYOPHILIZED, FOR SOLUTION INTRAVENOUS at 19:07

## 2023-08-15 RX ADMIN — Medication 4: at 06:31

## 2023-08-15 RX ADMIN — LIDOCAINE 1 PATCH: 4 CREAM TOPICAL at 12:00

## 2023-08-15 RX ADMIN — LIDOCAINE 1 PATCH: 4 CREAM TOPICAL at 18:10

## 2023-08-15 RX ADMIN — PANTOPRAZOLE SODIUM 40 MILLIGRAM(S): 20 TABLET, DELAYED RELEASE ORAL at 05:19

## 2023-08-15 RX ADMIN — Medication 3 MILLILITER(S): at 10:42

## 2023-08-15 RX ADMIN — POLYETHYLENE GLYCOL 3350 17 GRAM(S): 17 POWDER, FOR SOLUTION ORAL at 19:07

## 2023-08-15 RX ADMIN — Medication 650 MILLIGRAM(S): at 11:48

## 2023-08-15 RX ADMIN — PIPERACILLIN AND TAZOBACTAM 25 GRAM(S): 4; .5 INJECTION, POWDER, LYOPHILIZED, FOR SOLUTION INTRAVENOUS at 03:33

## 2023-08-15 RX ADMIN — SODIUM CHLORIDE 4 MILLILITER(S): 9 INJECTION INTRAMUSCULAR; INTRAVENOUS; SUBCUTANEOUS at 18:13

## 2023-08-15 RX ADMIN — LIDOCAINE 1 PATCH: 4 CREAM TOPICAL at 00:18

## 2023-08-15 RX ADMIN — Medication 40 MILLIEQUIVALENT(S): at 05:20

## 2023-08-15 RX ADMIN — Medication 3 MILLILITER(S): at 17:05

## 2023-08-15 RX ADMIN — CARBIDOPA AND LEVODOPA 2.5 TABLET(S): 25; 100 TABLET ORAL at 10:22

## 2023-08-15 RX ADMIN — CARBIDOPA AND LEVODOPA 2.5 TABLET(S): 25; 100 TABLET ORAL at 12:58

## 2023-08-15 RX ADMIN — AMIODARONE HYDROCHLORIDE 200 MILLIGRAM(S): 400 TABLET ORAL at 05:19

## 2023-08-15 RX ADMIN — SODIUM CHLORIDE 4 MILLILITER(S): 9 INJECTION INTRAMUSCULAR; INTRAVENOUS; SUBCUTANEOUS at 03:33

## 2023-08-15 RX ADMIN — CARBIDOPA AND LEVODOPA 2.5 TABLET(S): 25; 100 TABLET ORAL at 17:05

## 2023-08-15 RX ADMIN — Medication 8: at 12:02

## 2023-08-15 RX ADMIN — Medication 650 MILLIGRAM(S): at 11:01

## 2023-08-15 RX ADMIN — Medication 8: at 17:02

## 2023-08-15 RX ADMIN — SODIUM CHLORIDE 4 MILLILITER(S): 9 INJECTION INTRAMUSCULAR; INTRAVENOUS; SUBCUTANEOUS at 10:42

## 2023-08-15 NOTE — PROGRESS NOTE ADULT - PROBLEM SELECTOR PLAN 2
Patient with recurrent aspirations with recent admission for aspiration pneumonia, now admitted for management of aspiration pna. Likely etiology delayed oropharyngeal phase 2/2 dementia causing dysphagia.  -evaluated by S&S, cleared for pureed with thick liquids; aligned with GOC with acknowledgement by family that patient is aspiration risk  -crush all meds  -aspiration precautions  -frequent suctioning , elevate head of bed at all times   -management of pna as above Patient with recurrent aspirations with recent admission for aspiration pneumonia, now admitted for management of aspiration pna. Likely etiology delayed oropharyngeal phase 2/2 dementia causing dysphagia.  -evaluated by S&S, cleared for pureed with thick liquids; aligned with GOC with acknowledgement by family that patient is aspiration risk  -crush all meds  -aspiration precautions  -frequent suctioning , elevate head of bed at all times   -management of pna as above  -patient to be discharged with suction device; nurse to perform suction teaching at bedside with patient and patient's HHA at bedside prior to discharge

## 2023-08-15 NOTE — PROGRESS NOTE ADULT - ATTENDING COMMENTS
88F with PMH of parkinson's disease, Alzheimer's dementia, SDH/SAH, HTN, HLD and DM2 presenting with aspiration pneumonia, now being transitioned to home hospice, with plans to discharge tomorrow.     #Aspiration pneumonia  - stepped down from telemetry floor  - planning for discharge on augmentin  - clarified with patient and her aide that they have oxygen at home  - monitor overnight, and plan for discharge.     #Dm2  - started on insulin in the hospital, but will not discharge on insulin  - will resume home medications, and decision to continue to be determined with hospice team depending on further goals of care    #Constipation  - miralax    35 minutes spent on this encounter, including face to face with patient, care coordination and documentation

## 2023-08-15 NOTE — PROGRESS NOTE ADULT - ASSESSMENT
88yo F with PMH of Dementia, Parkinson's, h/o Lung CA, HTN, T2DM, and h/o SDH/SAH p/w respiratory failure and AFib. Palliative consulted for complex medical decision making in the setting of advanced illness.    ·	if we are comfortable discharging the patient on oral ABx, then we can coordinate discharge back home with hospice as soon as tomorrow (adding a suction machine so patient's home care is better equipped) 86yo F with PMH of Dementia, Parkinson's, h/o Lung CA, HTN, T2DM, and h/o SDH/SAH p/w respiratory failure and AFib. Palliative consulted for complex medical decision making in the setting of advanced illness.    ·	if we are comfortable discharging the patient on oral ABx, then we can coordinate discharge back home with hospice as soon as tomorrow (adding a suction machine so patient's home care is better equipped)  ·	can order scheduled Tylenol 650mg PO q8h ATC 88yo F with PMH of Dementia, Parkinson's, h/o Lung CA, HTN, T2DM, and h/o SDH/SAH p/w respiratory failure and AFib. Palliative consulted for complex medical decision making in the setting of advanced illness.    ·	will coordinate discharge back home with hospice for tomorrow barring any setbacks (adding a suction machine so patient's home care is better equipped) -> discussed with HHA, daughter, and PCP (all in agreement)  ·	can order scheduled Tylenol 650mg PO q8h ATC  ·	please have RN educate the aide on how to use oral suction

## 2023-08-15 NOTE — PROGRESS NOTE ADULT - ASSESSMENT
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

## 2023-08-15 NOTE — PROGRESS NOTE ADULT - SUBJECTIVE AND OBJECTIVE BOX
INCOMPLETE  ------STEP DOWN from [facility] to [facility]----------  Hospital Course:   Pt w/ PMHx PMHx Alzheimer's dementia, Parkinson's Disease, SDH/SAH s/p repair of cerebral aneurysm 1960s, Lung Ca s/p pneumonectomy of L lung, HTN, HLD, DM, sepsis 2/2 colitis, recent admission June 2023 2/2 aspiration event, found to have aspiration PNA with hypoxia and new afib with RVR. Initially admitted to MICU, then stepped down to telemetry, then to [facility]. Afib w/ RVR resolved. After GOC conversation, decision made for patient to be discharged home with home hospice. Pt now p/w SOB/ Family wants pt transported to hospital. Care provider reports pt was in her normal state of health this morning, ate w/o difficulty. She was given Miralax to have a BM, had a BM, and then afterwards coughed. Shortly after she was noted to have more inc WOB and her SP02 was in the 60s on 2 two pulse ox machines while on her chronic 3L NC. IN the ED, pt denies CP, SOB. She reports low back pain. She has no other complaints.     INTERVAL HPI/OVERNIGHT EVENTS: elevated FSG, lantus 10U    SUBJECTIVE: Patient seen and examined at bedside, resting comfortably in bed, and does not appear to be in any acute distress. Patient states  Patient denies any recent fevers, chills, nausea, vomiting, headache, acute sob, chest pain, abdominal pain, genitourinary sx, extremity pain or swelling.     ANTIBIOTICS/RELEVANT:    MEDICATIONS  (STANDING):  albuterol/ipratropium for Nebulization 3 milliLiter(s) Nebulizer every 6 hours  aMIOdarone    Tablet 200 milliGRAM(s) Oral daily  aspirin  chewable 81 milliGRAM(s) Oral daily  atorvastatin 5 milliGRAM(s) Oral at bedtime  carbidopa/levodopa  25/100 2.5 Tablet(s) Oral <User Schedule>  dextrose 5%. 1000 milliLiter(s) (100 mL/Hr) IV Continuous <Continuous>  dextrose 5%. 1000 milliLiter(s) (50 mL/Hr) IV Continuous <Continuous>  dextrose 50% Injectable 25 Gram(s) IV Push once  dextrose 50% Injectable 12.5 Gram(s) IV Push once  dextrose 50% Injectable 25 Gram(s) IV Push once  enoxaparin Injectable 40 milliGRAM(s) SubCutaneous every 24 hours  glucagon  Injectable 1 milliGRAM(s) IntraMuscular once  insulin glargine Injectable (LANTUS) 10 Unit(s) SubCutaneous at bedtime  insulin lispro (ADMELOG) corrective regimen sliding scale   SubCutaneous Before meals and at bedtime  lidocaine   4% Patch 1 Patch Transdermal daily  losartan 100 milliGRAM(s) Oral daily  metoprolol tartrate 12.5 milliGRAM(s) Oral every 12 hours  pantoprazole    Tablet 40 milliGRAM(s) Oral before breakfast  piperacillin/tazobactam IVPB.. 3.375 Gram(s) IV Intermittent every 8 hours  sertraline 100 milliGRAM(s) Oral daily  sodium chloride 3%  Inhalation 4 milliLiter(s) Inhalation every 6 hours    MEDICATIONS  (PRN):  acetaminophen     Tablet .. 650 milliGRAM(s) Oral every 6 hours PRN Temp greater or equal to 38C (100.4F), Mild Pain (1 - 3), Moderate Pain (4 - 6)  dextrose Oral Gel 15 Gram(s) Oral once PRN Blood Glucose LESS THAN 70 milliGRAM(s)/deciliter      Vital Signs Last 24 Hrs  T(C): 36.3 (15 Aug 2023 16:57), Max: 36.7 (15 Aug 2023 00:36)  T(F): 97.3 (15 Aug 2023 16:57), Max: 98.1 (15 Aug 2023 00:36)  HR: 103 (15 Aug 2023 16:57) (94 - 112)  BP: 146/69 (15 Aug 2023 16:57) (113/53 - 147/67)  BP(mean): 97 (15 Aug 2023 12:00) (76 - 97)  RR: 17 (15 Aug 2023 16:57) (17 - 18)  SpO2: 99% (15 Aug 2023 16:57) (94% - 99%)    Parameters below as of 15 Aug 2023 16:57  Patient On (Oxygen Delivery Method): nasal cannula  O2 Flow (L/min): 3      PHYSICAL EXAM:  General: in no acute distress  Eyes: EOMI intact bilaterally. Anicteric sclerae, moist conjunctivae  HENT: Moist mucous membranes  Neck: Trachea midline, supple  Lungs: CTA B/L. No wheezes, rales, or rhonchi  Cardiovascular: RRR. No murmurs, rubs, or gallops  Abdomen: Soft, non-tender non-distended; No rebound or guarding  Extremities: WWP, No clubbing, cyanosis or edema  Neurological: Alert and oriented  Skin: Warm and dry. No obvious rash     LABS:                        7.9    8.45  )-----------( 239      ( 15 Aug 2023 11:52 )             25.9     08-15    134<L>  |  97  |  24<H>  ----------------------------<  326<H>  3.8   |  27  |  0.48<L>    Ca    10.0      15 Aug 2023 11:52  Phos  2.9     08-15  Mg     2.4     08-15    TPro  5.8<L>  /  Alb  2.8<L>  /  TBili  0.2  /  DBili  x   /  AST  12  /  ALT  6<L>  /  AlkPhos  78  08-15      Urinalysis Basic - ( 15 Aug 2023 11:52 )    Color: x / Appearance: x / SG: x / pH: x  Gluc: 326 mg/dL / Ketone: x  / Bili: x / Urobili: x   Blood: x / Protein: x / Nitrite: x   Leuk Esterase: x / RBC: x / WBC x   Sq Epi: x / Non Sq Epi: x / Bacteria: x        MICROBIOLOGY:    RADIOLOGY & ADDITIONAL STUDIES: -----------------STEPDOWN FROM [facility] TO [facility]--------------  Hospital Course:   88F with PMHx Alzheimer's dementia, Parkinson's Disease, SDH/SAH s/p repair of cerebral aneurysm (in 1960s), Lung Ca s/p pneumonectomy of L lung, HTN, HLD, DM, recent admission 6/2023 d/t aspiration PNA [facility] to be discharged home with home hospice. Pt now p/w SOB/ Family wants pt transported to hospital. Care provider reports pt was in her normal state of health this morning, ate w/o difficulty. She was given Miralax to have a BM, had a BM, and then afterwards coughed. Shortly after she was noted to have more inc WOB and her SP02 was in the 60s on 2 two pulse ox machines while on her chronic 3L NC. IN the ED, pt denies CP, SOB. She reports low back pain. She has no other complaints.     --------------  INTERVAL HPI/OVERNIGHT EVENTS: elevated FSG, lantus 10U    SUBJECTIVE: Patient seen and examined at bedside, resting comfortably in bed, and does not appear to be in any acute distress. Patient states  Patient denies any recent fevers, chills, nausea, vomiting, headache, acute sob, chest pain, abdominal pain, genitourinary sx, extremity pain or swelling.     ANTIBIOTICS/RELEVANT:    MEDICATIONS  (STANDING):  albuterol/ipratropium for Nebulization 3 milliLiter(s) Nebulizer every 6 hours  aMIOdarone    Tablet 200 milliGRAM(s) Oral daily  aspirin  chewable 81 milliGRAM(s) Oral daily  atorvastatin 5 milliGRAM(s) Oral at bedtime  carbidopa/levodopa  25/100 2.5 Tablet(s) Oral <User Schedule>  dextrose 5%. 1000 milliLiter(s) (100 mL/Hr) IV Continuous <Continuous>  dextrose 5%. 1000 milliLiter(s) (50 mL/Hr) IV Continuous <Continuous>  dextrose 50% Injectable 25 Gram(s) IV Push once  dextrose 50% Injectable 12.5 Gram(s) IV Push once  dextrose 50% Injectable 25 Gram(s) IV Push once  enoxaparin Injectable 40 milliGRAM(s) SubCutaneous every 24 hours  glucagon  Injectable 1 milliGRAM(s) IntraMuscular once  insulin glargine Injectable (LANTUS) 10 Unit(s) SubCutaneous at bedtime  insulin lispro (ADMELOG) corrective regimen sliding scale   SubCutaneous Before meals and at bedtime  lidocaine   4% Patch 1 Patch Transdermal daily  losartan 100 milliGRAM(s) Oral daily  metoprolol tartrate 12.5 milliGRAM(s) Oral every 12 hours  pantoprazole    Tablet 40 milliGRAM(s) Oral before breakfast  piperacillin/tazobactam IVPB.. 3.375 Gram(s) IV Intermittent every 8 hours  sertraline 100 milliGRAM(s) Oral daily  sodium chloride 3%  Inhalation 4 milliLiter(s) Inhalation every 6 hours    MEDICATIONS  (PRN):  acetaminophen     Tablet .. 650 milliGRAM(s) Oral every 6 hours PRN Temp greater or equal to 38C (100.4F), Mild Pain (1 - 3), Moderate Pain (4 - 6)  dextrose Oral Gel 15 Gram(s) Oral once PRN Blood Glucose LESS THAN 70 milliGRAM(s)/deciliter      Vital Signs Last 24 Hrs  T(C): 36.3 (15 Aug 2023 16:57), Max: 36.7 (15 Aug 2023 00:36)  T(F): 97.3 (15 Aug 2023 16:57), Max: 98.1 (15 Aug 2023 00:36)  HR: 103 (15 Aug 2023 16:57) (94 - 112)  BP: 146/69 (15 Aug 2023 16:57) (113/53 - 147/67)  BP(mean): 97 (15 Aug 2023 12:00) (76 - 97)  RR: 17 (15 Aug 2023 16:57) (17 - 18)  SpO2: 99% (15 Aug 2023 16:57) (94% - 99%)    Parameters below as of 15 Aug 2023 16:57  Patient On (Oxygen Delivery Method): nasal cannula  O2 Flow (L/min): 3      PHYSICAL EXAM:  General: in no acute distress  Eyes: EOMI intact bilaterally. Anicteric sclerae, moist conjunctivae  HENT: Moist mucous membranes  Neck: Trachea midline, supple  Lungs: CTA B/L. No wheezes, rales, or rhonchi  Cardiovascular: RRR. No murmurs, rubs, or gallops  Abdomen: Soft, non-tender non-distended; No rebound or guarding  Extremities: WWP, No clubbing, cyanosis or edema  Neurological: Alert and oriented  Skin: Warm and dry. No obvious rash     LABS:                        7.9    8.45  )-----------( 239      ( 15 Aug 2023 11:52 )             25.9     08-15    134<L>  |  97  |  24<H>  ----------------------------<  326<H>  3.8   |  27  |  0.48<L>    Ca    10.0      15 Aug 2023 11:52  Phos  2.9     08-15  Mg     2.4     08-15    TPro  5.8<L>  /  Alb  2.8<L>  /  TBili  0.2  /  DBili  x   /  AST  12  /  ALT  6<L>  /  AlkPhos  78  08-15      Urinalysis Basic - ( 15 Aug 2023 11:52 )    Color: x / Appearance: x / SG: x / pH: x  Gluc: 326 mg/dL / Ketone: x  / Bili: x / Urobili: x   Blood: x / Protein: x / Nitrite: x   Leuk Esterase: x / RBC: x / WBC x   Sq Epi: x / Non Sq Epi: x / Bacteria: x        MICROBIOLOGY:    RADIOLOGY & ADDITIONAL STUDIES: -----------------STEPDOWN FROM [facility] TO [facility]--------------  Hospital Course:   88F with PMHx Alzheimer's dementia, Parkinson's Disease, SDH/SAH s/p repair of cerebral aneurysm (in 1960s), Lung Ca s/p pneumonectomy of L lung, HTN, HLD, DM, recent admission 6/2023 d/t aspiration PNA c/b Afib with RVR. After GOC conversation, decision made for patient to be discharged home with home hospice. Pt now p/w SOB, and family called ambulance to hospital. At [facility], s&s recommended crush mediciation and small sips/bites    --------------  INTERVAL HPI/OVERNIGHT EVENTS: elevated FSG, lantus 10U    SUBJECTIVE: Patient seen and examined at bedside, resting comfortably in bed, and does not appear to be in any acute distress. Patient states  Patient denies any recent fevers, chills, nausea, vomiting, headache, acute sob, chest pain, abdominal pain, genitourinary sx, extremity pain or swelling.     ANTIBIOTICS/RELEVANT:    MEDICATIONS  (STANDING):  albuterol/ipratropium for Nebulization 3 milliLiter(s) Nebulizer every 6 hours  aMIOdarone    Tablet 200 milliGRAM(s) Oral daily  aspirin  chewable 81 milliGRAM(s) Oral daily  atorvastatin 5 milliGRAM(s) Oral at bedtime  carbidopa/levodopa  25/100 2.5 Tablet(s) Oral <User Schedule>  dextrose 5%. 1000 milliLiter(s) (100 mL/Hr) IV Continuous <Continuous>  dextrose 5%. 1000 milliLiter(s) (50 mL/Hr) IV Continuous <Continuous>  dextrose 50% Injectable 25 Gram(s) IV Push once  dextrose 50% Injectable 12.5 Gram(s) IV Push once  dextrose 50% Injectable 25 Gram(s) IV Push once  enoxaparin Injectable 40 milliGRAM(s) SubCutaneous every 24 hours  glucagon  Injectable 1 milliGRAM(s) IntraMuscular once  insulin glargine Injectable (LANTUS) 10 Unit(s) SubCutaneous at bedtime  insulin lispro (ADMELOG) corrective regimen sliding scale   SubCutaneous Before meals and at bedtime  lidocaine   4% Patch 1 Patch Transdermal daily  losartan 100 milliGRAM(s) Oral daily  metoprolol tartrate 12.5 milliGRAM(s) Oral every 12 hours  pantoprazole    Tablet 40 milliGRAM(s) Oral before breakfast  piperacillin/tazobactam IVPB.. 3.375 Gram(s) IV Intermittent every 8 hours  sertraline 100 milliGRAM(s) Oral daily  sodium chloride 3%  Inhalation 4 milliLiter(s) Inhalation every 6 hours    MEDICATIONS  (PRN):  acetaminophen     Tablet .. 650 milliGRAM(s) Oral every 6 hours PRN Temp greater or equal to 38C (100.4F), Mild Pain (1 - 3), Moderate Pain (4 - 6)  dextrose Oral Gel 15 Gram(s) Oral once PRN Blood Glucose LESS THAN 70 milliGRAM(s)/deciliter      Vital Signs Last 24 Hrs  T(C): 36.3 (15 Aug 2023 16:57), Max: 36.7 (15 Aug 2023 00:36)  T(F): 97.3 (15 Aug 2023 16:57), Max: 98.1 (15 Aug 2023 00:36)  HR: 103 (15 Aug 2023 16:57) (94 - 112)  BP: 146/69 (15 Aug 2023 16:57) (113/53 - 147/67)  BP(mean): 97 (15 Aug 2023 12:00) (76 - 97)  RR: 17 (15 Aug 2023 16:57) (17 - 18)  SpO2: 99% (15 Aug 2023 16:57) (94% - 99%)    Parameters below as of 15 Aug 2023 16:57  Patient On (Oxygen Delivery Method): nasal cannula  O2 Flow (L/min): 3      PHYSICAL EXAM:  General: in no acute distress  Eyes: EOMI intact bilaterally. Anicteric sclerae, moist conjunctivae  HENT: Moist mucous membranes  Neck: Trachea midline, supple  Lungs: CTA B/L. No wheezes, rales, or rhonchi  Cardiovascular: RRR. No murmurs, rubs, or gallops  Abdomen: Soft, non-tender non-distended; No rebound or guarding  Extremities: WWP, No clubbing, cyanosis or edema  Neurological: Alert and oriented  Skin: Warm and dry. No obvious rash     LABS:                        7.9    8.45  )-----------( 239      ( 15 Aug 2023 11:52 )             25.9     08-15    134<L>  |  97  |  24<H>  ----------------------------<  326<H>  3.8   |  27  |  0.48<L>    Ca    10.0      15 Aug 2023 11:52  Phos  2.9     08-15  Mg     2.4     08-15    TPro  5.8<L>  /  Alb  2.8<L>  /  TBili  0.2  /  DBili  x   /  AST  12  /  ALT  6<L>  /  AlkPhos  78  08-15      Urinalysis Basic - ( 15 Aug 2023 11:52 )    Color: x / Appearance: x / SG: x / pH: x  Gluc: 326 mg/dL / Ketone: x  / Bili: x / Urobili: x   Blood: x / Protein: x / Nitrite: x   Leuk Esterase: x / RBC: x / WBC x   Sq Epi: x / Non Sq Epi: x / Bacteria: x        MICROBIOLOGY:    RADIOLOGY & ADDITIONAL STUDIES: -----------------STEPDOWN FROM [facility] TO [facility]--------------  Hospital Course:   88F with PMHx Alzheimer's dementia, Parkinson's Disease, SDH/SAH s/p repair of cerebral aneurysm (in 1960s), Lung Ca s/p pneumonectomy of L lung, HTN, HLD, DM, recent admission 6/2023 d/t aspiration PNA c/b Afib with RVR. After GOC conversation, decision made for patient to be discharged home with home hospice. Pt now p/w SOB, and family called ambulance to hospital. At [facility], s&s recommended crush medication and small sips/bites    --------------  INTERVAL HPI/OVERNIGHT EVENTS: elevated FSG, lantus 10U    SUBJECTIVE: Patient seen and examined at bedside, resting comfortably in bed, and does not appear to be in any acute distress. Patient states  Patient denies any recent fevers, chills, nausea, vomiting, headache, acute sob, chest pain, abdominal pain, genitourinary sx, extremity pain or swelling.     ANTIBIOTICS/RELEVANT:    MEDICATIONS  (STANDING):  albuterol/ipratropium for Nebulization 3 milliLiter(s) Nebulizer every 6 hours  aMIOdarone    Tablet 200 milliGRAM(s) Oral daily  aspirin  chewable 81 milliGRAM(s) Oral daily  atorvastatin 5 milliGRAM(s) Oral at bedtime  carbidopa/levodopa  25/100 2.5 Tablet(s) Oral <User Schedule>  dextrose 5%. 1000 milliLiter(s) (100 mL/Hr) IV Continuous <Continuous>  dextrose 5%. 1000 milliLiter(s) (50 mL/Hr) IV Continuous <Continuous>  dextrose 50% Injectable 25 Gram(s) IV Push once  dextrose 50% Injectable 12.5 Gram(s) IV Push once  dextrose 50% Injectable 25 Gram(s) IV Push once  enoxaparin Injectable 40 milliGRAM(s) SubCutaneous every 24 hours  glucagon  Injectable 1 milliGRAM(s) IntraMuscular once  insulin glargine Injectable (LANTUS) 10 Unit(s) SubCutaneous at bedtime  insulin lispro (ADMELOG) corrective regimen sliding scale   SubCutaneous Before meals and at bedtime  lidocaine   4% Patch 1 Patch Transdermal daily  losartan 100 milliGRAM(s) Oral daily  metoprolol tartrate 12.5 milliGRAM(s) Oral every 12 hours  pantoprazole    Tablet 40 milliGRAM(s) Oral before breakfast  piperacillin/tazobactam IVPB.. 3.375 Gram(s) IV Intermittent every 8 hours  sertraline 100 milliGRAM(s) Oral daily  sodium chloride 3%  Inhalation 4 milliLiter(s) Inhalation every 6 hours    MEDICATIONS  (PRN):  acetaminophen     Tablet .. 650 milliGRAM(s) Oral every 6 hours PRN Temp greater or equal to 38C (100.4F), Mild Pain (1 - 3), Moderate Pain (4 - 6)  dextrose Oral Gel 15 Gram(s) Oral once PRN Blood Glucose LESS THAN 70 milliGRAM(s)/deciliter      Vital Signs Last 24 Hrs  T(C): 36.3 (15 Aug 2023 16:57), Max: 36.7 (15 Aug 2023 00:36)  T(F): 97.3 (15 Aug 2023 16:57), Max: 98.1 (15 Aug 2023 00:36)  HR: 103 (15 Aug 2023 16:57) (94 - 112)  BP: 146/69 (15 Aug 2023 16:57) (113/53 - 147/67)  BP(mean): 97 (15 Aug 2023 12:00) (76 - 97)  RR: 17 (15 Aug 2023 16:57) (17 - 18)  SpO2: 99% (15 Aug 2023 16:57) (94% - 99%)    Parameters below as of 15 Aug 2023 16:57  Patient On (Oxygen Delivery Method): nasal cannula  O2 Flow (L/min): 3      PHYSICAL EXAM:  General: in no acute distress  Eyes: EOMI intact bilaterally. Anicteric sclerae, moist conjunctivae  HENT: Moist mucous membranes  Neck: Trachea midline, supple  Lungs: CTA B/L. No wheezes, rales, or rhonchi  Cardiovascular: RRR. No murmurs, rubs, or gallops  Abdomen: Soft, non-tender non-distended; No rebound or guarding  Extremities: WWP, No clubbing, cyanosis or edema  Neurological: Alert and oriented  Skin: Warm and dry. No obvious rash     LABS:                        7.9    8.45  )-----------( 239      ( 15 Aug 2023 11:52 )             25.9     08-15    134<L>  |  97  |  24<H>  ----------------------------<  326<H>  3.8   |  27  |  0.48<L>    Ca    10.0      15 Aug 2023 11:52  Phos  2.9     08-15  Mg     2.4     08-15    TPro  5.8<L>  /  Alb  2.8<L>  /  TBili  0.2  /  DBili  x   /  AST  12  /  ALT  6<L>  /  AlkPhos  78  08-15      Urinalysis Basic - ( 15 Aug 2023 11:52 )    Color: x / Appearance: x / SG: x / pH: x  Gluc: 326 mg/dL / Ketone: x  / Bili: x / Urobili: x   Blood: x / Protein: x / Nitrite: x   Leuk Esterase: x / RBC: x / WBC x   Sq Epi: x / Non Sq Epi: x / Bacteria: x        MICROBIOLOGY:    RADIOLOGY & ADDITIONAL STUDIES: INCOMPLETE  -----------------STEPDOWN FROM [facility] TO [facility]--------------  Hospital Course:   88F with PMHx Alzheimer's dementia, Parkinson's Disease, SDH/SAH s/p repair of cerebral aneurysm (in 1960s), Lung Ca s/p pneumonectomy of L lung, HTN, HLD, DM, recent admission 6/2023 d/t aspiration PNA c/b Afib with RVR. After GOC conversation, decision made for patient to be discharged home with home hospice. Pt now p/w SOB, and family called ambulance to hospital. At [facility], s&s recommended crush medication and small sips/bites    --------------  INTERVAL HPI/OVERNIGHT EVENTS: elevated FSG, lantus 10U    SUBJECTIVE: Patient seen and examined at bedside, resting comfortably in bed, and does not appear to be in any acute distress. Patient states  Patient denies any recent fevers, chills, nausea, vomiting, headache, acute sob, chest pain, abdominal pain, genitourinary sx, extremity pain or swelling.     ANTIBIOTICS/RELEVANT:    MEDICATIONS  (STANDING):  albuterol/ipratropium for Nebulization 3 milliLiter(s) Nebulizer every 6 hours  aMIOdarone    Tablet 200 milliGRAM(s) Oral daily  aspirin  chewable 81 milliGRAM(s) Oral daily  atorvastatin 5 milliGRAM(s) Oral at bedtime  carbidopa/levodopa  25/100 2.5 Tablet(s) Oral <User Schedule>  dextrose 5%. 1000 milliLiter(s) (100 mL/Hr) IV Continuous <Continuous>  dextrose 5%. 1000 milliLiter(s) (50 mL/Hr) IV Continuous <Continuous>  dextrose 50% Injectable 25 Gram(s) IV Push once  dextrose 50% Injectable 12.5 Gram(s) IV Push once  dextrose 50% Injectable 25 Gram(s) IV Push once  enoxaparin Injectable 40 milliGRAM(s) SubCutaneous every 24 hours  glucagon  Injectable 1 milliGRAM(s) IntraMuscular once  insulin glargine Injectable (LANTUS) 10 Unit(s) SubCutaneous at bedtime  insulin lispro (ADMELOG) corrective regimen sliding scale   SubCutaneous Before meals and at bedtime  lidocaine   4% Patch 1 Patch Transdermal daily  losartan 100 milliGRAM(s) Oral daily  metoprolol tartrate 12.5 milliGRAM(s) Oral every 12 hours  pantoprazole    Tablet 40 milliGRAM(s) Oral before breakfast  piperacillin/tazobactam IVPB.. 3.375 Gram(s) IV Intermittent every 8 hours  sertraline 100 milliGRAM(s) Oral daily  sodium chloride 3%  Inhalation 4 milliLiter(s) Inhalation every 6 hours    MEDICATIONS  (PRN):  acetaminophen     Tablet .. 650 milliGRAM(s) Oral every 6 hours PRN Temp greater or equal to 38C (100.4F), Mild Pain (1 - 3), Moderate Pain (4 - 6)  dextrose Oral Gel 15 Gram(s) Oral once PRN Blood Glucose LESS THAN 70 milliGRAM(s)/deciliter      Vital Signs Last 24 Hrs  T(C): 36.3 (15 Aug 2023 16:57), Max: 36.7 (15 Aug 2023 00:36)  T(F): 97.3 (15 Aug 2023 16:57), Max: 98.1 (15 Aug 2023 00:36)  HR: 103 (15 Aug 2023 16:57) (94 - 112)  BP: 146/69 (15 Aug 2023 16:57) (113/53 - 147/67)  BP(mean): 97 (15 Aug 2023 12:00) (76 - 97)  RR: 17 (15 Aug 2023 16:57) (17 - 18)  SpO2: 99% (15 Aug 2023 16:57) (94% - 99%)    Parameters below as of 15 Aug 2023 16:57  Patient On (Oxygen Delivery Method): nasal cannula  O2 Flow (L/min): 3      PHYSICAL EXAM:  General: in no acute distress  Eyes: EOMI intact bilaterally. Anicteric sclerae, moist conjunctivae  HENT: Moist mucous membranes  Neck: Trachea midline, supple  Lungs: CTA B/L. No wheezes, rales, or rhonchi  Cardiovascular: RRR. No murmurs, rubs, or gallops  Abdomen: Soft, non-tender non-distended; No rebound or guarding  Extremities: WWP, No clubbing, cyanosis or edema  Neurological: Alert and oriented  Skin: Warm and dry. No obvious rash     LABS:                        7.9    8.45  )-----------( 239      ( 15 Aug 2023 11:52 )             25.9     08-15    134<L>  |  97  |  24<H>  ----------------------------<  326<H>  3.8   |  27  |  0.48<L>    Ca    10.0      15 Aug 2023 11:52  Phos  2.9     08-15  Mg     2.4     08-15    TPro  5.8<L>  /  Alb  2.8<L>  /  TBili  0.2  /  DBili  x   /  AST  12  /  ALT  6<L>  /  AlkPhos  78  08-15      Urinalysis Basic - ( 15 Aug 2023 11:52 )    Color: x / Appearance: x / SG: x / pH: x  Gluc: 326 mg/dL / Ketone: x  / Bili: x / Urobili: x   Blood: x / Protein: x / Nitrite: x   Leuk Esterase: x / RBC: x / WBC x   Sq Epi: x / Non Sq Epi: x / Bacteria: x        MICROBIOLOGY:    RADIOLOGY & ADDITIONAL STUDIES: INCOMPLETE  -----------------STEPDOWN FROM [facility] TO [facility]--------------  Hospital Course:   88F with PMHx Alzheimer's dementia, Parkinson's Disease, SDH/SAH s/p repair of cerebral aneurysm (in 1960s), Lung Ca s/p pneumonectomy of L lung, HTN, HLD, DM, recent admission 6/2023 d/t aspiration PNA c/b Afib with RVR. After GOC conversation, decision made for patient to be discharged home with home hospice. Pt now p/w SOB, and family called ambulance to hospital. In the ED, Zosyn 3.375, vanc 750mg, 1.65L ns. [facility], zozyn 3.375 q8, duoneb, and toprol 25mg to lopressor 12.5mg q12 crushed. S&s recommended crush medication and small sips/bites.    --------------  INTERVAL HPI/OVERNIGHT EVENTS: elevated FSG, lantus 10U    SUBJECTIVE: Patient seen and examined at bedside, resting comfortably in bed, and does not appear to be in any acute distress. Patient states  Patient denies any recent fevers, chills, nausea, vomiting, headache, acute sob, chest pain, abdominal pain, genitourinary sx, extremity pain or swelling.     ANTIBIOTICS/RELEVANT:    MEDICATIONS  (STANDING):  albuterol/ipratropium for Nebulization 3 milliLiter(s) Nebulizer every 6 hours  aMIOdarone    Tablet 200 milliGRAM(s) Oral daily  aspirin  chewable 81 milliGRAM(s) Oral daily  atorvastatin 5 milliGRAM(s) Oral at bedtime  carbidopa/levodopa  25/100 2.5 Tablet(s) Oral <User Schedule>  dextrose 5%. 1000 milliLiter(s) (100 mL/Hr) IV Continuous <Continuous>  dextrose 5%. 1000 milliLiter(s) (50 mL/Hr) IV Continuous <Continuous>  dextrose 50% Injectable 25 Gram(s) IV Push once  dextrose 50% Injectable 12.5 Gram(s) IV Push once  dextrose 50% Injectable 25 Gram(s) IV Push once  enoxaparin Injectable 40 milliGRAM(s) SubCutaneous every 24 hours  glucagon  Injectable 1 milliGRAM(s) IntraMuscular once  insulin glargine Injectable (LANTUS) 10 Unit(s) SubCutaneous at bedtime  insulin lispro (ADMELOG) corrective regimen sliding scale   SubCutaneous Before meals and at bedtime  lidocaine   4% Patch 1 Patch Transdermal daily  losartan 100 milliGRAM(s) Oral daily  metoprolol tartrate 12.5 milliGRAM(s) Oral every 12 hours  pantoprazole    Tablet 40 milliGRAM(s) Oral before breakfast  piperacillin/tazobactam IVPB.. 3.375 Gram(s) IV Intermittent every 8 hours  sertraline 100 milliGRAM(s) Oral daily  sodium chloride 3%  Inhalation 4 milliLiter(s) Inhalation every 6 hours    MEDICATIONS  (PRN):  acetaminophen     Tablet .. 650 milliGRAM(s) Oral every 6 hours PRN Temp greater or equal to 38C (100.4F), Mild Pain (1 - 3), Moderate Pain (4 - 6)  dextrose Oral Gel 15 Gram(s) Oral once PRN Blood Glucose LESS THAN 70 milliGRAM(s)/deciliter      Vital Signs Last 24 Hrs  T(C): 36.3 (15 Aug 2023 16:57), Max: 36.7 (15 Aug 2023 00:36)  T(F): 97.3 (15 Aug 2023 16:57), Max: 98.1 (15 Aug 2023 00:36)  HR: 103 (15 Aug 2023 16:57) (94 - 112)  BP: 146/69 (15 Aug 2023 16:57) (113/53 - 147/67)  BP(mean): 97 (15 Aug 2023 12:00) (76 - 97)  RR: 17 (15 Aug 2023 16:57) (17 - 18)  SpO2: 99% (15 Aug 2023 16:57) (94% - 99%)    Parameters below as of 15 Aug 2023 16:57  Patient On (Oxygen Delivery Method): nasal cannula  O2 Flow (L/min): 3      PHYSICAL EXAM:  General: in no acute distress  Eyes: EOMI intact bilaterally. Anicteric sclerae, moist conjunctivae  HENT: Moist mucous membranes  Neck: Trachea midline, supple  Lungs: CTA B/L. No wheezes, rales, or rhonchi  Cardiovascular: RRR. No murmurs, rubs, or gallops  Abdomen: Soft, non-tender non-distended; No rebound or guarding  Extremities: WWP, No clubbing, cyanosis or edema  Neurological: Alert and oriented  Skin: Warm and dry. No obvious rash     LABS:                        7.9    8.45  )-----------( 239      ( 15 Aug 2023 11:52 )             25.9     08-15    134<L>  |  97  |  24<H>  ----------------------------<  326<H>  3.8   |  27  |  0.48<L>    Ca    10.0      15 Aug 2023 11:52  Phos  2.9     08-15  Mg     2.4     08-15    TPro  5.8<L>  /  Alb  2.8<L>  /  TBili  0.2  /  DBili  x   /  AST  12  /  ALT  6<L>  /  AlkPhos  78  08-15      Urinalysis Basic - ( 15 Aug 2023 11:52 )    Color: x / Appearance: x / SG: x / pH: x  Gluc: 326 mg/dL / Ketone: x  / Bili: x / Urobili: x   Blood: x / Protein: x / Nitrite: x   Leuk Esterase: x / RBC: x / WBC x   Sq Epi: x / Non Sq Epi: x / Bacteria: x        MICROBIOLOGY:    RADIOLOGY & ADDITIONAL STUDIES: INCOMPLETE  -----------------STEPDOWN FROM [facility] TO [facility]--------------  Hospital Course:   88F with PMHx Alzheimer's dementia, Parkinson's Disease, SDH/SAH s/p repair of cerebral aneurysm (in 1960s), Lung Ca s/p pneumonectomy of L lung, HTN, HLD, DM, recent admission 6/2023 d/t aspiration PNA c/b Afib with RVR. After GOC conversation, decision made for patient to be discharged home with home hospice. Pt now p/w SOB, and family called ambulance to hospital. In the ED, Zosyn 3.375, vanc 750mg, 1.65L ns. At [facility], zozyn 3.375 q8, duoneb, and toprol 25mg to lopressor 12.5mg q12 crushed. S&s recommended crush medication and small sips/bites. Palliative consult,     --------------  INTERVAL HPI/OVERNIGHT EVENTS: elevated FSG, lantus 10U    SUBJECTIVE: Patient seen and examined at bedside, resting comfortably in bed, and does not appear to be in any acute distress. Patient states  Patient denies any recent fevers, chills, nausea, vomiting, headache, acute sob, chest pain, abdominal pain, genitourinary sx, extremity pain or swelling.     ANTIBIOTICS/RELEVANT:    MEDICATIONS  (STANDING):  albuterol/ipratropium for Nebulization 3 milliLiter(s) Nebulizer every 6 hours  aMIOdarone    Tablet 200 milliGRAM(s) Oral daily  aspirin  chewable 81 milliGRAM(s) Oral daily  atorvastatin 5 milliGRAM(s) Oral at bedtime  carbidopa/levodopa  25/100 2.5 Tablet(s) Oral <User Schedule>  dextrose 5%. 1000 milliLiter(s) (100 mL/Hr) IV Continuous <Continuous>  dextrose 5%. 1000 milliLiter(s) (50 mL/Hr) IV Continuous <Continuous>  dextrose 50% Injectable 25 Gram(s) IV Push once  dextrose 50% Injectable 12.5 Gram(s) IV Push once  dextrose 50% Injectable 25 Gram(s) IV Push once  enoxaparin Injectable 40 milliGRAM(s) SubCutaneous every 24 hours  glucagon  Injectable 1 milliGRAM(s) IntraMuscular once  insulin glargine Injectable (LANTUS) 10 Unit(s) SubCutaneous at bedtime  insulin lispro (ADMELOG) corrective regimen sliding scale   SubCutaneous Before meals and at bedtime  lidocaine   4% Patch 1 Patch Transdermal daily  losartan 100 milliGRAM(s) Oral daily  metoprolol tartrate 12.5 milliGRAM(s) Oral every 12 hours  pantoprazole    Tablet 40 milliGRAM(s) Oral before breakfast  piperacillin/tazobactam IVPB.. 3.375 Gram(s) IV Intermittent every 8 hours  sertraline 100 milliGRAM(s) Oral daily  sodium chloride 3%  Inhalation 4 milliLiter(s) Inhalation every 6 hours    MEDICATIONS  (PRN):  acetaminophen     Tablet .. 650 milliGRAM(s) Oral every 6 hours PRN Temp greater or equal to 38C (100.4F), Mild Pain (1 - 3), Moderate Pain (4 - 6)  dextrose Oral Gel 15 Gram(s) Oral once PRN Blood Glucose LESS THAN 70 milliGRAM(s)/deciliter      Vital Signs Last 24 Hrs  T(C): 36.3 (15 Aug 2023 16:57), Max: 36.7 (15 Aug 2023 00:36)  T(F): 97.3 (15 Aug 2023 16:57), Max: 98.1 (15 Aug 2023 00:36)  HR: 103 (15 Aug 2023 16:57) (94 - 112)  BP: 146/69 (15 Aug 2023 16:57) (113/53 - 147/67)  BP(mean): 97 (15 Aug 2023 12:00) (76 - 97)  RR: 17 (15 Aug 2023 16:57) (17 - 18)  SpO2: 99% (15 Aug 2023 16:57) (94% - 99%)    Parameters below as of 15 Aug 2023 16:57  Patient On (Oxygen Delivery Method): nasal cannula  O2 Flow (L/min): 3      PHYSICAL EXAM:  General: in no acute distress  Eyes: EOMI intact bilaterally. Anicteric sclerae, moist conjunctivae  HENT: Moist mucous membranes  Neck: Trachea midline, supple  Lungs: CTA B/L. No wheezes, rales, or rhonchi  Cardiovascular: RRR. No murmurs, rubs, or gallops  Abdomen: Soft, non-tender non-distended; No rebound or guarding  Extremities: WWP, No clubbing, cyanosis or edema  Neurological: Alert and oriented  Skin: Warm and dry. No obvious rash     LABS:                        7.9    8.45  )-----------( 239      ( 15 Aug 2023 11:52 )             25.9     08-15    134<L>  |  97  |  24<H>  ----------------------------<  326<H>  3.8   |  27  |  0.48<L>    Ca    10.0      15 Aug 2023 11:52  Phos  2.9     08-15  Mg     2.4     08-15    TPro  5.8<L>  /  Alb  2.8<L>  /  TBili  0.2  /  DBili  x   /  AST  12  /  ALT  6<L>  /  AlkPhos  78  08-15      Urinalysis Basic - ( 15 Aug 2023 11:52 )    Color: x / Appearance: x / SG: x / pH: x  Gluc: 326 mg/dL / Ketone: x  / Bili: x / Urobili: x   Blood: x / Protein: x / Nitrite: x   Leuk Esterase: x / RBC: x / WBC x   Sq Epi: x / Non Sq Epi: x / Bacteria: x        MICROBIOLOGY:    RADIOLOGY & ADDITIONAL STUDIES: -----------------STEPDOWN FROM [facility] TO [facility]--------------  Hospital Course:   88F with PMHx Alzheimer's dementia, Parkinson's Disease, SDH/SAH s/p repair of cerebral aneurysm (in 1960s), Lung Ca s/p pneumonectomy of L lung, HTN, HLD, DM, recent admission 6/2023 d/t aspiration PNA c/b Afib with RVR with eventual d/c home with home hospice. Pt now p/w shortness of breath and altered mental status, presumed asp pna requiring O2 via NC. Started on Vanc/Zosyn. Evaluated by s&s, recommended pureed diet with crushed medication and small sips/bites. Transitioned home Toprol 25mg qd to Lopressor 12.5mg bid (as it can be crushed). Mental status now at baseline, O2 weaned to 3LNC. Pt continues to have episodes of desaturation after eating. Stable for stepdown to [facility] pending resumption of home hospice with home suction machine to help manage secretions.     --------------  INTERVAL HPI/OVERNIGHT EVENTS: elevated FSG, lantus 10U    SUBJECTIVE: Patient seen and examined at bedside, resting comfortably in bed, and does not appear to be in any acute distress. Patient states  Patient denies any recent fevers, chills, nausea, vomiting, headache, acute sob, chest pain, abdominal pain, genitourinary sx, extremity pain or swelling.     ANTIBIOTICS/RELEVANT:    MEDICATIONS  (STANDING):  albuterol/ipratropium for Nebulization 3 milliLiter(s) Nebulizer every 6 hours  aMIOdarone    Tablet 200 milliGRAM(s) Oral daily  aspirin  chewable 81 milliGRAM(s) Oral daily  atorvastatin 5 milliGRAM(s) Oral at bedtime  carbidopa/levodopa  25/100 2.5 Tablet(s) Oral <User Schedule>  dextrose 5%. 1000 milliLiter(s) (100 mL/Hr) IV Continuous <Continuous>  dextrose 5%. 1000 milliLiter(s) (50 mL/Hr) IV Continuous <Continuous>  dextrose 50% Injectable 25 Gram(s) IV Push once  dextrose 50% Injectable 12.5 Gram(s) IV Push once  dextrose 50% Injectable 25 Gram(s) IV Push once  enoxaparin Injectable 40 milliGRAM(s) SubCutaneous every 24 hours  glucagon  Injectable 1 milliGRAM(s) IntraMuscular once  insulin glargine Injectable (LANTUS) 10 Unit(s) SubCutaneous at bedtime  insulin lispro (ADMELOG) corrective regimen sliding scale   SubCutaneous Before meals and at bedtime  lidocaine   4% Patch 1 Patch Transdermal daily  losartan 100 milliGRAM(s) Oral daily  metoprolol tartrate 12.5 milliGRAM(s) Oral every 12 hours  pantoprazole    Tablet 40 milliGRAM(s) Oral before breakfast  piperacillin/tazobactam IVPB.. 3.375 Gram(s) IV Intermittent every 8 hours  sertraline 100 milliGRAM(s) Oral daily  sodium chloride 3%  Inhalation 4 milliLiter(s) Inhalation every 6 hours    MEDICATIONS  (PRN):  acetaminophen     Tablet .. 650 milliGRAM(s) Oral every 6 hours PRN Temp greater or equal to 38C (100.4F), Mild Pain (1 - 3), Moderate Pain (4 - 6)  dextrose Oral Gel 15 Gram(s) Oral once PRN Blood Glucose LESS THAN 70 milliGRAM(s)/deciliter      Vital Signs Last 24 Hrs  T(C): 36.3 (15 Aug 2023 16:57), Max: 36.7 (15 Aug 2023 00:36)  T(F): 97.3 (15 Aug 2023 16:57), Max: 98.1 (15 Aug 2023 00:36)  HR: 103 (15 Aug 2023 16:57) (94 - 112)  BP: 146/69 (15 Aug 2023 16:57) (113/53 - 147/67)  BP(mean): 97 (15 Aug 2023 12:00) (76 - 97)  RR: 17 (15 Aug 2023 16:57) (17 - 18)  SpO2: 99% (15 Aug 2023 16:57) (94% - 99%)    Parameters below as of 15 Aug 2023 16:57  Patient On (Oxygen Delivery Method): nasal cannula  O2 Flow (L/min): 3      PHYSICAL EXAM:  General: in no acute distress  Eyes: EOMI intact bilaterally. Anicteric sclerae, moist conjunctivae  HENT: Moist mucous membranes  Neck: Trachea midline, supple  Lungs: CTA B/L. No wheezes, rales, or rhonchi  Cardiovascular: RRR. No murmurs, rubs, or gallops  Abdomen: Soft, non-tender non-distended; No rebound or guarding  Extremities: WWP, No clubbing, cyanosis or edema  Neurological: Alert and oriented  Skin: Warm and dry. No obvious rash     LABS:                        7.9    8.45  )-----------( 239      ( 15 Aug 2023 11:52 )             25.9     08-15    134<L>  |  97  |  24<H>  ----------------------------<  326<H>  3.8   |  27  |  0.48<L>    Ca    10.0      15 Aug 2023 11:52  Phos  2.9     08-15  Mg     2.4     08-15    TPro  5.8<L>  /  Alb  2.8<L>  /  TBili  0.2  /  DBili  x   /  AST  12  /  ALT  6<L>  /  AlkPhos  78  08-15      Urinalysis Basic - ( 15 Aug 2023 11:52 )    Color: x / Appearance: x / SG: x / pH: x  Gluc: 326 mg/dL / Ketone: x  / Bili: x / Urobili: x   Blood: x / Protein: x / Nitrite: x   Leuk Esterase: x / RBC: x / WBC x   Sq Epi: x / Non Sq Epi: x / Bacteria: x        MICROBIOLOGY:    RADIOLOGY & ADDITIONAL STUDIES:

## 2023-08-15 NOTE — PROGRESS NOTE ADULT - ATTENDING COMMENTS
87 yo F on home hospice readmitted after daughter called concerned for worsening respiratory failure, misunderstood hospice instructions, pt found to have AHRF 2/2 aspiration pna. Reinforced overall goals of care with daughter who would like to reinstate home hospice. Plan to have home suctioning set up for comfort and to allow continued feeds as tolerated. Can transition to augmentin for PO abx. Will need to have home suctioning w/ hospice set up, transfer to [facility] [place] while awaiting reinstitution of home services. Family would like to avoid telemetry/frequent vital signs/disruption especially in the evenings.     Reviewed labs, medications, CXR. Decision making of highest complexity. 87 yo F on home hospice readmitted after daughter called concerned for worsening respiratory failure, misunderstood hospice instructions, pt found to have AHRF 2/2 aspiration pna. Reinforced overall goals of care with daughter who would like to reinstate home hospice. Plan to have home suctioning set up for comfort and to allow continued feeds as tolerated. Can transition to augmentin for PO abx. Will need to have home suctioning w/ hospice set up, transfer to [facility] while awaiting reinstitution of home services. Family would like to avoid telemetry/frequent vital signs/disruption especially in the evenings. Hypertension noted, if persists and not associated w/ distress can increase antihypertensives.    Reviewed labs, medications, CXR. Decision making of highest complexity.

## 2023-08-15 NOTE — PROGRESS NOTE ADULT - TIME BILLING
Emotional Support/Supportive Care and Clarification of Potential Disease Trajectory related to Dementia  Assessment of Symptom Caroline and Palliative regimen  Discharge Facilitation with ongoing coordination for home hospice  Exploration of GOC/Advanced Directives including HCP designation, code status, and hospice eligibility    Time inclusive of chart review, medication ordering, discussion with primary team, clinical documentation, and communication with family/caregiver
case complexity as above.

## 2023-08-15 NOTE — PROGRESS NOTE ADULT - PROBLEM SELECTOR PLAN 2
During hospitalization in 6/23, patient found to have atrial fibrillation (TSH wnl) which was thought to be in setting of infection.    - S&S recommends based on GOC, mildly thickened liquids with pureed diet, crush medication when feasible  - C/w amiodarone 200mg

## 2023-08-15 NOTE — PROGRESS NOTE ADULT - SUBJECTIVE AND OBJECTIVE BOX
HOSPITAL COURSE:      SUBJECTIVE / INTERVAL HPI: Patient seen and examined at bedside. Reports she is feeling ok. Says her breathing has "been better." Denies any pain. Per HHA at bedside patient has been urinating fine however has not had a BM in few days. Otherwise has been eating and is comfortable.  Remaining ROS negative       PHYSICAL EXAM:    General: lying in bed comfortably in NAD   HEENT: NC/AT; PERRL, anicteric sclera; MMM  Neck: supple  Cardiovascular: +S1/S2, tachycardic rate, regular rhythm   Respiratory: poor air entry, decreased BS b/l. comfortable on 4LNC. mild tachypnea.  Gastrointestinal: soft, NT/ND; +BSx4  Extremities: WWP; no edema, clubbing or cyanosis  Vascular: 2+ radial, DP pulses B/L  Neurological: AAOx3; no focal deficits. b/l LE weakness   Dermatologic: no appreciable wounds or damage to the skin    VITAL SIGNS:  Vital Signs Last 24 Hrs  T(C): 36.3 (15 Aug 2023 16:57), Max: 36.7 (15 Aug 2023 00:36)  T(F): 97.3 (15 Aug 2023 16:57), Max: 98.1 (15 Aug 2023 00:36)  HR: 103 (15 Aug 2023 16:57) (94 - 112)  BP: 146/69 (15 Aug 2023 16:57) (113/53 - 147/67)  BP(mean): 97 (15 Aug 2023 12:00) (76 - 97)  RR: 17 (15 Aug 2023 16:57) (17 - 18)  SpO2: 99% (15 Aug 2023 16:57) (94% - 99%)    Parameters below as of 15 Aug 2023 16:57  Patient On (Oxygen Delivery Method): nasal cannula  O2 Flow (L/min): 3        MEDICATIONS:  MEDICATIONS  (STANDING):  albuterol/ipratropium for Nebulization 3 milliLiter(s) Nebulizer every 6 hours  aMIOdarone    Tablet 200 milliGRAM(s) Oral daily  aspirin  chewable 81 milliGRAM(s) Oral daily  atorvastatin 5 milliGRAM(s) Oral at bedtime  carbidopa/levodopa  25/100 2.5 Tablet(s) Oral <User Schedule>  dextrose 5%. 1000 milliLiter(s) (100 mL/Hr) IV Continuous <Continuous>  dextrose 5%. 1000 milliLiter(s) (50 mL/Hr) IV Continuous <Continuous>  dextrose 50% Injectable 25 Gram(s) IV Push once  dextrose 50% Injectable 12.5 Gram(s) IV Push once  dextrose 50% Injectable 25 Gram(s) IV Push once  enoxaparin Injectable 40 milliGRAM(s) SubCutaneous every 24 hours  glucagon  Injectable 1 milliGRAM(s) IntraMuscular once  insulin glargine Injectable (LANTUS) 10 Unit(s) SubCutaneous at bedtime  insulin lispro (ADMELOG) corrective regimen sliding scale   SubCutaneous Before meals and at bedtime  lidocaine   4% Patch 1 Patch Transdermal daily  losartan 100 milliGRAM(s) Oral daily  metoprolol tartrate 12.5 milliGRAM(s) Oral every 12 hours  pantoprazole    Tablet 40 milliGRAM(s) Oral before breakfast  piperacillin/tazobactam IVPB.. 3.375 Gram(s) IV Intermittent every 8 hours  sertraline 100 milliGRAM(s) Oral daily  sodium chloride 3%  Inhalation 4 milliLiter(s) Inhalation every 6 hours    MEDICATIONS  (PRN):  acetaminophen     Tablet .. 650 milliGRAM(s) Oral every 6 hours PRN Temp greater or equal to 38C (100.4F), Mild Pain (1 - 3), Moderate Pain (4 - 6)  dextrose Oral Gel 15 Gram(s) Oral once PRN Blood Glucose LESS THAN 70 milliGRAM(s)/deciliter      ALLERGIES:  Allergies    No Known Allergies    Intolerances        LABS:                        7.9    8.45  )-----------( 239      ( 15 Aug 2023 11:52 )             25.9     08-15    134<L>  |  97  |  24<H>  ----------------------------<  326<H>  3.8   |  27  |  0.48<L>    Ca    10.0      15 Aug 2023 11:52  Phos  2.9     08-15  Mg     2.4     08-15    TPro  5.8<L>  /  Alb  2.8<L>  /  TBili  0.2  /  DBili  x   /  AST  12  /  ALT  6<L>  /  AlkPhos  78  08-15      Urinalysis Basic - ( 15 Aug 2023 11:52 )    Color: x / Appearance: x / SG: x / pH: x  Gluc: 326 mg/dL / Ketone: x  / Bili: x / Urobili: x   Blood: x / Protein: x / Nitrite: x   Leuk Esterase: x / RBC: x / WBC x   Sq Epi: x / Non Sq Epi: x / Bacteria: x      CAPILLARY BLOOD GLUCOSE      POCT Blood Glucose.: 314 mg/dL (15 Aug 2023 16:33)      RADIOLOGY & ADDITIONAL TESTS: Reviewed. HOSPITAL COURSE: PMHx Alzheimer's dementia, Parkinson's Disease, SDH/SAH s/p repair of cerebral aneurysm 1960s, Lung Ca s/p pneumonectomy of L lung, HTN, HLD, DM, sepsis 2/2 colitis, recent admission June 2023 2/2 aspiration event, found to have aspiration PNA with hypoxia and new afib with RVR. Initially admitted to MICU, then stepped down to telemetry, then to F. Afib w/ RVR resolved. After GOC conversation, decision made for patient to be discharged home with home hospice. Pt now p/w SOB/ Family wants pt transported to hospital. Care provider reports pt was in her normal state of health morning of admission, ate w/o difficulty. She was given Miralax to have a BM, had a BM, and then afterwards coughed. Shortly after she was noted to have more inc WOB and her SP02 was in the 60s on 2 two pulse ox machines while on her chronic 3L NC. Family called EMS and patient was brought to ED. In the ED patient found to be febrile to 100.9F, tachy to 125 and hypoxic on RA requiring 3LNC. She was started on vancomycin and zosyn for presumed aspiration pna. RVP negative, CXR done showing persistent opacification and volume loss in the left upper thorax; increased infiltrates in the right lung. Patient admitted to [facility] for treatment of AHRF in setting of asp pna. Evaluated by S&S, rec for mildly thick liquids and pureed with frequent suctioning. Started on insulin regimen for hyperglycemia. Palliative consulted, patient to be sent home with home hospice. Stable for stepdown to [facility] pending home hospice.     SUBJECTIVE / INTERVAL HPI: Patient seen and examined at bedside. Reports she is feeling ok. Says her breathing has "been better." Denies any pain. Per HHA at bedside patient has been urinating fine however has not had a BM in few days. Otherwise has been eating and is comfortable.  Remaining ROS negative       PHYSICAL EXAM:    General: lying in bed comfortably in NAD   HEENT: NC/AT; PERRL, anicteric sclera; MMM  Neck: supple  Cardiovascular: +S1/S2, tachycardic rate, regular rhythm   Respiratory: poor air entry, decreased BS b/l. comfortable on 4LNC. mild tachypnea.  Gastrointestinal: soft, NT/ND; +BSx4  Extremities: WWP; no edema, clubbing or cyanosis  Vascular: 2+ radial, DP pulses B/L  Neurological: AAOx3; no focal deficits. b/l LE weakness   Dermatologic: no appreciable wounds or damage to the skin    VITAL SIGNS:  Vital Signs Last 24 Hrs  T(C): 36.3 (15 Aug 2023 16:57), Max: 36.7 (15 Aug 2023 00:36)  T(F): 97.3 (15 Aug 2023 16:57), Max: 98.1 (15 Aug 2023 00:36)  HR: 103 (15 Aug 2023 16:57) (94 - 112)  BP: 146/69 (15 Aug 2023 16:57) (113/53 - 147/67)  BP(mean): 97 (15 Aug 2023 12:00) (76 - 97)  RR: 17 (15 Aug 2023 16:57) (17 - 18)  SpO2: 99% (15 Aug 2023 16:57) (94% - 99%)    Parameters below as of 15 Aug 2023 16:57  Patient On (Oxygen Delivery Method): nasal cannula  O2 Flow (L/min): 3        MEDICATIONS:  MEDICATIONS  (STANDING):  albuterol/ipratropium for Nebulization 3 milliLiter(s) Nebulizer every 6 hours  aMIOdarone    Tablet 200 milliGRAM(s) Oral daily  aspirin  chewable 81 milliGRAM(s) Oral daily  atorvastatin 5 milliGRAM(s) Oral at bedtime  carbidopa/levodopa  25/100 2.5 Tablet(s) Oral <User Schedule>  dextrose 5%. 1000 milliLiter(s) (100 mL/Hr) IV Continuous <Continuous>  dextrose 5%. 1000 milliLiter(s) (50 mL/Hr) IV Continuous <Continuous>  dextrose 50% Injectable 25 Gram(s) IV Push once  dextrose 50% Injectable 12.5 Gram(s) IV Push once  dextrose 50% Injectable 25 Gram(s) IV Push once  enoxaparin Injectable 40 milliGRAM(s) SubCutaneous every 24 hours  glucagon  Injectable 1 milliGRAM(s) IntraMuscular once  insulin glargine Injectable (LANTUS) 10 Unit(s) SubCutaneous at bedtime  insulin lispro (ADMELOG) corrective regimen sliding scale   SubCutaneous Before meals and at bedtime  lidocaine   4% Patch 1 Patch Transdermal daily  losartan 100 milliGRAM(s) Oral daily  metoprolol tartrate 12.5 milliGRAM(s) Oral every 12 hours  pantoprazole    Tablet 40 milliGRAM(s) Oral before breakfast  piperacillin/tazobactam IVPB.. 3.375 Gram(s) IV Intermittent every 8 hours  sertraline 100 milliGRAM(s) Oral daily  sodium chloride 3%  Inhalation 4 milliLiter(s) Inhalation every 6 hours    MEDICATIONS  (PRN):  acetaminophen     Tablet .. 650 milliGRAM(s) Oral every 6 hours PRN Temp greater or equal to 38C (100.4F), Mild Pain (1 - 3), Moderate Pain (4 - 6)  dextrose Oral Gel 15 Gram(s) Oral once PRN Blood Glucose LESS THAN 70 milliGRAM(s)/deciliter      ALLERGIES:  Allergies    No Known Allergies    Intolerances        LABS:                        7.9    8.45  )-----------( 239      ( 15 Aug 2023 11:52 )             25.9     08-15    134<L>  |  97  |  24<H>  ----------------------------<  326<H>  3.8   |  27  |  0.48<L>    Ca    10.0      15 Aug 2023 11:52  Phos  2.9     08-15  Mg     2.4     08-15    TPro  5.8<L>  /  Alb  2.8<L>  /  TBili  0.2  /  DBili  x   /  AST  12  /  ALT  6<L>  /  AlkPhos  78  08-15      Urinalysis Basic - ( 15 Aug 2023 11:52 )    Color: x / Appearance: x / SG: x / pH: x  Gluc: 326 mg/dL / Ketone: x  / Bili: x / Urobili: x   Blood: x / Protein: x / Nitrite: x   Leuk Esterase: x / RBC: x / WBC x   Sq Epi: x / Non Sq Epi: x / Bacteria: x      CAPILLARY BLOOD GLUCOSE      POCT Blood Glucose.: 314 mg/dL (15 Aug 2023 16:33)      RADIOLOGY & ADDITIONAL TESTS: Reviewed. HOSPITAL COURSE: 88F PMHx Alzheimer's dementia, Parkinson's Disease, SDH/SAH s/p repair of cerebral aneurysm 1960s, Lung Ca s/p pneumonectomy of L lung, HTN, HLD, DM, sepsis 2/2 colitis, recent admission June 2023 2/2 aspiration event, found to have aspiration PNA with hypoxia and new afib with RVR. Initially admitted to MICU, then stepped down to telemetry, then to F. Afib w/ RVR resolved. After GOC conversation, decision made for patient to be discharged home with home hospice. Pt now p/w SOB/ Family wants pt transported to hospital. Care provider reports pt was in her normal state of health morning of admission, ate w/o difficulty. She was given Miralax to have a BM, had a BM, and then afterwards coughed. Shortly after she was noted to have more inc WOB and her SP02 was in the 60s on 2 two pulse ox machines while on her chronic 3L NC. Family called EMS and patient was brought to ED. In the ED patient found to be febrile to 100.9F, tachy to 125 and hypoxic on RA requiring 3LNC. She was started on vancomycin and zosyn for presumed aspiration pna. RVP negative, CXR done showing persistent opacification and volume loss in the left upper thorax; increased infiltrates in the right lung. Patient admitted to [facility] for treatment of AHRF in setting of asp pna. Evaluated by S&S, rec for mildly thick liquids and pureed with frequent suctioning. Started on insulin regimen for hyperglycemia. Palliative consulted, patient to be sent home with home hospice. Stable for stepdown to [facility] pending home hospice.     SUBJECTIVE / INTERVAL HPI: Patient seen and examined at bedside. Reports she is feeling ok. Says her breathing has "been better." Denies any pain. Per HHA at bedside patient has been urinating fine however has not had a BM in few days. Otherwise has been eating and is comfortable.  Remaining ROS negative       PHYSICAL EXAM:    General: lying in bed comfortably in NAD   HEENT: NC/AT; PERRL, anicteric sclera; MMM  Neck: supple  Cardiovascular: +S1/S2, tachycardic rate, regular rhythm   Respiratory: poor air entry, decreased BS b/l. comfortable on 4LNC. mild tachypnea.  Gastrointestinal: soft, NT/ND; +BSx4  Extremities: WWP; no edema, clubbing or cyanosis  Vascular: 2+ radial, DP pulses B/L  Neurological: AAOx3; no focal deficits. b/l LE weakness   Dermatologic: no appreciable wounds or damage to the skin    VITAL SIGNS:  Vital Signs Last 24 Hrs  T(C): 36.3 (15 Aug 2023 16:57), Max: 36.7 (15 Aug 2023 00:36)  T(F): 97.3 (15 Aug 2023 16:57), Max: 98.1 (15 Aug 2023 00:36)  HR: 103 (15 Aug 2023 16:57) (94 - 112)  BP: 146/69 (15 Aug 2023 16:57) (113/53 - 147/67)  BP(mean): 97 (15 Aug 2023 12:00) (76 - 97)  RR: 17 (15 Aug 2023 16:57) (17 - 18)  SpO2: 99% (15 Aug 2023 16:57) (94% - 99%)    Parameters below as of 15 Aug 2023 16:57  Patient On (Oxygen Delivery Method): nasal cannula  O2 Flow (L/min): 3        MEDICATIONS:  MEDICATIONS  (STANDING):  albuterol/ipratropium for Nebulization 3 milliLiter(s) Nebulizer every 6 hours  aMIOdarone    Tablet 200 milliGRAM(s) Oral daily  aspirin  chewable 81 milliGRAM(s) Oral daily  atorvastatin 5 milliGRAM(s) Oral at bedtime  carbidopa/levodopa  25/100 2.5 Tablet(s) Oral <User Schedule>  dextrose 5%. 1000 milliLiter(s) (100 mL/Hr) IV Continuous <Continuous>  dextrose 5%. 1000 milliLiter(s) (50 mL/Hr) IV Continuous <Continuous>  dextrose 50% Injectable 25 Gram(s) IV Push once  dextrose 50% Injectable 12.5 Gram(s) IV Push once  dextrose 50% Injectable 25 Gram(s) IV Push once  enoxaparin Injectable 40 milliGRAM(s) SubCutaneous every 24 hours  glucagon  Injectable 1 milliGRAM(s) IntraMuscular once  insulin glargine Injectable (LANTUS) 10 Unit(s) SubCutaneous at bedtime  insulin lispro (ADMELOG) corrective regimen sliding scale   SubCutaneous Before meals and at bedtime  lidocaine   4% Patch 1 Patch Transdermal daily  losartan 100 milliGRAM(s) Oral daily  metoprolol tartrate 12.5 milliGRAM(s) Oral every 12 hours  pantoprazole    Tablet 40 milliGRAM(s) Oral before breakfast  piperacillin/tazobactam IVPB.. 3.375 Gram(s) IV Intermittent every 8 hours  sertraline 100 milliGRAM(s) Oral daily  sodium chloride 3%  Inhalation 4 milliLiter(s) Inhalation every 6 hours    MEDICATIONS  (PRN):  acetaminophen     Tablet .. 650 milliGRAM(s) Oral every 6 hours PRN Temp greater or equal to 38C (100.4F), Mild Pain (1 - 3), Moderate Pain (4 - 6)  dextrose Oral Gel 15 Gram(s) Oral once PRN Blood Glucose LESS THAN 70 milliGRAM(s)/deciliter      ALLERGIES:  Allergies    No Known Allergies    Intolerances        LABS:                        7.9    8.45  )-----------( 239      ( 15 Aug 2023 11:52 )             25.9     08-15    134<L>  |  97  |  24<H>  ----------------------------<  326<H>  3.8   |  27  |  0.48<L>    Ca    10.0      15 Aug 2023 11:52  Phos  2.9     08-15  Mg     2.4     08-15    TPro  5.8<L>  /  Alb  2.8<L>  /  TBili  0.2  /  DBili  x   /  AST  12  /  ALT  6<L>  /  AlkPhos  78  08-15      Urinalysis Basic - ( 15 Aug 2023 11:52 )    Color: x / Appearance: x / SG: x / pH: x  Gluc: 326 mg/dL / Ketone: x  / Bili: x / Urobili: x   Blood: x / Protein: x / Nitrite: x   Leuk Esterase: x / RBC: x / WBC x   Sq Epi: x / Non Sq Epi: x / Bacteria: x      CAPILLARY BLOOD GLUCOSE      POCT Blood Glucose.: 314 mg/dL (15 Aug 2023 16:33)      RADIOLOGY & ADDITIONAL TESTS: Reviewed. ***********STEPDOWN FROM [facility] TO [facility]*********    HOSPITAL COURSE: 88F PMHx Alzheimer's dementia, Parkinson's Disease, SDH/SAH s/p repair of cerebral aneurysm 1960s, Lung Ca s/p pneumonectomy of L lung, HTN, HLD, DM, sepsis 2/2 colitis, recent admission June 2023 2/2 aspiration event, found to have aspiration PNA with hypoxia and new afib with RVR. Initially admitted to MICU, then stepped down to telemetry, then to [facility]. Afib w/ RVR resolved. After GOC conversation, decision made for patient to be discharged home with home hospice. Pt now p/w SOB/ Family wants pt transported to hospital. Care provider reports pt was in her normal state of health morning of admission, ate w/o difficulty. She was given Miralax to have a BM, had a BM, and then afterwards coughed. Shortly after she was noted to have more inc WOB and her SP02 was in the 60s on 2 two pulse ox machines while on her chronic 3L NC. Family called EMS and patient was brought to ED. In the ED patient found to be febrile to 100.9F, tachy to 125 and hypoxic on RA requiring 3LNC. She was started on vancomycin and zosyn for presumed aspiration pna. RVP negative, CXR done showing persistent opacification and volume loss in the left upper thorax; increased infiltrates in the right lung. Patient admitted to [facility] for treatment of AHRF in setting of asp pna. Evaluated by S&S, rec for mildly thick liquids and pureed with frequent suctioning. Started on insulin regimen for hyperglycemia. Palliative consulted, patient to be sent home with home hospice. Stable for stepdown to [facility] pending home hospice.     SUBJECTIVE / INTERVAL HPI: Patient seen and examined at bedside. Reports she is feeling ok. Says her breathing has "been better." Denies any pain. Per HHA at bedside patient has been urinating fine however has not had a BM in few days. Otherwise has been eating and is comfortable.  Remaining ROS negative       PHYSICAL EXAM:    General: lying in bed comfortably in NAD   HEENT: NC/AT; PERRL, anicteric sclera; MMM  Neck: supple  Cardiovascular: +S1/S2, tachycardic rate, regular rhythm   Respiratory: poor air entry, decreased BS b/l. comfortable on 4LNC. mild tachypnea.  Gastrointestinal: soft, NT/ND; +BSx4  Extremities: WWP; no edema, clubbing or cyanosis  Vascular: 2+ radial, DP pulses B/L  Neurological: AAOx3; no focal deficits. b/l LE weakness   Dermatologic: no appreciable wounds or damage to the skin    VITAL SIGNS:  Vital Signs Last 24 Hrs  T(C): 36.3 (15 Aug 2023 16:57), Max: 36.7 (15 Aug 2023 00:36)  T(F): 97.3 (15 Aug 2023 16:57), Max: 98.1 (15 Aug 2023 00:36)  HR: 103 (15 Aug 2023 16:57) (94 - 112)  BP: 146/69 (15 Aug 2023 16:57) (113/53 - 147/67)  BP(mean): 97 (15 Aug 2023 12:00) (76 - 97)  RR: 17 (15 Aug 2023 16:57) (17 - 18)  SpO2: 99% (15 Aug 2023 16:57) (94% - 99%)    Parameters below as of 15 Aug 2023 16:57  Patient On (Oxygen Delivery Method): nasal cannula  O2 Flow (L/min): 3        MEDICATIONS:  MEDICATIONS  (STANDING):  albuterol/ipratropium for Nebulization 3 milliLiter(s) Nebulizer every 6 hours  aMIOdarone    Tablet 200 milliGRAM(s) Oral daily  aspirin  chewable 81 milliGRAM(s) Oral daily  atorvastatin 5 milliGRAM(s) Oral at bedtime  carbidopa/levodopa  25/100 2.5 Tablet(s) Oral <User Schedule>  dextrose 5%. 1000 milliLiter(s) (100 mL/Hr) IV Continuous <Continuous>  dextrose 5%. 1000 milliLiter(s) (50 mL/Hr) IV Continuous <Continuous>  dextrose 50% Injectable 25 Gram(s) IV Push once  dextrose 50% Injectable 12.5 Gram(s) IV Push once  dextrose 50% Injectable 25 Gram(s) IV Push once  enoxaparin Injectable 40 milliGRAM(s) SubCutaneous every 24 hours  glucagon  Injectable 1 milliGRAM(s) IntraMuscular once  insulin glargine Injectable (LANTUS) 10 Unit(s) SubCutaneous at bedtime  insulin lispro (ADMELOG) corrective regimen sliding scale   SubCutaneous Before meals and at bedtime  lidocaine   4% Patch 1 Patch Transdermal daily  losartan 100 milliGRAM(s) Oral daily  metoprolol tartrate 12.5 milliGRAM(s) Oral every 12 hours  pantoprazole    Tablet 40 milliGRAM(s) Oral before breakfast  piperacillin/tazobactam IVPB.. 3.375 Gram(s) IV Intermittent every 8 hours  sertraline 100 milliGRAM(s) Oral daily  sodium chloride 3%  Inhalation 4 milliLiter(s) Inhalation every 6 hours    MEDICATIONS  (PRN):  acetaminophen     Tablet .. 650 milliGRAM(s) Oral every 6 hours PRN Temp greater or equal to 38C (100.4F), Mild Pain (1 - 3), Moderate Pain (4 - 6)  dextrose Oral Gel 15 Gram(s) Oral once PRN Blood Glucose LESS THAN 70 milliGRAM(s)/deciliter      ALLERGIES:  Allergies    No Known Allergies    Intolerances        LABS:                        7.9    8.45  )-----------( 239      ( 15 Aug 2023 11:52 )             25.9     08-15    134<L>  |  97  |  24<H>  ----------------------------<  326<H>  3.8   |  27  |  0.48<L>    Ca    10.0      15 Aug 2023 11:52  Phos  2.9     08-15  Mg     2.4     08-15    TPro  5.8<L>  /  Alb  2.8<L>  /  TBili  0.2  /  DBili  x   /  AST  12  /  ALT  6<L>  /  AlkPhos  78  08-15      Urinalysis Basic - ( 15 Aug 2023 11:52 )    Color: x / Appearance: x / SG: x / pH: x  Gluc: 326 mg/dL / Ketone: x  / Bili: x / Urobili: x   Blood: x / Protein: x / Nitrite: x   Leuk Esterase: x / RBC: x / WBC x   Sq Epi: x / Non Sq Epi: x / Bacteria: x      CAPILLARY BLOOD GLUCOSE      POCT Blood Glucose.: 314 mg/dL (15 Aug 2023 16:33)      RADIOLOGY & ADDITIONAL TESTS: Reviewed.

## 2023-08-15 NOTE — PROGRESS NOTE ADULT - SUBJECTIVE AND OBJECTIVE BOX
[facility]  Last Bobo, Palliative Care Attending  Contact Info: Call [phone] (HEAL Line) or message on Microsoft Teams (Last Bobo)    SUBJECTIVE AND OBJECTIVE:  INTERVAL HPI/OVERNIGHT EVENTS: Interval events noted. Feels better, answering questions. Still endorses back pain. See patient's PRN use for the past 24hrs noted below. Comprehensive symptom assessment and GOC exploration as noted below. Extensive time spent discussing plan of care with family and HHA.    ALLERGIES:  No Known Allergies    MEDICATIONS  (STANDING):  albuterol/ipratropium for Nebulization 3 milliLiter(s) Nebulizer every 6 hours  aMIOdarone    Tablet 200 milliGRAM(s) Oral daily  aspirin  chewable 81 milliGRAM(s) Oral daily  atorvastatin 5 milliGRAM(s) Oral at bedtime  carbidopa/levodopa  25/100 2.5 Tablet(s) Oral <User Schedule>  dextrose 5%. 1000 milliLiter(s) (100 mL/Hr) IV Continuous <Continuous>  dextrose 5%. 1000 milliLiter(s) (50 mL/Hr) IV Continuous <Continuous>  dextrose 50% Injectable 25 Gram(s) IV Push once  dextrose 50% Injectable 12.5 Gram(s) IV Push once  dextrose 50% Injectable 25 Gram(s) IV Push once  enoxaparin Injectable 40 milliGRAM(s) SubCutaneous every 24 hours  glucagon  Injectable 1 milliGRAM(s) IntraMuscular once  insulin glargine Injectable (LANTUS) 10 Unit(s) SubCutaneous at bedtime  insulin lispro (ADMELOG) corrective regimen sliding scale   SubCutaneous Before meals and at bedtime  lidocaine   4% Patch 1 Patch Transdermal daily  losartan 100 milliGRAM(s) Oral daily  metoprolol tartrate 12.5 milliGRAM(s) Oral every 12 hours  pantoprazole    Tablet 40 milliGRAM(s) Oral before breakfast  piperacillin/tazobactam IVPB.. 3.375 Gram(s) IV Intermittent every 8 hours  sertraline 100 milliGRAM(s) Oral daily  sodium chloride 3%  Inhalation 4 milliLiter(s) Inhalation every 6 hours    MEDICATIONS  (PRN):  acetaminophen     Tablet .. 650 milliGRAM(s) Oral every 6 hours PRN Temp greater or equal to 38C (100.4F), Mild Pain (1 - 3), Moderate Pain (4 - 6)  dextrose Oral Gel 15 Gram(s) Oral once PRN Blood Glucose LESS THAN 70 milliGRAM(s)/deciliter      Analgesic Use (Scheduled and PRNs) for past 24 hours:  acetaminophen     Tablet ..   325 milliGRAM(s) Oral (08-14-23 @ 14:06)    acetaminophen     Tablet ..   650 milliGRAM(s) Oral (08-15-23 @ 11:01)    carbidopa/levodopa  25/100   2.5 Tablet(s) Oral (08-15-23 @ 10:22)   2.5 Tablet(s) Oral (08-14-23 @ 16:48)    sertraline   100 milliGRAM(s) Oral (08-15-23 @ 12:01)      ITEMS UNCHECKED ARE NOT PRESENT  PRESENT SYMPTOMS/REVIEW OF SYSTEMS: []Unable to obtain due to poor mentation   Source if other than patient:  []Family   []Team     Pain: [x] yes [] no  QOL impact - bothersome  Location - lower back                    Aggravating Factors -  Quality -  Radiation -  Timing -  Severity (0-10 scale) -   Minimal Acceptable Level (0-10 scale) -    Dyspnea:                           []Mild  []Moderate []Severe  Anxiety:                             []Mild []Moderate []Severe  Fatigue:                             []Mild []Moderate []Severe  Nausea:                             []Mild []Moderate []Severe  Loss of Appetite:              []Mild []Moderate []Severe  Constipation:                    []Mild []Moderate []Severe    Other Symptoms:  [x]All other review of systems negative     Palliative Performance Status Version 2:  40%  (Functional Assessment Tool)    GENERAL:  [x] NAD [x]Alert []Lethargic  []Cachexia  []Unarousable  [x]Verbal  []Non-Verbal  BEHAVIORAL:   []Anxiety  [x]Delirium []Agitation [x]Cooperative []Oriented x  HEENT:  [x]Normal  [x] Moist Mucous Membranes []Dry mouth   []ET Tube/Trach  []Oral lesions  PULMONARY:   []Clear []Tachypnea  []Audible excessive secretions  [x]Normal Work of Breathing []Labored Breathing  [x]Rhonchi []Crackles []Wheezing  CARDIOVASCULAR:    []Regular Rate []Regular Rhythm [x]Irregular [x]Tachy  []Timi  GASTROINTESTINAL:  [x]Soft  []Distended   [x]+BS  [x]Non tender []Tender  []PEG []OGT/ NGT  Last BM:  GENITOURINARY:  []Normal [x] Incontinent   []Oliguria/Anuria   []Juarez  MUSCULOSKELETAL:   []Normal Extremities  [x]Weakness  []Bed/Wheelchair bound []Edema  NEUROLOGIC:   []No focal deficits  [x]Cognitive impairment  [x]Dysphagia []Dysarthria []Paresis [x]Encephalopathic  SKIN:   [x]Normal   []Pressure ulcer(s)  []Rash      Vital Signs Last 24 Hrs  T(C): 36.3 (15 Aug 2023 09:54), Max: 36.7 (15 Aug 2023 00:36)  T(F): 97.4 (15 Aug 2023 09:54), Max: 98.1 (15 Aug 2023 00:36)  HR: 112 (15 Aug 2023 12:00) (94 - 112)  BP: 147/67 (15 Aug 2023 12:00) (113/53 - 162/67)  BP(mean): 97 (15 Aug 2023 12:00) (76 - 97)  RR: 18 (15 Aug 2023 12:00) (18 - 18)  SpO2: 94% (15 Aug 2023 12:00) (94% - 98%)    Parameters below as of 15 Aug 2023 12:00  Patient On (Oxygen Delivery Method): nasal cannula  O2 Flow (L/min): 3    LABS:                       7.9    8.45  )-----------( 239      ( 15 Aug 2023 11:52 )             25.9   08-15    134<L>  |  97  |  24<H>  ----------------------------<  326<H>  3.8   |  27  |  0.48<L>    Ca    10.0      15 Aug 2023 11:52  Phos  2.9     08-15  Mg     2.4     08-15  TPro  5.8<L>  /  Alb  2.8<L>  /  TBili  0.2  /  DBili  x   /  AST  12  /  ALT  6<L>  /  AlkPhos  78  08-15    RADIOLOGY & ADDITIONAL STUDIES: None new    DISCUSSION OF CASE: Daughter, VERAA - to provide updates and emotional support; Medicine - to discuss plan of care

## 2023-08-16 ENCOUNTER — TRANSCRIPTION ENCOUNTER (OUTPATIENT)
Age: 88
End: 2023-08-16

## 2023-08-16 VITALS
SYSTOLIC BLOOD PRESSURE: 165 MMHG | DIASTOLIC BLOOD PRESSURE: 85 MMHG | TEMPERATURE: 98 F | OXYGEN SATURATION: 96 % | HEART RATE: 102 BPM | RESPIRATION RATE: 17 BRPM

## 2023-08-16 LAB
-  CLINDAMYCIN: SIGNIFICANT CHANGE UP
-  ERYTHROMYCIN: SIGNIFICANT CHANGE UP
-  LINEZOLID: SIGNIFICANT CHANGE UP
-  OXACILLIN: SIGNIFICANT CHANGE UP
-  RIFAMPIN: SIGNIFICANT CHANGE UP
-  TRIMETHOPRIM/SULFAMETHOXAZOLE: SIGNIFICANT CHANGE UP
-  VANCOMYCIN: SIGNIFICANT CHANGE UP
ALBUMIN SERPL ELPH-MCNC: 2.3 G/DL — LOW (ref 3.3–5)
ALP SERPL-CCNC: 80 U/L — SIGNIFICANT CHANGE UP (ref 40–120)
ALT FLD-CCNC: 5 U/L — LOW (ref 10–45)
ANION GAP SERPL CALC-SCNC: 8 MMOL/L — SIGNIFICANT CHANGE UP (ref 5–17)
AST SERPL-CCNC: 12 U/L — SIGNIFICANT CHANGE UP (ref 10–40)
BASOPHILS # BLD AUTO: 0.03 K/UL — SIGNIFICANT CHANGE UP (ref 0–0.2)
BASOPHILS NFR BLD AUTO: 0.3 % — SIGNIFICANT CHANGE UP (ref 0–2)
BILIRUB SERPL-MCNC: 0.2 MG/DL — SIGNIFICANT CHANGE UP (ref 0.2–1.2)
BUN SERPL-MCNC: 19 MG/DL — SIGNIFICANT CHANGE UP (ref 7–23)
CALCIUM SERPL-MCNC: 10.1 MG/DL — SIGNIFICANT CHANGE UP (ref 8.4–10.5)
CHLORIDE SERPL-SCNC: 102 MMOL/L — SIGNIFICANT CHANGE UP (ref 96–108)
CO2 SERPL-SCNC: 29 MMOL/L — SIGNIFICANT CHANGE UP (ref 22–31)
CREAT SERPL-MCNC: 0.58 MG/DL — SIGNIFICANT CHANGE UP (ref 0.5–1.3)
EGFR: 88 ML/MIN/1.73M2 — SIGNIFICANT CHANGE UP
EOSINOPHIL # BLD AUTO: 0.32 K/UL — SIGNIFICANT CHANGE UP (ref 0–0.5)
EOSINOPHIL NFR BLD AUTO: 3.6 % — SIGNIFICANT CHANGE UP (ref 0–6)
GLUCOSE BLDC GLUCOMTR-MCNC: 137 MG/DL — HIGH (ref 70–99)
GLUCOSE SERPL-MCNC: 107 MG/DL — HIGH (ref 70–99)
HCT VFR BLD CALC: 25.4 % — LOW (ref 34.5–45)
HGB BLD-MCNC: 7.6 G/DL — LOW (ref 11.5–15.5)
IMM GRANULOCYTES NFR BLD AUTO: 2.7 % — HIGH (ref 0–0.9)
LYMPHOCYTES # BLD AUTO: 0.6 K/UL — LOW (ref 1–3.3)
LYMPHOCYTES # BLD AUTO: 6.8 % — LOW (ref 13–44)
MAGNESIUM SERPL-MCNC: 1.7 MG/DL — SIGNIFICANT CHANGE UP (ref 1.6–2.6)
MCHC RBC-ENTMCNC: 22.9 PG — LOW (ref 27–34)
MCHC RBC-ENTMCNC: 29.9 GM/DL — LOW (ref 32–36)
MCV RBC AUTO: 76.5 FL — LOW (ref 80–100)
METHOD TYPE: SIGNIFICANT CHANGE UP
MONOCYTES # BLD AUTO: 0.6 K/UL — SIGNIFICANT CHANGE UP (ref 0–0.9)
MONOCYTES NFR BLD AUTO: 6.8 % — SIGNIFICANT CHANGE UP (ref 2–14)
NEUTROPHILS # BLD AUTO: 7.05 K/UL — SIGNIFICANT CHANGE UP (ref 1.8–7.4)
NEUTROPHILS NFR BLD AUTO: 79.8 % — HIGH (ref 43–77)
NRBC # BLD: 0 /100 WBCS — SIGNIFICANT CHANGE UP (ref 0–0)
PHOSPHATE SERPL-MCNC: 2.3 MG/DL — LOW (ref 2.5–4.5)
PLATELET # BLD AUTO: 243 K/UL — SIGNIFICANT CHANGE UP (ref 150–400)
POTASSIUM SERPL-MCNC: 4 MMOL/L — SIGNIFICANT CHANGE UP (ref 3.5–5.3)
POTASSIUM SERPL-SCNC: 4 MMOL/L — SIGNIFICANT CHANGE UP (ref 3.5–5.3)
PROT SERPL-MCNC: 5.5 G/DL — LOW (ref 6–8.3)
RBC # BLD: 3.32 M/UL — LOW (ref 3.8–5.2)
RBC # FLD: 17.2 % — HIGH (ref 10.3–14.5)
SODIUM SERPL-SCNC: 139 MMOL/L — SIGNIFICANT CHANGE UP (ref 135–145)
WBC # BLD: 8.84 K/UL — SIGNIFICANT CHANGE UP (ref 3.8–10.5)
WBC # FLD AUTO: 8.84 K/UL — SIGNIFICANT CHANGE UP (ref 3.8–10.5)

## 2023-08-16 PROCEDURE — 96365 THER/PROPH/DIAG IV INF INIT: CPT

## 2023-08-16 PROCEDURE — 92610 EVALUATE SWALLOWING FUNCTION: CPT

## 2023-08-16 PROCEDURE — 82330 ASSAY OF CALCIUM: CPT

## 2023-08-16 PROCEDURE — 80202 ASSAY OF VANCOMYCIN: CPT

## 2023-08-16 PROCEDURE — 83605 ASSAY OF LACTIC ACID: CPT

## 2023-08-16 PROCEDURE — 84295 ASSAY OF SERUM SODIUM: CPT

## 2023-08-16 PROCEDURE — 99239 HOSP IP/OBS DSCHRG MGMT >30: CPT

## 2023-08-16 PROCEDURE — 84132 ASSAY OF SERUM POTASSIUM: CPT

## 2023-08-16 PROCEDURE — 87086 URINE CULTURE/COLONY COUNT: CPT

## 2023-08-16 PROCEDURE — 87186 SC STD MICRODIL/AGAR DIL: CPT

## 2023-08-16 PROCEDURE — 96368 THER/DIAG CONCURRENT INF: CPT

## 2023-08-16 PROCEDURE — 80053 COMPREHEN METABOLIC PANEL: CPT

## 2023-08-16 PROCEDURE — 84100 ASSAY OF PHOSPHORUS: CPT

## 2023-08-16 PROCEDURE — 83880 ASSAY OF NATRIURETIC PEPTIDE: CPT

## 2023-08-16 PROCEDURE — 85027 COMPLETE CBC AUTOMATED: CPT

## 2023-08-16 PROCEDURE — 71045 X-RAY EXAM CHEST 1 VIEW: CPT

## 2023-08-16 PROCEDURE — 82962 GLUCOSE BLOOD TEST: CPT

## 2023-08-16 PROCEDURE — 82803 BLOOD GASES ANY COMBINATION: CPT

## 2023-08-16 PROCEDURE — 84145 PROCALCITONIN (PCT): CPT

## 2023-08-16 PROCEDURE — 87040 BLOOD CULTURE FOR BACTERIA: CPT

## 2023-08-16 PROCEDURE — 81001 URINALYSIS AUTO W/SCOPE: CPT

## 2023-08-16 PROCEDURE — 93005 ELECTROCARDIOGRAM TRACING: CPT

## 2023-08-16 PROCEDURE — 83036 HEMOGLOBIN GLYCOSYLATED A1C: CPT

## 2023-08-16 PROCEDURE — 94640 AIRWAY INHALATION TREATMENT: CPT

## 2023-08-16 PROCEDURE — 83735 ASSAY OF MAGNESIUM: CPT

## 2023-08-16 PROCEDURE — 99291 CRITICAL CARE FIRST HOUR: CPT

## 2023-08-16 PROCEDURE — 80048 BASIC METABOLIC PNL TOTAL CA: CPT

## 2023-08-16 PROCEDURE — 87150 DNA/RNA AMPLIFIED PROBE: CPT

## 2023-08-16 PROCEDURE — 87637 SARSCOV2&INF A&B&RSV AMP PRB: CPT

## 2023-08-16 PROCEDURE — 85025 COMPLETE CBC W/AUTO DIFF WBC: CPT

## 2023-08-16 PROCEDURE — 36415 COLL VENOUS BLD VENIPUNCTURE: CPT

## 2023-08-16 RX ORDER — METOPROLOL TARTRATE 50 MG
0.5 TABLET ORAL
Qty: 30 | Refills: 6
Start: 2023-08-16 | End: 2024-03-12

## 2023-08-16 RX ORDER — METOPROLOL TARTRATE 50 MG
1 TABLET ORAL
Refills: 0 | DISCHARGE

## 2023-08-16 RX ADMIN — ENOXAPARIN SODIUM 40 MILLIGRAM(S): 100 INJECTION SUBCUTANEOUS at 11:52

## 2023-08-16 RX ADMIN — SERTRALINE 100 MILLIGRAM(S): 25 TABLET, FILM COATED ORAL at 11:53

## 2023-08-16 RX ADMIN — Medication 3 MILLILITER(S): at 00:08

## 2023-08-16 RX ADMIN — CARBIDOPA AND LEVODOPA 2.5 TABLET(S): 25; 100 TABLET ORAL at 12:00

## 2023-08-16 RX ADMIN — LIDOCAINE 1 PATCH: 4 CREAM TOPICAL at 11:52

## 2023-08-16 RX ADMIN — PIPERACILLIN AND TAZOBACTAM 25 GRAM(S): 4; .5 INJECTION, POWDER, LYOPHILIZED, FOR SOLUTION INTRAVENOUS at 11:54

## 2023-08-16 RX ADMIN — PANTOPRAZOLE SODIUM 40 MILLIGRAM(S): 20 TABLET, DELAYED RELEASE ORAL at 06:07

## 2023-08-16 RX ADMIN — LOSARTAN POTASSIUM 100 MILLIGRAM(S): 100 TABLET, FILM COATED ORAL at 06:07

## 2023-08-16 RX ADMIN — AMIODARONE HYDROCHLORIDE 200 MILLIGRAM(S): 400 TABLET ORAL at 06:07

## 2023-08-16 RX ADMIN — Medication 3 MILLILITER(S): at 11:53

## 2023-08-16 RX ADMIN — Medication 62.5 MILLIMOLE(S): at 10:41

## 2023-08-16 RX ADMIN — POLYETHYLENE GLYCOL 3350 17 GRAM(S): 17 POWDER, FOR SOLUTION ORAL at 11:52

## 2023-08-16 RX ADMIN — PIPERACILLIN AND TAZOBACTAM 25 GRAM(S): 4; .5 INJECTION, POWDER, LYOPHILIZED, FOR SOLUTION INTRAVENOUS at 03:26

## 2023-08-16 RX ADMIN — SODIUM CHLORIDE 4 MILLILITER(S): 9 INJECTION INTRAMUSCULAR; INTRAVENOUS; SUBCUTANEOUS at 00:08

## 2023-08-16 RX ADMIN — Medication 12.5 MILLIGRAM(S): at 12:00

## 2023-08-16 RX ADMIN — Medication 12.5 MILLIGRAM(S): at 00:08

## 2023-08-16 RX ADMIN — SODIUM CHLORIDE 4 MILLILITER(S): 9 INJECTION INTRAMUSCULAR; INTRAVENOUS; SUBCUTANEOUS at 11:53

## 2023-08-16 RX ADMIN — Medication 81 MILLIGRAM(S): at 11:52

## 2023-08-16 RX ADMIN — Medication 3 MILLILITER(S): at 06:07

## 2023-08-16 RX ADMIN — SODIUM CHLORIDE 4 MILLILITER(S): 9 INJECTION INTRAMUSCULAR; INTRAVENOUS; SUBCUTANEOUS at 06:07

## 2023-08-16 RX ADMIN — CARBIDOPA AND LEVODOPA 2.5 TABLET(S): 25; 100 TABLET ORAL at 09:56

## 2023-08-16 NOTE — PROGRESS NOTE ADULT - ASSESSMENT
87yo F with PMHx Alzheimer's dementia, Parkinson's Disease, SDH/SAH s/p repair of cerebral aneurysm 1960s, Lung Ca s/p pneumonectomy of L lung, HTN, HLD, DM, recent admission for sepsis 2/2 colitis and subsequently for aspiration pneumonia presenting for hypoxia and increased work of breathing, admitted for AHRF 2/2 aspiration pneumonia. Plan for discharge to home hospice today.

## 2023-08-16 NOTE — PROGRESS NOTE ADULT - PROBLEM SELECTOR PLAN 7
Patient with history of DM on home metformin 500mg BID. A1C 7.0.  -hold home metformin while inpatient  -mISS while inpatient  -started on lantus 10 units bedtime for hyperglycemia  -continue to monitor fingersticks and adjust regimen as needed
.  Complex medical decision making / symptom management in the setting of advanced illness.    Will continue to follow for ongoing GOC discussion / titration of palliative regimen. Emotional support provided, questions answered.  Active Psychosocial Referrals:  [x]Social Work/Case management [x]PT/OT []Chaplaincy [x]Hospice  []Patient/Family Support []Holistic RN []Massage Therapy []Music Therapy []Ethics  Coping: [] well [x] with difficulty [] poor coping [] unable to assess  Support system: [x] strong [] adequate [] inadequate    For new or uncontrolled symptoms, please call Palliative Care at [phone]). The service is available 24/7 (including nights & weekends) to provide symptom management recommendations over the phone as appropriate
Patient with history of DM on home metformin 500mg BID. A1C 7.0.  -hold home metformin while inpatient  -mISS while inpatient  -started on lantus 10 units bedtime for hyperglycemia  -continue to monitor fingersticks and adjust regimen as needed
Fluids: S/p 1.65L NS  Diet: mildly thickened liquids pureed diet  DVT ppx: Lovenox  GI ppx: PPI 40mg  DIspo: 7 Lachman
Fluids: S/p 1.65L NS  Diet: mildly thickened liquids pureed diet  DVT ppx: Lovenox  GI ppx: PPI 40mg  DIspo: 7ur

## 2023-08-16 NOTE — PROGRESS NOTE ADULT - PROBLEM SELECTOR PROBLEM 7
Encounter for palliative care
DM (diabetes mellitus)
DM (diabetes mellitus)
Prophylactic measure
Prophylactic measure

## 2023-08-16 NOTE — PROGRESS NOTE ADULT - PROBLEM SELECTOR PLAN 8
Hgb 9.4 on admission, microcytic. Baseline ranging from 9-11 per previous admissions.  -no signs of bleeding on exam  -iron studies c/w AOCD however history of MERRILL on home PO iron   -maintain active T&S   -monitor hgb and transfuse for <7
Hgb 9.4 on admission, microcytic. Baseline ranging from 9-11 per previous admissions.  -no signs of bleeding on exam  -iron studies c/w AOCD however history of MERRILL on home PO iron   -maintain active T&S   -monitor hgb and transfuse for <7

## 2023-08-16 NOTE — PROGRESS NOTE ADULT - PROBLEM SELECTOR PLAN 2
Patient with recurrent aspirations with recent admission for aspiration pneumonia, now admitted for management of aspiration pna. Likely etiology delayed oropharyngeal phase 2/2 dementia causing dysphagia.  -evaluated by S&S, cleared for pureed with thick liquids; aligned with GOC with acknowledgement by family that patient is aspiration risk  -crush all meds  -aspiration precautions  -frequent suctioning , elevate head of bed at all times   -management of pna as above  -patient to be discharged with suction device; nurse to perform suction teaching at bedside with patient and patient's HHA at bedside prior to discharge

## 2023-08-16 NOTE — PROGRESS NOTE ADULT - PROBLEM SELECTOR PLAN 6
.  Patient is DNR/DNI, MOLST in chart  -GOC note as noted, goal is stabilize the patient and return home with hospice  -children are designated HCPs
Patient with history of HTN on home losartan 100mg qd   -c/w home losartan   -monitor BPs
Patient with history of HTN on home losartan 100mg qd   -c/w home losartan   -monitor BPs
Patient with history of DM, per last visit notes on home metformin 500mg BID. A1C 6.9 5/2023.     - c/w ISS
Patient with history of DM, per last visit notes on home metformin 500mg BID. A1C 6.9 5/2023.     - c/w ISS

## 2023-08-16 NOTE — DISCHARGE NOTE PROVIDER - NSDCCPCAREPLAN_GEN_ALL_CORE_FT
PRINCIPAL DISCHARGE DIAGNOSIS  Diagnosis: Pneumonia, aspiration  Assessment and Plan of Treatment: You were diagnosed with pneumonia, an infection that inflames air sacs in one or both lungs, which may fill with fluid. With pneumonia, the air sacs may fill with fluid or pus. The infection can be life-threatening to infants, children, and people over 65. Symptoms include cough with phlegm or pus, fever, chills, and difficulty breathing. Antibiotics can treat many forms of pneumonia. Some forms of pneumonia can be prevented by vaccines. You were treated with antibiotics and began to show improvement. Please continue taking your antibiotic augmentin for 8 doses, through 08/20 in the morning. Your pneumonia was likely due to aspiration, which can occur when you have some difficulty swallowing. You were seen by our speech and swallow specialists that recommended a diet of pureed foods with mildly thick liquids. You also will crush your medication to prevent the risk of aspiration. In addition, you will be sent home with a suction machine, please suction frequently and elevate the head of the bed. In addition, you were started on oxygen and will be sent home with oxygen.        SECONDARY DISCHARGE DIAGNOSES  Diagnosis: Encounter for home hospice care  Assessment and Plan of Treatment:      PRINCIPAL DISCHARGE DIAGNOSIS  Diagnosis: Pneumonia, aspiration  Assessment and Plan of Treatment: You were diagnosed with pneumonia, an infection that inflames air sacs in one or both lungs, which may fill with fluid. With pneumonia, the air sacs may fill with fluid or pus. The infection can be life-threatening to infants, children, and people over 65. Symptoms include cough with phlegm or pus, fever, chills, and difficulty breathing. Antibiotics can treat many forms of pneumonia. Some forms of pneumonia can be prevented by vaccines. You were treated with antibiotics and began to show improvement. Please continue taking your antibiotic augmentin for 8 doses, through 08/20 in the morning. Your pneumonia was likely due to aspiration, which can occur when you have some difficulty swallowing. You were seen by our speech and swallow specialists that recommended a diet of pureed foods with mildly thick liquids. You also will crush your medication to prevent the risk of aspiration. In addition, you will be sent home with a suction machine, please suction frequently and elevate the head of the bed. In addition, you will be sent home with oxygen.

## 2023-08-16 NOTE — DISCHARGE NOTE PROVIDER - HOSPITAL COURSE
FERNANDO ASHBY is a 88F PMHx Alzheimer's dementia, Parkinson's Disease, SDH/SAH s/p repair of cerebral aneurysm 1960s, Lung Ca s/p pneumonectomy of L lung, HTN, HLD, DM, sepsis 2/2 colitis, recent admission June 2023 2/2 aspiration event, found to have aspiration PNA with hypoxia and new afib with RVR. Initially admitted to MICU, then stepped down to telemetry, then to F. Afib w/ RVR resolved. After GOC conversation, decision made for patient to be discharged home with home hospice. Pt now p/w SOB/ Family wants pt transported to hospital. Care provider reports pt was in her normal state of health morning of admission, ate w/o difficulty. She was given Miralax to have a BM, had a BM, and then afterwards coughed. Shortly after she was noted to have more inc WOB and her SP02 was in the 60s on 2 two pulse ox machines while on her chronic 3L NC. Family called EMS and patient was brought to ED. In the ED patient found to be febrile to 100.9F, tachy to 125 and hypoxic on RA requiring 3LNC. She was started on vancomycin and zosyn for presumed aspiration pna. RVP negative, CXR done showing persistent opacification and volume loss in the left upper thorax; increased infiltrates in the right lung. Patient admitted to telemetry for treatment of AHRF in setting of asp pna. Evaluated by S&S, rec for mildly thick liquids and pureed with frequent suctioning. Started on insulin regimen for hyperglycemia. Palliative consulted, patient to be sent home with home hospice. Stable for discharge to home hospice.     Problem List/Main Diagnoses (system-based):   #Sepsis with acute hypoxic respiratory failure.   Patient met 4 SIRS on admission (T 100.9, , RR 36 WBC 14.07) with elevated lactate and hypoxia requiring NC, likely in setting of pneumonia given patient's history of aspiration pneumonia and likely new infiltrate on right. RVP neg, UA/UCx neg, BCx NGTD (Staph hominis in one set likely contamination). Received zosyn 3.375 g q8h transitioned to PO augmentin on discharge.  - completed 3 days of zosyn  - c/w augmentin 875 mg bid to complete 7 day abx course (8 doses, through 08/20 in AM)  - Pt will be sent home with a suction machine and home oxygen, pt educated about use of both    #At high risk for aspiration.   Patient with recurrent aspirations with recent admission for aspiration pneumonia, now admitted for management of aspiration pna. Likely etiology delayed oropharyngeal phase 2/2 dementia causing dysphagia. Pt on O2 via NC, unable to wean while inpatient. Evaluated by S&S, cleared for pureed with thick liquids; aligned with GOC with acknowledgement by family that patient is aspiration risk. Plan for home hospice w/ suction device and oxygen.   - Pt will be sent home with a suction machine and home oxygen, pt educated about use of both    #Atrial fibrillation.   History of atrial fibrillation on home amiodarone 200mg qd and toprol 25mg qd   -c/w home amiodarone 200 mg q24h   -discontinued metoprolol succinate 25 mg q24h as cannot be crushed  - started on metoprolol tartrate 12.5 mg q12h (crush medication in setting of multiple aspiration PNA)    #Parkinson disease.   Patient with both Parkinson's disease and Alzheimer's dementia. On home sertraline and sinemet   - C/w sinemet 25/100 mg x 2.5 tabs crushed  - C/w sertraline 100mg qd.    #Hyperlipidemia.   -c/w home atorvastatin 10mg qd    #Hypertension.   -c/w home losartan 100 mg q24h    #DM (diabetes mellitus).   Patient with history of DM on home metformin 500mg BID. A1C 7.0. mISS while inpatient with lantus 10 units at night.   - c/w home metformin 500 mg bid    #Anemia  Hgb 9.4 on admission, microcytic, at baseline. Baseline ranging from 9-11 per previous admissions.  -iron studies c/w AOCD however history of MERRILL on home PO iron  - c/w home PO iron    Patient was discharged to: home hospice    New medications: augmentin 875 mg bid (8 doses, through 08/20 in AM)  Changes to old medications: metoprolol succinate 25 mg q24h -> metoprolol tartrate 12.5 mg q24h (crushed)  Medications that were stopped: none  Equipment: discharged with suction device and home O2    Items to follow up as outpatient: aspiration precautions, home hospice    Physical exam at the time of discharge:     General: lying in bed comfortably in NAD   HEENT: NC/AT; PERRL, anicteric sclera; MMM  Cardiovascular: +S1/S2, tachycardic rate, regular rhythm   Respiratory: poor air entry, decreased BS b/l. comfortable on 3LNC. no increased WOB  Gastrointestinal: soft, NT/ND; +BSx4  Extremities: WWP; no edema, clubbing or cyanosis  Vascular: 2+ radial, DP pulses B/L  Neurological: AAOx3; no focal deficits. b/l LE weakness   Dermatologic: no appreciable wounds or damage to the skin

## 2023-08-16 NOTE — PROGRESS NOTE ADULT - SUBJECTIVE AND OBJECTIVE BOX
OVERNIGHT EVENTS:  SUBJECTIVE: Patient was seen and examined at bedside.      VITAL SIGNS:  T(F): 98.6 (08-16-23 @ 05:45)  HR: 100 (08-16-23 @ 05:45)  BP: 163/85 (08-16-23 @ 05:45)  RR: 17 (08-16-23 @ 05:45)  SpO2: 98% (08-16-23 @ 05:45)  Wt(kg): --    PHYSICAL EXAM  GENERAL: NAD, lying in bed comfortably  HEAD:  Atraumatic, normocephalic  EYES: EOMI, PERRLA, conjunctiva and sclera clear  ENT: Moist mucous membranes  NECK: Supple, no JVD  HEART: Regular rate and rhythm, no murmurs, rubs, or gallops  LUNGS: Unlabored respirations.  Clear to auscultation bilaterally, no crackles, wheezing, or rhonchi  ABDOMEN: Soft, nontender, nondistended, +BS  EXTREMITIES: 2+ peripheral pulses bilaterally. No clubbing, cyanosis, or edema  NERVOUS SYSTEM:  A&Ox3, no focal deficits   SKIN: No rashes or lesions    MEDICATIONS  (STANDING):  albuterol/ipratropium for Nebulization 3 milliLiter(s) Nebulizer every 6 hours  aMIOdarone    Tablet 200 milliGRAM(s) Oral daily  aspirin  chewable 81 milliGRAM(s) Oral daily  atorvastatin 5 milliGRAM(s) Oral at bedtime  carbidopa/levodopa  25/100 2.5 Tablet(s) Oral <User Schedule>  dextrose 5%. 1000 milliLiter(s) (100 mL/Hr) IV Continuous <Continuous>  dextrose 5%. 1000 milliLiter(s) (50 mL/Hr) IV Continuous <Continuous>  dextrose 50% Injectable 25 Gram(s) IV Push once  dextrose 50% Injectable 12.5 Gram(s) IV Push once  dextrose 50% Injectable 25 Gram(s) IV Push once  enoxaparin Injectable 40 milliGRAM(s) SubCutaneous every 24 hours  glucagon  Injectable 1 milliGRAM(s) IntraMuscular once  insulin glargine Injectable (LANTUS) 10 Unit(s) SubCutaneous at bedtime  insulin lispro (ADMELOG) corrective regimen sliding scale   SubCutaneous Before meals and at bedtime  lidocaine   4% Patch 1 Patch Transdermal daily  losartan 100 milliGRAM(s) Oral daily  metoprolol tartrate 12.5 milliGRAM(s) Oral every 12 hours  pantoprazole    Tablet 40 milliGRAM(s) Oral before breakfast  piperacillin/tazobactam IVPB.. 3.375 Gram(s) IV Intermittent every 8 hours  polyethylene glycol 3350 17 Gram(s) Oral daily  sertraline 100 milliGRAM(s) Oral daily  sodium chloride 3%  Inhalation 4 milliLiter(s) Inhalation every 6 hours    MEDICATIONS  (PRN):  acetaminophen     Tablet .. 650 milliGRAM(s) Oral every 6 hours PRN Temp greater or equal to 38C (100.4F), Mild Pain (1 - 3), Moderate Pain (4 - 6)  dextrose Oral Gel 15 Gram(s) Oral once PRN Blood Glucose LESS THAN 70 milliGRAM(s)/deciliter      Allergies    No Known Allergies    Intolerances        LABS:                        7.6    8.84  )-----------( 243      ( 16 Aug 2023 05:30 )             25.4     08-16    139  |  102  |  x   ----------------------------<  x   4.0   |  x   |  x     Ca    10.0      15 Aug 2023 11:52  Phos  2.9     08-15  Mg     1.7     08-16    TPro  5.8<L>  /  Alb  2.8<L>  /  TBili  0.2  /  DBili  x   /  AST  12  /  ALT  6<L>  /  AlkPhos  78  08-15      Urinalysis Basic - ( 15 Aug 2023 11:52 )    Color: x / Appearance: x / SG: x / pH: x  Gluc: 326 mg/dL / Ketone: x  / Bili: x / Urobili: x   Blood: x / Protein: x / Nitrite: x   Leuk Esterase: x / RBC: x / WBC x   Sq Epi: x / Non Sq Epi: x / Bacteria: x          MICROBIOLOGY      RADIOLOGY & ADDITIONAL TESTS:  Reviewed OVERNIGHT EVENTS: RADHA  SUBJECTIVE: Patient was seen and examined at bedside. Pt feeling better this AM. She denies fever/chills, URI sx, CP, SOB, abd pain, N/V, diarrhea, dysuria, headache. Looking forward to being discharged home.     VITAL SIGNS:  T(F): 98.6 (08-16-23 @ 05:45)  HR: 100 (08-16-23 @ 05:45)  BP: 163/85 (08-16-23 @ 05:45)  RR: 17 (08-16-23 @ 05:45)  SpO2: 98% (08-16-23 @ 05:45)  Wt(kg): --    PHYSICAL EXAM  General: lying in bed comfortably in NAD   HEENT: NC/AT; PERRL, anicteric sclera; MMM  Neck: supple  Cardiovascular: +S1/S2, tachycardic rate, regular rhythm   Respiratory: poor air entry, decreased BS b/l. comfortable on 4LNC. mild tachypnea.  Gastrointestinal: soft, NT/ND; +BSx4  Extremities: WWP; no edema, clubbing or cyanosis  Vascular: 2+ radial, DP pulses B/L  Neurological: AAOx3; no focal deficits. b/l LE weakness   Dermatologic: no appreciable wounds or damage to the skin    MEDICATIONS  (STANDING):  albuterol/ipratropium for Nebulization 3 milliLiter(s) Nebulizer every 6 hours  aMIOdarone    Tablet 200 milliGRAM(s) Oral daily  aspirin  chewable 81 milliGRAM(s) Oral daily  atorvastatin 5 milliGRAM(s) Oral at bedtime  carbidopa/levodopa  25/100 2.5 Tablet(s) Oral <User Schedule>  dextrose 5%. 1000 milliLiter(s) (100 mL/Hr) IV Continuous <Continuous>  dextrose 5%. 1000 milliLiter(s) (50 mL/Hr) IV Continuous <Continuous>  dextrose 50% Injectable 25 Gram(s) IV Push once  dextrose 50% Injectable 12.5 Gram(s) IV Push once  dextrose 50% Injectable 25 Gram(s) IV Push once  enoxaparin Injectable 40 milliGRAM(s) SubCutaneous every 24 hours  glucagon  Injectable 1 milliGRAM(s) IntraMuscular once  insulin glargine Injectable (LANTUS) 10 Unit(s) SubCutaneous at bedtime  insulin lispro (ADMELOG) corrective regimen sliding scale   SubCutaneous Before meals and at bedtime  lidocaine   4% Patch 1 Patch Transdermal daily  losartan 100 milliGRAM(s) Oral daily  metoprolol tartrate 12.5 milliGRAM(s) Oral every 12 hours  pantoprazole    Tablet 40 milliGRAM(s) Oral before breakfast  piperacillin/tazobactam IVPB.. 3.375 Gram(s) IV Intermittent every 8 hours  polyethylene glycol 3350 17 Gram(s) Oral daily  sertraline 100 milliGRAM(s) Oral daily  sodium chloride 3%  Inhalation 4 milliLiter(s) Inhalation every 6 hours    MEDICATIONS  (PRN):  acetaminophen     Tablet .. 650 milliGRAM(s) Oral every 6 hours PRN Temp greater or equal to 38C (100.4F), Mild Pain (1 - 3), Moderate Pain (4 - 6)  dextrose Oral Gel 15 Gram(s) Oral once PRN Blood Glucose LESS THAN 70 milliGRAM(s)/deciliter      Allergies    No Known Allergies    Intolerances        LABS:                        7.6    8.84  )-----------( 243      ( 16 Aug 2023 05:30 )             25.4     08-16    139  |  102  |  x   ----------------------------<  x   4.0   |  x   |  x     Ca    10.0      15 Aug 2023 11:52  Phos  2.9     08-15  Mg     1.7     08-16    TPro  5.8<L>  /  Alb  2.8<L>  /  TBili  0.2  /  DBili  x   /  AST  12  /  ALT  6<L>  /  AlkPhos  78  08-15      Urinalysis Basic - ( 15 Aug 2023 11:52 )    Color: x / Appearance: x / SG: x / pH: x  Gluc: 326 mg/dL / Ketone: x  / Bili: x / Urobili: x   Blood: x / Protein: x / Nitrite: x   Leuk Esterase: x / RBC: x / WBC x   Sq Epi: x / Non Sq Epi: x / Bacteria: x    MICROBIOLOGY  Culture - Blood (08.14.23 @ 18:55)    Specimen Source: .Blood Blood   Culture Results:   No growth at 1 day.    RADIOLOGY & ADDITIONAL TESTS:  Reviewed

## 2023-08-16 NOTE — PROGRESS NOTE ADULT - PROBLEM SELECTOR PLAN 4
Patient with both Parkinsons disease and Alzheimers dementia. On home sertraline 100mg qd and sinemet 25-100mg x 2.5 tabs q8.  - C/w sinemet 25/100 mg x 2.5 tabs crushed  - C/w sertraline 100mg qd
Patient with history of hyperlipidemia, per last visit notes on home medications atorvastatin 10 mg.    - pantoprazole 40mg
Patient with history of hyperlipidemia, per last visit notes on home medications atorvastatin 10 mg.    - pantoprazole 40mg
.  In the setting of aspiration and AFib/RVR  -c/w treatment of reversible issues  -wean supplemental O2 as tolerated  -can transition to PO Abx for discharge
Patient with both Parkinsons disease and Alzheimers dementia. On home sertraline 100mg qd and sinemet 25-100mg x 2.5 tabs q8.  - C/w sinemet 25/100 mg x 2.5 tabs crushed  - C/w sertraline 100mg qd

## 2023-08-16 NOTE — DISCHARGE NOTE PROVIDER - NSDCCPTREATMENT_GEN_ALL_CORE_FT
PRINCIPAL PROCEDURE  Procedure: XR chest AP  Findings and Treatment: EXAM:  XR CHEST PORTABLE IMMED 1V    PROCEDURE DATE:  08/13/2023    INTERPRETATION:  Chest x-ray  Indication: Shortness of breath  An AP view of the chest is compared to the prior study dated 6/19/2020.   Postoperative changes in the left lung with complete opacification of the   left hemithorax and volume loss again noted. Hazy diffuse infiltrate in   the right lung. Small right pleural effusion. No pneumothorax. The heart   is displaced to the left and cannot be optimally evaluated. Postoperative   changes in the left breast. Degenerative changes of the bones.  IMPRESSION: Persistent opacification and volume loss in the left upper   thorax. Increased infiltrates in the rightlung could be due to   congestive heart failure or pneumonia.  --- End of Report ---

## 2023-08-16 NOTE — PROGRESS NOTE ADULT - PROBLEM SELECTOR PLAN 1
.  -c/w Tylenol 650mg PO q6h PRN for Mild/Moderate Pain  -c/w Lidocaine Patch daily
Patient met 4 SIRS on admission (T 100.9, , RR 36 WBC 14.07) with elevated lactate and hypoxia requiring NC, likely in setting of pneumonia given patient's history of aspiration pneumonia and likely new infiltrate on right. S/p 1.65L NaCl, Vancomycin 750, Zosyn 3.375 in ED.   -RVP negative  -UA and UCs negative   -CXR on admission showing persistent opacification and volume loss in the left upper thorax; increased infiltrates in the right lung  -c/w zosyn for treatment of aspiration pna; can transition to PO augmentin upon discharge   -f/u BCs  -tylenol prn fevers   -aspiration precautions; frequent suctioning   -wean O2 as tolerated
Patient met 4 SIRS on admission (T 100.9, , RR 36 WBC 14.07) with elevated lactate, likely in setting of pneumonia given patient's history of aspiration pneumonia and likely new infiltrate on right.  Urinary source unlike UA without evidence of infection.  S/p 1.65L NaCl, Vancomycin 750, Zosyn 3.375.    - Continue Vancomycin, Zosyn  - Tylenol PRN for fever  - F/u UCx  - Bcx shows staph epidermidis, repeat bcx  - F/u Bcx
Patient met 4 SIRS on admission (T 100.9, , RR 36 WBC 14.07) with elevated lactate, likely in setting of pneumonia given patient's history of aspiration pneumonia and likely new infiltrate on right.  Urinary source unlike UA without evidence of infection.  S/p 1.65L NaCl, Vancomycin 750, Zosyn 3.375. Ucx neg.    - zosyn 3.375g q8  - Tylenol PRN for fever  - Bcx shows staph epidermidis, repeat bcx  - F/u Bcx, no growth 12 hrs
Patient met 4 SIRS on admission (T 100.9, , RR 36 WBC 14.07) with elevated lactate and hypoxia requiring NC, likely in setting of pneumonia given patient's history of aspiration pneumonia and likely new infiltrate on right. S/p 1.65L NaCl, Vancomycin 750, Zosyn 3.375 in ED.   -RVP negative  -UA and UCs negative   -CXR on admission showing persistent opacification and volume loss in the left upper thorax; increased infiltrates in the right lung  -c/w zosyn for treatment of aspiration pna; can transition to PO augmentin upon discharge (7 day total course)  -f/u BCs  -tylenol prn fevers   -aspiration precautions; frequent suctioning   -wean O2 as tolerated

## 2023-08-16 NOTE — DISCHARGE NOTE PROVIDER - NSDCMRMEDTOKEN_GEN_ALL_CORE_FT
acetaminophen 325 mg oral tablet: 2 tab(s) orally every 6 hours As needed Temp greater or equal to 38C (100.4F), Mild Pain (1 - 3)  acetylcysteine 20% inhalation solution: 4 milliliter(s) inhaled every 6 hours  amiodarone 200 mg oral tablet: 1 tab(s) orally every 24 hours  aspirin 81 mg oral capsule: 1 cap(s) orally once a day  atorvastatin 10 mg oral tablet: 0.5 tab(s) orally once a day  docusate-senna 65 mg-10 mg oral tablet: 1 tab(s) orally 2 times a day  famotidine 20 mg oral tablet: 1 tab(s) orally every 24 hours  ipratropium 500 mcg/2.5 mL inhalation solution: 2.5 milliliter(s) inhaled every 6 hours  lactobacillus acidophilus oral capsule: 1 tab(s) orally once a day  losartan 100 mg oral tablet: 1 tab(s) orally once a day  MetFORMIN (Eqv-Fortamet) 500 mg oral tablet, extended release: 1 tab(s) orally every 12 hours  metoprolol succinate 25 mg oral tablet, extended release: 1 orally once a day  Multiple Vitamins oral tablet: 1 tab(s) orally once a day  sertraline 100 mg oral tablet: 1 tab(s) orally once a day  Sinemet 25 mg-100 mg oral tablet: 2.5 tab(s) orally 3 times a day  sodium chloride 3% inhalation solution: 4 milliliter(s) inhaled every 6 hours   acetaminophen 325 mg oral tablet: 2 tab(s) orally every 6 hours As needed Temp greater or equal to 38C (100.4F), Mild Pain (1 - 3)  acetylcysteine 20% inhalation solution: 4 milliliter(s) inhaled every 6 hours  amiodarone 200 mg oral tablet: 1 tab(s) orally every 24 hours  amoxicillin-clavulanate 875 mg-125 mg oral tablet: 1 tab(s) orally 2 times a day Please take your antibiotics for an additional 8 doses.  aspirin 81 mg oral capsule: 1 cap(s) orally once a day  atorvastatin 10 mg oral tablet: 0.5 tab(s) orally once a day  docusate-senna 65 mg-10 mg oral tablet: 1 tab(s) orally 2 times a day  famotidine 20 mg oral tablet: 1 tab(s) orally every 24 hours  ipratropium 500 mcg/2.5 mL inhalation solution: 2.5 milliliter(s) inhaled every 6 hours  lactobacillus acidophilus oral capsule: 1 tab(s) orally once a day  losartan 100 mg oral tablet: 1 tab(s) orally once a day  MetFORMIN (Eqv-Fortamet) 500 mg oral tablet, extended release: 1 tab(s) orally every 12 hours  metoprolol tartrate 25 mg oral tablet: 0.5 tab(s) orally 2 times a day Please take 1/2 a tab twice a day. Please crush the tablet for easier swallowing.  Multiple Vitamins oral tablet: 1 tab(s) orally once a day  sertraline 100 mg oral tablet: 1 tab(s) orally once a day  Sinemet 25 mg-100 mg oral tablet: 2.5 tab(s) orally 3 times a day  sodium chloride 3% inhalation solution: 4 milliliter(s) inhaled every 6 hours

## 2023-08-16 NOTE — PROGRESS NOTE ADULT - ATTENDING COMMENTS
88 YOF with extensive PMH admitted to stepdown initially for acute hypoxic respiratory failure 2/2 aspiration PNA downgraded to RMF yesterday with plans to transition to home hospice today.

## 2023-08-16 NOTE — PROGRESS NOTE ADULT - PROBLEM SELECTOR PLAN 5
.  Advancing disease  -at baseline, patient is alert but requires prompting; trending towards baseline  -hospice eligible given new bedbound status, dysphagia, hypoalbuminemia
Patient with history of HTN, per last visit note on home losartan 100mg qd.     - continue home meds, metoprolol succinate 25mg ER, losartan 100mg qD
-c/w home atorvastatin 10mg qd
Patient with history of HTN, per last visit note on home losartan 100mg qd.     - metoprolol tartrate 12.5mg BID  - losartan 100mg qD
-c/w home atorvastatin 10mg qd

## 2023-08-16 NOTE — PROGRESS NOTE ADULT - PROBLEM SELECTOR PLAN 9
F: none  E: replete as needed  N: pureed diet   DVT ppx: lovenox   DNR/DNI   DIspo: RMF then home hospice
F: none  E: replete as needed  N: pureed diet   DVT ppx: lovenox   DNR/DNI   DIspo: home hospice

## 2023-08-16 NOTE — PROGRESS NOTE ADULT - PROBLEM SELECTOR PROBLEM 2
Atrial fibrillation
At high risk for aspiration
At high risk for aspiration
Atrial fibrillation
Dysphagia

## 2023-08-16 NOTE — PROGRESS NOTE ADULT - PROBLEM SELECTOR PROBLEM 1
Sepsis with acute hypoxic respiratory failure
Chronic back pain
Sepsis with acute hypoxic respiratory failure

## 2023-08-16 NOTE — PROGRESS NOTE ADULT - PROBLEM SELECTOR PLAN 3
.  PPSV = 40%, requires assistance for most ADLs  -PT Eval when appropriate for mobility  -c/w supportive care, OOB, encourage movement
History of atrial fibrillation on home amiodarone 200mg qd and toprol 25mg qd   -c/w home amiodarone   -lopressor 12.5mg q12 instead of toprol as medications should be crushed in setting of recurrent aspirations   -monitor HR; currently rate controlled
History of atrial fibrillation on home amiodarone 200mg qd and toprol 25mg qd   -c/w home amiodarone   -lopressor 12.5mg q12 instead of toprol as medications should be crushed in setting of recurrent aspirations   -monitor HR; currently rate controlled
Patient with both Parkinsons disease and Alzheimers dementia. On home keppra 500mg q12, sertraline 50mg qd, and sinemet 25-100mg x 2.5 tabs q8. AAOx1-2, reportedly at baseline mental status. Pt did not pass bedside dysphagia this PM.    - C/w sinemet 25/100 mg x 2.5 tabs crushed  - C/w sertraline 50 mg PO
Patient with both Parkinsons disease and Alzheimers dementia. On home keppra 500mg q12, sertraline 50mg qd, and sinemet 25-100mg x 2.5 tabs q8. AAOx1-2, reportedly at baseline mental status. Pt did not pass bedside dysphagia this PM.    - C/w sinemet 25/100 mg x 2.5 tabs crushed  - C/w sertraline 100mg PO

## 2023-08-16 NOTE — PROGRESS NOTE ADULT - PROBLEM SELECTOR PROBLEM 6
DM (diabetes mellitus)
Hypertension
DM (diabetes mellitus)
Hypertension
Advanced care planning/counseling discussion

## 2023-08-16 NOTE — PROGRESS NOTE ADULT - PROVIDER SPECIALTY LIST ADULT
Internal Medicine
Internal Medicine
Palliative Care
Internal Medicine

## 2023-08-16 NOTE — DISCHARGE NOTE PROVIDER - ATTENDING DISCHARGE PHYSICAL EXAMINATION:
Admitted for acute hypoxic respiratory failure secondary to aspiration pneumonia. Discharged on home hospice.

## 2023-08-16 NOTE — PROGRESS NOTE ADULT - PROBLEM SELECTOR PROBLEM 4
Hyperlipidemia
Parkinson disease
Parkinson disease
Sepsis with acute hypoxic respiratory failure
Hyperlipidemia

## 2023-08-18 LAB
CULTURE RESULTS: SIGNIFICANT CHANGE UP
SPECIMEN SOURCE: SIGNIFICANT CHANGE UP

## 2023-08-19 LAB
CULTURE RESULTS: SIGNIFICANT CHANGE UP
SPECIMEN SOURCE: SIGNIFICANT CHANGE UP

## 2023-08-21 DIAGNOSIS — J69.0 PNEUMONITIS DUE TO INHALATION OF FOOD AND VOMIT: ICD-10-CM

## 2023-08-21 DIAGNOSIS — I10 ESSENTIAL (PRIMARY) HYPERTENSION: ICD-10-CM

## 2023-08-21 DIAGNOSIS — F02.80 DEMENTIA IN OTHER DISEASES CLASSIFIED ELSEWHERE, UNSPECIFIED SEVERITY, WITHOUT BEHAVIORAL DISTURBANCE, PSYCHOTIC DISTURBANCE, MOOD DISTURBANCE, AND ANXIETY: ICD-10-CM

## 2023-08-21 DIAGNOSIS — G30.9 ALZHEIMER'S DISEASE, UNSPECIFIED: ICD-10-CM

## 2023-08-21 DIAGNOSIS — Z90.49 ACQUIRED ABSENCE OF OTHER SPECIFIED PARTS OF DIGESTIVE TRACT: ICD-10-CM

## 2023-08-21 DIAGNOSIS — Z20.822 CONTACT WITH AND (SUSPECTED) EXPOSURE TO COVID-19: ICD-10-CM

## 2023-08-21 DIAGNOSIS — E11.9 TYPE 2 DIABETES MELLITUS WITHOUT COMPLICATIONS: ICD-10-CM

## 2023-08-21 DIAGNOSIS — K52.9 NONINFECTIVE GASTROENTERITIS AND COLITIS, UNSPECIFIED: ICD-10-CM

## 2023-08-21 DIAGNOSIS — A41.9 SEPSIS, UNSPECIFIED ORGANISM: ICD-10-CM

## 2023-08-21 DIAGNOSIS — Z79.84 LONG TERM (CURRENT) USE OF ORAL HYPOGLYCEMIC DRUGS: ICD-10-CM

## 2023-08-21 DIAGNOSIS — Z66 DO NOT RESUSCITATE: ICD-10-CM

## 2023-08-21 DIAGNOSIS — G20 PARKINSON'S DISEASE: ICD-10-CM

## 2023-08-21 DIAGNOSIS — E78.5 HYPERLIPIDEMIA, UNSPECIFIED: ICD-10-CM

## 2023-08-21 DIAGNOSIS — I48.91 UNSPECIFIED ATRIAL FIBRILLATION: ICD-10-CM

## 2023-08-21 DIAGNOSIS — J96.01 ACUTE RESPIRATORY FAILURE WITH HYPOXIA: ICD-10-CM

## 2023-08-21 DIAGNOSIS — R65.20 SEVERE SEPSIS WITHOUT SEPTIC SHOCK: ICD-10-CM

## 2023-08-21 DIAGNOSIS — G89.29 OTHER CHRONIC PAIN: ICD-10-CM

## 2023-08-21 DIAGNOSIS — Z90.2 ACQUIRED ABSENCE OF LUNG [PART OF]: ICD-10-CM

## 2023-08-21 DIAGNOSIS — D64.9 ANEMIA, UNSPECIFIED: ICD-10-CM

## 2023-08-21 DIAGNOSIS — K59.00 CONSTIPATION, UNSPECIFIED: ICD-10-CM

## 2023-08-21 DIAGNOSIS — R13.12 DYSPHAGIA, OROPHARYNGEAL PHASE: ICD-10-CM

## 2023-08-21 DIAGNOSIS — Z79.82 LONG TERM (CURRENT) USE OF ASPIRIN: ICD-10-CM

## 2023-08-21 DIAGNOSIS — M54.9 DORSALGIA, UNSPECIFIED: ICD-10-CM

## 2023-08-23 LAB
CULTURE RESULTS: SIGNIFICANT CHANGE UP
ORGANISM # SPEC MICROSCOPIC CNT: SIGNIFICANT CHANGE UP
SPECIMEN SOURCE: SIGNIFICANT CHANGE UP

## 2023-08-28 ENCOUNTER — APPOINTMENT (OUTPATIENT)
Dept: INTERNAL MEDICINE | Facility: CLINIC | Age: 88
End: 2023-08-28

## 2023-09-22 NOTE — OCCUPATIONAL THERAPY INITIAL EVALUATION ADULT - ADDITIONAL COMMENTS
Pt admitted from [facility] rehab. Pt is a poor historian, history obtained from aide at bedside. Prior to rehab, pt living alone with 24/7 HHA (3 aides rotate), no TI, has elevator access. Pt has a shower chair, grab bars, wheelchair and RW. Aides assist with all ADLs and patient is 1 person assist for transfers and ambulation with RW (short distances only). No

## 2023-12-01 NOTE — ED ADULT NURSE NOTE - GENITOURINARY ASSESSMENT
- - - +  3 cm left forehead linear laceration no ocular involvement no foreign body +  left [place] orbital hematoma no eye proptosis

## 2024-04-01 NOTE — H&P ADULT - NSTOBACCOSCREENHP_GEN_A_NCS
No
PAST SURGICAL HISTORY:  H/O knee surgery     History of cataract surgery     S/P appendectomy

## 2024-06-19 NOTE — ASU PATIENT PROFILE, ADULT - CENTRAL VENOUS CATHETER
----- Message from Brandy Chi no

## 2024-09-10 NOTE — DISCHARGE NOTE PROVIDER - NSDCQMAMI_CARD_ALL_CORE
For information on Fall & Injury Prevention, visit: [email or web address] OR  [email or web address] OR  https://www.cdc.gov/steadi/patient.html No

## 2025-06-25 NOTE — ASU PATIENT PROFILE, ADULT - TEACHING/LEARNING FACTORS INFLUENCE READINESS TO LEARN
PHYSICAL EXAM:      Constitutional: NAD, well-groomed, well-developed  HEENT: EOMI, Normal Hearing  Neck: No LAD, No JVD  Back: Normal spine flexure, No CVA tenderness  Respiratory: CTAB  Cardiovascular: S1 and S2, RRR  Gastrointestinal: BS+, soft, NT/ND  Extremities: mild  peripheral edema  Vascular: 2+ peripheral pulses  Neurological: A/O x 3, no focal deficits  Psychiatric: Normal mood, normal affect  Musculoskeletal: 5/5 strength b/l upper and lower extremities  Skin: No rashes none